# Patient Record
Sex: MALE | Race: WHITE | NOT HISPANIC OR LATINO | Employment: UNEMPLOYED | URBAN - METROPOLITAN AREA
[De-identification: names, ages, dates, MRNs, and addresses within clinical notes are randomized per-mention and may not be internally consistent; named-entity substitution may affect disease eponyms.]

---

## 2019-03-06 ENCOUNTER — HISTORICAL (OUTPATIENT)
Dept: INTERNAL MEDICINE | Facility: CLINIC | Age: 59
End: 2019-03-06

## 2019-03-06 LAB
ABS NEUT (OLG): 5.18 X10(3)/MCL (ref 2.1–9.2)
ALBUMIN SERPL-MCNC: 2.6 GM/DL (ref 3.4–5)
ALBUMIN/GLOB SERPL: 0.5 RATIO (ref 1.1–2)
ALP SERPL-CCNC: 76 UNIT/L (ref 45–117)
ALT SERPL-CCNC: 13 UNIT/L (ref 12–78)
APPEARANCE, UA: CLEAR
AST SERPL-CCNC: 19 UNIT/L (ref 15–37)
BACTERIA #/AREA URNS AUTO: ABNORMAL /[HPF]
BASOPHILS # BLD AUTO: 0.04 X10(3)/MCL
BASOPHILS NFR BLD AUTO: 0 %
BILIRUB SERPL-MCNC: 0.5 MG/DL (ref 0.2–1)
BILIRUB UR QL STRIP: NEGATIVE
BILIRUBIN DIRECT+TOT PNL SERPL-MCNC: 0.2 MG/DL
BILIRUBIN DIRECT+TOT PNL SERPL-MCNC: 0.3 MG/DL
BUN SERPL-MCNC: 12 MG/DL (ref 7–18)
CALCIUM SERPL-MCNC: 8 MG/DL (ref 8.5–10.1)
CHLORIDE SERPL-SCNC: 99 MMOL/L (ref 98–107)
CHOLEST SERPL-MCNC: 90 MG/DL
CHOLEST/HDLC SERPL: 2.4 {RATIO} (ref 0–5)
CO2 SERPL-SCNC: 26 MMOL/L (ref 21–32)
COLOR UR: YELLOW
CREAT SERPL-MCNC: 0.9 MG/DL (ref 0.6–1.3)
EOSINOPHIL # BLD AUTO: 0.18 X10(3)/MCL
EOSINOPHIL NFR BLD AUTO: 2 %
ERYTHROCYTE [DISTWIDTH] IN BLOOD BY AUTOMATED COUNT: 14.5 % (ref 11.5–14.5)
EST. AVERAGE GLUCOSE BLD GHB EST-MCNC: 80 MG/DL
FOLATE SERPL-MCNC: 3.4 NG/ML (ref 3.1–17.5)
GLOBULIN SER-MCNC: 5.6 GM/ML (ref 2.3–3.5)
GLUCOSE (UA): NORMAL
GLUCOSE SERPL-MCNC: 69 MG/DL (ref 74–106)
HAV IGM SERPL QL IA: NONREACTIVE
HBA1C MFR BLD: 4.4 % (ref 4.2–6.3)
HBV CORE IGM SERPL QL IA: NONREACTIVE
HBV SURFACE AG SERPL QL IA: NEGATIVE
HCT VFR BLD AUTO: 36.9 % (ref 40–51)
HCV AB SERPL QL IA: REACTIVE
HDLC SERPL-MCNC: 38 MG/DL
HGB BLD-MCNC: 12.7 GM/DL (ref 13.5–17.5)
HGB UR QL STRIP: >1 MG/DL
HIV 1+2 AB+HIV1 P24 AG SERPL QL IA: NONREACTIVE
HYALINE CASTS #/AREA URNS LPF: ABNORMAL /[LPF]
IMM GRANULOCYTES # BLD AUTO: 0.04 10*3/UL
IMM GRANULOCYTES NFR BLD AUTO: 0 %
KETONES UR QL STRIP: NEGATIVE
LDLC SERPL CALC-MCNC: 37 MG/DL (ref 0–130)
LEUKOCYTE ESTERASE UR QL STRIP: NEGATIVE
LYMPHOCYTES # BLD AUTO: 2.47 X10(3)/MCL
LYMPHOCYTES NFR BLD AUTO: 28 % (ref 13–40)
MCH RBC QN AUTO: 34.6 PG (ref 26–34)
MCHC RBC AUTO-ENTMCNC: 34.4 GM/DL (ref 31–37)
MCV RBC AUTO: 100.5 FL (ref 80–100)
MONOCYTES # BLD AUTO: 0.78 X10(3)/MCL
MONOCYTES NFR BLD AUTO: 9 % (ref 4–12)
NEUTROPHILS # BLD AUTO: 5.18 X10(3)/MCL
NEUTROPHILS NFR BLD AUTO: 60 X10(3)/MCL
NITRITE UR QL STRIP: NEGATIVE
PH UR STRIP: 7 [PH] (ref 4.5–8)
PLATELET # BLD AUTO: 170 X10(3)/MCL (ref 130–400)
PMV BLD AUTO: 10.2 FL (ref 7.4–10.4)
POTASSIUM SERPL-SCNC: 4.1 MMOL/L (ref 3.5–5.1)
PROT SERPL-MCNC: 8.2 GM/DL (ref 6.4–8.2)
PROT UR QL STRIP: 10 MG/DL
PSA SERPL-MCNC: 0.6 NG/ML
RBC # BLD AUTO: 3.67 X10(6)/MCL (ref 4.5–5.9)
RBC #/AREA URNS AUTO: ABNORMAL /[HPF]
SODIUM SERPL-SCNC: 132 MMOL/L (ref 136–145)
SP GR UR STRIP: 1.01 (ref 1–1.03)
SQUAMOUS #/AREA URNS LPF: ABNORMAL /[LPF]
T PALLIDUM AB SER QL: NONREACTIVE
TRIGL SERPL-MCNC: 77 MG/DL
TSH SERPL-ACNC: 2.27 MIU/L (ref 0.36–3.74)
UROBILINOGEN UR STRIP-ACNC: 2 MG/DL
VIT B12 SERPL-MCNC: 182 PG/ML (ref 193–986)
VLDLC SERPL CALC-MCNC: 15 MG/DL
WBC # SPEC AUTO: 8.7 X10(3)/MCL (ref 4.5–11)
WBC #/AREA URNS AUTO: ABNORMAL /HPF

## 2019-03-08 LAB — FINAL CULTURE: NO GROWTH

## 2019-05-21 ENCOUNTER — HISTORICAL (OUTPATIENT)
Dept: ADMINISTRATIVE | Facility: HOSPITAL | Age: 59
End: 2019-05-21

## 2019-05-21 LAB
ALBUMIN SERPL-MCNC: 3 GM/DL (ref 3.4–5)
ALBUMIN/GLOB SERPL: 0.5 RATIO (ref 1.1–2)
ALP SERPL-CCNC: 79 UNIT/L (ref 45–117)
ALT SERPL-CCNC: 21 UNIT/L (ref 12–78)
APTT PPP: 29.9 SECOND(S) (ref 23.3–37)
AST SERPL-CCNC: 31 UNIT/L (ref 15–37)
BILIRUB SERPL-MCNC: 1.2 MG/DL (ref 0.2–1)
BILIRUBIN DIRECT+TOT PNL SERPL-MCNC: 0.5 MG/DL
BILIRUBIN DIRECT+TOT PNL SERPL-MCNC: 0.7 MG/DL
BUN SERPL-MCNC: 6 MG/DL (ref 7–18)
CALCIUM SERPL-MCNC: 8.7 MG/DL (ref 8.5–10.1)
CHLORIDE SERPL-SCNC: 95 MMOL/L (ref 98–107)
CO2 SERPL-SCNC: 27 MMOL/L (ref 21–32)
CREAT SERPL-MCNC: 0.9 MG/DL (ref 0.6–1.3)
ERYTHROCYTE [DISTWIDTH] IN BLOOD BY AUTOMATED COUNT: 12.8 % (ref 11.5–14.5)
GLOBULIN SER-MCNC: 5.5 GM/ML (ref 2.3–3.5)
GLUCOSE SERPL-MCNC: 72 MG/DL (ref 74–106)
HCT VFR BLD AUTO: 37.6 % (ref 40–51)
HGB BLD-MCNC: 13.6 GM/DL (ref 13.5–17.5)
INR PPP: 1.03 (ref 0.9–1.2)
MCH RBC QN AUTO: 37.7 PG (ref 26–34)
MCHC RBC AUTO-ENTMCNC: 36.2 GM/DL (ref 31–37)
MCV RBC AUTO: 104.2 FL (ref 80–100)
PLATELET # BLD AUTO: 191 X10(3)/MCL (ref 130–400)
PMV BLD AUTO: 9.9 FL (ref 7.4–10.4)
POTASSIUM SERPL-SCNC: 4.2 MMOL/L (ref 3.5–5.1)
PROT SERPL-MCNC: 8.5 GM/DL (ref 6.4–8.2)
PROTHROMBIN TIME: 13.4 SECOND(S) (ref 11.9–14.4)
RBC # BLD AUTO: 3.61 X10(6)/MCL (ref 4.5–5.9)
SODIUM SERPL-SCNC: 130 MMOL/L (ref 136–145)
WBC # SPEC AUTO: 7.9 X10(3)/MCL (ref 4.5–11)

## 2019-05-24 ENCOUNTER — HISTORICAL (OUTPATIENT)
Dept: SURGERY | Facility: HOSPITAL | Age: 59
End: 2019-05-24

## 2019-05-24 LAB
ETHANOL SERPL-MCNC: <3 MG/DL
POTASSIUM SERPL-SCNC: 4.8 MMOL/L (ref 3.5–5.1)

## 2019-05-31 ENCOUNTER — HOSPITAL ENCOUNTER (OUTPATIENT)
Dept: MEDSURG UNIT | Facility: HOSPITAL | Age: 59
End: 2019-06-01
Attending: OTOLARYNGOLOGY | Admitting: OTOLARYNGOLOGY

## 2019-05-31 LAB — POTASSIUM SERPL-SCNC: 4.7 MMOL/L (ref 3.5–5.1)

## 2019-06-28 ENCOUNTER — HISTORICAL (OUTPATIENT)
Dept: SURGERY | Facility: HOSPITAL | Age: 59
End: 2019-06-28

## 2019-06-28 LAB
ABS NEUT (OLG): 7.17 X10(3)/MCL (ref 2.1–9.2)
BASOPHILS # BLD AUTO: 0.06 X10(3)/MCL
BASOPHILS NFR BLD AUTO: 0 %
BUN SERPL-MCNC: 7 MG/DL (ref 7–18)
CALCIUM SERPL-MCNC: 9.3 MG/DL (ref 8.5–10.1)
CHLORIDE SERPL-SCNC: 98 MMOL/L (ref 98–107)
CO2 SERPL-SCNC: 24 MMOL/L (ref 21–32)
CREAT SERPL-MCNC: 1.1 MG/DL (ref 0.6–1.3)
CREAT/UREA NIT SERPL: 6
EOSINOPHIL # BLD AUTO: 0.17 X10(3)/MCL
EOSINOPHIL NFR BLD AUTO: 2 %
ERYTHROCYTE [DISTWIDTH] IN BLOOD BY AUTOMATED COUNT: 12 % (ref 11.5–14.5)
GLUCOSE SERPL-MCNC: 115 MG/DL (ref 74–106)
HCT VFR BLD AUTO: 40.5 % (ref 40–51)
HGB BLD-MCNC: 13.9 GM/DL (ref 13.5–17.5)
IMM GRANULOCYTES # BLD AUTO: 0.08 10*3/UL
IMM GRANULOCYTES NFR BLD AUTO: 1 %
LYMPHOCYTES # BLD AUTO: 2.93 X10(3)/MCL
LYMPHOCYTES NFR BLD AUTO: 26 % (ref 13–40)
MCH RBC QN AUTO: 37 PG (ref 26–34)
MCHC RBC AUTO-ENTMCNC: 34.3 GM/DL (ref 31–37)
MCV RBC AUTO: 107.7 FL (ref 80–100)
MONOCYTES # BLD AUTO: 0.98 X10(3)/MCL
MONOCYTES NFR BLD AUTO: 9 % (ref 4–12)
NEUTROPHILS # BLD AUTO: 7.17 X10(3)/MCL
NEUTROPHILS NFR BLD AUTO: 63 X10(3)/MCL
PLATELET # BLD AUTO: 218 X10(3)/MCL (ref 130–400)
PMV BLD AUTO: 9.2 FL (ref 7.4–10.4)
POTASSIUM SERPL-SCNC: 3.9 MMOL/L (ref 3.5–5.1)
RBC # BLD AUTO: 3.76 X10(6)/MCL (ref 4.5–5.9)
SODIUM SERPL-SCNC: 130 MMOL/L (ref 136–145)
WBC # SPEC AUTO: 11.4 X10(3)/MCL (ref 4.5–11)

## 2019-07-10 ENCOUNTER — HISTORICAL (OUTPATIENT)
Dept: RESPIRATORY THERAPY | Facility: HOSPITAL | Age: 59
End: 2019-07-10

## 2019-07-26 ENCOUNTER — HISTORICAL (OUTPATIENT)
Dept: SURGERY | Facility: HOSPITAL | Age: 59
End: 2019-07-26

## 2020-04-28 ENCOUNTER — HISTORICAL (OUTPATIENT)
Dept: INTERNAL MEDICINE | Facility: CLINIC | Age: 60
End: 2020-04-28

## 2020-04-28 LAB
ABS NEUT (OLG): 3.79 X10(3)/MCL (ref 2.1–9.2)
ALBUMIN SERPL-MCNC: 2.8 GM/DL (ref 3.4–5)
ALBUMIN/GLOB SERPL: 0.6 RATIO (ref 1.1–2)
ALP SERPL-CCNC: 66 UNIT/L (ref 45–117)
ALT SERPL-CCNC: 17 UNIT/L (ref 12–78)
AST SERPL-CCNC: 23 UNIT/L (ref 15–37)
BASOPHILS # BLD AUTO: 0 X10(3)/MCL (ref 0–0.2)
BASOPHILS NFR BLD AUTO: 1 %
BILIRUB SERPL-MCNC: 0.6 MG/DL (ref 0.2–1)
BILIRUBIN DIRECT+TOT PNL SERPL-MCNC: 0.2 MG/DL (ref 0–0.2)
BILIRUBIN DIRECT+TOT PNL SERPL-MCNC: 0.4 MG/DL
BUN SERPL-MCNC: 7 MG/DL (ref 7–18)
CALCIUM SERPL-MCNC: 8.6 MG/DL (ref 8.5–10.1)
CHLORIDE SERPL-SCNC: 106 MMOL/L (ref 98–107)
CO2 SERPL-SCNC: 27 MMOL/L (ref 21–32)
CREAT SERPL-MCNC: 0.9 MG/DL (ref 0.6–1.3)
EOSINOPHIL # BLD AUTO: 0.3 X10(3)/MCL (ref 0–0.9)
EOSINOPHIL NFR BLD AUTO: 5 %
ERYTHROCYTE [DISTWIDTH] IN BLOOD BY AUTOMATED COUNT: 12.2 % (ref 11.5–14.5)
GLOBULIN SER-MCNC: 4.5 GM/ML (ref 2.3–3.5)
GLUCOSE SERPL-MCNC: 96 MG/DL (ref 74–106)
HCT VFR BLD AUTO: 39.5 % (ref 40–51)
HGB BLD-MCNC: 13.6 GM/DL (ref 13.5–17.5)
IMM GRANULOCYTES # BLD AUTO: 0.01 10*3/UL
IMM GRANULOCYTES NFR BLD AUTO: 0 %
LYMPHOCYTES # BLD AUTO: 2.3 X10(3)/MCL (ref 0.6–4.6)
LYMPHOCYTES NFR BLD AUTO: 32 %
MCH RBC QN AUTO: 34.4 PG (ref 26–34)
MCHC RBC AUTO-ENTMCNC: 34.4 GM/DL (ref 31–37)
MCV RBC AUTO: 100 FL (ref 80–100)
MONOCYTES # BLD AUTO: 0.7 X10(3)/MCL (ref 0.1–1.3)
MONOCYTES NFR BLD AUTO: 10 %
NEUTROPHILS # BLD AUTO: 3.79 X10(3)/MCL (ref 2.1–9.2)
NEUTROPHILS NFR BLD AUTO: 53 %
PLATELET # BLD AUTO: 231 X10(3)/MCL (ref 130–400)
PMV BLD AUTO: 9.3 FL (ref 7.4–10.4)
POTASSIUM SERPL-SCNC: 4.4 MMOL/L (ref 3.5–5.1)
PROT SERPL-MCNC: 7.3 GM/DL (ref 6.4–8.2)
RBC # BLD AUTO: 3.95 X10(6)/MCL (ref 4.5–5.9)
SODIUM SERPL-SCNC: 138 MMOL/L (ref 136–145)
WBC # SPEC AUTO: 7.2 X10(3)/MCL (ref 4.5–11)

## 2020-12-22 ENCOUNTER — HISTORICAL (OUTPATIENT)
Dept: INTERNAL MEDICINE | Facility: CLINIC | Age: 60
End: 2020-12-22

## 2020-12-22 LAB
ABS NEUT (OLG): 5.16 X10(3)/MCL (ref 2.1–9.2)
ALBUMIN SERPL-MCNC: 3.8 GM/DL (ref 3.4–4.8)
ALBUMIN/GLOB SERPL: 0.8 RATIO (ref 1.1–2)
ALP SERPL-CCNC: 62 UNIT/L (ref 40–150)
ALT SERPL-CCNC: 22 UNIT/L (ref 0–55)
APPEARANCE, UA: CLEAR
AST SERPL-CCNC: 40 UNIT/L (ref 5–34)
BACTERIA #/AREA URNS AUTO: ABNORMAL /HPF
BASOPHILS # BLD AUTO: 0 X10(3)/MCL (ref 0–0.2)
BASOPHILS NFR BLD AUTO: 0 %
BILIRUB SERPL-MCNC: 1 MG/DL
BILIRUB UR QL STRIP: NEGATIVE
BILIRUBIN DIRECT+TOT PNL SERPL-MCNC: 0.5 MG/DL (ref 0–0.5)
BILIRUBIN DIRECT+TOT PNL SERPL-MCNC: 0.5 MG/DL (ref 0–0.8)
BUN SERPL-MCNC: 5 MG/DL (ref 8.4–25.7)
CALCIUM SERPL-MCNC: 9.3 MG/DL (ref 8.8–10)
CHLORIDE SERPL-SCNC: 95 MMOL/L (ref 98–107)
CHOLEST SERPL-MCNC: 125 MG/DL
CHOLEST/HDLC SERPL: 5 {RATIO} (ref 0–5)
CO2 SERPL-SCNC: 25 MMOL/L (ref 23–31)
COLOR UR: COLORLESS
CREAT SERPL-MCNC: 0.98 MG/DL (ref 0.73–1.18)
DEPRECATED CALCIDIOL+CALCIFEROL SERPL-MC: 15.2 NG/ML (ref 30–80)
EOSINOPHIL # BLD AUTO: 0.3 X10(3)/MCL (ref 0–0.9)
EOSINOPHIL NFR BLD AUTO: 4 %
ERYTHROCYTE [DISTWIDTH] IN BLOOD BY AUTOMATED COUNT: 12.6 % (ref 11.5–14.5)
EST. AVERAGE GLUCOSE BLD GHB EST-MCNC: 85.3 MG/DL
GLOBULIN SER-MCNC: 4.7 GM/DL (ref 2.4–3.5)
GLUCOSE (UA): NEGATIVE
GLUCOSE SERPL-MCNC: 117 MG/DL (ref 82–115)
HBA1C MFR BLD: 4.6 %
HCT VFR BLD AUTO: 40.8 % (ref 40–51)
HDLC SERPL-MCNC: 24 MG/DL (ref 35–60)
HGB BLD-MCNC: 14.3 GM/DL (ref 13.5–17.5)
HGB UR QL STRIP: NEGATIVE
HYALINE CASTS #/AREA URNS LPF: ABNORMAL /LPF
IMM GRANULOCYTES # BLD AUTO: 0.03 10*3/UL
IMM GRANULOCYTES NFR BLD AUTO: 0 %
KETONES UR QL STRIP: NEGATIVE
LDLC SERPL CALC-MCNC: 72 MG/DL (ref 50–140)
LEUKOCYTE ESTERASE UR QL STRIP: NEGATIVE
LYMPHOCYTES # BLD AUTO: 2 X10(3)/MCL (ref 0.6–4.6)
LYMPHOCYTES NFR BLD AUTO: 24 %
MCH RBC QN AUTO: 38.6 PG (ref 26–34)
MCHC RBC AUTO-ENTMCNC: 35 GM/DL (ref 31–37)
MCV RBC AUTO: 110.3 FL (ref 80–100)
MONOCYTES # BLD AUTO: 0.7 X10(3)/MCL (ref 0.1–1.3)
MONOCYTES NFR BLD AUTO: 8 %
NEUTROPHILS # BLD AUTO: 5.16 X10(3)/MCL (ref 2.1–9.2)
NEUTROPHILS NFR BLD AUTO: 63 %
NITRITE UR QL STRIP: NEGATIVE
PH UR STRIP: 7 [PH] (ref 4.5–8)
PLATELET # BLD AUTO: 212 X10(3)/MCL (ref 130–400)
PMV BLD AUTO: 9.1 FL (ref 7.4–10.4)
POTASSIUM SERPL-SCNC: 4 MMOL/L (ref 3.5–5.1)
PROT SERPL-MCNC: 8.5 GM/DL (ref 5.8–7.6)
PROT UR QL STRIP: NEGATIVE
RBC # BLD AUTO: 3.7 X10(6)/MCL (ref 4.5–5.9)
RBC #/AREA URNS AUTO: ABNORMAL /HPF
SODIUM SERPL-SCNC: 131 MMOL/L (ref 136–145)
SP GR UR STRIP: 1 (ref 1–1.03)
SQUAMOUS #/AREA URNS LPF: ABNORMAL /LPF
TRIGL SERPL-MCNC: 146 MG/DL (ref 34–140)
TSH SERPL-ACNC: 1.26 UIU/ML (ref 0.35–4.94)
UROBILINOGEN UR STRIP-ACNC: NORMAL
VLDLC SERPL CALC-MCNC: 29 MG/DL
WBC # SPEC AUTO: 8.2 X10(3)/MCL (ref 4.5–11)
WBC #/AREA URNS AUTO: ABNORMAL /HPF

## 2021-02-11 ENCOUNTER — HISTORICAL (OUTPATIENT)
Dept: RADIOLOGY | Facility: HOSPITAL | Age: 61
End: 2021-02-11

## 2022-04-10 ENCOUNTER — HISTORICAL (OUTPATIENT)
Dept: ADMINISTRATIVE | Facility: HOSPITAL | Age: 62
End: 2022-04-10

## 2022-04-28 VITALS
HEIGHT: 69 IN | WEIGHT: 162.06 LBS | DIASTOLIC BLOOD PRESSURE: 83 MMHG | SYSTOLIC BLOOD PRESSURE: 126 MMHG | BODY MASS INDEX: 24 KG/M2 | OXYGEN SATURATION: 96 %

## 2022-05-04 NOTE — HISTORICAL [facility] CERNER
This is a historical note converted from [employer]. Formatting and pictures may have been removed.  Please reference [employer]ner for original formatting and attached multimedia. Indication for Surgery  Nasal alar squamous cell carcinoma  Preoperative Diagnosis  Nasal alar squamous cell carcinoma  Postoperative Diagnosis  Nasal alar squamous cell carcinoma  Operation  Wide local excision of left nasal alar squamous cell carcinoma  Surgeon(s)  LY Boggs MD  Assistant  JOSELIN Tapia MD  Anesthesia  General  Estimated Blood Loss  10cc  Findings  2x2cm left alar ulcerative squamous cell carcinoma  Specimen(s)  Routine:  Left nasal alar lesion  ?  Frozen:  Left nasal alar lesion tip  Left nasal alar lesion supratip  Left nasal alar lesion dorsal sidewall  Left nasal alar lesion infraorbital  Left nasal alar lesion cheek  Left nasal alar lesion upper lip  Left nasal alar lesion deep margin  Complications  None  Technique  After informed consent was obtained, the patient was brought back to the operating room and placed under general anesthesia. The patient was prepped and draped appropriately. 1cm margins were marked around the left nasal alar lesion, and 3cc lidocaine 1% with epinephrine was injected. A 15 blade was used to circumferentially incise around the lesion, and frozen margins were taken as noted above. The lesion was?palpated, and it was felt that the entire ala would need to be taken to clear deep margins. A combination of a 15 blade and curved iris scissors were used to remove a large segment of the lower lateral cartilage and skin, leaving a defect in the ala. Frozen margins returned negative for pathology. A xeroform bolster was then placed and sutured into place over the defect with intention to reconstruct at a later date. He was then turned back to anesthesia for reversal and transport to PACU in good condition. The patient tolerated the procedure well and without complication. ?  ?  ?  Kyle Boggs MD  U  Otolaryngology PGY-2
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
## 2022-05-04 NOTE — HISTORICAL [facility] CERNER
This is a historical note converted from [employer]. Formatting and pictures may have been removed.  Please reference [employer] for original formatting and attached multimedia. Indication for Surgery  Nasal skin cancer  Preoperative Diagnosis  SCC of left nasal ala  Postoperative Diagnosis  SCC of left nasal ala  Left nasal valve collapse  Operation  Right conchal cartilage harvest  Non-anatomic nasal ala graft of conchal cartilage into paramedian flap  Thinning/revision of paramedia flap  Surgeon(s)  Katalina Head MD  ?  Staff: Remington Grimm MD  Assistant  Kyle Odom MD  Anesthesia  GETA  Estimated Blood Loss  5cc  Urine Output  None  Findings  Edematous/thickened distal paramedian flap with collapse of external nasal valve  Specimen(s)  None  Complications  None  Technique  59yoM with left nasal ala s/p WLE/partial rhinectomy and reconstruction with paramedian forehead flap. He presents today for intermediate stage revision of his paramedian flap and non-anatomic conchal cartilage graft. He was met in preop. R/B/A were discussed and consent was obtained. He was brought down to the operating room. GETA was administered by the anesthesia team. Timeout was performed verifying patients name/procedure/allergies/laterality. Patient was turned 180 degrees. 2% lidocaine with 1:100,000 epinephrine was injected subcutaneously into the right conchal bowl as awell as the distal portion of the paramedian flap. Patient was then prepped and draped in usual sterile fashion. 15 blade was used to make skin incisoin over most posterior edge of conchal bowl along the crease of the antihelix. Using double pronged skin hook and curved irsi scissors, anterior skin flap was elevated off of the conchal cartilage to its most medial edge. 15 blade was then used to?make an incision through?the medial edge of the cartilage to be harvested. The cartilage was then harvested in lateral to medial fashion on its posterior edge.  The conchal bowl skin was closed with 3-0 chromic in insterrupted fashion. Mineral oil and cotton ball was then used to fashion a bolster dressing and sutrued in with 3-0 silk. The conchal cartilage was handed over to the back table to be saved for grafting. We then turned our attention to the paramedian flap. The flap was noted to have generalized edema with collapse/compression of external nasal valve. Incision was made at the caudal/distal edge. The space between the sandwiched galeal layers were found and dissected out. Flap was debulked with curved iris scissors on both edges taking care to preserve subcutaneous fat. The collected cartilag was then grafted into the pocket iwhtin the paramedian with its long axis laying horizontally. The flap was then closed with 3-0 nylons in interrupted fashion. 26Fr pediatric nasal trumpet was cut and placed in left external nasal valve to stent open the paramedian.  ?  Patient was then cleaned off and turned over to anesthesia for emergence. He was extubated and transferred to recovery in stable condition.  ?  All counts correct x2. Dr. Grimm was present for the critcal poritons of the case.  ?  Katalina Nelson MD  [facility] HO[facility]

## 2023-06-06 ENCOUNTER — HOSPITAL ENCOUNTER (EMERGENCY)
Facility: HOSPITAL | Age: 63
Discharge: ANOTHER HOSPITAL | End: 2023-06-07
Attending: FAMILY MEDICINE
Payer: MEDICAID

## 2023-06-06 DIAGNOSIS — D64.9 ANEMIA, UNSPECIFIED TYPE: ICD-10-CM

## 2023-06-06 DIAGNOSIS — E87.1 HYPONATREMIA: Primary | ICD-10-CM

## 2023-06-06 DIAGNOSIS — J90 PLEURAL EFFUSION ON RIGHT: ICD-10-CM

## 2023-06-06 DIAGNOSIS — R07.9 CHEST PAIN: ICD-10-CM

## 2023-06-06 LAB
ALBUMIN SERPL-MCNC: 3 G/DL (ref 3.4–4.8)
ALBUMIN/GLOB SERPL: 0.6 RATIO (ref 1.1–2)
ALP SERPL-CCNC: 66 UNIT/L (ref 40–150)
ALT SERPL-CCNC: 9 UNIT/L (ref 0–55)
APPEARANCE UR: CLEAR
AST SERPL-CCNC: 16 UNIT/L (ref 5–34)
BACTERIA #/AREA URNS AUTO: NORMAL /HPF
BASOPHILS # BLD AUTO: 0.03 X10(3)/MCL
BASOPHILS NFR BLD AUTO: 0.3 %
BILIRUB UR QL STRIP.AUTO: NEGATIVE MG/DL
BILIRUBIN DIRECT+TOT PNL SERPL-MCNC: 0.6 MG/DL
BNP BLD-MCNC: 999.4 PG/ML
BUN SERPL-MCNC: 14.6 MG/DL (ref 8.4–25.7)
CALCIUM SERPL-MCNC: 8.3 MG/DL (ref 8.8–10)
CHLORIDE SERPL-SCNC: 94 MMOL/L (ref 98–107)
CO2 SERPL-SCNC: 22 MMOL/L (ref 23–31)
COLOR UR: YELLOW
CREAT SERPL-MCNC: 1.17 MG/DL (ref 0.73–1.18)
D DIMER PPP IA.FEU-MCNC: 3.62 UG/ML FEU (ref 0–0.5)
EOSINOPHIL # BLD AUTO: 0 X10(3)/MCL (ref 0–0.9)
EOSINOPHIL NFR BLD AUTO: 0 %
ERYTHROCYTE [DISTWIDTH] IN BLOOD BY AUTOMATED COUNT: 21.1 % (ref 11.5–17)
ETHANOL SERPL-MCNC: 37 MG/DL
FLUAV AG UPPER RESP QL IA.RAPID: NOT DETECTED
FLUBV AG UPPER RESP QL IA.RAPID: NOT DETECTED
GFR SERPLBLD CREATININE-BSD FMLA CKD-EPI: >60 MLS/MIN/1.73/M2
GLOBULIN SER-MCNC: 4.7 GM/DL (ref 2.4–3.5)
GLUCOSE SERPL-MCNC: 87 MG/DL (ref 82–115)
GLUCOSE UR QL STRIP.AUTO: NEGATIVE MG/DL
GROUP & RH: NORMAL
HCT VFR BLD AUTO: 18 % (ref 42–52)
HEMOCCULT SP1 STL QL: NEGATIVE
HGB BLD-MCNC: 5 G/DL (ref 14–18)
INDIRECT COOMBS GEL: NORMAL
KETONES UR QL STRIP.AUTO: NEGATIVE MG/DL
LEUKOCYTE ESTERASE UR QL STRIP.AUTO: NEGATIVE UNIT/L
LYMPHOCYTES # BLD AUTO: 1.32 X10(3)/MCL (ref 0.6–4.6)
LYMPHOCYTES NFR BLD AUTO: 14.7 %
MCH RBC QN AUTO: 20.7 PG (ref 27–31)
MCHC RBC AUTO-ENTMCNC: 27.8 G/DL (ref 33–36)
MCV RBC AUTO: 74.4 FL (ref 80–94)
MONOCYTES # BLD AUTO: 1.12 X10(3)/MCL (ref 0.1–1.3)
MONOCYTES NFR BLD AUTO: 12.5 %
NEUTROPHILS # BLD AUTO: 6.45 X10(3)/MCL (ref 2.1–9.2)
NEUTROPHILS NFR BLD AUTO: 72.1 %
NITRITE UR QL STRIP.AUTO: NEGATIVE
PH UR STRIP.AUTO: 6.5 [PH]
PLATELET # BLD AUTO: 277 X10(3)/MCL (ref 130–400)
PMV BLD AUTO: 9.6 FL (ref 7.4–10.4)
POTASSIUM SERPL-SCNC: 4.4 MMOL/L (ref 3.5–5.1)
PROT SERPL-MCNC: 7.7 GM/DL (ref 5.8–7.6)
PROT UR QL STRIP.AUTO: NEGATIVE MG/DL
RBC # BLD AUTO: 2.42 X10(6)/MCL (ref 4.7–6.1)
RBC #/AREA URNS AUTO: NORMAL /HPF
RBC UR QL AUTO: NEGATIVE UNIT/L
SARS-COV-2 RNA RESP QL NAA+PROBE: NOT DETECTED
SODIUM SERPL-SCNC: 124 MMOL/L (ref 136–145)
SP GR UR STRIP.AUTO: 1.01
SPECIMEN OUTDATE: NORMAL
SQUAMOUS #/AREA URNS AUTO: NORMAL /HPF
TROPONIN ISTAT: 0.01 NG/ML
UROBILINOGEN UR STRIP-ACNC: 1 MG/DL
WBC # SPEC AUTO: 8.96 X10(3)/MCL (ref 4.5–11.5)
WBC #/AREA URNS AUTO: NORMAL /HPF

## 2023-06-06 PROCEDURE — 86920 COMPATIBILITY TEST SPIN: CPT | Performed by: SPECIALIST

## 2023-06-06 PROCEDURE — 85379 FIBRIN DEGRADATION QUANT: CPT | Performed by: FAMILY MEDICINE

## 2023-06-06 PROCEDURE — 83935 ASSAY OF URINE OSMOLALITY: CPT | Performed by: SPECIALIST

## 2023-06-06 PROCEDURE — 82270 OCCULT BLOOD FECES: CPT | Performed by: SPECIALIST

## 2023-06-06 PROCEDURE — 25000242 PHARM REV CODE 250 ALT 637 W/ HCPCS: Performed by: FAMILY MEDICINE

## 2023-06-06 PROCEDURE — 81003 URINALYSIS AUTO W/O SCOPE: CPT | Performed by: FAMILY MEDICINE

## 2023-06-06 PROCEDURE — 94640 AIRWAY INHALATION TREATMENT: CPT

## 2023-06-06 PROCEDURE — 96374 THER/PROPH/DIAG INJ IV PUSH: CPT

## 2023-06-06 PROCEDURE — 86900 BLOOD TYPING SEROLOGIC ABO: CPT | Performed by: SPECIALIST

## 2023-06-06 PROCEDURE — C9113 INJ PANTOPRAZOLE SODIUM, VIA: HCPCS | Performed by: SPECIALIST

## 2023-06-06 PROCEDURE — 83880 ASSAY OF NATRIURETIC PEPTIDE: CPT | Performed by: FAMILY MEDICINE

## 2023-06-06 PROCEDURE — 80053 COMPREHEN METABOLIC PANEL: CPT | Performed by: FAMILY MEDICINE

## 2023-06-06 PROCEDURE — 82077 ASSAY SPEC XCP UR&BREATH IA: CPT | Performed by: SPECIALIST

## 2023-06-06 PROCEDURE — 36430 TRANSFUSION BLD/BLD COMPNT: CPT

## 2023-06-06 PROCEDURE — 94760 N-INVAS EAR/PLS OXIMETRY 1: CPT

## 2023-06-06 PROCEDURE — 85025 COMPLETE CBC W/AUTO DIFF WBC: CPT | Performed by: FAMILY MEDICINE

## 2023-06-06 PROCEDURE — P9016 RBC LEUKOCYTES REDUCED: HCPCS | Performed by: SPECIALIST

## 2023-06-06 PROCEDURE — 83930 ASSAY OF BLOOD OSMOLALITY: CPT | Performed by: SPECIALIST

## 2023-06-06 PROCEDURE — 93005 ELECTROCARDIOGRAM TRACING: CPT

## 2023-06-06 PROCEDURE — 93010 ELECTROCARDIOGRAM REPORT: CPT | Mod: ,,, | Performed by: INTERNAL MEDICINE

## 2023-06-06 PROCEDURE — 25000003 PHARM REV CODE 250: Performed by: SPECIALIST

## 2023-06-06 PROCEDURE — 63600175 PHARM REV CODE 636 W HCPCS: Performed by: SPECIALIST

## 2023-06-06 PROCEDURE — 93010 EKG 12-LEAD: ICD-10-PCS | Mod: ,,, | Performed by: INTERNAL MEDICINE

## 2023-06-06 PROCEDURE — 25500020 PHARM REV CODE 255: Performed by: SPECIALIST

## 2023-06-06 PROCEDURE — 99291 CRITICAL CARE FIRST HOUR: CPT

## 2023-06-06 PROCEDURE — 63600175 PHARM REV CODE 636 W HCPCS: Performed by: FAMILY MEDICINE

## 2023-06-06 PROCEDURE — 0240U COVID/FLU A&B PCR: CPT | Performed by: FAMILY MEDICINE

## 2023-06-06 PROCEDURE — 84300 ASSAY OF URINE SODIUM: CPT | Performed by: SPECIALIST

## 2023-06-06 PROCEDURE — 96375 TX/PRO/DX INJ NEW DRUG ADDON: CPT

## 2023-06-06 RX ORDER — HYDROCODONE BITARTRATE AND ACETAMINOPHEN 500; 5 MG/1; MG/1
TABLET ORAL
Status: DISCONTINUED | OUTPATIENT
Start: 2023-06-06 | End: 2023-06-07 | Stop reason: HOSPADM

## 2023-06-06 RX ORDER — METHYLPREDNISOLONE SOD SUCC 125 MG
125 VIAL (EA) INJECTION
Status: COMPLETED | OUTPATIENT
Start: 2023-06-06 | End: 2023-06-06

## 2023-06-06 RX ORDER — TRAMADOL HYDROCHLORIDE 50 MG/1
50 TABLET ORAL
Status: COMPLETED | OUTPATIENT
Start: 2023-06-06 | End: 2023-06-06

## 2023-06-06 RX ORDER — IPRATROPIUM BROMIDE AND ALBUTEROL SULFATE 2.5; .5 MG/3ML; MG/3ML
3 SOLUTION RESPIRATORY (INHALATION)
Status: COMPLETED | OUTPATIENT
Start: 2023-06-06 | End: 2023-06-06

## 2023-06-06 RX ORDER — FUROSEMIDE 10 MG/ML
40 INJECTION INTRAMUSCULAR; INTRAVENOUS
Status: COMPLETED | OUTPATIENT
Start: 2023-06-06 | End: 2023-06-06

## 2023-06-06 RX ORDER — PANTOPRAZOLE SODIUM 40 MG/10ML
40 INJECTION, POWDER, LYOPHILIZED, FOR SOLUTION INTRAVENOUS
Status: COMPLETED | OUTPATIENT
Start: 2023-06-06 | End: 2023-06-06

## 2023-06-06 RX ADMIN — PANTOPRAZOLE SODIUM 40 MG: 40 INJECTION, POWDER, FOR SOLUTION INTRAVENOUS at 07:06

## 2023-06-06 RX ADMIN — TRAMADOL HYDROCHLORIDE 50 MG: 50 TABLET, COATED ORAL at 08:06

## 2023-06-06 RX ADMIN — FUROSEMIDE 40 MG: 10 INJECTION, SOLUTION INTRAMUSCULAR; INTRAVENOUS at 05:06

## 2023-06-06 RX ADMIN — IOPAMIDOL 100 ML: 755 INJECTION, SOLUTION INTRAVENOUS at 06:06

## 2023-06-06 RX ADMIN — IPRATROPIUM BROMIDE AND ALBUTEROL SULFATE 3 ML: 2.5; .5 SOLUTION RESPIRATORY (INHALATION) at 05:06

## 2023-06-06 RX ADMIN — METHYLPREDNISOLONE SODIUM SUCCINATE 125 MG: 125 INJECTION, POWDER, FOR SOLUTION INTRAMUSCULAR; INTRAVENOUS at 05:06

## 2023-06-06 NOTE — ED PROVIDER NOTES
Encounter Date: 6/6/2023       History     Chief Complaint   Patient presents with    Leg Swelling     Bilateral leg swelling, weeping from one leg, audible wheezing     I, Dr. Hanson, assumed care from Dr. Escalera at 6:00 p.m.; patient arrived with bilateral leg swelling with oozing serous fluid from his right leg and some audible wheezing; patient is reported to be homeless and currently is not on any medications and denies any medical problems; patient has QUIÑONES and orthopnea; no chest pain, no fever    Old records reveal a history of alcoholic cirrhosis, history of hepatitis-C, ascites, anemia, COPD and squamous cell carcinoma of the nose    The history is provided by the patient.   Review of patient's allergies indicates:  No Known Allergies  History reviewed. No pertinent past medical history.  History reviewed. No pertinent surgical history.  History reviewed. No pertinent family history.     Review of Systems   Constitutional: Negative.    HENT: Negative.     Respiratory:  Positive for shortness of breath and wheezing.    Cardiovascular:  Positive for leg swelling.   Gastrointestinal: Negative.    Genitourinary: Negative.    Musculoskeletal: Negative.    Neurological: Negative.      Physical Exam     Initial Vitals [06/06/23 1702]   BP Pulse Resp Temp SpO2   (!) 103/53 110 (!) 24 98.9 °F (37.2 °C) 95 %      MAP       --         Physical Exam    Nursing note and vitals reviewed.  Constitutional: He appears well-developed and well-nourished.   HENT:   Head: Normocephalic and atraumatic.   Mouth/Throat: Oropharynx is clear and moist.   Eyes: EOM are normal. Pupils are equal, round, and reactive to light.   Neck: Neck supple.   Normal range of motion.  Cardiovascular:  Regular rhythm and normal heart sounds.   Tachycardia present.         Right lower extremity with 2+ pitting edema and some denuded skin with yellow serous discharge   Pulmonary/Chest: Breath sounds normal.   Abdominal: Abdomen is soft. Bowel sounds  are normal. He exhibits distension (Tympanic). There is no abdominal tenderness.   Rectal exam revealed normal tone, no rectal mass, tan stool There is no rebound and no guarding.   Musculoskeletal:         General: Normal range of motion.      Cervical back: Normal range of motion and neck supple.     Neurological: He is alert and oriented to person, place, and time.   Skin: Skin is warm and dry.   Excoriations left lateral upper back; scattered ecchymoses over the upper extremities/forearms       ED Course   Critical Care    Date/Time: 6/6/2023 6:00 PM  Performed by: Grabiel Hanson MD  Authorized by: Laron Escalera MD   Direct patient critical care time: 28 minutes  Additional history critical care time: 5 minutes  Ordering / reviewing critical care time: 7 minutes  Documentation critical care time: 7 minutes  Consulting other physicians critical care time: 4 minutes  Total critical care time (exclusive of procedural time) : 51 minutes  Critical care was necessary to treat or prevent imminent or life-threatening deterioration of the following conditions: metabolic crisis and circulatory failure (Anemia requiring blood transfusion).  Critical care was time spent personally by me on the following activities: evaluation of patient's response to treatment, examination of patient, obtaining history from patient or surrogate, ordering and performing treatments and interventions, ordering and review of laboratory studies, ordering and review of radiographic studies, re-evaluation of patient's condition and review of old charts.      Labs Reviewed   COMPREHENSIVE METABOLIC PANEL - Abnormal; Notable for the following components:       Result Value    Sodium Level 124 (*)     Chloride 94 (*)     Carbon Dioxide 22 (*)     Calcium Level Total 8.3 (*)     Protein Total 7.7 (*)     Albumin Level 3.0 (*)     Globulin 4.7 (*)     Albumin/Globulin Ratio 0.6 (*)     All other components within normal limits   B-TYPE NATRIURETIC  PEPTIDE - Abnormal; Notable for the following components:    Natriuretic Peptide 999.4 (*)     All other components within normal limits   D DIMER, QUANTITATIVE - Abnormal; Notable for the following components:    D-Dimer 3.62 (*)     All other components within normal limits   CBC WITH DIFFERENTIAL - Abnormal; Notable for the following components:    RBC 2.42 (*)     Hgb 5.0 (*)     Hct 18.0 (*)     MCV 74.4 (*)     MCH 20.7 (*)     MCHC 27.8 (*)     RDW 21.1 (*)     All other components within normal limits   ALCOHOL,MEDICAL (ETHANOL) - Abnormal; Notable for the following components:    Ethanol Level 37.0 (*)     All other components within normal limits   COVID/FLU A&B PCR - Normal    Narrative:     The Xpert Xpress SARS-CoV-2/FLU/RSV plus is a rapid, multiplexed real-time PCR test intended for the simultaneous qualitative detection and differentiation of SARS-CoV-2, Influenza A, Influenza B, and respiratory syncytial virus (RSV) viral RNA in either nasopharyngeal swab or nasal swab specimens.         URINALYSIS, MICROSCOPIC - Normal   OCCULT BLOOD STOOL, CA SCREEN - Normal   CBC W/ AUTO DIFFERENTIAL    Narrative:     The following orders were created for panel order CBC auto differential.  Procedure                               Abnormality         Status                     ---------                               -----------         ------                     CBC with Differential[[id number]]        Abnormal            Final result                 Please view results for these tests on the individual orders.   URINALYSIS, REFLEX TO URINE CULTURE   OCCULT BLOOD,STOOL,SCREEN (1-3)    Narrative:     The following orders were created for panel order Occult Blood, Stool Screening (1 -3).  Procedure                               Abnormality         Status                     ---------                               -----------         ------                     Occult Blood Stool, CA S...[[id number]]  Normal               Final result               OCCULT BLOOD STOOL, CA S...[[id number]]                                                   Please view results for these tests on the individual orders.   OCCULT BLOOD STOOL, CA SCREEN 2ND SPEC   OSMOLALITY, SERUM   OSMOLALITY, URINE RANDOM   SODIUM, URINE, RANDOM   TYPE & SCREEN   POCT TROPONIN   PREPARE RBC SOFT     EKG Readings: (Independently Interpreted)   Rhythm: Sinus Tachycardia. Heart Rate: 106. Ectopy: No Ectopy. Conduction: Normal. T Waves: Normal. Clinical Impression: Normal Sinus Rhythm   Nonspecific ST abnormality   ECG Results              EKG 12-lead (In process)  Result time 06/07/23 05:24:54      In process by Interface, Lab In [facility] (06/07/23 05:24:54)                   Narrative:    Test Reason : R07.9,    Vent. Rate : 106 BPM     Atrial Rate : 106 BPM     P-R Int : 140 ms          QRS Dur : 074 ms      QT Int : 344 ms       P-R-T Axes : 021 089 023 degrees     QTc Int : 456 ms    Sinus tachycardia  Nonspecific ST abnormality  Abnormal ECG  No previous ECGs available    Referred By: [email or web address]   SELF           Confirmed By:                                   Imaging Results              CTA Chest Non-Coronary (PE Studies) (Final result)  Result time 06/06/23 19:05:57      Final result by Etelvina Nolasco MD (06/06/23 19:05:57)                   Impression:      1. No evidence of pulmonary embolus  2. Moderate right pleural effusion with right basilar atelectasis      Electronically signed by: Etelvina Nolasco  Date:    06/06/2023  Time:    19:05               Narrative:    EXAMINATION:  CTA CHEST NON CORONARY (PE STUDIES)    CLINICAL HISTORY:  Pulmonary embolism (PE) suspected, positive D-dimer;    TECHNIQUE:  Helically acquired images with axial, sagittal and coronal reformations were obtained from the thoracic inlet to the lung bases followingthe IV administration of contrast.  CTA timed for evaluation of the pulmonary arteries.  MIP images were  performed.    Automated tube current modulation, weight-based exposure dosing, and/or iterative reconstruction technique utilized to reach lowest reasonably achievable exposure rate.    DLP: 732 mGy*cm    COMPARISON:  CT chest 02/11/2021    FINDINGS:  BASE OF NECK: No significant abnormality.    AORTA: Aortic atherosclerosis.    PULMONARY VASCULATURE: Pulmonary arteries enhance normally.  No evidence of pulmonary embolus.    HEART: Normal size. No effusion. There are coronary artery calcifications.    MYRON/MEDIASTINUM: No enlarged lymph nodes by size criteria.    AIRWAYS: Patent.    LUNGS/PLEURA: Right lower lobe and right middle lobe atelectasis.  Pulmonary emphysema.  Dependently layering moderate right pleural effusion.    UPPER ABDOMEN: Nodular thickening of the left adrenal gland is unchanged dating back to at least 2021.  No follow-up imaging is recommended per consensus recommendations based on imaging criteria.    THORACIC SOFT TISSUES: Gynecomastia    BONES: No acute fracture. No suspicious lytic or sclerotic lesions.                                       X-Ray Chest AP Portable (Final result)  Result time 06/06/23 17:25:19      Final result by Wilver Hernández MD (06/06/23 17:25:19)                   Impression:      Right-sided pleural effusion with right lower lobe area of atelectasis versus developing infiltrate      Electronically signed by: Wilver Hernández  Date:    06/06/2023  Time:    17:25               Narrative:    EXAMINATION:  XR CHEST AP PORTABLE    CLINICAL HISTORY:  Chest Pain;    TECHNIQUE:  Single frontal view of the chest was performed.    COMPARISON:  05/21/2019    FINDINGS:  There is a right-sided pleural effusion.  There is right lower lobe atelectasis versus developing infiltrate.  The heart is normal appearance.  The pulmonary vascularity is unremarkable.  Bones and joints show no acute abnormality.                                       Medications   0.9%  NaCl infusion (for  blood administration) (has no administration in time range)   furosemide injection 40 mg (40 mg Intravenous Given 6/6/23 1735)   albuterol-ipratropium 2.5 mg-0.5 mg/3 mL nebulizer solution 3 mL (3 mLs Nebulization Given 6/6/23 1738)   methylPREDNISolone sodium succinate injection 125 mg (125 mg Intravenous Given 6/6/23 1737)   iopamidoL (ISOVUE-370) injection 100 mL (100 mLs Intravenous Given 6/6/23 1850)   pantoprazole injection 40 mg (40 mg Intravenous Given 6/6/23 1946)   traMADoL tablet 50 mg (50 mg Oral Given 6/6/23 2006)   furosemide injection 40 mg (40 mg Intravenous Given 6/7/23 0258)   albuterol-ipratropium 2.5 mg-0.5 mg/3 mL nebulizer solution 3 mL (3 mLs Nebulization Given 6/7/23 0301)     Medical Decision Making:   Initial Assessment:   Patient arrived with shortness of breath and wheezing, leg swelling over several days; patient denies any medical problems and is currently on no medications; he is homeless; he denies chest pain, no nausea or vomiting;   Old records reveal a history of COPD, alcoholic cirrhosis, history of hepatitis-C, ascites, anemia, squamous cell carcinoma of the nose and left adrenal mass    Patient later did note dark stools and that he takes Tums fairly frequently; he denies a history of PUD or esophageal varices; he does note treatment with interferon for hepatitis-C when he lived in [place] many years ago; he also reports he had hepatitis-B and he was cured of that  Differential Diagnosis:   CHF, pleural effusion, pneumonia, pulmonary edema, electrolyte abnormality, anemia  Clinical Tests:   Lab Tests: Ordered and Reviewed       <> Summary of Lab: Hemoglobin 5; sodium 124; stool Hemoccult blood negative  Radiological Study: Ordered and Reviewed  ED Management:  Patient was given Lasix 40 mg IV, a DuoNeb treatment and Solu-Medrol 125 mg IV upon his initial arrival; I evaluated patient and he has minimal wheezing following the DuoNeb and is resting comfortably; his labs returned  revealing a significant anemia with hemoglobin of 5.0 g/dl, sodium of 124 mmol/L, and D-dimer 3.62 ug/ml; I have ordered a CT lungs PE protocol; CT was negative for pulmonary embolus and again showed the right pleural effusion; with patient's hemoglobin at 5 g/dl will order 2 units of packed red blood cells for transfusion  Other:   I have discussed this case with another health care provider.       <> Summary of the Discussion:   19:47- I reviewed the case with Dr. Green, hospitalist at our facility; I discussed the history, past medical history, physical exam and laboratory findings; due to the history of hepatitis-C, alcoholic cirrhosis and dark stools he requested we transfer for gastroenterology evaluation to rule out the possibility of esophageal varices and to further investigate for any possibility of GI bleed           ED Course as of 06/07/23 0556   Tue Jun 06, 2023 1821 RBC(!): 2.42 [DD]   1821 Hemoglobin(!!): 5.0 [DD]   1821 Hematocrit(!!): 18.0 [DD]   1821 Sodium(!): 124 [DD]   1822 Chloride(!): 94 [DD]   1822 Troponin I: 0.01 [DD]   2102 Accepted at [facility] by Dr. Sotelo; I spoke with his nurse practitioner on-call [DD]   Wed Jun 07, 2023 0033 Prepare RBC 2 Units; Emergency [DD]   0033 Albumin(!): 3.0 [DD]      ED Course User Index  [DD] Grabiel Hanson MD                 Clinical Impression:   Final diagnoses:  [R07.9] Chest pain  [E87.1] Hyponatremia (Primary)  [D64.9] Anemia, unspecified type  [J90] Pleural effusion on right        ED Disposition Condition    Transfer to Another Facility Stable                Grabiel Hanson MD  06/07/23 0556

## 2023-06-06 NOTE — ED NOTES
1822: spoke to yanira in dietary for a low sodium diet for pt/ called with no answer since 1815. Found her in the dietary room.

## 2023-06-06 NOTE — ED PROVIDER NOTES
Encounter Date: 6/6/2023       History     Chief Complaint   Patient presents with    Leg Swelling     Bilateral leg swelling, weeping from one leg, audible wheezing     Leg swelling   63-year-old male patient who has leg swelling bilaterally with weeping from 1 leg open sores and audible wheezing patient otherwise has no nausea no vomiting no diarrhea does have orthopnea does have dyspnea on exertion pulse ox of 95% on room air no chills or night sweats no known sick contacts no chronic history known that is contributing to today's episode patient is the source of his history with significant other      Review of patient's allergies indicates:  No Known Allergies  History reviewed. No pertinent past medical history.  History reviewed. No pertinent surgical history.  History reviewed. No pertinent family history.     Review of Systems   Constitutional:  Negative for fever.   HENT:  Negative for sore throat.    Respiratory:  Positive for shortness of breath and wheezing.    Cardiovascular:  Positive for leg swelling. Negative for chest pain.   Gastrointestinal:  Negative for nausea.   Genitourinary:  Negative for dysuria.   Musculoskeletal:  Negative for back pain.   Skin:  Negative for rash.   Neurological:  Negative for weakness.   Hematological:  Does not bruise/bleed easily.   All other systems reviewed and are negative.    Physical Exam     Initial Vitals [06/06/23 1702]   BP Pulse Resp Temp SpO2   (!) 103/53 110 (!) 24 98.9 °F (37.2 °C) 95 %      MAP       --         Physical Exam    Nursing note and vitals reviewed.  Constitutional: He appears well-developed and well-nourished. He is not diaphoretic. No distress.   HENT:   Head: Normocephalic and atraumatic.   Right Ear: External ear normal.   Left Ear: External ear normal.   Nose: Nose normal.   Mouth/Throat: Oropharynx is clear and moist. No oropharyngeal exudate.   Eyes: Conjunctivae and EOM are normal. Pupils are equal, round, and reactive to light. Right  eye exhibits no discharge. Left eye exhibits no discharge.   Neck: Neck supple. No thyromegaly present. No tracheal deviation present. No JVD present.   Normal range of motion.  Cardiovascular:  Normal rate, regular rhythm, normal heart sounds and intact distal pulses.     Exam reveals no gallop and no friction rub.       No murmur heard.  Pulmonary/Chest: No stridor. No respiratory distress. He has wheezes. He has no rhonchi. He has no rales. He exhibits no tenderness.   Abdominal: Abdomen is soft. Bowel sounds are normal. He exhibits no distension. There is no abdominal tenderness. There is no rebound and no guarding.   Musculoskeletal:         General: Tenderness and edema present. Normal range of motion.      Cervical back: Normal range of motion and neck supple.     Lymphadenopathy:     He has no cervical adenopathy.   Neurological: He is alert and oriented to person, place, and time. He has normal strength and normal reflexes. No cranial nerve deficit or sensory deficit. GCS score is 15. GCS eye subscore is 4. GCS verbal subscore is 5. GCS motor subscore is 6.   Skin: Skin is warm and dry. No rash and no abscess noted. No erythema.   Open area with oozing and seeping   Psychiatric: He has a normal mood and affect. His behavior is normal. Judgment and thought content normal.       ED Course   Procedures  Labs Reviewed   COMPREHENSIVE METABOLIC PANEL - Abnormal; Notable for the following components:       Result Value    Sodium Level 124 (*)     Chloride 94 (*)     Carbon Dioxide 22 (*)     Calcium Level Total 8.3 (*)     Protein Total 7.7 (*)     Albumin Level 3.0 (*)     Globulin 4.7 (*)     Albumin/Globulin Ratio 0.6 (*)     All other components within normal limits   B-TYPE NATRIURETIC PEPTIDE - Abnormal; Notable for the following components:    Natriuretic Peptide 999.4 (*)     All other components within normal limits   D DIMER, QUANTITATIVE - Abnormal; Notable for the following components:    D-Dimer 3.62  (*)     All other components within normal limits   CBC WITH DIFFERENTIAL - Abnormal; Notable for the following components:    RBC 2.42 (*)     Hgb 5.0 (*)     Hct 18.0 (*)     MCV 74.4 (*)     MCH 20.7 (*)     MCHC 27.8 (*)     RDW 21.1 (*)     All other components within normal limits   ALCOHOL,MEDICAL (ETHANOL) - Abnormal; Notable for the following components:    Ethanol Level 37.0 (*)     All other components within normal limits   COVID/FLU A&B PCR - Normal    Narrative:     The Xpert Xpress SARS-CoV-2/FLU/RSV plus is a rapid, multiplexed real-time PCR test intended for the simultaneous qualitative detection and differentiation of SARS-CoV-2, Influenza A, Influenza B, and respiratory syncytial virus (RSV) viral RNA in either nasopharyngeal swab or nasal swab specimens.         URINALYSIS, MICROSCOPIC - Normal   OCCULT BLOOD STOOL, CA SCREEN - Normal   CBC W/ AUTO DIFFERENTIAL    Narrative:     The following orders were created for panel order CBC auto differential.  Procedure                               Abnormality         Status                     ---------                               -----------         ------                     CBC with Differential[[id number]]        Abnormal            Final result                 Please view results for these tests on the individual orders.   URINALYSIS, REFLEX TO URINE CULTURE   TROPONIN ISTAT   OCCULT BLOOD,STOOL,SCREEN (1-3)    Narrative:     The following orders were created for panel order Occult Blood, Stool Screening (1 -3).  Procedure                               Abnormality         Status                     ---------                               -----------         ------                     Occult Blood Stool, CA S...[[id number]]  Normal              Final result               OCCULT BLOOD STOOL, CA S...[[id number]]                                                   Please view results for these tests on the individual orders.   OCCULT BLOOD STOOL, CA  SCREEN 2ND SPEC   OSMOLALITY, SERUM   OSMOLALITY, URINE RANDOM   SODIUM, URINE, RANDOM   TYPE & SCREEN   ABORH RETYPE   POCT TROPONIN   PREPARE RBC SOFT     EKG Readings: (Independently Interpreted)   Initial Reading: No STEMI. Rhythm: Normal Sinus Rhythm. Ectopy: No Ectopy. Conduction: Normal. ST Segments: Normal ST Segments. T Waves: Normal. Axis: Normal. Clinical Impression: Normal Sinus Rhythm   ECG Results              EKG 12-lead (Final result)  Result time 06/07/23 08:41:27      Final result by Interface, Lab In [facility] (06/07/23 08:41:27)                   Narrative:    Test Reason : R07.9,    Vent. Rate : 106 BPM     Atrial Rate : 106 BPM     P-R Int : 140 ms          QRS Dur : 074 ms      QT Int : 344 ms       P-R-T Axes : 021 089 023 degrees     QTc Int : 456 ms    Sinus tachycardia  Nonspecific ST abnormality  Abnormal ECG  No previous ECGs available  Confirmed by Jc Kennedy MD ([id number]) on 6/7/2023 8:41:13 AM    Referred By: [email or web address]   SELF           Confirmed By:Jc Kennedy MD                                  Imaging Results              CTA Chest Non-Coronary (PE Studies) (Final result)  Result time 06/06/23 19:05:57      Final result by Etelvina Nolasco MD (06/06/23 19:05:57)                   Impression:      1. No evidence of pulmonary embolus  2. Moderate right pleural effusion with right basilar atelectasis      Electronically signed by: Etelvina Nolasco  Date:    06/06/2023  Time:    19:05               Narrative:    EXAMINATION:  CTA CHEST NON CORONARY (PE STUDIES)    CLINICAL HISTORY:  Pulmonary embolism (PE) suspected, positive D-dimer;    TECHNIQUE:  Helically acquired images with axial, sagittal and coronal reformations were obtained from the thoracic inlet to the lung bases followingthe IV administration of contrast.  CTA timed for evaluation of the pulmonary arteries.  MIP images were performed.    Automated tube current modulation, weight-based exposure dosing, and/or iterative  reconstruction technique utilized to reach lowest reasonably achievable exposure rate.    DLP: 732 mGy*cm    COMPARISON:  CT chest 02/11/2021    FINDINGS:  BASE OF NECK: No significant abnormality.    AORTA: Aortic atherosclerosis.    PULMONARY VASCULATURE: Pulmonary arteries enhance normally.  No evidence of pulmonary embolus.    HEART: Normal size. No effusion. There are coronary artery calcifications.    MYRON/MEDIASTINUM: No enlarged lymph nodes by size criteria.    AIRWAYS: Patent.    LUNGS/PLEURA: Right lower lobe and right middle lobe atelectasis.  Pulmonary emphysema.  Dependently layering moderate right pleural effusion.    UPPER ABDOMEN: Nodular thickening of the left adrenal gland is unchanged dating back to at least 2021.  No follow-up imaging is recommended per consensus recommendations based on imaging criteria.    THORACIC SOFT TISSUES: Gynecomastia    BONES: No acute fracture. No suspicious lytic or sclerotic lesions.                                       X-Ray Chest AP Portable (Final result)  Result time 06/06/23 17:25:19      Final result by Wilver Hernández MD (06/06/23 17:25:19)                   Impression:      Right-sided pleural effusion with right lower lobe area of atelectasis versus developing infiltrate      Electronically signed by: Wilver Hernández  Date:    06/06/2023  Time:    17:25               Narrative:    EXAMINATION:  XR CHEST AP PORTABLE    CLINICAL HISTORY:  Chest Pain;    TECHNIQUE:  Single frontal view of the chest was performed.    COMPARISON:  05/21/2019    FINDINGS:  There is a right-sided pleural effusion.  There is right lower lobe atelectasis versus developing infiltrate.  The heart is normal appearance.  The pulmonary vascularity is unremarkable.  Bones and joints show no acute abnormality.                                       Medications   0.9%  NaCl infusion (for blood administration) (has no administration in time range)   furosemide injection 40 mg (40 mg  Intravenous Given 6/6/23 1735)   albuterol-ipratropium 2.5 mg-0.5 mg/3 mL nebulizer solution 3 mL (3 mLs Nebulization Given 6/6/23 1738)   methylPREDNISolone sodium succinate injection 125 mg (125 mg Intravenous Given 6/6/23 1737)   iopamidoL (ISOVUE-370) injection 100 mL (100 mLs Intravenous Given 6/6/23 1850)   pantoprazole injection 40 mg (40 mg Intravenous Given 6/6/23 1946)   traMADoL tablet 50 mg (50 mg Oral Given 6/6/23 2006)   furosemide injection 40 mg (40 mg Intravenous Given 6/7/23 0258)   albuterol-ipratropium 2.5 mg-0.5 mg/3 mL nebulizer solution 3 mL (3 mLs Nebulization Given 6/7/23 0301)     Medical Decision Making:   Differential Diagnosis:   CHF cellulitis dependent edema congestive heart failure exacerbation           ED Course as of 06/07/23 0903   Tue Jun 06, 2023 1821 RBC(!): 2.42 [DD]   1821 Hemoglobin(!!): 5.0 [DD]   1821 Hematocrit(!!): 18.0 [DD]   1821 Sodium(!): 124 [DD]   1822 Chloride(!): 94 [DD]   1822 Troponin I: 0.01 [DD]   2102 Accepted at [facility] by Dr. Sotelo; I spoke with his nurse practitioner on-call [DD]   Wed Jun 07, 2023 0033 Prepare RBC 2 Units; Emergency [DD]   0033 Albumin(!): 3.0 [DD]      ED Course User Index  [DD] Grabiel Hanson MD                 Clinical Impression:   Final diagnoses:  [R07.9] Chest pain  [E87.1] Hyponatremia (Primary)  [D64.9] Anemia, unspecified type  [J90] Pleural effusion on right        ED Disposition Condition    Transfer to Another Facility Stable                Laron Escalera MD  06/06/23 1736       Laron Escalera MD  06/06/23 1736       Laron Escalera MD  06/07/23 0903

## 2023-06-07 ENCOUNTER — HOSPITAL ENCOUNTER (INPATIENT)
Facility: HOSPITAL | Age: 63
LOS: 5 days | Discharge: HOME | DRG: 432 | End: 2023-06-12
Attending: INTERNAL MEDICINE | Admitting: INTERNAL MEDICINE
Payer: MEDICAID

## 2023-06-07 VITALS
HEIGHT: 68 IN | HEART RATE: 91 BPM | DIASTOLIC BLOOD PRESSURE: 67 MMHG | TEMPERATURE: 98 F | SYSTOLIC BLOOD PRESSURE: 125 MMHG | RESPIRATION RATE: 20 BRPM | WEIGHT: 160 LBS | BODY MASS INDEX: 24.25 KG/M2 | OXYGEN SATURATION: 93 %

## 2023-06-07 DIAGNOSIS — R07.9 CHEST PAIN: ICD-10-CM

## 2023-06-07 DIAGNOSIS — K70.31 ALCOHOLIC CIRRHOSIS OF LIVER WITH ASCITES: ICD-10-CM

## 2023-06-07 DIAGNOSIS — I50.9 CHF (CONGESTIVE HEART FAILURE): Primary | ICD-10-CM

## 2023-06-07 DIAGNOSIS — D64.9 ANEMIA: ICD-10-CM

## 2023-06-07 PROBLEM — K70.30 ALCOHOLIC CIRRHOSIS: Status: ACTIVE | Noted: 2023-06-07

## 2023-06-07 LAB
ABO + RH BLD: NORMAL
ABO + RH BLD: NORMAL
ABORH RETYPE: NORMAL
AFP-TM SERPL-MCNC: 2.9 NG/ML
ALBUMIN SERPL-MCNC: 3 G/DL (ref 3.4–4.8)
ALBUMIN/GLOB SERPL: 0.6 RATIO (ref 1.1–2)
ALP SERPL-CCNC: 53 UNIT/L (ref 40–150)
ALT SERPL-CCNC: 8 UNIT/L (ref 0–55)
ANION GAP SERPL CALC-SCNC: 9 MEQ/L
APTT PPP: 27.6 SECONDS (ref 23.2–33.7)
AST SERPL-CCNC: 14 UNIT/L (ref 5–34)
BASOPHILS # BLD AUTO: 0.03 X10(3)/MCL
BASOPHILS NFR BLD AUTO: 0.4 %
BILIRUBIN DIRECT+TOT PNL SERPL-MCNC: 1.2 MG/DL
BLD PROD TYP BPU: NORMAL
BLD PROD TYP BPU: NORMAL
BLOOD UNIT EXPIRATION DATE: NORMAL
BLOOD UNIT EXPIRATION DATE: NORMAL
BLOOD UNIT TYPE CODE: 5100
BLOOD UNIT TYPE CODE: 5100
BUN SERPL-MCNC: 17.8 MG/DL (ref 8.4–25.7)
BUN SERPL-MCNC: 23 MG/DL (ref 8.4–25.7)
CALCIUM SERPL-MCNC: 8.4 MG/DL (ref 8.8–10)
CALCIUM SERPL-MCNC: 8.5 MG/DL (ref 8.8–10)
CHLORIDE SERPL-SCNC: 94 MMOL/L (ref 98–107)
CHLORIDE SERPL-SCNC: 95 MMOL/L (ref 98–107)
CO2 SERPL-SCNC: 24 MMOL/L (ref 23–31)
CO2 SERPL-SCNC: 27 MMOL/L (ref 23–31)
CREAT SERPL-MCNC: 1.16 MG/DL (ref 0.73–1.18)
CREAT SERPL-MCNC: 1.41 MG/DL (ref 0.73–1.18)
CREAT/UREA NIT SERPL: 16
CROSSMATCH INTERPRETATION: NORMAL
CROSSMATCH INTERPRETATION: NORMAL
DISPENSE STATUS: NORMAL
DISPENSE STATUS: NORMAL
EOSINOPHIL # BLD AUTO: 0 X10(3)/MCL (ref 0–0.9)
EOSINOPHIL NFR BLD AUTO: 0 %
ERYTHROCYTE [DISTWIDTH] IN BLOOD BY AUTOMATED COUNT: 20.1 % (ref 11.5–17)
ERYTHROCYTE [DISTWIDTH] IN BLOOD BY AUTOMATED COUNT: 20.7 % (ref 11.5–17)
FERRITIN SERPL-MCNC: 28.64 NG/ML (ref 21.81–274.66)
FOLATE SERPL-MCNC: 3.5 NG/ML (ref 7–31.4)
GFR SERPLBLD CREATININE-BSD FMLA CKD-EPI: 56 MLS/MIN/1.73/M2
GFR SERPLBLD CREATININE-BSD FMLA CKD-EPI: >60 MLS/MIN/1.73/M2
GLOBULIN SER-MCNC: 4.7 GM/DL (ref 2.4–3.5)
GLUCOSE SERPL-MCNC: 124 MG/DL (ref 82–115)
GLUCOSE SERPL-MCNC: 131 MG/DL (ref 82–115)
GROUP & RH: NORMAL
HCT VFR BLD AUTO: 23.4 % (ref 42–52)
HCT VFR BLD AUTO: 25.5 % (ref 42–52)
HGB BLD-MCNC: 6.9 G/DL (ref 14–18)
HGB BLD-MCNC: 7.3 G/DL (ref 14–18)
HIV 1+2 AB+HIV1 P24 AG SERPL QL IA: NONREACTIVE
IMM GRANULOCYTES # BLD AUTO: 0.04 X10(3)/MCL (ref 0–0.04)
IMM GRANULOCYTES NFR BLD AUTO: 0.6 %
INDIRECT COOMBS GEL: NORMAL
INR BLD: 1.13 (ref 0–1.3)
IRON SATN MFR SERPL: 4 % (ref 20–50)
IRON SERPL-MCNC: 17 UG/DL (ref 65–175)
LYMPHOCYTES # BLD AUTO: 0.6 X10(3)/MCL (ref 0.6–4.6)
LYMPHOCYTES NFR BLD AUTO: 9 %
MCH RBC QN AUTO: 23.2 PG (ref 27–31)
MCH RBC QN AUTO: 23.2 PG (ref 27–31)
MCHC RBC AUTO-ENTMCNC: 28.6 G/DL (ref 33–36)
MCHC RBC AUTO-ENTMCNC: 29.5 G/DL (ref 33–36)
MCV RBC AUTO: 78.5 FL (ref 80–94)
MCV RBC AUTO: 81.2 FL (ref 80–94)
MONOCYTES # BLD AUTO: 0.51 X10(3)/MCL (ref 0.1–1.3)
MONOCYTES NFR BLD AUTO: 7.6 %
NEUTROPHILS # BLD AUTO: 5.5 X10(3)/MCL (ref 2.1–9.2)
NEUTROPHILS NFR BLD AUTO: 82.4 %
NRBC BLD AUTO-RTO: 1 %
OSMOLALITY SERPL: 275 MOSM/KG (ref 280–300)
OSMOLALITY UR: 281 MOSM/KG (ref 300–1300)
PLATELET # BLD AUTO: 241 X10(3)/MCL (ref 130–400)
PLATELET # BLD AUTO: 255 X10(3)/MCL (ref 130–400)
PMV BLD AUTO: 10.2 FL (ref 7.4–10.4)
PMV BLD AUTO: 10.3 FL (ref 7.4–10.4)
POC CARDIAC TROPONIN I: 0.01 NG/ML (ref 0–0.08)
POTASSIUM SERPL-SCNC: 4.5 MMOL/L (ref 3.5–5.1)
POTASSIUM SERPL-SCNC: 5 MMOL/L (ref 3.5–5.1)
PROT SERPL-MCNC: 7.7 GM/DL (ref 5.8–7.6)
PROTHROMBIN TIME: 14.4 SECONDS (ref 12.5–14.5)
RBC # BLD AUTO: 2.98 X10(6)/MCL (ref 4.7–6.1)
RBC # BLD AUTO: 3.14 X10(6)/MCL (ref 4.7–6.1)
RET# (OHS): 0.08 (ref 0.03–0.1)
RETICULOCYTE COUNT AUTOMATED (OLG): 2.62 % (ref 1.1–2.1)
SAMPLE: NORMAL
SODIUM SERPL-SCNC: 128 MMOL/L (ref 136–145)
SODIUM SERPL-SCNC: 129 MMOL/L (ref 136–145)
SODIUM UR-SCNC: 92 MMOL/L
SPECIMEN OUTDATE: NORMAL
T4 FREE SERPL-MCNC: 0.92 NG/DL (ref 0.7–1.48)
TIBC SERPL-MCNC: 377 UG/DL (ref 69–240)
TIBC SERPL-MCNC: 394 UG/DL (ref 250–450)
TRANSFERRIN SERPL-MCNC: 357 MG/DL (ref 163–344)
TSH SERPL-ACNC: 1.66 UIU/ML (ref 0.35–4.94)
UNIT NUMBER: NORMAL
UNIT NUMBER: NORMAL
VIT B12 SERPL-MCNC: 344 PG/ML (ref 213–816)
WBC # SPEC AUTO: 6.68 X10(3)/MCL (ref 4.5–11.5)
WBC # SPEC AUTO: 9.03 X10(3)/MCL (ref 4.5–11.5)

## 2023-06-07 PROCEDURE — 11000001 HC ACUTE MED/SURG PRIVATE ROOM

## 2023-06-07 PROCEDURE — 82746 ASSAY OF FOLIC ACID SERUM: CPT | Performed by: INTERNAL MEDICINE

## 2023-06-07 PROCEDURE — P9016 RBC LEUKOCYTES REDUCED: HCPCS | Performed by: SPECIALIST

## 2023-06-07 PROCEDURE — 83550 IRON BINDING TEST: CPT | Performed by: INTERNAL MEDICINE

## 2023-06-07 PROCEDURE — 87389 HIV-1 AG W/HIV-1&-2 AB AG IA: CPT | Performed by: INTERNAL MEDICINE

## 2023-06-07 PROCEDURE — 63600175 PHARM REV CODE 636 W HCPCS: Performed by: SPECIALIST

## 2023-06-07 PROCEDURE — 85730 THROMBOPLASTIN TIME PARTIAL: CPT | Performed by: INTERNAL MEDICINE

## 2023-06-07 PROCEDURE — 83010 ASSAY OF HAPTOGLOBIN QUANT: CPT | Performed by: INTERNAL MEDICINE

## 2023-06-07 PROCEDURE — 82105 ALPHA-FETOPROTEIN SERUM: CPT | Performed by: INTERNAL MEDICINE

## 2023-06-07 PROCEDURE — 63600175 PHARM REV CODE 636 W HCPCS: Performed by: INTERNAL MEDICINE

## 2023-06-07 PROCEDURE — 85060 BLOOD SMEAR INTERPRETATION: CPT | Performed by: INTERNAL MEDICINE

## 2023-06-07 PROCEDURE — 86900 BLOOD TYPING SEROLOGIC ABO: CPT | Performed by: INTERNAL MEDICINE

## 2023-06-07 PROCEDURE — C9113 INJ PANTOPRAZOLE SODIUM, VIA: HCPCS | Performed by: INTERNAL MEDICINE

## 2023-06-07 PROCEDURE — 82607 VITAMIN B-12: CPT | Performed by: INTERNAL MEDICINE

## 2023-06-07 PROCEDURE — 80053 COMPREHEN METABOLIC PANEL: CPT | Performed by: FAMILY MEDICINE

## 2023-06-07 PROCEDURE — 94640 AIRWAY INHALATION TREATMENT: CPT

## 2023-06-07 PROCEDURE — 84439 ASSAY OF FREE THYROXINE: CPT | Performed by: INTERNAL MEDICINE

## 2023-06-07 PROCEDURE — 96376 TX/PRO/DX INJ SAME DRUG ADON: CPT

## 2023-06-07 PROCEDURE — 85045 AUTOMATED RETICULOCYTE COUNT: CPT | Performed by: INTERNAL MEDICINE

## 2023-06-07 PROCEDURE — 85610 PROTHROMBIN TIME: CPT | Performed by: INTERNAL MEDICINE

## 2023-06-07 PROCEDURE — 85027 COMPLETE CBC AUTOMATED: CPT | Performed by: INTERNAL MEDICINE

## 2023-06-07 PROCEDURE — 25000003 PHARM REV CODE 250: Performed by: FAMILY MEDICINE

## 2023-06-07 PROCEDURE — 25000242 PHARM REV CODE 250 ALT 637 W/ HCPCS: Performed by: FAMILY MEDICINE

## 2023-06-07 PROCEDURE — 25000242 PHARM REV CODE 250 ALT 637 W/ HCPCS: Performed by: SPECIALIST

## 2023-06-07 PROCEDURE — 84443 ASSAY THYROID STIM HORMONE: CPT | Performed by: INTERNAL MEDICINE

## 2023-06-07 PROCEDURE — 25000003 PHARM REV CODE 250: Performed by: INTERNAL MEDICINE

## 2023-06-07 PROCEDURE — 80074 ACUTE HEPATITIS PANEL: CPT | Performed by: INTERNAL MEDICINE

## 2023-06-07 PROCEDURE — 86923 COMPATIBILITY TEST ELECTRIC: CPT | Performed by: INTERNAL MEDICINE

## 2023-06-07 PROCEDURE — 21400001 HC TELEMETRY ROOM

## 2023-06-07 PROCEDURE — 96374 THER/PROPH/DIAG INJ IV PUSH: CPT

## 2023-06-07 PROCEDURE — 85025 COMPLETE CBC W/AUTO DIFF WBC: CPT | Performed by: FAMILY MEDICINE

## 2023-06-07 PROCEDURE — 82728 ASSAY OF FERRITIN: CPT | Performed by: INTERNAL MEDICINE

## 2023-06-07 RX ORDER — ACETAMINOPHEN 500 MG
1000 TABLET ORAL EVERY 6 HOURS PRN
Status: DISCONTINUED | OUTPATIENT
Start: 2023-06-07 | End: 2023-06-07

## 2023-06-07 RX ORDER — THIAMINE HCL 100 MG
100 TABLET ORAL 3 TIMES DAILY
Status: DISCONTINUED | OUTPATIENT
Start: 2023-06-07 | End: 2023-06-12 | Stop reason: HOSPADM

## 2023-06-07 RX ORDER — TALC
6 POWDER (GRAM) TOPICAL NIGHTLY PRN
Status: DISCONTINUED | OUTPATIENT
Start: 2023-06-07 | End: 2023-06-12 | Stop reason: HOSPADM

## 2023-06-07 RX ORDER — FUROSEMIDE 10 MG/ML
40 INJECTION INTRAMUSCULAR; INTRAVENOUS 2 TIMES DAILY
Status: DISCONTINUED | OUTPATIENT
Start: 2023-06-07 | End: 2023-06-11

## 2023-06-07 RX ORDER — ALBUTEROL SULFATE 90 UG/1
2 AEROSOL, METERED RESPIRATORY (INHALATION) EVERY 8 HOURS
Status: DISCONTINUED | OUTPATIENT
Start: 2023-06-07 | End: 2023-06-07

## 2023-06-07 RX ORDER — LORAZEPAM 1 MG/1
2 TABLET ORAL EVERY 4 HOURS PRN
Status: DISCONTINUED | OUTPATIENT
Start: 2023-06-07 | End: 2023-06-12 | Stop reason: HOSPADM

## 2023-06-07 RX ORDER — FOLIC ACID 1 MG/1
1 TABLET ORAL DAILY
Status: DISCONTINUED | OUTPATIENT
Start: 2023-06-07 | End: 2023-06-12 | Stop reason: HOSPADM

## 2023-06-07 RX ORDER — PANTOPRAZOLE SODIUM 40 MG/10ML
40 INJECTION, POWDER, LYOPHILIZED, FOR SOLUTION INTRAVENOUS 2 TIMES DAILY
Status: DISCONTINUED | OUTPATIENT
Start: 2023-06-07 | End: 2023-06-12 | Stop reason: HOSPADM

## 2023-06-07 RX ORDER — TRAMADOL HYDROCHLORIDE 50 MG/1
50 TABLET ORAL
Status: COMPLETED | OUTPATIENT
Start: 2023-06-07 | End: 2023-06-07

## 2023-06-07 RX ORDER — IPRATROPIUM BROMIDE AND ALBUTEROL SULFATE 2.5; .5 MG/3ML; MG/3ML
3 SOLUTION RESPIRATORY (INHALATION) EVERY 4 HOURS PRN
Status: DISCONTINUED | OUTPATIENT
Start: 2023-06-07 | End: 2023-06-12 | Stop reason: HOSPADM

## 2023-06-07 RX ORDER — PROCHLORPERAZINE EDISYLATE 5 MG/ML
5 INJECTION INTRAMUSCULAR; INTRAVENOUS EVERY 6 HOURS PRN
Status: DISCONTINUED | OUTPATIENT
Start: 2023-06-07 | End: 2023-06-12 | Stop reason: HOSPADM

## 2023-06-07 RX ORDER — ONDANSETRON 2 MG/ML
4 INJECTION INTRAMUSCULAR; INTRAVENOUS EVERY 4 HOURS PRN
Status: DISCONTINUED | OUTPATIENT
Start: 2023-06-07 | End: 2023-06-12 | Stop reason: HOSPADM

## 2023-06-07 RX ORDER — TRAMADOL HYDROCHLORIDE 50 MG/1
50 TABLET ORAL EVERY 6 HOURS PRN
Status: DISCONTINUED | OUTPATIENT
Start: 2023-06-07 | End: 2023-06-12 | Stop reason: HOSPADM

## 2023-06-07 RX ORDER — SODIUM CHLORIDE 0.9 % (FLUSH) 0.9 %
10 SYRINGE (ML) INJECTION
Status: DISCONTINUED | OUTPATIENT
Start: 2023-06-07 | End: 2023-06-12 | Stop reason: HOSPADM

## 2023-06-07 RX ORDER — ACETAMINOPHEN 325 MG/1
650 TABLET ORAL EVERY 4 HOURS PRN
Status: DISCONTINUED | OUTPATIENT
Start: 2023-06-07 | End: 2023-06-12 | Stop reason: HOSPADM

## 2023-06-07 RX ORDER — ALBUTEROL SULFATE 90 UG/1
2 AEROSOL, METERED RESPIRATORY (INHALATION) EVERY 4 HOURS PRN
Status: DISCONTINUED | OUTPATIENT
Start: 2023-06-07 | End: 2023-06-07 | Stop reason: HOSPADM

## 2023-06-07 RX ORDER — IPRATROPIUM BROMIDE AND ALBUTEROL SULFATE 2.5; .5 MG/3ML; MG/3ML
3 SOLUTION RESPIRATORY (INHALATION)
Status: COMPLETED | OUTPATIENT
Start: 2023-06-07 | End: 2023-06-07

## 2023-06-07 RX ORDER — FUROSEMIDE 10 MG/ML
40 INJECTION INTRAMUSCULAR; INTRAVENOUS
Status: COMPLETED | OUTPATIENT
Start: 2023-06-07 | End: 2023-06-07

## 2023-06-07 RX ORDER — POLYETHYLENE GLYCOL 3350 17 G/17G
17 POWDER, FOR SOLUTION ORAL 2 TIMES DAILY PRN
Status: DISCONTINUED | OUTPATIENT
Start: 2023-06-07 | End: 2023-06-12 | Stop reason: HOSPADM

## 2023-06-07 RX ORDER — AMOXICILLIN 250 MG
2 CAPSULE ORAL 2 TIMES DAILY PRN
Status: DISCONTINUED | OUTPATIENT
Start: 2023-06-07 | End: 2023-06-12 | Stop reason: HOSPADM

## 2023-06-07 RX ORDER — HYDROCODONE BITARTRATE AND ACETAMINOPHEN 500; 5 MG/1; MG/1
TABLET ORAL
Status: DISCONTINUED | OUTPATIENT
Start: 2023-06-08 | End: 2023-06-12 | Stop reason: HOSPADM

## 2023-06-07 RX ORDER — MAG HYDROX/ALUMINUM HYD/SIMETH 200-200-20
30 SUSPENSION, ORAL (FINAL DOSE FORM) ORAL 4 TIMES DAILY PRN
Status: DISCONTINUED | OUTPATIENT
Start: 2023-06-07 | End: 2023-06-12 | Stop reason: HOSPADM

## 2023-06-07 RX ADMIN — IPRATROPIUM BROMIDE AND ALBUTEROL SULFATE 3 ML: 2.5; .5 SOLUTION RESPIRATORY (INHALATION) at 03:06

## 2023-06-07 RX ADMIN — FOLIC ACID 1 MG: 1 TABLET ORAL at 09:06

## 2023-06-07 RX ADMIN — FUROSEMIDE 40 MG: 10 INJECTION, SOLUTION INTRAMUSCULAR; INTRAVENOUS at 09:06

## 2023-06-07 RX ADMIN — TRAMADOL HYDROCHLORIDE 50 MG: 50 TABLET, COATED ORAL at 10:06

## 2023-06-07 RX ADMIN — FUROSEMIDE 40 MG: 10 INJECTION, SOLUTION INTRAMUSCULAR; INTRAVENOUS at 02:06

## 2023-06-07 RX ADMIN — PANTOPRAZOLE SODIUM 40 MG: 40 INJECTION, POWDER, FOR SOLUTION INTRAVENOUS at 10:06

## 2023-06-07 RX ADMIN — THERA TABS 1 TABLET: TAB at 10:06

## 2023-06-07 RX ADMIN — THIAMINE HCL TAB 100 MG 100 MG: 100 TAB at 10:06

## 2023-06-07 RX ADMIN — ALBUTEROL SULFATE 2 PUFF: 90 AEROSOL, METERED RESPIRATORY (INHALATION) at 02:06

## 2023-06-07 RX ADMIN — TRAMADOL HYDROCHLORIDE 50 MG: 50 TABLET, COATED ORAL at 04:06

## 2023-06-07 NOTE — ED NOTES
Report called to BENJA Prince on 5th floor at [facility]; answered all questions; [facility]

## 2023-06-07 NOTE — ED NOTES
Called lab to follow up on 2nd unit of blood; spoke with Cal and he states Pat reports blood is not ready

## 2023-06-07 NOTE — ED NOTES
Pt moved to [facility] for ED saturation; spoke with transfer center and states transfer called in as regular transfer, explained that we are attempting to avoid going on diversion and need transfer called as stat; BENJA Catherine called [facility] for stat transfer
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
## 2023-06-07 NOTE — ED NOTES
Patient tolerating transfusion well; no acute distress noted and no signs of reaction. Rate increased to 100ml/hr

## 2023-06-07 NOTE — ED NOTES
Called transfer center, spoke with Luis, states we are still waiting on a bed and they will notify us when they have one; confirmed that we will have a bed sometime today

## 2023-06-08 LAB
ABO + RH BLD: NORMAL
ANION GAP SERPL CALC-SCNC: 6 MEQ/L
AV INDEX (PROSTH): 0.89
AV MEAN GRADIENT: 2 MMHG
AV PEAK GRADIENT: 4 MMHG
AV VALVE AREA: 3.08 CM2
AV VELOCITY RATIO: 0.9
BASOPHILS # BLD AUTO: 0.01 X10(3)/MCL
BASOPHILS NFR BLD AUTO: 0.1 %
BLD PROD TYP BPU: NORMAL
BLOOD UNIT EXPIRATION DATE: NORMAL
BLOOD UNIT TYPE CODE: 6200
BSA FOR ECHO PROCEDURE: 1.96 M2
BUN SERPL-MCNC: 23 MG/DL (ref 8.4–25.7)
CALCIUM SERPL-MCNC: 8.7 MG/DL (ref 8.8–10)
CHLORIDE SERPL-SCNC: 94 MMOL/L (ref 98–107)
CO2 SERPL-SCNC: 30 MMOL/L (ref 23–31)
CREAT SERPL-MCNC: 1.27 MG/DL (ref 0.73–1.18)
CREAT/UREA NIT SERPL: 18
CROSSMATCH INTERPRETATION: NORMAL
CV ECHO LV RWT: 0.33 CM
DISPENSE STATUS: NORMAL
DOP CALC AO PEAK VEL: 0.97 M/S
DOP CALC AO VTI: 17.9 CM
DOP CALC LVOT AREA: 3.5 CM2
DOP CALC LVOT DIAMETER: 2.1 CM
DOP CALC LVOT PEAK VEL: 0.87 M/S
DOP CALC LVOT STROKE VOLUME: 55.04 CM3
DOP CALC MV VTI: 21.5 CM
DOP CALCLVOT PEAK VEL VTI: 15.9 CM
E WAVE DECELERATION TIME: 114 MSEC
E/A RATIO: 1.86
E/E' RATIO: 12.8 M/S
ECHO LV POSTERIOR WALL: 0.92 CM (ref 0.6–1.1)
EJECTION FRACTION: 40 %
EOSINOPHIL # BLD AUTO: 0 X10(3)/MCL (ref 0–0.9)
EOSINOPHIL NFR BLD AUTO: 0 %
ERYTHROCYTE [DISTWIDTH] IN BLOOD BY AUTOMATED COUNT: 20.6 % (ref 11.5–17)
FRACTIONAL SHORTENING: 20 % (ref 28–44)
GFR SERPLBLD CREATININE-BSD FMLA CKD-EPI: >60 MLS/MIN/1.73/M2
GLUCOSE SERPL-MCNC: 84 MG/DL (ref 82–115)
HAPTOGLOB SERPL-MCNC: 179 MG/DL (ref 40–368)
HAV IGM SERPL QL IA: NONREACTIVE
HBV CORE IGM SERPL QL IA: NONREACTIVE
HBV SURFACE AG SERPL QL IA: NONREACTIVE
HCT VFR BLD AUTO: 28 % (ref 42–52)
HCV AB SERPL QL IA: REACTIVE
HEMATOLOGIST REVIEW: NORMAL
HGB BLD-MCNC: 8.3 G/DL (ref 14–18)
IMM GRANULOCYTES # BLD AUTO: 0.04 X10(3)/MCL (ref 0–0.04)
IMM GRANULOCYTES NFR BLD AUTO: 0.5 %
INTERVENTRICULAR SEPTUM: 0.83 CM (ref 0.6–1.1)
LDH SERPL-CCNC: 146 U/L (ref 125–220)
LEFT ATRIUM SIZE: 4.2 CM
LEFT ATRIUM VOLUME INDEX MOD: 40.5 ML/M2
LEFT ATRIUM VOLUME MOD: 78.9 CM3
LEFT INTERNAL DIMENSION IN SYSTOLE: 4.43 CM (ref 2.1–4)
LEFT VENTRICLE DIASTOLIC VOLUME INDEX: 76.41 ML/M2
LEFT VENTRICLE DIASTOLIC VOLUME: 149 ML
LEFT VENTRICLE MASS INDEX: 93 G/M2
LEFT VENTRICLE SYSTOLIC VOLUME INDEX: 45.7 ML/M2
LEFT VENTRICLE SYSTOLIC VOLUME: 89.1 ML
LEFT VENTRICULAR INTERNAL DIMENSION IN DIASTOLE: 5.53 CM (ref 3.5–6)
LEFT VENTRICULAR MASS: 180.91 G
LV LATERAL E/E' RATIO: 14.22 M/S
LV SEPTAL E/E' RATIO: 11.64 M/S
LVOT MG: 2 MMHG
LVOT MV: 0.56 CM/S
LYMPHOCYTES # BLD AUTO: 1.02 X10(3)/MCL (ref 0.6–4.6)
LYMPHOCYTES NFR BLD AUTO: 12.3 %
MCH RBC QN AUTO: 24.1 PG (ref 27–31)
MCHC RBC AUTO-ENTMCNC: 29.6 G/DL (ref 33–36)
MCV RBC AUTO: 81.2 FL (ref 80–94)
MONOCYTES # BLD AUTO: 0.91 X10(3)/MCL (ref 0.1–1.3)
MONOCYTES NFR BLD AUTO: 11 %
MV MEAN GRADIENT: 3 MMHG
MV PEAK A VEL: 0.69 M/S
MV PEAK E VEL: 1.28 M/S
MV PEAK GRADIENT: 5 MMHG
MV VALVE AREA BY CONTINUITY EQUATION: 2.56 CM2
NEUTROPHILS # BLD AUTO: 6.33 X10(3)/MCL (ref 2.1–9.2)
NEUTROPHILS NFR BLD AUTO: 76.1 %
NRBC BLD AUTO-RTO: 0.6 %
OHS LV EJECTION FRACTION SIMPSONS BIPLANE MOD: 4 %
PISA TR MAX VEL: 2.62 M/S
PLATELET # BLD AUTO: 239 X10(3)/MCL (ref 130–400)
PMV BLD AUTO: 9.7 FL (ref 7.4–10.4)
POTASSIUM SERPL-SCNC: 4.1 MMOL/L (ref 3.5–5.1)
RA MAJOR: 4.6 CM
RA PRESSURE: 8 MMHG
RA WIDTH: 4.3 CM
RBC # BLD AUTO: 3.45 X10(6)/MCL (ref 4.7–6.1)
RIGHT VENTRICULAR END-DIASTOLIC DIMENSION: 3.2 CM
SODIUM SERPL-SCNC: 130 MMOL/L (ref 136–145)
TDI LATERAL: 0.09 M/S
TDI SEPTAL: 0.11 M/S
TDI: 0.1 M/S
TR MAX PG: 27 MMHG
TRICUSPID ANNULAR PLANE SYSTOLIC EXCURSION: 1.85 CM
TV REST PULMONARY ARTERY PRESSURE: 35 MMHG
UNIT NUMBER: NORMAL
WBC # SPEC AUTO: 8.31 X10(3)/MCL (ref 4.5–11.5)

## 2023-06-08 PROCEDURE — P9016 RBC LEUKOCYTES REDUCED: HCPCS | Performed by: INTERNAL MEDICINE

## 2023-06-08 PROCEDURE — 80048 BASIC METABOLIC PNL TOTAL CA: CPT | Performed by: INTERNAL MEDICINE

## 2023-06-08 PROCEDURE — 25000242 PHARM REV CODE 250 ALT 637 W/ HCPCS: Performed by: INTERNAL MEDICINE

## 2023-06-08 PROCEDURE — 21400001 HC TELEMETRY ROOM

## 2023-06-08 PROCEDURE — 36430 TRANSFUSION BLD/BLD COMPNT: CPT

## 2023-06-08 PROCEDURE — 94640 AIRWAY INHALATION TREATMENT: CPT

## 2023-06-08 PROCEDURE — 27000221 HC OXYGEN, UP TO 24 HOURS

## 2023-06-08 PROCEDURE — 83615 LACTATE (LD) (LDH) ENZYME: CPT | Performed by: INTERNAL MEDICINE

## 2023-06-08 PROCEDURE — 85025 COMPLETE CBC W/AUTO DIFF WBC: CPT | Performed by: INTERNAL MEDICINE

## 2023-06-08 PROCEDURE — 25000003 PHARM REV CODE 250: Performed by: INTERNAL MEDICINE

## 2023-06-08 PROCEDURE — C9113 INJ PANTOPRAZOLE SODIUM, VIA: HCPCS | Performed by: INTERNAL MEDICINE

## 2023-06-08 PROCEDURE — 63600175 PHARM REV CODE 636 W HCPCS: Performed by: INTERNAL MEDICINE

## 2023-06-08 RX ORDER — GABAPENTIN 300 MG/1
300 CAPSULE ORAL 3 TIMES DAILY
Status: DISCONTINUED | OUTPATIENT
Start: 2023-06-08 | End: 2023-06-12

## 2023-06-08 RX ORDER — TIOTROPIUM BROMIDE AND OLODATEROL 3.124; 2.736 UG/1; UG/1
2 SPRAY, METERED RESPIRATORY (INHALATION) DAILY
COMMUNITY
Start: 2023-05-25 | End: 2024-03-25

## 2023-06-08 RX ORDER — SPIRONOLACTONE 25 MG/1
50 TABLET ORAL 2 TIMES DAILY
Status: DISCONTINUED | OUTPATIENT
Start: 2023-06-08 | End: 2023-06-12 | Stop reason: HOSPADM

## 2023-06-08 RX ORDER — CLOPIDOGREL BISULFATE 75 MG/1
75 TABLET ORAL DAILY
COMMUNITY
Start: 2023-03-21 | End: 2023-10-23

## 2023-06-08 RX ORDER — GABAPENTIN 300 MG/1
300 CAPSULE ORAL 3 TIMES DAILY
COMMUNITY
Start: 2023-05-25 | End: 2023-12-27

## 2023-06-08 RX ORDER — DICLOFENAC SODIUM 75 MG/1
75 TABLET, DELAYED RELEASE ORAL 2 TIMES DAILY
Status: ON HOLD | COMMUNITY
Start: 2023-04-26 | End: 2023-06-12 | Stop reason: HOSPADM

## 2023-06-08 RX ORDER — ALBUTEROL SULFATE 90 UG/1
1-2 AEROSOL, METERED RESPIRATORY (INHALATION) EVERY 4 HOURS PRN
Status: ON HOLD | COMMUNITY
Start: 2023-05-19 | End: 2024-01-12

## 2023-06-08 RX ORDER — SPIRONOLACTONE 50 MG/1
50 TABLET, FILM COATED ORAL 2 TIMES DAILY
Status: ON HOLD | COMMUNITY
Start: 2023-05-25 | End: 2023-11-03 | Stop reason: HOSPADM

## 2023-06-08 RX ORDER — ASPIRIN 81 MG/1
81 TABLET, FILM COATED ORAL DAILY
COMMUNITY
Start: 2023-01-20 | End: 2023-10-23

## 2023-06-08 RX ADMIN — THERA TABS 1 TABLET: TAB at 09:06

## 2023-06-08 RX ADMIN — FOLIC ACID 1 MG: 1 TABLET ORAL at 11:06

## 2023-06-08 RX ADMIN — GABAPENTIN 300 MG: 300 CAPSULE ORAL at 11:06

## 2023-06-08 RX ADMIN — SPIRONOLACTONE 50 MG: 25 TABLET ORAL at 11:06

## 2023-06-08 RX ADMIN — PANTOPRAZOLE SODIUM 40 MG: 40 INJECTION, POWDER, FOR SOLUTION INTRAVENOUS at 08:06

## 2023-06-08 RX ADMIN — SODIUM CHLORIDE 125 MG: 9 INJECTION, SOLUTION INTRAVENOUS at 09:06

## 2023-06-08 RX ADMIN — SPIRONOLACTONE 50 MG: 25 TABLET ORAL at 08:06

## 2023-06-08 RX ADMIN — GABAPENTIN 300 MG: 300 CAPSULE ORAL at 08:06

## 2023-06-08 RX ADMIN — THIAMINE HCL TAB 100 MG 100 MG: 100 TAB at 11:06

## 2023-06-08 RX ADMIN — FUROSEMIDE 40 MG: 10 INJECTION, SOLUTION INTRAMUSCULAR; INTRAVENOUS at 09:06

## 2023-06-08 RX ADMIN — GABAPENTIN 300 MG: 300 CAPSULE ORAL at 04:06

## 2023-06-08 RX ADMIN — THIAMINE HCL TAB 100 MG 100 MG: 100 TAB at 04:06

## 2023-06-08 RX ADMIN — FUROSEMIDE 40 MG: 10 INJECTION, SOLUTION INTRAMUSCULAR; INTRAVENOUS at 05:06

## 2023-06-08 RX ADMIN — IPRATROPIUM BROMIDE AND ALBUTEROL SULFATE 3 ML: .5; 3 SOLUTION RESPIRATORY (INHALATION) at 07:06

## 2023-06-08 RX ADMIN — THIAMINE HCL TAB 100 MG 100 MG: 100 TAB at 08:06

## 2023-06-08 RX ADMIN — SODIUM CHLORIDE 125 MG: 9 INJECTION, SOLUTION INTRAVENOUS at 01:06

## 2023-06-08 NOTE — PLAN OF CARE
06/08/23 1013   Discharge Assessment   Assessment Type Discharge Planning Assessment   Confirmed/corrected address, phone number and insurance Yes   Confirmed Demographics Contacted registration to update  (notified Courtney)   Source of Information patient   Communicated SADI with patient/caregiver Date not available/Unable to determine   Reason For Admission alcoholic cirrhosis   People in Home friend(s)  (homeless but drifts between friends)   Do you expect to return to your current living situation? Yes   Prior to hospitilization cognitive status: Alert/Oriented   Current cognitive status: Alert/Oriented   Home Layout Able to live on 1st floor   Equipment Currently Used at Home none  (had a cane but lost it)   Readmission within 30 days? No   Patient currently being followed by outpatient case management? No   Do you currently have service(s) that help you manage your care at home? No   Do you take prescription medications? Yes   Do you have prescription coverage? Yes   Do you have any problems affording any of your prescribed medications? No   Is the patient taking medications as prescribed? yes   Who is going to help you get home at discharge? will need ride   How do you get to doctors appointments? other (see comments)  (pennie)   Are you on dialysis? No   Discharge Plan A Home   Discharge Plan B Home   DME Needed Upon Discharge  none   Discharge Plan discussed with: Patient   Transition of Care Barriers None   Physical Activity   On average, how many days per week do you engage in moderate to strenuous exercise (like a brisk walk)?   (walks everywhere)   On average, how many minutes do you engage in exercise at this level? 120 min   Financial Resource Strain   How hard is it for you to pay for the very basics like food, housing, medical care, and heating? Hard   Housing Stability   In the last 12 months, was there a time when you were not able to pay the mortgage or rent on time? Y   In the last 12  months, how many places have you lived? 5  (drifts)   In the last 12 months, was there a time when you did not have a steady place to sleep or slept in a shelter (including now)? Y   Transportation Needs   In the past 12 months, has lack of transportation kept you from medical appointments or from getting medications? yes   In the past 12 months, has lack of transportation kept you from meetings, work, or from getting things needed for daily living? Yes   Food Insecurity   Within the past 12 months, you worried that your food would run out before you got the money to buy more. Never true  (says someone will feed him)   Within the past 12 months, the food you bought just didn't last and you didn't have money to get more. Never true   Stress   Do you feel stress - tense, restless, nervous, or anxious, or unable to sleep at night because your mind is troubled all the time - these days? Not at all   Social Connections   In a typical week, how many times do you talk on the phone with family, friends, or neighbors? Once a week   How often do you get together with friends or relatives? Once   How often do you attend Latter day or Judaism services? Never   Do you belong to any clubs or organizations such as Latter day groups, unions, fraternal or athletic groups, or school groups? No   How often do you attend meetings of the clubs or organizations you belong to? Never   Are you , , , , never , or living with a partner?

## 2023-06-08 NOTE — H&P
[facility] Hospital Medicine - H&P Note    Patient Name: Hemal Tolbert Jr.  : 1960  MRN: [id number]  PCP: No primary care provider on file.  Admitting Physician: Anjelica Sotelo MD  Admission Class: IP- Inpatient   Length of Stay: 0  Face-to-Face encounter date: 2023  Code status: Full    Chief Complaint   Bilateral lower extremity swelling    History of Present Illness   This is a 63-year-old male with medical history of alcoholic cirrhosis, COPD, hypertension, on aspirin and Plavix, presented to [facility] ED with chief complaint of bilateral lower extremity swelling and weeping as well as shortness breath and wheezing over the past 2 weeks.  He denies history of heart failure but report taking fluid pills for his cirrhosis.  He is not sure why he is on aspirin and Plavix.  Reports shortness of breath on exertion, denies chest pain, fever chills.  Report chronic intermittent cough that is nonproductive.  Denies abdominal pain cough, nausea, vomiting, hematemesis, diarrhea, melena or hematochezia.    On arrival he was borderline hypotensive, tachypneic, tachycardic, and saturating 95% on 2 L nasal cannula.  Labs were notable for hemoglobin of 5.0, platelets 277, sodium 124, creatinine 1.17, albumin 3.0, INR 1.13, , and elevated D-dimer.  Stool occult blood negative.  Chest x-ray show congestive and small right-sided pleural effusion with right lower lobe atelectasis.  CTA chest ordered for elevated D-dimer show no evidence of pulmonary embolus and similar finding of chest x-ray of moderate right-sided pleural effusion and right basilar atelectasis.    He received 2 units of PRBC, Lasix 40 mg IV 2 doses, bronchodilators, subsequently was transferred to [facility] and referred to hospital medicine service for further evaluation and management.    On my evaluation he is resting comfortably on 2 L cannula denies shortness breath or chest pain.  Repeat hemoglobin is 6.9  which is appropriate response to 2 units.    ROS   Except as documented, all other systems reviewed and negative     Past Medical History   Alcoholic cirrhosis  COPD  On aspirin/plavix --unclear reason  Hypertension    Past Surgical History   Resection of nose squamous cell carcinoma    Social History   Drinks 4-5 beers daily, used to drink more than that and liquor previously  Smoke 1 pack per day  Remote history of drug use in the 70s, denies currently    Family History   Reviewed and negative    Allergies   Patient has no known allergies.    Home Medications     Prior to Admission medications    Medication Sig Start Date End Date Taking? Authorizing Provider   ADULT ASPIRIN REGIMEN 81 mg EC tablet Take 81 mg by mouth once daily. 1/20/23   Historical Provider   albuterol (PROVENTIL/VENTOLIN HFA) 90 mcg/actuation inhaler Inhale into the lungs. 5/19/23   Historical Provider   clopidogreL (PLAVIX) 75 mg tablet Take 75 mg by mouth once daily. 3/21/23   Historical Provider   diclofenac (VOLTAREN) 75 MG EC tablet Take 75 mg by mouth 2 (two) times daily. 4/26/23   Historical Provider   gabapentin (NEURONTIN) 300 MG capsule Take 300 mg by mouth 3 (three) times daily. 5/25/23   Historical Provider   spironolactone (ALDACTONE) 50 MG tablet Take 50 mg by mouth 2 (two) times daily. 5/25/23   Historical Provider   STIOLTO RESPIMAT 2.5-2.5 mcg/actuation Mist Inhale 2 puffs into the lungs once daily. 5/25/23   Historical Provider       Physical Exam   Vital Signs  Temp:  [97.5 °F (36.4 °C)-98 °F (36.7 °C)]   Pulse:  [84-95]   Resp:  [18-20]   BP: (109-139)/(65-99)   SpO2:  [92 %-100 %]    General: Appears comfortable  HEENT: NC/AT  Neck:  JVD about 4 cm  Chest:  Scattered rales and expiratory wheezing bilaterally  CVS: Regular rhythm. Normal S1/S2.  2+ pedal edema  Abdomen: nondistended, normoactive BS, soft and non-tender.  MSK: No obvious deformity or joint swelling  Skin: Warm and dry  Neuro: AAOx3, no focal neurological  deficit  Psych: Cooperative    Labs     Recent Labs     06/06/23  1733 06/07/23  1028   WBC 8.96 6.68   RBC 2.42* 3.14*   HGB 5.0* 7.3*   HCT 18.0* 25.5*   MCV 74.4* 81.2   MCH 20.7* 23.2*   MCHC 27.8* 28.6*   RDW 21.1* 20.1*    255     Recent Labs     06/06/23  1733   D-DIMER 3.62*      Recent Labs     06/06/23  1733 06/07/23  1028   * 129*   K 4.4 5.0   CHLORIDE 94* 94*   CO2 22* 27   BUN 14.6 17.8   CREATININE 1.17 1.16   EGFRNORACEVR >60 >60   GLUCOSE 87 131*   CALCIUM 8.3* 8.5*   ALBUMIN 3.0* 3.0*   GLOBULIN 4.7* 4.7*   ALKPHOS 66 53   ALT 9 8   AST 16 14   BILITOT 0.6 1.2   .4*  --         Imaging   CTA Chest Non-Coronary (PE Studies)  Narrative: EXAMINATION:  CTA CHEST NON CORONARY (PE STUDIES)    CLINICAL HISTORY:  Pulmonary embolism (PE) suspected, positive D-dimer;    TECHNIQUE:  Helically acquired images with axial, sagittal and coronal reformations were obtained from the thoracic inlet to the lung bases followingthe IV administration of contrast.  CTA timed for evaluation of the pulmonary arteries.  MIP images were performed.    Automated tube current modulation, weight-based exposure dosing, and/or iterative reconstruction technique utilized to reach lowest reasonably achievable exposure rate.    DLP: 732 mGy*cm    COMPARISON:  CT chest 02/11/2021    FINDINGS:  BASE OF NECK: No significant abnormality.    AORTA: Aortic atherosclerosis.    PULMONARY VASCULATURE: Pulmonary arteries enhance normally.  No evidence of pulmonary embolus.    HEART: Normal size. No effusion. There are coronary artery calcifications.    MYRON/MEDIASTINUM: No enlarged lymph nodes by size criteria.    AIRWAYS: Patent.    LUNGS/PLEURA: Right lower lobe and right middle lobe atelectasis.  Pulmonary emphysema.  Dependently layering moderate right pleural effusion.    UPPER ABDOMEN: Nodular thickening of the left adrenal gland is unchanged dating back to at least 2021.  No follow-up imaging is recommended per  consensus recommendations based on imaging criteria.    THORACIC SOFT TISSUES: Gynecomastia    BONES: No acute fracture. No suspicious lytic or sclerotic lesions.  Impression: 1. No evidence of pulmonary embolus  2. Moderate right pleural effusion with right basilar atelectasis    Electronically signed by: Etelvina Nolasco  Date:    06/06/2023  Time:    19:05  X-Ray Chest AP Portable  Narrative: EXAMINATION:  XR CHEST AP PORTABLE    CLINICAL HISTORY:  Chest Pain;    TECHNIQUE:  Single frontal view of the chest was performed.    COMPARISON:  05/21/2019    FINDINGS:  There is a right-sided pleural effusion.  There is right lower lobe atelectasis versus developing infiltrate.  The heart is normal appearance.  The pulmonary vascularity is unremarkable.  Bones and joints show no acute abnormality.  Impression: Right-sided pleural effusion with right lower lobe area of atelectasis versus developing infiltrate    Electronically signed by: Wilver Hernández  Date:    06/06/2023  Time:    17:25     US Liver with Doppler (xpd)    (Results Pending)     Assessment & Plan   Symptomatic anemia- iron and folate deficiency  Acute heart failure unknown LVEF  Alcoholic cirrhosis  Hypervolemic hyponatremia-cirrhosis/heart failure - improving with diuresis  COPD mild exacerbation    History of osteoarthritis-NSAID use, on aspirin Plavix-not sure of what indication    Plan:    Transfuse additional unit/3rd unit PRBC weight  Transthoracic echo-  pending  Liver ultrasound with Doppler  Check hepatitis panel, HIV, AFP  Lasix 40 mg IV BID  Continue spironolactone 50 mg b.i.d.  Accurate I/O and daily weight  Ferrlecit 125 mg daily for 5 doses  Folic acid, multivitamin, thiamine daily  Will hold antiplatelets at this time  Given significant iron-deficiency anemia will consult GI for possible endoscopy although no sign of overt GI bleeding at this time.  PPI p.o. b.i.d.  I spent 15 minutes on smoking and alcohol cessation counseling, patient  considering to quit both, declined nicotine patch at this time.  VTE Prophylaxis:  SCDs     Critical care time:  35 minutes  Critical care diagnosis:  Symptomatic anemia, acute CHF unknown LVEF    Anjelica Sotelo MD  Internal Medicine

## 2023-06-08 NOTE — PROGRESS NOTES
[facility]  Hospital Medicine Progress Note        Chief Complaint: Inpatient Follow-up for Bilateral lower extremity swelling    HPI: This is a 63-year-old male with medical history of alcoholic cirrhosis, COPD, hypertension, on aspirin and Plavix, presented to [facility] ED with chief complaint of bilateral lower extremity swelling and weeping as well as shortness breath and wheezing over the past 2 weeks.  He denies history of heart failure but report taking fluid pills for his cirrhosis.  He is not sure why he is on aspirin and Plavix.  Reports shortness of breath on exertion, denies chest pain, fever chills.  Report chronic intermittent cough that is nonproductive.  Denies abdominal pain cough, nausea, vomiting, hematemesis, diarrhea, melena or hematochezia.     On arrival he was borderline hypotensive, tachypneic, tachycardic, and saturating 95% on 2 L nasal cannula.  Labs were notable for hemoglobin of 5.0, platelets 277, sodium 124, creatinine 1.17, albumin 3.0, INR 1.13, , and elevated D-dimer.  Stool occult blood negative.  Chest x-ray show congestive and small right-sided pleural effusion with right lower lobe atelectasis.  CTA chest ordered for elevated D-dimer show no evidence of pulmonary embolus and similar finding of chest x-ray of moderate right-sided pleural effusion and right basilar atelectasis.     He received 2 units of PRBC, Lasix 40 mg IV 2 doses, bronchodilators, subsequently was transferred to [facility] and referred to hospital medicine service for further evaluation and management.     On  evaluation he is resting comfortably on 2 L cannula denies shortness breath or chest pain.  Repeat hemoglobin is 6.9 which is appropriate response to 2 units.    June 8, 2023 Patient was seen and examined at bedside , patient denies any complaints of hematemesis/hematochezia/hematuria, lightheadedness, fevers or chills or pain or cough , bowel or bladder issues,  reports shortness of breath is better  Chart was reviewed, hemoglobin of 8.3 this morning.  Sodium improving - 130, creatinine 1.27  Case was discussed with patient's nurse and  on the floor.    June 9, 2023--this morning EGD was unremarkable, gastroenterology recommended a colonoscopy tomorrow, patient is doing well after procedure, no nausea, no vomiting, no abdominal pain    Objective/physical exam:  General: In no acute distress, afebrile  Chest: Clear to auscultation bilaterally  Heart: RRR, +S1, S2, no appreciable murmur  Abdomen: Soft, nontender, BS +, distended  MSK: Warm, no lower extremity edema, no clubbing or cyanosis  Neurologic: Alert and oriented x4, Cranial nerve II-XII intact, Strength 5/5 in all 4 extremities    VITAL SIGNS: 24 HRS MIN & MAX LAST   Temp  Min: 97.4 °F (36.3 °C)  Max: 98 °F (36.7 °C) 97.4 °F (36.3 °C)   BP  Min: 105/69  Max: 136/76 136/76   Pulse  Min: 78  Max: 95  78   Resp  Min: 18  Max: 22 18   SpO2  Min: 91 %  Max: 95 % (!) 91 %     I have reviewed the following labs:    Recent Labs   Lab 06/07/23 1028 06/07/23 2134 06/08/23  0858   WBC 6.68 9.03 8.31   RBC 3.14* 2.98* 3.45*   HGB 7.3* 6.9* 8.3*   HCT 25.5* 23.4* 28.0*   MCV 81.2 78.5* 81.2   MCH 23.2* 23.2* 24.1*   MCHC 28.6* 29.5* 29.6*   RDW 20.1* 20.7* 20.6*    241 239   MPV 10.3 10.2 9.7       Recent Labs   Lab 06/06/23  1733 06/07/23  1028 06/07/23 2134 06/08/23  0858   * 129* 128* 130*   K 4.4 5.0 4.5 4.1   CO2 22* 27 24 30   BUN 14.6 17.8 23.0 23.0   CREATININE 1.17 1.16 1.41* 1.27*   CALCIUM 8.3* 8.5* 8.4* 8.7*   ALBUMIN 3.0* 3.0*  --   --    ALKPHOS 66 53  --   --    ALT 9 8  --   --    AST 16 14  --   --    BILITOT 0.6 1.2  --   --           Microbiology Results (last 7 days)       ** No results found for the last 168 hours. **             See below for Radiology    Scheduled Med:   ferric gluconate (FERRLECIT) IVPB  125 mg Intravenous QHS    folic acid  1 mg Oral Daily    furosemide (LASIX)  injection  40 mg Intravenous BID    gabapentin  300 mg Oral TID    multivitamin  1 tablet Oral Daily    pantoprazole  40 mg Intravenous BID    spironolactone  50 mg Oral BID    thiamine  100 mg Oral TID        Continuous Infusions:       PRN Meds:  sodium chloride, acetaminophen, albuterol-ipratropium, aluminum-magnesium hydroxide-simethicone, LORazepam, melatonin, ondansetron, polyethylene glycol, prochlorperazine, senna-docusate 8.6-50 mg, sodium chloride 0.9%, traMADoL       Assessment/Plan:  Symptomatic anemia-iron and folate deficiency  Cirrhosis secondary to alcohol   Acute heart failure , Systolic  COPD with mild exacerbation  Hypervolemic hyponatremia  History of osteoarthritis-NSAID use, on aspirin Plavix-not sure of what indication  EGD was unremarkable June 9, 2023  Cardiac echo shows ejection fraction 40% and grade 3 diastolic dysfunction    Received transfusions in-house , supplement iron, pantoprazole b.i.d. avoid blood thinners  Monitor hemoglobin, keep hemoglobin above 7-8  Gastroenterology was consulted, appreciate recommendations, colonoscopy being planned for tomorrow,   Hep C antibody reactive  Ultrasound of the abdomen-cirrhosis, small volume ascites, pleural effusion  Continue Lasix and spironolactone  Cardiology consultation for new onset heart failure  P.r.n. bronchodilators   Sodium improving with diuresis, monitor sodium level  Appropriate home medications for chronic medical problems resumed              VTE prophylaxis:     Patient condition:  Fair    Anticipated discharge and Disposition:     tbd    All diagnosis and differential diagnosis have been reviewed; assessment and plan has been documented; I have personally reviewed the labs and test results that are presently available; I have reviewed the patients medication list; I have reviewed the consulting providers response and recommendations. I have reviewed or attempted to review medical records based upon their availability    All  of the patient's questions have been  addressed and answered. Patient's is agreeable to the above stated plan. I will continue to monitor closely and make adjustments to medical management as needed.  _____________________________________________________________________    Nutrition Status:    Radiology:  I have personally reviewed the following imaging and agree with the radiologist.     Echo  · The left ventricle is mildly enlarged with mildly decreased systolic   function.  · Overall there is mild to moderate global hypokinesis; Prounounced basal   inferior and inferioseptal hypokinesis.  · The estimated ejection fraction is 40%.  · Grade III left ventricular diastolic dysfunction.  · Mild right ventricular enlargement with low normal right ventricular   systolic function.  · Mild-to-moderate mitral regurgitation.  · Mild tricuspid regurgitation.  · Mild pulmonic regurgitation.  · The estimated PA systolic pressure is 35 mmHg.  · Mild left atrial enlargement.  · Mild right atrial enlargement.     US Liver with Doppler (xpd)  Narrative: EXAMINATION:  US LIVER WITH DOPPLER    CLINICAL HISTORY:  Cirrhosis evaluation;    COMPARISON:  CT yesterday.    FINDINGS:  Grayscale, color and spectral doppler evaluation of the right upper quadrant.    Pancreas obscured.  Imaged portions of IVC normal in caliber.    Liver is not significantly enlarged.  There is nodular cirrhotic hepatic contour.  There is coarsened hepatic echotexture.  No focal suspicious liver lesion.  Imaged portal system is patent with appropriate direction of flow.  Imaged hepatic veins are patent.  Hepatic artery not visualized.  Splenic artery and vein not visualized.    No gallstones visualized.  Mild nonspecific gallbladder wall thickening which could relate to fluid overload.  The common bile duct is normal in caliber  and measures 3 mm.    Right kidney measures 9 cm in length. No hydronephrosis.    There is small volume ascites.  There is moderate  right pleural effusion.  Impression: 1. Cirrhosis with no focal suspicious liver lesion seen.  2. Imaged hepatic vasculature is patent.  3. Small volume ascites and moderate right pleural effusion.    Electronically signed by: Carl Elam  Date:    06/08/2023  Time:    09:52      Jeanette Castillo MD   06/08/2023
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
## 2023-06-08 NOTE — NURSING
Nurses Note -- 4 Eyes      6/8/2023   4:23 AM      Skin assessed during: Transfer      [x] No Altered Skin Integrity Present    []Prevention Measures Documented      [] Yes- Altered Skin Integrity Present or Discovered   [] LDA Added if Not in Epic (Describe Wound)   [] New Altered Skin Integrity was Present on Admit and Documented in LDA   [] Wound Image Taken    Wound Care Consulted? No    Attending Nurse:  Yana Hinton RN     Second RN/Staff Member:     Poppy Estrada

## 2023-06-09 ENCOUNTER — ANESTHESIA EVENT (OUTPATIENT)
Dept: ENDOSCOPY | Facility: HOSPITAL | Age: 63
DRG: 432 | End: 2023-06-09
Payer: MEDICAID

## 2023-06-09 ENCOUNTER — ANESTHESIA (OUTPATIENT)
Dept: ENDOSCOPY | Facility: HOSPITAL | Age: 63
DRG: 432 | End: 2023-06-09
Payer: MEDICAID

## 2023-06-09 LAB
ANION GAP SERPL CALC-SCNC: 9 MEQ/L
BASOPHILS # BLD AUTO: 0.03 X10(3)/MCL
BASOPHILS NFR BLD AUTO: 0.3 %
BUN SERPL-MCNC: 19.1 MG/DL (ref 8.4–25.7)
CALCIUM SERPL-MCNC: 8.7 MG/DL (ref 8.8–10)
CHLORIDE SERPL-SCNC: 93 MMOL/L (ref 98–107)
CO2 SERPL-SCNC: 31 MMOL/L (ref 23–31)
CREAT SERPL-MCNC: 1.12 MG/DL (ref 0.73–1.18)
CREAT/UREA NIT SERPL: 17
EOSINOPHIL # BLD AUTO: 0 X10(3)/MCL (ref 0–0.9)
EOSINOPHIL NFR BLD AUTO: 0 %
ERYTHROCYTE [DISTWIDTH] IN BLOOD BY AUTOMATED COUNT: 21.1 % (ref 11.5–17)
GFR SERPLBLD CREATININE-BSD FMLA CKD-EPI: >60 MLS/MIN/1.73/M2
GLUCOSE SERPL-MCNC: 81 MG/DL (ref 82–115)
HCT VFR BLD AUTO: 27.6 % (ref 42–52)
HGB BLD-MCNC: 8.1 G/DL (ref 14–18)
IMM GRANULOCYTES # BLD AUTO: 0.05 X10(3)/MCL (ref 0–0.04)
IMM GRANULOCYTES NFR BLD AUTO: 0.6 %
LYMPHOCYTES # BLD AUTO: 1.16 X10(3)/MCL (ref 0.6–4.6)
LYMPHOCYTES NFR BLD AUTO: 13 %
MCH RBC QN AUTO: 23.8 PG (ref 27–31)
MCHC RBC AUTO-ENTMCNC: 29.3 G/DL (ref 33–36)
MCV RBC AUTO: 81.2 FL (ref 80–94)
MONOCYTES # BLD AUTO: 1.06 X10(3)/MCL (ref 0.1–1.3)
MONOCYTES NFR BLD AUTO: 11.9 %
NEUTROPHILS # BLD AUTO: 6.61 X10(3)/MCL (ref 2.1–9.2)
NEUTROPHILS NFR BLD AUTO: 74.2 %
NRBC BLD AUTO-RTO: 0.4 %
PATH REV: NORMAL
PLATELET # BLD AUTO: 239 X10(3)/MCL (ref 130–400)
PMV BLD AUTO: 9.8 FL (ref 7.4–10.4)
POTASSIUM SERPL-SCNC: 4.3 MMOL/L (ref 3.5–5.1)
RBC # BLD AUTO: 3.4 X10(6)/MCL (ref 4.7–6.1)
SODIUM SERPL-SCNC: 133 MMOL/L (ref 136–145)
WBC # SPEC AUTO: 8.91 X10(3)/MCL (ref 4.5–11.5)

## 2023-06-09 PROCEDURE — 25000003 PHARM REV CODE 250: Performed by: INTERNAL MEDICINE

## 2023-06-09 PROCEDURE — 25000003 PHARM REV CODE 250: Performed by: NURSE ANESTHETIST, CERTIFIED REGISTERED

## 2023-06-09 PROCEDURE — 43235 EGD DIAGNOSTIC BRUSH WASH: CPT | Performed by: INTERNAL MEDICINE

## 2023-06-09 PROCEDURE — 25000242 PHARM REV CODE 250 ALT 637 W/ HCPCS

## 2023-06-09 PROCEDURE — D9220A PRA ANESTHESIA: Mod: ANES,,, | Performed by: ANESTHESIOLOGY

## 2023-06-09 PROCEDURE — 37000008 HC ANESTHESIA 1ST 15 MINUTES: Performed by: INTERNAL MEDICINE

## 2023-06-09 PROCEDURE — D9220A PRA ANESTHESIA: Mod: CRNA,,, | Performed by: NURSE ANESTHETIST, CERTIFIED REGISTERED

## 2023-06-09 PROCEDURE — 85025 COMPLETE CBC W/AUTO DIFF WBC: CPT | Performed by: INTERNAL MEDICINE

## 2023-06-09 PROCEDURE — 99900035 HC TECH TIME PER 15 MIN (STAT)

## 2023-06-09 PROCEDURE — D9220A PRA ANESTHESIA: ICD-10-PCS | Mod: ANES,,, | Performed by: ANESTHESIOLOGY

## 2023-06-09 PROCEDURE — 21400001 HC TELEMETRY ROOM

## 2023-06-09 PROCEDURE — 27000221 HC OXYGEN, UP TO 24 HOURS

## 2023-06-09 PROCEDURE — 94640 AIRWAY INHALATION TREATMENT: CPT

## 2023-06-09 PROCEDURE — C9113 INJ PANTOPRAZOLE SODIUM, VIA: HCPCS | Performed by: INTERNAL MEDICINE

## 2023-06-09 PROCEDURE — 25000242 PHARM REV CODE 250 ALT 637 W/ HCPCS: Performed by: INTERNAL MEDICINE

## 2023-06-09 PROCEDURE — 25000003 PHARM REV CODE 250: Performed by: PHYSICIAN ASSISTANT

## 2023-06-09 PROCEDURE — 63600175 PHARM REV CODE 636 W HCPCS: Performed by: INTERNAL MEDICINE

## 2023-06-09 PROCEDURE — 80048 BASIC METABOLIC PNL TOTAL CA: CPT | Performed by: INTERNAL MEDICINE

## 2023-06-09 PROCEDURE — 63600175 PHARM REV CODE 636 W HCPCS: Performed by: NURSE ANESTHETIST, CERTIFIED REGISTERED

## 2023-06-09 PROCEDURE — D9220A PRA ANESTHESIA: ICD-10-PCS | Mod: CRNA,,, | Performed by: NURSE ANESTHETIST, CERTIFIED REGISTERED

## 2023-06-09 PROCEDURE — 94761 N-INVAS EAR/PLS OXIMETRY MLT: CPT

## 2023-06-09 RX ORDER — IPRATROPIUM BROMIDE AND ALBUTEROL SULFATE 2.5; .5 MG/3ML; MG/3ML
SOLUTION RESPIRATORY (INHALATION)
Status: COMPLETED
Start: 2023-06-09 | End: 2023-06-09

## 2023-06-09 RX ORDER — IPRATROPIUM BROMIDE AND ALBUTEROL SULFATE 2.5; .5 MG/3ML; MG/3ML
3 SOLUTION RESPIRATORY (INHALATION) ONCE
Status: CANCELLED | OUTPATIENT
Start: 2023-06-09 | End: 2023-06-09

## 2023-06-09 RX ORDER — LORAZEPAM 2 MG/ML
0.25 INJECTION INTRAMUSCULAR ONCE AS NEEDED
Status: CANCELLED | OUTPATIENT
Start: 2023-06-09 | End: 2034-11-05

## 2023-06-09 RX ORDER — LIDOCAINE HYDROCHLORIDE 10 MG/ML
1 INJECTION, SOLUTION EPIDURAL; INFILTRATION; INTRACAUDAL; PERINEURAL ONCE
Status: CANCELLED | OUTPATIENT
Start: 2023-06-09 | End: 2023-06-09

## 2023-06-09 RX ORDER — PROPOFOL 10 MG/ML
VIAL (ML) INTRAVENOUS
Status: DISCONTINUED | OUTPATIENT
Start: 2023-06-09 | End: 2023-06-09

## 2023-06-09 RX ORDER — ONDANSETRON 2 MG/ML
4 INJECTION INTRAMUSCULAR; INTRAVENOUS DAILY PRN
Status: CANCELLED | OUTPATIENT
Start: 2023-06-09

## 2023-06-09 RX ORDER — LISINOPRIL 5 MG/1
5 TABLET ORAL DAILY
Status: DISCONTINUED | OUTPATIENT
Start: 2023-06-09 | End: 2023-06-12

## 2023-06-09 RX ORDER — PROCHLORPERAZINE EDISYLATE 5 MG/ML
5 INJECTION INTRAMUSCULAR; INTRAVENOUS EVERY 30 MIN PRN
Status: CANCELLED | OUTPATIENT
Start: 2023-06-09

## 2023-06-09 RX ORDER — IPRATROPIUM BROMIDE AND ALBUTEROL SULFATE 2.5; .5 MG/3ML; MG/3ML
3 SOLUTION RESPIRATORY (INHALATION)
Status: CANCELLED | OUTPATIENT
Start: 2023-06-09

## 2023-06-09 RX ORDER — SODIUM CHLORIDE, SODIUM GLUCONATE, SODIUM ACETATE, POTASSIUM CHLORIDE AND MAGNESIUM CHLORIDE 30; 37; 368; 526; 502 MG/100ML; MG/100ML; MG/100ML; MG/100ML; MG/100ML
INJECTION, SOLUTION INTRAVENOUS CONTINUOUS
Status: CANCELLED | OUTPATIENT
Start: 2023-06-09 | End: 2023-07-09

## 2023-06-09 RX ORDER — LIDOCAINE HYDROCHLORIDE 10 MG/ML
INJECTION, SOLUTION EPIDURAL; INFILTRATION; INTRACAUDAL; PERINEURAL
Status: DISPENSED
Start: 2023-06-09 | End: 2023-06-09

## 2023-06-09 RX ORDER — LIDOCAINE HYDROCHLORIDE 20 MG/ML
INJECTION, SOLUTION EPIDURAL; INFILTRATION; INTRACAUDAL; PERINEURAL
Status: DISCONTINUED | OUTPATIENT
Start: 2023-06-09 | End: 2023-06-09

## 2023-06-09 RX ORDER — SODIUM, POTASSIUM,MAG SULFATES 17.5-3.13G
1 SOLUTION, RECONSTITUTED, ORAL ORAL 2 TIMES DAILY
Status: COMPLETED | OUTPATIENT
Start: 2023-06-09 | End: 2023-06-09

## 2023-06-09 RX ORDER — FUROSEMIDE 10 MG/ML
40 INJECTION INTRAMUSCULAR; INTRAVENOUS ONCE
Status: DISCONTINUED | OUTPATIENT
Start: 2023-06-09 | End: 2023-06-11

## 2023-06-09 RX ORDER — ONDANSETRON 4 MG/1
8 TABLET, ORALLY DISINTEGRATING ORAL EVERY 6 HOURS PRN
Status: CANCELLED | OUTPATIENT
Start: 2023-06-09

## 2023-06-09 RX ORDER — PROPOFOL 10 MG/ML
VIAL (ML) INTRAVENOUS
Status: COMPLETED
Start: 2023-06-09 | End: 2023-06-09

## 2023-06-09 RX ORDER — MEPERIDINE HYDROCHLORIDE 25 MG/ML
12.5 INJECTION INTRAMUSCULAR; INTRAVENOUS; SUBCUTANEOUS EVERY 10 MIN PRN
Status: CANCELLED | OUTPATIENT
Start: 2023-06-09 | End: 2023-06-10

## 2023-06-09 RX ADMIN — GABAPENTIN 300 MG: 300 CAPSULE ORAL at 08:06

## 2023-06-09 RX ADMIN — SODIUM CHLORIDE 125 MG: 9 INJECTION, SOLUTION INTRAVENOUS at 09:06

## 2023-06-09 RX ADMIN — THERA TABS 1 TABLET: TAB at 12:06

## 2023-06-09 RX ADMIN — GABAPENTIN 300 MG: 300 CAPSULE ORAL at 12:06

## 2023-06-09 RX ADMIN — FUROSEMIDE 40 MG: 10 INJECTION, SOLUTION INTRAMUSCULAR; INTRAVENOUS at 05:06

## 2023-06-09 RX ADMIN — THIAMINE HCL TAB 100 MG 100 MG: 100 TAB at 12:06

## 2023-06-09 RX ADMIN — SODIUM CHLORIDE, SODIUM GLUCONATE, SODIUM ACETATE, POTASSIUM CHLORIDE AND MAGNESIUM CHLORIDE: 526; 502; 368; 37; 30 INJECTION, SOLUTION INTRAVENOUS at 11:06

## 2023-06-09 RX ADMIN — IPRATROPIUM BROMIDE AND ALBUTEROL SULFATE 3 ML: .5; 3 SOLUTION RESPIRATORY (INHALATION) at 10:06

## 2023-06-09 RX ADMIN — SODIUM SULFATE, POTASSIUM SULFATE, MAGNESIUM SULFATE 354 ML: 17.5; 3.13; 1.6 SOLUTION, CONCENTRATE ORAL at 05:06

## 2023-06-09 RX ADMIN — FUROSEMIDE 40 MG: 10 INJECTION, SOLUTION INTRAMUSCULAR; INTRAVENOUS at 12:06

## 2023-06-09 RX ADMIN — THIAMINE HCL TAB 100 MG 100 MG: 100 TAB at 08:06

## 2023-06-09 RX ADMIN — IPRATROPIUM BROMIDE AND ALBUTEROL SULFATE 3 ML: .5; 3 SOLUTION RESPIRATORY (INHALATION) at 07:06

## 2023-06-09 RX ADMIN — LIDOCAINE HYDROCHLORIDE 5 ML: 20 INJECTION, SOLUTION INTRAVENOUS at 11:06

## 2023-06-09 RX ADMIN — PROPOFOL 30 MG: 10 INJECTION, EMULSION INTRAVENOUS at 11:06

## 2023-06-09 RX ADMIN — PANTOPRAZOLE SODIUM 40 MG: 40 INJECTION, POWDER, FOR SOLUTION INTRAVENOUS at 12:06

## 2023-06-09 RX ADMIN — PROPOFOL 70 MG: 10 INJECTION, EMULSION INTRAVENOUS at 11:06

## 2023-06-09 RX ADMIN — LISINOPRIL 5 MG: 5 TABLET ORAL at 05:06

## 2023-06-09 RX ADMIN — PANTOPRAZOLE SODIUM 40 MG: 40 INJECTION, POWDER, FOR SOLUTION INTRAVENOUS at 08:06

## 2023-06-09 RX ADMIN — GABAPENTIN 300 MG: 300 CAPSULE ORAL at 05:06

## 2023-06-09 RX ADMIN — SPIRONOLACTONE 50 MG: 25 TABLET ORAL at 08:06

## 2023-06-09 RX ADMIN — THIAMINE HCL TAB 100 MG 100 MG: 100 TAB at 05:06

## 2023-06-09 RX ADMIN — SPIRONOLACTONE 50 MG: 25 TABLET ORAL at 12:06

## 2023-06-09 NOTE — ANESTHESIA POSTPROCEDURE EVALUATION
Anesthesia Post Evaluation    Patient: Hemal Stricklandantionette Bernal    Procedure(s) Performed: Procedure(s) (LRB):  EGD (N/A)    Final Anesthesia Type: general      Patient location during evaluation: GI PACU  Patient participation: Yes- Able to Participate  Level of consciousness: awake and alert  Post-procedure vital signs: reviewed and stable  Pain management: adequate  Airway patency: patent    PONV status at discharge: No PONV  Anesthetic complications: no      Cardiovascular status: blood pressure returned to baseline  Respiratory status: spontaneous ventilation and room air  Hydration status: euvolemic  Follow-up not needed.          Vitals Value Taken Time   BP 95/58 06/09/23 1122   Temp 36.5 °C (97.7 °F) 06/09/23 1122   Pulse 78 06/09/23 1122   Resp 18 06/09/23 1122   SpO2 94 % 06/09/23 1122         No case tracking events are documented in the log.      Pain/Noah Score: Noah Score: 8 (6/9/2023 11:24 AM)

## 2023-06-09 NOTE — CONSULTS
Inpatient consult to Cardiology  Consult performed by: Jeanette Castillo MD  Consult ordered by: Brenda Jc MD  Reason for consult: New Onset CHF      [facility] - 5th Floor Med Surg  Cardiology  Consult Note    Patient Name: Hemal Tolbert Jr.  MRN: [id number]  Admission Date: 6/7/2023  Hospital Length of Stay: 2 days  Code Status: Full Code   Attending Provider: Jeanette Castillo MD   Consulting Provider: Yair Olivarez MD  Primary Care Physician: Primary Doctor No  Principal Problem:Alcoholic cirrhosis    Patient information was obtained from patient, past medical records, ER records, and primary team.     Subjective:     Chief Complaint: Reason for Consult:     HPI: Mr. Tolbert is a 62 y/o male with a history of HTN, COPD, Alcoholic Cirrhosis, and DEON, who is unknown to [facility]. He presented to the ER on 6.7.23 with complaints of bilateral lower extremity swelling for 2 weeks. He reports weeping wounds. Upon arrival to the ER, he was tachypneic, tachycardia, and hypotensive. Significant labs at that time included. .4, D-Dimer 3.62, and H&H 5.0/18.0, and Creat 1.17. CXR showed right-sided pleural effusion with right lower lobe area of atelectasis versus developing infiltrate. CTA Chest showed no evidence of pulmonary embolus and moderate right pleural effusion with right basilar atelectasis. US of Liver showed Cirrhosis with no focal suspicious liver lesion seen, Imaged hepatic vasculature is patent, and small volume ascites and moderate right pleural effusion. [facility] was consulted for new onset CHF management.    PMH: Alcoholic Cirrhosis, COPD, HTN, DEON, Nose Cancer  PSH: EGD (6.9.23), Resection of nose squamous cell carcinoma   Family History: Denies family history  Social History: Current alcohol drinkers; 3 beers per day; Current Smoker (1PPD). Denies illicit drug use; remote history in the 70s.     Previous Cardiac Diagnostics:   ECHO (6.8.23):   The left ventricle is mildly enlarged with mildly  decreased systolic function. Overall there is mild to moderate global hypokinesis; Prounounced basal inferior and inferioseptal hypokinesis.The estimated ejection fraction is 40%. Grade III left ventricular diastolic dysfunction. Mild right ventricular enlargement with low normal right ventricular systolic function. Mild-to-moderate mitral regurgitation. Mild tricuspid regurgitation. Mild pulmonic regurgitation.The estimated PA systolic pressure is 35 mmHg. Mild left atrial enlargement. Mild right atrial enlargement.    Review of patient's allergies indicates:  No Known Allergies    No current facility-administered medications on file prior to encounter.     Current Outpatient Medications on File Prior to Encounter   Medication Sig    ADULT ASPIRIN REGIMEN 81 mg EC tablet Take 81 mg by mouth once daily.    albuterol (PROVENTIL/VENTOLIN HFA) 90 mcg/actuation inhaler Inhale into the lungs.    clopidogreL (PLAVIX) 75 mg tablet Take 75 mg by mouth once daily.    diclofenac (VOLTAREN) 75 MG EC tablet Take 75 mg by mouth 2 (two) times daily.    gabapentin (NEURONTIN) 300 MG capsule Take 300 mg by mouth 3 (three) times daily.    spironolactone (ALDACTONE) 50 MG tablet Take 50 mg by mouth 2 (two) times daily.    STIOLTO RESPIMAT 2.5-2.5 mcg/actuation Mist Inhale 2 puffs into the lungs once daily.     Review of Systems   Respiratory:  Positive for shortness of breath.    Cardiovascular:  Negative for chest pain, palpitations and leg swelling.   Gastrointestinal:  Positive for abdominal distention.   All other systems reviewed and are negative.    Objective:     Vital Signs (Most Recent):  Temp: 97.9 °F (36.6 °C) (06/09/23 1222)  Pulse: 85 (06/09/23 1140)  Resp: 18 (06/09/23 1222)  BP: 133/76 (06/09/23 1222)  SpO2: (!) 94 % (06/09/23 1222) Vital Signs (24h Range):  Temp:  [97.5 °F (36.4 °C)-98.3 °F (36.8 °C)] 97.9 °F (36.6 °C)  Pulse:  [78-91] 85  Resp:  [15-21] 18  SpO2:  [90 %-99 %] 94 %  BP: ()/(49-96) 133/76      Weight: 79 kg (174 lb 2.6 oz)  Body mass index is 25.8 kg/m².    SpO2: (!) 94 %       Intake/Output Summary (Last 24 hours) at 6/9/2023 1504  Last data filed at 6/9/2023 1407  Gross per 24 hour   Intake 100 ml   Output 1750 ml   Net -1650 ml     Lines/Drains/Airways       Peripheral Intravenous Line  Duration                  Peripheral IV - Single Lumen 06/09/23 0915 22 G Distal;Left;Posterior Forearm <1 day                  Significant Labs:  Recent Results (from the past 72 hour(s))   COVID/FLU A&B PCR    Collection Time: 06/06/23  5:21 PM   Result Value Ref Range    Influenza A PCR Not Detected Not Detected    Influenza B PCR Not Detected Not Detected    SARS-CoV-2 PCR Not Detected Not Detected, Negative, Invalid   Troponin ISTAT    Collection Time: 06/06/23  5:31 PM   Result Value Ref Range    POC Cardiac Troponin I 0.01 0.00 - 0.08 ng/mL    Sample UNKNOWN    Comprehensive metabolic panel    Collection Time: 06/06/23  5:33 PM   Result Value Ref Range    Sodium Level 124 (L) 136 - 145 mmol/L    Potassium Level 4.4 3.5 - 5.1 mmol/L    Chloride 94 (L) 98 - 107 mmol/L    Carbon Dioxide 22 (L) 23 - 31 mmol/L    Glucose Level 87 82 - 115 mg/dL    Blood Urea Nitrogen 14.6 8.4 - 25.7 mg/dL    Creatinine 1.17 0.73 - 1.18 mg/dL    Calcium Level Total 8.3 (L) 8.8 - 10.0 mg/dL    Protein Total 7.7 (H) 5.8 - 7.6 gm/dL    Albumin Level 3.0 (L) 3.4 - 4.8 g/dL    Globulin 4.7 (H) 2.4 - 3.5 gm/dL    Albumin/Globulin Ratio 0.6 (L) 1.1 - 2.0 ratio    Bilirubin Total 0.6 <=1.5 mg/dL    Alkaline Phosphatase 66 40 - 150 unit/L    Alanine Aminotransferase 9 0 - 55 unit/L    Aspartate Aminotransferase 16 5 - 34 unit/L    eGFR >60 mls/min/1.73/m2   BNP    Collection Time: 06/06/23  5:33 PM   Result Value Ref Range    Natriuretic Peptide 999.4 (H) <=100.0 pg/mL   D-Dimer, Quantitative    Collection Time: 06/06/23  5:33 PM   Result Value Ref Range    D-Dimer 3.62 (H) 0.00 - 0.50 ug/mL FEU   CBC with Differential    Collection Time:  06/06/23  5:33 PM   Result Value Ref Range    WBC 8.96 4.50 - 11.50 x10(3)/mcL    RBC 2.42 (L) 4.70 - 6.10 x10(6)/mcL    Hgb 5.0 (LL) 14.0 - 18.0 g/dL    Hct 18.0 (LL) 42.0 - 52.0 %    MCV 74.4 (L) 80.0 - 94.0 fL    MCH 20.7 (L) 27.0 - 31.0 pg    MCHC 27.8 (L) 33.0 - 36.0 g/dL    RDW 21.1 (H) 11.5 - 17.0 %    Platelet 277 130 - 400 x10(3)/mcL    MPV 9.6 7.4 - 10.4 fL    Neut % 72.1 %    Lymph % 14.7 %    Mono % 12.5 %    Eos % 0.0 %    Basophil % 0.3 %    Lymph # 1.32 0.6 - 4.6 x10(3)/mcL    Neut # 6.45 2.1 - 9.2 x10(3)/mcL    Mono # 1.12 0.1 - 1.3 x10(3)/mcL    Eos # 0.00 0 - 0.9 x10(3)/mcL    Baso # 0.03 <=0.2 x10(3)/mcL   Ethanol    Collection Time: 06/06/23  5:33 PM   Result Value Ref Range    Ethanol Level 37.0 (H) <=10.0 mg/dL   Urinalysis, Reflex to Urine Culture    Collection Time: 06/06/23  6:06 PM    Specimen: Urine   Result Value Ref Range    Color, UA Yellow Yellow, Light-Yellow, Dark Yellow, Luma, Straw    Appearance, UA Clear Clear    Specific Gravity, UA 1.015     pH, UA 6.5 5.0 - 8.5    Protein, UA Negative Negative mg/dL    Glucose, UA Negative Negative, Normal mg/dL    Ketones, UA Negative Negative mg/dL    Blood, UA Negative Negative unit/L    Bilirubin, UA Negative Negative mg/dL    Urobilinogen, UA 1.0 0.2, 1.0, Normal mg/dL    Nitrites, UA Negative Negative    Leukocyte Esterase, UA Negative Negative unit/L   Urinalysis, Microscopic    Collection Time: 06/06/23  6:06 PM   Result Value Ref Range    Bacteria, UA None Seen None Seen, Rare, Occasional /HPF    RBC, UA None Seen None Seen, 0-2, 3-5, 0-5 /HPF    WBC, UA None Seen None Seen, 0-2, 3-5, 0-5 /HPF    Squamous Epithelial Cells, UA Rare None Seen, Rare, Occasional, Occ /HPF   POCT Troponin    Collection Time: 06/06/23  6:12 PM   Result Value Ref Range    Troponin I 0.01 ng/mL   Occult Blood Stool, CA Screen    Collection Time: 06/06/23  7:27 PM   Result Value Ref Range    Occult Blood Stool 1 Negative Negative   Osmolality, Serum     Collection Time: 06/06/23  7:39 PM   Result Value Ref Range    Osmolality 275 (L) 280 - 300 mOsm/kg   Osmolality, Urine    Collection Time: 06/06/23  7:39 PM   Result Value Ref Range    Urine Osmolality 281 (L) 300 - 1,300 mOsm/kg   Sodium, Random Urine    Collection Time: 06/06/23  7:39 PM   Result Value Ref Range    Urine Sodium 92.0 mmol/L   Prepare RBC 2 Units; Emergency    Collection Time: 06/06/23  8:28 PM   Result Value Ref Range    UNIT NUMBER [id number]     UNIT ABO/RH O POS     DISPENSE STATUS Transfused     Unit Expiration [id number]     Product Code F2282C45     Unit Blood Type Code 5100     CROSSMATCH INTERPRETATION Compatible     UNIT NUMBER [id number]     UNIT ABO/RH O POS     DISPENSE STATUS Transfused     Unit Expiration [id number]     Product Code K9373M94     Unit Blood Type Code 5100     CROSSMATCH INTERPRETATION Compatible    Type & Screen    Collection Time: 06/06/23  8:28 PM   Result Value Ref Range    Group & Rh A POS     Indirect Martinez GEL NEG     Specimen Outdate 06/09/2023 23:59    ABORH RETYPE    Collection Time: 06/07/23  6:52 AM   Result Value Ref Range    ABORH Retype A POS    Comprehensive metabolic panel    Collection Time: 06/07/23 10:28 AM   Result Value Ref Range    Sodium Level 129 (L) 136 - 145 mmol/L    Potassium Level 5.0 3.5 - 5.1 mmol/L    Chloride 94 (L) 98 - 107 mmol/L    Carbon Dioxide 27 23 - 31 mmol/L    Glucose Level 131 (H) 82 - 115 mg/dL    Blood Urea Nitrogen 17.8 8.4 - 25.7 mg/dL    Creatinine 1.16 0.73 - 1.18 mg/dL    Calcium Level Total 8.5 (L) 8.8 - 10.0 mg/dL    Protein Total 7.7 (H) 5.8 - 7.6 gm/dL    Albumin Level 3.0 (L) 3.4 - 4.8 g/dL    Globulin 4.7 (H) 2.4 - 3.5 gm/dL    Albumin/Globulin Ratio 0.6 (L) 1.1 - 2.0 ratio    Bilirubin Total 1.2 <=1.5 mg/dL    Alkaline Phosphatase 53 40 - 150 unit/L    Alanine Aminotransferase 8 0 - 55 unit/L    Aspartate Aminotransferase 14 5 - 34 unit/L    eGFR >60 mls/min/1.73/m2   CBC with Differential     Collection Time: 06/07/23 10:28 AM   Result Value Ref Range    WBC 6.68 4.50 - 11.50 x10(3)/mcL    RBC 3.14 (L) 4.70 - 6.10 x10(6)/mcL    Hgb 7.3 (L) 14.0 - 18.0 g/dL    Hct 25.5 (L) 42.0 - 52.0 %    MCV 81.2 80.0 - 94.0 fL    MCH 23.2 (L) 27.0 - 31.0 pg    MCHC 28.6 (L) 33.0 - 36.0 g/dL    RDW 20.1 (H) 11.5 - 17.0 %    Platelet 255 130 - 400 x10(3)/mcL    MPV 10.3 7.4 - 10.4 fL    Neut % 82.4 %    Lymph % 9.0 %    Mono % 7.6 %    Eos % 0.0 %    Basophil % 0.4 %    Lymph # 0.60 0.6 - 4.6 x10(3)/mcL    Neut # 5.50 2.1 - 9.2 x10(3)/mcL    Mono # 0.51 0.1 - 1.3 x10(3)/mcL    Eos # 0.00 0 - 0.9 x10(3)/mcL    Baso # 0.03 <=0.2 x10(3)/mcL    IG# 0.04 0 - 0.04 x10(3)/mcL    IG% 0.6 %   Iron and TIBC    Collection Time: 06/07/23  9:34 PM   Result Value Ref Range    Iron Binding Capacity Unsaturated 377 (H) 69 - 240 ug/dL    Iron Level 17 (L) 65 - 175 ug/dL    Transferrin 357 (H) 163 - 344 mg/dL    Iron Binding Capacity Total 394 250 - 450 ug/dL    Iron Saturation 4 (L) 20 - 50 %   Ferritin    Collection Time: 06/07/23  9:34 PM   Result Value Ref Range    Ferritin Level 28.64 21.81 - 274.66 ng/mL   Vitamin B12    Collection Time: 06/07/23  9:34 PM   Result Value Ref Range    Vitamin B12 Level 344 213 - 816 pg/mL   Folate    Collection Time: 06/07/23  9:34 PM   Result Value Ref Range    Folate Level 3.5 (L) 7.0 - 31.4 ng/mL   Reticulocytes    Collection Time: 06/07/23  9:34 PM   Result Value Ref Range    Retic Cnt Auto 2.62 (H) 1.1 - 2.1 %    RET# 0.0781 0.026 - 0.095   Haptoglobin    Collection Time: 06/07/23  9:34 PM   Result Value Ref Range    Haptoglobin 179 40 - 368 mg/dL   Path Review, Peripheral Smear    Collection Time: 06/07/23  9:34 PM   Result Value Ref Range    Peripheral Smear Evaluation       - Microcytic, hypochromic anemia.    Impression: Microcytic hypochromic anemia can be seen in either iron deficiency or a disorder of globin synthesis (e.g., thalassemia).  Additional laboratory studies for evaluation would  include iron studies and if indicated hemoglobin electrophoresis.     Robbie Maier M.D.      APTT    Collection Time: 06/07/23  9:34 PM   Result Value Ref Range    PTT 27.6 23.2 - 33.7 seconds   Protime-INR    Collection Time: 06/07/23  9:34 PM   Result Value Ref Range    PT 14.4 12.5 - 14.5 seconds    INR 1.13 0.00 - 1.30   HIV 1/2 Ag/Ab (4th Gen)    Collection Time: 06/07/23  9:34 PM   Result Value Ref Range    HIV Nonreactive Nonreactive   Hepatitis Panel, Acute    Collection Time: 06/07/23  9:34 PM   Result Value Ref Range    Hepatitis A IgM Nonreactive Nonreactive    Hepatitis B Core IgM Nonreactive Nonreactive    Hepatitis B Surface Antigen Nonreactive Nonreactive    Hepatitis C Antibody Reactive (A) Nonreactive   TSH    Collection Time: 06/07/23  9:34 PM   Result Value Ref Range    Thyroid Stimulating Hormone 1.659 0.350 - 4.940 uIU/mL   T4, Free    Collection Time: 06/07/23  9:34 PM   Result Value Ref Range    Thyroxine Free 0.92 0.70 - 1.48 ng/dL   AFP Tumor Marker    Collection Time: 06/07/23  9:34 PM   Result Value Ref Range    Alpha Fetoprotein Level 2.90 <=8.90 ng/mL   Type & Screen    Collection Time: 06/07/23  9:34 PM   Result Value Ref Range    Group & Rh A POS     Indirect Martinez GEL NEG     Specimen Outdate 06/10/2023 23:59    CBC Without Differential    Collection Time: 06/07/23  9:34 PM   Result Value Ref Range    WBC 9.03 4.50 - 11.50 x10(3)/mcL    RBC 2.98 (L) 4.70 - 6.10 x10(6)/mcL    Hgb 6.9 (L) 14.0 - 18.0 g/dL    Hct 23.4 (L) 42.0 - 52.0 %    MCV 78.5 (L) 80.0 - 94.0 fL    MCH 23.2 (L) 27.0 - 31.0 pg    MCHC 29.5 (L) 33.0 - 36.0 g/dL    RDW 20.7 (H) 11.5 - 17.0 %    Platelet 241 130 - 400 x10(3)/mcL    MPV 10.2 7.4 - 10.4 fL    NRBC% 1.0 %   Basic Metabolic Panel    Collection Time: 06/07/23  9:34 PM   Result Value Ref Range    Sodium Level 128 (L) 136 - 145 mmol/L    Potassium Level 4.5 3.5 - 5.1 mmol/L    Chloride 95 (L) 98 - 107 mmol/L    Carbon Dioxide 24 23 - 31 mmol/L    Glucose  Level 124 (H) 82 - 115 mg/dL    Blood Urea Nitrogen 23.0 8.4 - 25.7 mg/dL    Creatinine 1.41 (H) 0.73 - 1.18 mg/dL    BUN/Creatinine Ratio 16     Calcium Level Total 8.4 (L) 8.8 - 10.0 mg/dL    Anion Gap 9.0 mEq/L    eGFR 56 mls/min/1.73/m2   Prepare RBC 1 Unit    Collection Time: 06/07/23  9:34 PM   Result Value Ref Range    UNIT NUMBER [id number]     UNIT ABO/RH A POS     DISPENSE STATUS Transfused     Unit Expiration [id number]     Product Code [id number]     Unit Blood Type Code 6200     CROSSMATCH INTERPRETATION Compatible    Pathologist Interpretation    Collection Time: 06/07/23  9:34 PM   Result Value Ref Range    Pathology Review       The presence of hepatitis C antibody indicates past or present hepatitis C infection.    Robbie Maier M.D.    Lactate Dehydrogenase    Collection Time: 06/08/23  5:27 AM   Result Value Ref Range    Lactate Dehydrogenase 146 125 - 220 U/L   CBC with Differential    Collection Time: 06/08/23  8:58 AM   Result Value Ref Range    WBC 8.31 4.50 - 11.50 x10(3)/mcL    RBC 3.45 (L) 4.70 - 6.10 x10(6)/mcL    Hgb 8.3 (L) 14.0 - 18.0 g/dL    Hct 28.0 (L) 42.0 - 52.0 %    MCV 81.2 80.0 - 94.0 fL    MCH 24.1 (L) 27.0 - 31.0 pg    MCHC 29.6 (L) 33.0 - 36.0 g/dL    RDW 20.6 (H) 11.5 - 17.0 %    Platelet 239 130 - 400 x10(3)/mcL    MPV 9.7 7.4 - 10.4 fL    Neut % 76.1 %    Lymph % 12.3 %    Mono % 11.0 %    Eos % 0.0 %    Basophil % 0.1 %    Lymph # 1.02 0.6 - 4.6 x10(3)/mcL    Neut # 6.33 2.1 - 9.2 x10(3)/mcL    Mono # 0.91 0.1 - 1.3 x10(3)/mcL    Eos # 0.00 0 - 0.9 x10(3)/mcL    Baso # 0.01 <=0.2 x10(3)/mcL    IG# 0.04 0 - 0.04 x10(3)/mcL    IG% 0.5 %    NRBC% 0.6 %   Basic Metabolic Panel    Collection Time: 06/08/23  8:58 AM   Result Value Ref Range    Sodium Level 130 (L) 136 - 145 mmol/L    Potassium Level 4.1 3.5 - 5.1 mmol/L    Chloride 94 (L) 98 - 107 mmol/L    Carbon Dioxide 30 23 - 31 mmol/L    Glucose Level 84 82 - 115 mg/dL    Blood Urea Nitrogen 23.0 8.4 - 25.7 mg/dL     Creatinine 1.27 (H) 0.73 - 1.18 mg/dL    BUN/Creatinine Ratio 18     Calcium Level Total 8.7 (L) 8.8 - 10.0 mg/dL    Anion Gap 6.0 mEq/L    eGFR >60 mls/min/1.73/m2   Echo    Collection Time: 06/08/23 10:19 AM   Result Value Ref Range    BSA 1.96 m2    TDI SEPTAL 0.11 m/s    LV LATERAL E/E' RATIO 14.22 m/s    LV SEPTAL E/E' RATIO 11.64 m/s    Right Atrial Pressure (from IVC) 8 mmHg    EF 40 %    Left Ventricular Outflow Tract Mean Velocity 0.56 cm/s    Left Ventricular Outflow Tract Mean Gradient 2.00 mmHg    TDI LATERAL 0.09 m/s    LVIDd 5.53 3.5 - 6.0 cm    IVS 0.83 0.6 - 1.1 cm    Posterior Wall 0.92 0.6 - 1.1 cm    LVIDs 4.43 (A) 2.1 - 4.0 cm    FS 20 28 - 44 %    LV mass 180.91 g    LA size 4.20 cm    RVDD 3.20 cm    TAPSE 1.85 cm    Left Ventricle Relative Wall Thickness 0.33 cm    AV mean gradient 2 mmHg    AV valve area 3.08 cm2    AV Velocity Ratio 0.90     AV index (prosthetic) 0.89     MV mean gradient 3 mmHg    MV valve area by continuity eq 2.56 cm2    E/A ratio 1.86     Mean e' 0.10 m/s    E wave deceleration time 114.00 msec    LVOT diameter 2.10 cm    LVOT area 3.5 cm2    LVOT peak rex 0.87 m/s    LVOT peak VTI 15.90 cm    Ao peak rex 0.97 m/s    Ao VTI 17.9 cm    LVOT stroke volume 55.04 cm3    AV peak gradient 4 mmHg    MV peak gradient 5 mmHg    TV rest pulmonary artery pressure 35 mmHg    E/E' ratio 12.80 m/s    MV Peak E Rex 1.28 m/s    TR Max Rex 2.62 m/s    MV VTI 21.5 cm    MV Peak A Rex 0.69 m/s    LV Systolic Volume 89.10 mL    LV Systolic Volume Index 45.7 mL/m2    LV Diastolic Volume 149.00 mL    LV Diastolic Volume Index 76.41 mL/m2    LV Mass Index 93 g/m2    RA Major Axis 4.60 cm    Triscuspid Valve Regurgitation Peak Gradient 27 mmHg    LA Volume Index (Mod) 40.5 mL/m2    LA volume (mod) 78.90 cm3    RA Width 4.30 cm    Grady's Biplane MOD Ejection Fraction 4 %   Basic Metabolic Panel    Collection Time: 06/09/23  4:40 AM   Result Value Ref Range    Sodium Level 133 (L) 136 - 145  mmol/L    Potassium Level 4.3 3.5 - 5.1 mmol/L    Chloride 93 (L) 98 - 107 mmol/L    Carbon Dioxide 31 23 - 31 mmol/L    Glucose Level 81 (L) 82 - 115 mg/dL    Blood Urea Nitrogen 19.1 8.4 - 25.7 mg/dL    Creatinine 1.12 0.73 - 1.18 mg/dL    BUN/Creatinine Ratio 17     Calcium Level Total 8.7 (L) 8.8 - 10.0 mg/dL    Anion Gap 9.0 mEq/L    eGFR >60 mls/min/1.73/m2   CBC with Differential    Collection Time: 06/09/23  4:40 AM   Result Value Ref Range    WBC 8.91 4.50 - 11.50 x10(3)/mcL    RBC 3.40 (L) 4.70 - 6.10 x10(6)/mcL    Hgb 8.1 (L) 14.0 - 18.0 g/dL    Hct 27.6 (L) 42.0 - 52.0 %    MCV 81.2 80.0 - 94.0 fL    MCH 23.8 (L) 27.0 - 31.0 pg    MCHC 29.3 (L) 33.0 - 36.0 g/dL    RDW 21.1 (H) 11.5 - 17.0 %    Platelet 239 130 - 400 x10(3)/mcL    MPV 9.8 7.4 - 10.4 fL    Neut % 74.2 %    Lymph % 13.0 %    Mono % 11.9 %    Eos % 0.0 %    Basophil % 0.3 %    Lymph # 1.16 0.6 - 4.6 x10(3)/mcL    Neut # 6.61 2.1 - 9.2 x10(3)/mcL    Mono # 1.06 0.1 - 1.3 x10(3)/mcL    Eos # 0.00 0 - 0.9 x10(3)/mcL    Baso # 0.03 <=0.2 x10(3)/mcL    IG# 0.05 (H) 0 - 0.04 x10(3)/mcL    IG% 0.6 %    NRBC% 0.4 %     Significant Imaging:  Liver US (6.7.23): Cirrhosis with no focal suspicious liver lesion seen. Imaged hepatic vasculature is patent. Small volume ascites and moderate right pleural effusion.    CTA Chest NON Coronary (6.6.23): No evidence of pulmonary embolus. Moderate right pleural effusion with right basilar atelectasis    CXR AP Portable (6.6.23): Right-sided pleural effusion with right lower lobe area of atelectasis versus developing infiltrate    EKG:      Telemetry:  SR    Physical Exam  Vitals reviewed.   Constitutional:       Appearance: Normal appearance.   Cardiovascular:      Rate and Rhythm: Normal rate and regular rhythm.      Pulses: Normal pulses.      Heart sounds: Normal heart sounds. No murmur heard.  Pulmonary:      Effort: Pulmonary effort is normal.      Breath sounds: Wheezing present.      Comments: Crackles to  Right Lung  Shortness of Breath noted  Musculoskeletal:         General: Normal range of motion.      Cervical back: Normal range of motion.   Skin:     General: Skin is warm and dry.   Neurological:      Mental Status: He is alert.     Home Medications:   No current facility-administered medications on file prior to encounter.     Current Outpatient Medications on File Prior to Encounter   Medication Sig Dispense Refill    ADULT ASPIRIN REGIMEN 81 mg EC tablet Take 81 mg by mouth once daily.      albuterol (PROVENTIL/VENTOLIN HFA) 90 mcg/actuation inhaler Inhale into the lungs.      clopidogreL (PLAVIX) 75 mg tablet Take 75 mg by mouth once daily.      diclofenac (VOLTAREN) 75 MG EC tablet Take 75 mg by mouth 2 (two) times daily.      gabapentin (NEURONTIN) 300 MG capsule Take 300 mg by mouth 3 (three) times daily.      spironolactone (ALDACTONE) 50 MG tablet Take 50 mg by mouth 2 (two) times daily.      STIOLTO RESPIMAT 2.5-2.5 mcg/actuation Mist Inhale 2 puffs into the lungs once daily.       Current Inpatient Medications:  Current Facility-Administered Medications:     0.9%  NaCl infusion (for blood administration), , Intravenous, Q24H PRN, Anjelica Sotelo MD    acetaminophen tablet 650 mg, 650 mg, Oral, Q4H PRN, Anjelica Sotelo MD    albuterol-ipratropium 2.5 mg-0.5 mg/3 mL nebulizer solution 3 mL, 3 mL, Nebulization, Q4H PRN, Anjelica Sotelo MD, 3 mL at 06/09/23 1015    aluminum-magnesium hydroxide-simethicone 200-200-20 mg/5 mL suspension 30 mL, 30 mL, Oral, QID PRN, Anjelica Sotelo MD    ferric gluconate (FERRLECIT) 125 mg in sodium chloride 0.9% 100 mL IVPB, 125 mg, Intravenous, QHS, Anjelica Sotelo MD, Stopped at 06/08/23 2210    folic acid tablet 1 mg, 1 mg, Oral, Daily, Anjelica Sotelo MD, 1 mg at 06/08/23 1113    furosemide injection 40 mg, 40 mg, Intravenous, BID, Anjelica Sotelo MD, 40 mg at 06/09/23 1225    gabapentin capsule 300 mg, 300 mg, Oral, TID, Anjelica Sotelo MD, 300 mg at 06/09/23 1225    LIDOcaine (PF) 10 mg/ml  (1%) 10 mg/mL (1 %) injection, , , ,     LORazepam tablet 2 mg, 2 mg, Oral, Q4H PRN, Anjelica Sotelo MD    melatonin tablet 6 mg, 6 mg, Oral, Nightly PRN, Anjelica Sotelo MD    multivitamin tablet, 1 tablet, Oral, Daily, Anjelica Sotelo MD, 1 tablet at 06/09/23 1225    ondansetron injection 4 mg, 4 mg, Intravenous, Q4H PRN, Anjelica Sotelo MD    pantoprazole injection 40 mg, 40 mg, Intravenous, BID, Anjelica Sotelo MD, 40 mg at 06/09/23 1225    polyethylene glycol packet 17 g, 17 g, Oral, BID PRN, Anjelica Sotelo MD    prochlorperazine injection Soln 5 mg, 5 mg, Intravenous, Q6H PRN, Anjelica Sotelo MD    senna-docusate 8.6-50 mg per tablet 2 tablet, 2 tablet, Oral, BID PRN, Anjelica Sotelo MD    sodium chloride 0.9% flush 10 mL, 10 mL, Intravenous, PRN, Anjelica Sotelo MD    sodium,potassium,mag sulfates 17.5-3.13-1.6 gram SolR 354 mL, 1 kit, Oral, BID, MARQUES Hobbs    spironolactone tablet 50 mg, 50 mg, Oral, BID, Anjelica Sotelo MD, 50 mg at 06/09/23 1225    thiamine tablet 100 mg, 100 mg, Oral, TID, Anjelica Sotelo MD, 100 mg at 06/09/23 1225    traMADoL tablet 50 mg, 50 mg, Oral, Q6H PRN, Anjelica Sotelo MD, 50 mg at 06/07/23 2250      VTE Risk Mitigation (From admission, onward)           Ordered     IP VTE LOW RISK PATIENT  Once         06/07/23 2053     Place sequential compression device  Until discontinued         06/07/23 2053                  Assessment:   Acute Combined Systolic/Diastolic Heart Failure  CMO  --unknown type  --EF 40%  Symptomatic Anemia  --received 2 units of blood   --on ASA and Plavix (unknown reason)  --EGD (6.9.23): Normal Findings  Liver Cirrhosis  COPD Exacerbation   Hep C Antibody Reactive  HTN  Nose Cancer  --removal of squamous cell carcinoma   DEON  Plan:   Add Lisinopril 5mg  Continue Lasix IV 40mg oral daily  Additional Lasix 40mg today  Hold ASA and Plavix at this time   Daily Weight/Accurate I&O  Continue Spironolactone   Colonoscopy Tomorrow per GI  Keep Mag > 2 and Potassium > 4  Labs in AM: CAB, BMP,  FLP and Mag  Outpatient Ischemic Evaluation for CMO    Thank you for your consult.     I, Anahi Madrid RN, transcribed this report in the presence of Yair Olivarez MD.     Anahi Madrid, BENJA  Cardiology  [facility] - 5th Floor Med Surg  06/09/2023 3:04 PM

## 2023-06-09 NOTE — PROGRESS NOTES
"Inpatient Nutrition Evaluation    Admit Date: 6/7/2023   Total duration of encounter: 2 days    Nutrition Recommendation/Prescription     -Oral diet advancement as medically appropriate per MD to goal diet: low-sodium.     Nutrition Assessment     Chart Review    Reason Seen: continuous nutrition monitoring    Malnutrition Screening Tool Results   Have you recently lost weight without trying?: No  Have you been eating poorly because of a decreased appetite?: No   MST Score: 0     Diagnosis:  Symptomatic anemia-iron and folate deficiency  Cirrhosis secondary to alcohol   Acute heart failure, Systolic  COPD with mild exacerbation  Hypervolemic hyponatremia  History of osteoarthritis-NSAID use, on aspirin Plavix-not sure of what indication  EGD was unremarkable June 9, 2023  Cardiac echo shows ejection fraction 40% and grade 3 diastolic dysfunction    Relevant Medical History:   Past Medical History:   Diagnosis Date    Alcoholic cirrhosis     COPD (chronic obstructive pulmonary disease)     Hypertension     Sleep apnea       Nutrition-Related Medications: ferrlecit, folic acid, furosemide, MV, pantoprazole, spironolactone, thiamine    Nutrition-Related Labs:  6/9/23 Na 133, Cl 93, Gluc 81, Ca 8.7    Diet Order: Diet clear liquid  Diet NPO Except for: Medication  Oral Supplement Order: none  Appetite/Oral Intake: good/% of meals  Factors Affecting Nutritional Intake: clear liquid diet  Food/Taoism/Cultural Preferences: none reported  Food Allergies: none reported    Skin Integrity: intact  Wound(s):       Comments    6/9/23 Tolerated clear liquids for lunch, 100% tray. Denies any n/v/d/c, changes in appetite or weight prior to admit. States dry UBW ~160lbs.     Anthropometrics    Height: 5' 8.9" (175 cm)    Last Weight: 79 kg (174 lb 2.6 oz) (06/08/23 0743) Weight Method: Bed Scale  BMI (Calculated): 25.8  BMI Classification: overweight (BMI 25-29.9)        Ideal Body Weight (IBW), Male: 159.4 lb     % Ideal " Body Weight, Male (lb): 109.27 %                 Usual Body Weight (UBW), k.72 kg  % Usual Body Weight: 108.86     Usual Weight Provided By: patient    Wt Readings from Last 5 Encounters:   23 79 kg (174 lb 2.6 oz)   23 72.6 kg (160 lb)   20 73.5 kg (162 lb 0.6 oz)     Weight Change(s) Since Admission:  Admit Weight: 79.1 kg (174 lb 6.1 oz) (23 1900)  23 79kg wt. Anticipate weight loss with improving edema. 2-3+ on admit.     Patient Education    Not applicable.    Monitoring & Evaluation     Dietitian will monitor food and beverage intake.  Nutrition Risk/Follow-Up: low (follow-up in 5-7 days)  Patients assigned 'low nutrition risk' status do not qualify for a full nutritional assessment but will be monitored and re-evaluated in a 5-7 day time period. Please consult if re-evaluation needed sooner.

## 2023-06-09 NOTE — ANESTHESIA PREPROCEDURE EVALUATION
06/09/2023  Hemal Tolbert Jr. is a 63 y.o., male with CHF (EF 40% and grade 3 diastolic dysfunction) and alcoholic cirrhosis with COPD previously on transferred from outside hospital June 6 with shortness of breath and wheezing.  Further workup revealed critical anemia requiring transfusion and Lasix with supplemental oxygen with elevated BNP with right pleural effusion.  Patient presents today for EGD satting 93% on 1 L nasal cannula.  Patient states I feel great.  He states his sats are usually in the high 80s    Transthoracic echo June 8, 2023   EF 40% with grade 3 diastolic dysfunction  Mild-to-moderate MR, mild TR/PI  PA systolic pressure 35          Pre-op Assessment    I have reviewed the Patient Summary Reports.    I have reviewed the NPO Status.   I have reviewed the Medications.     Review of Systems  Anesthesia Hx:   Denies Personal Hx of Anesthesia complications.   Social:  Non-Smoker    Cardiovascular:   Hypertension  Functional Capacity good / => 4 METS  Congestive Heart Failure (CHF) , Acute Congestive Heart Failure, LV Systolic HF, LV Diastolic HF   Pulmonary:   COPD, moderate Sleep Apnea, CPAP    Hepatic/GI:  Liver Disease, Alcoholic Liver Disease , Cirrhosis        Physical Exam  General: Well nourished, Cooperative, Alert and Oriented    Airway:  Mallampati: II   Mouth Opening: Normal  TM Distance: Normal  Tongue: Normal  Neck ROM: Normal ROM    Dental:  Edentulous    Chest/Lungs:  Clear to auscultation, Normal Respiratory Rate    Heart:  Rate: Normal  Rhythm: Regular Rhythm        Anesthesia Plan  Type of Anesthesia, risks & benefits discussed:    Anesthesia Type: Gen Natural Airway  Intra-op Monitoring Plan: Standard ASA Monitors  Induction:  IV  Informed Consent: Informed consent signed with the Patient and all parties understand the risks and agree with anesthesia plan.  All  questions answered.   ASA Score: 3  Day of Surgery Review of History & Physical: H&P Update referred to the surgeon/provider.    Ready For Surgery From Anesthesia Perspective.     .

## 2023-06-09 NOTE — TRANSFER OF CARE
"Anesthesia Transfer of Care Note    Patient: Hemal Tolbert JrAlley    Procedure(s) Performed: Procedure(s) (LRB):  EGD (N/A)    Patient location: GI    Anesthesia Type: MAC    Transport from OR: Transported from OR on room air with adequate spontaneous ventilation    Post pain: adequate analgesia    Post assessment: no apparent anesthetic complications    Post vital signs: stable    Level of consciousness: awake, alert and oriented    Nausea/Vomiting: no nausea/vomiting    Complications: none    Transfer of care protocol was followed      Last vitals:   Visit Vitals  BP (!) 95/58 (BP Location: Right arm, Patient Position: Lying)   Pulse 78   Temp 36.5 °C (97.7 °F) (Skin)   Resp 18   Ht 5' 8.9" (1.75 m)   Wt 79 kg (174 lb 2.6 oz)   SpO2 (!) 94%   BMI 25.80 kg/m²     "

## 2023-06-09 NOTE — PROCEDURE NOTE ADDENDUM
EGD Report    Date of procedure: 06/09/2023     Surgeon: Tima Teixeira    ASA: per anesthesia     Medications: Per anesthesia    Indication: anemia    Procedure: EGD     Description of the Procedure: The patient was brought back to the endoscopy suite where the risks, benefits, and alternatives of the procedure were described in detail. The patient was given the opportunity to ask questions and then signed informed consent. Patient was positioned in the left lateral decubitus position, continuous monitoring was initiated, and supplemental oxygen was provided via nasal cannula. Bite block was placed. Adequate sedation was achieved with the above mentioned medications as documented in chart and then titrated during the entire procedure. Under direct visualization the gastroscope was introduced through the oropharynx into the esophagus. The scope was advanced into the stomach and to the second portion of the duodenum. Scope was withdrawn and the mucosa was carefully examined. The entire gastric mucosa was examined, including the fundus with retroflexion. Air was evacuated from the stomach and the scope was withdrawn into the esophagus. The entire esophageal mucosa was examined. The procedure was completed. The patient tolerated the procedure well and was transferred to the recovery area in stable condition.     Estimated Blood Loss: minimal    Complications: none    Findings:  Normal     Impression and Recommendations:   Colonoscopy tomorrow     Tima Teixeira

## 2023-06-10 ENCOUNTER — ANESTHESIA (OUTPATIENT)
Dept: SURGERY | Facility: HOSPITAL | Age: 63
DRG: 432 | End: 2023-06-10
Payer: MEDICAID

## 2023-06-10 ENCOUNTER — ANESTHESIA EVENT (OUTPATIENT)
Dept: SURGERY | Facility: HOSPITAL | Age: 63
DRG: 432 | End: 2023-06-10
Payer: MEDICAID

## 2023-06-10 LAB
ANION GAP SERPL CALC-SCNC: 7 MEQ/L
BASOPHILS # BLD AUTO: 0.02 X10(3)/MCL
BASOPHILS NFR BLD AUTO: 0.2 %
BUN SERPL-MCNC: 14.2 MG/DL (ref 8.4–25.7)
CALCIUM SERPL-MCNC: 8.8 MG/DL (ref 8.8–10)
CHLORIDE SERPL-SCNC: 98 MMOL/L (ref 98–107)
CHOLEST SERPL-MCNC: 77 MG/DL
CHOLEST/HDLC SERPL: 3 {RATIO} (ref 0–5)
CO2 SERPL-SCNC: 32 MMOL/L (ref 23–31)
CREAT SERPL-MCNC: 1.09 MG/DL (ref 0.73–1.18)
CREAT/UREA NIT SERPL: 13
EOSINOPHIL # BLD AUTO: 0 X10(3)/MCL (ref 0–0.9)
EOSINOPHIL NFR BLD AUTO: 0 %
ERYTHROCYTE [DISTWIDTH] IN BLOOD BY AUTOMATED COUNT: 22 % (ref 11.5–17)
GFR SERPLBLD CREATININE-BSD FMLA CKD-EPI: >60 MLS/MIN/1.73/M2
GLUCOSE SERPL-MCNC: 82 MG/DL (ref 82–115)
HCT VFR BLD AUTO: 28.8 % (ref 42–52)
HDLC SERPL-MCNC: 23 MG/DL (ref 35–60)
HGB BLD-MCNC: 8.5 G/DL (ref 14–18)
IMM GRANULOCYTES # BLD AUTO: 0.04 X10(3)/MCL (ref 0–0.04)
IMM GRANULOCYTES NFR BLD AUTO: 0.5 %
LDLC SERPL CALC-MCNC: 44 MG/DL (ref 50–140)
LYMPHOCYTES # BLD AUTO: 1.24 X10(3)/MCL (ref 0.6–4.6)
LYMPHOCYTES NFR BLD AUTO: 14.8 %
MAGNESIUM SERPL-MCNC: 2.2 MG/DL (ref 1.6–2.6)
MCH RBC QN AUTO: 24.4 PG (ref 27–31)
MCHC RBC AUTO-ENTMCNC: 29.5 G/DL (ref 33–36)
MCV RBC AUTO: 82.5 FL (ref 80–94)
MONOCYTES # BLD AUTO: 1.33 X10(3)/MCL (ref 0.1–1.3)
MONOCYTES NFR BLD AUTO: 15.9 %
NEUTROPHILS # BLD AUTO: 5.76 X10(3)/MCL (ref 2.1–9.2)
NEUTROPHILS NFR BLD AUTO: 68.6 %
NRBC BLD AUTO-RTO: 0.2 %
PLATELET # BLD AUTO: 236 X10(3)/MCL (ref 130–400)
PMV BLD AUTO: 9.7 FL (ref 7.4–10.4)
POTASSIUM SERPL-SCNC: 4 MMOL/L (ref 3.5–5.1)
RBC # BLD AUTO: 3.49 X10(6)/MCL (ref 4.7–6.1)
SODIUM SERPL-SCNC: 137 MMOL/L (ref 136–145)
TRIGL SERPL-MCNC: 52 MG/DL (ref 34–140)
VLDLC SERPL CALC-MCNC: 10 MG/DL
WBC # SPEC AUTO: 8.39 X10(3)/MCL (ref 4.5–11.5)

## 2023-06-10 PROCEDURE — 94761 N-INVAS EAR/PLS OXIMETRY MLT: CPT

## 2023-06-10 PROCEDURE — 37000009 HC ANESTHESIA EA ADD 15 MINS: Performed by: INTERNAL MEDICINE

## 2023-06-10 PROCEDURE — 21400001 HC TELEMETRY ROOM

## 2023-06-10 PROCEDURE — D9220A PRA ANESTHESIA: Mod: CRNA,,, | Performed by: NURSE ANESTHETIST, CERTIFIED REGISTERED

## 2023-06-10 PROCEDURE — 27000221 HC OXYGEN, UP TO 24 HOURS

## 2023-06-10 PROCEDURE — D9220A PRA ANESTHESIA: Mod: ANES,,, | Performed by: STUDENT IN AN ORGANIZED HEALTH CARE EDUCATION/TRAINING PROGRAM

## 2023-06-10 PROCEDURE — 25000003 PHARM REV CODE 250: Performed by: INTERNAL MEDICINE

## 2023-06-10 PROCEDURE — 37000008 HC ANESTHESIA 1ST 15 MINUTES: Performed by: INTERNAL MEDICINE

## 2023-06-10 PROCEDURE — 80048 BASIC METABOLIC PNL TOTAL CA: CPT | Performed by: INTERNAL MEDICINE

## 2023-06-10 PROCEDURE — 25000003 PHARM REV CODE 250: Performed by: NURSE ANESTHETIST, CERTIFIED REGISTERED

## 2023-06-10 PROCEDURE — 80061 LIPID PANEL: CPT | Performed by: INTERNAL MEDICINE

## 2023-06-10 PROCEDURE — 63600175 PHARM REV CODE 636 W HCPCS: Performed by: INTERNAL MEDICINE

## 2023-06-10 PROCEDURE — D9220A PRA ANESTHESIA: ICD-10-PCS | Mod: ANES,,, | Performed by: STUDENT IN AN ORGANIZED HEALTH CARE EDUCATION/TRAINING PROGRAM

## 2023-06-10 PROCEDURE — 45378 DIAGNOSTIC COLONOSCOPY: CPT | Performed by: INTERNAL MEDICINE

## 2023-06-10 PROCEDURE — 85025 COMPLETE CBC W/AUTO DIFF WBC: CPT | Performed by: INTERNAL MEDICINE

## 2023-06-10 PROCEDURE — C9113 INJ PANTOPRAZOLE SODIUM, VIA: HCPCS | Performed by: INTERNAL MEDICINE

## 2023-06-10 PROCEDURE — 83735 ASSAY OF MAGNESIUM: CPT | Performed by: INTERNAL MEDICINE

## 2023-06-10 PROCEDURE — 63600175 PHARM REV CODE 636 W HCPCS: Performed by: NURSE ANESTHETIST, CERTIFIED REGISTERED

## 2023-06-10 PROCEDURE — D9220A PRA ANESTHESIA: ICD-10-PCS | Mod: CRNA,,, | Performed by: NURSE ANESTHETIST, CERTIFIED REGISTERED

## 2023-06-10 RX ORDER — PHENYLEPHRINE HYDROCHLORIDE 10 MG/ML
INJECTION INTRAVENOUS
Status: DISCONTINUED | OUTPATIENT
Start: 2023-06-10 | End: 2023-06-10

## 2023-06-10 RX ORDER — PROPOFOL 10 MG/ML
VIAL (ML) INTRAVENOUS
Status: DISCONTINUED | OUTPATIENT
Start: 2023-06-10 | End: 2023-06-10

## 2023-06-10 RX ORDER — LIDOCAINE HYDROCHLORIDE 20 MG/ML
INJECTION, SOLUTION EPIDURAL; INFILTRATION; INTRACAUDAL; PERINEURAL
Status: DISCONTINUED | OUTPATIENT
Start: 2023-06-10 | End: 2023-06-10

## 2023-06-10 RX ADMIN — SPIRONOLACTONE 50 MG: 25 TABLET ORAL at 08:06

## 2023-06-10 RX ADMIN — THIAMINE HCL TAB 100 MG 100 MG: 100 TAB at 12:06

## 2023-06-10 RX ADMIN — PANTOPRAZOLE SODIUM 40 MG: 40 INJECTION, POWDER, FOR SOLUTION INTRAVENOUS at 12:06

## 2023-06-10 RX ADMIN — GABAPENTIN 300 MG: 300 CAPSULE ORAL at 12:06

## 2023-06-10 RX ADMIN — THIAMINE HCL TAB 100 MG 100 MG: 100 TAB at 04:06

## 2023-06-10 RX ADMIN — GABAPENTIN 300 MG: 300 CAPSULE ORAL at 08:06

## 2023-06-10 RX ADMIN — SPIRONOLACTONE 50 MG: 25 TABLET ORAL at 12:06

## 2023-06-10 RX ADMIN — PANTOPRAZOLE SODIUM 40 MG: 40 INJECTION, POWDER, FOR SOLUTION INTRAVENOUS at 08:06

## 2023-06-10 RX ADMIN — GABAPENTIN 300 MG: 300 CAPSULE ORAL at 04:06

## 2023-06-10 RX ADMIN — LISINOPRIL 5 MG: 5 TABLET ORAL at 12:06

## 2023-06-10 RX ADMIN — SODIUM CHLORIDE 125 MG: 9 INJECTION, SOLUTION INTRAVENOUS at 08:06

## 2023-06-10 RX ADMIN — PROPOFOL 40 MG: 10 INJECTION, EMULSION INTRAVENOUS at 10:06

## 2023-06-10 RX ADMIN — LIDOCAINE HYDROCHLORIDE 4 ML: 20 INJECTION, SOLUTION EPIDURAL; INFILTRATION; INTRACAUDAL; PERINEURAL at 10:06

## 2023-06-10 RX ADMIN — THERA TABS 1 TABLET: TAB at 12:06

## 2023-06-10 RX ADMIN — THIAMINE HCL TAB 100 MG 100 MG: 100 TAB at 08:06

## 2023-06-10 RX ADMIN — PHENYLEPHRINE HYDROCHLORIDE 200 MCG: 10 INJECTION INTRAVENOUS at 10:06

## 2023-06-10 RX ADMIN — FUROSEMIDE 40 MG: 10 INJECTION, SOLUTION INTRAMUSCULAR; INTRAVENOUS at 06:06

## 2023-06-10 RX ADMIN — FOLIC ACID 1 MG: 1 TABLET ORAL at 12:06

## 2023-06-10 RX ADMIN — FUROSEMIDE 40 MG: 10 INJECTION, SOLUTION INTRAMUSCULAR; INTRAVENOUS at 12:06

## 2023-06-10 RX ADMIN — SODIUM CHLORIDE, SODIUM GLUCONATE, SODIUM ACETATE, POTASSIUM CHLORIDE AND MAGNESIUM CHLORIDE: 526; 502; 368; 37; 30 INJECTION, SOLUTION INTRAVENOUS at 10:06

## 2023-06-10 RX ADMIN — PROPOFOL 60 MG: 10 INJECTION, EMULSION INTRAVENOUS at 10:06

## 2023-06-10 NOTE — ANESTHESIA PREPROCEDURE EVALUATION
06/10/2023  Hemal Tolbert Jr. is a 63 y.o., male.    Other Medical History   COPD (chronic obstructive pulmonary disease) Sleep apnea   Alcoholic cirrhosis Hypertension     Surgical History  No surgical history recorded     8.5 / 28 / 236k  Na 137, K 4.0, Cr 1.09  tte lvef40%, grade III dd, mild/mod MR, mild TR, mild ME  ecg sinus tachycardia    Pre-op Assessment    I have reviewed the Patient Summary Reports.    I have reviewed the NPO Status.   I have reviewed the Medications.     Review of Systems  Anesthesia Hx:   Denies Personal Hx of Anesthesia complications.   Social:  Non-Smoker    Cardiovascular:   Hypertension  Functional Capacity good / => 4 METS  Congestive Heart Failure (CHF) , Acute Congestive Heart Failure, LV Systolic HF, LV Diastolic HF   Pulmonary:   COPD, moderate Sleep Apnea, CPAP    Hepatic/GI:  Liver Disease, Alcoholic Liver Disease , Cirrhosis        Physical Exam  General: Well nourished, Cooperative, Alert and Oriented    Airway:  Mallampati: II   Mouth Opening: Normal  TM Distance: Normal  Tongue: Normal  Neck ROM: Normal ROM    Dental:  Edentulous        Anesthesia Plan  Type of Anesthesia, risks & benefits discussed:    Anesthesia Type: Gen Natural Airway  Intra-op Monitoring Plan: Standard ASA Monitors  Post Op Pain Control Plan: IV/PO Opioids PRN  Induction:  IV  Informed Consent: Informed consent signed with the Patient and all parties understand the risks and agree with anesthesia plan.  All questions answered.   ASA Score: 3  Day of Surgery Review of History & Physical: H&P Update referred to the surgeon/provider.    Ready For Surgery From Anesthesia Perspective.     .

## 2023-06-10 NOTE — PROGRESS NOTES
[facility] - 5th Floor Med Surg  Cardiology  Progress Note    Patient Name: Hemal Tolbert Jr.  MRN: [id number]  Admission Date: 6/7/2023  Hospital Length of Stay: 3 days  Code Status: Full Code   Attending Physician: Lopez Carr MD   Primary Care Physician: Primary Doctor No  Expected Discharge Date:   Principal Problem:Alcoholic cirrhosis    Subjective:   Chief Complaint: Reason for Consult:      HPI: Mr. Tolbert is a 62 y/o male with a history of HTN, COPD, Alcoholic Cirrhosis, and DEON, who is unknown to [facility]. He presented to the ER on 6.7.23 with complaints of bilateral lower extremity swelling for 2 weeks. He reports weeping wounds. Upon arrival to the ER, he was tachypneic, tachycardia, and hypotensive. Significant labs at that time included. .4, D-Dimer 3.62, and H&H 5.0/18.0, and Creat 1.17. CXR showed right-sided pleural effusion with right lower lobe area of atelectasis versus developing infiltrate. CTA Chest showed no evidence of pulmonary embolus and moderate right pleural effusion with right basilar atelectasis. US of Liver showed Cirrhosis with no focal suspicious liver lesion seen, Imaged hepatic vasculature is patent, and small volume ascites and moderate right pleural effusion. [facility] was consulted for new onset CHF management.    Hospital Course:  6.10.23: Patient currently undergoing endoscopy. Chart Check Performed. SR on Tele Review.     PMH: Alcoholic Cirrhosis, COPD, HTN, DEON, Nose Cancer  PSH: EGD (6.9.23), Resection of nose squamous cell carcinoma   Family History: Denies family history  Social History: Current alcohol drinkers; 3 beers per day; Current Smoker (1PPD). Denies illicit drug use; remote history in the 70s.      Previous Cardiac Diagnostics:   ECHO (6.8.23):   The left ventricle is mildly enlarged with mildly decreased systolic function. Overall there is mild to moderate global hypokinesis; Prounounced basal inferior and inferioseptal hypokinesis.The  estimated ejection fraction is 40%. Grade III left ventricular diastolic dysfunction. Mild right ventricular enlargement with low normal right ventricular systolic function. Mild-to-moderate mitral regurgitation. Mild tricuspid regurgitation. Mild pulmonic regurgitation.The estimated PA systolic pressure is 35 mmHg. Mild left atrial enlargement. Mild right atrial enlargement.     Review of patient's allergies indicates:  No Known Allergies    ROS Deferred- In Endoscopy.    Objective:     Vital Signs (Most Recent):  Temp: 98.1 °F (36.7 °C) (06/10/23 1059)  Pulse: 93 (06/10/23 1110)  Resp: 18 (06/10/23 1110)  BP: (!) 113/59 (06/10/23 1110)  SpO2: 98 % (06/10/23 1110) Vital Signs (24h Range):  Temp:  [98 °F (36.7 °C)-99.2 °F (37.3 °C)] 98.1 °F (36.7 °C)  Pulse:  [82-98] 93  Resp:  [17-23] 18  SpO2:  [89 %-98 %] 98 %  BP: (104-126)/(52-78) 113/59     Weight: 70.4 kg (155 lb 3.2 oz)  Body mass index is 22.99 kg/m².    SpO2: 98 %         Intake/Output Summary (Last 24 hours) at 6/10/2023 1232  Last data filed at 6/9/2023 1601  Gross per 24 hour   Intake --   Output 1000 ml   Net -1000 ml       Lines/Drains/Airways       Peripheral Intravenous Line  Duration                  Peripheral IV - Single Lumen 06/09/23 0915 22 G Distal;Left;Posterior Forearm 1 day                  Significant Labs:   Recent Results (from the past 72 hour(s))   Iron and TIBC    Collection Time: 06/07/23  9:34 PM   Result Value Ref Range    Iron Binding Capacity Unsaturated 377 (H) 69 - 240 ug/dL    Iron Level 17 (L) 65 - 175 ug/dL    Transferrin 357 (H) 163 - 344 mg/dL    Iron Binding Capacity Total 394 250 - 450 ug/dL    Iron Saturation 4 (L) 20 - 50 %   Ferritin    Collection Time: 06/07/23  9:34 PM   Result Value Ref Range    Ferritin Level 28.64 21.81 - 274.66 ng/mL   Vitamin B12    Collection Time: 06/07/23  9:34 PM   Result Value Ref Range    Vitamin B12 Level 344 213 - 816 pg/mL   Folate    Collection Time: 06/07/23  9:34 PM   Result Value  Ref Range    Folate Level 3.5 (L) 7.0 - 31.4 ng/mL   Reticulocytes    Collection Time: 06/07/23  9:34 PM   Result Value Ref Range    Retic Cnt Auto 2.62 (H) 1.1 - 2.1 %    RET# [id number] 0.026 - 0.095   Haptoglobin    Collection Time: 06/07/23  9:34 PM   Result Value Ref Range    Haptoglobin 179 40 - 368 mg/dL   Path Review, Peripheral Smear    Collection Time: 06/07/23  9:34 PM   Result Value Ref Range    Peripheral Smear Evaluation       - Microcytic, hypochromic anemia.    Impression: Microcytic hypochromic anemia can be seen in either iron deficiency or a disorder of globin synthesis (e.g., thalassemia).  Additional laboratory studies for evaluation would include iron studies and if indicated hemoglobin electrophoresis.     Robbie Maier M.D.      APTT    Collection Time: 06/07/23  9:34 PM   Result Value Ref Range    PTT 27.6 23.2 - 33.7 seconds   Protime-INR    Collection Time: 06/07/23  9:34 PM   Result Value Ref Range    PT 14.4 12.5 - 14.5 seconds    INR 1.13 0.00 - 1.30   HIV 1/2 Ag/Ab (4th Gen)    Collection Time: 06/07/23  9:34 PM   Result Value Ref Range    HIV Nonreactive Nonreactive   Hepatitis Panel, Acute    Collection Time: 06/07/23  9:34 PM   Result Value Ref Range    Hepatitis A IgM Nonreactive Nonreactive    Hepatitis B Core IgM Nonreactive Nonreactive    Hepatitis B Surface Antigen Nonreactive Nonreactive    Hepatitis C Antibody Reactive (A) Nonreactive   TSH    Collection Time: 06/07/23  9:34 PM   Result Value Ref Range    Thyroid Stimulating Hormone 1.659 0.350 - 4.940 uIU/mL   T4, Free    Collection Time: 06/07/23  9:34 PM   Result Value Ref Range    Thyroxine Free 0.92 0.70 - 1.48 ng/dL   AFP Tumor Marker    Collection Time: 06/07/23  9:34 PM   Result Value Ref Range    Alpha Fetoprotein Level 2.90 <=8.90 ng/mL   Type & Screen    Collection Time: 06/07/23  9:34 PM   Result Value Ref Range    Group & Rh A POS     Indirect Martinez GEL NEG     Specimen Outdate 06/10/2023 23:59    CBC Without  Differential    Collection Time: 06/07/23  9:34 PM   Result Value Ref Range    WBC 9.03 4.50 - 11.50 x10(3)/mcL    RBC 2.98 (L) 4.70 - 6.10 x10(6)/mcL    Hgb 6.9 (L) 14.0 - 18.0 g/dL    Hct 23.4 (L) 42.0 - 52.0 %    MCV 78.5 (L) 80.0 - 94.0 fL    MCH 23.2 (L) 27.0 - 31.0 pg    MCHC 29.5 (L) 33.0 - 36.0 g/dL    RDW 20.7 (H) 11.5 - 17.0 %    Platelet 241 130 - 400 x10(3)/mcL    MPV 10.2 7.4 - 10.4 fL    NRBC% 1.0 %   Basic Metabolic Panel    Collection Time: 06/07/23  9:34 PM   Result Value Ref Range    Sodium Level 128 (L) 136 - 145 mmol/L    Potassium Level 4.5 3.5 - 5.1 mmol/L    Chloride 95 (L) 98 - 107 mmol/L    Carbon Dioxide 24 23 - 31 mmol/L    Glucose Level 124 (H) 82 - 115 mg/dL    Blood Urea Nitrogen 23.0 8.4 - 25.7 mg/dL    Creatinine 1.41 (H) 0.73 - 1.18 mg/dL    BUN/Creatinine Ratio 16     Calcium Level Total 8.4 (L) 8.8 - 10.0 mg/dL    Anion Gap 9.0 mEq/L    eGFR 56 mls/min/1.73/m2   Prepare RBC 1 Unit    Collection Time: 06/07/23  9:34 PM   Result Value Ref Range    UNIT NUMBER [id number]     UNIT ABO/RH A POS     DISPENSE STATUS Transfused     Unit Expiration [id number]     Product Code P0579W40     Unit Blood Type Code 6200     CROSSMATCH INTERPRETATION Compatible    Pathologist Interpretation    Collection Time: 06/07/23  9:34 PM   Result Value Ref Range    Pathology Review       The presence of hepatitis C antibody indicates past or present hepatitis C infection.    Robbie Maier M.D.    Lactate Dehydrogenase    Collection Time: 06/08/23  5:27 AM   Result Value Ref Range    Lactate Dehydrogenase 146 125 - 220 U/L   CBC with Differential    Collection Time: 06/08/23  8:58 AM   Result Value Ref Range    WBC 8.31 4.50 - 11.50 x10(3)/mcL    RBC 3.45 (L) 4.70 - 6.10 x10(6)/mcL    Hgb 8.3 (L) 14.0 - 18.0 g/dL    Hct 28.0 (L) 42.0 - 52.0 %    MCV 81.2 80.0 - 94.0 fL    MCH 24.1 (L) 27.0 - 31.0 pg    MCHC 29.6 (L) 33.0 - 36.0 g/dL    RDW 20.6 (H) 11.5 - 17.0 %    Platelet 239 130 - 400 x10(3)/mcL     MPV 9.7 7.4 - 10.4 fL    Neut % 76.1 %    Lymph % 12.3 %    Mono % 11.0 %    Eos % 0.0 %    Basophil % 0.1 %    Lymph # 1.02 0.6 - 4.6 x10(3)/mcL    Neut # 6.33 2.1 - 9.2 x10(3)/mcL    Mono # 0.91 0.1 - 1.3 x10(3)/mcL    Eos # 0.00 0 - 0.9 x10(3)/mcL    Baso # 0.01 <=0.2 x10(3)/mcL    IG# 0.04 0 - 0.04 x10(3)/mcL    IG% 0.5 %    NRBC% 0.6 %   Basic Metabolic Panel    Collection Time: 06/08/23  8:58 AM   Result Value Ref Range    Sodium Level 130 (L) 136 - 145 mmol/L    Potassium Level 4.1 3.5 - 5.1 mmol/L    Chloride 94 (L) 98 - 107 mmol/L    Carbon Dioxide 30 23 - 31 mmol/L    Glucose Level 84 82 - 115 mg/dL    Blood Urea Nitrogen 23.0 8.4 - 25.7 mg/dL    Creatinine 1.27 (H) 0.73 - 1.18 mg/dL    BUN/Creatinine Ratio 18     Calcium Level Total 8.7 (L) 8.8 - 10.0 mg/dL    Anion Gap 6.0 mEq/L    eGFR >60 mls/min/1.73/m2   Echo    Collection Time: 06/08/23 10:19 AM   Result Value Ref Range    BSA 1.96 m2    TDI SEPTAL 0.11 m/s    LV LATERAL E/E' RATIO 14.22 m/s    LV SEPTAL E/E' RATIO 11.64 m/s    Right Atrial Pressure (from IVC) 8 mmHg    EF 40 %    Left Ventricular Outflow Tract Mean Velocity 0.56 cm/s    Left Ventricular Outflow Tract Mean Gradient 2.00 mmHg    TDI LATERAL 0.09 m/s    LVIDd 5.53 3.5 - 6.0 cm    IVS 0.83 0.6 - 1.1 cm    Posterior Wall 0.92 0.6 - 1.1 cm    LVIDs 4.43 (A) 2.1 - 4.0 cm    FS 20 28 - 44 %    LV mass 180.91 g    LA size 4.20 cm    RVDD 3.20 cm    TAPSE 1.85 cm    Left Ventricle Relative Wall Thickness 0.33 cm    AV mean gradient 2 mmHg    AV valve area 3.08 cm2    AV Velocity Ratio 0.90     AV index (prosthetic) 0.89     MV mean gradient 3 mmHg    MV valve area by continuity eq 2.56 cm2    E/A ratio 1.86     Mean e' 0.10 m/s    E wave deceleration time 114.00 msec    LVOT diameter 2.10 cm    LVOT area 3.5 cm2    LVOT peak rupal 0.87 m/s    LVOT peak VTI 15.90 cm    Ao peak rupal 0.97 m/s    Ao VTI 17.9 cm    LVOT stroke volume 55.04 cm3    AV peak gradient 4 mmHg    MV peak gradient 5 mmHg     TV rest pulmonary artery pressure 35 mmHg    E/E' ratio 12.80 m/s    MV Peak E Rex 1.28 m/s    TR Max Rex 2.62 m/s    MV VTI 21.5 cm    MV Peak A Rex 0.69 m/s    LV Systolic Volume 89.10 mL    LV Systolic Volume Index 45.7 mL/m2    LV Diastolic Volume 149.00 mL    LV Diastolic Volume Index 76.41 mL/m2    LV Mass Index 93 g/m2    RA Major Axis 4.60 cm    Triscuspid Valve Regurgitation Peak Gradient 27 mmHg    LA Volume Index (Mod) 40.5 mL/m2    LA volume (mod) 78.90 cm3    RA Width 4.30 cm    Grady's Biplane MOD Ejection Fraction 4 %   Basic Metabolic Panel    Collection Time: 06/09/23  4:40 AM   Result Value Ref Range    Sodium Level 133 (L) 136 - 145 mmol/L    Potassium Level 4.3 3.5 - 5.1 mmol/L    Chloride 93 (L) 98 - 107 mmol/L    Carbon Dioxide 31 23 - 31 mmol/L    Glucose Level 81 (L) 82 - 115 mg/dL    Blood Urea Nitrogen 19.1 8.4 - 25.7 mg/dL    Creatinine 1.12 0.73 - 1.18 mg/dL    BUN/Creatinine Ratio 17     Calcium Level Total 8.7 (L) 8.8 - 10.0 mg/dL    Anion Gap 9.0 mEq/L    eGFR >60 mls/min/1.73/m2   CBC with Differential    Collection Time: 06/09/23  4:40 AM   Result Value Ref Range    WBC 8.91 4.50 - 11.50 x10(3)/mcL    RBC 3.40 (L) 4.70 - 6.10 x10(6)/mcL    Hgb 8.1 (L) 14.0 - 18.0 g/dL    Hct 27.6 (L) 42.0 - 52.0 %    MCV 81.2 80.0 - 94.0 fL    MCH 23.8 (L) 27.0 - 31.0 pg    MCHC 29.3 (L) 33.0 - 36.0 g/dL    RDW 21.1 (H) 11.5 - 17.0 %    Platelet 239 130 - 400 x10(3)/mcL    MPV 9.8 7.4 - 10.4 fL    Neut % 74.2 %    Lymph % 13.0 %    Mono % 11.9 %    Eos % 0.0 %    Basophil % 0.3 %    Lymph # 1.16 0.6 - 4.6 x10(3)/mcL    Neut # 6.61 2.1 - 9.2 x10(3)/mcL    Mono # 1.06 0.1 - 1.3 x10(3)/mcL    Eos # 0.00 0 - 0.9 x10(3)/mcL    Baso # 0.03 <=0.2 x10(3)/mcL    IG# 0.05 (H) 0 - 0.04 x10(3)/mcL    IG% 0.6 %    NRBC% 0.4 %   Basic Metabolic Panel    Collection Time: 06/10/23  5:05 AM   Result Value Ref Range    Sodium Level 137 136 - 145 mmol/L    Potassium Level 4.0 3.5 - 5.1 mmol/L    Chloride 98 98 -  107 mmol/L    Carbon Dioxide 32 (H) 23 - 31 mmol/L    Glucose Level 82 82 - 115 mg/dL    Blood Urea Nitrogen 14.2 8.4 - 25.7 mg/dL    Creatinine 1.09 0.73 - 1.18 mg/dL    BUN/Creatinine Ratio 13     Calcium Level Total 8.8 8.8 - 10.0 mg/dL    Anion Gap 7.0 mEq/L    eGFR >60 mls/min/1.73/m2   Magnesium    Collection Time: 06/10/23  5:05 AM   Result Value Ref Range    Magnesium Level 2.20 1.60 - 2.60 mg/dL   Lipid Panel    Collection Time: 06/10/23  5:05 AM   Result Value Ref Range    Cholesterol Total 77 <=200 mg/dL    HDL Cholesterol 23 (L) 35 - 60 mg/dL    Triglyceride 52 34 - 140 mg/dL    Cholesterol/HDL Ratio 3 0 - 5    Very Low Density Lipoprotein 10     LDL Cholesterol 44.00 (L) 50.00 - 140.00 mg/dL   CBC with Differential    Collection Time: 06/10/23  5:05 AM   Result Value Ref Range    WBC 8.39 4.50 - 11.50 x10(3)/mcL    RBC 3.49 (L) 4.70 - 6.10 x10(6)/mcL    Hgb 8.5 (L) 14.0 - 18.0 g/dL    Hct 28.8 (L) 42.0 - 52.0 %    MCV 82.5 80.0 - 94.0 fL    MCH 24.4 (L) 27.0 - 31.0 pg    MCHC 29.5 (L) 33.0 - 36.0 g/dL    RDW 22.0 (H) 11.5 - 17.0 %    Platelet 236 130 - 400 x10(3)/mcL    MPV 9.7 7.4 - 10.4 fL    Neut % 68.6 %    Lymph % 14.8 %    Mono % 15.9 %    Eos % 0.0 %    Basophil % 0.2 %    Lymph # 1.24 0.6 - 4.6 x10(3)/mcL    Neut # 5.76 2.1 - 9.2 x10(3)/mcL    Mono # 1.33 (H) 0.1 - 1.3 x10(3)/mcL    Eos # 0.00 0 - 0.9 x10(3)/mcL    Baso # 0.02 <=0.2 x10(3)/mcL    IG# 0.04 0 - 0.04 x10(3)/mcL    IG% 0.5 %    NRBC% 0.2 %     Telemetry:  Tele Strips Reviewed, Sinus Rhythm    Physical Exam  Vitals and nursing note reviewed.   Patient Currently undergoing Endoscopy. Chart Check Performed.    Current Inpatient Medications:    Current Facility-Administered Medications:     0.9%  NaCl infusion (for blood administration), , Intravenous, Q24H PRN, Anjelica Sotelo MD    acetaminophen tablet 650 mg, 650 mg, Oral, Q4H PRN, Anjelica Sotelo MD    albuterol-ipratropium 2.5 mg-0.5 mg/3 mL nebulizer solution 3 mL, 3 mL, Nebulization,  Q4H PRN, Anjelica Sotelo MD, 3 mL at 06/09/23 1015    aluminum-magnesium hydroxide-simethicone 200-200-20 mg/5 mL suspension 30 mL, 30 mL, Oral, QID PRN, Anjelica Sotelo MD    ferric gluconate (FERRLECIT) 125 mg in sodium chloride 0.9% 100 mL IVPB, 125 mg, Intravenous, QHS, Anjelica Sotelo MD, Stopped at 06/09/23 2234    folic acid tablet 1 mg, 1 mg, Oral, Daily, Anjelica Sotelo MD, 1 mg at 06/08/23 1113    furosemide injection 40 mg, 40 mg, Intravenous, BID, Anjelica Sotelo MD, 40 mg at 06/09/23 1741    furosemide injection 40 mg, 40 mg, Intravenous, Once, Yair Olivarez MD    gabapentin capsule 300 mg, 300 mg, Oral, TID, Anjelica Sotelo MD, 300 mg at 06/09/23 2046    lisinopriL tablet 5 mg, 5 mg, Oral, Daily, Yair Olivarez MD, 5 mg at 06/09/23 1742    LORazepam tablet 2 mg, 2 mg, Oral, Q4H PRN, Anjelica Sotelo MD    melatonin tablet 6 mg, 6 mg, Oral, Nightly PRN, Anjelica Sotelo MD    multivitamin tablet, 1 tablet, Oral, Daily, Anjelica Sotelo MD, 1 tablet at 06/09/23 1225    ondansetron injection 4 mg, 4 mg, Intravenous, Q4H PRN, Anjelica Sotelo MD    pantoprazole injection 40 mg, 40 mg, Intravenous, BID, Anjelica Sotelo MD, 40 mg at 06/09/23 2046    polyethylene glycol packet 17 g, 17 g, Oral, BID PRN, Anjelica Sotelo MD    prochlorperazine injection Soln 5 mg, 5 mg, Intravenous, Q6H PRN, Anjelica Sotelo MD    senna-docusate 8.6-50 mg per tablet 2 tablet, 2 tablet, Oral, BID PRN, Anjelica Sotelo MD    sodium chloride 0.9% flush 10 mL, 10 mL, Intravenous, PRN, Anjelica Sotelo MD    spironolactone tablet 50 mg, 50 mg, Oral, BID, Anjelica Sotelo MD, 50 mg at 06/09/23 2046    thiamine tablet 100 mg, 100 mg, Oral, TID, Anjelica Sotelo MD, 100 mg at 06/09/23 2046    traMADoL tablet 50 mg, 50 mg, Oral, Q6H PRN, Anjelica Sotelo MD, 50 mg at 06/07/23 2250    VTE Risk Mitigation (From admission, onward)           Ordered     IP VTE LOW RISK PATIENT  Once         06/07/23 2053     Place sequential compression device  Until discontinued         06/07/23 2053                   Assessment:   Acute Combined Systolic & Diastolic HF  Cardiomyopathy (Unspecified for now)    - EF 40%  Symptomatic Anemia with Possible GI Source    - Status Post PRBC Transfusion    - EGD (6.9.23) Normal Findings  Liver Cirrhosis  COPD with Acute Exacerbation  Hepatitis C Antibody Reactive  Hypertension  Nose Cancer    - Removal of Squamous Cell Carcinoma  Obstructive Sleep Apnea    Plan:   Continue Diuretics as Clinically Indicated.  Blood thinning agents on hold presently given anemia requiring transfusion.  Continue HF Medications  Undergoing Colonoscopy Today  Plan Outpatient Ischemic Evaluation for CMO  Chart Check Performed. Will follow up tomorrow.    LINA Pedro  Cardiology  [facility] - 5th Floor Med Surg  06/10/2023     Chart reviewed.  Patient was out for colonoscopy.  Attempted twice to see the patient he was not in room.  We will see him tomorrow.

## 2023-06-10 NOTE — PROGRESS NOTES
[facility] Hospital Medicine Progress Note        Chief Complaint: Inpatient Follow-up for anemia     HPI:   63-year-old male with medical history of alcoholic cirrhosis, COPD, hypertension, on aspirin and Plavix, presented to [facility] ED with chief complaint of bilateral lower extremity swelling and weeping as well as shortness breath and wheezing over the past 2 weeks.  He denies history of heart failure but report taking fluid pills for his cirrhosis.  He is not sure why he is on aspirin and Plavix.  Reports shortness of breath on exertion, denies chest pain, fever chills.  Report chronic intermittent cough that is nonproductive.  Denies abdominal pain cough, nausea, vomiting, hematemesis, diarrhea, melena or hematochezia.     On arrival he was borderline hypotensive, tachypneic, tachycardic, and saturating 95% on 2 L nasal cannula.  Labs were notable for hemoglobin of 5.0, platelets 277, sodium 124, creatinine 1.17, albumin 3.0, INR 1.13, , and elevated D-dimer.  Stool occult blood negative.  Chest x-ray show congestive and small right-sided pleural effusion with right lower lobe atelectasis.  CTA chest ordered for elevated D-dimer show no evidence of pulmonary embolus and similar finding of chest x-ray of moderate right-sided pleural effusion and right basilar atelectasis.     He received 2 units of PRBC, Lasix 40 mg IV 2 doses, bronchodilators, subsequently was transferred to [facility] and referred to hospital medicine service for further evaluation and management.     Interval Hx:   No significant events reported overnight, prepped for colonoscopy this AM, no further evidence of  bleeding, pt was seen after the colonoscopy, was having lunch, no complaints. Lab showed stable hb 8.5     Case was discussed with patient's nurse .    Objective/physical exam:  General: In no acute distress, afebrile  Chest: Clear to auscultation bilaterally  Heart: RRR  Abdomen: Soft,  nontender  MSK: Warm, no lower extremity edema  Neurologic: Alert   VITAL SIGNS: 24 HRS MIN & MAX LAST   Temp  Min: 97.5 °F (36.4 °C)  Max: 99.2 °F (37.3 °C) 99.2 °F (37.3 °C)   BP  Min: 95/58  Max: 133/76 123/64   Pulse  Min: 78  Max: 98  92   Resp  Min: 15  Max: 20 17   SpO2  Min: 89 %  Max: 96 % (!) 92 %     I have reviewed the following labs:    Recent Labs   Lab 06/08/23  0858 06/09/23  0440 06/10/23  0505   WBC 8.31 8.91 8.39   RBC 3.45* 3.40* 3.49*   HGB 8.3* 8.1* 8.5*   HCT 28.0* 27.6* 28.8*   MCV 81.2 81.2 82.5   MCH 24.1* 23.8* 24.4*   MCHC 29.6* 29.3* 29.5*   RDW 20.6* 21.1* 22.0*    239 236   MPV 9.7 9.8 9.7       Recent Labs   Lab 06/06/23  1733 06/07/23  1028 06/07/23  2134 06/08/23  0858 06/09/23  0440 06/10/23  0505   * 129*   < > 130* 133* 137   K 4.4 5.0   < > 4.1 4.3 4.0   CO2 22* 27   < > 30 31 32*   BUN 14.6 17.8   < > 23.0 19.1 14.2   CREATININE 1.17 1.16   < > 1.27* 1.12 1.09   CALCIUM 8.3* 8.5*   < > 8.7* 8.7* 8.8   MG  --   --   --   --   --  2.20   ALBUMIN 3.0* 3.0*  --   --   --   --    ALKPHOS 66 53  --   --   --   --    ALT 9 8  --   --   --   --    AST 16 14  --   --   --   --    BILITOT 0.6 1.2  --   --   --   --     < > = values in this interval not displayed.          Microbiology Results (last 7 days)       ** No results found for the last 168 hours. **             See below for Radiology    Scheduled Med:   ferric gluconate (FERRLECIT) IVPB  125 mg Intravenous QHS    folic acid  1 mg Oral Daily    furosemide (LASIX) injection  40 mg Intravenous BID    furosemide (LASIX) injection  40 mg Intravenous Once    gabapentin  300 mg Oral TID    lisinopriL  5 mg Oral Daily    multivitamin  1 tablet Oral Daily    pantoprazole  40 mg Intravenous BID    spironolactone  50 mg Oral BID    thiamine  100 mg Oral TID        Continuous Infusions:       PRN Meds:  sodium chloride, acetaminophen, albuterol-ipratropium, aluminum-magnesium hydroxide-simethicone, LORazepam, melatonin,  ondansetron, polyethylene glycol, prochlorperazine, senna-docusate 8.6-50 mg, sodium chloride 0.9%, traMADoL       Assessment/Plan:  Symptomatic anemia with possible GI source s/p prbc transfusion iron and folate deficiency s/p egd, colon  Combined systolic and diastolic  heart failure  LVEF 40%  Alcoholic cirrhosis  Hypervolemic hyponatremia-cirrhosis/heart failure - improving with diuresis  COPD mild exacerbation     History of osteoarthritis-NSAID use, on aspirin Plavix-not sure of what indication      Pt doing well , hb stable , no active out put, on room air  Pt had colonoscopy this AM , tolerating po diet  Cont iv diuretics   Reviewed cards, GI note  GI cleared  to use antithrombotics  if necessary  Cards following. Appreciate further recs   Needs strict Out put monitoring  Labs in the AM       Pt needs follow up gi/pa clinic 2-3 weeks with cbc ( office [phone])   pt needs outpt video capsule exam of the small bowel to complete gi anemia workup.       VTE prophylaxis: Scds    Patient condition:  Stable    Anticipated discharge and Disposition:   TBD      _____________________________________________________________________    Nutrition Status:    Radiology:  I have personally reviewed the following imaging and agree with the radiologist.     Echo  · The left ventricle is mildly enlarged with mildly decreased systolic   function.  · Overall there is mild to moderate global hypokinesis; Prounounced basal   inferior and inferioseptal hypokinesis.  · The estimated ejection fraction is 40%.  · Grade III left ventricular diastolic dysfunction.  · Mild right ventricular enlargement with low normal right ventricular   systolic function.  · Mild-to-moderate mitral regurgitation.  · Mild tricuspid regurgitation.  · Mild pulmonic regurgitation.  · The estimated PA systolic pressure is 35 mmHg.  · Mild left atrial enlargement.  · Mild right atrial enlargement.     US Liver with Doppler (xpd)  Narrative:  EXAMINATION:  US LIVER WITH DOPPLER    CLINICAL HISTORY:  Cirrhosis evaluation;    COMPARISON:  CT yesterday.    FINDINGS:  Grayscale, color and spectral doppler evaluation of the right upper quadrant.    Pancreas obscured.  Imaged portions of IVC normal in caliber.    Liver is not significantly enlarged.  There is nodular cirrhotic hepatic contour.  There is coarsened hepatic echotexture.  No focal suspicious liver lesion.  Imaged portal system is patent with appropriate direction of flow.  Imaged hepatic veins are patent.  Hepatic artery not visualized.  Splenic artery and vein not visualized.    No gallstones visualized.  Mild nonspecific gallbladder wall thickening which could relate to fluid overload.  The common bile duct is normal in caliber  and measures 3 mm.    Right kidney measures 9 cm in length. No hydronephrosis.    There is small volume ascites.  There is moderate right pleural effusion.  Impression: 1. Cirrhosis with no focal suspicious liver lesion seen.  2. Imaged hepatic vasculature is patent.  3. Small volume ascites and moderate right pleural effusion.    Electronically signed by: Carl Elam  Date:    06/08/2023  Time:    09:52      Lopez Carr MD   06/10/2023

## 2023-06-10 NOTE — PROGRESS NOTES
Procedure results reported to floor nurse. Informed of vital signs, diet, and patient's stability. Nurse verbalized understanding.

## 2023-06-10 NOTE — ANESTHESIA POSTPROCEDURE EVALUATION
Anesthesia Post Evaluation    Patient: Hemal Stricklandantionette Bernal    Procedure(s) Performed: Procedure(s) (LRB):  COLONOSCOPY (N/A)    Final Anesthesia Type: general      Patient location during evaluation: PACU  Patient participation: Yes- Able to Participate  Level of consciousness: awake and alert  Post-procedure vital signs: reviewed and stable  Pain management: adequate  Airway patency: patent    PONV status at discharge: No PONV  Anesthetic complications: no      Respiratory status: unassisted  Hydration status: euvolemic  Follow-up not needed.          Vitals Value Taken Time   /59 06/10/23 1110   Temp 36.7 °C (98.1 °F) 06/10/23 1059   Pulse 90 06/10/23 1110   Resp 18 06/10/23 1110   SpO2 99 % 06/10/23 1110   Vitals shown include unvalidated device data.      No case tracking events are documented in the log.      Pain/Noah Score: Noah Score: 10 (6/10/2023 10:57 AM)

## 2023-06-10 NOTE — PROVATION PATIENT INSTRUCTIONS
Discharge Summary/Instructions after an Endoscopic Procedure  Patient Name: Hemal Tolbert  Patient MRN: [id number]  Patient YOB: 1960  Saturday, Mabel 10, 2023  Johan Flower MD  Dear patient,  As a result of recent federal legislation (The Federal Cures Act), you may   receive lab or pathology results from your procedure in your [facility]   account before your physician is able to contact you. Your physician or   their representative will relay the results to you with their   recommendations at their soonest availability.  Thank you,  RESTRICTIONS:  During your procedure today, you received medications for sedation.  These   medications may affect your judgment, balance and coordination.  Therefore,   for 24 hours, you have the following restrictions:   - DO NOT drive a car, operate machinery, make legal/financial decisions,   sign important papers or drink alcohol.    ACTIVITY:  Today: no heavy lifting, straining or running due to procedural   sedation/anesthesia.  The following day: return to full activity including work.  DIET:  Eat and drink normally unless instructed otherwise.     TREATMENT FOR COMMON SIDE EFFECTS:  - Mild abdominal pain, nausea, belching, bloating or excessive gas:  rest,   eat lightly and use a heating pad.  - Sore Throat: treat with throat lozenges and/or gargle with warm salt   water.  - Because air was used during the procedure, expelling large amounts of air   from your rectum or belching is normal.  - If a bowel prep was taken, you may not have a bowel movement for 1-3 days.    This is normal.  SYMPTOMS TO WATCH FOR AND REPORT TO YOUR PHYSICIAN:  1. Abdominal pain or bloating, other than gas cramps.  2. Chest pain.  3. Back pain.  4. Signs of infection such as: chills or fever occurring within 24 hours   after the procedure.  5. Rectal bleeding, which would show as bright red, maroon, or black stools.   (A tablespoon of blood from the rectum is not serious, especially  if   hemorrhoids are present.)  6. Vomiting.  7. Weakness or dizziness.  GO DIRECTLY TO THE NEAREST EMERGENCY ROOM IF YOU HAVE ANY OF THE FOLLOWING:      Difficulty breathing              Chills and/or fever over 101 F   Persistent vomiting and/or vomiting blood   Severe abdominal pain   Severe chest pain   Black, tarry stools   Bleeding- more than one tablespoon   Any other symptom or condition that you feel may need urgent attention  Your doctor recommends these additional instructions:  If any biopsies were taken, your doctors clinic will contact you in 1 to 2   weeks with any results.  - Return patient to [facility] for [facility] care.   - Resume regular diet.   - Continue present medications.  - ok to use antithrombotics  - ok to dc from gi standpoint  - follow up gi/pa clinic 2-3 weeks with [facility] (our office [phone])  - we will schedule pt for outpt video capsule exam of the small bowel to   complete gi anemia workup.  - no further recommendations, we will sign off now  For questions, problems or results please call your physician - Johan Flower MD at Work:  [phone].  [facility], EMERGENCY ROOM PHONE NUMBER: [phone]  IF A COMPLICATION OR EMERGENCY SITUATION ARISES AND YOU ARE UNABLE TO REACH   YOUR PHYSICIAN - GO DIRECTLY TO THE EMERGENCY ROOM.  MD Johan Guaman MD  6/10/2023 10:58:27 AM  This report has been verified and signed electronically.  Dear patient,  As a result of recent federal legislation (The Federal Cures Act), you may   receive lab or pathology results from your procedure in your [facility]   account before your physician is able to contact you. Your physician or   their representative will relay the results to you with their   recommendations at their soonest availability.  Thank you,  PROVATION

## 2023-06-10 NOTE — TRANSFER OF CARE
"Anesthesia Transfer of Care Note    Patient: Hemal Tolbert JrAlley    Procedure(s) Performed: Procedure(s) (LRB):  COLONOSCOPY (N/A)    Patient location: OPS    Anesthesia Type: MAC    Transport from OR: Transported from OR on room air with adequate spontaneous ventilation    Post pain: adequate analgesia    Post assessment: no apparent anesthetic complications    Post vital signs: stable    Level of consciousness: awake and oriented    Nausea/Vomiting: no nausea/vomiting    Complications: none    Transfer of care protocol was followed      Last vitals:   Visit Vitals  /63 (BP Location: Right arm)   Pulse 92   Temp 36.9 °C (98.4 °F) (Tympanic)   Resp (!) 22   Ht 5' 8.9" (1.75 m)   Wt 70.4 kg (155 lb 3.2 oz)   SpO2 (!) 93%   BMI 22.99 kg/m²     "

## 2023-06-11 LAB
ANION GAP SERPL CALC-SCNC: 12 MEQ/L
ANISOCYTOSIS BLD QL SMEAR: ABNORMAL
BASOPHILS # BLD AUTO: 0.03 X10(3)/MCL
BASOPHILS NFR BLD AUTO: 0.3 %
BUN SERPL-MCNC: 17.1 MG/DL (ref 8.4–25.7)
CALCIUM SERPL-MCNC: 9 MG/DL (ref 8.8–10)
CHLORIDE SERPL-SCNC: 96 MMOL/L (ref 98–107)
CO2 SERPL-SCNC: 26 MMOL/L (ref 23–31)
CREAT SERPL-MCNC: 1.14 MG/DL (ref 0.73–1.18)
CREAT/UREA NIT SERPL: 15
EOSINOPHIL # BLD AUTO: 0 X10(3)/MCL (ref 0–0.9)
EOSINOPHIL NFR BLD AUTO: 0 %
ERYTHROCYTE [DISTWIDTH] IN BLOOD BY AUTOMATED COUNT: 23.4 % (ref 11.5–17)
GFR SERPLBLD CREATININE-BSD FMLA CKD-EPI: >60 MLS/MIN/1.73/M2
GLUCOSE SERPL-MCNC: 102 MG/DL (ref 82–115)
HCT VFR BLD AUTO: 33.3 % (ref 42–52)
HGB BLD-MCNC: 9.8 G/DL (ref 14–18)
HYPOCHROMIA BLD QL SMEAR: ABNORMAL
IMM GRANULOCYTES # BLD AUTO: 0.04 X10(3)/MCL (ref 0–0.04)
IMM GRANULOCYTES NFR BLD AUTO: 0.4 %
LYMPHOCYTES # BLD AUTO: 1.16 X10(3)/MCL (ref 0.6–4.6)
LYMPHOCYTES NFR BLD AUTO: 10.4 %
MACROCYTES BLD QL SMEAR: ABNORMAL
MAGNESIUM SERPL-MCNC: 2.3 MG/DL (ref 1.6–2.6)
MCH RBC QN AUTO: 24.3 PG (ref 27–31)
MCHC RBC AUTO-ENTMCNC: 29.4 G/DL (ref 33–36)
MCV RBC AUTO: 82.6 FL (ref 80–94)
MONOCYTES # BLD AUTO: 1.42 X10(3)/MCL (ref 0.1–1.3)
MONOCYTES NFR BLD AUTO: 12.8 %
NEUTROPHILS # BLD AUTO: 8.48 X10(3)/MCL (ref 2.1–9.2)
NEUTROPHILS NFR BLD AUTO: 76.1 %
NRBC BLD AUTO-RTO: 0 %
PLATELET # BLD AUTO: 262 X10(3)/MCL (ref 130–400)
PLATELET # BLD EST: ADEQUATE 10*3/UL
PMV BLD AUTO: 9.5 FL (ref 7.4–10.4)
POTASSIUM SERPL-SCNC: 4.1 MMOL/L (ref 3.5–5.1)
RBC # BLD AUTO: 4.03 X10(6)/MCL (ref 4.7–6.1)
RBC MORPH BLD: ABNORMAL
SODIUM SERPL-SCNC: 134 MMOL/L (ref 136–145)
WBC # SPEC AUTO: 11.13 X10(3)/MCL (ref 4.5–11.5)

## 2023-06-11 PROCEDURE — 63600175 PHARM REV CODE 636 W HCPCS: Performed by: INTERNAL MEDICINE

## 2023-06-11 PROCEDURE — 83735 ASSAY OF MAGNESIUM: CPT | Performed by: NURSE PRACTITIONER

## 2023-06-11 PROCEDURE — 27000221 HC OXYGEN, UP TO 24 HOURS

## 2023-06-11 PROCEDURE — 21400001 HC TELEMETRY ROOM

## 2023-06-11 PROCEDURE — 25000003 PHARM REV CODE 250: Performed by: INTERNAL MEDICINE

## 2023-06-11 PROCEDURE — 94761 N-INVAS EAR/PLS OXIMETRY MLT: CPT

## 2023-06-11 PROCEDURE — C9113 INJ PANTOPRAZOLE SODIUM, VIA: HCPCS | Performed by: INTERNAL MEDICINE

## 2023-06-11 PROCEDURE — 85025 COMPLETE CBC W/AUTO DIFF WBC: CPT | Performed by: INTERNAL MEDICINE

## 2023-06-11 PROCEDURE — 80048 BASIC METABOLIC PNL TOTAL CA: CPT | Performed by: NURSE PRACTITIONER

## 2023-06-11 PROCEDURE — 25000003 PHARM REV CODE 250: Performed by: NURSE PRACTITIONER

## 2023-06-11 RX ORDER — METOPROLOL SUCCINATE 25 MG/1
25 TABLET, EXTENDED RELEASE ORAL DAILY
Status: DISCONTINUED | OUTPATIENT
Start: 2023-06-11 | End: 2023-06-12 | Stop reason: HOSPADM

## 2023-06-11 RX ORDER — FUROSEMIDE 20 MG/1
20 TABLET ORAL DAILY
Status: DISCONTINUED | OUTPATIENT
Start: 2023-06-11 | End: 2023-06-12 | Stop reason: HOSPADM

## 2023-06-11 RX ORDER — CLOPIDOGREL BISULFATE 75 MG/1
75 TABLET ORAL DAILY
Status: DISCONTINUED | OUTPATIENT
Start: 2023-06-11 | End: 2023-06-12 | Stop reason: HOSPADM

## 2023-06-11 RX ADMIN — SODIUM CHLORIDE 125 MG: 9 INJECTION, SOLUTION INTRAVENOUS at 09:06

## 2023-06-11 RX ADMIN — CLOPIDOGREL BISULFATE 75 MG: 75 TABLET ORAL at 04:06

## 2023-06-11 RX ADMIN — THIAMINE HCL TAB 100 MG 100 MG: 100 TAB at 09:06

## 2023-06-11 RX ADMIN — THERA TABS 1 TABLET: TAB at 09:06

## 2023-06-11 RX ADMIN — SPIRONOLACTONE 50 MG: 25 TABLET ORAL at 09:06

## 2023-06-11 RX ADMIN — FOLIC ACID 1 MG: 1 TABLET ORAL at 09:06

## 2023-06-11 RX ADMIN — PANTOPRAZOLE SODIUM 40 MG: 40 INJECTION, POWDER, FOR SOLUTION INTRAVENOUS at 09:06

## 2023-06-11 RX ADMIN — FUROSEMIDE 20 MG: 20 TABLET ORAL at 10:06

## 2023-06-11 RX ADMIN — THIAMINE HCL TAB 100 MG 100 MG: 100 TAB at 02:06

## 2023-06-11 RX ADMIN — FUROSEMIDE 40 MG: 10 INJECTION, SOLUTION INTRAMUSCULAR; INTRAVENOUS at 09:06

## 2023-06-11 RX ADMIN — LISINOPRIL 5 MG: 5 TABLET ORAL at 09:06

## 2023-06-11 RX ADMIN — GABAPENTIN 300 MG: 300 CAPSULE ORAL at 02:06

## 2023-06-11 RX ADMIN — GABAPENTIN 300 MG: 300 CAPSULE ORAL at 09:06

## 2023-06-11 RX ADMIN — METOPROLOL SUCCINATE 25 MG: 25 TABLET, EXTENDED RELEASE ORAL at 10:06

## 2023-06-11 NOTE — PROGRESS NOTES
[facility] - 5th Floor Med Surg  Cardiology  Progress Note    Patient Name: Hemal Tolbert Jr.  MRN: [id number]  Admission Date: 6/7/2023  Hospital Length of Stay: 4 days  Code Status: Full Code   Attending Physician: Lopez Carr MD   Primary Care Physician: Primary Doctor No  Expected Discharge Date:   Principal Problem:Alcoholic cirrhosis    Subjective:   Chief Complaint: Reason for Consult:      HPI: Mr. Tolbert is a 64 y/o male with a history of HTN, COPD, Alcoholic Cirrhosis, and DEON, who is unknown to [facility]. He presented to the ER on 6.7.23 with complaints of bilateral lower extremity swelling for 2 weeks. He reports weeping wounds. Upon arrival to the ER, he was tachypneic, tachycardia, and hypotensive. Significant labs at that time included. .4, D-Dimer 3.62, and H&H 5.0/18.0, and Creat 1.17. CXR showed right-sided pleural effusion with right lower lobe area of atelectasis versus developing infiltrate. CTA Chest showed no evidence of pulmonary embolus and moderate right pleural effusion with right basilar atelectasis. US of Liver showed Cirrhosis with no focal suspicious liver lesion seen, Imaged hepatic vasculature is patent, and small volume ascites and moderate right pleural effusion. [facility] was consulted for new onset CHF management.    Hospital Course:  6.10.23: Patient currently undergoing endoscopy. Chart Check Performed. SR on Tele Review.  6.11.23: NAD Noted. Resting in bed. Comfortable.      PMH: Alcoholic Cirrhosis, COPD, HTN, DEON, Nose Cancer  PSH: EGD (6.9.23), Resection of nose squamous cell carcinoma   Family History: Denies family history  Social History: Current alcohol drinkers; 3 beers per day; Current Smoker (1PPD). Denies illicit drug use; remote history in the 70s.      Previous Cardiac Diagnostics:   ECHO (6.8.23):   The left ventricle is mildly enlarged with mildly decreased systolic function. Overall there is mild to moderate global hypokinesis; Prounounced  basal inferior and inferioseptal hypokinesis.The estimated ejection fraction is 40%. Grade III left ventricular diastolic dysfunction. Mild right ventricular enlargement with low normal right ventricular systolic function. Mild-to-moderate mitral regurgitation. Mild tricuspid regurgitation. Mild pulmonic regurgitation.The estimated PA systolic pressure is 35 mmHg. Mild left atrial enlargement. Mild right atrial enlargement.     Review of patient's allergies indicates:  No Known Allergies    Review of Systems   Cardiovascular:  Negative for chest pain.   Respiratory:  Negative for shortness of breath.       Objective:     Vital Signs (Most Recent):  Temp: 98.6 °F (37 °C) (06/11/23 0700)  Pulse: 93 (06/11/23 0700)  Resp: 18 (06/11/23 0700)  BP: 110/64 (06/11/23 0700)  SpO2: (!) 92 % (06/11/23 0700) Vital Signs (24h Range):  Temp:  [98.1 °F (36.7 °C)-99.5 °F (37.5 °C)] 98.6 °F (37 °C)  Pulse:  [82-97] 93  Resp:  [17-23] 18  SpO2:  [89 %-98 %] 92 %  BP: ()/(31-71) 110/64     Weight: 70.4 kg (155 lb 3.2 oz)  Body mass index is 22.99 kg/m².    SpO2: (!) 92 %         Intake/Output Summary (Last 24 hours) at 6/11/2023 0910  Last data filed at 6/11/2023 0745  Gross per 24 hour   Intake 0 ml   Output --   Net 0 ml         Lines/Drains/Airways       Peripheral Intravenous Line  Duration                  Peripheral IV - Single Lumen 06/10/23 2040 22 G Anterior;Right Forearm <1 day                  Significant Labs:   Recent Results (from the past 72 hour(s))   Echo    Collection Time: 06/08/23 10:19 AM   Result Value Ref Range    BSA 1.96 m2    TDI SEPTAL 0.11 m/s    LV LATERAL E/E' RATIO 14.22 m/s    LV SEPTAL E/E' RATIO 11.64 m/s    Right Atrial Pressure (from IVC) 8 mmHg    EF 40 %    Left Ventricular Outflow Tract Mean Velocity 0.56 cm/s    Left Ventricular Outflow Tract Mean Gradient 2.00 mmHg    TDI LATERAL 0.09 m/s    LVIDd 5.53 3.5 - 6.0 cm    IVS 0.83 0.6 - 1.1 cm    Posterior Wall 0.92 0.6 - 1.1 cm    LVIDs 4.43  (A) 2.1 - 4.0 cm    FS 20 28 - 44 %    LV mass 180.91 g    LA size 4.20 cm    RVDD 3.20 cm    TAPSE 1.85 cm    Left Ventricle Relative Wall Thickness 0.33 cm    AV mean gradient 2 mmHg    AV valve area 3.08 cm2    AV Velocity Ratio 0.90     AV index (prosthetic) 0.89     MV mean gradient 3 mmHg    MV valve area by continuity eq 2.56 cm2    E/A ratio 1.86     Mean e' 0.10 m/s    E wave deceleration time 114.00 msec    LVOT diameter 2.10 cm    LVOT area 3.5 cm2    LVOT peak rex 0.87 m/s    LVOT peak VTI 15.90 cm    Ao peak rex 0.97 m/s    Ao VTI 17.9 cm    LVOT stroke volume 55.04 cm3    AV peak gradient 4 mmHg    MV peak gradient 5 mmHg    TV rest pulmonary artery pressure 35 mmHg    E/E' ratio 12.80 m/s    MV Peak E Rex 1.28 m/s    TR Max Rex 2.62 m/s    MV VTI 21.5 cm    MV Peak A Rex 0.69 m/s    LV Systolic Volume 89.10 mL    LV Systolic Volume Index 45.7 mL/m2    LV Diastolic Volume 149.00 mL    LV Diastolic Volume Index 76.41 mL/m2    LV Mass Index 93 g/m2    RA Major Axis 4.60 cm    Triscuspid Valve Regurgitation Peak Gradient 27 mmHg    LA Volume Index (Mod) 40.5 mL/m2    LA volume (mod) 78.90 cm3    RA Width 4.30 cm    Grady's Biplane MOD Ejection Fraction 4 %   Basic Metabolic Panel    Collection Time: 06/09/23  4:40 AM   Result Value Ref Range    Sodium Level 133 (L) 136 - 145 mmol/L    Potassium Level 4.3 3.5 - 5.1 mmol/L    Chloride 93 (L) 98 - 107 mmol/L    Carbon Dioxide 31 23 - 31 mmol/L    Glucose Level 81 (L) 82 - 115 mg/dL    Blood Urea Nitrogen 19.1 8.4 - 25.7 mg/dL    Creatinine 1.12 0.73 - 1.18 mg/dL    BUN/Creatinine Ratio 17     Calcium Level Total 8.7 (L) 8.8 - 10.0 mg/dL    Anion Gap 9.0 mEq/L    eGFR >60 mls/min/1.73/m2   CBC with Differential    Collection Time: 06/09/23  4:40 AM   Result Value Ref Range    WBC 8.91 4.50 - 11.50 x10(3)/mcL    RBC 3.40 (L) 4.70 - 6.10 x10(6)/mcL    Hgb 8.1 (L) 14.0 - 18.0 g/dL    Hct 27.6 (L) 42.0 - 52.0 %    MCV 81.2 80.0 - 94.0 fL    MCH 23.8 (L) 27.0 -  31.0 pg    MCHC 29.3 (L) 33.0 - 36.0 g/dL    RDW 21.1 (H) 11.5 - 17.0 %    Platelet 239 130 - 400 x10(3)/mcL    MPV 9.8 7.4 - 10.4 fL    Neut % 74.2 %    Lymph % 13.0 %    Mono % 11.9 %    Eos % 0.0 %    Basophil % 0.3 %    Lymph # 1.16 0.6 - 4.6 x10(3)/mcL    Neut # 6.61 2.1 - 9.2 x10(3)/mcL    Mono # 1.06 0.1 - 1.3 x10(3)/mcL    Eos # 0.00 0 - 0.9 x10(3)/mcL    Baso # 0.03 <=0.2 x10(3)/mcL    IG# 0.05 (H) 0 - 0.04 x10(3)/mcL    IG% 0.6 %    NRBC% 0.4 %   Basic Metabolic Panel    Collection Time: 06/10/23  5:05 AM   Result Value Ref Range    Sodium Level 137 136 - 145 mmol/L    Potassium Level 4.0 3.5 - 5.1 mmol/L    Chloride 98 98 - 107 mmol/L    Carbon Dioxide 32 (H) 23 - 31 mmol/L    Glucose Level 82 82 - 115 mg/dL    Blood Urea Nitrogen 14.2 8.4 - 25.7 mg/dL    Creatinine 1.09 0.73 - 1.18 mg/dL    BUN/Creatinine Ratio 13     Calcium Level Total 8.8 8.8 - 10.0 mg/dL    Anion Gap 7.0 mEq/L    eGFR >60 mls/min/1.73/m2   Magnesium    Collection Time: 06/10/23  5:05 AM   Result Value Ref Range    Magnesium Level 2.20 1.60 - 2.60 mg/dL   Lipid Panel    Collection Time: 06/10/23  5:05 AM   Result Value Ref Range    Cholesterol Total 77 <=200 mg/dL    HDL Cholesterol 23 (L) 35 - 60 mg/dL    Triglyceride 52 34 - 140 mg/dL    Cholesterol/HDL Ratio 3 0 - 5    Very Low Density Lipoprotein 10     LDL Cholesterol 44.00 (L) 50.00 - 140.00 mg/dL   CBC with Differential    Collection Time: 06/10/23  5:05 AM   Result Value Ref Range    WBC 8.39 4.50 - 11.50 x10(3)/mcL    RBC 3.49 (L) 4.70 - 6.10 x10(6)/mcL    Hgb 8.5 (L) 14.0 - 18.0 g/dL    Hct 28.8 (L) 42.0 - 52.0 %    MCV 82.5 80.0 - 94.0 fL    MCH 24.4 (L) 27.0 - 31.0 pg    MCHC 29.5 (L) 33.0 - 36.0 g/dL    RDW 22.0 (H) 11.5 - 17.0 %    Platelet 236 130 - 400 x10(3)/mcL    MPV 9.7 7.4 - 10.4 fL    Neut % 68.6 %    Lymph % 14.8 %    Mono % 15.9 %    Eos % 0.0 %    Basophil % 0.2 %    Lymph # 1.24 0.6 - 4.6 x10(3)/mcL    Neut # 5.76 2.1 - 9.2 x10(3)/mcL    Mono # 1.33 (H) 0.1 -  1.3 x10(3)/mcL    Eos # 0.00 0 - 0.9 x10(3)/mcL    Baso # 0.02 <=0.2 x10(3)/mcL    IG# 0.04 0 - 0.04 x10(3)/mcL    IG% 0.5 %    NRBC% 0.2 %   Basic Metabolic Panel    Collection Time: 06/11/23  7:17 AM   Result Value Ref Range    Sodium Level 134 (L) 136 - 145 mmol/L    Potassium Level 4.1 3.5 - 5.1 mmol/L    Chloride 96 (L) 98 - 107 mmol/L    Carbon Dioxide 26 23 - 31 mmol/L    Glucose Level 102 82 - 115 mg/dL    Blood Urea Nitrogen 17.1 8.4 - 25.7 mg/dL    Creatinine 1.14 0.73 - 1.18 mg/dL    BUN/Creatinine Ratio 15     Calcium Level Total 9.0 8.8 - 10.0 mg/dL    Anion Gap 12.0 mEq/L    eGFR >60 mls/min/1.73/m2   Magnesium    Collection Time: 06/11/23  7:17 AM   Result Value Ref Range    Magnesium Level 2.30 1.60 - 2.60 mg/dL   CBC with Differential    Collection Time: 06/11/23  7:17 AM   Result Value Ref Range    WBC 11.13 4.50 - 11.50 x10(3)/mcL    RBC 4.03 (L) 4.70 - 6.10 x10(6)/mcL    Hgb 9.8 (L) 14.0 - 18.0 g/dL    Hct 33.3 (L) 42.0 - 52.0 %    MCV 82.6 80.0 - 94.0 fL    MCH 24.3 (L) 27.0 - 31.0 pg    MCHC 29.4 (L) 33.0 - 36.0 g/dL    RDW 23.4 (H) 11.5 - 17.0 %    Platelet 262 130 - 400 x10(3)/mcL    MPV 9.5 7.4 - 10.4 fL    Neut % 76.1 %    Lymph % 10.4 %    Mono % 12.8 %    Eos % 0.0 %    Basophil % 0.3 %    Lymph # 1.16 0.6 - 4.6 x10(3)/mcL    Neut # 8.48 2.1 - 9.2 x10(3)/mcL    Mono # 1.42 (H) 0.1 - 1.3 x10(3)/mcL    Eos # 0.00 0 - 0.9 x10(3)/mcL    Baso # 0.03 <=0.2 x10(3)/mcL    IG# 0.04 0 - 0.04 x10(3)/mcL    IG% 0.4 %    NRBC% 0.0 %   Blood Smear Microscopic Exam    Collection Time: 06/11/23  7:17 AM   Result Value Ref Range    RBC Morph Abnormal (A) Normal    Anisocyte 1+ (A) (none)    Hypochrom 1+ (A) (none)    Macrocyte 1+ (A) (none)    Platelet Est Adequate Normal, Adequate     Telemetry:  Sinus Rhythm    Physical Exam  Vitals and nursing note reviewed.   Constitutional:       General: He is not in acute distress.  HENT:      Head: Normocephalic.      Mouth/Throat:      Mouth: Mucous membranes are  moist.      Pharynx: Oropharynx is clear.   Cardiovascular:      Rate and Rhythm: Normal rate and regular rhythm.   Pulmonary:      Effort: Pulmonary effort is normal. No respiratory distress.   Abdominal:      Palpations: Abdomen is soft.   Musculoskeletal:         General: Normal range of motion.      Cervical back: Neck supple.   Skin:     General: Skin is warm and dry.   Neurological:      General: No focal deficit present.      Mental Status: Mental status is at baseline.   Psychiatric:         Behavior: Behavior normal.   Patient Currently undergoing Endoscopy. Chart Check Performed.    Current Inpatient Medications:    Current Facility-Administered Medications:     0.9%  NaCl infusion (for blood administration), , Intravenous, Q24H PRN, Anjelica Sotelo MD    acetaminophen tablet 650 mg, 650 mg, Oral, Q4H PRN, Anjelica Sotelo MD    albuterol-ipratropium 2.5 mg-0.5 mg/3 mL nebulizer solution 3 mL, 3 mL, Nebulization, Q4H PRN, Anjelica Sotelo MD, 3 mL at 06/09/23 1015    aluminum-magnesium hydroxide-simethicone 200-200-20 mg/5 mL suspension 30 mL, 30 mL, Oral, QID PRN, Anjelica Sotelo MD    ferric gluconate (FERRLECIT) 125 mg in sodium chloride 0.9% 100 mL IVPB, 125 mg, Intravenous, QHS, Anjelica Sotelo MD, Stopped at 06/10/23 2127    folic acid tablet 1 mg, 1 mg, Oral, Daily, Anjelica Sotelo MD, 1 mg at 06/10/23 1240    furosemide injection 40 mg, 40 mg, Intravenous, BID, Anjelica Sotelo MD, 40 mg at 06/10/23 1819    furosemide injection 40 mg, 40 mg, Intravenous, Once, Yair Olivarez MD    gabapentin capsule 300 mg, 300 mg, Oral, TID, Anjelica Sotelo MD, 300 mg at 06/10/23 2027    lisinopriL tablet 5 mg, 5 mg, Oral, Daily, Yair Olivarez MD, 5 mg at 06/10/23 1240    LORazepam tablet 2 mg, 2 mg, Oral, Q4H PRN, Anjelica Sotelo MD    melatonin tablet 6 mg, 6 mg, Oral, Nightly PRN, Anjelica Sotelo MD    metoprolol succinate (TOPROL-XL) 24 hr tablet 25 mg, 25 mg, Oral, Daily, LINA Pedro    multivitamin tablet, 1 tablet, Oral, Daily, Ali M  MD Deepak, 1 tablet at 06/10/23 1240    ondansetron injection 4 mg, 4 mg, Intravenous, Q4H PRN, Anjelica Sotelo MD    pantoprazole injection 40 mg, 40 mg, Intravenous, BID, Anjelica Sotelo MD, 40 mg at 06/10/23 2027    polyethylene glycol packet 17 g, 17 g, Oral, BID PRN, Anjelica Sotelo MD    prochlorperazine injection Soln 5 mg, 5 mg, Intravenous, Q6H PRN, Anjelica Sotelo MD    senna-docusate 8.6-50 mg per tablet 2 tablet, 2 tablet, Oral, BID PRN, Anjelica Sotelo MD    sodium chloride 0.9% flush 10 mL, 10 mL, Intravenous, PRN, Anjelica Sotelo MD    spironolactone tablet 50 mg, 50 mg, Oral, BID, Anjelica Sotelo MD, 50 mg at 06/10/23 2027    thiamine tablet 100 mg, 100 mg, Oral, TID, Anjelica Sotelo MD, 100 mg at 06/10/23 2027    traMADoL tablet 50 mg, 50 mg, Oral, Q6H PRN, Anjelica Sotelo MD, 50 mg at 06/07/23 2250    VTE Risk Mitigation (From admission, onward)           Ordered     IP VTE LOW RISK PATIENT  Once         06/07/23 2053     Place sequential compression device  Until discontinued         06/07/23 2053                  Assessment:   Acute Combined Systolic & Diastolic HF (Compensated)  Cardiomyopathy (Unspecified for now)    - EF 40%  Symptomatic Anemia with Possible GI Source    - Status Post PRBC Transfusion    - EGD (6.9.23) Normal Findings, Colonoscopy: Negative (6.10.23)  Liver Cirrhosis  COPD with Acute Exacerbation  Hepatitis C Antibody Reactive  Hypertension  Nose Cancer    - Removal of Squamous Cell Carcinoma  Obstructive Sleep Apnea    Plan:   Transition to Lasix 20 Mg PO Daily  Continue Current HF Medications- Add low dose beta blockade  Plan outpatient Nuclear SPECT Pharm Stress Test Re: CMO EF 40% Rule out Ischemic Etiology  Follow up with [facility]- Baldemar Ochoa  Will be available. Call if needed.    Selvin Sue, LINA  Cardiology  [facility] - 5th Floor Med Surg  06/11/2023

## 2023-06-11 NOTE — PROGRESS NOTES
[facility]  Hospital Medicine Progress Note        Chief Complaint: Inpatient Follow-up for anemia     HPI:   63-year-old male with medical history of alcoholic cirrhosis, COPD, hypertension, on aspirin and Plavix, presented to [facility] ED with chief complaint of bilateral lower extremity swelling and weeping as well as shortness breath and wheezing over the past 2 weeks.  He denies history of heart failure but report taking fluid pills for his cirrhosis.  He is not sure why he is on aspirin and Plavix.  Reports shortness of breath on exertion, denies chest pain, fever chills.  Report chronic intermittent cough that is nonproductive.  Denies abdominal pain cough, nausea, vomiting, hematemesis, diarrhea, melena or hematochezia.     On arrival he was borderline hypotensive, tachypneic, tachycardic, and saturating 95% on 2 L nasal cannula.  Labs were notable for hemoglobin of 5.0, platelets 277, sodium 124, creatinine 1.17, albumin 3.0, INR 1.13, , and elevated D-dimer.  Stool occult blood negative.  Chest x-ray show congestive and small right-sided pleural effusion with right lower lobe atelectasis.  CTA chest ordered for elevated D-dimer show no evidence of pulmonary embolus and similar finding of chest x-ray of moderate right-sided pleural effusion and right basilar atelectasis.     He received 2 units of PRBC, Lasix 40 mg IV 2 doses, bronchodilators, subsequently was transferred to [facility] and referred to hospital medicine service for further evaluation and management.     EGD (6.9.23) Normal Findings, Colonoscopy: Negative (6.10.23).Pt needs follow up gi/pa clinic 2-3 weeks with cbc ( office [phone])   pt needs outpt video capsule exam of the small bowel to complete gi anemia workup.     Cards evaluated recommended  outpatient Nuclear SPECT Pharm Stress Test Re: CMO EF 40% Rule out Ischemic Etiology    Interval Hx:   No significant events reported overnight, pt  reported feeling better, friend/fam member at bed side, discussed the egd/colon findings, need to have OP f/u.      Objective/physical exam:  General: In no acute distress, afebrile  Chest: Clear to auscultation bilaterally  Heart: RRR  Abdomen: Soft, nontender  MSK: Warm, no lower extremity edema  Neurologic: Alert   VITAL SIGNS: 24 HRS MIN & MAX LAST   Temp  Min: 98.1 °F (36.7 °C)  Max: 99.5 °F (37.5 °C) 98.6 °F (37 °C)   BP  Min: 81/31  Max: 124/71 110/64   Pulse  Min: 82  Max: 97  93   Resp  Min: 17  Max: 23 18   SpO2  Min: 89 %  Max: 98 % (!) 92 %     I have reviewed the following labs:    Recent Labs   Lab 06/09/23  0440 06/10/23  0505 06/11/23  0717   WBC 8.91 8.39 11.13   RBC 3.40* 3.49* 4.03*   HGB 8.1* 8.5* 9.8*   HCT 27.6* 28.8* 33.3*   MCV 81.2 82.5 82.6   MCH 23.8* 24.4* 24.3*   MCHC 29.3* 29.5* 29.4*   RDW 21.1* 22.0* 23.4*    236 262   MPV 9.8 9.7 9.5       Recent Labs   Lab 06/06/23  1733 06/07/23  1028 06/07/23  2134 06/09/23  0440 06/10/23  0505 06/11/23  0717   * 129*   < > 133* 137 134*   K 4.4 5.0   < > 4.3 4.0 4.1   CO2 22* 27   < > 31 32* 26   BUN 14.6 17.8   < > 19.1 14.2 17.1   CREATININE 1.17 1.16   < > 1.12 1.09 1.14   CALCIUM 8.3* 8.5*   < > 8.7* 8.8 9.0   MG  --   --   --   --  2.20 2.30   ALBUMIN 3.0* 3.0*  --   --   --   --    ALKPHOS 66 53  --   --   --   --    ALT 9 8  --   --   --   --    AST 16 14  --   --   --   --    BILITOT 0.6 1.2  --   --   --   --     < > = values in this interval not displayed.          Microbiology Results (last 7 days)       ** No results found for the last 168 hours. **             See below for Radiology    Scheduled Med:   ferric gluconate (FERRLECIT) IVPB  125 mg Intravenous QHS    folic acid  1 mg Oral Daily    furosemide  20 mg Oral Daily    gabapentin  300 mg Oral TID    lisinopriL  5 mg Oral Daily    metoprolol succinate  25 mg Oral Daily    multivitamin  1 tablet Oral Daily    pantoprazole  40 mg Intravenous BID    spironolactone  50  mg Oral BID    thiamine  100 mg Oral TID        Continuous Infusions:       PRN Meds:  sodium chloride, acetaminophen, albuterol-ipratropium, aluminum-magnesium hydroxide-simethicone, LORazepam, melatonin, ondansetron, polyethylene glycol, prochlorperazine, senna-docusate 8.6-50 mg, sodium chloride 0.9%, traMADoL       Assessment/Plan:  Symptomatic anemia with possible GI source s/p prbc transfusion iron and folate deficiency s/p egd, colon  Combined systolic and diastolic  heart failure  LVEF 40%  Alcoholic cirrhosis  Hypervolemic hyponatremia-cirrhosis/heart failure - improving with diuresis  COPD mild exacerbation     History of osteoarthritis-NSAID use, on aspirin Plavix for hx of PAD with stents    Pt doing well , hb stable ,noted he was satting 90-93% on room air RR 22  Ordered 6 min walk test   Volume status improved   Diuretics switched to po  Cards evaluated , input noted, recommended OP work up.started BB   GI cleared  to use antithrombotics  if necessary  Resumed plavix today  Cont iv iron day 5 today then stop   Cont duonebs prn  Labs in the AM   Pt still drinking alcohol, smokes marijauna-counselled     Pt needs follow up gi/pa clinic 2-3 weeks with cbc ( office [phone])   pt needs outpt video capsule exam of the small bowel to complete gi anemia workup.   Follow up with [facility] [place]      VTE prophylaxis: Scds    Patient condition:  Stable    Anticipated discharge and Disposition:   Likely home tmrw      _____________________________________________________________________    Nutrition Status:    Radiology:  I have personally reviewed the following imaging and agree with the radiologist.     Echo  · The left ventricle is mildly enlarged with mildly decreased systolic   function.  · Overall there is mild to moderate global hypokinesis; Prounounced basal   inferior and inferioseptal hypokinesis.  · The estimated ejection fraction is 40%.  · Grade III left ventricular diastolic  dysfunction.  · Mild right ventricular enlargement with low normal right ventricular   systolic function.  · Mild-to-moderate mitral regurgitation.  · Mild tricuspid regurgitation.  · Mild pulmonic regurgitation.  · The estimated PA systolic pressure is 35 mmHg.  · Mild left atrial enlargement.  · Mild right atrial enlargement.     US Liver with Doppler (xpd)  Narrative: EXAMINATION:  US LIVER WITH DOPPLER    CLINICAL HISTORY:  Cirrhosis evaluation;    COMPARISON:  CT yesterday.    FINDINGS:  Grayscale, color and spectral doppler evaluation of the right upper quadrant.    Pancreas obscured.  Imaged portions of IVC normal in caliber.    Liver is not significantly enlarged.  There is nodular cirrhotic hepatic contour.  There is coarsened hepatic echotexture.  No focal suspicious liver lesion.  Imaged portal system is patent with appropriate direction of flow.  Imaged hepatic veins are patent.  Hepatic artery not visualized.  Splenic artery and vein not visualized.    No gallstones visualized.  Mild nonspecific gallbladder wall thickening which could relate to fluid overload.  The common bile duct is normal in caliber  and measures 3 mm.    Right kidney measures 9 cm in length. No hydronephrosis.    There is small volume ascites.  There is moderate right pleural effusion.  Impression: 1. Cirrhosis with no focal suspicious liver lesion seen.  2. Imaged hepatic vasculature is patent.  3. Small volume ascites and moderate right pleural effusion.    Electronically signed by: Carl Elam  Date:    06/08/2023  Time:    09:52      Lopez Carr MD   06/11/2023

## 2023-06-12 VITALS
WEIGHT: 155.19 LBS | DIASTOLIC BLOOD PRESSURE: 54 MMHG | TEMPERATURE: 98 F | HEART RATE: 95 BPM | HEIGHT: 69 IN | SYSTOLIC BLOOD PRESSURE: 106 MMHG | BODY MASS INDEX: 22.98 KG/M2 | OXYGEN SATURATION: 95 % | RESPIRATION RATE: 17 BRPM

## 2023-06-12 LAB
BASOPHILS # BLD AUTO: 0.04 X10(3)/MCL
BASOPHILS NFR BLD AUTO: 0.3 %
EOSINOPHIL # BLD AUTO: 0 X10(3)/MCL (ref 0–0.9)
EOSINOPHIL NFR BLD AUTO: 0 %
ERYTHROCYTE [DISTWIDTH] IN BLOOD BY AUTOMATED COUNT: 23.9 % (ref 11.5–17)
HCT VFR BLD AUTO: 30.4 % (ref 42–52)
HGB BLD-MCNC: 9 G/DL (ref 14–18)
IMM GRANULOCYTES # BLD AUTO: 0.06 X10(3)/MCL (ref 0–0.04)
IMM GRANULOCYTES NFR BLD AUTO: 0.5 %
LYMPHOCYTES # BLD AUTO: 1.41 X10(3)/MCL (ref 0.6–4.6)
LYMPHOCYTES NFR BLD AUTO: 12 %
MCH RBC QN AUTO: 24.9 PG (ref 27–31)
MCHC RBC AUTO-ENTMCNC: 29.6 G/DL (ref 33–36)
MCV RBC AUTO: 84 FL (ref 80–94)
MONOCYTES # BLD AUTO: 1.66 X10(3)/MCL (ref 0.1–1.3)
MONOCYTES NFR BLD AUTO: 14.1 %
NEUTROPHILS # BLD AUTO: 8.61 X10(3)/MCL (ref 2.1–9.2)
NEUTROPHILS NFR BLD AUTO: 73.1 %
NRBC BLD AUTO-RTO: 0 %
PLATELET # BLD AUTO: 230 X10(3)/MCL (ref 130–400)
PMV BLD AUTO: 9.5 FL (ref 7.4–10.4)
RBC # BLD AUTO: 3.62 X10(6)/MCL (ref 4.7–6.1)
WBC # SPEC AUTO: 11.78 X10(3)/MCL (ref 4.5–11.5)

## 2023-06-12 PROCEDURE — C9113 INJ PANTOPRAZOLE SODIUM, VIA: HCPCS | Performed by: INTERNAL MEDICINE

## 2023-06-12 PROCEDURE — 63600175 PHARM REV CODE 636 W HCPCS: Performed by: INTERNAL MEDICINE

## 2023-06-12 PROCEDURE — 25000003 PHARM REV CODE 250: Performed by: INTERNAL MEDICINE

## 2023-06-12 PROCEDURE — 25000003 PHARM REV CODE 250: Performed by: NURSE PRACTITIONER

## 2023-06-12 PROCEDURE — 85025 COMPLETE CBC W/AUTO DIFF WBC: CPT | Performed by: INTERNAL MEDICINE

## 2023-06-12 RX ORDER — FOLIC ACID 1 MG/1
1 TABLET ORAL DAILY
Qty: 90 TABLET | Refills: 1 | Status: ON HOLD | OUTPATIENT
Start: 2023-06-12 | End: 2023-11-21 | Stop reason: HOSPADM

## 2023-06-12 RX ORDER — GABAPENTIN 100 MG/1
100 CAPSULE ORAL 3 TIMES DAILY
Status: DISCONTINUED | OUTPATIENT
Start: 2023-06-12 | End: 2023-06-12 | Stop reason: HOSPADM

## 2023-06-12 RX ORDER — TRAMADOL HYDROCHLORIDE 50 MG/1
50 TABLET ORAL EVERY 8 HOURS PRN
Qty: 15 TABLET | Refills: 0 | Status: SHIPPED | OUTPATIENT
Start: 2023-06-12 | End: 2023-10-23

## 2023-06-12 RX ORDER — LISINOPRIL 2.5 MG/1
2.5 TABLET ORAL DAILY
Qty: 90 TABLET | Refills: 0 | Status: ON HOLD | OUTPATIENT
Start: 2023-06-12 | End: 2023-11-03 | Stop reason: HOSPADM

## 2023-06-12 RX ORDER — FUROSEMIDE 20 MG/1
20 TABLET ORAL DAILY
Qty: 30 TABLET | Refills: 1 | Status: SHIPPED | OUTPATIENT
Start: 2023-06-12 | End: 2023-10-23 | Stop reason: DRUGHIGH

## 2023-06-12 RX ORDER — PANTOPRAZOLE SODIUM 40 MG/1
40 TABLET, DELAYED RELEASE ORAL 2 TIMES DAILY
Qty: 60 TABLET | Refills: 2 | Status: SHIPPED | OUTPATIENT
Start: 2023-06-12 | End: 2023-12-13 | Stop reason: SDUPTHER

## 2023-06-12 RX ORDER — LISINOPRIL 2.5 MG/1
2.5 TABLET ORAL DAILY
Status: DISCONTINUED | OUTPATIENT
Start: 2023-06-12 | End: 2023-06-12 | Stop reason: HOSPADM

## 2023-06-12 RX ORDER — LANOLIN ALCOHOL/MO/W.PET/CERES
100 CREAM (GRAM) TOPICAL DAILY
Status: ON HOLD | COMMUNITY
Start: 2023-06-12 | End: 2023-11-03 | Stop reason: HOSPADM

## 2023-06-12 RX ORDER — METOPROLOL SUCCINATE 25 MG/1
25 TABLET, EXTENDED RELEASE ORAL DAILY
Qty: 30 TABLET | Refills: 1 | Status: ON HOLD | OUTPATIENT
Start: 2023-06-12 | End: 2023-11-03 | Stop reason: HOSPADM

## 2023-06-12 RX ADMIN — THIAMINE HCL TAB 100 MG 100 MG: 100 TAB at 09:06

## 2023-06-12 RX ADMIN — SPIRONOLACTONE 50 MG: 25 TABLET ORAL at 09:06

## 2023-06-12 RX ADMIN — FOLIC ACID 1 MG: 1 TABLET ORAL at 09:06

## 2023-06-12 RX ADMIN — METOPROLOL SUCCINATE 25 MG: 25 TABLET, EXTENDED RELEASE ORAL at 09:06

## 2023-06-12 RX ADMIN — PANTOPRAZOLE SODIUM 40 MG: 40 INJECTION, POWDER, FOR SOLUTION INTRAVENOUS at 09:06

## 2023-06-12 RX ADMIN — THERA TABS 1 TABLET: TAB at 09:06

## 2023-06-12 RX ADMIN — GABAPENTIN 100 MG: 100 CAPSULE ORAL at 09:06

## 2023-06-12 RX ADMIN — FUROSEMIDE 20 MG: 20 TABLET ORAL at 09:06

## 2023-06-12 RX ADMIN — CLOPIDOGREL BISULFATE 75 MG: 75 TABLET ORAL at 09:06

## 2023-06-12 RX ADMIN — LISINOPRIL 2.5 MG: 2.5 TABLET ORAL at 09:06

## 2023-06-12 NOTE — CARE UPDATE
[id number] Pt informs me he wants to take a bus to his hotel. Cancelled pt's trip with [employer]

## 2023-06-12 NOTE — DISCHARGE SUMMARY
[facility] Hospital Medicine Discharge Summary    Admit Date: 6/7/2023  Discharge Date and Time: 6/12/202310:36 AM  Admitting Physician:  Team  Discharging Physician: Bill Epps MD.  Primary Care Physician: Primary Doctor No  Consults: Cardiology and Gastroenterology    Discharge Diagnoses:  Symptomatic anemia with possible GI source s/p prbc transfusion iron and folate deficiency s/p egd, colon  Combined systolic and diastolic  heart failure  LVEF 40%  Alcoholic cirrhosis- refused to quit alcohol   Hypervolemic hyponatremia-cirrhosis/heart failure - improving with diuresis  COPD mild exacerbation- resolved  Acute hypoxemic respiratory failure due to COPD ykhqjjmezprr-pbwowdhf-bi room air now  Nicotine dependence with current smoking status and refusing to quit   History of osteoarthritis-NSAID use, on aspirin Plavix for hx of PAD with stents    Hospital Course:   63-year-old male with medical history of alcoholic cirrhosis, COPD, hypertension, on aspirin and Plavix, presented to [facility] ED with chief complaint of bilateral lower extremity swelling and weeping as well as shortness breath and wheezing over the past 2 weeks.  He denies history of heart failure but report taking fluid pills for his cirrhosis.  He is not sure why he is on aspirin and Plavix.  Reports shortness of breath on exertion, denies chest pain, fever chills.  Report chronic intermittent cough that is nonproductive.  Denies abdominal pain cough, nausea, vomiting, hematemesis, diarrhea, melena or hematochezia.  On arrival he was borderline hypotensive, tachypneic, tachycardic, and saturating 95% on 2 L nasal cannula.  Labs were notable for hemoglobin of 5.0, platelets 277, sodium 124, creatinine 1.17, albumin 3.0, INR 1.13, , and elevated D-dimer.  Stool occult blood negative.  Chest x-ray show congestive and small right-sided pleural effusion with right lower lobe atelectasis.  CTA chest ordered  for elevated D-dimer show no evidence of pulmonary embolus and similar finding of chest x-ray of moderate right-sided pleural effusion and right basilar atelectasis.  He received 2 units of PRBC, Lasix 40 mg IV 2 doses, bronchodilators, subsequently was transferred to [facility] and referred to hospital medicine service for further evaluation and management.  EGD (6.9.23) Normal Findings, Colonoscopy: Negative (6.10.23).Pt needs follow up gi/pa clinic 2-3 weeks with cbc ( office [phone])   Pt needs outpt video capsule exam of the small bowel to complete gi anemia workup.-  informed   Cards evaluated recommended  outpatient Nuclear SPECT Pharm Stress Test Re: CMO EF 40% Rule out Ischemic Etiology  Pt doing well, hb stable noted he was satting 90-93% on room air  Ordered 6 min walk test - did not qualify for home oxygen  Volume status improved   Diuretics switched to po  Cards evaluated , input noted, recommended OP work up.started BB   GI cleared  to use antithrombotics, resumed  Completed 5 days of iv iron while in-house  Pt still drinking alcohol, smokes marijauna-counselled patient refused to quit alcohol and quit smoking as well.  Reported he gets out of here, he would like the cigarettes and when he gets home, he will drink is alcohol     Pt needs follow up gi/pa clinic 2-3 weeks with cbc ( office [phone])   pt needs outpt video capsule exam of the small bowel to complete gi anemia workup.   Follow up with [facility]   informed     Pt was seen and examined on the day of discharge  Vitals:  VITAL SIGNS: 24 HRS MIN & MAX LAST   Temp  Min: 97.5 °F (36.4 °C)  Max: 98.7 °F (37.1 °C) 98.4 °F (36.9 °C)   BP  Min: 73/30  Max: 120/62 (!) 106/54   Pulse  Min: 66  Max: 95  95   Resp  Min: 15  Max: 22 17   SpO2  Min: 88 %  Max: 95 % 95 %       Physical Exam:  General: In no acute distress, afebrile  Chest: Clear to auscultation bilaterally, on room air  Heart: RRR  Abdomen:  Soft, distended, nontender  MSK: Warm, no lower extremity edema  Neurologic:  Awake alert and oriented      Significant Diagnostic Studies: See Full reports for all details    Recent Labs   Lab 06/10/23  0505 06/11/23  0717 06/12/23  0515   WBC 8.39 11.13 11.78*   RBC 3.49* 4.03* 3.62*   HGB 8.5* 9.8* 9.0*   HCT 28.8* 33.3* 30.4*   MCV 82.5 82.6 84.0   MCH 24.4* 24.3* 24.9*   MCHC 29.5* 29.4* 29.6*   RDW 22.0* 23.4* 23.9*    262 230   MPV 9.7 9.5 9.5       Recent Labs   Lab 06/06/23  1733 06/07/23  1028 06/07/23  2134 06/09/23  0440 06/10/23  0505 06/11/23  0717   * 129*   < > 133* 137 134*   K 4.4 5.0   < > 4.3 4.0 4.1   CO2 22* 27   < > 31 32* 26   BUN 14.6 17.8   < > 19.1 14.2 17.1   CREATININE 1.17 1.16   < > 1.12 1.09 1.14   CALCIUM 8.3* 8.5*   < > 8.7* 8.8 9.0   MG  --   --   --   --  2.20 2.30   ALBUMIN 3.0* 3.0*  --   --   --   --    ALKPHOS 66 53  --   --   --   --    ALT 9 8  --   --   --   --    AST 16 14  --   --   --   --    BILITOT 0.6 1.2  --   --   --   --     < > = values in this interval not displayed.        Microbiology Results (last 7 days)       ** No results found for the last 168 hours. **             Echo  · The left ventricle is mildly enlarged with mildly decreased systolic   function.  · Overall there is mild to moderate global hypokinesis; Prounounced basal   inferior and inferioseptal hypokinesis.  · The estimated ejection fraction is 40%.  · Grade III left ventricular diastolic dysfunction.  · Mild right ventricular enlargement with low normal right ventricular   systolic function.  · Mild-to-moderate mitral regurgitation.  · Mild tricuspid regurgitation.  · Mild pulmonic regurgitation.  · The estimated PA systolic pressure is 35 mmHg.  · Mild left atrial enlargement.  · Mild right atrial enlargement.     US Liver with Doppler (xpd)  Narrative: EXAMINATION:  US LIVER WITH DOPPLER    CLINICAL HISTORY:  Cirrhosis evaluation;    COMPARISON:  CT  yesterday.    FINDINGS:  Grayscale, color and spectral doppler evaluation of the right upper quadrant.    Pancreas obscured.  Imaged portions of IVC normal in caliber.    Liver is not significantly enlarged.  There is nodular cirrhotic hepatic contour.  There is coarsened hepatic echotexture.  No focal suspicious liver lesion.  Imaged portal system is patent with appropriate direction of flow.  Imaged hepatic veins are patent.  Hepatic artery not visualized.  Splenic artery and vein not visualized.    No gallstones visualized.  Mild nonspecific gallbladder wall thickening which could relate to fluid overload.  The common bile duct is normal in caliber  and measures 3 mm.    Right kidney measures 9 cm in length. No hydronephrosis.    There is small volume ascites.  There is moderate right pleural effusion.  Impression: 1. Cirrhosis with no focal suspicious liver lesion seen.  2. Imaged hepatic vasculature is patent.  3. Small volume ascites and moderate right pleural effusion.    Electronically signed by: Carl Elam  Date:    06/08/2023  Time:    09:52         Medication List        START taking these medications      folic acid 1 MG tablet  Commonly known as: FOLVITE  Take 1 tablet (1 mg total) by mouth once daily.     furosemide 20 MG tablet  Commonly known as: LASIX  Take 1 tablet (20 mg total) by mouth once daily.     lisinopriL 2.5 MG tablet  Commonly known as: PRINIVIL,ZESTRIL  Take 1 tablet (2.5 mg total) by mouth once daily.     metoprolol succinate 25 MG 24 hr tablet  Commonly known as: TOPROL-XL  Take 1 tablet (25 mg total) by mouth once daily.     multivitamin Tab  Take 1 tablet by mouth once daily.     pantoprazole 40 MG tablet  Commonly known as: PROTONIX  Take 1 tablet (40 mg total) by mouth 2 (two) times daily.     thiamine 100 MG tablet  Take 1 tablet (100 mg total) by mouth once daily.     traMADoL 50 mg tablet  Commonly known as: ULTRAM  Take 1 tablet (50 mg total) by mouth every 8 (eight) hours  as needed for Pain.            CONTINUE taking these medications      ADULT ASPIRIN REGIMEN 81 MG EC tablet  Generic drug: aspirin     albuterol 90 mcg/actuation inhaler  Commonly known as: PROVENTIL/VENTOLIN HFA     clopidogreL 75 mg tablet  Commonly known as: PLAVIX     gabapentin 300 MG capsule  Commonly known as: NEURONTIN     spironolactone 50 MG tablet  Commonly known as: ALDACTONE     STIOLTO RESPIMAT 2.5-2.5 mcg/actuation Mist  Generic drug: tiotropium-olodateroL            STOP taking these medications      diclofenac 75 MG EC tablet  Commonly known as: VOLTAREN               Where to Get Your Medications        These medications were sent to [address] [address]      Phone: [phone]   folic acid 1 MG tablet  furosemide 20 MG tablet  lisinopriL 2.5 MG tablet  metoprolol succinate 25 MG 24 hr tablet  pantoprazole 40 MG tablet  traMADoL 50 mg tablet       You can get these medications from any pharmacy    You don't need a prescription for these medications  multivitamin Tab  thiamine 100 MG tablet          Explained in detail to the patient about the discharge plan, medications, and follow-up visits. Pt understands and agrees with the treatment plan  Discharge Disposition: Home   Discharged Condition: stable  Diet-   Dietary Orders (From admission, onward)       Start     Ordered    06/10/23 1300  Diet Low Sodium, 2gm  Diet effective now         06/10/23 1259                   Medications Per DC med rec  Activities as tolerated   Follow-up Information       LINA Terrazas Follow up in 10 day(s).    Specialty: Cardiology  Why: Hospital Follow Up- Needs Nuclear Stress Test SPECT Re: CMO  Office will contact you with appointment.  Contact information:  [address] [facility] [address] [phone]               Johan Flower MD. Schedule an appointment as soon as possible for a visit in  2 week(s).    Specialty: Gastroenterology  Why: to set up Video capsule endoscopy for GI bleed / alcoholic cirrhosis  Office will contact you with an appointment.  Contact information:  [address]  [facility] [id number] [phone]                           For further questions contact hospitalist office    Discharge time 34 minutes    For worsening symptoms, chest pain, shortness of breath, increased abdominal pain, high grade fever, stroke or stroke like symptoms, immediately go to the nearest Emergency Room or call 911 as soon as possible.      Bill Epps MD  Department of Hospital Medicine   [facility]   06/12/2023 10:36 AM

## 2023-06-12 NOTE — CARE UPDATE
[id number] Pt will dc today and needs a ride. Called [employer] transport and set up transport to [facility] at [address]. Trip #[id number]

## 2023-06-13 ENCOUNTER — PATIENT OUTREACH (OUTPATIENT)
Dept: ADMINISTRATIVE | Facility: CLINIC | Age: 63
End: 2023-06-13
Payer: MEDICAID

## 2023-06-13 NOTE — PROGRESS NOTES
C3 nurse attempted to contact Hemal Tolbert Jr.  for a TCC post hospital discharge follow up call. No answer. Left voicemail with callback information. The patient does not have a scheduled HOSFU appointment.

## 2023-06-14 NOTE — PROGRESS NOTES
C3 nurse spoke with Hemal Tolbert Jr.  for a TCC post hospital discharge follow up call. The patient does not have a scheduled HOSFU appointment with pcp within 5-7 days post hospital discharge date 6/12/23. C3 nurse was unable to schedule HOSFU appointment in [facility].

## 2023-10-23 ENCOUNTER — HOSPITAL ENCOUNTER (INPATIENT)
Facility: HOSPITAL | Age: 63
LOS: 11 days | Discharge: HOME | DRG: 216 | End: 2023-11-03
Attending: EMERGENCY MEDICINE | Admitting: INTERNAL MEDICINE
Payer: MEDICAID

## 2023-10-23 ENCOUNTER — HOSPITAL ENCOUNTER (EMERGENCY)
Facility: HOSPITAL | Age: 63
Discharge: ANOTHER HOSPITAL | End: 2023-10-23
Attending: SPECIALIST
Payer: MEDICAID

## 2023-10-23 VITALS
BODY MASS INDEX: 24.25 KG/M2 | WEIGHT: 160 LBS | HEIGHT: 68 IN | HEART RATE: 95 BPM | SYSTOLIC BLOOD PRESSURE: 126 MMHG | RESPIRATION RATE: 24 BRPM | OXYGEN SATURATION: 96 % | DIASTOLIC BLOOD PRESSURE: 78 MMHG | TEMPERATURE: 98 F

## 2023-10-23 DIAGNOSIS — R07.89 CHEST WALL PAIN: ICD-10-CM

## 2023-10-23 DIAGNOSIS — R79.89 ELEVATED TROPONIN I LEVEL: ICD-10-CM

## 2023-10-23 DIAGNOSIS — I21.4 NSTEMI (NON-ST ELEVATED MYOCARDIAL INFARCTION): ICD-10-CM

## 2023-10-23 DIAGNOSIS — R07.9 CHEST PAIN, UNSPECIFIED TYPE: ICD-10-CM

## 2023-10-23 DIAGNOSIS — I47.20 VT (VENTRICULAR TACHYCARDIA): ICD-10-CM

## 2023-10-23 DIAGNOSIS — I50.9 CONGESTIVE HEART FAILURE, UNSPECIFIED HF CHRONICITY, UNSPECIFIED HEART FAILURE TYPE: ICD-10-CM

## 2023-10-23 DIAGNOSIS — I50.43 ACUTE ON CHRONIC COMBINED SYSTOLIC AND DIASTOLIC CONGESTIVE HEART FAILURE: ICD-10-CM

## 2023-10-23 DIAGNOSIS — I50.41 ACUTE COMBINED SYSTOLIC AND DIASTOLIC CONGESTIVE HEART FAILURE, NYHA CLASS 2: ICD-10-CM

## 2023-10-23 DIAGNOSIS — I50.43 ACUTE ON CHRONIC COMBINED SYSTOLIC AND DIASTOLIC HEART FAILURE: ICD-10-CM

## 2023-10-23 DIAGNOSIS — F17.200 TOBACCO DEPENDENCE: ICD-10-CM

## 2023-10-23 DIAGNOSIS — R79.89 ELEVATED TROPONIN: ICD-10-CM

## 2023-10-23 DIAGNOSIS — I25.110 CORONARY ARTERY DISEASE INVOLVING NATIVE CORONARY ARTERY OF NATIVE HEART WITH UNSTABLE ANGINA PECTORIS: ICD-10-CM

## 2023-10-23 DIAGNOSIS — J44.9 CHRONIC OBSTRUCTIVE PULMONARY DISEASE, UNSPECIFIED COPD TYPE: ICD-10-CM

## 2023-10-23 DIAGNOSIS — R57.0 CARDIOGENIC SHOCK: ICD-10-CM

## 2023-10-23 DIAGNOSIS — I49.01 VENTRICULAR FIBRILLATION: Primary | ICD-10-CM

## 2023-10-23 DIAGNOSIS — R06.2 WHEEZING: ICD-10-CM

## 2023-10-23 DIAGNOSIS — I47.20 V-TACH: Primary | ICD-10-CM

## 2023-10-23 DIAGNOSIS — R07.9 CHEST PAIN: ICD-10-CM

## 2023-10-23 DIAGNOSIS — I10 PRIMARY HYPERTENSION: ICD-10-CM

## 2023-10-23 PROBLEM — F10.10 ALCOHOL ABUSE: Status: ACTIVE | Noted: 2023-10-23

## 2023-10-23 PROBLEM — Z72.0 TOBACCO ABUSE: Status: ACTIVE | Noted: 2023-10-23

## 2023-10-23 LAB
ALBUMIN SERPL BCP-MCNC: 3.6 G/DL (ref 3.5–5.2)
ALBUMIN SERPL-MCNC: 3.5 G/DL (ref 3.4–4.8)
ALBUMIN/GLOB SERPL: 0.7 RATIO (ref 1.1–2)
ALP SERPL-CCNC: 60 UNIT/L (ref 40–150)
ALP SERPL-CCNC: 65 U/L (ref 55–135)
ALT SERPL W/O P-5'-P-CCNC: 21 U/L (ref 10–44)
ALT SERPL-CCNC: 17 UNIT/L (ref 0–55)
ANION GAP SERPL CALC-SCNC: 15 MMOL/L (ref 8–16)
AORTIC ROOT ANNULUS: 3.38 CM
APTT PPP: 27.9 SEC (ref 21–32)
APTT PPP: 38.4 SEC (ref 21–32)
ASCENDING AORTA: 3.41 CM
AST SERPL-CCNC: 37 UNIT/L (ref 5–34)
AST SERPL-CCNC: 62 U/L (ref 10–40)
AV INDEX (PROSTH): 0.92
AV MEAN GRADIENT: 2 MMHG
AV PEAK GRADIENT: 3 MMHG
AV VALVE AREA BY VELOCITY RATIO: 2.64 CM²
AV VALVE AREA: 2.73 CM²
AV VELOCITY RATIO: 0.88
BASOPHILS # BLD AUTO: 0.04 X10(3)/MCL
BASOPHILS # BLD AUTO: 0.06 K/UL (ref 0–0.2)
BASOPHILS # BLD AUTO: 0.06 K/UL (ref 0–0.2)
BASOPHILS NFR BLD AUTO: 0.4 %
BASOPHILS NFR BLD: 0.3 % (ref 0–1.9)
BASOPHILS NFR BLD: 0.3 % (ref 0–1.9)
BILIRUB SERPL-MCNC: 0.4 MG/DL
BILIRUB SERPL-MCNC: 0.4 MG/DL (ref 0.1–1)
BNP BLD-MCNC: 122.4 PG/ML
BNP SERPL-MCNC: 189 PG/ML (ref 0–99)
BSA FOR ECHO PROCEDURE: 2.08 M2
BUN SERPL-MCNC: 11 MG/DL (ref 8.4–25.7)
BUN SERPL-MCNC: 12 MG/DL (ref 8–23)
CALCIUM SERPL-MCNC: 8.6 MG/DL (ref 8.7–10.5)
CALCIUM SERPL-MCNC: 9.3 MG/DL (ref 8.8–10)
CHLORIDE SERPL-SCNC: 95 MMOL/L (ref 95–110)
CHLORIDE SERPL-SCNC: 96 MMOL/L (ref 98–107)
CK SERPL-CCNC: 939 U/L (ref 20–200)
CO2 SERPL-SCNC: 18 MMOL/L (ref 23–29)
CO2 SERPL-SCNC: 18 MMOL/L (ref 23–31)
CREAT SERPL-MCNC: 1.2 MG/DL (ref 0.5–1.4)
CREAT SERPL-MCNC: 1.24 MG/DL (ref 0.73–1.18)
CV ECHO LV RWT: 0.45 CM
DIFFERENTIAL METHOD: ABNORMAL
DIFFERENTIAL METHOD: ABNORMAL
DOP CALC AO PEAK VEL: 0.86 M/S
DOP CALC AO VTI: 11.9 CM
DOP CALC LVOT AREA: 3 CM2
DOP CALC LVOT DIAMETER: 1.95 CM
DOP CALC LVOT PEAK VEL: 0.76 M/S
DOP CALC LVOT STROKE VOLUME: 32.54 CM3
DOP CALC RVOT PEAK VEL: 0.57 M/S
DOP CALC RVOT VTI: 9.8 CM
DOP CALCLVOT PEAK VEL VTI: 10.9 CM
E WAVE DECELERATION TIME: 119.77 MSEC
E/A RATIO: 1.63
E/E' RATIO: 6.84 M/S
ECHO LV POSTERIOR WALL: 0.95 CM (ref 0.6–1.1)
EOSINOPHIL # BLD AUTO: 0 K/UL (ref 0–0.5)
EOSINOPHIL # BLD AUTO: 0.1 K/UL (ref 0–0.5)
EOSINOPHIL # BLD AUTO: 0.17 X10(3)/MCL (ref 0–0.9)
EOSINOPHIL NFR BLD AUTO: 1.6 %
EOSINOPHIL NFR BLD: 0 % (ref 0–8)
EOSINOPHIL NFR BLD: 0.4 % (ref 0–8)
ERYTHROCYTE [DISTWIDTH] IN BLOOD BY AUTOMATED COUNT: 12.4 % (ref 11.5–14.5)
ERYTHROCYTE [DISTWIDTH] IN BLOOD BY AUTOMATED COUNT: 12.6 % (ref 11.5–14.5)
ERYTHROCYTE [DISTWIDTH] IN BLOOD BY AUTOMATED COUNT: 12.7 % (ref 11.5–17)
EST. GFR  (NO RACE VARIABLE): >60 ML/MIN/1.73 M^2
ETHANOL SERPL-MCNC: 29 MG/DL
FRACTIONAL SHORTENING: 13 % (ref 28–44)
GFR SERPLBLD CREATININE-BSD FMLA CKD-EPI: >60 MLS/MIN/1.73/M2
GLOBULIN SER-MCNC: 5.2 GM/DL (ref 2.4–3.5)
GLUCOSE SERPL-MCNC: 154 MG/DL (ref 82–115)
GLUCOSE SERPL-MCNC: 173 MG/DL (ref 70–110)
HCT VFR BLD AUTO: 39.7 % (ref 42–52)
HCT VFR BLD AUTO: 42.6 % (ref 40–54)
HCT VFR BLD AUTO: 43.5 % (ref 40–54)
HGB BLD-MCNC: 13 G/DL (ref 14–18)
HGB BLD-MCNC: 14.1 G/DL (ref 14–18)
HGB BLD-MCNC: 14.4 G/DL (ref 14–18)
IMM GRANULOCYTES # BLD AUTO: 0.04 X10(3)/MCL (ref 0–0.04)
IMM GRANULOCYTES # BLD AUTO: 0.12 K/UL (ref 0–0.04)
IMM GRANULOCYTES # BLD AUTO: 0.19 K/UL (ref 0–0.04)
IMM GRANULOCYTES NFR BLD AUTO: 0.4 %
IMM GRANULOCYTES NFR BLD AUTO: 0.7 % (ref 0–0.5)
IMM GRANULOCYTES NFR BLD AUTO: 1.1 % (ref 0–0.5)
INR PPP: 1
INR PPP: 1 (ref 0.8–1.2)
INTERVENTRICULAR SEPTUM: 0.96 CM (ref 0.6–1.1)
IVC DIAMETER: 1.42 CM
IVRT: 49.48 MSEC
LA MAJOR: 4.28 CM
LA MINOR: 4.19 CM
LA WIDTH: 2.7 CM
LACTATE SERPL-SCNC: 4.6 MMOL/L (ref 0.5–2.2)
LEFT ATRIUM SIZE: 2.39 CM
LEFT ATRIUM VOLUME INDEX: 11.4 ML/M2
LEFT ATRIUM VOLUME: 23.23 CM3
LEFT INTERNAL DIMENSION IN SYSTOLE: 3.71 CM (ref 2.1–4)
LEFT VENTRICLE DIASTOLIC VOLUME INDEX: 39.3 ML/M2
LEFT VENTRICLE DIASTOLIC VOLUME: 80.17 ML
LEFT VENTRICLE MASS INDEX: 64 G/M2
LEFT VENTRICLE SYSTOLIC VOLUME INDEX: 28.6 ML/M2
LEFT VENTRICLE SYSTOLIC VOLUME: 58.38 ML
LEFT VENTRICULAR INTERNAL DIMENSION IN DIASTOLE: 4.24 CM (ref 3.5–6)
LEFT VENTRICULAR MASS: 130.71 G
LIPASE SERPL-CCNC: 13 U/L
LV LATERAL E/E' RATIO: 6.5 M/S
LV SEPTAL E/E' RATIO: 7.22 M/S
LVOT MG: 1.13 MMHG
LVOT MV: 0.49 CM/S
LYMPHOCYTES # BLD AUTO: 1.1 K/UL (ref 1–4.8)
LYMPHOCYTES # BLD AUTO: 1.7 K/UL (ref 1–4.8)
LYMPHOCYTES # BLD AUTO: 3.96 X10(3)/MCL (ref 0.6–4.6)
LYMPHOCYTES NFR BLD AUTO: 36.7 %
LYMPHOCYTES NFR BLD: 6.3 % (ref 18–48)
LYMPHOCYTES NFR BLD: 9.5 % (ref 18–48)
MAGNESIUM SERPL-MCNC: 2.2 MG/DL (ref 1.6–2.6)
MCH RBC QN AUTO: 32.6 PG (ref 27–31)
MCH RBC QN AUTO: 32.7 PG (ref 27–31)
MCH RBC QN AUTO: 33.2 PG (ref 27–31)
MCHC RBC AUTO-ENTMCNC: 32.7 G/DL (ref 33–36)
MCHC RBC AUTO-ENTMCNC: 33.1 G/DL (ref 32–36)
MCHC RBC AUTO-ENTMCNC: 33.1 G/DL (ref 32–36)
MCV RBC AUTO: 101.5 FL (ref 80–94)
MCV RBC AUTO: 99 FL (ref 82–98)
MCV RBC AUTO: 99 FL (ref 82–98)
MONOCYTES # BLD AUTO: 1.29 X10(3)/MCL (ref 0.1–1.3)
MONOCYTES # BLD AUTO: 1.3 K/UL (ref 0.3–1)
MONOCYTES # BLD AUTO: 1.3 K/UL (ref 0.3–1)
MONOCYTES NFR BLD AUTO: 12 %
MONOCYTES NFR BLD: 7 % (ref 4–15)
MONOCYTES NFR BLD: 7.6 % (ref 4–15)
MV PEAK A VEL: 0.4 M/S
MV PEAK E VEL: 0.65 M/S
MV STENOSIS PRESSURE HALF TIME: 34.73 MS
MV VALVE AREA P 1/2 METHOD: 6.33 CM2
NEUTROPHILS # BLD AUTO: 14.5 K/UL (ref 1.8–7.7)
NEUTROPHILS # BLD AUTO: 15.2 K/UL (ref 1.8–7.7)
NEUTROPHILS # BLD AUTO: 5.24 X10(3)/MCL (ref 2.1–9.2)
NEUTROPHILS NFR BLD AUTO: 48.7 %
NEUTROPHILS NFR BLD: 81.5 % (ref 38–73)
NEUTROPHILS NFR BLD: 85.3 % (ref 38–73)
NRBC BLD-RTO: 0 /100 WBC
NRBC BLD-RTO: 0 /100 WBC
PHOSPHATE SERPL-MCNC: 5.3 MG/DL (ref 2.7–4.5)
PISA MRMAX VEL: 3.5 M/S
PISA TR MAX VEL: 2.09 M/S
PLATELET # BLD AUTO: 243 X10(3)/MCL (ref 130–400)
PLATELET # BLD AUTO: 259 K/UL (ref 150–450)
PLATELET # BLD AUTO: 264 K/UL (ref 150–450)
PMV BLD AUTO: 9.4 FL (ref 9.2–12.9)
PMV BLD AUTO: 9.6 FL (ref 7.4–10.4)
PMV BLD AUTO: 9.8 FL (ref 9.2–12.9)
POC CARDIAC TROPONIN I: 0.51 NG/ML (ref 0–0.08)
POCT GLUCOSE: 146 MG/DL (ref 70–110)
POCT GLUCOSE: 167 MG/DL (ref 70–110)
POTASSIUM SERPL-SCNC: 4.8 MMOL/L (ref 3.5–5.1)
POTASSIUM SERPL-SCNC: 5.6 MMOL/L (ref 3.5–5.1)
PROCALCITONIN SERPL IA-MCNC: 1.63 NG/ML
PROT SERPL-MCNC: 8.7 GM/DL (ref 5.8–7.6)
PROT SERPL-MCNC: 8.9 G/DL (ref 6–8.4)
PROTHROMBIN TIME: 10.1 SECONDS (ref 12.5–14.5)
PROTHROMBIN TIME: 10.8 SEC (ref 9–12.5)
PV MEAN GRADIENT: 1 MMHG
RA MAJOR: 2.94 CM
RA PRESSURE ESTIMATED: 3 MMHG
RA WIDTH: 2.3 CM
RBC # BLD AUTO: 3.91 X10(6)/MCL (ref 4.7–6.1)
RBC # BLD AUTO: 4.32 M/UL (ref 4.6–6.2)
RBC # BLD AUTO: 4.41 M/UL (ref 4.6–6.2)
RV MID DIAMA: 3.37 CM
RV TB RVSP: 5 MMHG
SAMPLE: ABNORMAL
SODIUM SERPL-SCNC: 128 MMOL/L (ref 136–145)
SODIUM SERPL-SCNC: 128 MMOL/L (ref 136–145)
STJ: 3.35 CM
TDI LATERAL: 0.1 M/S
TDI SEPTAL: 0.09 M/S
TDI: 0.1 M/S
TR MAX PG: 17 MMHG
TRICUSPID ANNULAR PLANE SYSTOLIC EXCURSION: 1.82 CM
TROPONIN I SERPL DL<=0.01 NG/ML-MCNC: 3.63 NG/ML (ref 0–0.03)
TROPONIN I SERPL DL<=0.01 NG/ML-MCNC: 39.97 NG/ML (ref 0–0.03)
TROPONIN I SERPL DL<=0.01 NG/ML-MCNC: 9.9 NG/ML (ref 0–0.03)
TSH SERPL-ACNC: 2.67 UIU/ML (ref 0.35–4.94)
TV REST PULMONARY ARTERY PRESSURE: 20 MMHG
WBC # BLD AUTO: 17.74 K/UL (ref 3.9–12.7)
WBC # BLD AUTO: 17.79 K/UL (ref 3.9–12.7)
WBC # SPEC AUTO: 10.74 X10(3)/MCL (ref 4.5–11.5)
Z-SCORE OF LEFT VENTRICULAR DIMENSION IN END DIASTOLE: -3.61
Z-SCORE OF LEFT VENTRICULAR DIMENSION IN END SYSTOLE: -0.04

## 2023-10-23 PROCEDURE — 85610 PROTHROMBIN TIME: CPT | Performed by: SPECIALIST

## 2023-10-23 PROCEDURE — 99285 EMERGENCY DEPT VISIT HI MDM: CPT | Mod: 25,27

## 2023-10-23 PROCEDURE — 94640 AIRWAY INHALATION TREATMENT: CPT | Mod: XB

## 2023-10-23 PROCEDURE — 27000221 HC OXYGEN, UP TO 24 HOURS

## 2023-10-23 PROCEDURE — 94640 AIRWAY INHALATION TREATMENT: CPT

## 2023-10-23 PROCEDURE — 84443 ASSAY THYROID STIM HORMONE: CPT | Performed by: SPECIALIST

## 2023-10-23 PROCEDURE — 36415 COLL VENOUS BLD VENIPUNCTURE: CPT | Performed by: NURSE PRACTITIONER

## 2023-10-23 PROCEDURE — 84425 ASSAY OF VITAMIN B-1: CPT | Performed by: NURSE PRACTITIONER

## 2023-10-23 PROCEDURE — 25000242 PHARM REV CODE 250 ALT 637 W/ HCPCS: Performed by: EMERGENCY MEDICINE

## 2023-10-23 PROCEDURE — 85025 COMPLETE CBC W/AUTO DIFF WBC: CPT | Performed by: SPECIALIST

## 2023-10-23 PROCEDURE — 83690 ASSAY OF LIPASE: CPT | Performed by: SPECIALIST

## 2023-10-23 PROCEDURE — 80053 COMPREHEN METABOLIC PANEL: CPT | Performed by: EMERGENCY MEDICINE

## 2023-10-23 PROCEDURE — 63600175 PHARM REV CODE 636 W HCPCS: Performed by: NURSE PRACTITIONER

## 2023-10-23 PROCEDURE — 85730 THROMBOPLASTIN TIME PARTIAL: CPT | Mod: 91 | Performed by: INTERNAL MEDICINE

## 2023-10-23 PROCEDURE — 63600175 PHARM REV CODE 636 W HCPCS

## 2023-10-23 PROCEDURE — 85025 COMPLETE CBC W/AUTO DIFF WBC: CPT | Performed by: EMERGENCY MEDICINE

## 2023-10-23 PROCEDURE — 25000003 PHARM REV CODE 250: Performed by: EMERGENCY MEDICINE

## 2023-10-23 PROCEDURE — 63600175 PHARM REV CODE 636 W HCPCS: Performed by: EMERGENCY MEDICINE

## 2023-10-23 PROCEDURE — 99285 EMERGENCY DEPT VISIT HI MDM: CPT | Mod: 25

## 2023-10-23 PROCEDURE — 85610 PROTHROMBIN TIME: CPT | Performed by: NURSE PRACTITIONER

## 2023-10-23 PROCEDURE — 27100171 HC OXYGEN HIGH FLOW UP TO 24 HOURS

## 2023-10-23 PROCEDURE — 27000190 HC CPAP FULL FACE MASK W/VALVE

## 2023-10-23 PROCEDURE — 82550 ASSAY OF CK (CPK): CPT | Performed by: EMERGENCY MEDICINE

## 2023-10-23 PROCEDURE — 93010 EKG 12-LEAD: ICD-10-PCS | Mod: ,,, | Performed by: INTERNAL MEDICINE

## 2023-10-23 PROCEDURE — 93005 ELECTROCARDIOGRAM TRACING: CPT | Mod: 59

## 2023-10-23 PROCEDURE — 94660 CPAP INITIATION&MGMT: CPT

## 2023-10-23 PROCEDURE — 93010 ELECTROCARDIOGRAM REPORT: CPT | Mod: ,,, | Performed by: INTERNAL MEDICINE

## 2023-10-23 PROCEDURE — 84145 PROCALCITONIN (PCT): CPT | Performed by: NURSE PRACTITIONER

## 2023-10-23 PROCEDURE — 83605 ASSAY OF LACTIC ACID: CPT | Performed by: NURSE PRACTITIONER

## 2023-10-23 PROCEDURE — 84100 ASSAY OF PHOSPHORUS: CPT | Performed by: NURSE PRACTITIONER

## 2023-10-23 PROCEDURE — 83735 ASSAY OF MAGNESIUM: CPT | Performed by: SPECIALIST

## 2023-10-23 PROCEDURE — 82962 GLUCOSE BLOOD TEST: CPT

## 2023-10-23 PROCEDURE — 94761 N-INVAS EAR/PLS OXIMETRY MLT: CPT

## 2023-10-23 PROCEDURE — 83880 ASSAY OF NATRIURETIC PEPTIDE: CPT | Performed by: SPECIALIST

## 2023-10-23 PROCEDURE — 99223 1ST HOSP IP/OBS HIGH 75: CPT | Mod: ,,, | Performed by: INTERNAL MEDICINE

## 2023-10-23 PROCEDURE — 83880 ASSAY OF NATRIURETIC PEPTIDE: CPT | Performed by: EMERGENCY MEDICINE

## 2023-10-23 PROCEDURE — 87040 BLOOD CULTURE FOR BACTERIA: CPT | Performed by: NURSE PRACTITIONER

## 2023-10-23 PROCEDURE — 25000003 PHARM REV CODE 250: Performed by: NURSE PRACTITIONER

## 2023-10-23 PROCEDURE — 36415 COLL VENOUS BLD VENIPUNCTURE: CPT | Performed by: INTERNAL MEDICINE

## 2023-10-23 PROCEDURE — 99223 PR INITIAL HOSPITAL CARE,LEVL III: ICD-10-PCS | Mod: ,,, | Performed by: INTERNAL MEDICINE

## 2023-10-23 PROCEDURE — 82077 ASSAY SPEC XCP UR&BREATH IA: CPT | Performed by: SPECIALIST

## 2023-10-23 PROCEDURE — 80053 COMPREHEN METABOLIC PANEL: CPT | Performed by: SPECIALIST

## 2023-10-23 PROCEDURE — 84484 ASSAY OF TROPONIN QUANT: CPT | Mod: 91 | Performed by: NURSE PRACTITIONER

## 2023-10-23 PROCEDURE — 85730 THROMBOPLASTIN TIME PARTIAL: CPT | Performed by: NURSE PRACTITIONER

## 2023-10-23 PROCEDURE — 93005 ELECTROCARDIOGRAM TRACING: CPT

## 2023-10-23 PROCEDURE — 85025 COMPLETE CBC W/AUTO DIFF WBC: CPT | Mod: 91 | Performed by: NURSE PRACTITIONER

## 2023-10-23 PROCEDURE — 25000242 PHARM REV CODE 250 ALT 637 W/ HCPCS: Performed by: NURSE PRACTITIONER

## 2023-10-23 PROCEDURE — 20000000 HC ICU ROOM

## 2023-10-23 PROCEDURE — 92950 HEART/LUNG RESUSCITATION CPR: CPT

## 2023-10-23 PROCEDURE — 96374 THER/PROPH/DIAG INJ IV PUSH: CPT

## 2023-10-23 PROCEDURE — 84484 ASSAY OF TROPONIN QUANT: CPT | Performed by: EMERGENCY MEDICINE

## 2023-10-23 PROCEDURE — 99900035 HC TECH TIME PER 15 MIN (STAT)

## 2023-10-23 RX ORDER — IPRATROPIUM BROMIDE AND ALBUTEROL SULFATE 2.5; .5 MG/3ML; MG/3ML
3 SOLUTION RESPIRATORY (INHALATION)
Status: COMPLETED | OUTPATIENT
Start: 2023-10-23 | End: 2023-10-23

## 2023-10-23 RX ORDER — FAMOTIDINE 10 MG/ML
20 INJECTION INTRAVENOUS EVERY 12 HOURS
Status: DISCONTINUED | OUTPATIENT
Start: 2023-10-23 | End: 2023-10-23

## 2023-10-23 RX ORDER — CALCIUM GLUCONATE 20 MG/ML
1 INJECTION, SOLUTION INTRAVENOUS
Status: DISCONTINUED | OUTPATIENT
Start: 2023-10-23 | End: 2023-11-03 | Stop reason: HOSPADM

## 2023-10-23 RX ORDER — BUDESONIDE 0.5 MG/2ML
0.5 INHALANT ORAL EVERY 12 HOURS
Status: DISCONTINUED | OUTPATIENT
Start: 2023-10-23 | End: 2023-11-03 | Stop reason: HOSPADM

## 2023-10-23 RX ORDER — SODIUM CHLORIDE 9 MG/ML
1000 INJECTION, SOLUTION INTRAVENOUS
Status: COMPLETED | OUTPATIENT
Start: 2023-10-23 | End: 2023-10-23

## 2023-10-23 RX ORDER — POTASSIUM CHLORIDE 7.45 MG/ML
60 INJECTION INTRAVENOUS
Status: DISCONTINUED | OUTPATIENT
Start: 2023-10-23 | End: 2023-11-03 | Stop reason: HOSPADM

## 2023-10-23 RX ORDER — MUPIROCIN 20 MG/G
OINTMENT TOPICAL 2 TIMES DAILY
Status: COMPLETED | OUTPATIENT
Start: 2023-10-23 | End: 2023-10-27

## 2023-10-23 RX ORDER — NAPROXEN SODIUM 220 MG/1
81 TABLET, FILM COATED ORAL DAILY
Status: DISCONTINUED | OUTPATIENT
Start: 2023-10-24 | End: 2023-11-03 | Stop reason: HOSPADM

## 2023-10-23 RX ORDER — MAGNESIUM SULFATE HEPTAHYDRATE 40 MG/ML
2 INJECTION, SOLUTION INTRAVENOUS
Status: DISPENSED | OUTPATIENT
Start: 2023-10-23 | End: 2023-10-23

## 2023-10-23 RX ORDER — MAGNESIUM SULFATE HEPTAHYDRATE 40 MG/ML
2 INJECTION, SOLUTION INTRAVENOUS
Status: DISCONTINUED | OUTPATIENT
Start: 2023-10-23 | End: 2023-11-03 | Stop reason: HOSPADM

## 2023-10-23 RX ORDER — POTASSIUM CHLORIDE 7.45 MG/ML
80 INJECTION INTRAVENOUS
Status: DISCONTINUED | OUTPATIENT
Start: 2023-10-23 | End: 2023-11-03 | Stop reason: HOSPADM

## 2023-10-23 RX ORDER — HEPARIN SODIUM,PORCINE/D5W 25000/250
0-40 INTRAVENOUS SOLUTION INTRAVENOUS CONTINUOUS
Status: DISCONTINUED | OUTPATIENT
Start: 2023-10-23 | End: 2023-10-26

## 2023-10-23 RX ORDER — FUROSEMIDE 10 MG/ML
40 INJECTION INTRAMUSCULAR; INTRAVENOUS
Status: COMPLETED | OUTPATIENT
Start: 2023-10-23 | End: 2023-10-23

## 2023-10-23 RX ORDER — ACETAMINOPHEN 325 MG/1
650 TABLET ORAL EVERY 4 HOURS PRN
Status: DISCONTINUED | OUTPATIENT
Start: 2023-10-23 | End: 2023-10-24 | Stop reason: SDUPTHER

## 2023-10-23 RX ORDER — CALCIUM GLUCONATE 20 MG/ML
2 INJECTION, SOLUTION INTRAVENOUS
Status: DISCONTINUED | OUTPATIENT
Start: 2023-10-23 | End: 2023-11-03 | Stop reason: HOSPADM

## 2023-10-23 RX ORDER — GABAPENTIN 300 MG/1
300 CAPSULE ORAL 3 TIMES DAILY
Status: DISCONTINUED | OUTPATIENT
Start: 2023-10-24 | End: 2023-11-03 | Stop reason: HOSPADM

## 2023-10-23 RX ORDER — LORAZEPAM 2 MG/ML
1 INJECTION INTRAMUSCULAR EVERY 6 HOURS PRN
Status: DISCONTINUED | OUTPATIENT
Start: 2023-10-23 | End: 2023-10-28

## 2023-10-23 RX ORDER — CALCIUM GLUCONATE 20 MG/ML
3 INJECTION, SOLUTION INTRAVENOUS
Status: DISCONTINUED | OUTPATIENT
Start: 2023-10-23 | End: 2023-11-03 | Stop reason: HOSPADM

## 2023-10-23 RX ORDER — CHLORHEXIDINE GLUCONATE ORAL RINSE 1.2 MG/ML
15 SOLUTION DENTAL 2 TIMES DAILY
Status: DISCONTINUED | OUTPATIENT
Start: 2023-10-23 | End: 2023-10-24

## 2023-10-23 RX ORDER — AMIODARONE HYDROCHLORIDE 150 MG/3ML
150 INJECTION, SOLUTION INTRAVENOUS
Status: COMPLETED | OUTPATIENT
Start: 2023-10-23 | End: 2023-10-23

## 2023-10-23 RX ORDER — FOLIC ACID 1 MG/1
1 TABLET ORAL DAILY
Status: DISCONTINUED | OUTPATIENT
Start: 2023-10-23 | End: 2023-11-03 | Stop reason: HOSPADM

## 2023-10-23 RX ORDER — CHLORDIAZEPOXIDE HYDROCHLORIDE 5 MG/1
5 CAPSULE, GELATIN COATED ORAL EVERY 8 HOURS
Status: DISCONTINUED | OUTPATIENT
Start: 2023-10-23 | End: 2023-10-27

## 2023-10-23 RX ORDER — THIAMINE HCL 100 MG
100 TABLET ORAL DAILY
Status: DISCONTINUED | OUTPATIENT
Start: 2023-10-23 | End: 2023-11-03 | Stop reason: HOSPADM

## 2023-10-23 RX ORDER — CHLORDIAZEPOXIDE HYDROCHLORIDE 5 MG/1
10 CAPSULE, GELATIN COATED ORAL EVERY 8 HOURS
Status: DISCONTINUED | OUTPATIENT
Start: 2023-10-23 | End: 2023-10-23

## 2023-10-23 RX ORDER — FUROSEMIDE 10 MG/ML
40 INJECTION INTRAMUSCULAR; INTRAVENOUS ONCE
Status: COMPLETED | OUTPATIENT
Start: 2023-10-23 | End: 2023-10-23

## 2023-10-23 RX ORDER — FUROSEMIDE 40 MG/1
40 TABLET ORAL DAILY
COMMUNITY
Start: 2023-10-09

## 2023-10-23 RX ORDER — POTASSIUM CHLORIDE 7.45 MG/ML
40 INJECTION INTRAVENOUS
Status: DISCONTINUED | OUTPATIENT
Start: 2023-10-23 | End: 2023-11-03 | Stop reason: HOSPADM

## 2023-10-23 RX ORDER — SODIUM CHLORIDE 0.9 % (FLUSH) 0.9 %
10 SYRINGE (ML) INJECTION
Status: DISCONTINUED | OUTPATIENT
Start: 2023-10-23 | End: 2023-11-03 | Stop reason: HOSPADM

## 2023-10-23 RX ORDER — PANTOPRAZOLE SODIUM 40 MG/1
40 TABLET, DELAYED RELEASE ORAL 2 TIMES DAILY
Status: DISCONTINUED | OUTPATIENT
Start: 2023-10-24 | End: 2023-11-03 | Stop reason: HOSPADM

## 2023-10-23 RX ORDER — METHYLPREDNISOLONE SOD SUCC 125 MG
125 VIAL (EA) INJECTION ONCE
Status: COMPLETED | OUTPATIENT
Start: 2023-10-23 | End: 2023-10-23

## 2023-10-23 RX ORDER — IPRATROPIUM BROMIDE AND ALBUTEROL SULFATE 2.5; .5 MG/3ML; MG/3ML
3 SOLUTION RESPIRATORY (INHALATION) EVERY 6 HOURS PRN
Status: DISCONTINUED | OUTPATIENT
Start: 2023-10-23 | End: 2023-11-03 | Stop reason: HOSPADM

## 2023-10-23 RX ORDER — MAGNESIUM SULFATE HEPTAHYDRATE 40 MG/ML
4 INJECTION, SOLUTION INTRAVENOUS
Status: DISCONTINUED | OUTPATIENT
Start: 2023-10-23 | End: 2023-11-03 | Stop reason: HOSPADM

## 2023-10-23 RX ORDER — ASPIRIN 325 MG
325 TABLET, DELAYED RELEASE (ENTERIC COATED) ORAL ONCE
Status: COMPLETED | OUTPATIENT
Start: 2023-10-23 | End: 2023-10-23

## 2023-10-23 RX ORDER — ARFORMOTEROL TARTRATE 15 UG/2ML
15 SOLUTION RESPIRATORY (INHALATION) 2 TIMES DAILY
Status: DISCONTINUED | OUTPATIENT
Start: 2023-10-23 | End: 2023-11-03 | Stop reason: HOSPADM

## 2023-10-23 RX ORDER — GLUCAGON 1 MG
1 KIT INJECTION
Status: DISCONTINUED | OUTPATIENT
Start: 2023-10-23 | End: 2023-10-26

## 2023-10-23 RX ORDER — INSULIN ASPART 100 [IU]/ML
0-5 INJECTION, SOLUTION INTRAVENOUS; SUBCUTANEOUS EVERY 6 HOURS PRN
Status: DISCONTINUED | OUTPATIENT
Start: 2023-10-23 | End: 2023-10-26

## 2023-10-23 RX ADMIN — Medication 100 MG: at 02:10

## 2023-10-23 RX ADMIN — ASPIRIN 325 MG: 325 TABLET, COATED ORAL at 12:10

## 2023-10-23 RX ADMIN — IPRATROPIUM BROMIDE AND ALBUTEROL SULFATE 3 ML: 2.5; .5 SOLUTION RESPIRATORY (INHALATION) at 02:10

## 2023-10-23 RX ADMIN — IPRATROPIUM BROMIDE AND ALBUTEROL SULFATE 3 ML: 2.5; .5 SOLUTION RESPIRATORY (INHALATION) at 07:10

## 2023-10-23 RX ADMIN — MAGNESIUM SULFATE HEPTAHYDRATE 2 G: 40 INJECTION, SOLUTION INTRAVENOUS at 05:10

## 2023-10-23 RX ADMIN — BUDESONIDE INHALATION 0.5 MG: 0.5 SUSPENSION RESPIRATORY (INHALATION) at 07:10

## 2023-10-23 RX ADMIN — MUPIROCIN: 20 OINTMENT TOPICAL at 12:10

## 2023-10-23 RX ADMIN — MUPIROCIN: 20 OINTMENT TOPICAL at 09:10

## 2023-10-23 RX ADMIN — METHYLPREDNISOLONE SODIUM SUCCINATE 125 MG: 125 INJECTION, POWDER, FOR SOLUTION INTRAMUSCULAR; INTRAVENOUS at 08:10

## 2023-10-23 RX ADMIN — SODIUM CHLORIDE 1000 ML: 9 INJECTION, SOLUTION INTRAVENOUS at 07:10

## 2023-10-23 RX ADMIN — AMIODARONE HYDROCHLORIDE 0.5 MG/MIN: 1.8 INJECTION, SOLUTION INTRAVENOUS at 12:10

## 2023-10-23 RX ADMIN — AMIODARONE HYDROCHLORIDE 150 MG: 50 INJECTION, SOLUTION INTRAVENOUS at 06:10

## 2023-10-23 RX ADMIN — IPRATROPIUM BROMIDE AND ALBUTEROL SULFATE 3 ML: 2.5; .5 SOLUTION RESPIRATORY (INHALATION) at 09:10

## 2023-10-23 RX ADMIN — HEPARIN SODIUM 14 UNITS/KG/HR: 10000 INJECTION, SOLUTION INTRAVENOUS at 07:10

## 2023-10-23 RX ADMIN — CHLORHEXIDINE GLUCONATE 0.12% ORAL RINSE 15 ML: 1.2 LIQUID ORAL at 12:10

## 2023-10-23 RX ADMIN — CHLORDIAZEPOXIDE HYDROCHLORIDE 5 MG: 5 CAPSULE ORAL at 09:10

## 2023-10-23 RX ADMIN — MAGNESIUM SULFATE HEPTAHYDRATE 2 G: 40 INJECTION, SOLUTION INTRAVENOUS at 12:10

## 2023-10-23 RX ADMIN — FAMOTIDINE 20 MG: 10 INJECTION, SOLUTION INTRAVENOUS at 12:10

## 2023-10-23 RX ADMIN — FUROSEMIDE 40 MG: 10 INJECTION, SOLUTION INTRAMUSCULAR; INTRAVENOUS at 08:10

## 2023-10-23 RX ADMIN — HEPARIN SODIUM 12 UNITS/KG/HR: 10000 INJECTION, SOLUTION INTRAVENOUS at 12:10

## 2023-10-23 RX ADMIN — AMIODARONE HYDROCHLORIDE 1 MG/MIN: 1.8 INJECTION, SOLUTION INTRAVENOUS at 06:10

## 2023-10-23 RX ADMIN — CEFTRIAXONE 1 G: 1 INJECTION, POWDER, FOR SOLUTION INTRAMUSCULAR; INTRAVENOUS at 08:10

## 2023-10-23 RX ADMIN — ARFORMOTEROL TARTRATE 15 MCG: 15 SOLUTION RESPIRATORY (INHALATION) at 07:10

## 2023-10-23 RX ADMIN — FUROSEMIDE 40 MG: 10 INJECTION, SOLUTION INTRAMUSCULAR; INTRAVENOUS at 12:10

## 2023-10-23 RX ADMIN — CHLORHEXIDINE GLUCONATE 0.12% ORAL RINSE 15 ML: 1.2 LIQUID ORAL at 08:10

## 2023-10-23 RX ADMIN — CHLORDIAZEPOXIDE HYDROCHLORIDE 5 MG: 5 CAPSULE ORAL at 01:10

## 2023-10-23 RX ADMIN — FOLIC ACID 1 MG: 1 TABLET ORAL at 12:10

## 2023-10-23 NOTE — H&P
[facility] - Emergency Dept.  Critical Care Medicine  History & Physical    Patient Name: Hemal Tolbert Jr.  MRN: [id number]  Admission Date: 10/23/2023  Hospital Length of Stay: 0 days  Code Status: Full Code  Attending Physician: Elva Amador MD   Primary Care Provider: No, Primary Doctor   Principal Problem: VT (ventricular tachycardia)    Subjective:     HPI:  63-year-old male with a known past medical history of combined heart failure with the EF of 40% and grade 3 diastolic dysfunction, alcohol abuse (reports 3-4 beers/day), tobacco abuse with alcoholic cirrhosis, hypertension, and COPD that presented to the  ER 10/23 as an ER to ER transfer for vtach that required defibrillation.  Prior to transfer, patient was initiated on amiodarone infusion.  Upon arrival to the ER in [place], Cardiology was consulted.  Is documented that patient again went into V-tach and required defibrillation in the ER with 1 shock with conversion to normal sinus rhythm.  ER nurse reports patient was given multiple nebs back to back for wheezing and was escalated to a Ventimask if he is a mouth breather. Patient's patient will be continued on amnio fusion protocol.  Review of EKG strips in the concerning for possible Torsades. Workup significant for leukocytosis of 17,000, sodium 128, creatinine 1.2, , , and troponin trends up with latest 3.6 --> 9.9.  Patient was given an aspirin, started on heparin infusion, given 4 mg of Mag IV, and 40 mg of Lasix IV.  Echo ordered.  Patient admitted to the ICU per the critical care team with Cardiology following.        Hospital/ICU Course:  No notes on file     Past Medical History:   Diagnosis Date    Alcoholic cirrhosis     CHF (congestive heart failure)     LV EF 40%    COPD (chronic obstructive pulmonary disease)     Hypertension     Peripheral artery disease     with stents    Sleep apnea        Past Surgical History:   Procedure Laterality Date    COLONOSCOPY  N/A 6/10/2023    Procedure: COLONOSCOPY;  Surgeon: Johan Flower MD;  Location: [facility] OR;  Service: Gastroenterology;  Laterality: N/A;    ESOPHAGOGASTRODUODENOSCOPY N/A 6/9/2023    Procedure: EGD;  Surgeon: Tima Teixeira MD;  Location: [facility] ENDOSCOPY;  Service: Gastroenterology;  Laterality: N/A;       Review of patient's allergies indicates:  No Known Allergies    Family History    None       Tobacco Use    Smoking status: Every Day     Current packs/day: 1.00     Types: Cigarettes    Smokeless tobacco: Never   Substance and Sexual Activity    Alcohol use: Yes     Alcohol/week: 24.0 standard drinks of alcohol     Types: 24 Cans of beer per week     Comment: Refused to quit in June 23    Drug use: Yes     Types: Marijuana    Sexual activity: Not on file         Review of Systems   Unable to perform ROS: Acuity of condition     Objective:     Vital Signs (Most Recent):  Temp: 96.3 °F (35.7 °C) (warm blankets applied) (10/23/23 1027)  Pulse: 106 (10/23/23 1215)  Resp: (!) 23 (10/23/23 1215)  BP: 117/75 (10/23/23 1215)  SpO2: 96 % (10/23/23 1215) Vital Signs (24h Range):  Temp:  [96.3 °F (35.7 °C)-97.9 °F (36.6 °C)] 96.3 °F (35.7 °C)  Pulse:  [] 106  Resp:  [17-32] 23  SpO2:  [86 %-100 %] 96 %  BP: ()/() 117/75     Weight: 89.8 kg (198 lb)  Body mass index is 30.11 kg/m².    No intake or output data in the 24 hours ending 10/23/23 1327     Physical Exam  Vitals reviewed.   Constitutional:       General: He is awake. He is not in acute distress.     Appearance: He is well-developed. He is obese. He is ill-appearing.      Comments: Disheveled male semi-upright in bed on venti mask in [employer]   Cardiovascular:      Rate and Rhythm: Normal rate.      Pulses:           Radial pulses are 2+ on the right side and 2+ on the left side.   Pulmonary:      Effort: Pulmonary effort is normal. No tachypnea, bradypnea, accessory muscle usage or respiratory distress.      Breath sounds: Decreased  breath sounds and wheezing present.      Comments: On venti mask  Abdominal:      General: There is no distension.      Palpations: Abdomen is soft.   Musculoskeletal:      Right lower leg: Edema present.      Left lower leg: Edema present.   Skin:     General: Skin is warm and dry.   Neurological:      General: No focal deficit present.      Mental Status: He is alert.   Psychiatric:         Behavior: Behavior is cooperative.          Vents:  Oxygen Concentration (%): 50 (10/23/23 1022)    Lines/Drains/Airways       Peripheral Intravenous Line  Duration                  Peripheral IV - Single Lumen 20 G Left;Posterior Hand -- days         Peripheral IV - Single Lumen 20 G Posterior;Right Hand -- days         Peripheral IV - Single Lumen 10/23/23 1150 20 G Anterior;Left Forearm <1 day         Peripheral IV - Single Lumen 10/23/23 1225 20 G Anterior;Right Forearm <1 day                    Significant Labs:    CBC/Anemia Profile:  Recent Labs   Lab 10/23/23  0600 10/23/23  0950 10/23/23  1150   WBC 10.74 17.74* 17.79*   HGB 13.0* 14.1 14.4   HCT 39.7* 42.6 43.5    264 259   .5* 99* 99*   RDW 12.7 12.4 12.6        Chemistries:  Recent Labs   Lab 10/23/23  0600 10/23/23  0950   * 128*   K 5.6* 4.8   CL  --  95   CO2 18* 18*   BUN 11.0 12   CREATININE 1.24* 1.2   CALCIUM 9.3 8.6*   ALBUMIN 3.5 3.6   PROT  --  8.9*   BILITOT 0.4 0.4   ALKPHOS 60 65   ALT 17 21   AST 37* 62*   GLUCOSE 154*  --    MG 2.20  --        All pertinent labs within the past 24 hours have been reviewed.    Significant Imaging:   I have reviewed all pertinent imaging results/findings within the past 24 hours.    Assessment/Plan:     Psychiatric  Alcohol abuse  - ETOH level 29 at OSH  - reports drinks 3-4 beers/day with last drink 10/22  - ativan for CIWA >8 PRN  - folic acid and thiamine daily   - librium 5mg q8h  - cessation education when appropriate     Pulmonary  COPD (chronic obstructive pulmonary disease)  - does not appear  to be in acute exacerbation and findings more consistent with volume  - PRN duoneb, scheduled brovana and pulmicort nebs  - monitor for bronchospasm and change in status    Cardiac/Vascular  * VT (ventricular tachycardia)  - shock x1 at OSH and again x1 in the ER here   - on amio gtt per protocol  - telemetry monitoring   - giving 4 mg mag IV  - check phos  - replete lytes aggressively as needed  - echo pending   - cards following     Primary hypertension  - resume home meds as appropriate  - trend hemodynamics  - cards following     Acute on chronic combined systolic and diastolic heart failure  - previous echo with EF 40% and grade II diastolic function, repeat echo pending  - appears volume up on exam with elevated BNP, low Na, and hypoxia  - 40 mg lasix IV x1, monitor response     Elevated troponin  - trends climbing, continue to trend  - CP at time of my exam  - ASA given and heparin gtt started upon my exam   - cards following     GI  Alcoholic cirrhosis  - trend enzymes  - supportive care   - alcohol cessation education when appropriate     Other  Tobacco abuse  - cessation education when appropriate  - nicotine patch if needed     Prophylaxis Measures:  GI ppx: Famotidine  VTE ppx: Heparin  Glucose control: Monitor blood glucose    Code Status: Full Code    Critical Care Time: 41 minutes  Critical secondary to Patient has a condition that poses threat to life and bodily function: vtach on amio    Critical care was time spent personally by me on the following activities: development of treatment plan with patient or surrogate and bedside caregivers, discussions with consultants, evaluation of patient's response to treatment, examination of patient, ordering and performing treatments and interventions, ordering and review of laboratory studies, ordering and review of radiographic studies, pulse oximetry, re-evaluation of patient's condition. This critical care time did not overlap with that of any other  provider or involve time for any procedures.     Ryan Gold NP  Critical Care Medicine  [facility] - Emergency Dept.

## 2023-10-23 NOTE — ASSESSMENT & PLAN NOTE
- ETOH level 29 at OSH  - reports drinks 3-4 beers/day with last drink 10/22  - ativan for CIWA >8 PRN  - folic acid and thiamine daily   - librium 5mg q8h  - cessation education when appropriate

## 2023-10-23 NOTE — H&P (VIEW-ONLY)
"[facility] - Emergency Dept.  Cardiology  Consult Note    Patient Name: Hemal Tolbert Jr.  MRN: [id number]   Admission Date: 10/23/2023  Hospital Length of Stay: 0 days  Code Status: Prior   Attending Provider: Armin Foster MD   Consulting Provider: Gaurav Salomon MD  Primary Care Physician: No, Primary Doctor  Principal Problem:<principal problem not specified>    Patient information was obtained from ER records.     Inpatient consult to Cardiology  Consult performed by: Gaurav Salomon MD  Consult ordered by: Armin Foster MD        Subjective:     Chief Complaint:  SOB and chest pain     HPI:   Patient presents to the emergency room with chest pain he states started about 2 hours ago, substernal; ECG appeared normal that was taken upon patient's arrival; as I was questioning patient he said "0 my God" and then went unconscious and rhythm indicated ventricular fibrillation; chest compressions were immediately started and pads placed and patient was defibrillated x1 into sinus rhythm; patient regained consciousness about 30 seconds after being defibrillated     The history is provided by the patient.      Review of patient's allergies indicates:  No Known Allergies       Past Medical History:   Diagnosis Date    Alcoholic cirrhosis      CHF (congestive heart failure)       LV EF 40%    COPD (chronic obstructive pulmonary disease)      Hypertension      Peripheral artery disease       with stents    Sleep apnea    Patient seen and examined. Plan trend troponin, treat VT and echo today.  Prior history of anemia and cirrhosis, was planned to have capsule endoscopy and cards w/u for ischemia. Continued etoh issues.  May need follow up [facility] this week.      Past Medical History:   Diagnosis Date    Alcoholic cirrhosis     CHF (congestive heart failure)     LV EF 40%    COPD (chronic obstructive pulmonary disease)     Hypertension     Peripheral artery disease     with stents    Sleep apnea  "       Past Surgical History:   Procedure Laterality Date    COLONOSCOPY N/A 6/10/2023    Procedure: COLONOSCOPY;  Surgeon: Johan Flower MD;  Location: [facility] OR;  Service: Gastroenterology;  Laterality: N/A;    ESOPHAGOGASTRODUODENOSCOPY N/A 6/9/2023    Procedure: EGD;  Surgeon: Tima Teixeira MD;  Location: [facility] ENDOSCOPY;  Service: Gastroenterology;  Laterality: N/A;       Review of patient's allergies indicates:  No Known Allergies    Current Facility-Administered Medications on File Prior to Encounter   Medication    [COMPLETED] 0.9%  NaCl infusion    [COMPLETED] albuterol-ipratropium 2.5 mg-0.5 mg/3 mL nebulizer solution 3 mL    [COMPLETED] amiodarone 360 mg/200 mL (1.8 mg/mL) infusion    [COMPLETED] amiodarone injection 150 mg     Current Outpatient Medications on File Prior to Encounter   Medication Sig    folic acid (FOLVITE) 1 MG tablet Take 1 tablet (1 mg total) by mouth once daily.    furosemide (LASIX) 40 MG tablet Take 40 mg by mouth once daily.    gabapentin (NEURONTIN) 300 MG capsule Take 300 mg by mouth 3 (three) times daily.    pantoprazole (PROTONIX) 40 MG tablet Take 1 tablet (40 mg total) by mouth 2 (two) times daily.    spironolactone (ALDACTONE) 50 MG tablet Take 50 mg by mouth 2 (two) times daily.    STIOLTO RESPIMAT 2.5-2.5 mcg/actuation Mist Inhale 2 puffs into the lungs once daily.    albuterol (PROVENTIL/VENTOLIN HFA) 90 mcg/actuation inhaler Inhale 1-2 puffs into the lungs every 4 (four) hours as needed.    lisinopriL (PRINIVIL,ZESTRIL) 2.5 MG tablet Take 1 tablet (2.5 mg total) by mouth once daily. (Patient not taking: Reported on 10/23/2023)    metoprolol succinate (TOPROL-XL) 25 MG 24 hr tablet Take 1 tablet (25 mg total) by mouth once daily. (Patient not taking: Reported on 10/23/2023)    multivitamin Tab Take 1 tablet by mouth once daily. (Patient not taking: Reported on 6/14/2023)    thiamine 100 MG tablet Take 1 tablet (100 mg total) by mouth once  daily. (Patient not taking: Reported on 6/14/2023)    [DISCONTINUED] ADULT ASPIRIN REGIMEN 81 mg EC tablet Take 81 mg by mouth once daily.    [DISCONTINUED] clopidogreL (PLAVIX) 75 mg tablet Take 75 mg by mouth once daily.    [DISCONTINUED] furosemide (LASIX) 20 MG tablet Take 1 tablet (20 mg total) by mouth once daily.    [DISCONTINUED] traMADoL (ULTRAM) 50 mg tablet Take 1 tablet (50 mg total) by mouth every 8 (eight) hours as needed for Pain.     Family History    None       Tobacco Use    Smoking status: Every Day     Current packs/day: 1.00     Types: Cigarettes    Smokeless tobacco: Never   Substance and Sexual Activity    Alcohol use: Yes     Alcohol/week: 24.0 standard drinks of alcohol     Types: 24 Cans of beer per week     Comment: Refused to quit in June 23    Drug use: Yes     Types: Marijuana    Sexual activity: Not on file     Review of Systems   Constitutional: Positive for malaise/fatigue. Negative for chills, diaphoresis, night sweats, weight gain and weight loss.   HENT:  Negative for congestion, hoarse voice, sore throat and stridor.    Eyes:  Negative for double vision and pain.   Cardiovascular:  Positive for chest pain and dyspnea on exertion. Negative for claudication, cyanosis, irregular heartbeat, leg swelling, near-syncope, orthopnea, palpitations, paroxysmal nocturnal dyspnea and syncope.   Respiratory:  Negative for cough, hemoptysis, shortness of breath, sleep disturbances due to breathing, snoring, sputum production and wheezing.    Endocrine: Negative for cold intolerance, heat intolerance and polydipsia.   Hematologic/Lymphatic: Negative for bleeding problem. Does not bruise/bleed easily.   Skin:  Negative for color change, dry skin and rash.   Musculoskeletal:  Negative for joint swelling and muscle cramps.   Gastrointestinal:  Negative for bloating, abdominal pain, constipation, diarrhea, dysphagia, melena, nausea and vomiting.   Genitourinary:  Negative for flank pain and  urgency.   Neurological:  Negative for dizziness, focal weakness, headaches, light-headedness, loss of balance, seizures and weakness.   Psychiatric/Behavioral:  Negative for altered mental status and memory loss. The patient is not nervous/anxious.      Objective:     Vital Signs (Most Recent):  Temp: 96.3 °F (35.7 °C) (warm blankets applied) (10/23/23 1027)  Pulse: 109 (10/23/23 1031)  Resp: (!) 26 (10/23/23 1031)  BP: 118/76 (10/23/23 1031)  SpO2: 96 % (10/23/23 1031) Vital Signs (24h Range):  Temp:  [96.3 °F (35.7 °C)-97.9 °F (36.6 °C)] 96.3 °F (35.7 °C)  Pulse:  [] 109  Resp:  [18-32] 26  SpO2:  [86 %-100 %] 96 %  BP: ()/() 118/76     Weight: 90 kg (198 lb 6.6 oz)  Body mass index is 30.17 kg/m².    SpO2: 96 %       No intake or output data in the 24 hours ending 10/23/23 1113    Lines/Drains/Airways       Peripheral Intravenous Line  Duration                  Peripheral IV - Single Lumen 20 G Left;Posterior Hand -- days         Peripheral IV - Single Lumen 20 G Posterior;Right Hand -- days                     Physical Exam  Eyes:      Pupils: Pupils are equal, round, and reactive to light.   Neck:      Trachea: No tracheal deviation.   Cardiovascular:      Rate and Rhythm: Normal rate and regular rhythm.      Pulses: Intact distal pulses.           Carotid pulses are 2+ on the right side and 2+ on the left side.       Radial pulses are 2+ on the right side and 2+ on the left side.        Femoral pulses are 2+ on the right side and 2+ on the left side.       Popliteal pulses are 2+ on the right side and 2+ on the left side.        Dorsalis pedis pulses are 2+ on the right side and 2+ on the left side.        Posterior tibial pulses are 2+ on the right side and 2+ on the left side.      Heart sounds: Normal heart sounds. No murmur heard.     No friction rub. No gallop.   Pulmonary:      Effort: Pulmonary effort is normal. No respiratory distress.      Breath sounds: Normal breath sounds. No  stridor. No wheezing or rales.   Chest:      Chest wall: No tenderness.   Abdominal:      General: There is no distension.      Tenderness: There is no abdominal tenderness. There is no rebound.   Musculoskeletal:         General: No tenderness.      Cervical back: Normal range of motion.   Skin:     General: Skin is warm and dry.   Neurological:      Mental Status: He is alert and oriented to person, place, and time.          Significant Labs: CMP   Recent Labs   Lab 10/23/23  0600 10/23/23  0950   * 128*   K 5.6* 4.8   CL  --  95   CO2 18* 18*   GLU  --  173*   BUN 11.0 12   CREATININE 1.24* 1.2   CALCIUM 9.3 8.6*   PROT  --  8.9*   ALBUMIN 3.5 3.6   BILITOT 0.4 0.4   ALKPHOS 60 65   AST 37* 62*   ALT 17 21   ANIONGAP  --  15   , CBC   Recent Labs   Lab 10/23/23  0600 10/23/23  0950   WBC 10.74 17.74*   HGB 13.0* 14.1   HCT 39.7* 42.6    264   , and Troponin   Recent Labs   Lab 10/23/23  0950   TROPONINI 3.626*       Significant Imaging: Echocardiogram: Transthoracic echo (TTE) complete ([facility] Only):   Results for orders placed or performed during the hospital encounter of 06/07/23   Echo   Result Value Ref Range    BSA 1.96 m2    TDI SEPTAL 0.11 m/s    LV LATERAL E/E' RATIO 14.22 m/s    LV SEPTAL E/E' RATIO 11.64 m/s    Right Atrial Pressure (from IVC) 8 mmHg    EF 40 %    Left Ventricular Outflow Tract Mean Velocity 0.56 cm/s    Left Ventricular Outflow Tract Mean Gradient 2.00 mmHg    TDI LATERAL 0.09 m/s    LVIDd 5.53 3.5 - 6.0 cm    IVS 0.83 0.6 - 1.1 cm    Posterior Wall 0.92 0.6 - 1.1 cm    LVIDs 4.43 (A) 2.1 - 4.0 cm    FS 20 28 - 44 %    LV mass 180.91 g    LA size 4.20 cm    RVDD 3.20 cm    TAPSE 1.85 cm    Left Ventricle Relative Wall Thickness 0.33 cm    AV mean gradient 2 mmHg    AV valve area 3.08 cm2    AV Velocity Ratio 0.90     AV index (prosthetic) 0.89     MV mean gradient 3 mmHg    MV valve area by continuity eq 2.56 cm2    E/A ratio 1.86     Mean e' 0.10 m/s    E wave deceleration  time 114.00 msec    LVOT diameter 2.10 cm    LVOT area 3.5 cm2    LVOT peak rex 0.87 m/s    LVOT peak VTI 15.90 cm    Ao peak rex 0.97 m/s    Ao VTI 17.9 cm    LVOT stroke volume 55.04 cm3    AV peak gradient 4 mmHg    MV peak gradient 5 mmHg    TV resting pulmonary artery pressure 35 mmHg    E/E' ratio 12.80 m/s    MV Peak E Rex 1.28 m/s    TR Max Rex 2.62 m/s    MV VTI 21.5 cm    MV Peak A Rex 0.69 m/s    LV Systolic Volume 89.10 mL    LV Systolic Volume Index 45.7 mL/m2    LV Diastolic Volume 149.00 mL    LV Diastolic Volume Index 76.41 mL/m2    LV Mass Index 93 g/m2    RA Major Axis 4.60 cm    Triscuspid Valve Regurgitation Peak Gradient 27 mmHg    LA Volume Index (Mod) 40.5 mL/m2    LA volume (mod) 78.90 cm3    RA Width 4.30 cm    Grady's Biplane MOD Ejection Fraction 4 %    Narrative    · The left ventricle is mildly enlarged with mildly decreased systolic   function.  · Overall there is mild to moderate global hypokinesis; Prounounced basal   inferior and inferioseptal hypokinesis.  · The estimated ejection fraction is 40%.  · Grade III left ventricular diastolic dysfunction.  · Mild right ventricular enlargement with low normal right ventricular   systolic function.  · Mild-to-moderate mitral regurgitation.  · Mild tricuspid regurgitation.  · Mild pulmonic regurgitation.  · The estimated PA systolic pressure is 35 mmHg.  · Mild left atrial enlargement.  · Mild right atrial enlargement.        Assessment and Plan:     VT (ventricular tachycardia)  Treated with shock therapy  Continue amiodarone  Ischemic evaluation  ETOH evaluation and cardiac echo pending    Elevated troponin  Will trend  IV heparin  Cardiac echo pending  EKG not showing ischemia    CHF (congestive heart failure)  Patient is identified as having Combined Systolic and Diastolic heart failure that is Acute on chronic. CHF is currently uncontrolled due to Continued edema of extremities. Latest ECHO performed and demonstrates- Results for  orders placed during the hospital encounter of 06/07/23    Echo    Interpretation Summary  · The left ventricle is mildly enlarged with mildly decreased systolic function.  · Overall there is mild to moderate global hypokinesis; Prounounced basal inferior and inferioseptal hypokinesis.  · The estimated ejection fraction is 40%.  · Grade III left ventricular diastolic dysfunction.  · Mild right ventricular enlargement with low normal right ventricular systolic function.  · Mild-to-moderate mitral regurgitation.  · Mild tricuspid regurgitation.  · Mild pulmonic regurgitation.  · The estimated PA systolic pressure is 35 mmHg.  · Mild left atrial enlargement.  · Mild right atrial enlargement.  . Continue Beta Blocker, ACE/ARB and Furosemide and monitor clinical status closely. Monitor on telemetry. Patient is off CHF pathway.  Monitor strict Is&Os and daily weights.  Place on fluid restriction of 2 L. Cardiology has been consulted. Continue to stress to patient importance of self efficacy and  on diet for CHF. Last BNP reviewed- and noted below   Recent Labs   Lab 10/23/23  0950   *   .    Alcoholic cirrhosis  Per intensive team        VTE Risk Mitigation (From admission, onward)    None          Thank you for your consult. I will follow-up with patient. Please contact us if you have any additional questions.    Gaurav Salomon MD  Cardiology   [facility] - Emergency Dept.

## 2023-10-23 NOTE — PLAN OF CARE
Pt is AAOx4, VSS on 15 L venti-mask 50% FiO2. Inspiratory and expiratory wheezes noted. Sinus tachycardia on monitor. Pt's abdomen is distended. Voiding per urinal with 0 mL post void bladder scan. NPO. Pt turns self independently in bed. POC reviewed with pt. Bed is locked in lowest position, side rails up x2, bed alarm set, call light/personal items within reach, pt instructed to call staff for assistance with mobility.   Temp:  [96.3 °F (35.7 °C)-97.6 °F (36.4 °C)]   Pulse:  []   Resp:  [17-32]   BP: ()/(50-89)   SpO2:  [86 %-100 %]      Problem: Adult Inpatient Plan of Care  Goal: Plan of Care Review  Outcome: Ongoing, Progressing  Goal: Patient-Specific Goal (Individualized)  Outcome: Ongoing, Progressing  Goal: Absence of Hospital-Acquired Illness or Injury  Outcome: Ongoing, Progressing  Goal: Optimal Comfort and Wellbeing  Outcome: Ongoing, Progressing  Goal: Readiness for Transition of Care  Outcome: Ongoing, Progressing

## 2023-10-23 NOTE — ASSESSMENT & PLAN NOTE
- trends climbing, continue to trend  - CP at time of my exam  - ASA given and heparin gtt started upon my exam   - cards following

## 2023-10-23 NOTE — ASSESSMENT & PLAN NOTE
- shock x1 at OSH and again x1 in the ER here   - on amio gtt per protocol  - telemetry monitoring   - giving 4 mg mag IV  - check phos  - replete lytes aggressively as needed  - echo pending   - cards following

## 2023-10-23 NOTE — ED PROVIDER NOTES
"Encounter Date: 10/23/2023       History     Chief Complaint   Patient presents with    Chest Pain     Crushing chest pain. Diaphoretic. Chest pain for a few hours.      Patient presents to the emergency room with chest pain he states started about 2 hours ago, substernal; ECG appeared normal that was taken upon patient's arrival; as I was questioning patient he said "0 my God" and then went unconscious and rhythm indicated ventricular fibrillation; chest compressions were immediately started and pads placed and patient was defibrillated x1 into sinus rhythm; patient regained consciousness about 30 seconds after being defibrillated    The history is provided by the patient.     Review of patient's allergies indicates:  No Known Allergies  Past Medical History:   Diagnosis Date    Alcoholic cirrhosis     CHF (congestive heart failure)     LV EF 40%    COPD (chronic obstructive pulmonary disease)     Hypertension     Peripheral artery disease     with stents    Sleep apnea      Past Surgical History:   Procedure Laterality Date    COLONOSCOPY N/A 6/10/2023    Procedure: COLONOSCOPY;  Surgeon: Johan Flower MD;  Location: [facility] OR;  Service: Gastroenterology;  Laterality: N/A;    ESOPHAGOGASTRODUODENOSCOPY N/A 6/9/2023    Procedure: EGD;  Surgeon: Tima Teixeira MD;  Location: [facility] ENDOSCOPY;  Service: Gastroenterology;  Laterality: N/A;     History reviewed. No pertinent family history.  Social History     Tobacco Use    Smoking status: Every Day     Current packs/day: 1.00     Types: Cigarettes    Smokeless tobacco: Never   Substance Use Topics    Alcohol use: Yes     Alcohol/week: 24.0 standard drinks of alcohol     Types: 24 Cans of beer per week     Comment: Refused to quit in June 23    Drug use: Yes     Types: Marijuana     Review of Systems   Unable to perform ROS: Mental status change       Physical Exam     Initial Vitals [10/23/23 0543]   BP Pulse Resp Temp SpO2   (!) 145/102 100 18 97.9 °F (36.6 " °C) 99 %      MAP       --         Physical Exam    Nursing note and vitals reviewed.  Constitutional: He appears well-developed and well-nourished.   HENT:   Head: Normocephalic and atraumatic.   Eyes: EOM are normal. Pupils are equal, round, and reactive to light.   Neck: Neck supple.   Normal range of motion.  Cardiovascular:  Normal rate, regular rhythm and normal heart sounds.           Pulmonary/Chest: Breath sounds normal.   Abdominal: Abdomen is soft.   Musculoskeletal:         General: Normal range of motion.      Cervical back: Normal range of motion and neck supple.     Neurological: He is alert and oriented to person, place, and time.   Skin: Skin is warm and dry.         ED Course   Procedures  Labs Reviewed   B-TYPE NATRIURETIC PEPTIDE - Abnormal; Notable for the following components:       Result Value    Natriuretic Peptide 122.4 (*)     All other components within normal limits   COMPREHENSIVE METABOLIC PANEL - Abnormal; Notable for the following components:    Sodium Level 128 (*)     Potassium Level 5.6 (*)     Chloride 96 (*)     Carbon Dioxide 18 (*)     Glucose Level 154 (*)     Creatinine 1.24 (*)     Protein Total 8.7 (*)     Globulin 5.2 (*)     Albumin/Globulin Ratio 0.7 (*)     Aspartate Aminotransferase 37 (*)     All other components within normal limits   ALCOHOL,MEDICAL (ETHANOL) - Abnormal; Notable for the following components:    Ethanol Level 29.0 (*)     All other components within normal limits   PROTIME-INR - Abnormal; Notable for the following components:    PT 10.1 (*)     All other components within normal limits   CBC WITH DIFFERENTIAL - Abnormal; Notable for the following components:    RBC 3.91 (*)     Hgb 13.0 (*)     Hct 39.7 (*)     .5 (*)     MCH 33.2 (*)     MCHC 32.7 (*)     All other components within normal limits   TROPONIN ISTAT - Abnormal; Notable for the following components:    POC Cardiac Troponin I 0.51 (*)     All other components within normal limits    MAGNESIUM - Normal   LIPASE - Normal   TSH - Normal   CBC W/ AUTO DIFFERENTIAL    Narrative:     The following orders were created for panel order CBC auto differential.  Procedure                               Abnormality         Status                     ---------                               -----------         ------                     CBC with Differential[[id number]]       Abnormal            Final result                 Please view results for these tests on the individual orders.   POCT TROPONIN     EKG Readings: (Independently Interpreted)   Rhythm: Normal Sinus Rhythm. Heart Rate: 94. Ectopy: No Ectopy. Conduction: Normal. ST Segments: Normal ST Segments. T Waves: Normal. Clinical Impression: Normal Sinus Rhythm   Anterior infarct age undetermined       Imaging Results              X-Ray Chest 1 View (Final result)  Result time 10/23/23 07:07:24      Final result by Waldemar Trujillo MD (10/23/23 07:07:24)                   Impression:      No acute pulmonary process identified.      Electronically signed by: Waldemar Trujillo  Date:    10/23/2023  Time:    07:07               Narrative:    EXAMINATION:  XR CHEST 1 VIEW    CLINICAL HISTORY:  Wheezing    TECHNIQUE:  Frontal view(s) of the chest.    COMPARISON:  Radiography 06/06/2023    FINDINGS:  Normal cardiac silhouette.  The lungs are well-inflated.  No consolidation identified.  No significant pleural effusion or discernible pneumothorax.                                       Medications   amiodarone injection 150 mg (150 mg Intravenous Given 10/23/23 0621)   amiodarone 360 mg/200 mL (1.8 mg/mL) infusion (1 mg/min Intravenous New Bag 10/23/23 0626)   albuterol-ipratropium 2.5 mg-0.5 mg/3 mL nebulizer solution 3 mL (3 mLs Nebulization Given 10/23/23 0700)   0.9%  NaCl infusion (1,000 mLs Intravenous New Bag 10/23/23 0709)     Medical Decision Making  Patient presents to the emergency room with chest pain he states started about 2 hours ago, substernal;  "ECG appeared normal that was taken upon patient's arrival; as I was questioning patient he said "0 my God" and then went unconscious and rhythm indicated ventricular fibrillation; chest compressions were immediately started and pads placed and patient was defibrillated x1 into sinus rhythm; patient regained consciousness about 30 seconds after being defibrillated; patient has a past medical history of hypertension, oa, alcoholic cirrhosis, COPD, anemia    DIFFERENTIAL DIAGNOSIS- AMI, VFib, CHF, hypertension, electrolyte abnormality    Patient care turned over to Dr. Pinto at 6:00 a.m. Dr Pinto : I was called to the room for a rhythm change and the patient is in stable V-Tach with BP of 109/76. Amiodarone begun. Shortly after the patient is back in sinus rhythm. He denies chest pain but on exam he is wheezing badly. Will give a neb.     Amount and/or Complexity of Data Reviewed  Labs: ordered. Decision-making details documented in ED Course.  Radiology: ordered.  Discussion of management or test interpretation with external provider(s): The patient has had ventricular fibrillation and then stable V-tach which were managed by starting amiodarone. Critical care time 60 minutes.     Critical Care  Total time providing critical care: 60 minutes                               Clinical Impression:   Final diagnoses:  [R07.9] Chest pain  [I49.01] Ventricular fibrillation (Primary)  [R79.89] Elevated troponin  [R06.2] Wheezing  [J44.9] Chronic obstructive pulmonary disease, unspecified COPD type  [R07.89] Chest wall pain        ED Disposition Condition    Transfer to Another Facility Stable                Chavez Pinto MD  10/23/23 0722       Chavez Pinto MD  11/27/23 0644    "

## 2023-10-23 NOTE — ASSESSMENT & PLAN NOTE
Treated with shock therapy  Continue amiodarone  Ischemic evaluation  ETOH evaluation and cardiac echo pending

## 2023-10-23 NOTE — ED NOTES
Patient awake lethargic weak diaphoretic EKG done SR c/o severe mid chest pain started 2 hours ago

## 2023-10-23 NOTE — PHARMACY MED REC
"Admission Medication History     The home medication history was taken by Annette Peres.    You may go to "Admission" then "Reconcile Home Medications" tabs to review and/or act upon these items.     The home medication list has been updated by the Pharmacy department.   Please read ALL comments highlighted in yellow.   Please address this information as you see fit.    Feel free to contact us if you have any questions or require assistance.      The medications listed below were removed from the home medication list. Please reorder if appropriate:  Patient reports no longer taking the following medication(s):  Tramadol 50mg  Asprin 81mg  Plavix 75mg        Medications listed below were obtained from: Patient/family and Analytic software- [employer]  (Not in a hospital admission)      Annette Peres  [id number]      Current Outpatient Medications on File Prior to Encounter   Medication Sig Dispense Refill Last Dose    folic acid (FOLVITE) 1 MG tablet Take 1 tablet (1 mg total) by mouth once daily. 90 tablet 1 10/22/2023    furosemide (LASIX) 40 MG tablet Take 40 mg by mouth once daily.   10/22/2023    gabapentin (NEURONTIN) 300 MG capsule Take 300 mg by mouth 3 (three) times daily.   10/22/2023    pantoprazole (PROTONIX) 40 MG tablet Take 1 tablet (40 mg total) by mouth 2 (two) times daily. 60 tablet 2 10/22/2023    spironolactone (ALDACTONE) 50 MG tablet Take 50 mg by mouth 2 (two) times daily.   10/22/2023    STIOLTO RESPIMAT 2.5-2.5 mcg/actuation Mist Inhale 2 puffs into the lungs once daily.   10/22/2023    albuterol (PROVENTIL/VENTOLIN HFA) 90 mcg/actuation inhaler Inhale 1-2 puffs into the lungs every 4 (four) hours as needed.   Unknown    lisinopriL (PRINIVIL,ZESTRIL) 2.5 MG tablet Take 1 tablet (2.5 mg total) by mouth once daily. (Patient not taking: Reported on 10/23/2023) 90 tablet 0 Not Taking    metoprolol succinate (TOPROL-XL) 25 MG 24 hr tablet Take 1 tablet (25 mg total) by mouth once daily. " (Patient not taking: Reported on 10/23/2023) 30 tablet 1 Not Taking    multivitamin Tab Take 1 tablet by mouth once daily. (Patient not taking: Reported on 6/14/2023)       thiamine 100 MG tablet Take 1 tablet (100 mg total) by mouth once daily. (Patient not taking: Reported on 6/14/2023)                          .

## 2023-10-23 NOTE — Clinical Note
The PA catheter was inserted into the main pulmonary artery. Hemodynamics were performed. Cardiac output was obtained at 6 L/min.

## 2023-10-23 NOTE — ED NOTES
Patient arrived to ER having chest pain. Patient taken to room 5 and EKG performed. Patient complaining of crushing chest pain and diaphoretic on arrival.

## 2023-10-23 NOTE — ED PROVIDER NOTES
SCRIBE #1 NOTE: I, Jailyn Parrish, am scribing for, and in the presence of, Armin Foster MD. I have scribed the entire note.      History      Chief Complaint   Patient presents with    Chest Pain       Review of patient's allergies indicates:  No Known Allergies     HPI   HPI    10/23/2023, 9:15 AM   History obtained from the patient and EMS  HPI/ROS limited secondary to acuity of condition      History of Present Illness: Hemal Tolbert Jr. is a 63 y.o. male patient with a PMHx of alcoholic cirrhosis, CHF, COPD, HTN, peripheral artery disease, and sleep apnea who presents to the Emergency Department as a transfer from [facility]. Pt was seen at [facility] this morning for CP. At [facility], the pt went into ventricular fibrillation and ROSC was achieved after chest compressions and defibrillation. The pt then went into V-Tach, so amiodarone was started. Pt was then transferred to this facility for further treatment.         Arrival mode: AASI    PCP: No, Primary Doctor       Past Medical History:  Past Medical History:   Diagnosis Date    Alcoholic cirrhosis     CHF (congestive heart failure)     LV EF 40%    COPD (chronic obstructive pulmonary disease)     Hypertension     Peripheral artery disease     with stents    Sleep apnea        Past Surgical History:  Past Surgical History:   Procedure Laterality Date    COLONOSCOPY N/A 6/10/2023    Procedure: COLONOSCOPY;  Surgeon: Johan Flower MD;  Location: [facility] OR;  Service: Gastroenterology;  Laterality: N/A;    ESOPHAGOGASTRODUODENOSCOPY N/A 6/9/2023    Procedure: EGD;  Surgeon: Tima Teixeira MD;  Location: [facility] ENDOSCOPY;  Service: Gastroenterology;  Laterality: N/A;         Family History:  No family history on file.    Social History:  Social History     Tobacco Use    Smoking status: Every Day     Current packs/day: 1.00     Types: Cigarettes    Smokeless tobacco: Never   Substance and Sexual  Activity    Alcohol use: Yes     Alcohol/week: 24.0 standard drinks of alcohol     Types: 24 Cans of beer per week     Comment: Refused to quit in June 23    Drug use: Yes     Types: Marijuana    Sexual activity: Not on file       ROS   Review of Systems   Unable to perform ROS: Acuity of condition       Physical Exam      Initial Vitals   BP Pulse Resp Temp SpO2   10/23/23 0933 10/23/23 0925 10/23/23 0933 10/23/23 1027 10/23/23 0933   104/75 (!) 11 (!) 30 96.3 °F (35.7 °C) 97 %      MAP       --                 Physical Exam  Nursing Notes and Vital Signs Reviewed.  Constitutional: Patient is in moderate distress. Well-developed and well-nourished.  Head: Atraumatic. Normocephalic.  Eyes: PERRL. EOM intact. Conjunctivae are not pale. No scleral icterus.  ENT: Mucous membranes are moist. Oropharynx is clear and symmetric.    Neck: Supple. Full ROM. No lymphadenopathy.  Cardiovascular: Tachycardic rate. Regular rhythm. No murmurs, rubs, or gallops. Distal pulses are 2+ and symmetric.  Pulmonary/Chest: Tachypneic. No respiratory distress. Clear to auscultation bilaterally. No wheezing or rales.  Abdominal: Soft and non-distended.  There is no tenderness.  No rebound, guarding, or rigidity.   Musculoskeletal: Moves all extremities. No obvious deformities. No edema.  Skin: Mottled  Neurological:  Awake.  No acute focal neurological deficits are appreciated.    ED Course    Critical Care    Date/Time: 10/23/2023 11:04 AM    Performed by: Armin Foster MD  Authorized by: Armin Foster MD  Direct patient critical care time: 24 minutes  Additional history critical care time: 8 minutes  Ordering / reviewing critical care time: 9 minutes  Documentation critical care time: 9 minutes  Consulting other physicians critical care time: 10 minutes  Total critical care time (exclusive of procedural time) : 60 minutes  Critical care time was exclusive of separately billable procedures and treating other patients and  "teaching time.  Critical care was necessary to treat or prevent imminent or life-threatening deterioration of the following conditions: V-tach and elevated troponin.  Critical care was time spent personally by me on the following activities: blood draw for specimens, development of treatment plan with patient or surrogate, discussions with consultants, interpretation of cardiac output measurements, evaluation of patient's response to treatment, ordering and performing treatments and interventions, ordering and review of laboratory studies, examination of patient, obtaining history from patient or surrogate, ordering and review of radiographic studies, pulse oximetry, re-evaluation of patient's condition and review of old charts.        ED Vital Signs:  Vitals:    10/23/23 0925 10/23/23 0933 10/23/23 0947 10/23/23 0952   BP:  104/75 135/84    Pulse: (!) 11 101 106 107   Resp:  (!) 30 (!) 30 (!) 26   Temp:       TempSrc:  Oral     SpO2:  97% 99% 98%   Weight:  90 kg (198 lb 6.6 oz)     Height:  5' 8" (1.727 m)      10/23/23 0957 10/23/23 1000 10/23/23 1010 10/23/23 1017   BP: 124/84 124/84  130/86   Pulse: 109 109 (!) 115 (!) 112   Resp: (!) 24 (!) 24 (!) 26 (!) 24   Temp:       TempSrc:       SpO2: 98% 98% (!) 86% (!) 91%   Weight:       Height:        10/23/23 1022 10/23/23 1027 10/23/23 1031 10/23/23 1132   BP:   118/76 117/77   Pulse: 109  109 103   Resp: (!) 24  (!) 26 17   Temp:  96.3 °F (35.7 °C)     TempSrc:  Rectal     SpO2: 96%  96% 97%   Weight:   89.8 kg (198 lb) 89.8 kg (198 lb)   Height:   5' 8" (1.727 m) 5' 8" (1.727 m)       Abnormal Lab Results:  Labs Reviewed   CBC W/ AUTO DIFFERENTIAL - Abnormal; Notable for the following components:       Result Value    WBC 17.74 (*)     RBC 4.32 (*)     MCV 99 (*)     MCH 32.6 (*)     Immature Granulocytes 1.1 (*)     Gran # (ANC) 14.5 (*)     Immature Grans (Abs) 0.19 (*)     Mono # 1.3 (*)     Gran % 81.5 (*)     Lymph % 9.5 (*)     All other components within " normal limits   COMPREHENSIVE METABOLIC PANEL - Abnormal; Notable for the following components:    Sodium 128 (*)     CO2 18 (*)     Glucose 173 (*)     Calcium 8.6 (*)     Total Protein 8.9 (*)     AST 62 (*)     All other components within normal limits   B-TYPE NATRIURETIC PEPTIDE - Abnormal; Notable for the following components:     (*)     All other components within normal limits   CK - Abnormal; Notable for the following components:     (*)     All other components within normal limits   TROPONIN I - Abnormal; Notable for the following components:    Troponin I 3.626 (*)     All other components within normal limits   CBC W/ AUTO DIFFERENTIAL - Abnormal; Notable for the following components:    WBC 17.79 (*)     RBC 4.41 (*)     MCV 99 (*)     MCH 32.7 (*)     Immature Granulocytes 0.7 (*)     Gran # (ANC) 15.2 (*)     Immature Grans (Abs) 0.12 (*)     Mono # 1.3 (*)     Gran % 85.3 (*)     Lymph % 6.3 (*)     All other components within normal limits    Narrative:     Draw baseline aPTT prior to starting the heparin bolus or  infusion  (if patient is on warfarin prior to heparin therapy)   POCT GLUCOSE - Abnormal; Notable for the following components:    POCT Glucose 167 (*)     All other components within normal limits   APTT    Narrative:     Draw baseline aPTT prior to starting the heparin bolus or  infusion  (if patient is on warfarin prior to heparin therapy)   PROTIME-INR    Narrative:     Draw baseline aPTT prior to starting the heparin bolus or  infusion  (if patient is on warfarin prior to heparin therapy)   URINALYSIS, REFLEX TO URINE CULTURE   TROPONIN I   VITAMIN B1   DRUG SCREEN PANEL, URINE EMERGENCY   URINALYSIS, REFLEX TO URINE CULTURE   POCT GLUCOSE MONITORING CONTINUOUS        All Lab Results:  Results for orders placed or performed during the hospital encounter of 10/23/23   CBC Auto Differential   Result Value Ref Range    WBC 17.74 (H) 3.90 - 12.70 K/uL    RBC 4.32 (L) 4.60  - 6.20 M/uL    Hemoglobin 14.1 14.0 - 18.0 g/dL    Hematocrit 42.6 40.0 - 54.0 %    MCV 99 (H) 82 - 98 fL    MCH 32.6 (H) 27.0 - 31.0 pg    MCHC 33.1 32.0 - 36.0 g/dL    RDW 12.4 11.5 - 14.5 %    Platelets 264 150 - 450 K/uL    MPV 9.8 9.2 - 12.9 fL    Immature Granulocytes 1.1 (H) 0.0 - 0.5 %    Gran # (ANC) 14.5 (H) 1.8 - 7.7 K/uL    Immature Grans (Abs) 0.19 (H) 0.00 - 0.04 K/uL    Lymph # 1.7 1.0 - 4.8 K/uL    Mono # 1.3 (H) 0.3 - 1.0 K/uL    Eos # 0.0 0.0 - 0.5 K/uL    Baso # 0.06 0.00 - 0.20 K/uL    nRBC 0 0 /100 WBC    Gran % 81.5 (H) 38.0 - 73.0 %    Lymph % 9.5 (L) 18.0 - 48.0 %    Mono % 7.6 4.0 - 15.0 %    Eosinophil % 0.0 0.0 - 8.0 %    Basophil % 0.3 0.0 - 1.9 %    Differential Method Automated    Comprehensive Metabolic Panel   Result Value Ref Range    Sodium 128 (L) 136 - 145 mmol/L    Potassium 4.8 3.5 - 5.1 mmol/L    Chloride 95 95 - 110 mmol/L    CO2 18 (L) 23 - 29 mmol/L    Glucose 173 (H) 70 - 110 mg/dL    BUN 12 8 - 23 mg/dL    Creatinine 1.2 0.5 - 1.4 mg/dL    Calcium 8.6 (L) 8.7 - 10.5 mg/dL    Total Protein 8.9 (H) 6.0 - 8.4 g/dL    Albumin 3.6 3.5 - 5.2 g/dL    Total Bilirubin 0.4 0.1 - 1.0 mg/dL    Alkaline Phosphatase 65 55 - 135 U/L    AST 62 (H) 10 - 40 U/L    ALT 21 10 - 44 U/L    eGFR >60 >60 mL/min/1.73 m^2    Anion Gap 15 8 - 16 mmol/L   BNP   Result Value Ref Range     (H) 0 - 99 pg/mL   CK   Result Value Ref Range     (H) 20 - 200 U/L   Troponin I   Result Value Ref Range    Troponin I 3.626 (H) 0.000 - 0.026 ng/mL   APTT   Result Value Ref Range    aPTT 27.9 21.0 - 32.0 sec   Protime-INR   Result Value Ref Range    Prothrombin Time 10.8 9.0 - 12.5 sec    INR 1.0 0.8 - 1.2   CBC auto differential   Result Value Ref Range    WBC 17.79 (H) 3.90 - 12.70 K/uL    RBC 4.41 (L) 4.60 - 6.20 M/uL    Hemoglobin 14.4 14.0 - 18.0 g/dL    Hematocrit 43.5 40.0 - 54.0 %    MCV 99 (H) 82 - 98 fL    MCH 32.7 (H) 27.0 - 31.0 pg    MCHC 33.1 32.0 - 36.0 g/dL    RDW 12.6 11.5 - 14.5 %     Platelets 259 150 - 450 K/uL    MPV 9.4 9.2 - 12.9 fL    Immature Granulocytes 0.7 (H) 0.0 - 0.5 %    Gran # (ANC) 15.2 (H) 1.8 - 7.7 K/uL    Immature Grans (Abs) 0.12 (H) 0.00 - 0.04 K/uL    Lymph # 1.1 1.0 - 4.8 K/uL    Mono # 1.3 (H) 0.3 - 1.0 K/uL    Eos # 0.1 0.0 - 0.5 K/uL    Baso # 0.06 0.00 - 0.20 K/uL    nRBC 0 0 /100 WBC    Gran % 85.3 (H) 38.0 - 73.0 %    Lymph % 6.3 (L) 18.0 - 48.0 %    Mono % 7.0 4.0 - 15.0 %    Eosinophil % 0.4 0.0 - 8.0 %    Basophil % 0.3 0.0 - 1.9 %    Differential Method Automated    Echo   Result Value Ref Range    BSA 2.08 m2    IVRT 49.48 msec    TDI LATERAL 0.10 m/s    Left Ventricular Outflow Tract Mean Gradient 1.13 mmHg    Left Ventricular Outflow Tract Mean Velocity 0.49 cm/s    MV stenosis pressure 1/2 time 34.73 ms    Mr max rex 3.50 m/s    TR Max Rex 2.09 m/s    Ao root annulus 3.38 cm    Ao peak rex 0.86 m/s    Posterior Wall 0.95 0.6 - 1.1 cm    LVOT diameter 1.95 cm    LVOT peak rex 0.76 m/s    LVOT peak VTI 10.90 cm    E wave deceleration time 119.77 msec    MV Peak A Rex 0.40 m/s    MV Peak E Rex 0.65 m/s    RA Major Axis 2.94 cm    TAPSE 1.82 cm    PV mean gradient 1 mmHg    RVOT peak rex 0.57 m/s    RVOT peak VTI 9.8 cm    IVS 0.96 0.6 - 1.1 cm    Ao VTI 11.90 cm    AV mean gradient 2 mmHg    LVIDd 4.24 3.5 - 6.0 cm    LVIDs 3.71 2.1 - 4.0 cm    Left Atrium Major Axis 4.28 cm    Left Atrium Minor Axis 4.19 cm    RV mid diameter 3.37 cm    STJ 3.35 cm    Ascending aorta 3.41 cm    LV Systolic Volume 58.38 mL    LV Diastolic Volume 80.17 mL    TDI SEPTAL 0.09 m/s    LA size 2.39 cm    IVC diameter 1.42 cm    LV LATERAL E/E' RATIO 6.50 m/s    LV SEPTAL E/E' RATIO 7.22 m/s    FS 13 28 - 44 %    LV mass 130.71 g    ZLVIDD -3.61     ZLVIDS -0.04     Left Ventricle Relative Wall Thickness 0.45 cm    AV valve area 2.73 cm²    AV Velocity Ratio 0.88     AV index (prosthetic) 0.92     MV valve area p 1/2 method 6.33 cm2    E/A ratio 1.63     Mean e' 0.10 m/s    LVOT area  3.0 cm2    LVOT stroke volume 32.54 cm3    AV peak gradient 3 mmHg    E/E' ratio 6.84 m/s    LV Systolic Volume Index 28.6 mL/m2    LV Diastolic Volume Index 39.30 mL/m2    LV Mass Index 64 g/m2    Triscuspid Valve Regurgitation Peak Gradient 17 mmHg    ADDIS by Velocity Ratio 2.64 cm²    PV peak gradient 1     LA Volume Index 11.4 mL/m2    LA volume 23.23 cm3    LA WIDTH 2.7 cm    RA Width 2.3 cm   POCT glucose   Result Value Ref Range    POCT Glucose 167 (H) 70 - 110 mg/dL         Imaging Results:  Imaging Results              X-Ray Chest AP Portable (Final result)  Result time 10/23/23 09:51:44      Final result by Lucio Calhoun III, MD (10/23/23 09:51:44)                   Impression:      Interstitial prominence with mild cephalization.  This can be seen with pulmonary venous hypertension.  These findings are unchanged.    Unchanged hyperinflation      Electronically signed by: Paddy Calhoun  Date:    10/23/2023  Time:    09:51               Narrative:    EXAMINATION:  XR CHEST AP PORTABLE    CLINICAL HISTORY:  sob;    TECHNIQUE:  Single frontal view of the chest was performed.    COMPARISON:  10/23/2023 at 06:53    FINDINGS:  External devices overlie the lower chest.  The cardiac silhouette and mediastinum are unremarkable.  Interstitial prominence with mild cephalization.  This can be seen with pulmonary venous hypertension.  No pneumothorax or pleural effusion.  The lungs are hyperinflated.                                     The EKG was ordered, reviewed, and independently interpreted by the ED provider.  Interpretation time: 9:36  Rate: 102 BPM  Rhythm: sinus tachycardia  Interpretation: Septal infarct, age undetermined. No STEMI.           The Emergency Provider reviewed the vital signs and test results, which are outlined above.    ED Discussion     10:49 AM: Discussed pt's case with Dr. Salomon (Cardiology) who recommends admitting the pt to the ICU.    11:05 AM: Discussed case with   Marjorie (Critical Care Medicine). Dr. Amador agrees with current care and management of pt and accepts admission.   Admitting Service: Critical Care Medicine   Admitting Physician: Dr. Amador  Admit to: ICU    11:07 AM: Re-evaluated pt. I have discussed test results, shared treatment plan, and the need for admission with patient and family at bedside. Pt and family express understanding at this time and agree with all information. All questions answered. Pt and family have no further questions or concerns at this time. Pt is ready for admit.           ED Medication(s):  Medications   glucagon (human recombinant) injection 1 mg (has no administration in time range)   sodium chloride 0.9% flush 10 mL (has no administration in time range)   mupirocin 2 % ointment ( Nasal Given 10/23/23 1205)   chlorhexidine 0.12 % solution 15 mL (15 mLs Mouth/Throat Given 10/23/23 1205)   potassium chloride 10 mEq in 100 mL IVPB (has no administration in time range)     And   potassium chloride 10 mEq in 100 mL IVPB (has no administration in time range)     And   potassium chloride 10 mEq in 100 mL IVPB (has no administration in time range)   magnesium sulfate 2g in water 50mL IVPB (premix) (has no administration in time range)   magnesium sulfate 2g in water 50mL IVPB (premix) (has no administration in time range)   sodium phosphate 15 mmol in dextrose 5 % (D5W) 250 mL IVPB (has no administration in time range)   sodium phosphate 20.01 mmol in dextrose 5 % (D5W) 250 mL IVPB (has no administration in time range)   sodium phosphate 30 mmol in dextrose 5 % (D5W) 250 mL IVPB (has no administration in time range)   calcium gluconate 1 g in NS IVPB (premixed) (has no administration in time range)   calcium gluconate 1 g in NS IVPB (premixed) (has no administration in time range)   calcium gluconate 1 g in NS IVPB (premixed) (has no administration in time range)   famotidine (PF) injection 20 mg (20 mg Intravenous Given 10/23/23  1207)   acetaminophen tablet 650 mg (has no administration in time range)   insulin aspart U-100 pen 0-5 Units (has no administration in time range)   dextrose 10% bolus 125 mL 125 mL (has no administration in time range)   dextrose 10% bolus 250 mL 250 mL (has no administration in time range)   aspirin chewable tablet 81 mg (has no administration in time range)   heparin 25,000 units in dextrose 5% (100 units/ml) IV bolus from bag INITIAL BOLUS (max bolus 4000 units) (has no administration in time range)   heparin 25,000 units in dextrose 5% 250 mL (100 units/mL) infusion LOW INTENSITY nomogram - [facility] (has no administration in time range)   heparin 25,000 units in dextrose 5% (100 units/ml) IV bolus from bag - ADDITIONAL PRN BOLUS - 60 units/kg (max bolus 4000 units) (has no administration in time range)   heparin 25,000 units in dextrose 5% (100 units/ml) IV bolus from bag - ADDITIONAL PRN BOLUS - 30 units/kg (max bolus 4000 units) (has no administration in time range)   magnesium sulfate 2g in water 50mL IVPB (premix) (2 g Intravenous New Bag 10/23/23 1200)   folic acid tablet 1 mg (1 mg Oral Given 10/23/23 1203)   LORazepam injection 1 mg (has no administration in time range)   chlordiazepoxide capsule 10 mg (has no administration in time range)   albuterol-ipratropium 2.5 mg-0.5 mg/3 mL nebulizer solution 3 mL (has no administration in time range)   arformoteroL nebulizer solution 15 mcg (has no administration in time range)   budesonide nebulizer solution 0.5 mg (has no administration in time range)   amiodarone 360 mg/200 mL (1.8 mg/mL) infusion (has no administration in time range)   albuterol-ipratropium 2.5 mg-0.5 mg/3 mL nebulizer solution 3 mL (3 mLs Nebulization Given 10/23/23 0957)   furosemide injection 40 mg (40 mg Intravenous Given 10/23/23 1210)   aspirin EC tablet 325 mg (325 mg Oral Given 10/23/23 1203)           New Prescriptions    No medications on file         Medical Decision Making     Medical Decision Making  Transfer from outside facility for Vfib, then vtach.  NSR since being placed on amiodarone  DDx: vtach, NSTEMI    Amount and/or Complexity of Data Reviewed  Labs: ordered. Decision-making details documented in ED Course.     Details: Elevated troponin  Radiology: ordered. Decision-making details documented in ED Course.     Details: No acute findings  ECG/medicine tests: ordered and independent interpretation performed. Decision-making details documented in ED Course.    Risk  Prescription drug management.  Decision regarding hospitalization.                Scribe Attestation:   Scribe #1: I performed the above scribed service and the documentation accurately describes the services I performed. I attest to the accuracy of the note.    Attending:   Physician Attestation Statement for Scribe #1: I, Armin Foster MD, personally performed the services described in this documentation, as scribed by Jailyn Parrish, in my presence, and it is both accurate and complete.          Clinical Impression       ICD-10-CM ICD-9-CM   1. V-tach  I47.20 427.1   2. Chest pain, unspecified type  R07.9 786.50   3. Elevated troponin  R79.89 790.6   4. NSTEMI (non-ST elevated myocardial infarction)  I21.4 410.70       Disposition:   Disposition: Admitted  Condition: Armin Moses MD  10/23/23 1214

## 2023-10-23 NOTE — CONSULTS
"[facility] - Emergency Dept.  Cardiology  Consult Note    Patient Name: Hemal Tolbert Jr.  MRN: [id number]   Admission Date: 10/23/2023  Hospital Length of Stay: 0 days  Code Status: Prior   Attending Provider: Armin Foster MD   Consulting Provider: Gaurav Salomon MD  Primary Care Physician: No, Primary Doctor  Principal Problem:<principal problem not specified>    Patient information was obtained from ER records.     Inpatient consult to Cardiology  Consult performed by: Gaurav Salomon MD  Consult ordered by: Armin Foster MD        Subjective:     Chief Complaint:  SOB and chest pain     HPI:   Patient presents to the emergency room with chest pain he states started about 2 hours ago, substernal; ECG appeared normal that was taken upon patient's arrival; as I was questioning patient he said "0 my God" and then went unconscious and rhythm indicated ventricular fibrillation; chest compressions were immediately started and pads placed and patient was defibrillated x1 into sinus rhythm; patient regained consciousness about 30 seconds after being defibrillated     The history is provided by the patient.      Review of patient's allergies indicates:  No Known Allergies       Past Medical History:   Diagnosis Date    Alcoholic cirrhosis      CHF (congestive heart failure)       LV EF 40%    COPD (chronic obstructive pulmonary disease)      Hypertension      Peripheral artery disease       with stents    Sleep apnea    Patient seen and examined. Plan trend troponin, treat VT and echo today.  Prior history of anemia and cirrhosis, was planned to have capsule endoscopy and cards w/u for ischemia. Continued etoh issues.  May need follow up [facility] this week.      Past Medical History:   Diagnosis Date    Alcoholic cirrhosis     CHF (congestive heart failure)     LV EF 40%    COPD (chronic obstructive pulmonary disease)     Hypertension     Peripheral artery disease     with stents    Sleep apnea  "       Past Surgical History:   Procedure Laterality Date    COLONOSCOPY N/A 6/10/2023    Procedure: COLONOSCOPY;  Surgeon: Johan Flower MD;  Location: [facility] OR;  Service: Gastroenterology;  Laterality: N/A;    ESOPHAGOGASTRODUODENOSCOPY N/A 6/9/2023    Procedure: EGD;  Surgeon: Tima Teixeira MD;  Location: [facility] ENDOSCOPY;  Service: Gastroenterology;  Laterality: N/A;       Review of patient's allergies indicates:  No Known Allergies    Current Facility-Administered Medications on File Prior to Encounter   Medication    [COMPLETED] 0.9%  NaCl infusion    [COMPLETED] albuterol-ipratropium 2.5 mg-0.5 mg/3 mL nebulizer solution 3 mL    [COMPLETED] amiodarone 360 mg/200 mL (1.8 mg/mL) infusion    [COMPLETED] amiodarone injection 150 mg     Current Outpatient Medications on File Prior to Encounter   Medication Sig    folic acid (FOLVITE) 1 MG tablet Take 1 tablet (1 mg total) by mouth once daily.    furosemide (LASIX) 40 MG tablet Take 40 mg by mouth once daily.    gabapentin (NEURONTIN) 300 MG capsule Take 300 mg by mouth 3 (three) times daily.    pantoprazole (PROTONIX) 40 MG tablet Take 1 tablet (40 mg total) by mouth 2 (two) times daily.    spironolactone (ALDACTONE) 50 MG tablet Take 50 mg by mouth 2 (two) times daily.    STIOLTO RESPIMAT 2.5-2.5 mcg/actuation Mist Inhale 2 puffs into the lungs once daily.    albuterol (PROVENTIL/VENTOLIN HFA) 90 mcg/actuation inhaler Inhale 1-2 puffs into the lungs every 4 (four) hours as needed.    lisinopriL (PRINIVIL,ZESTRIL) 2.5 MG tablet Take 1 tablet (2.5 mg total) by mouth once daily. (Patient not taking: Reported on 10/23/2023)    metoprolol succinate (TOPROL-XL) 25 MG 24 hr tablet Take 1 tablet (25 mg total) by mouth once daily. (Patient not taking: Reported on 10/23/2023)    multivitamin Tab Take 1 tablet by mouth once daily. (Patient not taking: Reported on 6/14/2023)    thiamine 100 MG tablet Take 1 tablet (100 mg total) by mouth once  daily. (Patient not taking: Reported on 6/14/2023)    [DISCONTINUED] ADULT ASPIRIN REGIMEN 81 mg EC tablet Take 81 mg by mouth once daily.    [DISCONTINUED] clopidogreL (PLAVIX) 75 mg tablet Take 75 mg by mouth once daily.    [DISCONTINUED] furosemide (LASIX) 20 MG tablet Take 1 tablet (20 mg total) by mouth once daily.    [DISCONTINUED] traMADoL (ULTRAM) 50 mg tablet Take 1 tablet (50 mg total) by mouth every 8 (eight) hours as needed for Pain.     Family History    None       Tobacco Use    Smoking status: Every Day     Current packs/day: 1.00     Types: Cigarettes    Smokeless tobacco: Never   Substance and Sexual Activity    Alcohol use: Yes     Alcohol/week: 24.0 standard drinks of alcohol     Types: 24 Cans of beer per week     Comment: Refused to quit in June 23    Drug use: Yes     Types: Marijuana    Sexual activity: Not on file     Review of Systems   Constitutional: Positive for malaise/fatigue. Negative for chills, diaphoresis, night sweats, weight gain and weight loss.   HENT:  Negative for congestion, hoarse voice, sore throat and stridor.    Eyes:  Negative for double vision and pain.   Cardiovascular:  Positive for chest pain and dyspnea on exertion. Negative for claudication, cyanosis, irregular heartbeat, leg swelling, near-syncope, orthopnea, palpitations, paroxysmal nocturnal dyspnea and syncope.   Respiratory:  Negative for cough, hemoptysis, shortness of breath, sleep disturbances due to breathing, snoring, sputum production and wheezing.    Endocrine: Negative for cold intolerance, heat intolerance and polydipsia.   Hematologic/Lymphatic: Negative for bleeding problem. Does not bruise/bleed easily.   Skin:  Negative for color change, dry skin and rash.   Musculoskeletal:  Negative for joint swelling and muscle cramps.   Gastrointestinal:  Negative for bloating, abdominal pain, constipation, diarrhea, dysphagia, melena, nausea and vomiting.   Genitourinary:  Negative for flank pain and  urgency.   Neurological:  Negative for dizziness, focal weakness, headaches, light-headedness, loss of balance, seizures and weakness.   Psychiatric/Behavioral:  Negative for altered mental status and memory loss. The patient is not nervous/anxious.      Objective:     Vital Signs (Most Recent):  Temp: 96.3 °F (35.7 °C) (warm blankets applied) (10/23/23 1027)  Pulse: 109 (10/23/23 1031)  Resp: (!) 26 (10/23/23 1031)  BP: 118/76 (10/23/23 1031)  SpO2: 96 % (10/23/23 1031) Vital Signs (24h Range):  Temp:  [96.3 °F (35.7 °C)-97.9 °F (36.6 °C)] 96.3 °F (35.7 °C)  Pulse:  [] 109  Resp:  [18-32] 26  SpO2:  [86 %-100 %] 96 %  BP: ()/() 118/76     Weight: 90 kg (198 lb 6.6 oz)  Body mass index is 30.17 kg/m².    SpO2: 96 %       No intake or output data in the 24 hours ending 10/23/23 1113    Lines/Drains/Airways       Peripheral Intravenous Line  Duration                  Peripheral IV - Single Lumen 20 G Left;Posterior Hand -- days         Peripheral IV - Single Lumen 20 G Posterior;Right Hand -- days                     Physical Exam  Eyes:      Pupils: Pupils are equal, round, and reactive to light.   Neck:      Trachea: No tracheal deviation.   Cardiovascular:      Rate and Rhythm: Normal rate and regular rhythm.      Pulses: Intact distal pulses.           Carotid pulses are 2+ on the right side and 2+ on the left side.       Radial pulses are 2+ on the right side and 2+ on the left side.        Femoral pulses are 2+ on the right side and 2+ on the left side.       Popliteal pulses are 2+ on the right side and 2+ on the left side.        Dorsalis pedis pulses are 2+ on the right side and 2+ on the left side.        Posterior tibial pulses are 2+ on the right side and 2+ on the left side.      Heart sounds: Normal heart sounds. No murmur heard.     No friction rub. No gallop.   Pulmonary:      Effort: Pulmonary effort is normal. No respiratory distress.      Breath sounds: Normal breath sounds. No  stridor. No wheezing or rales.   Chest:      Chest wall: No tenderness.   Abdominal:      General: There is no distension.      Tenderness: There is no abdominal tenderness. There is no rebound.   Musculoskeletal:         General: No tenderness.      Cervical back: Normal range of motion.   Skin:     General: Skin is warm and dry.   Neurological:      Mental Status: He is alert and oriented to person, place, and time.          Significant Labs: CMP   Recent Labs   Lab 10/23/23  0600 10/23/23  0950   * 128*   K 5.6* 4.8   CL  --  95   CO2 18* 18*   GLU  --  173*   BUN 11.0 12   CREATININE 1.24* 1.2   CALCIUM 9.3 8.6*   PROT  --  8.9*   ALBUMIN 3.5 3.6   BILITOT 0.4 0.4   ALKPHOS 60 65   AST 37* 62*   ALT 17 21   ANIONGAP  --  15   , CBC   Recent Labs   Lab 10/23/23  0600 10/23/23  0950   WBC 10.74 17.74*   HGB 13.0* 14.1   HCT 39.7* 42.6    264   , and Troponin   Recent Labs   Lab 10/23/23  0950   TROPONINI 3.626*       Significant Imaging: Echocardiogram: Transthoracic echo (TTE) complete ([facility] Only):   Results for orders placed or performed during the hospital encounter of 06/07/23   Echo   Result Value Ref Range    BSA 1.96 m2    TDI SEPTAL 0.11 m/s    LV LATERAL E/E' RATIO 14.22 m/s    LV SEPTAL E/E' RATIO 11.64 m/s    Right Atrial Pressure (from IVC) 8 mmHg    EF 40 %    Left Ventricular Outflow Tract Mean Velocity 0.56 cm/s    Left Ventricular Outflow Tract Mean Gradient 2.00 mmHg    TDI LATERAL 0.09 m/s    LVIDd 5.53 3.5 - 6.0 cm    IVS 0.83 0.6 - 1.1 cm    Posterior Wall 0.92 0.6 - 1.1 cm    LVIDs 4.43 (A) 2.1 - 4.0 cm    FS 20 28 - 44 %    LV mass 180.91 g    LA size 4.20 cm    RVDD 3.20 cm    TAPSE 1.85 cm    Left Ventricle Relative Wall Thickness 0.33 cm    AV mean gradient 2 mmHg    AV valve area 3.08 cm2    AV Velocity Ratio 0.90     AV index (prosthetic) 0.89     MV mean gradient 3 mmHg    MV valve area by continuity eq 2.56 cm2    E/A ratio 1.86     Mean e' 0.10 m/s    E wave deceleration  time 114.00 msec    LVOT diameter 2.10 cm    LVOT area 3.5 cm2    LVOT peak rex 0.87 m/s    LVOT peak VTI 15.90 cm    Ao peak rex 0.97 m/s    Ao VTI 17.9 cm    LVOT stroke volume 55.04 cm3    AV peak gradient 4 mmHg    MV peak gradient 5 mmHg    TV resting pulmonary artery pressure 35 mmHg    E/E' ratio 12.80 m/s    MV Peak E Rex 1.28 m/s    TR Max Rex 2.62 m/s    MV VTI 21.5 cm    MV Peak A Rex 0.69 m/s    LV Systolic Volume 89.10 mL    LV Systolic Volume Index 45.7 mL/m2    LV Diastolic Volume 149.00 mL    LV Diastolic Volume Index 76.41 mL/m2    LV Mass Index 93 g/m2    RA Major Axis 4.60 cm    Triscuspid Valve Regurgitation Peak Gradient 27 mmHg    LA Volume Index (Mod) 40.5 mL/m2    LA volume (mod) 78.90 cm3    RA Width 4.30 cm    Grady's Biplane MOD Ejection Fraction 4 %    Narrative    · The left ventricle is mildly enlarged with mildly decreased systolic   function.  · Overall there is mild to moderate global hypokinesis; Prounounced basal   inferior and inferioseptal hypokinesis.  · The estimated ejection fraction is 40%.  · Grade III left ventricular diastolic dysfunction.  · Mild right ventricular enlargement with low normal right ventricular   systolic function.  · Mild-to-moderate mitral regurgitation.  · Mild tricuspid regurgitation.  · Mild pulmonic regurgitation.  · The estimated PA systolic pressure is 35 mmHg.  · Mild left atrial enlargement.  · Mild right atrial enlargement.        Assessment and Plan:     VT (ventricular tachycardia)  Treated with shock therapy  Continue amiodarone  Ischemic evaluation  ETOH evaluation and cardiac echo pending    Elevated troponin  Will trend  IV heparin  Cardiac echo pending  EKG not showing ischemia    CHF (congestive heart failure)  Patient is identified as having Combined Systolic and Diastolic heart failure that is Acute on chronic. CHF is currently uncontrolled due to Continued edema of extremities. Latest ECHO performed and demonstrates- Results for  orders placed during the hospital encounter of 06/07/23    Echo    Interpretation Summary  · The left ventricle is mildly enlarged with mildly decreased systolic function.  · Overall there is mild to moderate global hypokinesis; Prounounced basal inferior and inferioseptal hypokinesis.  · The estimated ejection fraction is 40%.  · Grade III left ventricular diastolic dysfunction.  · Mild right ventricular enlargement with low normal right ventricular systolic function.  · Mild-to-moderate mitral regurgitation.  · Mild tricuspid regurgitation.  · Mild pulmonic regurgitation.  · The estimated PA systolic pressure is 35 mmHg.  · Mild left atrial enlargement.  · Mild right atrial enlargement.  . Continue Beta Blocker, ACE/ARB and Furosemide and monitor clinical status closely. Monitor on telemetry. Patient is off CHF pathway.  Monitor strict Is&Os and daily weights.  Place on fluid restriction of 2 L. Cardiology has been consulted. Continue to stress to patient importance of self efficacy and  on diet for CHF. Last BNP reviewed- and noted below   Recent Labs   Lab 10/23/23  0950   *   .    Alcoholic cirrhosis  Per intensive team        VTE Risk Mitigation (From admission, onward)    None          Thank you for your consult. I will follow-up with patient. Please contact us if you have any additional questions.    Gaurav Salomon MD  Cardiology   [facility] - Emergency Dept.

## 2023-10-23 NOTE — ASSESSMENT & PLAN NOTE
- does not appear to be in acute exacerbation and findings more consistent with volume  - PRN duoneb, scheduled brovana and pulmicort nebs  - monitor for bronchospasm and change in status

## 2023-10-23 NOTE — HPI
Hemal Tolbert is a 63-year-old male with a known past medical history of combined heart failure with an EF of 40% and grade 3 diastolic dysfunction, alcohol abuse (reports 3-4 beers/day), tobacco abuse with alcoholic cirrhosis, hypertension, and COPD who presented to the  ER 10/23 as an ER to ER transfer for evaluation of Vtach requiring defibrillation.  Prior to transfer, patient was initiated on amiodarone infusion. Upon arrival to the ER in [place], Cardiology was consulted. It was documented the patient again went into V-tach and required defibrillation in the ER with 1 shock with conversion to normal sinus rhythm. ER nurse reports patient was given multiple nebs back to back for wheezing and was escalated to a Ventimask as he is a mouth breather. Patient's was continued on the amiodarone infusion protocol. Review of EKG strips was concerning for possible Torsades. Workup significant for leukocytosis of 17,000, sodium 128, creatinine 1.2, , , and troponin trends up with latest 3.6 --> 9.9.  Patient was given an aspirin, started on heparin infusion, given 4 mg of Mag IV, and 40 mg of Lasix IV.  Echo ordered.  Patient admitted to the ICU per the critical care team with Cardiology following.

## 2023-10-23 NOTE — Clinical Note
Balloon pump turned off and removed from right femoral artery. Wire access obtained in right femoral artery.

## 2023-10-23 NOTE — ED NOTES
[facility] in ER loading pt -pt lethargic diaphoretic -responds to voice -continues to c/o chest pain --sinus tach on monitor --report given to [facility] to [place] -pt accepted to ED per Dr Foster

## 2023-10-23 NOTE — SUBJECTIVE & OBJECTIVE
Past Medical History:   Diagnosis Date    Alcoholic cirrhosis     CHF (congestive heart failure)     LV EF 40%    COPD (chronic obstructive pulmonary disease)     Hypertension     Peripheral artery disease     with stents    Sleep apnea        Past Surgical History:   Procedure Laterality Date    COLONOSCOPY N/A 6/10/2023    Procedure: COLONOSCOPY;  Surgeon: Johan Flower MD;  Location: [facility] OR;  Service: Gastroenterology;  Laterality: N/A;    ESOPHAGOGASTRODUODENOSCOPY N/A 6/9/2023    Procedure: EGD;  Surgeon: Tima Teixeira MD;  Location: [facility] ENDOSCOPY;  Service: Gastroenterology;  Laterality: N/A;       Review of patient's allergies indicates:  No Known Allergies    Current Facility-Administered Medications on File Prior to Encounter   Medication    [COMPLETED] 0.9%  NaCl infusion    [COMPLETED] albuterol-ipratropium 2.5 mg-0.5 mg/3 mL nebulizer solution 3 mL    [COMPLETED] amiodarone 360 mg/200 mL (1.8 mg/mL) infusion    [COMPLETED] amiodarone injection 150 mg     Current Outpatient Medications on File Prior to Encounter   Medication Sig    folic acid (FOLVITE) 1 MG tablet Take 1 tablet (1 mg total) by mouth once daily.    furosemide (LASIX) 40 MG tablet Take 40 mg by mouth once daily.    gabapentin (NEURONTIN) 300 MG capsule Take 300 mg by mouth 3 (three) times daily.    pantoprazole (PROTONIX) 40 MG tablet Take 1 tablet (40 mg total) by mouth 2 (two) times daily.    spironolactone (ALDACTONE) 50 MG tablet Take 50 mg by mouth 2 (two) times daily.    STIOLTO RESPIMAT 2.5-2.5 mcg/actuation Mist Inhale 2 puffs into the lungs once daily.    albuterol (PROVENTIL/VENTOLIN HFA) 90 mcg/actuation inhaler Inhale 1-2 puffs into the lungs every 4 (four) hours as needed.    lisinopriL (PRINIVIL,ZESTRIL) 2.5 MG tablet Take 1 tablet (2.5 mg total) by mouth once daily. (Patient not taking: Reported on 10/23/2023)    metoprolol succinate (TOPROL-XL) 25 MG 24 hr tablet Take 1 tablet (25 mg total) by mouth once  daily. (Patient not taking: Reported on 10/23/2023)    multivitamin Tab Take 1 tablet by mouth once daily. (Patient not taking: Reported on 6/14/2023)    thiamine 100 MG tablet Take 1 tablet (100 mg total) by mouth once daily. (Patient not taking: Reported on 6/14/2023)    [DISCONTINUED] ADULT ASPIRIN REGIMEN 81 mg EC tablet Take 81 mg by mouth once daily.    [DISCONTINUED] clopidogreL (PLAVIX) 75 mg tablet Take 75 mg by mouth once daily.    [DISCONTINUED] furosemide (LASIX) 20 MG tablet Take 1 tablet (20 mg total) by mouth once daily.    [DISCONTINUED] traMADoL (ULTRAM) 50 mg tablet Take 1 tablet (50 mg total) by mouth every 8 (eight) hours as needed for Pain.     Family History    None       Tobacco Use    Smoking status: Every Day     Current packs/day: 1.00     Types: Cigarettes    Smokeless tobacco: Never   Substance and Sexual Activity    Alcohol use: Yes     Alcohol/week: 24.0 standard drinks of alcohol     Types: 24 Cans of beer per week     Comment: Refused to quit in June 23    Drug use: Yes     Types: Marijuana    Sexual activity: Not on file     Review of Systems   Constitutional: Positive for malaise/fatigue. Negative for chills, diaphoresis, night sweats, weight gain and weight loss.   HENT:  Negative for congestion, hoarse voice, sore throat and stridor.    Eyes:  Negative for double vision and pain.   Cardiovascular:  Positive for chest pain and dyspnea on exertion. Negative for claudication, cyanosis, irregular heartbeat, leg swelling, near-syncope, orthopnea, palpitations, paroxysmal nocturnal dyspnea and syncope.   Respiratory:  Negative for cough, hemoptysis, shortness of breath, sleep disturbances due to breathing, snoring, sputum production and wheezing.    Endocrine: Negative for cold intolerance, heat intolerance and polydipsia.   Hematologic/Lymphatic: Negative for bleeding problem. Does not bruise/bleed easily.   Skin:  Negative for color change, dry skin and rash.   Musculoskeletal:   Negative for joint swelling and muscle cramps.   Gastrointestinal:  Negative for bloating, abdominal pain, constipation, diarrhea, dysphagia, melena, nausea and vomiting.   Genitourinary:  Negative for flank pain and urgency.   Neurological:  Negative for dizziness, focal weakness, headaches, light-headedness, loss of balance, seizures and weakness.   Psychiatric/Behavioral:  Negative for altered mental status and memory loss. The patient is not nervous/anxious.      Objective:     Vital Signs (Most Recent):  Temp: 96.3 °F (35.7 °C) (warm blankets applied) (10/23/23 1027)  Pulse: 109 (10/23/23 1031)  Resp: (!) 26 (10/23/23 1031)  BP: 118/76 (10/23/23 1031)  SpO2: 96 % (10/23/23 1031) Vital Signs (24h Range):  Temp:  [96.3 °F (35.7 °C)-97.9 °F (36.6 °C)] 96.3 °F (35.7 °C)  Pulse:  [] 109  Resp:  [18-32] 26  SpO2:  [86 %-100 %] 96 %  BP: ()/() 118/76     Weight: 90 kg (198 lb 6.6 oz)  Body mass index is 30.17 kg/m².    SpO2: 96 %       No intake or output data in the 24 hours ending 10/23/23 1113    Lines/Drains/Airways       Peripheral Intravenous Line  Duration                  Peripheral IV - Single Lumen 20 G Left;Posterior Hand -- days         Peripheral IV - Single Lumen 20 G Posterior;Right Hand -- days                     Physical Exam  Eyes:      Pupils: Pupils are equal, round, and reactive to light.   Neck:      Trachea: No tracheal deviation.   Cardiovascular:      Rate and Rhythm: Normal rate and regular rhythm.      Pulses: Intact distal pulses.           Carotid pulses are 2+ on the right side and 2+ on the left side.       Radial pulses are 2+ on the right side and 2+ on the left side.        Femoral pulses are 2+ on the right side and 2+ on the left side.       Popliteal pulses are 2+ on the right side and 2+ on the left side.        Dorsalis pedis pulses are 2+ on the right side and 2+ on the left side.        Posterior tibial pulses are 2+ on the right side and 2+ on the left side.       Heart sounds: Normal heart sounds. No murmur heard.     No friction rub. No gallop.   Pulmonary:      Effort: Pulmonary effort is normal. No respiratory distress.      Breath sounds: Normal breath sounds. No stridor. No wheezing or rales.   Chest:      Chest wall: No tenderness.   Abdominal:      General: There is no distension.      Tenderness: There is no abdominal tenderness. There is no rebound.   Musculoskeletal:         General: No tenderness.      Cervical back: Normal range of motion.   Skin:     General: Skin is warm and dry.   Neurological:      Mental Status: He is alert and oriented to person, place, and time.          Significant Labs: CMP   Recent Labs   Lab 10/23/23  0600 10/23/23  0950   * 128*   K 5.6* 4.8   CL  --  95   CO2 18* 18*   GLU  --  173*   BUN 11.0 12   CREATININE 1.24* 1.2   CALCIUM 9.3 8.6*   PROT  --  8.9*   ALBUMIN 3.5 3.6   BILITOT 0.4 0.4   ALKPHOS 60 65   AST 37* 62*   ALT 17 21   ANIONGAP  --  15   , CBC   Recent Labs   Lab 10/23/23  0600 10/23/23  0950   WBC 10.74 17.74*   HGB 13.0* 14.1   HCT 39.7* 42.6    264   , and Troponin   Recent Labs   Lab 10/23/23  0950   TROPONINI 3.626*       Significant Imaging: Echocardiogram: Transthoracic echo (TTE) complete (Cupid Only):   Results for orders placed or performed during the hospital encounter of 06/07/23   Echo   Result Value Ref Range    BSA 1.96 m2    TDI SEPTAL 0.11 m/s    LV LATERAL E/E' RATIO 14.22 m/s    LV SEPTAL E/E' RATIO 11.64 m/s    Right Atrial Pressure (from IVC) 8 mmHg    EF 40 %    Left Ventricular Outflow Tract Mean Velocity 0.56 cm/s    Left Ventricular Outflow Tract Mean Gradient 2.00 mmHg    TDI LATERAL 0.09 m/s    LVIDd 5.53 3.5 - 6.0 cm    IVS 0.83 0.6 - 1.1 cm    Posterior Wall 0.92 0.6 - 1.1 cm    LVIDs 4.43 (A) 2.1 - 4.0 cm    FS 20 28 - 44 %    LV mass 180.91 g    LA size 4.20 cm    RVDD 3.20 cm    TAPSE 1.85 cm    Left Ventricle Relative Wall Thickness 0.33 cm    AV mean gradient 2 mmHg     AV valve area 3.08 cm2    AV Velocity Ratio 0.90     AV index (prosthetic) 0.89     MV mean gradient 3 mmHg    MV valve area by continuity eq 2.56 cm2    E/A ratio 1.86     Mean e' 0.10 m/s    E wave deceleration time 114.00 msec    LVOT diameter 2.10 cm    LVOT area 3.5 cm2    LVOT peak rex 0.87 m/s    LVOT peak VTI 15.90 cm    Ao peak rex 0.97 m/s    Ao VTI 17.9 cm    LVOT stroke volume 55.04 cm3    AV peak gradient 4 mmHg    MV peak gradient 5 mmHg    TV resting pulmonary artery pressure 35 mmHg    E/E' ratio 12.80 m/s    MV Peak E Rex 1.28 m/s    TR Max Rex 2.62 m/s    MV VTI 21.5 cm    MV Peak A Rex 0.69 m/s    LV Systolic Volume 89.10 mL    LV Systolic Volume Index 45.7 mL/m2    LV Diastolic Volume 149.00 mL    LV Diastolic Volume Index 76.41 mL/m2    LV Mass Index 93 g/m2    RA Major Axis 4.60 cm    Triscuspid Valve Regurgitation Peak Gradient 27 mmHg    LA Volume Index (Mod) 40.5 mL/m2    LA volume (mod) 78.90 cm3    RA Width 4.30 cm    Grady's Biplane MOD Ejection Fraction 4 %    Narrative    · The left ventricle is mildly enlarged with mildly decreased systolic   function.  · Overall there is mild to moderate global hypokinesis; Prounounced basal   inferior and inferioseptal hypokinesis.  · The estimated ejection fraction is 40%.  · Grade III left ventricular diastolic dysfunction.  · Mild right ventricular enlargement with low normal right ventricular   systolic function.  · Mild-to-moderate mitral regurgitation.  · Mild tricuspid regurgitation.  · Mild pulmonic regurgitation.  · The estimated PA systolic pressure is 35 mmHg.  · Mild left atrial enlargement.  · Mild right atrial enlargement.

## 2023-10-23 NOTE — ASSESSMENT & PLAN NOTE
- previous echo with EF 40% and grade II diastolic function, repeat echo pending  - appears volume up on exam with elevated BNP, low Na, and hypoxia  - 40 mg lasix IV x1, monitor response

## 2023-10-23 NOTE — ED NOTES
Amiodarone 360 mg/200 ml (33.3 ml/hr) and normal saline (125 ml/hr) continued upon arrival to ED.

## 2023-10-23 NOTE — SUBJECTIVE & OBJECTIVE
Past Medical History:   Diagnosis Date    Alcoholic cirrhosis     CHF (congestive heart failure)     LV EF 40%    COPD (chronic obstructive pulmonary disease)     Hypertension     Peripheral artery disease     with stents    Sleep apnea        Past Surgical History:   Procedure Laterality Date    COLONOSCOPY N/A 6/10/2023    Procedure: COLONOSCOPY;  Surgeon: Johan Flower MD;  Location: [facility] OR;  Service: Gastroenterology;  Laterality: N/A;    ESOPHAGOGASTRODUODENOSCOPY N/A 6/9/2023    Procedure: EGD;  Surgeon: Tima Teixeira MD;  Location: [facility] ENDOSCOPY;  Service: Gastroenterology;  Laterality: N/A;       Review of patient's allergies indicates:  No Known Allergies    Family History    None       Tobacco Use    Smoking status: Every Day     Current packs/day: 1.00     Types: Cigarettes    Smokeless tobacco: Never   Substance and Sexual Activity    Alcohol use: Yes     Alcohol/week: 24.0 standard drinks of alcohol     Types: 24 Cans of beer per week     Comment: Refused to quit in June 23    Drug use: Yes     Types: Marijuana    Sexual activity: Not on file         Review of Systems   Unable to perform ROS: Acuity of condition     Objective:     Vital Signs (Most Recent):  Temp: 96.3 °F (35.7 °C) (warm blankets applied) (10/23/23 1027)  Pulse: 106 (10/23/23 1215)  Resp: (!) 23 (10/23/23 1215)  BP: 117/75 (10/23/23 1215)  SpO2: 96 % (10/23/23 1215) Vital Signs (24h Range):  Temp:  [96.3 °F (35.7 °C)-97.9 °F (36.6 °C)] 96.3 °F (35.7 °C)  Pulse:  [] 106  Resp:  [17-32] 23  SpO2:  [86 %-100 %] 96 %  BP: ()/() 117/75     Weight: 89.8 kg (198 lb)  Body mass index is 30.11 kg/m².    No intake or output data in the 24 hours ending 10/23/23 1327     Physical Exam  Vitals reviewed.   Constitutional:       General: He is awake. He is not in acute distress.     Appearance: He is well-developed. He is obese. He is ill-appearing.      Comments: Disheveled male semi-upright in bed on venti mask in  NAD   Cardiovascular:      Rate and Rhythm: Normal rate.      Pulses:           Radial pulses are 2+ on the right side and 2+ on the left side.   Pulmonary:      Effort: Pulmonary effort is normal. No tachypnea, bradypnea, accessory muscle usage or respiratory distress.      Breath sounds: Decreased breath sounds and wheezing present.      Comments: On venti mask  Abdominal:      General: There is no distension.      Palpations: Abdomen is soft.   Musculoskeletal:      Right lower leg: Edema present.      Left lower leg: Edema present.   Skin:     General: Skin is warm and dry.   Neurological:      General: No focal deficit present.      Mental Status: He is alert.   Psychiatric:         Behavior: Behavior is cooperative.          Vents:  Oxygen Concentration (%): 50 (10/23/23 1022)    Lines/Drains/Airways       Peripheral Intravenous Line  Duration                  Peripheral IV - Single Lumen 20 G Left;Posterior Hand -- days         Peripheral IV - Single Lumen 20 G Posterior;Right Hand -- days         Peripheral IV - Single Lumen 10/23/23 1150 20 G Anterior;Left Forearm <1 day         Peripheral IV - Single Lumen 10/23/23 1225 20 G Anterior;Right Forearm <1 day                    Significant Labs:    CBC/Anemia Profile:  Recent Labs   Lab 10/23/23  0600 10/23/23  0950 10/23/23  1150   WBC 10.74 17.74* 17.79*   HGB 13.0* 14.1 14.4   HCT 39.7* 42.6 43.5    264 259   .5* 99* 99*   RDW 12.7 12.4 12.6        Chemistries:  Recent Labs   Lab 10/23/23  0600 10/23/23  0950   * 128*   K 5.6* 4.8   CL  --  95   CO2 18* 18*   BUN 11.0 12   CREATININE 1.24* 1.2   CALCIUM 9.3 8.6*   ALBUMIN 3.5 3.6   PROT  --  8.9*   BILITOT 0.4 0.4   ALKPHOS 60 65   ALT 17 21   AST 37* 62*   GLUCOSE 154*  --    MG 2.20  --        All pertinent labs within the past 24 hours have been reviewed.    Significant Imaging:   I have reviewed all pertinent imaging results/findings within the past 24 hours.

## 2023-10-23 NOTE — ASSESSMENT & PLAN NOTE
Patient is identified as having Combined Systolic and Diastolic heart failure that is Acute on chronic. CHF is currently uncontrolled due to Continued edema of extremities. Latest ECHO performed and demonstrates- Results for orders placed during the hospital encounter of 06/07/23    Echo    Interpretation Summary  · The left ventricle is mildly enlarged with mildly decreased systolic function.  · Overall there is mild to moderate global hypokinesis; Prounounced basal inferior and inferioseptal hypokinesis.  · The estimated ejection fraction is 40%.  · Grade III left ventricular diastolic dysfunction.  · Mild right ventricular enlargement with low normal right ventricular systolic function.  · Mild-to-moderate mitral regurgitation.  · Mild tricuspid regurgitation.  · Mild pulmonic regurgitation.  · The estimated PA systolic pressure is 35 mmHg.  · Mild left atrial enlargement.  · Mild right atrial enlargement.  . Continue Beta Blocker, ACE/ARB and Furosemide and monitor clinical status closely. Monitor on telemetry. Patient is off CHF pathway.  Monitor strict Is&Os and daily weights.  Place on fluid restriction of 2 L. Cardiology has been consulted. Continue to stress to patient importance of self efficacy and  on diet for CHF. Last BNP reviewed- and noted below   Recent Labs   Lab 10/23/23  0950   *   .

## 2023-10-23 NOTE — HPI
"Patient presents to the emergency room with chest pain he states started about 2 hours ago, substernal; ECG appeared normal that was taken upon patient's arrival; as I was questioning patient he said "0 my God" and then went unconscious and rhythm indicated ventricular fibrillation; chest compressions were immediately started and pads placed and patient was defibrillated x1 into sinus rhythm; patient regained consciousness about 30 seconds after being defibrillated     The history is provided by the patient.      Review of patient's allergies indicates:  No Known Allergies       Past Medical History:   Diagnosis Date    Alcoholic cirrhosis      CHF (congestive heart failure)       LV EF 40%    COPD (chronic obstructive pulmonary disease)      Hypertension      Peripheral artery disease       with stents    Sleep apnea    Patient seen and examined. Plan trend troponin, treat VT and echo today.  Prior history of anemia and cirrhosis, was planned to have capsule endoscopy and cards w/u for ischemia. Continued etoh issues.  May need follow up [facility] this week.  "

## 2023-10-23 NOTE — Clinical Note
The catheter was reinserted into the and was inserted over the wire into the distal   left anterior descending. Hemodynamics were performed.  Inserted over iFR wire.

## 2023-10-24 PROBLEM — E87.1 HYPONATREMIA: Status: ACTIVE | Noted: 2023-10-24

## 2023-10-24 PROBLEM — R57.0 CARDIOGENIC SHOCK: Status: ACTIVE | Noted: 2023-10-24

## 2023-10-24 PROBLEM — R33.9 URINE RETENTION: Status: ACTIVE | Noted: 2023-10-24

## 2023-10-24 LAB
ALBUMIN SERPL BCP-MCNC: 3.1 G/DL (ref 3.5–5.2)
ALBUMIN SERPL BCP-MCNC: 3.2 G/DL (ref 3.5–5.2)
ALLENS TEST: ABNORMAL
ALP SERPL-CCNC: 47 U/L (ref 55–135)
ALP SERPL-CCNC: 55 U/L (ref 55–135)
ALT SERPL W/O P-5'-P-CCNC: 32 U/L (ref 10–44)
ALT SERPL W/O P-5'-P-CCNC: 35 U/L (ref 10–44)
AMPHET+METHAMPHET UR QL: NEGATIVE
ANION GAP SERPL CALC-SCNC: 12 MMOL/L (ref 8–16)
ANION GAP SERPL CALC-SCNC: 15 MMOL/L (ref 8–16)
APTT PPP: 38.5 SEC (ref 21–32)
APTT PPP: 47 SEC (ref 21–32)
APTT PPP: 49.6 SEC (ref 21–32)
AST SERPL-CCNC: 105 U/L (ref 10–40)
AST SERPL-CCNC: 138 U/L (ref 10–40)
BARBITURATES UR QL SCN>200 NG/ML: NEGATIVE
BASOPHILS # BLD AUTO: 0.02 K/UL (ref 0–0.2)
BASOPHILS NFR BLD: 0.2 % (ref 0–1.9)
BENZODIAZ UR QL SCN>200 NG/ML: NEGATIVE
BILIRUB DIRECT SERPL-MCNC: 0.2 MG/DL (ref 0.1–0.3)
BILIRUB SERPL-MCNC: 0.2 MG/DL (ref 0.1–1)
BILIRUB SERPL-MCNC: 0.3 MG/DL (ref 0.1–1)
BILIRUB UR QL STRIP: NEGATIVE
BILIRUB UR QL STRIP: NEGATIVE
BUN SERPL-MCNC: 19 MG/DL (ref 8–23)
BUN SERPL-MCNC: 19 MG/DL (ref 8–23)
BZE UR QL SCN: NEGATIVE
CALCIUM SERPL-MCNC: 7.8 MG/DL (ref 8.7–10.5)
CALCIUM SERPL-MCNC: 8.4 MG/DL (ref 8.7–10.5)
CANNABINOIDS UR QL SCN: NEGATIVE
CHLORIDE SERPL-SCNC: 94 MMOL/L (ref 95–110)
CHLORIDE SERPL-SCNC: 94 MMOL/L (ref 95–110)
CHOLEST SERPL-MCNC: 136 MG/DL (ref 120–199)
CHOLEST/HDLC SERPL: 3.9 {RATIO} (ref 2–5)
CK SERPL-CCNC: 1121 U/L (ref 20–200)
CLARITY UR: CLEAR
CLARITY UR: CLEAR
CO2 SERPL-SCNC: 18 MMOL/L (ref 23–29)
CO2 SERPL-SCNC: 21 MMOL/L (ref 23–29)
COLOR UR: YELLOW
COLOR UR: YELLOW
CREAT SERPL-MCNC: 1.4 MG/DL (ref 0.5–1.4)
CREAT SERPL-MCNC: 1.6 MG/DL (ref 0.5–1.4)
CREAT UR-MCNC: 142.7 MG/DL (ref 23–375)
DELSYS: ABNORMAL
DIFFERENTIAL METHOD: ABNORMAL
EOSINOPHIL # BLD AUTO: 0 K/UL (ref 0–0.5)
EOSINOPHIL NFR BLD: 0 % (ref 0–8)
EP: 8
ERYTHROCYTE [DISTWIDTH] IN BLOOD BY AUTOMATED COUNT: 12.2 % (ref 11.5–14.5)
ERYTHROCYTE [SEDIMENTATION RATE] IN BLOOD BY WESTERGREN METHOD: 18 MM/H
EST. GFR  (NO RACE VARIABLE): 48 ML/MIN/1.73 M^2
EST. GFR  (NO RACE VARIABLE): 56 ML/MIN/1.73 M^2
FIO2: 50
GLUCOSE SERPL-MCNC: 192 MG/DL (ref 70–110)
GLUCOSE SERPL-MCNC: 220 MG/DL (ref 70–110)
GLUCOSE UR QL STRIP: NEGATIVE
GLUCOSE UR QL STRIP: NEGATIVE
HCO3 UR-SCNC: 21.7 MMOL/L (ref 24–28)
HCT VFR BLD AUTO: 33.7 % (ref 40–54)
HDLC SERPL-MCNC: 35 MG/DL (ref 40–75)
HDLC SERPL: 25.7 % (ref 20–50)
HGB BLD-MCNC: 11.5 G/DL (ref 14–18)
HGB UR QL STRIP: NEGATIVE
HGB UR QL STRIP: NEGATIVE
IMM GRANULOCYTES # BLD AUTO: 0.08 K/UL (ref 0–0.04)
IMM GRANULOCYTES NFR BLD AUTO: 0.8 % (ref 0–0.5)
IP: 14
KETONES UR QL STRIP: ABNORMAL
KETONES UR QL STRIP: ABNORMAL
LACTATE SERPL-SCNC: 1 MMOL/L (ref 0.5–2.2)
LACTATE SERPL-SCNC: 4 MMOL/L (ref 0.5–2.2)
LACTATE SERPL-SCNC: 6.9 MMOL/L (ref 0.5–2.2)
LDLC SERPL CALC-MCNC: 83.4 MG/DL (ref 63–159)
LEUKOCYTE ESTERASE UR QL STRIP: NEGATIVE
LEUKOCYTE ESTERASE UR QL STRIP: NEGATIVE
LYMPHOCYTES # BLD AUTO: 0.5 K/UL (ref 1–4.8)
LYMPHOCYTES NFR BLD: 5.2 % (ref 18–48)
MAGNESIUM SERPL-MCNC: 2.8 MG/DL (ref 1.6–2.6)
MCH RBC QN AUTO: 32.3 PG (ref 27–31)
MCHC RBC AUTO-ENTMCNC: 34.1 G/DL (ref 32–36)
MCV RBC AUTO: 95 FL (ref 82–98)
METHADONE UR QL SCN>300 NG/ML: NEGATIVE
MODE: ABNORMAL
MONOCYTES # BLD AUTO: 0.2 K/UL (ref 0.3–1)
MONOCYTES NFR BLD: 1.8 % (ref 4–15)
NEUTROPHILS # BLD AUTO: 9.2 K/UL (ref 1.8–7.7)
NEUTROPHILS NFR BLD: 92 % (ref 38–73)
NITRITE UR QL STRIP: NEGATIVE
NITRITE UR QL STRIP: NEGATIVE
NONHDLC SERPL-MCNC: 101 MG/DL
NRBC BLD-RTO: 0 /100 WBC
OPIATES UR QL SCN: NEGATIVE
PCO2 BLDA: 37.1 MMHG (ref 35–45)
PCP UR QL SCN>25 NG/ML: NEGATIVE
PH SMN: 7.38 [PH] (ref 7.35–7.45)
PH UR STRIP: 5 [PH] (ref 5–8)
PH UR STRIP: 5 [PH] (ref 5–8)
PHOSPHATE SERPL-MCNC: 3 MG/DL (ref 2.7–4.5)
PLATELET # BLD AUTO: 227 K/UL (ref 150–450)
PMV BLD AUTO: 10.8 FL (ref 9.2–12.9)
PO2 BLDA: 90 MMHG (ref 80–100)
POC BE: -4 MMOL/L
POC SATURATED O2: 97 % (ref 95–100)
POCT GLUCOSE: 147 MG/DL (ref 70–110)
POCT GLUCOSE: 148 MG/DL (ref 70–110)
POCT GLUCOSE: 177 MG/DL (ref 70–110)
POCT GLUCOSE: 182 MG/DL (ref 70–110)
POCT GLUCOSE: 202 MG/DL (ref 70–110)
POTASSIUM SERPL-SCNC: 4.3 MMOL/L (ref 3.5–5.1)
POTASSIUM SERPL-SCNC: 4.7 MMOL/L (ref 3.5–5.1)
PROT SERPL-MCNC: 7.4 G/DL (ref 6–8.4)
PROT SERPL-MCNC: 8.1 G/DL (ref 6–8.4)
PROT UR QL STRIP: NEGATIVE
PROT UR QL STRIP: NEGATIVE
RBC # BLD AUTO: 3.56 M/UL (ref 4.6–6.2)
SAMPLE: ABNORMAL
SITE: ABNORMAL
SODIUM SERPL-SCNC: 127 MMOL/L (ref 136–145)
SODIUM SERPL-SCNC: 127 MMOL/L (ref 136–145)
SP GR UR STRIP: 1.02 (ref 1–1.03)
SP GR UR STRIP: 1.02 (ref 1–1.03)
TOXICOLOGY INFORMATION: NORMAL
TRIGL SERPL-MCNC: 88 MG/DL (ref 30–150)
TROPONIN I SERPL DL<=0.01 NG/ML-MCNC: 19.91 NG/ML (ref 0–0.03)
TROPONIN I SERPL DL<=0.01 NG/ML-MCNC: 21.84 NG/ML (ref 0–0.03)
TROPONIN I SERPL DL<=0.01 NG/ML-MCNC: 23.98 NG/ML (ref 0–0.03)
TROPONIN I SERPL DL<=0.01 NG/ML-MCNC: 33.09 NG/ML (ref 0–0.03)
URN SPEC COLLECT METH UR: ABNORMAL
URN SPEC COLLECT METH UR: ABNORMAL
UROBILINOGEN UR STRIP-ACNC: NEGATIVE EU/DL
UROBILINOGEN UR STRIP-ACNC: NEGATIVE EU/DL
WBC # BLD AUTO: 10 K/UL (ref 3.9–12.7)

## 2023-10-24 PROCEDURE — 80053 COMPREHEN METABOLIC PANEL: CPT | Performed by: NURSE PRACTITIONER

## 2023-10-24 PROCEDURE — 25000003 PHARM REV CODE 250: Performed by: PHYSICIAN ASSISTANT

## 2023-10-24 PROCEDURE — 63600175 PHARM REV CODE 636 W HCPCS: Performed by: INTERNAL MEDICINE

## 2023-10-24 PROCEDURE — 82550 ASSAY OF CK (CPK): CPT | Performed by: NURSE PRACTITIONER

## 2023-10-24 PROCEDURE — 80061 LIPID PANEL: CPT | Performed by: NURSE PRACTITIONER

## 2023-10-24 PROCEDURE — 81003 URINALYSIS AUTO W/O SCOPE: CPT | Mod: 59 | Performed by: EMERGENCY MEDICINE

## 2023-10-24 PROCEDURE — 36215 PLACE CATHETER IN ARTERY: CPT | Mod: LT | Performed by: INTERNAL MEDICINE

## 2023-10-24 PROCEDURE — 94660 CPAP INITIATION&MGMT: CPT

## 2023-10-24 PROCEDURE — 75625 PR  ANGIO AORTOGRAM ABD SERIAL: ICD-10-PCS | Mod: 26,59,, | Performed by: INTERNAL MEDICINE

## 2023-10-24 PROCEDURE — 25500020 PHARM REV CODE 255: Performed by: INTERNAL MEDICINE

## 2023-10-24 PROCEDURE — 99900035 HC TECH TIME PER 15 MIN (STAT)

## 2023-10-24 PROCEDURE — 80076 HEPATIC FUNCTION PANEL: CPT | Performed by: PHYSICIAN ASSISTANT

## 2023-10-24 PROCEDURE — 25000003 PHARM REV CODE 250: Performed by: NURSE PRACTITIONER

## 2023-10-24 PROCEDURE — 27100171 HC OXYGEN HIGH FLOW UP TO 24 HOURS

## 2023-10-24 PROCEDURE — 83605 ASSAY OF LACTIC ACID: CPT | Mod: 91 | Performed by: NURSE PRACTITIONER

## 2023-10-24 PROCEDURE — 93460 PR CATH PLACE/CORON ANGIO, IMG SUPER/INTERP,R&L HRT CATH, L HRT VENTRIC: ICD-10-PCS | Mod: 26,59,51, | Performed by: INTERNAL MEDICINE

## 2023-10-24 PROCEDURE — 20000000 HC ICU ROOM

## 2023-10-24 PROCEDURE — C1769 GUIDE WIRE: HCPCS | Performed by: INTERNAL MEDICINE

## 2023-10-24 PROCEDURE — C1751 CATH, INF, PER/CENT/MIDLINE: HCPCS | Performed by: INTERNAL MEDICINE

## 2023-10-24 PROCEDURE — 83735 ASSAY OF MAGNESIUM: CPT | Performed by: NURSE PRACTITIONER

## 2023-10-24 PROCEDURE — 75625 CONTRAST EXAM ABDOMINL AORTA: CPT | Mod: 26,59,, | Performed by: INTERNAL MEDICINE

## 2023-10-24 PROCEDURE — 99233 PR SUBSEQUENT HOSPITAL CARE,LEVL III: ICD-10-PCS | Mod: ,,, | Performed by: INTERNAL MEDICINE

## 2023-10-24 PROCEDURE — 33967 INSERT I-AORT PERCUT DEVICE: CPT | Performed by: INTERNAL MEDICINE

## 2023-10-24 PROCEDURE — 33967 PR IABP INSERTION: ICD-10-PCS | Mod: 59,,, | Performed by: INTERNAL MEDICINE

## 2023-10-24 PROCEDURE — 80307 DRUG TEST PRSMV CHEM ANLYZR: CPT | Performed by: NURSE PRACTITIONER

## 2023-10-24 PROCEDURE — 93460 R&L HRT ART/VENTRICLE ANGIO: CPT | Mod: 26,59,51, | Performed by: INTERNAL MEDICINE

## 2023-10-24 PROCEDURE — 84100 ASSAY OF PHOSPHORUS: CPT | Performed by: NURSE PRACTITIONER

## 2023-10-24 PROCEDURE — 36415 COLL VENOUS BLD VENIPUNCTURE: CPT | Performed by: INTERNAL MEDICINE

## 2023-10-24 PROCEDURE — 75716 PR  ANGIO EXTERMITY BILAT: ICD-10-PCS | Mod: 26,59,, | Performed by: INTERNAL MEDICINE

## 2023-10-24 PROCEDURE — 94761 N-INVAS EAR/PLS OXIMETRY MLT: CPT

## 2023-10-24 PROCEDURE — 99233 SBSQ HOSP IP/OBS HIGH 50: CPT | Mod: ,,, | Performed by: INTERNAL MEDICINE

## 2023-10-24 PROCEDURE — 99152 MOD SED SAME PHYS/QHP 5/>YRS: CPT | Mod: ,,, | Performed by: INTERNAL MEDICINE

## 2023-10-24 PROCEDURE — 75716 ARTERY X-RAYS ARMS/LEGS: CPT | Performed by: INTERNAL MEDICINE

## 2023-10-24 PROCEDURE — 25000003 PHARM REV CODE 250: Performed by: INTERNAL MEDICINE

## 2023-10-24 PROCEDURE — 85730 THROMBOPLASTIN TIME PARTIAL: CPT | Mod: 91 | Performed by: INTERNAL MEDICINE

## 2023-10-24 PROCEDURE — 63600175 PHARM REV CODE 636 W HCPCS: Performed by: NURSE PRACTITIONER

## 2023-10-24 PROCEDURE — 84484 ASSAY OF TROPONIN QUANT: CPT | Mod: 91 | Performed by: NURSE PRACTITIONER

## 2023-10-24 PROCEDURE — 99152 MOD SED SAME PHYS/QHP 5/>YRS: CPT | Performed by: INTERNAL MEDICINE

## 2023-10-24 PROCEDURE — 85025 COMPLETE CBC W/AUTO DIFF WBC: CPT | Performed by: NURSE PRACTITIONER

## 2023-10-24 PROCEDURE — 75716 ARTERY X-RAYS ARMS/LEGS: CPT | Mod: 26,59,, | Performed by: INTERNAL MEDICINE

## 2023-10-24 PROCEDURE — 94640 AIRWAY INHALATION TREATMENT: CPT

## 2023-10-24 PROCEDURE — 83605 ASSAY OF LACTIC ACID: CPT | Mod: 91 | Performed by: PHYSICIAN ASSISTANT

## 2023-10-24 PROCEDURE — 93460 R&L HRT ART/VENTRICLE ANGIO: CPT | Performed by: INTERNAL MEDICINE

## 2023-10-24 PROCEDURE — 80048 BASIC METABOLIC PNL TOTAL CA: CPT | Mod: XB | Performed by: NURSE PRACTITIONER

## 2023-10-24 PROCEDURE — 82803 BLOOD GASES ANY COMBINATION: CPT

## 2023-10-24 PROCEDURE — 75625 CONTRAST EXAM ABDOMINL AORTA: CPT | Performed by: INTERNAL MEDICINE

## 2023-10-24 PROCEDURE — 36620 INSERTION CATHETER ARTERY: CPT

## 2023-10-24 PROCEDURE — 84484 ASSAY OF TROPONIN QUANT: CPT | Performed by: NURSE PRACTITIONER

## 2023-10-24 PROCEDURE — 85730 THROMBOPLASTIN TIME PARTIAL: CPT | Performed by: INTERNAL MEDICINE

## 2023-10-24 PROCEDURE — 36215 PLACE CATHETER IN ARTERY: CPT | Mod: LT,,, | Performed by: INTERNAL MEDICINE

## 2023-10-24 PROCEDURE — 27201423 OPTIME MED/SURG SUP & DEVICES STERILE SUPPLY: Performed by: INTERNAL MEDICINE

## 2023-10-24 PROCEDURE — 36215 PR PLACE CATH SELECTIVE ART,NECK: ICD-10-PCS | Mod: LT,,, | Performed by: INTERNAL MEDICINE

## 2023-10-24 PROCEDURE — 83605 ASSAY OF LACTIC ACID: CPT | Performed by: NURSE PRACTITIONER

## 2023-10-24 PROCEDURE — 33967 INSERT I-AORT PERCUT DEVICE: CPT | Mod: 59,,, | Performed by: INTERNAL MEDICINE

## 2023-10-24 PROCEDURE — C1894 INTRO/SHEATH, NON-LASER: HCPCS | Performed by: INTERNAL MEDICINE

## 2023-10-24 PROCEDURE — 25000242 PHARM REV CODE 250 ALT 637 W/ HCPCS: Performed by: NURSE PRACTITIONER

## 2023-10-24 PROCEDURE — 36600 WITHDRAWAL OF ARTERIAL BLOOD: CPT

## 2023-10-24 PROCEDURE — 99153 MOD SED SAME PHYS/QHP EA: CPT | Performed by: INTERNAL MEDICINE

## 2023-10-24 PROCEDURE — 99152 PR MOD CONSCIOUS SEDATION, SAME PHYS, 5+ YRS, FIRST 15 MIN: ICD-10-PCS | Mod: ,,, | Performed by: INTERNAL MEDICINE

## 2023-10-24 RX ORDER — ACETAMINOPHEN 325 MG/1
650 TABLET ORAL EVERY 4 HOURS PRN
Status: DISCONTINUED | OUTPATIENT
Start: 2023-10-24 | End: 2023-10-26 | Stop reason: CLARIF

## 2023-10-24 RX ORDER — FUROSEMIDE 10 MG/ML
40 INJECTION INTRAMUSCULAR; INTRAVENOUS
Status: DISCONTINUED | OUTPATIENT
Start: 2023-10-25 | End: 2023-10-26

## 2023-10-24 RX ORDER — DOBUTAMINE HYDROCHLORIDE 400 MG/100ML
3 INJECTION INTRAVENOUS CONTINUOUS
Status: DISCONTINUED | OUTPATIENT
Start: 2023-10-24 | End: 2023-10-29

## 2023-10-24 RX ORDER — LIDOCAINE HYDROCHLORIDE 20 MG/ML
INJECTION, SOLUTION INFILTRATION; PERINEURAL
Status: DISCONTINUED | OUTPATIENT
Start: 2023-10-24 | End: 2023-10-24 | Stop reason: HOSPADM

## 2023-10-24 RX ORDER — ATORVASTATIN CALCIUM 40 MG/1
80 TABLET, FILM COATED ORAL DAILY
Status: DISCONTINUED | OUTPATIENT
Start: 2023-10-25 | End: 2023-11-03 | Stop reason: HOSPADM

## 2023-10-24 RX ORDER — ONDANSETRON 8 MG/1
8 TABLET, ORALLY DISINTEGRATING ORAL EVERY 8 HOURS PRN
Status: DISCONTINUED | OUTPATIENT
Start: 2023-10-24 | End: 2023-10-26 | Stop reason: SDUPTHER

## 2023-10-24 RX ORDER — FENTANYL CITRATE 50 UG/ML
INJECTION, SOLUTION INTRAMUSCULAR; INTRAVENOUS
Status: DISCONTINUED | OUTPATIENT
Start: 2023-10-24 | End: 2023-10-24 | Stop reason: HOSPADM

## 2023-10-24 RX ORDER — CEFAZOLIN SODIUM 1 G/3ML
INJECTION, POWDER, FOR SOLUTION INTRAMUSCULAR; INTRAVENOUS
Status: DISCONTINUED | OUTPATIENT
Start: 2023-10-24 | End: 2023-10-24 | Stop reason: HOSPADM

## 2023-10-24 RX ORDER — NITROGLYCERIN 5 MG/ML
INJECTION, SOLUTION INTRAVENOUS
Status: DISCONTINUED | OUTPATIENT
Start: 2023-10-24 | End: 2023-10-24 | Stop reason: HOSPADM

## 2023-10-24 RX ORDER — HEPARIN SODIUM 1000 [USP'U]/ML
INJECTION, SOLUTION INTRAVENOUS; SUBCUTANEOUS
Status: DISCONTINUED | OUTPATIENT
Start: 2023-10-24 | End: 2023-10-24 | Stop reason: HOSPADM

## 2023-10-24 RX ORDER — SODIUM CHLORIDE 9 MG/ML
INJECTION, SOLUTION INTRAVENOUS CONTINUOUS
Status: DISCONTINUED | OUTPATIENT
Start: 2023-10-24 | End: 2023-10-24

## 2023-10-24 RX ORDER — VERAPAMIL HYDROCHLORIDE 2.5 MG/ML
INJECTION, SOLUTION INTRAVENOUS
Status: DISCONTINUED | OUTPATIENT
Start: 2023-10-24 | End: 2023-10-24 | Stop reason: HOSPADM

## 2023-10-24 RX ORDER — SODIUM CHLORIDE 9 MG/ML
INJECTION, SOLUTION INTRAVENOUS CONTINUOUS
Status: ACTIVE | OUTPATIENT
Start: 2023-10-24 | End: 2023-10-25

## 2023-10-24 RX ORDER — MIDAZOLAM HYDROCHLORIDE 1 MG/ML
INJECTION, SOLUTION INTRAMUSCULAR; INTRAVENOUS
Status: DISCONTINUED | OUTPATIENT
Start: 2023-10-24 | End: 2023-10-24 | Stop reason: HOSPADM

## 2023-10-24 RX ORDER — DEXMEDETOMIDINE HYDROCHLORIDE 4 UG/ML
0-1.4 INJECTION INTRAVENOUS CONTINUOUS
Status: DISCONTINUED | OUTPATIENT
Start: 2023-10-25 | End: 2023-10-28

## 2023-10-24 RX ORDER — FUROSEMIDE 10 MG/ML
INJECTION INTRAMUSCULAR; INTRAVENOUS
Status: DISCONTINUED | OUTPATIENT
Start: 2023-10-24 | End: 2023-10-24 | Stop reason: HOSPADM

## 2023-10-24 RX ADMIN — FOLIC ACID 1 MG: 1 TABLET ORAL at 09:10

## 2023-10-24 RX ADMIN — ASPIRIN 81 MG CHEWABLE TABLET 81 MG: 81 TABLET CHEWABLE at 09:10

## 2023-10-24 RX ADMIN — MUPIROCIN: 20 OINTMENT TOPICAL at 09:10

## 2023-10-24 RX ADMIN — CHLORDIAZEPOXIDE HYDROCHLORIDE 5 MG: 5 CAPSULE ORAL at 02:10

## 2023-10-24 RX ADMIN — DEXMEDETOMIDINE HYDROCHLORIDE 0.3 MCG/KG/HR: 4 INJECTION INTRAVENOUS at 11:10

## 2023-10-24 RX ADMIN — BUDESONIDE INHALATION 0.5 MG: 0.5 SUSPENSION RESPIRATORY (INHALATION) at 09:10

## 2023-10-24 RX ADMIN — BUDESONIDE INHALATION 0.5 MG: 0.5 SUSPENSION RESPIRATORY (INHALATION) at 07:10

## 2023-10-24 RX ADMIN — METHYLPREDNISOLONE SODIUM SUCCINATE 60 MG: 40 INJECTION, POWDER, FOR SOLUTION INTRAMUSCULAR; INTRAVENOUS at 11:10

## 2023-10-24 RX ADMIN — METHYLPREDNISOLONE SODIUM SUCCINATE 60 MG: 40 INJECTION, POWDER, FOR SOLUTION INTRAMUSCULAR; INTRAVENOUS at 03:10

## 2023-10-24 RX ADMIN — ARFORMOTEROL TARTRATE 15 MCG: 15 SOLUTION RESPIRATORY (INHALATION) at 07:10

## 2023-10-24 RX ADMIN — SODIUM CHLORIDE: 9 INJECTION, SOLUTION INTRAVENOUS at 03:10

## 2023-10-24 RX ADMIN — SODIUM CHLORIDE 500 ML: 9 INJECTION, SOLUTION INTRAVENOUS at 04:10

## 2023-10-24 RX ADMIN — DOBUTAMINE IN DEXTROSE 5 MCG/KG/MIN: 400 INJECTION, SOLUTION INTRAVENOUS at 03:10

## 2023-10-24 RX ADMIN — GABAPENTIN 300 MG: 300 CAPSULE ORAL at 08:10

## 2023-10-24 RX ADMIN — MUPIROCIN: 20 OINTMENT TOPICAL at 08:10

## 2023-10-24 RX ADMIN — CHLORHEXIDINE GLUCONATE 0.12% ORAL RINSE 15 ML: 1.2 LIQUID ORAL at 09:10

## 2023-10-24 RX ADMIN — CEFTRIAXONE 1 G: 1 INJECTION, POWDER, FOR SOLUTION INTRAMUSCULAR; INTRAVENOUS at 08:10

## 2023-10-24 RX ADMIN — METHYLPREDNISOLONE SODIUM SUCCINATE 60 MG: 40 INJECTION, POWDER, FOR SOLUTION INTRAMUSCULAR; INTRAVENOUS at 02:10

## 2023-10-24 RX ADMIN — GABAPENTIN 300 MG: 300 CAPSULE ORAL at 02:10

## 2023-10-24 RX ADMIN — SODIUM CHLORIDE: 9 INJECTION, SOLUTION INTRAVENOUS at 10:10

## 2023-10-24 RX ADMIN — IPRATROPIUM BROMIDE AND ALBUTEROL SULFATE 3 ML: 2.5; .5 SOLUTION RESPIRATORY (INHALATION) at 01:10

## 2023-10-24 RX ADMIN — AMIODARONE HYDROCHLORIDE 0.5 MG/MIN: 1.8 INJECTION, SOLUTION INTRAVENOUS at 12:10

## 2023-10-24 RX ADMIN — CHLORDIAZEPOXIDE HYDROCHLORIDE 5 MG: 5 CAPSULE ORAL at 05:10

## 2023-10-24 RX ADMIN — AMIODARONE HYDROCHLORIDE 0.5 MG/MIN: 1.8 INJECTION, SOLUTION INTRAVENOUS at 01:10

## 2023-10-24 RX ADMIN — INSULIN ASPART 1 UNITS: 100 INJECTION, SOLUTION INTRAVENOUS; SUBCUTANEOUS at 12:10

## 2023-10-24 RX ADMIN — SODIUM CHLORIDE 250 ML: 9 INJECTION, SOLUTION INTRAVENOUS at 01:10

## 2023-10-24 RX ADMIN — IPRATROPIUM BROMIDE AND ALBUTEROL SULFATE 3 ML: 2.5; .5 SOLUTION RESPIRATORY (INHALATION) at 04:10

## 2023-10-24 RX ADMIN — ARFORMOTEROL TARTRATE 15 MCG: 15 SOLUTION RESPIRATORY (INHALATION) at 09:10

## 2023-10-24 RX ADMIN — METHYLPREDNISOLONE SODIUM SUCCINATE 60 MG: 40 INJECTION, POWDER, FOR SOLUTION INTRAMUSCULAR; INTRAVENOUS at 05:10

## 2023-10-24 RX ADMIN — PANTOPRAZOLE SODIUM 40 MG: 40 TABLET, DELAYED RELEASE ORAL at 09:10

## 2023-10-24 RX ADMIN — CHLORDIAZEPOXIDE HYDROCHLORIDE 5 MG: 5 CAPSULE ORAL at 09:10

## 2023-10-24 RX ADMIN — PANTOPRAZOLE SODIUM 40 MG: 40 TABLET, DELAYED RELEASE ORAL at 08:10

## 2023-10-24 RX ADMIN — GABAPENTIN 300 MG: 300 CAPSULE ORAL at 09:10

## 2023-10-24 RX ADMIN — HEPARIN SODIUM 14 UNITS/KG/HR: 10000 INJECTION, SOLUTION INTRAVENOUS at 07:10

## 2023-10-24 RX ADMIN — Medication 100 MG: at 09:10

## 2023-10-24 NOTE — ASSESSMENT & PLAN NOTE
Treated with shock therapy  Continue amiodarone  Ischemic evaluation  ETOH evaluation and cardiac echo pending    10/24/23  -Echo with EF of 20-25%  -Ischemic evaluation pending  -Continue amiodarone gtt for now  -Keep K > 4; Mg > 2

## 2023-10-24 NOTE — SUBJECTIVE & OBJECTIVE
Review of Systems   Constitutional: Positive for malaise/fatigue.   HENT: Negative.     Eyes: Negative.    Cardiovascular:  Positive for dyspnea on exertion.   Respiratory: Negative.     Endocrine: Negative.    Hematologic/Lymphatic: Negative.    Skin: Negative.    Musculoskeletal:  Positive for arthritis and joint pain.   Gastrointestinal: Negative.    Genitourinary: Negative.    Neurological: Negative.    Psychiatric/Behavioral: Negative.     Allergic/Immunologic: Negative.      Objective:     Vital Signs (Most Recent):  Temp: 98 °F (36.7 °C) (10/24/23 1101)  Pulse: 87 (10/24/23 0739)  Resp: 20 (10/24/23 0739)  BP: (!) 123/58 (10/24/23 0600)  SpO2: 96 % (10/24/23 0739) Vital Signs (24h Range):  Temp:  [97.5 °F (36.4 °C)-98.3 °F (36.8 °C)] 98 °F (36.7 °C)  Pulse:  [] 87  Resp:  [13-32] 20  SpO2:  [91 %-100 %] 96 %  BP: (101-130)/(58-84) 123/58  Arterial Line BP: (117-146)/(48-60) 127/58     Weight: 81.9 kg (180 lb 8.9 oz)  Body mass index is 27.45 kg/m².     SpO2: 96 %         Intake/Output Summary (Last 24 hours) at 10/24/2023 1334  Last data filed at 10/24/2023 1200  Gross per 24 hour   Intake 1777.64 ml   Output 525 ml   Net 1252.64 ml       Lines/Drains/Airways       Drain  Duration                  Urethral Catheter 10/24/23 1120 Coude 18 Fr. <1 day              Arterial Line  Duration             Arterial Line 10/24/23 0301 Right Radial <1 day              Peripheral Intravenous Line  Duration                  Peripheral IV - Single Lumen 20 G Left;Posterior Hand -- days         Peripheral IV - Single Lumen 20 G Posterior;Right Hand -- days         Peripheral IV - Single Lumen 10/23/23 1225 20 G Anterior;Right Forearm 1 day                       Physical Exam  Vitals and nursing note reviewed.   Constitutional:       General: He is not in acute distress.     Appearance: He is well-developed. He is ill-appearing. He is not diaphoretic.      Comments: On supplemental O2   HENT:      Head:  Normocephalic and atraumatic.   Eyes:      General:         Right eye: No discharge.         Left eye: No discharge.      Pupils: Pupils are equal, round, and reactive to light.   Cardiovascular:      Rate and Rhythm: Normal rate and regular rhythm.      Heart sounds: Normal heart sounds, S1 normal and S2 normal. No murmur heard.  Pulmonary:      Effort: Pulmonary effort is normal. No respiratory distress.      Breath sounds: Rhonchi present. No wheezing or rales.   Abdominal:      General: There is no distension.      Tenderness: There is no rebound.   Musculoskeletal:      Right lower leg: No edema.      Left lower leg: No edema.   Skin:     General: Skin is warm and dry.      Findings: No erythema.   Neurological:      General: No focal deficit present.      Mental Status: He is alert and oriented to person, place, and time.   Psychiatric:         Mood and Affect: Mood normal.         Behavior: Behavior normal.         Thought Content: Thought content normal.            Significant Labs: CMP   Recent Labs   Lab 10/23/23  0950 10/24/23  0250 10/24/23  0513 10/24/23  1226   * 127* 127*  --    K 4.8 4.7 4.3  --    CL 95 94* 94*  --    CO2 18* 18* 21*  --    * 220* 192*  --    BUN 12 19 19  --    CREATININE 1.2 1.6* 1.4  --    CALCIUM 8.6* 8.4* 7.8*  --    PROT 8.9* 8.1  --  7.4   ALBUMIN 3.6 3.2*  --  3.1*   BILITOT 0.4 0.2  --  0.3   ALKPHOS 65 55  --  47*   AST 62* 138*  --  105*   ALT 21 35  --  32   ANIONGAP 15 15 12  --    , CBC   Recent Labs   Lab 10/23/23  0950 10/23/23  1150 10/24/23  0513   WBC 17.74* 17.79* 10.00   HGB 14.1 14.4 11.5*   HCT 42.6 43.5 33.7*    259 227   , Troponin   Recent Labs   Lab 10/24/23  0043 10/24/23  0912 10/24/23  1226   TROPONINI 33.090* 23.984* 21.839*   , and All pertinent lab results from the last 24 hours have been reviewed.    Significant Imaging: Echocardiogram: Transthoracic echo (TTE) complete (Cupid Only):   Results for orders placed or performed  during the hospital encounter of 10/23/23   Echo   Result Value Ref Range    BSA 2.08 m2    IVRT 49.48 msec    TDI LATERAL 0.10 m/s    Left Ventricular Outflow Tract Mean Gradient 1.13 mmHg    Left Ventricular Outflow Tract Mean Velocity 0.49 cm/s    MV stenosis pressure 1/2 time 34.73 ms    Mr max rex 3.50 m/s    TR Max Rex 2.09 m/s    Ao root annulus 3.38 cm    Ao peak rex 0.86 m/s    Posterior Wall 0.95 0.6 - 1.1 cm    LVOT diameter 1.95 cm    LVOT peak rex 0.76 m/s    LVOT peak VTI 10.90 cm    E wave deceleration time 119.77 msec    MV Peak A Rex 0.40 m/s    MV Peak E Rex 0.65 m/s    RA Major Axis 2.94 cm    TAPSE 1.82 cm    PV mean gradient 1 mmHg    RVOT peak rex 0.57 m/s    RVOT peak VTI 9.8 cm    IVS 0.96 0.6 - 1.1 cm    Ao VTI 11.90 cm    AV mean gradient 2 mmHg    LVIDd 4.24 3.5 - 6.0 cm    LVIDs 3.71 2.1 - 4.0 cm    Left Atrium Major Axis 4.28 cm    Left Atrium Minor Axis 4.19 cm    RV mid diameter 3.37 cm    STJ 3.35 cm    Ascending aorta 3.41 cm    LV Systolic Volume 58.38 mL    LV Diastolic Volume 80.17 mL    TDI SEPTAL 0.09 m/s    LA size 2.39 cm    IVC diameter 1.42 cm    LV LATERAL E/E' RATIO 6.50 m/s    LV SEPTAL E/E' RATIO 7.22 m/s    FS 13 28 - 44 %    LV mass 130.71 g    ZLVIDD -3.61     ZLVIDS -0.04     Left Ventricle Relative Wall Thickness 0.45 cm    AV valve area 2.73 cm²    AV Velocity Ratio 0.88     AV index (prosthetic) 0.92     MV valve area p 1/2 method 6.33 cm2    E/A ratio 1.63     Mean e' 0.10 m/s    LVOT area 3.0 cm2    LVOT stroke volume 32.54 cm3    AV peak gradient 3 mmHg    E/E' ratio 6.84 m/s    LV Systolic Volume Index 28.6 mL/m2    LV Diastolic Volume Index 39.30 mL/m2    LV Mass Index 64 g/m2    Triscuspid Valve Regurgitation Peak Gradient 17 mmHg    ADDIS by Velocity Ratio 2.64 cm²    LA Volume Index 11.4 mL/m2    LA volume 23.23 cm3    LA WIDTH 2.7 cm    RA Width 2.3 cm    TV resting pulmonary artery pressure 20 mmHg    RV TB RVSP 5 mmHg    Est. RA pres 3 mmHg    Narrative       Left Ventricle: The left ventricle is mildly dilated. Normal wall   thickness. There is concentric remodeling. Moderate global hypokinesis   present. There is severely reduced systolic function with a visually   estimated ejection fraction of 20 - 25%. There is normal diastolic   function.    Right Ventricle: Normal right ventricular cavity size. Wall thickness   is normal. Right ventricle wall motion  is normal. Systolic function is   normal.    Mitral Valve: There is mild to moderate regurgitation with a centrally   directed jet.    Pulmonary Artery: The estimated pulmonary artery systolic pressure is   20 mmHg.    IVC/SVC: Normal venous pressure at 3 mmHg.      and X-Ray: CXR: X-Ray Chest 1 View (CXR): No results found for this visit on 10/23/23. and X-Ray Chest PA and Lateral (CXR): No results found for this visit on 10/23/23.

## 2023-10-24 NOTE — PROGRESS NOTES
[facility] (Hospital)  Cardiology  Progress Note    Patient Name: Hemal Tolbert Jr.  MRN: [id number]  Admission Date: 10/23/2023  Hospital Length of Stay: 1 days  Code Status: Full Code   Attending Physician: Elva Amador MD   Primary Care Physician: Emily, Primary Doctor  Expected Discharge Date:   Principal Problem:VT (ventricular tachycardia)    Subjective:   HPI:  63-year-old male with a known past medical history of combined heart failure with the EF of 40% and grade 3 diastolic dysfunction, alcohol abuse (reports 3-4 beers/day), tobacco abuse with alcoholic cirrhosis, hypertension, and COPD that presented to the  ER 10/23 as an ER to ER transfer for vtach that required defibrillation.  Prior to transfer, patient was initiated on amiodarone infusion.  Upon arrival to the ER in [place], Cardiology was consulted.  Is documented that patient again went into V-tach and required defibrillation in the ER with 1 shock with conversion to normal sinus rhythm.  ER nurse reports patient was given multiple nebs back to back for wheezing and was escalated to a Ventimask if he is a mouth breather. Patient's patient will be continued on amnio fusion protocol.  Review of EKG strips in the concerning for possible Torsades. Workup significant for leukocytosis of 17,000, sodium 128, creatinine 1.2, , , and troponin trends up with latest 3.6 --> 9.9.  Patient was given an aspirin, started on heparin infusion, given 4 mg of Mag IV, and 40 mg of Lasix IV.  Echo ordered.  Patient admitted to the ICU per the critical care team with Cardiology following.         Hospital Course:   10/24/23-Patient seen and examined today, resting in bed. Respiratory status worsened overnight, initially required Bipap. LA vasquez to 6.9, dobutamine initiated with improvement. Feeling better at time of exam, less SOB, able to lie flat. No CP. Troponin peaked at 33, now trending down. Repeat LA this AM normalized. Will  plan on R/LHC today pending discussion with interventionalist, Dr. Ernandez.           Review of Systems   Constitutional: Positive for malaise/fatigue.   HENT: Negative.     Eyes: Negative.    Cardiovascular:  Positive for dyspnea on exertion.   Respiratory: Negative.     Endocrine: Negative.    Hematologic/Lymphatic: Negative.    Skin: Negative.    Musculoskeletal:  Positive for arthritis and joint pain.   Gastrointestinal: Negative.    Genitourinary: Negative.    Neurological: Negative.    Psychiatric/Behavioral: Negative.     Allergic/Immunologic: Negative.      Objective:     Vital Signs (Most Recent):  Temp: 98 °F (36.7 °C) (10/24/23 1101)  Pulse: 87 (10/24/23 0739)  Resp: 20 (10/24/23 0739)  BP: (!) 123/58 (10/24/23 0600)  SpO2: 96 % (10/24/23 0739) Vital Signs (24h Range):  Temp:  [97.5 °F (36.4 °C)-98.3 °F (36.8 °C)] 98 °F (36.7 °C)  Pulse:  [] 87  Resp:  [13-32] 20  SpO2:  [91 %-100 %] 96 %  BP: (101-130)/(58-84) 123/58  Arterial Line BP: (117-146)/(48-60) 127/58     Weight: 81.9 kg (180 lb 8.9 oz)  Body mass index is 27.45 kg/m².     SpO2: 96 %         Intake/Output Summary (Last 24 hours) at 10/24/2023 1334  Last data filed at 10/24/2023 1200  Gross per 24 hour   Intake 1777.64 ml   Output 525 ml   Net 1252.64 ml       Lines/Drains/Airways       Drain  Duration                  Urethral Catheter 10/24/23 1120 Coude 18 Fr. <1 day              Arterial Line  Duration             Arterial Line 10/24/23 0301 Right Radial <1 day              Peripheral Intravenous Line  Duration                  Peripheral IV - Single Lumen 20 G Left;Posterior Hand -- days         Peripheral IV - Single Lumen 20 G Posterior;Right Hand -- days         Peripheral IV - Single Lumen 10/23/23 1225 20 G Anterior;Right Forearm 1 day                       Physical Exam  Vitals and nursing note reviewed.   Constitutional:       General: He is not in acute distress.     Appearance: He is well-developed. He is ill-appearing. He is  not diaphoretic.      Comments: On supplemental O2   HENT:      Head: Normocephalic and atraumatic.   Eyes:      General:         Right eye: No discharge.         Left eye: No discharge.      Pupils: Pupils are equal, round, and reactive to light.   Cardiovascular:      Rate and Rhythm: Normal rate and regular rhythm.      Heart sounds: Normal heart sounds, S1 normal and S2 normal. No murmur heard.  Pulmonary:      Effort: Pulmonary effort is normal. No respiratory distress.      Breath sounds: Rhonchi present. No wheezing or rales.   Abdominal:      General: There is no distension.      Tenderness: There is no rebound.   Musculoskeletal:      Right lower leg: No edema.      Left lower leg: No edema.   Skin:     General: Skin is warm and dry.      Findings: No erythema.   Neurological:      General: No focal deficit present.      Mental Status: He is alert and oriented to person, place, and time.   Psychiatric:         Mood and Affect: Mood normal.         Behavior: Behavior normal.         Thought Content: Thought content normal.            Significant Labs: CMP   Recent Labs   Lab 10/23/23  0950 10/24/23  0250 10/24/23  0513 10/24/23  1226   * 127* 127*  --    K 4.8 4.7 4.3  --    CL 95 94* 94*  --    CO2 18* 18* 21*  --    * 220* 192*  --    BUN 12 19 19  --    CREATININE 1.2 1.6* 1.4  --    CALCIUM 8.6* 8.4* 7.8*  --    PROT 8.9* 8.1  --  7.4   ALBUMIN 3.6 3.2*  --  3.1*   BILITOT 0.4 0.2  --  0.3   ALKPHOS 65 55  --  47*   AST 62* 138*  --  105*   ALT 21 35  --  32   ANIONGAP 15 15 12  --    , CBC   Recent Labs   Lab 10/23/23  0950 10/23/23  1150 10/24/23  0513   WBC 17.74* 17.79* 10.00   HGB 14.1 14.4 11.5*   HCT 42.6 43.5 33.7*    259 227   , Troponin   Recent Labs   Lab 10/24/23  0043 10/24/23  0912 10/24/23  1226   TROPONINI 33.090* 23.984* 21.839*   , and All pertinent lab results from the last 24 hours have been reviewed.    Significant Imaging: Echocardiogram: Transthoracic echo (TTE)  complete (Cupid Only):   Results for orders placed or performed during the hospital encounter of 10/23/23   Echo   Result Value Ref Range    BSA 2.08 m2    IVRT 49.48 msec    TDI LATERAL 0.10 m/s    Left Ventricular Outflow Tract Mean Gradient 1.13 mmHg    Left Ventricular Outflow Tract Mean Velocity 0.49 cm/s    MV stenosis pressure 1/2 time 34.73 ms    Mr max rex 3.50 m/s    TR Max Rex 2.09 m/s    Ao root annulus 3.38 cm    Ao peak rex 0.86 m/s    Posterior Wall 0.95 0.6 - 1.1 cm    LVOT diameter 1.95 cm    LVOT peak rex 0.76 m/s    LVOT peak VTI 10.90 cm    E wave deceleration time 119.77 msec    MV Peak A Rex 0.40 m/s    MV Peak E Rex 0.65 m/s    RA Major Axis 2.94 cm    TAPSE 1.82 cm    PV mean gradient 1 mmHg    RVOT peak rex 0.57 m/s    RVOT peak VTI 9.8 cm    IVS 0.96 0.6 - 1.1 cm    Ao VTI 11.90 cm    AV mean gradient 2 mmHg    LVIDd 4.24 3.5 - 6.0 cm    LVIDs 3.71 2.1 - 4.0 cm    Left Atrium Major Axis 4.28 cm    Left Atrium Minor Axis 4.19 cm    RV mid diameter 3.37 cm    STJ 3.35 cm    Ascending aorta 3.41 cm    LV Systolic Volume 58.38 mL    LV Diastolic Volume 80.17 mL    TDI SEPTAL 0.09 m/s    LA size 2.39 cm    IVC diameter 1.42 cm    LV LATERAL E/E' RATIO 6.50 m/s    LV SEPTAL E/E' RATIO 7.22 m/s    FS 13 28 - 44 %    LV mass 130.71 g    ZLVIDD -3.61     ZLVIDS -0.04     Left Ventricle Relative Wall Thickness 0.45 cm    AV valve area 2.73 cm²    AV Velocity Ratio 0.88     AV index (prosthetic) 0.92     MV valve area p 1/2 method 6.33 cm2    E/A ratio 1.63     Mean e' 0.10 m/s    LVOT area 3.0 cm2    LVOT stroke volume 32.54 cm3    AV peak gradient 3 mmHg    E/E' ratio 6.84 m/s    LV Systolic Volume Index 28.6 mL/m2    LV Diastolic Volume Index 39.30 mL/m2    LV Mass Index 64 g/m2    Triscuspid Valve Regurgitation Peak Gradient 17 mmHg    ADDIS by Velocity Ratio 2.64 cm²    LA Volume Index 11.4 mL/m2    LA volume 23.23 cm3    LA WIDTH 2.7 cm    RA Width 2.3 cm    TV resting pulmonary artery pressure 20  mmHg    RV TB RVSP 5 mmHg    Est. RA pres 3 mmHg    Narrative      Left Ventricle: The left ventricle is mildly dilated. Normal wall   thickness. There is concentric remodeling. Moderate global hypokinesis   present. There is severely reduced systolic function with a visually   estimated ejection fraction of 20 - 25%. There is normal diastolic   function.    Right Ventricle: Normal right ventricular cavity size. Wall thickness   is normal. Right ventricle wall motion  is normal. Systolic function is   normal.    Mitral Valve: There is mild to moderate regurgitation with a centrally   directed jet.    Pulmonary Artery: The estimated pulmonary artery systolic pressure is   20 mmHg.    IVC/SVC: Normal venous pressure at 3 mmHg.      and X-Ray: CXR: X-Ray Chest 1 View (CXR): No results found for this visit on 10/23/23. and X-Ray Chest PA and Lateral (CXR): No results found for this visit on 10/23/23.    Assessment and Plan:   Patient who presents with NSTEMI/VT/ETOH abuse. No recurrent arrhythmias overnight. Required initiation of inotropic support with good response. Ischemic workup with R/LHC planned. Will discuss further with [facility].    * VT (ventricular tachycardia)  Treated with shock therapy  Continue amiodarone  Ischemic evaluation  ETOH evaluation and cardiac echo pending    10/24/23  -Echo with EF of 20-25%  -Ischemic evaluation pending  -Continue amiodarone gtt for now  -Keep K > 4; Mg > 2    Cardiogenic shock  -Clinical condition worsened overnight with increased SOB/lactic acidosis  -LA normalized on dobutamine  -Continue inotropic support  -R/LHC planned  -Will discuss with [facility]    Hyponatremia  -In setting of CHF/ETOH abuse  -Monitor    Alcohol abuse  -ETOH cessation    COPD (chronic obstructive pulmonary disease)  -Mgmt as per primary team    Tobacco abuse  -Cessation advised    Acute on chronic combined systolic and diastolic heart failure  -BNP minimally elevated  -LA improved with  inotropic support  -Ischemic evaluation pending  -Will optimize regimen accordingly    Elevated troponin  Will trend  IV heparin  Cardiac echo pending  EKG not showing ischemia    10/24/23  -Troponin bumped to 33, now trending down  -CP free during exam  -Echo showed EF of 20-25%, developed cardiogenic shock overnight, responded well to dobutamine  -Continue ASA, heparin gtt  -Plans for R/LHC today pending discussion with Dr. Enrandez    CHF (congestive heart failure)  Patient is identified as having Combined Systolic and Diastolic heart failure that is Acute on chronic. CHF is currently uncontrolled due to Continued edema of extremities. Latest ECHO performed and demonstrates- Results for orders placed during the hospital encounter of 06/07/23    Echo    Interpretation Summary  · The left ventricle is mildly enlarged with mildly decreased systolic function.  · Overall there is mild to moderate global hypokinesis; Prounounced basal inferior and inferioseptal hypokinesis.  · The estimated ejection fraction is 40%.  · Grade III left ventricular diastolic dysfunction.  · Mild right ventricular enlargement with low normal right ventricular systolic function.  · Mild-to-moderate mitral regurgitation.  · Mild tricuspid regurgitation.  · Mild pulmonic regurgitation.  · The estimated PA systolic pressure is 35 mmHg.  · Mild left atrial enlargement.  · Mild right atrial enlargement.  . Continue Beta Blocker, ACE/ARB and Furosemide and monitor clinical status closely. Monitor on telemetry. Patient is off CHF pathway.  Monitor strict Is&Os and daily weights.  Place on fluid restriction of 2 L. Cardiology has been consulted. Continue to stress to patient importance of self efficacy and  on diet for CHF. Last BNP reviewed- and noted below   Recent Labs   Lab 10/23/23  0950   *   .    Alcoholic cirrhosis  -Per intensive team  -Will monitor LFT's while on amiodarone        VTE Risk Mitigation (From admission, onward)          Ordered     heparin 25,000 units in dextrose 5% (100 units/ml) IV bolus from bag - ADDITIONAL PRN BOLUS - 60 units/kg (max bolus 4000 units)  As needed (PRN)        Question:  Heparin Infusion Adjustment (DO NOT MODIFY ANSWER)  Answer:  [email or web address]\epic\Images\Pharmacy\HeparinInfusions\heparin LOW INTENSITY nomogram for [facility] [id number].[email or web address]    10/23/23 1123     heparin 25,000 units in dextrose 5% (100 units/ml) IV bolus from bag - ADDITIONAL PRN BOLUS - 30 units/kg (max bolus 4000 units)  As needed (PRN)        Question:  Heparin Infusion Adjustment (DO NOT MODIFY ANSWER)  Answer:  \\[email or web address]\epic\Images\Pharmacy\HeparinInfusions\heparin LOW INTENSITY nomogram for [facility] [id number].pdf    10/23/23 1123     heparin 25,000 units in dextrose 5% 250 mL (100 units/mL) infusion LOW INTENSITY nomogram - [facility]  Continuous        Question:  Begin at (units/kg/hr)  Answer:  12    10/23/23 1123     IP VTE HIGH RISK PATIENT  Once         10/23/23 1120     Place sequential compression device  Until discontinued         10/23/23 1120                Cammie Richardson PA-C  Cardiology  [facility] ([facility])

## 2023-10-24 NOTE — HOSPITAL COURSE
10/24: no acute events overnight, pt awake and alert on NC feeling much better than at time of admit, only c/o is that his is hungry but with keep NPO pending cards eval as may likely needs ischemic w/u  10/26: no acute events noted overnight, pt to return to cath lab today for high risk PCI, no new issues or c/o noted, wheezing resolved  10/27: denies acute complaints. Remains on Precedex infusion this morning. Abdomen firm, reports flatus and denies any complaints of nausea or vomiting. Denies chest pain or shortness of breath.   10/28: abdomen softer this morning; reports having large bowel movement overnight. Denies acute complaints of chest pain or shortness of breath. Remains on dobutamine infusion.

## 2023-10-24 NOTE — ASSESSMENT & PLAN NOTE
- trend liver enzymes  - supportive care   - alcohol cessation education when appropriate   - see plan for alcohol abuse

## 2023-10-24 NOTE — ASSESSMENT & PLAN NOTE
- shock x1 at OSH and again x1 in the ER here   - on amio gtt per protocol  - telemetry monitoring   - replete lytes aggressively as needed  - echo with EF 20-25%  - cards following   - NPO for now as pt will likely need ischemic w/u per card's timing

## 2023-10-24 NOTE — HOSPITAL COURSE
10/24/23-Patient seen and examined today, resting in bed. Respiratory status worsened overnight, initially required Bipap. LA vasquez to 6.9, dobutamine initiated with improvement. Feeling better at time of exam, less SOB, able to lie flat. No CP. Troponin peaked at 33, now trending down. Repeat LA this AM normalized. Will plan on R/LHC today pending discussion with interventionalist, Dr. Ernandez.     10/25/23-Patient seen and examined today, sedated, s/p R/LHC yesterday which showed elevated filling pressure, moderate pulmonary HTN, occlured RCA with left to right collaterals , ostial 80% LAD stenosis. IABP placed. CTS turned down for CABG. No recurrent VT. Labs reviewed. H/H dropped no active signs of bleeding. Creatinine stable. Will re-assess in AM, keep NPO after MN for possible LHC with high risk PCI.     10/26/23-Patient seen and examined today, resting in bed. Repeat LHC with high risk PCI planned by Dr. Ernandez. Feels ok. No CP. SOB stable. Labs reviewed and are stable. Amiodarone switched to po. Will need LifeVest.    10/27/23-Patient seen and examined today, sitting up in bed. Feels ok. No CP/SOB. Complains of constipation, abdomen distended. Xray concerning for SBO, critical care following. Dobutamine being weaned. LifeVest ordered.     10/28/2023 patient seen and examined sitting up in bed feels improved walking out of bed from time to time will do decreased dobutamine and LifeVest ordered.  Plan on eventual discharge.    10/29/2023: Patent patient is stable sitting up in bed able to walk LifeVest order off dobutamine will add Entresto today.  Continue guideline directed medical management of cardiomyopathy ischemia.  Patient is stable this time.    10/30/23 pt seen and examined today, resting in bed. Denies any Cp and CHF sxs at this time. Awaiting LifeVest approval. BP stable. Labs reviewed, stable    10/31/23 Pt seen and examined today resting comfortably in bed. Denies any CP and CHF sxs at this time Labs  reviewed, chart reviewed

## 2023-10-24 NOTE — ASSESSMENT & PLAN NOTE
- ETOH level 29 at OSH, reports drinks 3-4 beers/day with last drink 10/22  - ativan IV for CIWA >8 PRN  - folic acid and thiamine daily   - librium 5mg q8h  - cessation education when appropriate   - monitor for s/s w/d

## 2023-10-24 NOTE — ASSESSMENT & PLAN NOTE
- previous echo with EF 40% and grade II diastolic function, repeat echo with EF 20-25%  - was given lasix then small fluid bolus, minimal UOP but that is concern for retention and attempts to place jacobson per nursing underway  - started on dobutamine gtt overnight, currently at 5 mcg/kg/min  - cards following

## 2023-10-24 NOTE — PROGRESS NOTES
[facility] ([facility])  Critical Care Medicine  Progress Note    Patient Name: Hemal Tolbert Jr.  MRN: [id number]  Admission Date: 10/23/2023  Hospital Length of Stay: 1 days  Code Status: Full Code  Attending Provider: Elva Amador MD  Primary Care Provider: Emily, Primary Doctor   Principal Problem: VT (ventricular tachycardia)    Subjective:     HPI:  63-year-old male with a known past medical history of combined heart failure with the EF of 40% and grade 3 diastolic dysfunction, alcohol abuse (reports 3-4 beers/day), tobacco abuse with alcoholic cirrhosis, hypertension, and COPD that presented to the  ER 10/23 as an ER to ER transfer for vtach that required defibrillation.  Prior to transfer, patient was initiated on amiodarone infusion.  Upon arrival to the ER in [place], Cardiology was consulted.  Is documented that patient again went into V-tach and required defibrillation in the ER with 1 shock with conversion to normal sinus rhythm.  ER nurse reports patient was given multiple nebs back to back for wheezing and was escalated to a Ventimask if he is a mouth breather. Patient's patient will be continued on amnio fusion protocol.  Review of EKG strips in the concerning for possible Torsades. Workup significant for leukocytosis of 17,000, sodium 128, creatinine 1.2, , , and troponin trends up with latest 3.6 --> 9.9.  Patient was given an aspirin, started on heparin infusion, given 4 mg of Mag IV, and 40 mg of Lasix IV.  Echo ordered.  Patient admitted to the ICU per the critical care team with Cardiology following.        Hospital/ICU Course:  10/24: no acute events overnight, pt awake and alert on NC feeling much better than at time of admit, only c/o is that his is hungry but with keep NPO pending cards eval as may likely needs ischemic w/u      ROS complete and negative unless stated in the interval HPI    Objective:     Vital Signs (Most Recent):  Temp: 98.1 °F (36.7 °C)  (10/24/23 0701)  Pulse: 87 (10/24/23 0739)  Resp: 20 (10/24/23 0739)  BP: (!) 123/58 (10/24/23 0600)  SpO2: 96 % (10/24/23 0739) Vital Signs (24h Range):  Temp:  [96.3 °F (35.7 °C)-98.3 °F (36.8 °C)] 98.1 °F (36.7 °C)  Pulse:  [] 87  Resp:  [13-32] 20  SpO2:  [86 %-100 %] 96 %  BP: (101-130)/(58-86) 123/58  Arterial Line BP: (117-146)/(48-60) 127/58     Weight: 81.9 kg (180 lb 8.9 oz)  Body mass index is 27.45 kg/m².      Intake/Output Summary (Last 24 hours) at 10/24/2023 1007  Last data filed at 10/24/2023 0804  Gross per 24 hour   Intake 1477.73 ml   Output 525 ml   Net 952.73 ml        Physical Exam  Vitals reviewed.   Constitutional:       General: He is awake. He is not in acute distress.     Comments: Chronically ill appearing male semi-upright in bed on 2L NC in NAD   Cardiovascular:      Rate and Rhythm: Normal rate.      Pulses:           Radial pulses are 2+ on the right side and 2+ on the left side.      Comments: No edema  Pulmonary:      Effort: Pulmonary effort is normal. No tachypnea, bradypnea, accessory muscle usage or respiratory distress.      Breath sounds: Decreased breath sounds present. No wheezing, rhonchi or rales.   Abdominal:      General: There is no distension.      Palpations: Abdomen is soft.   Musculoskeletal:      Right lower leg: No edema.      Left lower leg: No edema.      Comments: FERREIRA   Skin:     General: Skin is warm and dry.   Neurological:      General: No focal deficit present.      Mental Status: He is alert.   Psychiatric:         Behavior: Behavior is cooperative.               Vents:  Oxygen Concentration (%): 32 (10/24/23 0735)    Lines/Drains/Airways       Arterial Line  Duration             Arterial Line 10/24/23 0301 Right Radial <1 day              Peripheral Intravenous Line  Duration                  Peripheral IV - Single Lumen 20 G Left;Posterior Hand -- days         Peripheral IV - Single Lumen 20 G Posterior;Right Hand -- days         Peripheral IV -  Single Lumen 10/23/23 1150 20 G Anterior;Left Forearm <1 day         Peripheral IV - Single Lumen 10/23/23 1225 20 G Anterior;Right Forearm <1 day                    Significant Labs:    CBC/Anemia Profile:  Recent Labs   Lab 10/23/23  0950 10/23/23  1150 10/24/23  0513   WBC 17.74* 17.79* 10.00   HGB 14.1 14.4 11.5*   HCT 42.6 43.5 33.7*    259 227   MCV 99* 99* 95   RDW 12.4 12.6 12.2        Chemistries:  Recent Labs   Lab 10/23/23  0600 10/23/23  0950 10/23/23  1150 10/24/23  0250 10/24/23  0513   * 128*  --  127* 127*   K 5.6* 4.8  --  4.7 4.3   CL  --  95  --  94* 94*   CO2 18* 18*  --  18* 21*   BUN 11.0 12  --  19 19   CREATININE 1.24* 1.2  --  1.6* 1.4   CALCIUM 9.3 8.6*  --  8.4* 7.8*   ALBUMIN 3.5 3.6  --  3.2*  --    PROT  --  8.9*  --  8.1  --    BILITOT 0.4 0.4  --  0.2  --    ALKPHOS 60 65  --  55  --    ALT 17 21  --  35  --    AST 37* 62*  --  138*  --    GLUCOSE 154*  --   --   --   --    MG 2.20  --   --   --  2.8*   PHOS  --   --  5.3*  --  3.0       All pertinent labs within the past 24 hours have been reviewed.    Significant Imaging:  I have reviewed all pertinent imaging results/findings within the past 24 hours.      ABG  Recent Labs   Lab 10/24/23  0211   PH 7.375   PO2 90   PCO2 37.1   HCO3 21.7*   BE -4*     Assessment/Plan:     Psychiatric  Alcohol abuse  - ETOH level 29 at OSH, reports drinks 3-4 beers/day with last drink 10/22  - ativan IV for CIWA >8 PRN  - folic acid and thiamine daily   - librium 5mg q8h  - cessation education when appropriate   - monitor for s/s w/d    Pulmonary  COPD (chronic obstructive pulmonary disease)  - started on IV steroids and rocephin  - on 2L NC this AM  - PRN duoneb, scheduled brovana and pulmicort nebs    Cardiac/Vascular  * VT (ventricular tachycardia)  - shock x1 at OSH and again x1 in the ER here   - on amio gtt per protocol  - telemetry monitoring   - replete lytes aggressively as needed  - echo with EF 20-25%  - cards following   -  NPO for now as pt will likely need ischemic w/u per card's timing     Primary hypertension  - resume home meds as appropriate  - trend hemodynamics  - cards following     Acute on chronic combined systolic and diastolic heart failure  - previous echo with EF 40% and grade II diastolic function, repeat echo with EF 20-25%  - was given lasix then small fluid bolus, minimal UOP but that is concern for retention and attempts to place jacobson per nursing underway  - started on dobutamine gtt overnight, currently at 5 mcg/kg/min  - cards following     Elevated troponin  - troponin plateau at 39  - no CP this AM  - continue ASA and heparin gtt    - cards following   - will keep NPO for now as pt will likely need ischemic w/u    Renal/  Hyponatremia  - could be s/t alcohol abuse +/- HF  - monitor   - allow PO intake when able     Urine retention  - jacobson placement attemps per nursing, in unsuccessful with coude may need urology consult   - monitor UOP and RFP    GI  Alcoholic cirrhosis  - trend liver enzymes  - supportive care   - alcohol cessation education when appropriate   - see plan for alcohol abuse     Other  Tobacco abuse  - cessation education when appropriate  - nicotine patch if needed    Prophylaxis Measures:  GI ppx: Famotidine  VTE ppx: Heparin  Glucose control: Monitor blood glucose    Code Status: Full Code     Critical Care Time: 35 minutes  Critical secondary to Patient has a condition that poses threat to life and bodily function: vtach on amio and heparin gtt with dobutamine      Critical care was time spent personally by me on the following activities: development of treatment plan with patient or surrogate and bedside caregivers, discussions with consultants, evaluation of patient's response to treatment, examination of patient, ordering and performing treatments and interventions, ordering and review of laboratory studies, ordering and review of radiographic studies, pulse oximetry, re-evaluation of  patient's condition. This critical care time did not overlap with that of any other provider or involve time for any procedures.     Ryan Gold NP  Critical Care Medicine  [facility] - Intensive Care ([facility])

## 2023-10-24 NOTE — ASSESSMENT & PLAN NOTE
- cessation education when appropriate  - nicotine patch if needed   (4) History of Falls or Infant-Toddler Placed in Bed

## 2023-10-24 NOTE — ASSESSMENT & PLAN NOTE
- previous echo with EF 40% and grade II diastolic function, repeat echo with EF 20-25%  - continue lasix  - jacobson in place   - cath lab 10/24 with findings of occluded RCA and LAD ostial calcified with 80% stenosis, returned to ICU with R groin IABP  - remains on dobutamine, heparin, and amio gtts  - cards following  - cardiac surgery consulted

## 2023-10-24 NOTE — ASSESSMENT & PLAN NOTE
-BNP minimally elevated  -LA improved with inotropic support  -Ischemic evaluation pending  -Will optimize regimen accordingly

## 2023-10-24 NOTE — ASSESSMENT & PLAN NOTE
Will trend  IV heparin  Cardiac echo pending  EKG not showing ischemia    10/24/23  -Troponin bumped to 33, now trending down  -CP free during exam  -Echo showed EF of 20-25%, developed cardiogenic shock overnight, responded well to dobutamine  -Continue ASA, heparin gtt  -Plans for R/LHC today pending discussion with Dr. Ernandez

## 2023-10-24 NOTE — INTERVAL H&P NOTE
The patient has been examined and the H&P has been reviewed:    I concur with the findings and no changes have occurred since H&P was written.    Procedure risks, benefits and alternative options discussed and understood by patient/family.          Active Hospital Problems    Diagnosis  POA    *VT (ventricular tachycardia) [I47.20]  Yes    Urine retention [R33.9]  Yes    Hyponatremia [E87.1]  Yes    Cardiogenic shock [R57.0]  Unknown    Elevated troponin [R79.89]  Yes    Acute on chronic combined systolic and diastolic heart failure [I50.43]  Yes    Tobacco abuse [Z72.0]  Yes    Primary hypertension [I10]  Yes    COPD (chronic obstructive pulmonary disease) [J44.9]  Yes    Alcohol abuse [F10.10]  Yes    Alcoholic cirrhosis [K70.30]  Yes      Resolved Hospital Problems   No resolved problems to display.

## 2023-10-24 NOTE — ASSESSMENT & PLAN NOTE
-Clinical condition worsened overnight with increased SOB/lactic acidosis  -LA normalized on dobutamine  -Continue inotropic support  -R/LHC planned  -Will discuss with Main [place]

## 2023-10-24 NOTE — PLAN OF CARE
[facility] (Hospital)  Initial Discharge Assessment       Primary Care Provider: No, Primary Doctor    Admission Diagnosis: V-tach [I47.20]  Elevated troponin [R79.89]  NSTEMI (non-ST elevated myocardial infarction) [I21.4]  Chest pain, unspecified type [R07.9]    Admission Date: 10/23/2023  Expected Discharge Date:     Transition of Care Barriers: None    Payor: MEDICAID / Plan: [place] / Product Type: Managed Medicaid /     Extended Emergency Contact Information  Primary Emergency Contact: Elda Neal  Mobile Phone: [phone]  Relation: Friend  Preferred language: English   needed? No  Secondary Emergency Contact: Luis Alfredo Tolbert  Mobile Phone: [phone]  Relation: Relative    Discharge Plan A: Home with family         [place] [address] [address]  Phone: [phone] Fax: [phone]      Initial Assessment (most recent)       Adult Discharge Assessment - 10/24/23 1341          Discharge Assessment    Assessment Type Discharge Planning Assessment     Confirmed/corrected address, phone number and insurance Yes     Confirmed Demographics Correct on Facesheet     Source of Information patient     Communicated SADI with patient/caregiver Date not available/Unable to determine     Reason For Admission ventricular tachycardia     People in Home friend(s)     Facility Arrived From: home     Do you expect to return to your current living situation? Yes     Do you have help at home or someone to help you manage your care at home? Yes     Who are your caregiver(s) and their phone number(s)? FriendElda     Prior to hospitilization cognitive status: Alert/Oriented     Current cognitive status: Alert/Oriented     Walking or Climbing Stairs ambulation difficulty, requires equipment     Mobility Management cane     Home Accessibility wheelchair accessible     Home Layout Able to live  on 1st floor     Equipment Currently Used at Home cane, straight     Readmission within 30 days? No     Patient currently being followed by outpatient case management? No     Do you currently have service(s) that help you manage your care at home? No     Do you take prescription medications? Yes     Do you have prescription coverage? Yes     Coverage medicaid     Do you have any problems affording any of your prescribed medications? No     Is the patient taking medications as prescribed? yes     Who is going to help you get home at discharge? may need assistance     How do you get to doctors appointments? car, drives self;family or friend will provide     Are you on dialysis? No     Do you take coumadin? No     DME Needed Upon Discharge  none     Discharge Plan discussed with: Patient     Transition of Care Barriers None     Discharge Plan A Home with family                   Anticipated DC dispo: home with friends   Prior Level of Function: independent with ADLs   People in home: friend, Elda     Comments:  CM met with patient at bedside to introduce role and discuss discharge planning. His friend and roommate, Elda, will be help at home. Patient may need assistance with transportation at time of discharge. Confirmed demographics, insurance, and emergency contacts. CM discharge needs depends on hospital progress. CM will continue following to assist with other needs.

## 2023-10-24 NOTE — ASSESSMENT & PLAN NOTE
- jacobson placement attemps per nursing, in unsuccessful with coude may need urology consult   - monitor UOP and RFP

## 2023-10-24 NOTE — SUBJECTIVE & OBJECTIVE
ROS complete and negative unless stated in the interval HPI    Objective:     Vital Signs (Most Recent):  Temp: 98.1 °F (36.7 °C) (10/24/23 0701)  Pulse: 87 (10/24/23 0739)  Resp: 20 (10/24/23 0739)  BP: (!) 123/58 (10/24/23 0600)  SpO2: 96 % (10/24/23 0739) Vital Signs (24h Range):  Temp:  [96.3 °F (35.7 °C)-98.3 °F (36.8 °C)] 98.1 °F (36.7 °C)  Pulse:  [] 87  Resp:  [13-32] 20  SpO2:  [86 %-100 %] 96 %  BP: (101-130)/(58-86) 123/58  Arterial Line BP: (117-146)/(48-60) 127/58     Weight: 81.9 kg (180 lb 8.9 oz)  Body mass index is 27.45 kg/m².      Intake/Output Summary (Last 24 hours) at 10/24/2023 1007  Last data filed at 10/24/2023 0804  Gross per 24 hour   Intake 1477.73 ml   Output 525 ml   Net 952.73 ml        Physical Exam  Vitals reviewed.   Constitutional:       General: He is awake. He is not in acute distress.     Comments: Chronically ill appearing male semi-upright in bed on 2L NC in NAD   Cardiovascular:      Rate and Rhythm: Normal rate.      Pulses:           Radial pulses are 2+ on the right side and 2+ on the left side.      Comments: No edema  Pulmonary:      Effort: Pulmonary effort is normal. No tachypnea, bradypnea, accessory muscle usage or respiratory distress.      Breath sounds: Decreased breath sounds present. No wheezing, rhonchi or rales.   Abdominal:      General: There is no distension.      Palpations: Abdomen is soft.   Musculoskeletal:      Right lower leg: No edema.      Left lower leg: No edema.      Comments: FERREIRA   Skin:     General: Skin is warm and dry.   Neurological:      General: No focal deficit present.      Mental Status: He is alert.   Psychiatric:         Behavior: Behavior is cooperative.               Vents:  Oxygen Concentration (%): 32 (10/24/23 0735)    Lines/Drains/Airways       Arterial Line  Duration             Arterial Line 10/24/23 0301 Right Radial <1 day              Peripheral Intravenous Line  Duration                  Peripheral IV - Single Lumen  20 G Left;Posterior Hand -- days         Peripheral IV - Single Lumen 20 G Posterior;Right Hand -- days         Peripheral IV - Single Lumen 10/23/23 1150 20 G Anterior;Left Forearm <1 day         Peripheral IV - Single Lumen 10/23/23 1225 20 G Anterior;Right Forearm <1 day                    Significant Labs:    CBC/Anemia Profile:  Recent Labs   Lab 10/23/23  0950 10/23/23  1150 10/24/23  0513   WBC 17.74* 17.79* 10.00   HGB 14.1 14.4 11.5*   HCT 42.6 43.5 33.7*    259 227   MCV 99* 99* 95   RDW 12.4 12.6 12.2        Chemistries:  Recent Labs   Lab 10/23/23  0600 10/23/23  0950 10/23/23  1150 10/24/23  0250 10/24/23  0513   * 128*  --  127* 127*   K 5.6* 4.8  --  4.7 4.3   CL  --  95  --  94* 94*   CO2 18* 18*  --  18* 21*   BUN 11.0 12  --  19 19   CREATININE 1.24* 1.2  --  1.6* 1.4   CALCIUM 9.3 8.6*  --  8.4* 7.8*   ALBUMIN 3.5 3.6  --  3.2*  --    PROT  --  8.9*  --  8.1  --    BILITOT 0.4 0.4  --  0.2  --    ALKPHOS 60 65  --  55  --    ALT 17 21  --  35  --    AST 37* 62*  --  138*  --    GLUCOSE 154*  --   --   --   --    MG 2.20  --   --   --  2.8*   PHOS  --   --  5.3*  --  3.0       All pertinent labs within the past 24 hours have been reviewed.    Significant Imaging:  I have reviewed all pertinent imaging results/findings within the past 24 hours.

## 2023-10-24 NOTE — TREATMENT PLAN
Worsening lactic acid, from 4.6 to 6.9.  Patient has been on BIPAP since around 7 pm after having some labored breathing.   Appears comfortable on BIPAP.  -115, HR stable  Currently on amiodarone infusion and heparin infusion.  Lasix 40 mg IV was given earlier with only 100 ml output.  Troponin has steadily risen from 3.6 this morning up to 9.9, then to 39, repeated and is 33.    Discussed with Dr. Elva Amador.  Will give 250 ml fluid bolus.  Reached out to cardiology on call, Dr. Lindsay and informed him of elevated troponin and lactic acid.  He recommends dobutamine infusion which has been ordered and communicated to nursing staff.

## 2023-10-24 NOTE — ASSESSMENT & PLAN NOTE
- troponin plateau at 39  - no CP this AM  - continue ASA and heparin gtt    - cards following   - will keep NPO for now as pt will likely need ischemic w/u

## 2023-10-24 NOTE — PLAN OF CARE
POC di scussed w/patient, verbalized understanding. ST/SR on monitor. VSS. Pt wore BiPAP most of shift, amarjit well, WOC/SOB much improved; now on 3LNC. LA elevated; Total 750mL NS bolus given; amarjit well. Dobutamine gtt started; heparin and amio gtts continued. Troponin inc to 39, then down to 33; cardiology notified. Scheduled Lasix given; amarjit 275mL UOP per urinal. Failed jacobson placement w/straight tip and coude cath; bladder scan 150mL urine; NP aware. Turns independently in bed. Fall precautions in place, bed alarm on.

## 2023-10-24 NOTE — ASSESSMENT & PLAN NOTE
- started on IV steroids and rocephin  - on 2L NC this AM  - PRN duoneb, scheduled brovana and pulmicort nebs

## 2023-10-25 LAB
ANION GAP SERPL CALC-SCNC: 10 MMOL/L (ref 8–16)
APTT PPP: 31.9 SEC (ref 21–32)
APTT PPP: 36.7 SEC (ref 21–32)
APTT PPP: 42.6 SEC (ref 21–32)
APTT PPP: 84.5 SEC (ref 21–32)
BASOPHILS # BLD AUTO: 0.01 K/UL (ref 0–0.2)
BASOPHILS NFR BLD: 0.1 % (ref 0–1.9)
BUN SERPL-MCNC: 25 MG/DL (ref 8–23)
CALCIUM SERPL-MCNC: 7.8 MG/DL (ref 8.7–10.5)
CHLORIDE SERPL-SCNC: 97 MMOL/L (ref 95–110)
CO2 SERPL-SCNC: 23 MMOL/L (ref 23–29)
CREAT SERPL-MCNC: 1.3 MG/DL (ref 0.5–1.4)
DIFFERENTIAL METHOD: ABNORMAL
EOSINOPHIL # BLD AUTO: 0 K/UL (ref 0–0.5)
EOSINOPHIL NFR BLD: 0 % (ref 0–8)
ERYTHROCYTE [DISTWIDTH] IN BLOOD BY AUTOMATED COUNT: 12.7 % (ref 11.5–14.5)
EST. GFR  (NO RACE VARIABLE): >60 ML/MIN/1.73 M^2
GLUCOSE SERPL-MCNC: 168 MG/DL (ref 70–110)
HCT VFR BLD AUTO: 31.4 % (ref 40–54)
HGB BLD-MCNC: 10.3 G/DL (ref 14–18)
IMM GRANULOCYTES # BLD AUTO: 0.09 K/UL (ref 0–0.04)
IMM GRANULOCYTES NFR BLD AUTO: 0.8 % (ref 0–0.5)
LYMPHOCYTES # BLD AUTO: 0.4 K/UL (ref 1–4.8)
LYMPHOCYTES NFR BLD: 3.4 % (ref 18–48)
MAGNESIUM SERPL-MCNC: 2.7 MG/DL (ref 1.6–2.6)
MCH RBC QN AUTO: 31.6 PG (ref 27–31)
MCHC RBC AUTO-ENTMCNC: 32.8 G/DL (ref 32–36)
MCV RBC AUTO: 96 FL (ref 82–98)
MONOCYTES # BLD AUTO: 0.4 K/UL (ref 0.3–1)
MONOCYTES NFR BLD: 3.4 % (ref 4–15)
NEUTROPHILS # BLD AUTO: 10.9 K/UL (ref 1.8–7.7)
NEUTROPHILS NFR BLD: 92.3 % (ref 38–73)
NRBC BLD-RTO: 0 /100 WBC
PHOSPHATE SERPL-MCNC: 3.1 MG/DL (ref 2.7–4.5)
PLATELET # BLD AUTO: 218 K/UL (ref 150–450)
PMV BLD AUTO: 11.1 FL (ref 9.2–12.9)
POCT GLUCOSE: 131 MG/DL (ref 70–110)
POCT GLUCOSE: 149 MG/DL (ref 70–110)
POCT GLUCOSE: 150 MG/DL (ref 70–110)
POCT GLUCOSE: 154 MG/DL (ref 70–110)
POCT GLUCOSE: 158 MG/DL (ref 70–110)
POCT GLUCOSE: 166 MG/DL (ref 70–110)
POTASSIUM SERPL-SCNC: 4.6 MMOL/L (ref 3.5–5.1)
RBC # BLD AUTO: 3.26 M/UL (ref 4.6–6.2)
SODIUM SERPL-SCNC: 130 MMOL/L (ref 136–145)
WBC # BLD AUTO: 11.77 K/UL (ref 3.9–12.7)

## 2023-10-25 PROCEDURE — 63600175 PHARM REV CODE 636 W HCPCS: Performed by: INTERNAL MEDICINE

## 2023-10-25 PROCEDURE — 25000003 PHARM REV CODE 250: Performed by: INTERNAL MEDICINE

## 2023-10-25 PROCEDURE — 99233 PR SUBSEQUENT HOSPITAL CARE,LEVL III: ICD-10-PCS | Mod: ,,, | Performed by: PHYSICIAN ASSISTANT

## 2023-10-25 PROCEDURE — 99233 SBSQ HOSP IP/OBS HIGH 50: CPT | Mod: ,,, | Performed by: PHYSICIAN ASSISTANT

## 2023-10-25 PROCEDURE — 85730 THROMBOPLASTIN TIME PARTIAL: CPT | Performed by: NURSE PRACTITIONER

## 2023-10-25 PROCEDURE — 25000003 PHARM REV CODE 250: Performed by: NURSE PRACTITIONER

## 2023-10-25 PROCEDURE — C9399 UNCLASSIFIED DRUGS OR BIOLOG: HCPCS | Performed by: NURSE PRACTITIONER

## 2023-10-25 PROCEDURE — 27000221 HC OXYGEN, UP TO 24 HOURS

## 2023-10-25 PROCEDURE — 94761 N-INVAS EAR/PLS OXIMETRY MLT: CPT

## 2023-10-25 PROCEDURE — 84100 ASSAY OF PHOSPHORUS: CPT | Performed by: INTERNAL MEDICINE

## 2023-10-25 PROCEDURE — 94640 AIRWAY INHALATION TREATMENT: CPT

## 2023-10-25 PROCEDURE — 25000242 PHARM REV CODE 250 ALT 637 W/ HCPCS: Performed by: INTERNAL MEDICINE

## 2023-10-25 PROCEDURE — 99900035 HC TECH TIME PER 15 MIN (STAT)

## 2023-10-25 PROCEDURE — 83735 ASSAY OF MAGNESIUM: CPT | Performed by: INTERNAL MEDICINE

## 2023-10-25 PROCEDURE — 20000000 HC ICU ROOM

## 2023-10-25 PROCEDURE — 85730 THROMBOPLASTIN TIME PARTIAL: CPT | Mod: 91 | Performed by: INTERNAL MEDICINE

## 2023-10-25 PROCEDURE — 80048 BASIC METABOLIC PNL TOTAL CA: CPT | Performed by: INTERNAL MEDICINE

## 2023-10-25 PROCEDURE — 85025 COMPLETE CBC W/AUTO DIFF WBC: CPT | Performed by: INTERNAL MEDICINE

## 2023-10-25 PROCEDURE — 63600175 PHARM REV CODE 636 W HCPCS: Performed by: NURSE PRACTITIONER

## 2023-10-25 RX ORDER — HYDRALAZINE HYDROCHLORIDE 20 MG/ML
10 INJECTION INTRAMUSCULAR; INTRAVENOUS EVERY 6 HOURS PRN
Status: DISCONTINUED | OUTPATIENT
Start: 2023-10-25 | End: 2023-11-03 | Stop reason: HOSPADM

## 2023-10-25 RX ADMIN — MUPIROCIN: 20 OINTMENT TOPICAL at 09:10

## 2023-10-25 RX ADMIN — CEFTRIAXONE 1 G: 1 INJECTION, POWDER, FOR SOLUTION INTRAMUSCULAR; INTRAVENOUS at 08:10

## 2023-10-25 RX ADMIN — MUPIROCIN: 20 OINTMENT TOPICAL at 08:10

## 2023-10-25 RX ADMIN — ARFORMOTEROL TARTRATE 15 MCG: 15 SOLUTION RESPIRATORY (INHALATION) at 07:10

## 2023-10-25 RX ADMIN — HYDRALAZINE HYDROCHLORIDE 10 MG: 20 INJECTION, SOLUTION INTRAMUSCULAR; INTRAVENOUS at 10:10

## 2023-10-25 RX ADMIN — GABAPENTIN 300 MG: 300 CAPSULE ORAL at 08:10

## 2023-10-25 RX ADMIN — ATORVASTATIN CALCIUM 80 MG: 40 TABLET, FILM COATED ORAL at 08:10

## 2023-10-25 RX ADMIN — BUDESONIDE INHALATION 0.5 MG: 0.5 SUSPENSION RESPIRATORY (INHALATION) at 07:10

## 2023-10-25 RX ADMIN — CHLORDIAZEPOXIDE HYDROCHLORIDE 5 MG: 5 CAPSULE ORAL at 02:10

## 2023-10-25 RX ADMIN — PANTOPRAZOLE SODIUM 40 MG: 40 TABLET, DELAYED RELEASE ORAL at 08:10

## 2023-10-25 RX ADMIN — SODIUM CHLORIDE: 9 INJECTION, SOLUTION INTRAVENOUS at 02:10

## 2023-10-25 RX ADMIN — PANTOPRAZOLE SODIUM 40 MG: 40 TABLET, DELAYED RELEASE ORAL at 09:10

## 2023-10-25 RX ADMIN — HEPARIN SODIUM 13 UNITS/KG/HR: 10000 INJECTION, SOLUTION INTRAVENOUS at 07:10

## 2023-10-25 RX ADMIN — Medication 100 MG: at 08:10

## 2023-10-25 RX ADMIN — METHYLPREDNISOLONE SODIUM SUCCINATE 60 MG: 40 INJECTION, POWDER, FOR SOLUTION INTRAMUSCULAR; INTRAVENOUS at 05:10

## 2023-10-25 RX ADMIN — IPRATROPIUM BROMIDE AND ALBUTEROL SULFATE 3 ML: 2.5; .5 SOLUTION RESPIRATORY (INHALATION) at 05:10

## 2023-10-25 RX ADMIN — FUROSEMIDE 40 MG: 10 INJECTION, SOLUTION INTRAMUSCULAR; INTRAVENOUS at 11:10

## 2023-10-25 RX ADMIN — GABAPENTIN 300 MG: 300 CAPSULE ORAL at 02:10

## 2023-10-25 RX ADMIN — AMIODARONE HYDROCHLORIDE 0.5 MG/MIN: 1.8 INJECTION, SOLUTION INTRAVENOUS at 11:10

## 2023-10-25 RX ADMIN — DEXMEDETOMIDINE HYDROCHLORIDE 0.5 MCG/KG/HR: 4 INJECTION INTRAVENOUS at 08:10

## 2023-10-25 RX ADMIN — CHLORDIAZEPOXIDE HYDROCHLORIDE 5 MG: 5 CAPSULE ORAL at 05:10

## 2023-10-25 RX ADMIN — FOLIC ACID 1 MG: 1 TABLET ORAL at 08:10

## 2023-10-25 RX ADMIN — GABAPENTIN 300 MG: 300 CAPSULE ORAL at 09:10

## 2023-10-25 RX ADMIN — METHYLPREDNISOLONE SODIUM SUCCINATE 60 MG: 40 INJECTION, POWDER, FOR SOLUTION INTRAMUSCULAR; INTRAVENOUS at 11:10

## 2023-10-25 RX ADMIN — DEXMEDETOMIDINE HYDROCHLORIDE 0.5 MCG/KG/HR: 4 INJECTION INTRAVENOUS at 05:10

## 2023-10-25 RX ADMIN — CHLORDIAZEPOXIDE HYDROCHLORIDE 5 MG: 5 CAPSULE ORAL at 09:10

## 2023-10-25 RX ADMIN — AMIODARONE HYDROCHLORIDE 0.5 MG/MIN: 1.8 INJECTION, SOLUTION INTRAVENOUS at 12:10

## 2023-10-25 RX ADMIN — ASPIRIN 81 MG CHEWABLE TABLET 81 MG: 81 TABLET CHEWABLE at 08:10

## 2023-10-25 RX ADMIN — DOBUTAMINE IN DEXTROSE 5 MCG/KG/MIN: 400 INJECTION, SOLUTION INTRAVENOUS at 02:10

## 2023-10-25 NOTE — ASSESSMENT & PLAN NOTE
-Clinical condition worsened overnight with increased SOB/lactic acidosis  -LA normalized on dobutamine  -Continue inotropic support  -R/LHC planned  -Will discuss with Main [place]    10/25/23  -Not candidate for advanced options given ETOH abuse  -Continue dobutamine  -Possible high risk PCI tmw

## 2023-10-25 NOTE — SUBJECTIVE & OBJECTIVE
No current facility-administered medications on file prior to encounter.     Current Outpatient Medications on File Prior to Encounter   Medication Sig    folic acid (FOLVITE) 1 MG tablet Take 1 tablet (1 mg total) by mouth once daily.    furosemide (LASIX) 40 MG tablet Take 40 mg by mouth once daily.    gabapentin (NEURONTIN) 300 MG capsule Take 300 mg by mouth 3 (three) times daily.    pantoprazole (PROTONIX) 40 MG tablet Take 1 tablet (40 mg total) by mouth 2 (two) times daily.    spironolactone (ALDACTONE) 50 MG tablet Take 50 mg by mouth 2 (two) times daily.    STIOLTO RESPIMAT 2.5-2.5 mcg/actuation Mist Inhale 2 puffs into the lungs once daily.    albuterol (PROVENTIL/VENTOLIN HFA) 90 mcg/actuation inhaler Inhale 1-2 puffs into the lungs every 4 (four) hours as needed.    lisinopriL (PRINIVIL,ZESTRIL) 2.5 MG tablet Take 1 tablet (2.5 mg total) by mouth once daily. (Patient not taking: Reported on 10/23/2023)    metoprolol succinate (TOPROL-XL) 25 MG 24 hr tablet Take 1 tablet (25 mg total) by mouth once daily. (Patient not taking: Reported on 10/23/2023)    multivitamin Tab Take 1 tablet by mouth once daily. (Patient not taking: Reported on 6/14/2023)    thiamine 100 MG tablet Take 1 tablet (100 mg total) by mouth once daily. (Patient not taking: Reported on 6/14/2023)       Review of patient's allergies indicates:  No Known Allergies    Past Medical History:   Diagnosis Date    Alcoholic cirrhosis     CHF (congestive heart failure)     LV EF 40%    COPD (chronic obstructive pulmonary disease)     Hypertension     Peripheral artery disease     with stents    Sleep apnea      Past Surgical History:   Procedure Laterality Date    ANGIOGRAM, ABDOMINAL AORTA  10/24/2023    Procedure: Angiogram, Abdominal Aorta;  Surgeon: Janette Ernandez MD;  Location: [facility] [place] CATH LAB;  Service: Cardiology;;    ARTERIOGRAPHY OF SUBCLAVIAN ARTERY Left 10/24/2023    Procedure: ARTERIOGRAM, SUBCLAVIAN;  Surgeon: Janette Ernandez MD;   Location: [facility] CATH LAB;  Service: Cardiology;  Laterality: Left;    CATHETERIZATION OF BOTH LEFT AND RIGHT HEART N/A 10/24/2023    Procedure: CATHETERIZATION, HEART, BOTH LEFT AND RIGHT;  Surgeon: Janette Ernandez MD;  Location: [facility] CATH LAB;  Service: Cardiology;  Laterality: N/A;    COLONOSCOPY N/A 6/10/2023    Procedure: COLONOSCOPY;  Surgeon: Johan Flower MD;  Location: [facility] OR;  Service: Gastroenterology;  Laterality: N/A;    ESOPHAGOGASTRODUODENOSCOPY N/A 6/9/2023    Procedure: EGD;  Surgeon: Tima Teixeira MD;  Location: [facility] ENDOSCOPY;  Service: Gastroenterology;  Laterality: N/A;    INSERTION OF INTRA-AORTIC BALLOON ASSIST DEVICE  10/24/2023    Procedure: INSERTION, INTRA-AORTIC BALLOON PUMP;  Surgeon: Janette Ernandez MD;  Location: [facility] CATH LAB;  Service: Cardiology;;    PLACEMENT, IABP  10/24/2023    Procedure: Placement, IABP;  Surgeon: Janette Ernandez MD;  Location: [facility] CATH LAB;  Service: Cardiology;;     Family History    None       Tobacco Use    Smoking status: Every Day     Current packs/day: 1.00     Types: Cigarettes    Smokeless tobacco: Never   Substance and Sexual Activity    Alcohol use: Yes     Alcohol/week: 24.0 standard drinks of alcohol     Types: 24 Cans of beer per week     Comment: Refused to quit in June 23    Drug use: Yes     Types: Marijuana    Sexual activity: Not on file     Review of Systems   Constitutional:  Positive for activity change.   HENT: Negative.     Eyes:  Positive for visual disturbance.   Respiratory:  Positive for cough, chest tightness and shortness of breath.    Cardiovascular:  Positive for chest pain.   Gastrointestinal:  Positive for abdominal distention. Negative for abdominal pain.   Endocrine: Negative.    Genitourinary: Negative.    Musculoskeletal: Negative.    Neurological: Negative.    Hematological: Negative.    Psychiatric/Behavioral: Negative.       Objective:     Vital Signs (Most Recent):  Temp: 97.7 °F (36.5 °C) (10/25/23  1504)  Pulse: 81 (10/25/23 1505)  Resp: 12 (10/25/23 1505)  BP: (!) 142/59 (10/25/23 1504)  SpO2: 96 % (10/25/23 1505) Vital Signs (24h Range):  Temp:  [97.7 °F (36.5 °C)-98.2 °F (36.8 °C)] 97.7 °F (36.5 °C)  Pulse:  [] 81  Resp:  [11-50] 12  SpO2:  [90 %-98 %] 96 %  BP: (125-171)/(56-77) 142/59  Arterial Line BP: (107-168)/(23-81) 142/58     Weight: 85.3 kg (188 lb 0.8 oz)  Body mass index is 28.59 kg/m².    SpO2: 96 %        Intake/Output - Last 3 Shifts         10/23 0700  10/24 0659 10/24 0700  10/25 0659 10/25 0700  10/26 0659    I.V. (mL/kg) 623 (7.8) 1933 (22.7) 431.6 (5.1)    IV Piggyback 854.8 174.9 39.9    Total Intake(mL/kg) 1477.7 (18.4) 2107.9 (24.7) 471.5 (5.5)    Urine (mL/kg/hr) 425 2165 (1.1) 1300 (1.7)    Stool  0     Total Output 425 2165 1300    Net +1052.7 -57.1 -828.5           Stool Occurrence  0 x              Lines/Drains/Airways       Drain  Duration                  Urethral Catheter 10/24/23 1120 Coude 18 Fr. 1 day              Arterial Line  Duration             Arterial Line 10/24/23 0301 Right Radial 1 day              Line  Duration                  IABP 10/24/23  1 day              Peripheral Intravenous Line  Duration                  Peripheral IV - Single Lumen 20 G Posterior;Right Hand -- days         Peripheral IV - Single Lumen 10/23/23 1225 20 G Anterior;Right Forearm 2 days         Peripheral IV - Single Lumen 10/24/23 1555 20 G Anterior;Proximal;Right Forearm <1 day                     STS Risk Score: n/a     Physical Exam  Constitutional:       Appearance: Normal appearance.   HENT:      Head: Normocephalic and atraumatic.      Nose: Nose normal.   Eyes:      Extraocular Movements: Extraocular movements intact.      Conjunctiva/sclera: Conjunctivae normal.   Cardiovascular:      Rate and Rhythm: Normal rate and regular rhythm.      Heart sounds: Normal heart sounds.   Pulmonary:      Effort: Pulmonary effort is normal.   Abdominal:      General: There is distension.       Palpations: Abdomen is soft.   Musculoskeletal:      Right lower leg: No edema.      Left lower leg: No edema.   Skin:     General: Skin is warm and dry.   Neurological:      Mental Status: He is alert and oriented to person, place, and time.   Psychiatric:         Behavior: Behavior normal.            Significant Labs:  All pertinent labs from the last 24 hours have been reviewed.    Significant Diagnostics:  I have reviewed all pertinent imaging results/findings within the past 24 hours.

## 2023-10-25 NOTE — PROGRESS NOTES
[facility] - Intensive Care ([facility])  Critical Care Medicine  Progress Note    Patient Name: Hemal Tolbert Jr.  MRN: [id number]  Admission Date: 10/23/2023  Hospital Length of Stay: 2 days  Code Status: Full Code  Attending Provider: Jc Mars MD  Primary Care Provider: Norma, Sigifredo   Principal Problem: VT (ventricular tachycardia)    Subjective:     HPI:  63-year-old male with a known past medical history of combined heart failure with the EF of 40% and grade 3 diastolic dysfunction, alcohol abuse (reports 3-4 beers/day), tobacco abuse with alcoholic cirrhosis, hypertension, and COPD that presented to the  ER 10/23 as an ER to ER transfer for vtach that required defibrillation.  Prior to transfer, patient was initiated on amiodarone infusion.  Upon arrival to the ER in [place], Cardiology was consulted.  Is documented that patient again went into V-tach and required defibrillation in the ER with 1 shock with conversion to normal sinus rhythm.  ER nurse reports patient was given multiple nebs back to back for wheezing and was escalated to a Ventimask if he is a mouth breather. Patient's patient will be continued on amnio fusion protocol.  Review of EKG strips in the concerning for possible Torsades. Workup significant for leukocytosis of 17,000, sodium 128, creatinine 1.2, , , and troponin trends up with latest 3.6 --> 9.9.  Patient was given an aspirin, started on heparin infusion, given 4 mg of Mag IV, and 40 mg of Lasix IV.  Echo ordered.  Patient admitted to the ICU per the critical care team with Cardiology following.        Hospital/ICU Course:  10/24: no acute events overnight, pt awake and alert on NC feeling much better than at time of admit, only c/o is that his is hungry but with keep NPO pending cards eval as may likely needs ischemic w/u      ROS complete and negative unless stated in the interval HPI     Objective:     Vital Signs (Most Recent):  Temp: 98.1 °F (36.7  °C) (10/25/23 0705)  Pulse: 80 (10/25/23 0850)  Resp: 12 (10/25/23 0850)  BP: (!) 166/68 (10/25/23 0705)  SpO2: 97 % (10/25/23 0850) Vital Signs (24h Range):  Temp:  [97.7 °F (36.5 °C)-98.3 °F (36.8 °C)] 98.1 °F (36.7 °C)  Pulse:  [] 80  Resp:  [12-37] 12  SpO2:  [90 %-97 %] 97 %  BP: (121-166)/(61-71) 166/68  Arterial Line BP: ()/(40-81) 168/67     Weight: 85.3 kg (188 lb 0.8 oz)  Body mass index is 28.59 kg/m².      Intake/Output Summary (Last 24 hours) at 10/25/2023 0855  Last data filed at 10/25/2023 0800  Gross per 24 hour   Intake 2073.82 ml   Output 2265 ml   Net -191.18 ml        Physical Exam  Vitals reviewed.   Constitutional:       General: He is awake. He is not in acute distress.     Appearance: He is well-developed.      Comments: Chronically ill appearing male lying in bed on NC with some audible wheezing    Cardiovascular:      Rate and Rhythm: Normal rate.      Pulses:           Radial pulses are 2+ on the right side and 2+ on the left side.      Comments: IABP to R groin   Pulmonary:      Effort: Pulmonary effort is normal.      Breath sounds: Wheezing present.      Comments: On NC  Abdominal:      General: There is no distension.      Palpations: Abdomen is soft.      Comments: Round   Musculoskeletal:      Comments: FERREIRA   Skin:     General: Skin is warm and dry.   Neurological:      General: No focal deficit present.      Mental Status: He is alert.   Psychiatric:         Behavior: Behavior is cooperative.               Vents:  Oxygen Concentration (%): 32 (10/25/23 0750)    Lines/Drains/Airways       Drain  Duration                  Urethral Catheter 10/24/23 1120 Coude 18 Fr. <1 day              Arterial Line  Duration             Arterial Line 10/24/23 0301 Right Radial 1 day              Line  Duration                  IABP 10/24/23  1 day              Peripheral Intravenous Line  Duration                  Peripheral IV - Single Lumen 20 G Posterior;Right Hand -- days          Peripheral IV - Single Lumen 10/23/23 1225 20 G Anterior;Right Forearm 1 day         Peripheral IV - Single Lumen 10/24/23 1555 20 G Anterior;Proximal;Right Forearm <1 day                    Significant Labs:    CBC/Anemia Profile:  Recent Labs   Lab 10/23/23  1150 10/24/23  0513 10/25/23  0501   WBC 17.79* 10.00 11.77   HGB 14.4 11.5* 10.3*   HCT 43.5 33.7* 31.4*    227 218   MCV 99* 95 96   RDW 12.6 12.2 12.7        Chemistries:  Recent Labs   Lab 10/23/23  0950 10/23/23  1150 10/24/23  0250 10/24/23  0513 10/24/23  1226 10/25/23  0501   *  --  127* 127*  --  130*   K 4.8  --  4.7 4.3  --  4.6   CL 95  --  94* 94*  --  97   CO2 18*  --  18* 21*  --  23   BUN 12  --  19 19  --  25*   CREATININE 1.2  --  1.6* 1.4  --  1.3   CALCIUM 8.6*  --  8.4* 7.8*  --  7.8*   ALBUMIN 3.6  --  3.2*  --  3.1*  --    PROT 8.9*  --  8.1  --  7.4  --    BILITOT 0.4  --  0.2  --  0.3  --    ALKPHOS 65  --  55  --  47*  --    ALT 21  --  35  --  32  --    AST 62*  --  138*  --  105*  --    MG  --   --   --  2.8*  --  2.7*   PHOS  --  5.3*  --  3.0  --  3.1       All pertinent labs within the past 24 hours have been reviewed.    Significant Imaging:  I have reviewed all pertinent imaging results/findings within the past 24 hours.      ABG  Recent Labs   Lab 10/24/23  0211   PH 7.375   PO2 90   PCO2 37.1   HCO3 21.7*   BE -4*     Assessment/Plan:     Psychiatric  Alcohol abuse  - ETOH level 29 at OSH, reports drinks 3-4 beers/day with last drink 10/22  - ativan IV for CIWA >8 PRN  - folic acid and thiamine daily   - librium 5mg q8h  - cessation education when appropriate   - monitor for s/s w/d    Pulmonary  COPD (chronic obstructive pulmonary disease)  - remains on IV steroids and rocephin  - on 2L NC this AM  - PRN duoneb, scheduled brovana and pulmicort nebs  - with wheezing today, continue diuresis     Cardiac/Vascular  * VT (ventricular tachycardia)  - shock x1 at OSH and again x1 in the ER here   - on amio gtt per  protocol  - telemetry monitoring   - replete lytes aggressively as needed  - echo with EF 20-25%  - cards following   - see plan for HF    Primary hypertension  - resume home meds as appropriate  - trend hemodynamics  - cards following     Acute on chronic combined systolic and diastolic heart failure  - previous echo with EF 40% and grade II diastolic function, repeat echo with EF 20-25%  - continue lasix  - jacobson in place   - cath lab 10/24 with findings of occluded RCA and LAD ostial calcified with 80% stenosis, returned to ICU with R groin IABP  - remains on dobutamine, heparin, and amio gtts  - cards following  - cardiac surgery consulted     Elevated troponin  - troponin plateau at 39  - remains CP free  - continue ASA and heparin gtt    - see plan for HF    Renal/  Hyponatremia  - could be s/t alcohol abuse +/- HF  - monitor, improving   - allow PO intake when able     Urine retention  - jacobson in place  - monitor UOP and RFP    GI  Alcoholic cirrhosis  - trend liver enzymes  - supportive care   - alcohol cessation education when appropriate   - see plan for alcohol abuse     Other  Tobacco abuse  - cessation education when appropriate  - nicotine patch if needed    Prophylaxis Measures:  GI ppx: Famotidine  VTE ppx: Heparin  Glucose control: Monitor blood glucose    Code Status: Full Code      Critical Care Time: 33 minutes  Critical secondary to Patient has a condition that poses threat to life and bodily function: vtach on amio and heparin gtt with dobutamine and amio, IABP in place to R groin     Critical care was time spent personally by me on the following activities: development of treatment plan with patient or surrogate and bedside caregivers, discussions with consultants, evaluation of patient's response to treatment, examination of patient, ordering and performing treatments and interventions, ordering and review of laboratory studies, ordering and review of radiographic studies, pulse oximetry,  re-evaluation of patient's condition. This critical care time did not overlap with that of any other provider or involve time for any procedures.     Ryan Gold NP  Critical Care Medicine  [facility] - Intensive Care ([facility])

## 2023-10-25 NOTE — ASSESSMENT & PLAN NOTE
-BNP minimally elevated  -LA improved with inotropic support  -Ischemic evaluation pending  -Will optimize regimen accordingly    10/25/23  -RHC with elevated filling pressures  -Continue IV diuresis  -Will optimize regimen as tolerated  -Possible high risk PCI tmw

## 2023-10-25 NOTE — PLAN OF CARE
Pt AAOx4, VSS, Afebrile, tolerating 3L NC  NSR on monitor Amio gtt, Dobutamine gtt,& heparin gtt continued  Coude jacobson place with minimal urine output  Left unit to cath lab @ 1745 for heart cath  POC discussed with patient  Safety precautions maintained, bed alarm in use

## 2023-10-25 NOTE — HPI
The patient is a 63-year-old male with multiple medical problems including CHF, alcoholic cirrhosis, depressed ejection fraction, hypertension, peripheral vascular disease who presented to the emergency room with chest pain.  The patient is currently using alcohol and tobacco.    Patient was being evaluated in the emergency room when he developed witness to ventricular fibrillation.  Patient was defibrillated x1 and patient is started on amiodarone.  The patient had cardiac catheterization done which showed severe multivessel coronary artery disease with an occluded RCA and an ostial LAD day lesion.  In addition cardiac catheterization shows elevated LVEDP of 31 and moderate pulmonary hypertension.  The patient had an intra-aortic balloon pump placed.

## 2023-10-25 NOTE — ASSESSMENT & PLAN NOTE
The patient is a 63-year-old male with multiple medical problems including CHF, alcoholic cirrhosis, depressed ejection fraction, hypertension, peripheral vascular disease who presented to the emergency room with chest pain.  The patient is status post ventricular fibrillation.  Patient was resuscitated successfully.  Cardiac catheterization shows severe multivessel coronary artery disease including an occluded RCA and an ostial LAD lesion.  In addition the patient had an elevated LVEDP and pulmonary hypertension.  An intra-aortic balloon pump was placed in the cath lab.     The patient is a poor candidate for surgical intervention due to his comorbidities which include COPD, current alcoholism, depressed ejection fraction, and cirrhosis.

## 2023-10-25 NOTE — ASSESSMENT & PLAN NOTE
- shock x1 at OSH and again x1 in the ER here   - on amio gtt per protocol  - telemetry monitoring   - replete lytes aggressively as needed  - echo with EF 20-25%  - cards following   - see plan for HF

## 2023-10-25 NOTE — CONSULTS
[facility])  Cardiothoracic Surgery  Consult Note    Patient Name: Hemal Tolbert Jr.  MRN: [id number]  Admission Date: 10/23/2023  Attending Physician: Jc Mars MD  Referring Provider: Chavez Pinto MD    Patient information was obtained from patient and ER records.     Consults  Subjective:     Principal Problem: VT (ventricular tachycardia)    History of Present Illness: The patient is a 63-year-old male with multiple medical problems including CHF, alcoholic cirrhosis, depressed ejection fraction, hypertension, peripheral vascular disease who presented to the emergency room with chest pain.  The patient is currently using alcohol and tobacco.    Patient was being evaluated in the emergency room when he developed witness to ventricular fibrillation.  Patient was defibrillated x1 and patient is started on amiodarone.  The patient had cardiac catheterization done which showed severe multivessel coronary artery disease with an occluded RCA and an ostial LAD day lesion.  In addition cardiac catheterization shows elevated LVEDP of 31 and moderate pulmonary hypertension.  The patient had an intra-aortic balloon pump placed.      No current facility-administered medications on file prior to encounter.     Current Outpatient Medications on File Prior to Encounter   Medication Sig    folic acid (FOLVITE) 1 MG tablet Take 1 tablet (1 mg total) by mouth once daily.    furosemide (LASIX) 40 MG tablet Take 40 mg by mouth once daily.    gabapentin (NEURONTIN) 300 MG capsule Take 300 mg by mouth 3 (three) times daily.    pantoprazole (PROTONIX) 40 MG tablet Take 1 tablet (40 mg total) by mouth 2 (two) times daily.    spironolactone (ALDACTONE) 50 MG tablet Take 50 mg by mouth 2 (two) times daily.    STIOLTO RESPIMAT 2.5-2.5 mcg/actuation Mist Inhale 2 puffs into the lungs once daily.    albuterol (PROVENTIL/VENTOLIN HFA) 90 mcg/actuation inhaler Inhale 1-2 puffs into the lungs every 4  (four) hours as needed.    lisinopriL (PRINIVIL,ZESTRIL) 2.5 MG tablet Take 1 tablet (2.5 mg total) by mouth once daily. (Patient not taking: Reported on 10/23/2023)    metoprolol succinate (TOPROL-XL) 25 MG 24 hr tablet Take 1 tablet (25 mg total) by mouth once daily. (Patient not taking: Reported on 10/23/2023)    multivitamin Tab Take 1 tablet by mouth once daily. (Patient not taking: Reported on 6/14/2023)    thiamine 100 MG tablet Take 1 tablet (100 mg total) by mouth once daily. (Patient not taking: Reported on 6/14/2023)       Review of patient's allergies indicates:  No Known Allergies    Past Medical History:   Diagnosis Date    Alcoholic cirrhosis     CHF (congestive heart failure)     LV EF 40%    COPD (chronic obstructive pulmonary disease)     Hypertension     Peripheral artery disease     with stents    Sleep apnea      Past Surgical History:   Procedure Laterality Date    ANGIOGRAM, ABDOMINAL AORTA  10/24/2023    Procedure: Angiogram, Abdominal Aorta;  Surgeon: Janette Ernandez MD;  Location: [facility] CATH LAB;  Service: Cardiology;;    ARTERIOGRAPHY OF SUBCLAVIAN ARTERY Left 10/24/2023    Procedure: ARTERIOGRAM, SUBCLAVIAN;  Surgeon: Janette Ernandez MD;  Location: [facility] CATH LAB;  Service: Cardiology;  Laterality: Left;    CATHETERIZATION OF BOTH LEFT AND RIGHT HEART N/A 10/24/2023    Procedure: CATHETERIZATION, HEART, BOTH LEFT AND RIGHT;  Surgeon: Janette Ernandez MD;  Location: [facility] CATH LAB;  Service: Cardiology;  Laterality: N/A;    COLONOSCOPY N/A 6/10/2023    Procedure: COLONOSCOPY;  Surgeon: Johan Flower MD;  Location: [facility] OR;  Service: Gastroenterology;  Laterality: N/A;    ESOPHAGOGASTRODUODENOSCOPY N/A 6/9/2023    Procedure: EGD;  Surgeon: Tima Teixeira MD;  Location: [facility] ENDOSCOPY;  Service: Gastroenterology;  Laterality: N/A;    INSERTION OF INTRA-AORTIC BALLOON ASSIST DEVICE  10/24/2023    Procedure: INSERTION, INTRA-AORTIC BALLOON PUMP;  Surgeon: Awais  Janette BARNETT MD;  Location: [facility] CATH LAB;  Service: Cardiology;;    PLACEMENT, IABP  10/24/2023    Procedure: Placement, IABP;  Surgeon: Janette Ernandez MD;  Location: [facility] CATH LAB;  Service: Cardiology;;     Family History    None       Tobacco Use    Smoking status: Every Day     Current packs/day: 1.00     Types: Cigarettes    Smokeless tobacco: Never   Substance and Sexual Activity    Alcohol use: Yes     Alcohol/week: 24.0 standard drinks of alcohol     Types: 24 Cans of beer per week     Comment: Refused to quit in June 23    Drug use: Yes     Types: Marijuana    Sexual activity: Not on file     Review of Systems   Constitutional:  Positive for activity change.   HENT: Negative.     Eyes:  Positive for visual disturbance.   Respiratory:  Positive for cough, chest tightness and shortness of breath.    Cardiovascular:  Positive for chest pain.   Gastrointestinal:  Positive for abdominal distention. Negative for abdominal pain.   Endocrine: Negative.    Genitourinary: Negative.    Musculoskeletal: Negative.    Neurological: Negative.    Hematological: Negative.    Psychiatric/Behavioral: Negative.       Objective:     Vital Signs (Most Recent):  Temp: 97.7 °F (36.5 °C) (10/25/23 1504)  Pulse: 81 (10/25/23 1505)  Resp: 12 (10/25/23 1505)  BP: (!) 142/59 (10/25/23 1504)  SpO2: 96 % (10/25/23 1505) Vital Signs (24h Range):  Temp:  [97.7 °F (36.5 °C)-98.2 °F (36.8 °C)] 97.7 °F (36.5 °C)  Pulse:  [] 81  Resp:  [11-50] 12  SpO2:  [90 %-98 %] 96 %  BP: (125-171)/(56-77) 142/59  Arterial Line BP: (107-168)/(23-81) 142/58     Weight: 85.3 kg (188 lb 0.8 oz)  Body mass index is 28.59 kg/m².    SpO2: 96 %        Intake/Output - Last 3 Shifts         10/23 0700  10/24 0659 10/24 0700  10/25 0659 10/25 0700  10/26 0659    I.V. (mL/kg) 623 (7.8) 1933 (22.7) 431.6 (5.1)    IV Piggyback 854.8 174.9 39.9    Total Intake(mL/kg) 1477.7 (18.4) 2107.9 (24.7) 471.5 (5.5)    Urine (mL/kg/hr) 425 2165 (1.1) 1300 (1.7)     Stool  0     Total Output 425 2165 1300    Net +1052.7 -57.1 -828.5           Stool Occurrence  0 x              Lines/Drains/Airways       Drain  Duration                  Urethral Catheter 10/24/23 1120 Coude 18 Fr. 1 day              Arterial Line  Duration             Arterial Line 10/24/23 0301 Right Radial 1 day              Line  Duration                  IABP 10/24/23  1 day              Peripheral Intravenous Line  Duration                  Peripheral IV - Single Lumen 20 G Posterior;Right Hand -- days         Peripheral IV - Single Lumen 10/23/23 1225 20 G Anterior;Right Forearm 2 days         Peripheral IV - Single Lumen 10/24/23 1555 20 G Anterior;Proximal;Right Forearm <1 day                     STS Risk Score: n/a     Physical Exam  Constitutional:       Appearance: Normal appearance.   HENT:      Head: Normocephalic and atraumatic.      Nose: Nose normal.   Eyes:      Extraocular Movements: Extraocular movements intact.      Conjunctiva/sclera: Conjunctivae normal.   Cardiovascular:      Rate and Rhythm: Normal rate and regular rhythm.      Heart sounds: Normal heart sounds.   Pulmonary:      Effort: Pulmonary effort is normal.   Abdominal:      General: There is distension.      Palpations: Abdomen is soft.   Musculoskeletal:      Right lower leg: No edema.      Left lower leg: No edema.   Skin:     General: Skin is warm and dry.   Neurological:      Mental Status: He is alert and oriented to person, place, and time.   Psychiatric:         Behavior: Behavior normal.            Significant Labs:  All pertinent labs from the last 24 hours have been reviewed.    Significant Diagnostics:  I have reviewed all pertinent imaging results/findings within the past 24 hours.      Assessment/Plan:     NYHA Score: NYHA IV: inability to carry on any physical activity without discomfort    Cardiogenic shock  The patient is a 63-year-old male with multiple medical problems including CHF, alcoholic cirrhosis,  depressed ejection fraction, hypertension, peripheral vascular disease who presented to the emergency room with chest pain.  The patient is status post ventricular fibrillation.  Patient was resuscitated successfully.  Cardiac catheterization shows severe multivessel coronary artery disease including an occluded RCA and an ostial LAD lesion.  In addition the patient had an elevated LVEDP and pulmonary hypertension.  An intra-aortic balloon pump was placed in the cath lab.     The patient is a poor candidate for surgical intervention due to his comorbidities which include COPD, current alcoholism, depressed ejection fraction, and cirrhosis.          Thank you for your consult. I will follow-up with patient. Please contact us if you have any additional questions.    John Amaya MD  Cardiothoracic Surgery  [facility] ([facility])

## 2023-10-25 NOTE — OP NOTE
INPATIENT Operative Note         SUMMARY     Surgery Date: 10/24/2023     Surgeon(s) and Role:     * Janette Ernandez MD - Primary    ASSISTANT:none    Pre-op Diagnosis:  VT (ventricular tachycardia) [I47.20]  NSTEMI (non-ST elevated myocardial infarction) [I21.4]  Acute combined systolic and diastolic congestive heart failure, NYHA class 2 [I50.41]  Elevated troponin [R79.89]      Post-op Diagnosis:  VT (ventricular tachycardia) [I47.20]  NSTEMI (non-ST elevated myocardial infarction) [I21.4]  Acute combined systolic and diastolic congestive heart failure, NYHA class 2 [I50.41]  Elevated troponin [R79.89]    Procedure(s) (LRB):  CATHETERIZATION, HEART, BOTH LEFT AND RIGHT (N/A)  Angiogram, Abdominal Aorta  INSERTION, INTRA-AORTIC BALLOON PUMP  ARTERIOGRAM, SUBCLAVIAN (Left)    COMPLICATION:none    Anesthesia: RN IV Sedation    Findings/Key Components:  Elevated filling pressures both rt aND LEFT   MODERATE PULMONARY HTN    OCCLUDED RCA WITH LEFT TO RIGHT COLLATERAL  LAD OSTIAL CALCIFIED HAZZY 80% STENOSIS   LVEDP 31   IABP PLACED  Estimated Blood Loss: < 50 ML.         SPECIMEN: NONE    Devices/Prostetics: None    PLAN: CONSULT CARDIAC SURGERY  DIURESE.

## 2023-10-25 NOTE — SUBJECTIVE & OBJECTIVE
ROS complete and negative unless stated in the interval HPI     Objective:     Vital Signs (Most Recent):  Temp: 98.1 °F (36.7 °C) (10/25/23 0705)  Pulse: 80 (10/25/23 0850)  Resp: 12 (10/25/23 0850)  BP: (!) 166/68 (10/25/23 0705)  SpO2: 97 % (10/25/23 0850) Vital Signs (24h Range):  Temp:  [97.7 °F (36.5 °C)-98.3 °F (36.8 °C)] 98.1 °F (36.7 °C)  Pulse:  [] 80  Resp:  [12-37] 12  SpO2:  [90 %-97 %] 97 %  BP: (121-166)/(61-71) 166/68  Arterial Line BP: ()/(40-81) 168/67     Weight: 85.3 kg (188 lb 0.8 oz)  Body mass index is 28.59 kg/m².      Intake/Output Summary (Last 24 hours) at 10/25/2023 0855  Last data filed at 10/25/2023 0800  Gross per 24 hour   Intake 2073.82 ml   Output 2265 ml   Net -191.18 ml        Physical Exam  Vitals reviewed.   Constitutional:       General: He is awake. He is not in acute distress.     Appearance: He is well-developed.      Comments: Chronically ill appearing male lying in bed on NC with some audible wheezing    Cardiovascular:      Rate and Rhythm: Normal rate.      Pulses:           Radial pulses are 2+ on the right side and 2+ on the left side.      Comments: IABP to R groin   Pulmonary:      Effort: Pulmonary effort is normal.      Breath sounds: Wheezing present.      Comments: On NC  Abdominal:      General: There is no distension.      Palpations: Abdomen is soft.      Comments: Round   Musculoskeletal:      Comments: FERREIRA   Skin:     General: Skin is warm and dry.   Neurological:      General: No focal deficit present.      Mental Status: He is alert.   Psychiatric:         Behavior: Behavior is cooperative.               Vents:  Oxygen Concentration (%): 32 (10/25/23 0750)    Lines/Drains/Airways       Drain  Duration                  Urethral Catheter 10/24/23 1120 Coude 18 Fr. <1 day              Arterial Line  Duration             Arterial Line 10/24/23 0301 Right Radial 1 day              Line  Duration                  IABP 10/24/23  1 day               Peripheral Intravenous Line  Duration                  Peripheral IV - Single Lumen 20 G Posterior;Right Hand -- days         Peripheral IV - Single Lumen 10/23/23 1225 20 G Anterior;Right Forearm 1 day         Peripheral IV - Single Lumen 10/24/23 1555 20 G Anterior;Proximal;Right Forearm <1 day                    Significant Labs:    CBC/Anemia Profile:  Recent Labs   Lab 10/23/23  1150 10/24/23  0513 10/25/23  0501   WBC 17.79* 10.00 11.77   HGB 14.4 11.5* 10.3*   HCT 43.5 33.7* 31.4*    227 218   MCV 99* 95 96   RDW 12.6 12.2 12.7        Chemistries:  Recent Labs   Lab 10/23/23  0950 10/23/23  1150 10/24/23  0250 10/24/23  0513 10/24/23  1226 10/25/23  0501   *  --  127* 127*  --  130*   K 4.8  --  4.7 4.3  --  4.6   CL 95  --  94* 94*  --  97   CO2 18*  --  18* 21*  --  23   BUN 12  --  19 19  --  25*   CREATININE 1.2  --  1.6* 1.4  --  1.3   CALCIUM 8.6*  --  8.4* 7.8*  --  7.8*   ALBUMIN 3.6  --  3.2*  --  3.1*  --    PROT 8.9*  --  8.1  --  7.4  --    BILITOT 0.4  --  0.2  --  0.3  --    ALKPHOS 65  --  55  --  47*  --    ALT 21  --  35  --  32  --    AST 62*  --  138*  --  105*  --    MG  --   --   --  2.8*  --  2.7*   PHOS  --  5.3*  --  3.0  --  3.1       All pertinent labs within the past 24 hours have been reviewed.    Significant Imaging:  I have reviewed all pertinent imaging results/findings within the past 24 hours.

## 2023-10-25 NOTE — PLAN OF CARE
Pt Aaox3. NSR on monitor. BP elevated, PRN  hydralazine given x1. Afebrile. Room air. IABP intact. Right groin site CDI. PIVs, jacobson intact. Tolerating cardiac diet. BG monitored. Pt shifts weight independently. Bed low, wheels locked, alarms audible. POC reviewed.    NPO at MN for possible LHC

## 2023-10-25 NOTE — ASSESSMENT & PLAN NOTE
Will trend  IV heparin  Cardiac echo pending  EKG not showing ischemia    10/24/23  -Troponin bumped to 33, now trending down  -CP free during exam  -Echo showed EF of 20-25%, developed cardiogenic shock overnight, responded well to dobutamine  -Continue ASA, heparin gtt  -Plans for R/LHC today pending discussion with Dr. Ernandez    10/25/23  -s/p R/LHC which showed elevated filling pressures, occluded RCA with left to right collateral, ostial LAD  -Turned down for CABG by CTS  -Continue heparin gtt, ASA, statin   -Keep NPO for possible high risk PCI tmw pending stability of H/H

## 2023-10-25 NOTE — PROGRESS NOTES
[facility] - Intensive Care (Hospital)  Cardiology  Progress Note    Patient Name: Hemal Tolbert Jr.  MRN: [id number]  Admission Date: 10/23/2023  Hospital Length of Stay: 2 days  Code Status: Full Code   Attending Physician: Jc Mars MD   Primary Care Physician: Norma, Provider  Expected Discharge Date:   Principal Problem:VT (ventricular tachycardia)    Subjective:   HPI:  63-year-old male with a known past medical history of combined heart failure with the EF of 40% and grade 3 diastolic dysfunction, alcohol abuse (reports 3-4 beers/day), tobacco abuse with alcoholic cirrhosis, hypertension, and COPD that presented to the  ER 10/23 as an ER to ER transfer for vtach that required defibrillation.  Prior to transfer, patient was initiated on amiodarone infusion.  Upon arrival to the ER in [place], Cardiology was consulted.  Is documented that patient again went into V-tach and required defibrillation in the ER with 1 shock with conversion to normal sinus rhythm.  ER nurse reports patient was given multiple nebs back to back for wheezing and was escalated to a Ventimask if he is a mouth breather. Patient's patient will be continued on amnio fusion protocol.  Review of EKG strips in the concerning for possible Torsades. Workup significant for leukocytosis of 17,000, sodium 128, creatinine 1.2, , , and troponin trends up with latest 3.6 --> 9.9.  Patient was given an aspirin, started on heparin infusion, given 4 mg of Mag IV, and 40 mg of Lasix IV.  Echo ordered.  Patient admitted to the ICU per the critical care team with Cardiology following.         Hospital Course:   10/24/23-Patient seen and examined today, resting in bed. Respiratory status worsened overnight, initially required Bipap. LA vasquez to 6.9, dobutamine initiated with improvement. Feeling better at time of exam, less SOB, able to lie flat. No CP. Troponin peaked at 33, now trending down. Repeat LA this AM normalized. Will  plan on R/LHC today pending discussion with interventionalist, Dr. Ernandez.     10/25/23-Patient seen and examined today, sedated, s/p R/LHC yesterday which showed elevated filling pressure, moderate pulmonary HTN, occlured RCA with left to right collaterals , ostial 80% LAD stenosis. IABP placed. CTS turned down for CABG. No recurrent VT. Labs reviewed. H/H dropped no active signs of bleeding. Creatinine stable. Will re-assess in AM, keep NPO after MN for possible LHC with high risk PCI.         Review of Systems   Reason unable to perform ROS: sedated.     Objective:     Vital Signs (Most Recent):  Temp: 98.1 °F (36.7 °C) (10/25/23 1105)  Pulse: 85 (10/25/23 1417)  Resp: 19 (10/25/23 1417)  BP: (!) 125/56 (10/25/23 1105)  SpO2: 96 % (10/25/23 1417) Vital Signs (24h Range):  Temp:  [97.7 °F (36.5 °C)-98.3 °F (36.8 °C)] 98.1 °F (36.7 °C)  Pulse:  [] 85  Resp:  [11-50] 19  SpO2:  [90 %-98 %] 96 %  BP: (124-171)/(56-77) 125/56  Arterial Line BP: (107-168)/(23-81) 145/59     Weight: 85.3 kg (188 lb 0.8 oz)  Body mass index is 28.59 kg/m².     SpO2: 96 %         Intake/Output Summary (Last 24 hours) at 10/25/2023 1419  Last data filed at 10/25/2023 1400  Gross per 24 hour   Intake 2164.29 ml   Output 3030 ml   Net -865.71 ml       Lines/Drains/Airways       Drain  Duration                  Urethral Catheter 10/24/23 1120 Coude 18 Fr. 1 day              Arterial Line  Duration             Arterial Line 10/24/23 0301 Right Radial 1 day              Line  Duration                  IABP 10/24/23  1 day              Peripheral Intravenous Line  Duration                  Peripheral IV - Single Lumen 20 G Posterior;Right Hand -- days         Peripheral IV - Single Lumen 10/23/23 1225 20 G Anterior;Right Forearm 2 days         Peripheral IV - Single Lumen 10/24/23 1555 20 G Anterior;Proximal;Right Forearm <1 day                       Physical Exam  Vitals and nursing note reviewed.   Constitutional:       Appearance: He is  ill-appearing.      Comments: On supplemental O2   HENT:      Head: Normocephalic and atraumatic.   Cardiovascular:      Rate and Rhythm: Normal rate and regular rhythm.      Heart sounds: S1 normal and S2 normal. No murmur heard.  Pulmonary:      Effort: Pulmonary effort is normal.      Breath sounds: Rhonchi present.   Abdominal:      Palpations: Abdomen is soft.   Musculoskeletal:      Right lower leg: No edema.      Left lower leg: No edema.   Skin:     Comments: R groin with IABP in place   Neurological:      Comments: Sedated            Significant Labs: CMP   Recent Labs   Lab 10/24/23  0250 10/24/23  0513 10/24/23  1226 10/25/23  0501   * 127*  --  130*   K 4.7 4.3  --  4.6   CL 94* 94*  --  97   CO2 18* 21*  --  23   * 192*  --  168*   BUN 19 19  --  25*   CREATININE 1.6* 1.4  --  1.3   CALCIUM 8.4* 7.8*  --  7.8*   PROT 8.1  --  7.4  --    ALBUMIN 3.2*  --  3.1*  --    BILITOT 0.2  --  0.3  --    ALKPHOS 55  --  47*  --    *  --  105*  --    ALT 35  --  32  --    ANIONGAP 15 12  --  10   , CBC   Recent Labs   Lab 10/24/23  0513 10/25/23  0501   WBC 10.00 11.77   HGB 11.5* 10.3*   HCT 33.7* 31.4*    218   , Troponin   Recent Labs   Lab 10/24/23  0912 10/24/23  1226 10/24/23  2032   TROPONINI 23.984* 21.839* 19.912*   , and All pertinent lab results from the last 24 hours have been reviewed.    Significant Imaging: Echocardiogram: Transthoracic echo (TTE) complete (Cupid Only):   Results for orders placed or performed during the hospital encounter of 10/23/23   Echo   Result Value Ref Range    BSA 2.08 m2    IVRT 49.48 msec    TDI LATERAL 0.10 m/s    Left Ventricular Outflow Tract Mean Gradient 1.13 mmHg    Left Ventricular Outflow Tract Mean Velocity 0.49 cm/s    MV stenosis pressure 1/2 time 34.73 ms    Mr max rex 3.50 m/s    TR Max Rex 2.09 m/s    Ao root annulus 3.38 cm    Ao peak rex 0.86 m/s    Posterior Wall 0.95 0.6 - 1.1 cm    LVOT diameter 1.95 cm    LVOT peak rex 0.76  m/s    LVOT peak VTI 10.90 cm    E wave deceleration time 119.77 msec    MV Peak A Rex 0.40 m/s    MV Peak E Rex 0.65 m/s    RA Major Axis 2.94 cm    TAPSE 1.82 cm    PV mean gradient 1 mmHg    RVOT peak rex 0.57 m/s    RVOT peak VTI 9.8 cm    IVS 0.96 0.6 - 1.1 cm    Ao VTI 11.90 cm    AV mean gradient 2 mmHg    LVIDd 4.24 3.5 - 6.0 cm    LVIDs 3.71 2.1 - 4.0 cm    Left Atrium Major Axis 4.28 cm    Left Atrium Minor Axis 4.19 cm    RV mid diameter 3.37 cm    STJ 3.35 cm    Ascending aorta 3.41 cm    LV Systolic Volume 58.38 mL    LV Diastolic Volume 80.17 mL    TDI SEPTAL 0.09 m/s    LA size 2.39 cm    IVC diameter 1.42 cm    LV LATERAL E/E' RATIO 6.50 m/s    LV SEPTAL E/E' RATIO 7.22 m/s    FS 13 28 - 44 %    LV mass 130.71 g    ZLVIDD -3.61     ZLVIDS -0.04     Left Ventricle Relative Wall Thickness 0.45 cm    AV valve area 2.73 cm²    AV Velocity Ratio 0.88     AV index (prosthetic) 0.92     MV valve area p 1/2 method 6.33 cm2    E/A ratio 1.63     Mean e' 0.10 m/s    LVOT area 3.0 cm2    LVOT stroke volume 32.54 cm3    AV peak gradient 3 mmHg    E/E' ratio 6.84 m/s    LV Systolic Volume Index 28.6 mL/m2    LV Diastolic Volume Index 39.30 mL/m2    LV Mass Index 64 g/m2    Triscuspid Valve Regurgitation Peak Gradient 17 mmHg    ADDIS by Velocity Ratio 2.64 cm²    LA Volume Index 11.4 mL/m2    LA volume 23.23 cm3    LA WIDTH 2.7 cm    RA Width 2.3 cm    TV resting pulmonary artery pressure 20 mmHg    RV TB RVSP 5 mmHg    Est. RA pres 3 mmHg    Narrative      Left Ventricle: The left ventricle is mildly dilated. Normal wall   thickness. There is concentric remodeling. Moderate global hypokinesis   present. There is severely reduced systolic function with a visually   estimated ejection fraction of 20 - 25%. There is normal diastolic   function.    Right Ventricle: Normal right ventricular cavity size. Wall thickness   is normal. Right ventricle wall motion  is normal. Systolic function is   normal.    Mitral  Valve: There is mild to moderate regurgitation with a centrally   directed jet.    Pulmonary Artery: The estimated pulmonary artery systolic pressure is   20 mmHg.    IVC/SVC: Normal venous pressure at 3 mmHg.     , EKG: Reviewed, and X-Ray: CXR: X-Ray Chest 1 View (CXR):   Results for orders placed or performed during the hospital encounter of 10/23/23   X-Ray Chest 1 View    Narrative    EXAMINATION:  XR CHEST 1 VIEW    CLINICAL HISTORY:  IABP placement;    TECHNIQUE:  Single frontal view of the chest was performed.    COMPARISON:  10/23/2023    FINDINGS:  Multiple telemetry leads overlie the chest.  The cardiac silhouette and mediastinum are unremarkable.  Exam is limited as the right costophrenic sulcus is not included.  Developing bilateral perihilar ground-glass opacities along with more prominent interstitial prominence and cephalization compatible with worsening pulmonary venous hypertension.  No visualized pneumothorax or pleural effusion.  The lungs remain hyperinflated.      Impression    Findings compatible with worsening pulmonary venous hypertension.    Unchanged hyperinflation.      Electronically signed by: Paddy Calhoun  Date:    10/25/2023  Time:    07:49    and X-Ray Chest PA and Lateral (CXR): No results found for this visit on 10/23/23.    Assessment and Plan:   Patient who presents with VT/NSTEMI/cardiogenic shock.  RCA and ostial LAD lesion as well as elevated filling pressures noted yesterday. Turned down for CABG. Continue diuresis and OMT. Possible high risk PCI tmw pending stability of H/H.     * VT (ventricular tachycardia)  Treated with shock therapy  Continue amiodarone  Ischemic evaluation  ETOH evaluation and cardiac echo pending    10/24/23  -Echo with EF of 20-25%  -Ischemic evaluation pending  -Continue amiodarone gtt for now  -Keep K > 4; Mg > 2    10/25/23  -Stable  -Keep on amiodarone for now  -Repeat LFT's    Cardiogenic shock  -Clinical condition worsened overnight  with increased SOB/lactic acidosis  -LA normalized on dobutamine  -Continue inotropic support  -R/LHC planned  -Will discuss with Main [facility]    10/25/23  -Not candidate for advanced options given ETOH abuse  -Continue dobutamine  -Possible high risk PCI tmw    Hyponatremia  -In setting of CHF/ETOH abuse  -Monitor    Alcohol abuse  -ETOH cessation    COPD (chronic obstructive pulmonary disease)  -Mgmt as per primary team    Tobacco abuse  -Cessation advised    Acute on chronic combined systolic and diastolic heart failure  -BNP minimally elevated  -LA improved with inotropic support  -Ischemic evaluation pending  -Will optimize regimen accordingly    10/25/23  -RHC with elevated filling pressures  -Continue IV diuresis  -Will optimize regimen as tolerated  -Possible high risk PCI tmw    Elevated troponin  Will trend  IV heparin  Cardiac echo pending  EKG not showing ischemia    10/24/23  -Troponin bumped to 33, now trending down  -CP free during exam  -Echo showed EF of 20-25%, developed cardiogenic shock overnight, responded well to dobutamine  -Continue ASA, heparin gtt  -Plans for R/LHC today pending discussion with Dr. Ernandez    10/25/23  -s/p R/LHC which showed elevated filling pressures, occluded RCA with left to right collateral, ostial LAD  -Turned down for CABG by CTS  -Continue heparin gtt, ASA, statin   -Keep NPO for possible high risk PCI tmw pending stability of H/H      CHF (congestive heart failure)  Patient is identified as having Combined Systolic and Diastolic heart failure that is Acute on chronic. CHF is currently uncontrolled due to Continued edema of extremities. Latest ECHO performed and demonstrates- Results for orders placed during the hospital encounter of 06/07/23    Echo    Interpretation Summary  · The left ventricle is mildly enlarged with mildly decreased systolic function.  · Overall there is mild to moderate global hypokinesis; Prounounced basal inferior and inferioseptal  hypokinesis.  · The estimated ejection fraction is 40%.  · Grade III left ventricular diastolic dysfunction.  · Mild right ventricular enlargement with low normal right ventricular systolic function.  · Mild-to-moderate mitral regurgitation.  · Mild tricuspid regurgitation.  · Mild pulmonic regurgitation.  · The estimated PA systolic pressure is 35 mmHg.  · Mild left atrial enlargement.  · Mild right atrial enlargement.  . Continue Beta Blocker, ACE/ARB and Furosemide and monitor clinical status closely. Monitor on telemetry. Patient is off CHF pathway.  Monitor strict Is&Os and daily weights.  Place on fluid restriction of 2 L. Cardiology has been consulted. Continue to stress to patient importance of self efficacy and  on diet for CHF. Last BNP reviewed- and noted below   Recent Labs   Lab 10/23/23  0950   *   .    Alcoholic cirrhosis  -Per intensive team  -Will monitor LFT's while on amiodarone        VTE Risk Mitigation (From admission, onward)         Ordered     heparin 25,000 units in dextrose 5% (100 units/ml) IV bolus from bag - ADDITIONAL PRN BOLUS - 60 units/kg (max bolus 4000 units)  As needed (PRN)        Question:  Heparin Infusion Adjustment (DO NOT MODIFY ANSWER)  Answer:  \\[facility]\Images\Pharmacy\HeparinInfusions\heparin LOW INTENSITY nomogram for [facility] [id number]    10/23/23 1123     heparin 25,000 units in dextrose 5% (100 units/ml) IV bolus from bag - ADDITIONAL PRN BOLUS - 30 units/kg (max bolus 4000 units)  As needed (PRN)        Question:  Heparin Infusion Adjustment (DO NOT MODIFY ANSWER)  Answer:  \\[facility]\Images\Pharmacy\HeparinInfusions\heparin LOW INTENSITY nomogram for [facility] [id number]    10/23/23 1123     heparin 25,000 units in dextrose 5% 250 mL (100 units/mL) infusion LOW INTENSITY nomogram - [facility]  Continuous        Question:  Begin at (units/kg/hr)  Answer:  12    10/23/23 1123     IP VTE HIGH RISK PATIENT  Once         10/23/23 1120     Place sequential  compression device  Until discontinued         10/23/23 1129                Cammie Richardson PA-C  Cardiology  '[place] - Intensive Care ([facility])

## 2023-10-25 NOTE — PLAN OF CARE
Problem: Adult Inpatient Plan of Care  Goal: Plan of Care Review  Outcome: Ongoing, Not Progressing  Pt remains AAOx4, confusion/agitation overnight but now back to baseline. Afebrile. NSR on monitor. BP stable. PIVs infusing Heparin, Amio, dobutamine, and Precedex. IABP to R groin CDI, without complications. TR band to L wrist removed this shift without complications. Adequate urine output via jacobson cath. Small weight shift adjustments made frequently but pt remains flat due to IABP. POC reviewed with pt, verbalized understanding but needs reinforcement. Will continue with current POC and update as needed.

## 2023-10-25 NOTE — SUBJECTIVE & OBJECTIVE
Review of Systems   Reason unable to perform ROS: sedated.     Objective:     Vital Signs (Most Recent):  Temp: 98.1 °F (36.7 °C) (10/25/23 1105)  Pulse: 85 (10/25/23 1417)  Resp: 19 (10/25/23 1417)  BP: (!) 125/56 (10/25/23 1105)  SpO2: 96 % (10/25/23 1417) Vital Signs (24h Range):  Temp:  [97.7 °F (36.5 °C)-98.3 °F (36.8 °C)] 98.1 °F (36.7 °C)  Pulse:  [] 85  Resp:  [11-50] 19  SpO2:  [90 %-98 %] 96 %  BP: (124-171)/(56-77) 125/56  Arterial Line BP: (107-168)/(23-81) 145/59     Weight: 85.3 kg (188 lb 0.8 oz)  Body mass index is 28.59 kg/m².     SpO2: 96 %         Intake/Output Summary (Last 24 hours) at 10/25/2023 1419  Last data filed at 10/25/2023 1400  Gross per 24 hour   Intake 2164.29 ml   Output 3030 ml   Net -865.71 ml       Lines/Drains/Airways       Drain  Duration                  Urethral Catheter 10/24/23 1120 Coude 18 Fr. 1 day              Arterial Line  Duration             Arterial Line 10/24/23 0301 Right Radial 1 day              Line  Duration                  IABP 10/24/23  1 day              Peripheral Intravenous Line  Duration                  Peripheral IV - Single Lumen 20 G Posterior;Right Hand -- days         Peripheral IV - Single Lumen 10/23/23 1225 20 G Anterior;Right Forearm 2 days         Peripheral IV - Single Lumen 10/24/23 1555 20 G Anterior;Proximal;Right Forearm <1 day                       Physical Exam  Vitals and nursing note reviewed.   Constitutional:       Appearance: He is ill-appearing.      Comments: On supplemental O2   HENT:      Head: Normocephalic and atraumatic.   Cardiovascular:      Rate and Rhythm: Normal rate and regular rhythm.      Heart sounds: S1 normal and S2 normal. No murmur heard.  Pulmonary:      Effort: Pulmonary effort is normal.      Breath sounds: Rhonchi present.   Abdominal:      Palpations: Abdomen is soft.   Musculoskeletal:      Right lower leg: No edema.      Left lower leg: No edema.   Skin:     Comments: R groin with IABP in place    Neurological:      Comments: Sedated            Significant Labs: CMP   Recent Labs   Lab 10/24/23  0250 10/24/23  0513 10/24/23  1226 10/25/23  0501   * 127*  --  130*   K 4.7 4.3  --  4.6   CL 94* 94*  --  97   CO2 18* 21*  --  23   * 192*  --  168*   BUN 19 19  --  25*   CREATININE 1.6* 1.4  --  1.3   CALCIUM 8.4* 7.8*  --  7.8*   PROT 8.1  --  7.4  --    ALBUMIN 3.2*  --  3.1*  --    BILITOT 0.2  --  0.3  --    ALKPHOS 55  --  47*  --    *  --  105*  --    ALT 35  --  32  --    ANIONGAP 15 12  --  10   , CBC   Recent Labs   Lab 10/24/23  0513 10/25/23  0501   WBC 10.00 11.77   HGB 11.5* 10.3*   HCT 33.7* 31.4*    218   , Troponin   Recent Labs   Lab 10/24/23  0912 10/24/23  1226 10/24/23  2032   TROPONINI 23.984* 21.839* 19.912*   , and All pertinent lab results from the last 24 hours have been reviewed.    Significant Imaging: Echocardiogram: Transthoracic echo (TTE) complete (Cupid Only):   Results for orders placed or performed during the hospital encounter of 10/23/23   Echo   Result Value Ref Range    BSA 2.08 m2    IVRT 49.48 msec    TDI LATERAL 0.10 m/s    Left Ventricular Outflow Tract Mean Gradient 1.13 mmHg    Left Ventricular Outflow Tract Mean Velocity 0.49 cm/s    MV stenosis pressure 1/2 time 34.73 ms    Mr max rex 3.50 m/s    TR Max Rex 2.09 m/s    Ao root annulus 3.38 cm    Ao peak rex 0.86 m/s    Posterior Wall 0.95 0.6 - 1.1 cm    LVOT diameter 1.95 cm    LVOT peak rex 0.76 m/s    LVOT peak VTI 10.90 cm    E wave deceleration time 119.77 msec    MV Peak A Rex 0.40 m/s    MV Peak E Rex 0.65 m/s    RA Major Axis 2.94 cm    TAPSE 1.82 cm    PV mean gradient 1 mmHg    RVOT peak rex 0.57 m/s    RVOT peak VTI 9.8 cm    IVS 0.96 0.6 - 1.1 cm    Ao VTI 11.90 cm    AV mean gradient 2 mmHg    LVIDd 4.24 3.5 - 6.0 cm    LVIDs 3.71 2.1 - 4.0 cm    Left Atrium Major Axis 4.28 cm    Left Atrium Minor Axis 4.19 cm    RV mid diameter 3.37 cm    STJ 3.35 cm    Ascending aorta 3.41  cm    LV Systolic Volume 58.38 mL    LV Diastolic Volume 80.17 mL    TDI SEPTAL 0.09 m/s    LA size 2.39 cm    IVC diameter 1.42 cm    LV LATERAL E/E' RATIO 6.50 m/s    LV SEPTAL E/E' RATIO 7.22 m/s    FS 13 28 - 44 %    LV mass 130.71 g    ZLVIDD -3.61     ZLVIDS -0.04     Left Ventricle Relative Wall Thickness 0.45 cm    AV valve area 2.73 cm²    AV Velocity Ratio 0.88     AV index (prosthetic) 0.92     MV valve area p 1/2 method 6.33 cm2    E/A ratio 1.63     Mean e' 0.10 m/s    LVOT area 3.0 cm2    LVOT stroke volume 32.54 cm3    AV peak gradient 3 mmHg    E/E' ratio 6.84 m/s    LV Systolic Volume Index 28.6 mL/m2    LV Diastolic Volume Index 39.30 mL/m2    LV Mass Index 64 g/m2    Triscuspid Valve Regurgitation Peak Gradient 17 mmHg    ADDIS by Velocity Ratio 2.64 cm²    LA Volume Index 11.4 mL/m2    LA volume 23.23 cm3    LA WIDTH 2.7 cm    RA Width 2.3 cm    TV resting pulmonary artery pressure 20 mmHg    RV TB RVSP 5 mmHg    Est. RA pres 3 mmHg    Narrative      Left Ventricle: The left ventricle is mildly dilated. Normal wall   thickness. There is concentric remodeling. Moderate global hypokinesis   present. There is severely reduced systolic function with a visually   estimated ejection fraction of 20 - 25%. There is normal diastolic   function.    Right Ventricle: Normal right ventricular cavity size. Wall thickness   is normal. Right ventricle wall motion  is normal. Systolic function is   normal.    Mitral Valve: There is mild to moderate regurgitation with a centrally   directed jet.    Pulmonary Artery: The estimated pulmonary artery systolic pressure is   20 mmHg.    IVC/SVC: Normal venous pressure at 3 mmHg.     , EKG: Reviewed, and X-Ray: CXR: X-Ray Chest 1 View (CXR):   Results for orders placed or performed during the hospital encounter of 10/23/23   X-Ray Chest 1 View    Narrative    EXAMINATION:  XR CHEST 1 VIEW    CLINICAL HISTORY:  IABP placement;    TECHNIQUE:  Single frontal view of the chest  was performed.    COMPARISON:  10/23/2023    FINDINGS:  Multiple telemetry leads overlie the chest.  The cardiac silhouette and mediastinum are unremarkable.  Exam is limited as the right costophrenic sulcus is not included.  Developing bilateral perihilar ground-glass opacities along with more prominent interstitial prominence and cephalization compatible with worsening pulmonary venous hypertension.  No visualized pneumothorax or pleural effusion.  The lungs remain hyperinflated.      Impression    Findings compatible with worsening pulmonary venous hypertension.    Unchanged hyperinflation.      Electronically signed by: Paddy Calhoun  Date:    10/25/2023  Time:    07:49    and X-Ray Chest PA and Lateral (CXR): No results found for this visit on 10/23/23.

## 2023-10-25 NOTE — ASSESSMENT & PLAN NOTE
- remains on IV steroids and rocephin  - on 2L NC this AM  - PRN duoneb, scheduled brovana and pulmicort nebs  - with wheezing today, continue diuresis

## 2023-10-26 LAB
ALBUMIN SERPL BCP-MCNC: 3.3 G/DL (ref 3.5–5.2)
ALP SERPL-CCNC: 41 U/L (ref 55–135)
ALT SERPL W/O P-5'-P-CCNC: 19 U/L (ref 10–44)
ANION GAP SERPL CALC-SCNC: 12 MMOL/L (ref 8–16)
APTT PPP: 43 SEC (ref 21–32)
AST SERPL-CCNC: 32 U/L (ref 10–40)
BASOPHILS # BLD AUTO: 0.01 K/UL (ref 0–0.2)
BASOPHILS NFR BLD: 0.1 % (ref 0–1.9)
BILIRUB DIRECT SERPL-MCNC: 0.2 MG/DL (ref 0.1–0.3)
BILIRUB SERPL-MCNC: 0.3 MG/DL (ref 0.1–1)
BNP SERPL-MCNC: 1150 PG/ML (ref 0–99)
BUN SERPL-MCNC: 33 MG/DL (ref 8–23)
CALCIUM SERPL-MCNC: 8.6 MG/DL (ref 8.7–10.5)
CHLORIDE SERPL-SCNC: 96 MMOL/L (ref 95–110)
CO2 SERPL-SCNC: 24 MMOL/L (ref 23–29)
CREAT SERPL-MCNC: 1.3 MG/DL (ref 0.5–1.4)
DIFFERENTIAL METHOD: ABNORMAL
EOSINOPHIL # BLD AUTO: 0 K/UL (ref 0–0.5)
EOSINOPHIL NFR BLD: 0 % (ref 0–8)
ERYTHROCYTE [DISTWIDTH] IN BLOOD BY AUTOMATED COUNT: 12.6 % (ref 11.5–14.5)
EST. GFR  (NO RACE VARIABLE): >60 ML/MIN/1.73 M^2
GLUCOSE SERPL-MCNC: 153 MG/DL (ref 70–110)
HCT VFR BLD AUTO: 32.8 % (ref 40–54)
HGB BLD-MCNC: 11.1 G/DL (ref 14–18)
IMM GRANULOCYTES # BLD AUTO: 0.13 K/UL (ref 0–0.04)
IMM GRANULOCYTES NFR BLD AUTO: 1.1 % (ref 0–0.5)
LYMPHOCYTES # BLD AUTO: 0.4 K/UL (ref 1–4.8)
LYMPHOCYTES NFR BLD: 3.4 % (ref 18–48)
MAGNESIUM SERPL-MCNC: 2.8 MG/DL (ref 1.6–2.6)
MCH RBC QN AUTO: 32.6 PG (ref 27–31)
MCHC RBC AUTO-ENTMCNC: 33.8 G/DL (ref 32–36)
MCV RBC AUTO: 96 FL (ref 82–98)
MONOCYTES # BLD AUTO: 0.6 K/UL (ref 0.3–1)
MONOCYTES NFR BLD: 5.3 % (ref 4–15)
NEUTROPHILS # BLD AUTO: 10.2 K/UL (ref 1.8–7.7)
NEUTROPHILS NFR BLD: 90.1 % (ref 38–73)
NRBC BLD-RTO: 0 /100 WBC
PHOSPHATE SERPL-MCNC: 3.2 MG/DL (ref 2.7–4.5)
PLATELET # BLD AUTO: 211 K/UL (ref 150–450)
PMV BLD AUTO: 10.7 FL (ref 9.2–12.9)
POC ACTIVATED CLOTTING TIME K: 137 SEC (ref 74–137)
POC ACTIVATED CLOTTING TIME K: 161 SEC (ref 74–137)
POCT GLUCOSE: 138 MG/DL (ref 70–110)
POCT GLUCOSE: 150 MG/DL (ref 70–110)
POCT GLUCOSE: 151 MG/DL (ref 70–110)
POCT GLUCOSE: 151 MG/DL (ref 70–110)
POTASSIUM SERPL-SCNC: 4.7 MMOL/L (ref 3.5–5.1)
PROCALCITONIN SERPL IA-MCNC: 0.09 NG/ML
PROT SERPL-MCNC: 7.9 G/DL (ref 6–8.4)
RBC # BLD AUTO: 3.41 M/UL (ref 4.6–6.2)
SAMPLE: ABNORMAL
SAMPLE: NORMAL
SODIUM SERPL-SCNC: 132 MMOL/L (ref 136–145)
WBC # BLD AUTO: 11.35 K/UL (ref 3.9–12.7)

## 2023-10-26 PROCEDURE — 25000003 PHARM REV CODE 250: Performed by: INTERNAL MEDICINE

## 2023-10-26 PROCEDURE — 33990 INSJ PERQ VAD L HRT ARTERIAL: CPT | Performed by: INTERNAL MEDICINE

## 2023-10-26 PROCEDURE — 99152 PR MOD CONSCIOUS SEDATION, SAME PHYS, 5+ YRS, FIRST 15 MIN: ICD-10-PCS | Mod: ,,, | Performed by: INTERNAL MEDICINE

## 2023-10-26 PROCEDURE — 36410 VNPNXR 3YR/> PHY/QHP DX/THER: CPT

## 2023-10-26 PROCEDURE — C1894 INTRO/SHEATH, NON-LASER: HCPCS | Performed by: INTERNAL MEDICINE

## 2023-10-26 PROCEDURE — 85730 THROMBOPLASTIN TIME PARTIAL: CPT | Performed by: NURSE PRACTITIONER

## 2023-10-26 PROCEDURE — 80076 HEPATIC FUNCTION PANEL: CPT | Performed by: INTERNAL MEDICINE

## 2023-10-26 PROCEDURE — 93572 IV DOP VEL&/PRESS C FLO EA: CPT | Mod: LD | Performed by: INTERNAL MEDICINE

## 2023-10-26 PROCEDURE — 85025 COMPLETE CBC W/AUTO DIFF WBC: CPT | Performed by: INTERNAL MEDICINE

## 2023-10-26 PROCEDURE — 76937 US GUIDE VASCULAR ACCESS: CPT

## 2023-10-26 PROCEDURE — 63600175 PHARM REV CODE 636 W HCPCS: Performed by: INTERNAL MEDICINE

## 2023-10-26 PROCEDURE — 93799 UNLISTED CV SVC/PROCEDURE: CPT | Mod: LD | Performed by: INTERNAL MEDICINE

## 2023-10-26 PROCEDURE — 84145 PROCALCITONIN (PCT): CPT | Performed by: INTERNAL MEDICINE

## 2023-10-26 PROCEDURE — C1753 CATH, INTRAVAS ULTRASOUND: HCPCS | Performed by: INTERNAL MEDICINE

## 2023-10-26 PROCEDURE — 25500020 PHARM REV CODE 255: Performed by: INTERNAL MEDICINE

## 2023-10-26 PROCEDURE — 33990 INSJ PERQ VAD L HRT ARTERIAL: CPT | Mod: ,,, | Performed by: INTERNAL MEDICINE

## 2023-10-26 PROCEDURE — 93799: ICD-10-PCS | Mod: 26,LD,, | Performed by: INTERNAL MEDICINE

## 2023-10-26 PROCEDURE — 99153 MOD SED SAME PHYS/QHP EA: CPT | Performed by: INTERNAL MEDICINE

## 2023-10-26 PROCEDURE — 94761 N-INVAS EAR/PLS OXIMETRY MLT: CPT

## 2023-10-26 PROCEDURE — C1769 GUIDE WIRE: HCPCS | Performed by: INTERNAL MEDICINE

## 2023-10-26 PROCEDURE — 99152 MOD SED SAME PHYS/QHP 5/>YRS: CPT | Performed by: INTERNAL MEDICINE

## 2023-10-26 PROCEDURE — 27000221 HC OXYGEN, UP TO 24 HOURS

## 2023-10-26 PROCEDURE — 27201423 OPTIME MED/SURG SUP & DEVICES STERILE SUPPLY: Performed by: INTERNAL MEDICINE

## 2023-10-26 PROCEDURE — 63600175 PHARM REV CODE 636 W HCPCS: Performed by: NURSE PRACTITIONER

## 2023-10-26 PROCEDURE — 94640 AIRWAY INHALATION TREATMENT: CPT

## 2023-10-26 PROCEDURE — 99152 MOD SED SAME PHYS/QHP 5/>YRS: CPT | Mod: ,,, | Performed by: INTERNAL MEDICINE

## 2023-10-26 PROCEDURE — C9399 UNCLASSIFIED DRUGS OR BIOLOG: HCPCS | Performed by: INTERNAL MEDICINE

## 2023-10-26 PROCEDURE — 25000003 PHARM REV CODE 250: Performed by: NURSE PRACTITIONER

## 2023-10-26 PROCEDURE — 25000003 PHARM REV CODE 250: Performed by: PHYSICIAN ASSISTANT

## 2023-10-26 PROCEDURE — 85347 COAGULATION TIME ACTIVATED: CPT | Performed by: INTERNAL MEDICINE

## 2023-10-26 PROCEDURE — 80048 BASIC METABOLIC PNL TOTAL CA: CPT | Performed by: NURSE PRACTITIONER

## 2023-10-26 PROCEDURE — 93799 UNLISTED CV SVC/PROCEDURE: CPT | Mod: 26,LD,, | Performed by: INTERNAL MEDICINE

## 2023-10-26 PROCEDURE — 33990 PR INSERT, VAD, PERCUT, LT HEART, ART ACCESS ONLY: ICD-10-PCS | Mod: ,,, | Performed by: INTERNAL MEDICINE

## 2023-10-26 PROCEDURE — 99900035 HC TECH TIME PER 15 MIN (STAT)

## 2023-10-26 PROCEDURE — C1751 CATH, INF, PER/CENT/MIDLINE: HCPCS

## 2023-10-26 PROCEDURE — 99233 PR SUBSEQUENT HOSPITAL CARE,LEVL III: ICD-10-PCS | Mod: ,,, | Performed by: INTERNAL MEDICINE

## 2023-10-26 PROCEDURE — 83735 ASSAY OF MAGNESIUM: CPT | Performed by: INTERNAL MEDICINE

## 2023-10-26 PROCEDURE — 99233 SBSQ HOSP IP/OBS HIGH 50: CPT | Mod: ,,, | Performed by: INTERNAL MEDICINE

## 2023-10-26 PROCEDURE — 27801859 OPTIME CATHETER, IMPELLA: Performed by: INTERNAL MEDICINE

## 2023-10-26 PROCEDURE — 25000242 PHARM REV CODE 250 ALT 637 W/ HCPCS: Performed by: INTERNAL MEDICINE

## 2023-10-26 PROCEDURE — 83880 ASSAY OF NATRIURETIC PEPTIDE: CPT | Performed by: PHYSICIAN ASSISTANT

## 2023-10-26 PROCEDURE — 84100 ASSAY OF PHOSPHORUS: CPT | Performed by: INTERNAL MEDICINE

## 2023-10-26 PROCEDURE — C1887 CATHETER, GUIDING: HCPCS | Performed by: INTERNAL MEDICINE

## 2023-10-26 PROCEDURE — 20000000 HC ICU ROOM

## 2023-10-26 PROCEDURE — C9399 UNCLASSIFIED DRUGS OR BIOLOG: HCPCS | Performed by: NURSE PRACTITIONER

## 2023-10-26 RX ORDER — MIDAZOLAM HYDROCHLORIDE 1 MG/ML
INJECTION, SOLUTION INTRAMUSCULAR; INTRAVENOUS
Status: DISCONTINUED | OUTPATIENT
Start: 2023-10-26 | End: 2023-10-26 | Stop reason: HOSPADM

## 2023-10-26 RX ORDER — BUMETANIDE 0.25 MG/ML
2 INJECTION INTRAMUSCULAR; INTRAVENOUS ONCE
Status: COMPLETED | OUTPATIENT
Start: 2023-10-27 | End: 2023-10-26

## 2023-10-26 RX ORDER — FUROSEMIDE 10 MG/ML
40 INJECTION INTRAMUSCULAR; INTRAVENOUS ONCE
Status: COMPLETED | OUTPATIENT
Start: 2023-10-26 | End: 2023-10-26

## 2023-10-26 RX ORDER — LIDOCAINE HYDROCHLORIDE 20 MG/ML
INJECTION, SOLUTION EPIDURAL; INFILTRATION; INTRACAUDAL; PERINEURAL
Status: DISCONTINUED | OUTPATIENT
Start: 2023-10-26 | End: 2023-10-26 | Stop reason: HOSPADM

## 2023-10-26 RX ORDER — FUROSEMIDE 10 MG/ML
INJECTION INTRAMUSCULAR; INTRAVENOUS
Status: DISCONTINUED | OUTPATIENT
Start: 2023-10-26 | End: 2023-10-26 | Stop reason: HOSPADM

## 2023-10-26 RX ORDER — ACETAMINOPHEN 325 MG/1
650 TABLET ORAL EVERY 4 HOURS PRN
Status: DISCONTINUED | OUTPATIENT
Start: 2023-10-26 | End: 2023-10-27

## 2023-10-26 RX ORDER — FENTANYL CITRATE 50 UG/ML
INJECTION, SOLUTION INTRAMUSCULAR; INTRAVENOUS
Status: DISCONTINUED | OUTPATIENT
Start: 2023-10-26 | End: 2023-10-26 | Stop reason: HOSPADM

## 2023-10-26 RX ORDER — LIDOCAINE HYDROCHLORIDE AND EPINEPHRINE 5; 5 MG/ML; UG/ML
110 INJECTION, SOLUTION INFILTRATION; PERINEURAL ONCE
Status: DISCONTINUED | OUTPATIENT
Start: 2023-10-26 | End: 2023-10-26

## 2023-10-26 RX ORDER — AMIODARONE HYDROCHLORIDE 200 MG/1
400 TABLET ORAL 2 TIMES DAILY
Status: DISCONTINUED | OUTPATIENT
Start: 2023-10-26 | End: 2023-11-03 | Stop reason: HOSPADM

## 2023-10-26 RX ORDER — HEPARIN SODIUM 1000 [USP'U]/ML
INJECTION INTRAVENOUS; SUBCUTANEOUS
Status: DISCONTINUED | OUTPATIENT
Start: 2023-10-26 | End: 2023-10-26 | Stop reason: HOSPADM

## 2023-10-26 RX ORDER — SODIUM CHLORIDE 9 MG/ML
INJECTION, SOLUTION INTRAVENOUS CONTINUOUS
Status: CANCELLED | OUTPATIENT
Start: 2023-10-26 | End: 2023-10-26

## 2023-10-26 RX ORDER — PROTAMINE SULFATE 10 MG/ML
INJECTION, SOLUTION INTRAVENOUS
Status: DISCONTINUED | OUTPATIENT
Start: 2023-10-26 | End: 2023-10-26 | Stop reason: HOSPADM

## 2023-10-26 RX ORDER — CLOPIDOGREL BISULFATE 75 MG/1
75 TABLET ORAL DAILY
Status: DISCONTINUED | OUTPATIENT
Start: 2023-10-27 | End: 2023-11-03 | Stop reason: HOSPADM

## 2023-10-26 RX ORDER — LIDOCAINE HYDROCHLORIDE AND EPINEPHRINE 5; 5 MG/ML; UG/ML
10 INJECTION, SOLUTION INFILTRATION; PERINEURAL ONCE
Status: COMPLETED | OUTPATIENT
Start: 2023-10-26 | End: 2023-10-26

## 2023-10-26 RX ORDER — SPIRONOLACTONE 25 MG/1
25 TABLET ORAL 2 TIMES DAILY
Status: DISCONTINUED | OUTPATIENT
Start: 2023-10-26 | End: 2023-11-03 | Stop reason: HOSPADM

## 2023-10-26 RX ORDER — ALBUMIN HUMAN 250 G/1000ML
25 SOLUTION INTRAVENOUS ONCE
Status: COMPLETED | OUTPATIENT
Start: 2023-10-27 | End: 2023-10-27

## 2023-10-26 RX ORDER — CEFAZOLIN SODIUM 1 G/3ML
INJECTION, POWDER, FOR SOLUTION INTRAMUSCULAR; INTRAVENOUS
Status: DISCONTINUED | OUTPATIENT
Start: 2023-10-26 | End: 2023-10-26 | Stop reason: HOSPADM

## 2023-10-26 RX ORDER — SODIUM CHLORIDE 9 MG/ML
INJECTION, SOLUTION INTRAVENOUS CONTINUOUS
Status: DISCONTINUED | OUTPATIENT
Start: 2023-10-26 | End: 2023-10-26

## 2023-10-26 RX ORDER — METOPROLOL TARTRATE 1 MG/ML
5 INJECTION, SOLUTION INTRAVENOUS ONCE
Status: COMPLETED | OUTPATIENT
Start: 2023-10-26 | End: 2023-10-26

## 2023-10-26 RX ORDER — HYDROMORPHONE HYDROCHLORIDE 1 MG/ML
1 INJECTION, SOLUTION INTRAMUSCULAR; INTRAVENOUS; SUBCUTANEOUS ONCE
Status: COMPLETED | OUTPATIENT
Start: 2023-10-26 | End: 2023-10-26

## 2023-10-26 RX ORDER — ONDANSETRON 8 MG/1
8 TABLET, ORALLY DISINTEGRATING ORAL EVERY 8 HOURS PRN
Status: DISCONTINUED | OUTPATIENT
Start: 2023-10-26 | End: 2023-10-27

## 2023-10-26 RX ORDER — BISACODYL 5 MG
10 TABLET, DELAYED RELEASE (ENTERIC COATED) ORAL DAILY PRN
Status: DISCONTINUED | OUTPATIENT
Start: 2023-10-27 | End: 2023-11-03 | Stop reason: HOSPADM

## 2023-10-26 RX ADMIN — ALBUMIN (HUMAN) 25 G: 5 SOLUTION INTRAVENOUS at 11:10

## 2023-10-26 RX ADMIN — PANTOPRAZOLE SODIUM 40 MG: 40 TABLET, DELAYED RELEASE ORAL at 08:10

## 2023-10-26 RX ADMIN — SODIUM CHLORIDE: 9 INJECTION, SOLUTION INTRAVENOUS at 08:10

## 2023-10-26 RX ADMIN — DEXMEDETOMIDINE HYDROCHLORIDE 1.4 MCG/KG/HR: 4 INJECTION INTRAVENOUS at 05:10

## 2023-10-26 RX ADMIN — ATORVASTATIN CALCIUM 80 MG: 40 TABLET, FILM COATED ORAL at 08:10

## 2023-10-26 RX ADMIN — METHYLPREDNISOLONE SODIUM SUCCINATE 60 MG: 40 INJECTION, POWDER, FOR SOLUTION INTRAMUSCULAR; INTRAVENOUS at 05:10

## 2023-10-26 RX ADMIN — GABAPENTIN 300 MG: 300 CAPSULE ORAL at 08:10

## 2023-10-26 RX ADMIN — METOROPROLOL TARTRATE 5 MG: 5 INJECTION, SOLUTION INTRAVENOUS at 08:10

## 2023-10-26 RX ADMIN — AMIODARONE HYDROCHLORIDE 0.5 MG/MIN: 1.8 INJECTION, SOLUTION INTRAVENOUS at 12:10

## 2023-10-26 RX ADMIN — TICAGRELOR 180 MG: 90 TABLET ORAL at 09:10

## 2023-10-26 RX ADMIN — DEXMEDETOMIDINE HYDROCHLORIDE 0.7 MCG/KG/HR: 4 INJECTION INTRAVENOUS at 10:10

## 2023-10-26 RX ADMIN — ARFORMOTEROL TARTRATE 15 MCG: 15 SOLUTION RESPIRATORY (INHALATION) at 07:10

## 2023-10-26 RX ADMIN — FOLIC ACID 1 MG: 1 TABLET ORAL at 08:10

## 2023-10-26 RX ADMIN — SPIRONOLACTONE 25 MG: 25 TABLET ORAL at 08:10

## 2023-10-26 RX ADMIN — BISACODYL 10 MG: 5 TABLET, COATED ORAL at 11:10

## 2023-10-26 RX ADMIN — DEXMEDETOMIDINE HYDROCHLORIDE 0.6 MCG/KG/HR: 4 INJECTION INTRAVENOUS at 03:10

## 2023-10-26 RX ADMIN — MUPIROCIN: 20 OINTMENT TOPICAL at 09:10

## 2023-10-26 RX ADMIN — MUPIROCIN: 20 OINTMENT TOPICAL at 08:10

## 2023-10-26 RX ADMIN — AMIODARONE HYDROCHLORIDE 400 MG: 200 TABLET ORAL at 08:10

## 2023-10-26 RX ADMIN — LIDOCAINE HYDROCHLORIDE,EPINEPHRINE BITARTRATE 10 ML: 5; .005 INJECTION, SOLUTION INFILTRATION; PERINEURAL at 08:10

## 2023-10-26 RX ADMIN — SPIRONOLACTONE 25 MG: 25 TABLET ORAL at 02:10

## 2023-10-26 RX ADMIN — HEPARIN SODIUM 18 UNITS/KG/HR: 10000 INJECTION, SOLUTION INTRAVENOUS at 01:10

## 2023-10-26 RX ADMIN — Medication 100 MG: at 08:10

## 2023-10-26 RX ADMIN — CHLORDIAZEPOXIDE HYDROCHLORIDE 5 MG: 5 CAPSULE ORAL at 02:10

## 2023-10-26 RX ADMIN — BUDESONIDE INHALATION 0.5 MG: 0.5 SUSPENSION RESPIRATORY (INHALATION) at 07:10

## 2023-10-26 RX ADMIN — FUROSEMIDE 40 MG: 10 INJECTION, SOLUTION INTRAMUSCULAR; INTRAVENOUS at 04:10

## 2023-10-26 RX ADMIN — BUMETANIDE 2 MG: 0.25 INJECTION INTRAMUSCULAR; INTRAVENOUS at 11:10

## 2023-10-26 RX ADMIN — ASPIRIN 81 MG CHEWABLE TABLET 81 MG: 81 TABLET CHEWABLE at 08:10

## 2023-10-26 RX ADMIN — DEXMEDETOMIDINE HYDROCHLORIDE 1.4 MCG/KG/HR: 4 INJECTION INTRAVENOUS at 08:10

## 2023-10-26 RX ADMIN — METHYLPREDNISOLONE SODIUM SUCCINATE 60 MG: 40 INJECTION, POWDER, FOR SOLUTION INTRAMUSCULAR; INTRAVENOUS at 11:10

## 2023-10-26 RX ADMIN — DOBUTAMINE IN DEXTROSE 5 MCG/KG/MIN: 400 INJECTION, SOLUTION INTRAVENOUS at 12:10

## 2023-10-26 RX ADMIN — GABAPENTIN 300 MG: 300 CAPSULE ORAL at 02:10

## 2023-10-26 RX ADMIN — METHYLPREDNISOLONE SODIUM SUCCINATE 60 MG: 40 INJECTION, POWDER, FOR SOLUTION INTRAMUSCULAR; INTRAVENOUS at 02:10

## 2023-10-26 RX ADMIN — HYDROMORPHONE HYDROCHLORIDE 1 MG: 1 INJECTION, SOLUTION INTRAMUSCULAR; INTRAVENOUS; SUBCUTANEOUS at 03:10

## 2023-10-26 RX ADMIN — CHLORDIAZEPOXIDE HYDROCHLORIDE 5 MG: 5 CAPSULE ORAL at 05:10

## 2023-10-26 RX ADMIN — HYDRALAZINE HYDROCHLORIDE 10 MG: 20 INJECTION, SOLUTION INTRAMUSCULAR; INTRAVENOUS at 02:10

## 2023-10-26 NOTE — ASSESSMENT & PLAN NOTE
- remains on IV steroids and rocephin  - on 2L NC this AM  - PRN duoneb, scheduled brovana and pulmicort nebs

## 2023-10-26 NOTE — PLAN OF CARE
Problem: Adult Inpatient Plan of Care  Goal: Plan of Care Review  Outcome: Ongoing, Progressing  Pt remains AAOx4, no complaints of pain or discomfort. Afebrile. NSR on monitor. BP stable. PIVs infusing Heparin, Amio, Dobutamine, and precedex. IABP to R groin CDI, without complications. Adequate urine output via jacobson cath. Small weight shift adjustments made frequently, HOB remains flat due to IABP. Pt kept NPO past midnight for possible procedure today. POC reviewed with pt, verbalized understanding but needs reinforcement. Will continue with current POC and update as needed.

## 2023-10-26 NOTE — ASSESSMENT & PLAN NOTE
Treated with shock therapy  Continue amiodarone  Ischemic evaluation  ETOH evaluation and cardiac echo pending    10/24/23  -Echo with EF of 20-25%  -Ischemic evaluation pending  -Continue amiodarone gtt for now  -Keep K > 4; Mg > 2    10/25/23  -Stable  -Keep on amiodarone for now  -Repeat LFT's    10/26/23  -Amiodarone switched to po  -Will need LIfeVest

## 2023-10-26 NOTE — ASSESSMENT & PLAN NOTE
-Per intensive team  -Will monitor LFT's while on amiodarone    10/26/23  -May need paracentesis, abdomen terse

## 2023-10-26 NOTE — ASSESSMENT & PLAN NOTE
Will trend  IV heparin  Cardiac echo pending  EKG not showing ischemia    10/24/23  -Troponin bumped to 33, now trending down  -CP free during exam  -Echo showed EF of 20-25%, developed cardiogenic shock overnight, responded well to dobutamine  -Continue ASA, heparin gtt  -Plans for R/LHC today pending discussion with Dr. Ernandez    10/25/23  -s/p R/LHC which showed elevated filling pressures, occluded RCA with left to right collateral, ostial LAD  -Turned down for CABG by CTS  -Continue heparin gtt, ASA, statin   -Keep NPO for possible high risk PCI tmw pending stability of H/H    10/26/23  -LHC with possible high risk PCI today by Dr. Ernandez. All risks, benefits, and treatment alternatives explained to patient in detail. All questions answered. He has agreed to proceed.

## 2023-10-26 NOTE — PROGRESS NOTES
[facility] ([facility])  Critical Care Medicine  Progress Note    Patient Name: Hemal Tolbert Jr.  MRN: [id number]  Admission Date: 10/23/2023  Hospital Length of Stay: 3 days  Code Status: Full Code  Attending Provider: Jc Mars MD  Primary Care Provider: Sigifredo Green   Principal Problem: VT (ventricular tachycardia)    Subjective:     HPI:  63-year-old male with a known past medical history of combined heart failure with the EF of 40% and grade 3 diastolic dysfunction, alcohol abuse (reports 3-4 beers/day), tobacco abuse with alcoholic cirrhosis, hypertension, and COPD that presented to the  ER 10/23 as an ER to ER transfer for vtach that required defibrillation.  Prior to transfer, patient was initiated on amiodarone infusion.  Upon arrival to the ER in [place], Cardiology was consulted.  Is documented that patient again went into V-tach and required defibrillation in the ER with 1 shock with conversion to normal sinus rhythm.  ER nurse reports patient was given multiple nebs back to back for wheezing and was escalated to a Ventimask if he is a mouth breather. Patient's patient will be continued on amnio fusion protocol.  Review of EKG strips in the concerning for possible Torsades. Workup significant for leukocytosis of 17,000, sodium 128, creatinine 1.2, , , and troponin trends up with latest 3.6 --> 9.9.  Patient was given an aspirin, started on heparin infusion, given 4 mg of Mag IV, and 40 mg of Lasix IV.  Echo ordered.  Patient admitted to the ICU per the critical care team with Cardiology following.        Hospital/ICU Course:  10/24: no acute events overnight, pt awake and alert on NC feeling much better than at time of admit, only c/o is that his is hungry but with keep NPO pending cards eval as may likely needs ischemic w/u  10/26: no acute events noted overnight, pt to return to cath lab today for high risk PCI, no new issues or c/o noted, wheezing  resolved      ROS complete and negative unless stated in the interval HPI     Objective:     Vital Signs (Most Recent):  Temp: 97.9 °F (36.6 °C) (10/26/23 0705)  Pulse: 75 (10/26/23 1045)  Resp: (!) 33 (10/26/23 1045)  BP: (!) 124/57 (10/26/23 0800)  SpO2: (!) 93 % (10/26/23 1045) Vital Signs (24h Range):  Temp:  [97.5 °F (36.4 °C)-97.9 °F (36.6 °C)] 97.9 °F (36.6 °C)  Pulse:  [71-96] 75  Resp:  [11-36] 33  SpO2:  [92 %-97 %] 93 %  BP: (124-142)/(57-59) 124/57  Arterial Line BP: (118-173)/(56-73) 173/66     Weight: 85.9 kg (189 lb 6 oz)  Body mass index is 28.79 kg/m².      Intake/Output Summary (Last 24 hours) at 10/26/2023 1342  Last data filed at 10/26/2023 1304  Gross per 24 hour   Intake 1342.13 ml   Output 1635 ml   Net -292.87 ml        Physical Exam  Vitals reviewed.   Constitutional:       General: He is awake. He is not in acute distress.     Appearance: He is well-developed.      Comments: Chronically ill appearing male lying in bed on NC in NAD   Cardiovascular:      Rate and Rhythm: Normal rate.      Pulses:           Radial pulses are 2+ on the right side and 2+ on the left side.   Pulmonary:      Effort: Pulmonary effort is normal.      Breath sounds: Decreased air movement present. No wheezing or rhonchi.      Comments: On 2L NC  Abdominal:      General: There is distension.      Palpations: Abdomen is soft.   Genitourinary:     Comments: Veronica in place  Musculoskeletal:      Right lower leg: No edema.      Left lower leg: No edema.   Skin:     General: Skin is warm and dry.   Neurological:      General: No focal deficit present.      Mental Status: He is alert.   Psychiatric:         Behavior: Behavior is cooperative.               Vents:  Oxygen Concentration (%): 32 (10/25/23 0750)    Lines/Drains/Airways       Drain  Duration                  Urethral Catheter 10/24/23 1120 Coude 18 Fr. 2 days              Arterial Line  Duration             Arterial Line 10/24/23 0301 Right Radial 2 days               Line  Duration                  IABP 10/24/23  2 days              Peripheral Intravenous Line  Duration                  Peripheral IV - Single Lumen 10/23/23 1225 20 G Anterior;Right Forearm 3 days         Peripheral IV - Single Lumen 10/24/23 1555 20 G Anterior;Proximal;Right Forearm 1 day         Peripheral IV - Single Lumen 10/26/23 0430 20 G Anterior;Left;Proximal Forearm <1 day         Peripheral IV - Single Lumen 10/26/23 1132 20 G Left;Posterior Forearm <1 day                    Significant Labs:    CBC/Anemia Profile:  Recent Labs   Lab 10/25/23  0501 10/26/23  0440   WBC 11.77 11.35   HGB 10.3* 11.1*   HCT 31.4* 32.8*    211   MCV 96 96   RDW 12.7 12.6        Chemistries:  Recent Labs   Lab 10/25/23  0501 10/26/23  0440   * 132*   K 4.6 4.7   CL 97 96   CO2 23 24   BUN 25* 33*   CREATININE 1.3 1.3   CALCIUM 7.8* 8.6*   ALBUMIN  --  3.3*   PROT  --  7.9   BILITOT  --  0.3   ALKPHOS  --  41*   ALT  --  19   AST  --  32   MG 2.7* 2.8*   PHOS 3.1 3.2       All pertinent labs within the past 24 hours have been reviewed.    Significant Imaging:  I have reviewed all pertinent imaging results/findings within the past 24 hours.      ABG  Recent Labs   Lab 10/24/23  0211   PH 7.375   PO2 90   PCO2 37.1   HCO3 21.7*   BE -4*     Assessment/Plan:     Psychiatric  Alcohol abuse  - ETOH level 29 at OSH, reports drinks 3-4 beers/day with last drink 10/22  - ativan IV for CIWA >8 PRN  - folic acid and thiamine daily   - stop librium   - cessation education when appropriate   - monitor for s/s w/d    Pulmonary  COPD (chronic obstructive pulmonary disease)  - remains on IV steroids and rocephin  - on 2L NC this AM  - PRN duoneb, scheduled brovana and pulmicort nebs    Cardiac/Vascular  * VT (ventricular tachycardia)  - shock x1 at OSH and again x1 in the ER here   - on amio gtt per protocol  - telemetry monitoring   - replete lytes aggressively as needed  - echo with EF 20-25%  - cards following   - see  plan for HF    Cardiogenic shock  - see plan for HF    Primary hypertension  - resume home meds as appropriate  - trend hemodynamics  - cards following     Acute on chronic combined systolic and diastolic heart failure  - previous echo with EF 40% and grade II diastolic function, repeat echo with EF 20-25%  - continue lasix  - jacobson in place   - cath lab 10/24 with findings of occluded RCA and LAD ostial calcified with 80% stenosis, returned to ICU with R groin IABP  - pt brought back to cath lab 10/26 for high risk PCI  - remains on dobutamine, heparin, and amio gtts  - cards following  - cardiac surgery consulted and deemed pt not a candidate for CABG    Elevated troponin  - troponin plateau at 39  - remains CP free  - continue ASA and heparin gtt    - see plan for HF    Renal/  Hyponatremia  - could be s/t alcohol abuse +/- HF  - monitor, slowly improving   - allow PO intake    Urine retention  - jacobson in place  - monitor UOP and RFP    GI  Alcoholic cirrhosis  - trend liver enzymes  - supportive care   - alcohol cessation education when appropriate   - see plan for alcohol abuse     Other  Tobacco abuse  - cessation education when appropriate  - nicotine patch if needed    Prophylaxis Measures:  GI ppx: Famotidine  VTE ppx: Heparin  Glucose control: Monitor blood glucose    Code Status: Full Code    Critical Care Time: 38 minutes  Critical secondary to Patient has a condition that poses threat to life and bodily function: vtach on amio and heparin gtt with dobutamine and amio, IABP in place to R groin     Critical care was time spent personally by me on the following activities: development of treatment plan with patient or surrogate and bedside caregivers, discussions with consultants, evaluation of patient's response to treatment, examination of patient, ordering and performing treatments and interventions, ordering and review of laboratory studies, ordering and review of radiographic studies, pulse  oximetry, re-evaluation of patient's condition. This critical care time did not overlap with that of any other provider or involve time for any procedures.     Ryan Gold NP  Critical Care Medicine  [facility] ([facility])

## 2023-10-26 NOTE — ASSESSMENT & PLAN NOTE
- previous echo with EF 40% and grade II diastolic function, repeat echo with EF 20-25%  - continue lasix  - jacobson in place   - cath lab 10/24 with findings of occluded RCA and LAD ostial calcified with 80% stenosis, returned to ICU with R groin IABP  - pt brought back to cath lab 10/26 for high risk PCI  - remains on dobutamine, heparin, and amio gtts  - cards following  - cardiac surgery consulted and deemed pt not a candidate for CABG

## 2023-10-26 NOTE — PROGRESS NOTES
Attempted to release fem-stop per MD directive, immediate blood oozing seen, fem-stop replaced. R foot pedal pulse remains present via doppler, pt denies numbness or tingling in RLE. Will continue to monitor site

## 2023-10-26 NOTE — PROGRESS NOTES
[facility] ([facility])  Cardiology  Progress Note    Patient Name: Hemla Tolbert Jr.  MRN: [id number]  Admission Date: 10/23/2023  Hospital Length of Stay: 3 days  Code Status: Full Code   Attending Physician: Jc Mars MD   Primary Care Physician: Norma, Provider  Expected Discharge Date:   Principal Problem:VT (ventricular tachycardia)    Subjective:   HPI:  63-year-old male with a known past medical history of combined heart failure with the EF of 40% and grade 3 diastolic dysfunction, alcohol abuse (reports 3-4 beers/day), tobacco abuse with alcoholic cirrhosis, hypertension, and COPD that presented to the  ER 10/23 as an ER to ER transfer for vtach that required defibrillation.  Prior to transfer, patient was initiated on amiodarone infusion.  Upon arrival to the ER in [place], Cardiology was consulted.  Is documented that patient again went into V-tach and required defibrillation in the ER with 1 shock with conversion to normal sinus rhythm.  ER nurse reports patient was given multiple nebs back to back for wheezing and was escalated to a Ventimask if he is a mouth breather. Patient's patient will be continued on amnio fusion protocol.  Review of EKG strips in the concerning for possible Torsades. Workup significant for leukocytosis of 17,000, sodium 128, creatinine 1.2, , , and troponin trends up with latest 3.6 --> 9.9.  Patient was given an aspirin, started on heparin infusion, given 4 mg of Mag IV, and 40 mg of Lasix IV.  Echo ordered.  Patient admitted to the ICU per the critical care team with Cardiology following.      Hospital Course:   10/24/23-Patient seen and examined today, resting in bed. Respiratory status worsened overnight, initially required Bipap. LA vasquez to 6.9, dobutamine initiated with improvement. Feeling better at time of exam, less SOB, able to lie flat. No CP. Troponin peaked at 33, now trending down. Repeat LA this AM normalized. Will  plan on R/LHC today pending discussion with interventionalist, Dr. Ernandez.     10/25/23-Patient seen and examined today, sedated, s/p R/LHC yesterday which showed elevated filling pressure, moderate pulmonary HTN, occlured RCA with left to right collaterals , ostial 80% LAD stenosis. IABP placed. CTS turned down for CABG. No recurrent VT. Labs reviewed. H/H dropped no active signs of bleeding. Creatinine stable. Will re-assess in AM, keep NPO after MN for possible LHC with high risk PCI.     10/26/23-Patient seen and examined today, resting in bed. Repeat LHC with high risk PCI planned by Dr. Ernandez. Feels ok. No CP. SOB stable. Labs reviewed and are stable. Amiodarone switched to po. Will need LifeVest.          Review of Systems   Constitutional: Positive for malaise/fatigue.   HENT: Negative.     Eyes: Negative.    Cardiovascular:  Positive for dyspnea on exertion.   Respiratory:  Positive for shortness of breath.    Endocrine: Negative.    Hematologic/Lymphatic: Negative.    Skin: Negative.    Musculoskeletal:  Positive for arthritis and joint pain.   Gastrointestinal: Negative.    Genitourinary: Negative.    Neurological: Negative.    Psychiatric/Behavioral: Negative.       Objective:     Vital Signs (Most Recent):  Temp: 97.9 °F (36.6 °C) (10/26/23 0705)  Pulse: 75 (10/26/23 1045)  Resp: (!) 33 (10/26/23 1045)  BP: (!) 124/57 (10/26/23 0800)  SpO2: (!) 93 % (10/26/23 1045) Vital Signs (24h Range):  Temp:  [97.5 °F (36.4 °C)-97.9 °F (36.6 °C)] 97.9 °F (36.6 °C)  Pulse:  [71-96] 75  Resp:  [11-36] 33  SpO2:  [92 %-97 %] 93 %  BP: (124)/(57) 124/57  Arterial Line BP: (121-173)/(56-73) 173/66     Weight: 85.9 kg (189 lb 6 oz)  Body mass index is 28.79 kg/m².     SpO2: (!) 93 %         Intake/Output Summary (Last 24 hours) at 10/26/2023 1529  Last data filed at 10/26/2023 1304  Gross per 24 hour   Intake 1252.34 ml   Output 1460 ml   Net -207.66 ml       Lines/Drains/Airways       Drain  Duration                   Urethral Catheter 10/24/23 1120 Coude 18 Fr. 2 days              Arterial Line  Duration             Arterial Line 10/24/23 0301 Right Radial 2 days              Line  Duration                  IABP 10/24/23  2 days              Peripheral Intravenous Line  Duration                  Peripheral IV - Single Lumen 10/23/23 1225 20 G Anterior;Right Forearm 3 days         Peripheral IV - Single Lumen 10/24/23 1555 20 G Anterior;Proximal;Right Forearm 1 day         Peripheral IV - Single Lumen 10/26/23 0430 20 G Anterior;Left;Proximal Forearm <1 day         Peripheral IV - Single Lumen 10/26/23 1132 20 G Left;Posterior Forearm <1 day                       Physical Exam  Vitals and nursing note reviewed.   Constitutional:       General: He is not in acute distress.     Appearance: He is well-developed. He is ill-appearing. He is not diaphoretic.      Comments: On supplemental O2   HENT:      Head: Normocephalic and atraumatic.   Eyes:      General:         Right eye: No discharge.         Left eye: No discharge.      Pupils: Pupils are equal, round, and reactive to light.   Cardiovascular:      Rate and Rhythm: Normal rate and regular rhythm.      Heart sounds: Normal heart sounds, S1 normal and S2 normal. No murmur heard.  Pulmonary:      Effort: Pulmonary effort is normal. No respiratory distress.      Breath sounds: Rhonchi present. No wheezing.   Abdominal:      General: There is distension.      Tenderness: There is no abdominal tenderness. There is no rebound.   Musculoskeletal:      Right lower leg: No edema.      Left lower leg: No edema.   Skin:     General: Skin is warm and dry.      Findings: No erythema.   Neurological:      General: No focal deficit present.      Mental Status: He is alert and oriented to person, place, and time.   Psychiatric:         Mood and Affect: Mood normal.         Behavior: Behavior normal.         Thought Content: Thought content normal.            Significant Labs: CMP   Recent Labs    Lab 10/25/23  0501 10/26/23  0440   * 132*   K 4.6 4.7   CL 97 96   CO2 23 24   * 153*   BUN 25* 33*   CREATININE 1.3 1.3   CALCIUM 7.8* 8.6*   PROT  --  7.9   ALBUMIN  --  3.3*   BILITOT  --  0.3   ALKPHOS  --  41*   AST  --  32   ALT  --  19   ANIONGAP 10 12   , CBC   Recent Labs   Lab 10/25/23  0501 10/26/23  0440   WBC 11.77 11.35   HGB 10.3* 11.1*   HCT 31.4* 32.8*    211   , Troponin   Recent Labs   Lab 10/24/23  2032   TROPONINI 19.912*   , and All pertinent lab results from the last 24 hours have been reviewed.    Significant Imaging: Echocardiogram: Transthoracic echo (TTE) complete (Cupid Only):   Results for orders placed or performed during the hospital encounter of 10/23/23   Echo   Result Value Ref Range    BSA 2.08 m2    IVRT 49.48 msec    TDI LATERAL 0.10 m/s    Left Ventricular Outflow Tract Mean Gradient 1.13 mmHg    Left Ventricular Outflow Tract Mean Velocity 0.49 cm/s    MV stenosis pressure 1/2 time 34.73 ms    Mr max rex 3.50 m/s    TR Max Rex 2.09 m/s    Ao root annulus 3.38 cm    Ao peak rex 0.86 m/s    Posterior Wall 0.95 0.6 - 1.1 cm    LVOT diameter 1.95 cm    LVOT peak rex 0.76 m/s    LVOT peak VTI 10.90 cm    E wave deceleration time 119.77 msec    MV Peak A Rex 0.40 m/s    MV Peak E Rex 0.65 m/s    RA Major Axis 2.94 cm    TAPSE 1.82 cm    PV mean gradient 1 mmHg    RVOT peak rex 0.57 m/s    RVOT peak VTI 9.8 cm    IVS 0.96 0.6 - 1.1 cm    Ao VTI 11.90 cm    AV mean gradient 2 mmHg    LVIDd 4.24 3.5 - 6.0 cm    LVIDs 3.71 2.1 - 4.0 cm    Left Atrium Major Axis 4.28 cm    Left Atrium Minor Axis 4.19 cm    RV mid diameter 3.37 cm    STJ 3.35 cm    Ascending aorta 3.41 cm    LV Systolic Volume 58.38 mL    LV Diastolic Volume 80.17 mL    TDI SEPTAL 0.09 m/s    LA size 2.39 cm    IVC diameter 1.42 cm    LV LATERAL E/E' RATIO 6.50 m/s    LV SEPTAL E/E' RATIO 7.22 m/s    FS 13 28 - 44 %    LV mass 130.71 g    ZLVIDD -3.61     ZLVIDS -0.04     Left Ventricle Relative Wall  Thickness 0.45 cm    AV valve area 2.73 cm²    AV Velocity Ratio 0.88     AV index (prosthetic) 0.92     MV valve area p 1/2 method 6.33 cm2    E/A ratio 1.63     Mean e' 0.10 m/s    LVOT area 3.0 cm2    LVOT stroke volume 32.54 cm3    AV peak gradient 3 mmHg    E/E' ratio 6.84 m/s    LV Systolic Volume Index 28.6 mL/m2    LV Diastolic Volume Index 39.30 mL/m2    LV Mass Index 64 g/m2    Triscuspid Valve Regurgitation Peak Gradient 17 mmHg    ADDIS by Velocity Ratio 2.64 cm²    LA Volume Index 11.4 mL/m2    LA volume 23.23 cm3    LA WIDTH 2.7 cm    RA Width 2.3 cm    TV resting pulmonary artery pressure 20 mmHg    RV TB RVSP 5 mmHg    Est. RA pres 3 mmHg    Narrative      Left Ventricle: The left ventricle is mildly dilated. Normal wall   thickness. There is concentric remodeling. Moderate global hypokinesis   present. There is severely reduced systolic function with a visually   estimated ejection fraction of 20 - 25%. There is normal diastolic   function.    Right Ventricle: Normal right ventricular cavity size. Wall thickness   is normal. Right ventricle wall motion  is normal. Systolic function is   normal.    Mitral Valve: There is mild to moderate regurgitation with a centrally   directed jet.    Pulmonary Artery: The estimated pulmonary artery systolic pressure is   20 mmHg.    IVC/SVC: Normal venous pressure at 3 mmHg.      and EKG: Reviewed    Assessment and Plan:   Patient who presents with VT/Vfib arrest/cardiogenic shock/NSTEMI. Stable. LHC today with possible high risk PCI. Continue OMT. Needs LifeVest.    * VT (ventricular tachycardia)  Treated with shock therapy  Continue amiodarone  Ischemic evaluation  ETOH evaluation and cardiac echo pending    10/24/23  -Echo with EF of 20-25%  -Ischemic evaluation pending  -Continue amiodarone gtt for now  -Keep K > 4; Mg > 2    10/25/23  -Stable  -Keep on amiodarone for now  -Repeat LFT's    10/26/23  -Amiodarone switched to po  -Will need  LIfeVest    Cardiogenic shock  -Clinical condition worsened overnight with increased SOB/lactic acidosis  -LA normalized on dobutamine  -Continue inotropic support  -R/LHC planned  -Will discuss with [facility] [place]    10/25/23  -Not candidate for advanced options given ETOH abuse  -Continue dobutamine  -Possible high risk PCI tmw    10/26/23  -C with possible high risk PCI today  -Will try to wean dobutamine as tolerated  -OMT    Hyponatremia  -In setting of CHF/ETOH abuse  -Monitor    Alcohol abuse  -ETOH cessation    COPD (chronic obstructive pulmonary disease)  -Mgmt as per primary team    Tobacco abuse  -Cessation advised    Acute on chronic combined systolic and diastolic heart failure  -BNP minimally elevated  -LA improved with inotropic support  -Ischemic evaluation pending  -Will optimize regimen accordingly    10/25/23  -RHC with elevated filling pressures  -Continue IV diuresis  -Will optimize regimen as tolerated  -Possible high risk PCI tmw    10/26/23  -Continue OMT  -LHC with possible high risk PCI today    Elevated troponin  Will trend  IV heparin  Cardiac echo pending  EKG not showing ischemia    10/24/23  -Troponin bumped to 33, now trending down  -CP free during exam  -Echo showed EF of 20-25%, developed cardiogenic shock overnight, responded well to dobutamine  -Continue ASA, heparin gtt  -Plans for R/LHC today pending discussion with Dr. Ernandez    10/25/23  -s/p R/LHC which showed elevated filling pressures, occluded RCA with left to right collateral, ostial LAD  -Turned down for CABG by CTS  -Continue heparin gtt, ASA, statin   -Keep NPO for possible high risk PCI tmw pending stability of H/H    10/26/23  -C with possible high risk PCI today by Dr. Ernandez. All risks, benefits, and treatment alternatives explained to patient in detail. All questions answered. He has agreed to proceed.      CHF (congestive heart failure)  Patient is identified as having Combined Systolic and Diastolic heart failure  that is Acute on chronic. CHF is currently uncontrolled due to Continued edema of extremities. Latest ECHO performed and demonstrates- Results for orders placed during the hospital encounter of 06/07/23    Echo    Interpretation Summary  · The left ventricle is mildly enlarged with mildly decreased systolic function.  · Overall there is mild to moderate global hypokinesis; Prounounced basal inferior and inferioseptal hypokinesis.  · The estimated ejection fraction is 40%.  · Grade III left ventricular diastolic dysfunction.  · Mild right ventricular enlargement with low normal right ventricular systolic function.  · Mild-to-moderate mitral regurgitation.  · Mild tricuspid regurgitation.  · Mild pulmonic regurgitation.  · The estimated PA systolic pressure is 35 mmHg.  · Mild left atrial enlargement.  · Mild right atrial enlargement.  . Continue Beta Blocker, ACE/ARB and Furosemide and monitor clinical status closely. Monitor on telemetry. Patient is off CHF pathway.  Monitor strict Is&Os and daily weights.  Place on fluid restriction of 2 L. Cardiology has been consulted. Continue to stress to patient importance of self efficacy and  on diet for CHF. Last BNP reviewed- and noted below   Recent Labs   Lab 10/23/23  0950   *   .    Alcoholic cirrhosis  -Per intensive team  -Will monitor LFT's while on amiodarone    10/26/23  -May need paracentesis, abdomen terse        VTE Risk Mitigation (From admission, onward)         Ordered     IP VTE HIGH RISK PATIENT  Once         10/23/23 1120     Place sequential compression device  Until discontinued         10/23/23 1120                Cammie Richardson PA-C  Cardiology  [facility] ([facility])

## 2023-10-26 NOTE — OP NOTE
INPATIENT Operative Note         SUMMARY     Surgery Date: 10/26/2023     Surgeon(s) and Role:     * Janette Ernandez MD - Primary    ASSISTANT:none    Pre-op Diagnosis:  Cardiogenic shock [R57.0]  Elevated troponin I level [R79.89]  Acute on chronic combined systolic and diastolic congestive heart failure [I50.43]  Coronary artery disease involving native coronary artery of native heart with unstable angina pectoris [I25.110]      Post-op Diagnosis:  Cardiogenic shock [R57.0]  Elevated troponin I level [R79.89]  Acute on chronic combined systolic and diastolic congestive heart failure [I50.43]  Coronary artery disease involving native coronary artery of native heart with unstable angina pectoris [I25.110]    Procedure(s) (LRB):  Percutaneous coronary intervention- with Impella (N/A)  INSERTION, IMPELLA (N/A)  Ultrasound-coronary (N/A)    COMPLICATION:none    Anesthesia: RN IV Sedation    Findings/Key Components:  Lad ostial angiographically 50% calcified csa 4.7 mm2   D1 large less than 40% stenosis in the ostium csa greater than 3 mm2.   Ifr abnormal in lad and diagonal   Lcx luminAL IRREGULarities   rca occluded.  Impella placed and will be removed at bedside.  Estimated Blood Loss: < 50 ML.         SPECIMEN: NONE    Devices/Prostetics: none    PLAN: maximize medical therapy   [facility]   PET CARDIOLITE AS OUTPATIENT

## 2023-10-26 NOTE — SUBJECTIVE & OBJECTIVE
Review of Systems   Constitutional: Positive for malaise/fatigue.   HENT: Negative.     Eyes: Negative.    Cardiovascular:  Positive for dyspnea on exertion.   Respiratory:  Positive for shortness of breath.    Endocrine: Negative.    Hematologic/Lymphatic: Negative.    Skin: Negative.    Musculoskeletal:  Positive for arthritis and joint pain.   Gastrointestinal: Negative.    Genitourinary: Negative.    Neurological: Negative.    Psychiatric/Behavioral: Negative.       Objective:     Vital Signs (Most Recent):  Temp: 97.9 °F (36.6 °C) (10/26/23 0705)  Pulse: 75 (10/26/23 1045)  Resp: (!) 33 (10/26/23 1045)  BP: (!) 124/57 (10/26/23 0800)  SpO2: (!) 93 % (10/26/23 1045) Vital Signs (24h Range):  Temp:  [97.5 °F (36.4 °C)-97.9 °F (36.6 °C)] 97.9 °F (36.6 °C)  Pulse:  [71-96] 75  Resp:  [11-36] 33  SpO2:  [92 %-97 %] 93 %  BP: (124)/(57) 124/57  Arterial Line BP: (121-173)/(56-73) 173/66     Weight: 85.9 kg (189 lb 6 oz)  Body mass index is 28.79 kg/m².     SpO2: (!) 93 %         Intake/Output Summary (Last 24 hours) at 10/26/2023 1529  Last data filed at 10/26/2023 1304  Gross per 24 hour   Intake 1252.34 ml   Output 1460 ml   Net -207.66 ml       Lines/Drains/Airways       Drain  Duration                  Urethral Catheter 10/24/23 1120 Coude 18 Fr. 2 days              Arterial Line  Duration             Arterial Line 10/24/23 0301 Right Radial 2 days              Line  Duration                  IABP 10/24/23  2 days              Peripheral Intravenous Line  Duration                  Peripheral IV - Single Lumen 10/23/23 1225 20 G Anterior;Right Forearm 3 days         Peripheral IV - Single Lumen 10/24/23 1555 20 G Anterior;Proximal;Right Forearm 1 day         Peripheral IV - Single Lumen 10/26/23 0430 20 G Anterior;Left;Proximal Forearm <1 day         Peripheral IV - Single Lumen 10/26/23 1132 20 G Left;Posterior Forearm <1 day                       Physical Exam  Vitals and nursing note reviewed.    Constitutional:       General: He is not in acute distress.     Appearance: He is well-developed. He is ill-appearing. He is not diaphoretic.      Comments: On supplemental O2   HENT:      Head: Normocephalic and atraumatic.   Eyes:      General:         Right eye: No discharge.         Left eye: No discharge.      Pupils: Pupils are equal, round, and reactive to light.   Cardiovascular:      Rate and Rhythm: Normal rate and regular rhythm.      Heart sounds: Normal heart sounds, S1 normal and S2 normal. No murmur heard.  Pulmonary:      Effort: Pulmonary effort is normal. No respiratory distress.      Breath sounds: Rhonchi present. No wheezing.   Abdominal:      General: There is distension.      Tenderness: There is no abdominal tenderness. There is no rebound.   Musculoskeletal:      Right lower leg: No edema.      Left lower leg: No edema.   Skin:     General: Skin is warm and dry.      Findings: No erythema.   Neurological:      General: No focal deficit present.      Mental Status: He is alert and oriented to person, place, and time.   Psychiatric:         Mood and Affect: Mood normal.         Behavior: Behavior normal.         Thought Content: Thought content normal.            Significant Labs: CMP   Recent Labs   Lab 10/25/23  0501 10/26/23  0440   * 132*   K 4.6 4.7   CL 97 96   CO2 23 24   * 153*   BUN 25* 33*   CREATININE 1.3 1.3   CALCIUM 7.8* 8.6*   PROT  --  7.9   ALBUMIN  --  3.3*   BILITOT  --  0.3   ALKPHOS  --  41*   AST  --  32   ALT  --  19   ANIONGAP 10 12   , CBC   Recent Labs   Lab 10/25/23  0501 10/26/23  0440   WBC 11.77 11.35   HGB 10.3* 11.1*   HCT 31.4* 32.8*    211   , Troponin   Recent Labs   Lab 10/24/23  2032   TROPONINI 19.912*   , and All pertinent lab results from the last 24 hours have been reviewed.    Significant Imaging: Echocardiogram: Transthoracic echo (TTE) complete ([facility] Only):   Results for orders placed or performed during the hospital encounter  of 10/23/23   Echo   Result Value Ref Range    BSA 2.08 m2    IVRT 49.48 msec    TDI LATERAL 0.10 m/s    Left Ventricular Outflow Tract Mean Gradient 1.13 mmHg    Left Ventricular Outflow Tract Mean Velocity 0.49 cm/s    MV stenosis pressure 1/2 time 34.73 ms    Mr max rex 3.50 m/s    TR Max Rex 2.09 m/s    Ao root annulus 3.38 cm    Ao peak rex 0.86 m/s    Posterior Wall 0.95 0.6 - 1.1 cm    LVOT diameter 1.95 cm    LVOT peak rex 0.76 m/s    LVOT peak VTI 10.90 cm    E wave deceleration time 119.77 msec    MV Peak A Rex 0.40 m/s    MV Peak E Rex 0.65 m/s    RA Major Axis 2.94 cm    TAPSE 1.82 cm    PV mean gradient 1 mmHg    RVOT peak rex 0.57 m/s    RVOT peak VTI 9.8 cm    IVS 0.96 0.6 - 1.1 cm    Ao VTI 11.90 cm    AV mean gradient 2 mmHg    LVIDd 4.24 3.5 - 6.0 cm    LVIDs 3.71 2.1 - 4.0 cm    Left Atrium Major Axis 4.28 cm    Left Atrium Minor Axis 4.19 cm    RV mid diameter 3.37 cm    STJ 3.35 cm    Ascending aorta 3.41 cm    LV Systolic Volume 58.38 mL    LV Diastolic Volume 80.17 mL    TDI SEPTAL 0.09 m/s    LA size 2.39 cm    IVC diameter 1.42 cm    LV LATERAL E/E' RATIO 6.50 m/s    LV SEPTAL E/E' RATIO 7.22 m/s    FS 13 28 - 44 %    LV mass 130.71 g    ZLVIDD -3.61     ZLVIDS -0.04     Left Ventricle Relative Wall Thickness 0.45 cm    AV valve area 2.73 cm²    AV Velocity Ratio 0.88     AV index (prosthetic) 0.92     MV valve area p 1/2 method 6.33 cm2    E/A ratio 1.63     Mean e' 0.10 m/s    LVOT area 3.0 cm2    LVOT stroke volume 32.54 cm3    AV peak gradient 3 mmHg    E/E' ratio 6.84 m/s    LV Systolic Volume Index 28.6 mL/m2    LV Diastolic Volume Index 39.30 mL/m2    LV Mass Index 64 g/m2    Triscuspid Valve Regurgitation Peak Gradient 17 mmHg    ADDIS by Velocity Ratio 2.64 cm²    LA Volume Index 11.4 mL/m2    LA volume 23.23 cm3    LA WIDTH 2.7 cm    RA Width 2.3 cm    TV resting pulmonary artery pressure 20 mmHg    RV TB RVSP 5 mmHg    Est. RA pres 3 mmHg    Narrative      Left Ventricle: The left  ventricle is mildly dilated. Normal wall   thickness. There is concentric remodeling. Moderate global hypokinesis   present. There is severely reduced systolic function with a visually   estimated ejection fraction of 20 - 25%. There is normal diastolic   function.    Right Ventricle: Normal right ventricular cavity size. Wall thickness   is normal. Right ventricle wall motion  is normal. Systolic function is   normal.    Mitral Valve: There is mild to moderate regurgitation with a centrally   directed jet.    Pulmonary Artery: The estimated pulmonary artery systolic pressure is   20 mmHg.    IVC/SVC: Normal venous pressure at 3 mmHg.      and EKG: Reviewed

## 2023-10-26 NOTE — SUBJECTIVE & OBJECTIVE
ROS complete and negative unless stated in the interval HPI     Objective:     Vital Signs (Most Recent):  Temp: 97.9 °F (36.6 °C) (10/26/23 0705)  Pulse: 75 (10/26/23 1045)  Resp: (!) 33 (10/26/23 1045)  BP: (!) 124/57 (10/26/23 0800)  SpO2: (!) 93 % (10/26/23 1045) Vital Signs (24h Range):  Temp:  [97.5 °F (36.4 °C)-97.9 °F (36.6 °C)] 97.9 °F (36.6 °C)  Pulse:  [71-96] 75  Resp:  [11-36] 33  SpO2:  [92 %-97 %] 93 %  BP: (124-142)/(57-59) 124/57  Arterial Line BP: (118-173)/(56-73) 173/66     Weight: 85.9 kg (189 lb 6 oz)  Body mass index is 28.79 kg/m².      Intake/Output Summary (Last 24 hours) at 10/26/2023 1342  Last data filed at 10/26/2023 1304  Gross per 24 hour   Intake 1342.13 ml   Output 1635 ml   Net -292.87 ml        Physical Exam  Vitals reviewed.   Constitutional:       General: He is awake. He is not in acute distress.     Appearance: He is well-developed.      Comments: Chronically ill appearing male lying in bed on NC in NAD   Cardiovascular:      Rate and Rhythm: Normal rate.      Pulses:           Radial pulses are 2+ on the right side and 2+ on the left side.   Pulmonary:      Effort: Pulmonary effort is normal.      Breath sounds: Decreased air movement present. No wheezing or rhonchi.      Comments: On 2L NC  Abdominal:      General: There is distension.      Palpations: Abdomen is soft.   Genitourinary:     Comments: Veronica in place  Musculoskeletal:      Right lower leg: No edema.      Left lower leg: No edema.   Skin:     General: Skin is warm and dry.   Neurological:      General: No focal deficit present.      Mental Status: He is alert.   Psychiatric:         Behavior: Behavior is cooperative.               Vents:  Oxygen Concentration (%): 32 (10/25/23 0750)    Lines/Drains/Airways       Drain  Duration                  Urethral Catheter 10/24/23 1120 Coude 18 Fr. 2 days              Arterial Line  Duration             Arterial Line 10/24/23 0301 Right Radial 2 days              Line   Duration                  IABP 10/24/23  2 days              Peripheral Intravenous Line  Duration                  Peripheral IV - Single Lumen 10/23/23 1225 20 G Anterior;Right Forearm 3 days         Peripheral IV - Single Lumen 10/24/23 1555 20 G Anterior;Proximal;Right Forearm 1 day         Peripheral IV - Single Lumen 10/26/23 0430 20 G Anterior;Left;Proximal Forearm <1 day         Peripheral IV - Single Lumen 10/26/23 1132 20 G Left;Posterior Forearm <1 day                    Significant Labs:    CBC/Anemia Profile:  Recent Labs   Lab 10/25/23  0501 10/26/23  0440   WBC 11.77 11.35   HGB 10.3* 11.1*   HCT 31.4* 32.8*    211   MCV 96 96   RDW 12.7 12.6        Chemistries:  Recent Labs   Lab 10/25/23  0501 10/26/23  0440   * 132*   K 4.6 4.7   CL 97 96   CO2 23 24   BUN 25* 33*   CREATININE 1.3 1.3   CALCIUM 7.8* 8.6*   ALBUMIN  --  3.3*   PROT  --  7.9   BILITOT  --  0.3   ALKPHOS  --  41*   ALT  --  19   AST  --  32   MG 2.7* 2.8*   PHOS 3.1 3.2       All pertinent labs within the past 24 hours have been reviewed.    Significant Imaging:  I have reviewed all pertinent imaging results/findings within the past 24 hours.

## 2023-10-26 NOTE — INTERVAL H&P NOTE
The patient has been examined and the H&P has been reviewed:    I concur with the findings and no changes have occurred since H&P was written.  Patient ahs severe calcified ostial lad disease. Turned down for cabg will proceed with high risk intervention impella supported.   Procedure risks, benefits and alternative options discussed and understood by patient/family.  I have explained the risks, benefits , and alternatives of the procedure in detail.the patient voices understanding and all questions have been answered.the patient agrees to proceed as planned.         Active Hospital Problems    Diagnosis  POA    *VT (ventricular tachycardia) [I47.20]  Yes    Urine retention [R33.9]  Yes    Hyponatremia [E87.1]  Yes    Cardiogenic shock [R57.0]  Yes    Elevated troponin [R79.89]  Yes    Acute on chronic combined systolic and diastolic heart failure [I50.43]  Yes    Tobacco abuse [Z72.0]  Yes    Primary hypertension [I10]  Yes    COPD (chronic obstructive pulmonary disease) [J44.9]  Yes    Alcohol abuse [F10.10]  Yes    Alcoholic cirrhosis [K70.30]  Yes      Resolved Hospital Problems   No resolved problems to display.

## 2023-10-26 NOTE — ASSESSMENT & PLAN NOTE
-Clinical condition worsened overnight with increased SOB/lactic acidosis  -LA normalized on dobutamine  -Continue inotropic support  -R/LHC planned  -Will discuss with [facility] [place]    10/25/23  -Not candidate for advanced options given ETOH abuse  -Continue dobutamine  -Possible high risk PCI [facility]    10/26/23  -LHC with possible high risk PCI today  -Will try to wean dobutamine as tolerated  -OMT

## 2023-10-26 NOTE — ASSESSMENT & PLAN NOTE
- ETOH level 29 at OSH, reports drinks 3-4 beers/day with last drink 10/22  - ativan IV for CIWA >8 PRN  - folic acid and thiamine daily   - stop librium   - cessation education when appropriate   - monitor for s/s w/d

## 2023-10-26 NOTE — PROGRESS NOTES
Dr. Mayo at bedside, removed Impella from R groin. Manual pressure held and fem-stop placed to groin. R foot pedal pulse present via doppler. Fem-stop to remain in place until 16:40 and flat bedrest for a total of 8 hours until midnight per Dr. Ernandez. Pt very talkative and trying to move throughout this process leading to loss of PIV, new PIV placed and pt placed on max dose Precedex to remain calm and drowsy in order to maintain flat bedrest without bleeding issues to groin site

## 2023-10-26 NOTE — ASSESSMENT & PLAN NOTE
-BNP minimally elevated  -LA improved with inotropic support  -Ischemic evaluation pending  -Will optimize regimen accordingly    10/25/23  -RHC with elevated filling pressures  -Continue IV diuresis  -Will optimize regimen as tolerated  -Possible high risk PCI tmw    10/26/23  -Continue OMT  -[facility] with possible high risk PCI today

## 2023-10-26 NOTE — PLAN OF CARE
At 1055 patient was transported to [facility] for high risk PCI and removal of IABP.    At 1430, receive back from [facility] with Impella support. Planned to removed the device after the ACT is normalize.    Patient was off mechanical circulatory support since 1545. Femstop was put per MD. No complaint of pain at the right femoral area. Dorsal pulses can be appreciated by doppler and no tingling sensations.    Hemodynamics remain stable.  Still on dobutamine.

## 2023-10-27 PROBLEM — R14.0 ABDOMINAL DISTENTION: Status: ACTIVE | Noted: 2023-10-27

## 2023-10-27 PROBLEM — F17.200 TOBACCO DEPENDENCE: Status: ACTIVE | Noted: 2023-10-23

## 2023-10-27 LAB
ANION GAP SERPL CALC-SCNC: 11 MMOL/L (ref 8–16)
BASOPHILS # BLD AUTO: 0.01 K/UL (ref 0–0.2)
BASOPHILS # BLD AUTO: 0.01 K/UL (ref 0–0.2)
BASOPHILS NFR BLD: 0.1 % (ref 0–1.9)
BASOPHILS NFR BLD: 0.1 % (ref 0–1.9)
BUN SERPL-MCNC: 39 MG/DL (ref 8–23)
CALCIUM SERPL-MCNC: 7.8 MG/DL (ref 8.7–10.5)
CHLORIDE SERPL-SCNC: 100 MMOL/L (ref 95–110)
CO2 SERPL-SCNC: 25 MMOL/L (ref 23–29)
CREAT SERPL-MCNC: 1.1 MG/DL (ref 0.5–1.4)
DIFFERENTIAL METHOD: ABNORMAL
DIFFERENTIAL METHOD: ABNORMAL
EOSINOPHIL # BLD AUTO: 0 K/UL (ref 0–0.5)
EOSINOPHIL # BLD AUTO: 0 K/UL (ref 0–0.5)
EOSINOPHIL NFR BLD: 0 % (ref 0–8)
EOSINOPHIL NFR BLD: 0 % (ref 0–8)
ERYTHROCYTE [DISTWIDTH] IN BLOOD BY AUTOMATED COUNT: 12.3 % (ref 11.5–14.5)
ERYTHROCYTE [DISTWIDTH] IN BLOOD BY AUTOMATED COUNT: 12.3 % (ref 11.5–14.5)
EST. GFR  (NO RACE VARIABLE): >60 ML/MIN/1.73 M^2
GLUCOSE SERPL-MCNC: 159 MG/DL (ref 70–110)
HCT VFR BLD AUTO: 30.2 % (ref 40–54)
HCT VFR BLD AUTO: 30.2 % (ref 40–54)
HGB BLD-MCNC: 10.2 G/DL (ref 14–18)
HGB BLD-MCNC: 10.2 G/DL (ref 14–18)
IMM GRANULOCYTES # BLD AUTO: 0.15 K/UL (ref 0–0.04)
IMM GRANULOCYTES # BLD AUTO: 0.15 K/UL (ref 0–0.04)
IMM GRANULOCYTES NFR BLD AUTO: 1.2 % (ref 0–0.5)
IMM GRANULOCYTES NFR BLD AUTO: 1.2 % (ref 0–0.5)
LYMPHOCYTES # BLD AUTO: 0.3 K/UL (ref 1–4.8)
LYMPHOCYTES # BLD AUTO: 0.3 K/UL (ref 1–4.8)
LYMPHOCYTES NFR BLD: 2.1 % (ref 18–48)
LYMPHOCYTES NFR BLD: 2.1 % (ref 18–48)
MAGNESIUM SERPL-MCNC: 2.3 MG/DL (ref 1.6–2.6)
MCH RBC QN AUTO: 32.3 PG (ref 27–31)
MCH RBC QN AUTO: 32.3 PG (ref 27–31)
MCHC RBC AUTO-ENTMCNC: 33.8 G/DL (ref 32–36)
MCHC RBC AUTO-ENTMCNC: 33.8 G/DL (ref 32–36)
MCV RBC AUTO: 96 FL (ref 82–98)
MCV RBC AUTO: 96 FL (ref 82–98)
MONOCYTES # BLD AUTO: 0.6 K/UL (ref 0.3–1)
MONOCYTES # BLD AUTO: 0.6 K/UL (ref 0.3–1)
MONOCYTES NFR BLD: 4.8 % (ref 4–15)
MONOCYTES NFR BLD: 4.8 % (ref 4–15)
NEUTROPHILS # BLD AUTO: 11.2 K/UL (ref 1.8–7.7)
NEUTROPHILS # BLD AUTO: 11.2 K/UL (ref 1.8–7.7)
NEUTROPHILS NFR BLD: 91.8 % (ref 38–73)
NEUTROPHILS NFR BLD: 91.8 % (ref 38–73)
NRBC BLD-RTO: 0 /100 WBC
NRBC BLD-RTO: 0 /100 WBC
PHOSPHATE SERPL-MCNC: 3.3 MG/DL (ref 2.7–4.5)
PLATELET # BLD AUTO: 198 K/UL (ref 150–450)
PLATELET # BLD AUTO: 198 K/UL (ref 150–450)
PMV BLD AUTO: 10.4 FL (ref 9.2–12.9)
PMV BLD AUTO: 10.4 FL (ref 9.2–12.9)
POCT GLUCOSE: 170 MG/DL (ref 70–110)
POCT GLUCOSE: 97 MG/DL (ref 70–110)
POTASSIUM SERPL-SCNC: 3.7 MMOL/L (ref 3.5–5.1)
RBC # BLD AUTO: 3.16 M/UL (ref 4.6–6.2)
RBC # BLD AUTO: 3.16 M/UL (ref 4.6–6.2)
SODIUM SERPL-SCNC: 136 MMOL/L (ref 136–145)
VIT B1 BLD-MCNC: 76 UG/L (ref 38–122)
WBC # BLD AUTO: 12.18 K/UL (ref 3.9–12.7)
WBC # BLD AUTO: 12.18 K/UL (ref 3.9–12.7)

## 2023-10-27 PROCEDURE — 25000003 PHARM REV CODE 250: Performed by: INTERNAL MEDICINE

## 2023-10-27 PROCEDURE — 63600175 PHARM REV CODE 636 W HCPCS: Performed by: NURSE PRACTITIONER

## 2023-10-27 PROCEDURE — 25000242 PHARM REV CODE 250 ALT 637 W/ HCPCS: Performed by: INTERNAL MEDICINE

## 2023-10-27 PROCEDURE — 80048 BASIC METABOLIC PNL TOTAL CA: CPT | Performed by: INTERNAL MEDICINE

## 2023-10-27 PROCEDURE — 94640 AIRWAY INHALATION TREATMENT: CPT

## 2023-10-27 PROCEDURE — 83735 ASSAY OF MAGNESIUM: CPT | Performed by: INTERNAL MEDICINE

## 2023-10-27 PROCEDURE — 99233 PR SUBSEQUENT HOSPITAL CARE,LEVL III: ICD-10-PCS | Mod: ,,, | Performed by: PHYSICIAN ASSISTANT

## 2023-10-27 PROCEDURE — 25000003 PHARM REV CODE 250: Performed by: NURSE PRACTITIONER

## 2023-10-27 PROCEDURE — 25000003 PHARM REV CODE 250: Performed by: PHYSICIAN ASSISTANT

## 2023-10-27 PROCEDURE — 63600175 PHARM REV CODE 636 W HCPCS: Performed by: INTERNAL MEDICINE

## 2023-10-27 PROCEDURE — 85025 COMPLETE CBC W/AUTO DIFF WBC: CPT | Performed by: INTERNAL MEDICINE

## 2023-10-27 PROCEDURE — 84100 ASSAY OF PHOSPHORUS: CPT | Performed by: INTERNAL MEDICINE

## 2023-10-27 PROCEDURE — 94761 N-INVAS EAR/PLS OXIMETRY MLT: CPT

## 2023-10-27 PROCEDURE — 99233 SBSQ HOSP IP/OBS HIGH 50: CPT | Mod: ,,, | Performed by: PHYSICIAN ASSISTANT

## 2023-10-27 PROCEDURE — 20000000 HC ICU ROOM

## 2023-10-27 PROCEDURE — 99900035 HC TECH TIME PER 15 MIN (STAT)

## 2023-10-27 PROCEDURE — C9399 UNCLASSIFIED DRUGS OR BIOLOG: HCPCS | Performed by: INTERNAL MEDICINE

## 2023-10-27 PROCEDURE — 27000221 HC OXYGEN, UP TO 24 HOURS

## 2023-10-27 PROCEDURE — P9047 ALBUMIN (HUMAN), 25%, 50ML: HCPCS | Mod: JZ,JG | Performed by: NURSE PRACTITIONER

## 2023-10-27 RX ORDER — NAPROXEN 500 MG/1
500 TABLET ORAL ONCE
Status: COMPLETED | OUTPATIENT
Start: 2023-10-27 | End: 2023-10-27

## 2023-10-27 RX ORDER — POTASSIUM CHLORIDE 20 MEQ/1
40 TABLET, EXTENDED RELEASE ORAL ONCE
Status: COMPLETED | OUTPATIENT
Start: 2023-10-27 | End: 2023-10-27

## 2023-10-27 RX ORDER — PREDNISONE 20 MG/1
20 TABLET ORAL DAILY
Status: COMPLETED | OUTPATIENT
Start: 2023-10-27 | End: 2023-10-31

## 2023-10-27 RX ORDER — DOCUSATE SODIUM 100 MG/1
100 CAPSULE, LIQUID FILLED ORAL 2 TIMES DAILY
Status: DISCONTINUED | OUTPATIENT
Start: 2023-10-27 | End: 2023-11-03 | Stop reason: HOSPADM

## 2023-10-27 RX ORDER — CHLORDIAZEPOXIDE HYDROCHLORIDE 5 MG/1
5 CAPSULE, GELATIN COATED ORAL 2 TIMES DAILY
Status: COMPLETED | OUTPATIENT
Start: 2023-10-27 | End: 2023-10-28

## 2023-10-27 RX ORDER — ONDANSETRON 2 MG/ML
4 INJECTION INTRAMUSCULAR; INTRAVENOUS EVERY 6 HOURS PRN
Status: DISCONTINUED | OUTPATIENT
Start: 2023-10-27 | End: 2023-11-03 | Stop reason: HOSPADM

## 2023-10-27 RX ORDER — POLYETHYLENE GLYCOL 3350 17 G/17G
17 POWDER, FOR SOLUTION ORAL 2 TIMES DAILY
Status: DISCONTINUED | OUTPATIENT
Start: 2023-10-27 | End: 2023-10-30

## 2023-10-27 RX ORDER — LACTULOSE 10 G/15ML
20 SOLUTION ORAL 3 TIMES DAILY
Status: DISCONTINUED | OUTPATIENT
Start: 2023-10-27 | End: 2023-11-03 | Stop reason: HOSPADM

## 2023-10-27 RX ADMIN — LACTULOSE 20 G: 20 SOLUTION ORAL at 02:10

## 2023-10-27 RX ADMIN — PREDNISONE 20 MG: 20 TABLET ORAL at 10:10

## 2023-10-27 RX ADMIN — MUPIROCIN: 20 OINTMENT TOPICAL at 09:10

## 2023-10-27 RX ADMIN — POLYETHYLENE GLYCOL 3350 17 G: 17 POWDER, FOR SOLUTION ORAL at 09:10

## 2023-10-27 RX ADMIN — ARFORMOTEROL TARTRATE 15 MCG: 15 SOLUTION RESPIRATORY (INHALATION) at 08:10

## 2023-10-27 RX ADMIN — SPIRONOLACTONE 25 MG: 25 TABLET ORAL at 08:10

## 2023-10-27 RX ADMIN — METHYLPREDNISOLONE SODIUM SUCCINATE 60 MG: 40 INJECTION, POWDER, FOR SOLUTION INTRAMUSCULAR; INTRAVENOUS at 05:10

## 2023-10-27 RX ADMIN — AMIODARONE HYDROCHLORIDE 400 MG: 200 TABLET ORAL at 09:10

## 2023-10-27 RX ADMIN — ARFORMOTEROL TARTRATE 15 MCG: 15 SOLUTION RESPIRATORY (INHALATION) at 07:10

## 2023-10-27 RX ADMIN — ONDANSETRON 4 MG: 2 INJECTION INTRAMUSCULAR; INTRAVENOUS at 12:10

## 2023-10-27 RX ADMIN — AMIODARONE HYDROCHLORIDE 400 MG: 200 TABLET ORAL at 08:10

## 2023-10-27 RX ADMIN — GABAPENTIN 300 MG: 300 CAPSULE ORAL at 08:10

## 2023-10-27 RX ADMIN — Medication 100 MG: at 09:10

## 2023-10-27 RX ADMIN — CLOPIDOGREL BISULFATE 75 MG: 75 TABLET ORAL at 09:10

## 2023-10-27 RX ADMIN — BUDESONIDE INHALATION 0.5 MG: 0.5 SUSPENSION RESPIRATORY (INHALATION) at 07:10

## 2023-10-27 RX ADMIN — POTASSIUM CHLORIDE 40 MEQ: 1500 TABLET, EXTENDED RELEASE ORAL at 10:10

## 2023-10-27 RX ADMIN — MUPIROCIN: 20 OINTMENT TOPICAL at 08:10

## 2023-10-27 RX ADMIN — CHLORDIAZEPOXIDE HYDROCHLORIDE 5 MG: 5 CAPSULE ORAL at 08:10

## 2023-10-27 RX ADMIN — GABAPENTIN 300 MG: 300 CAPSULE ORAL at 09:10

## 2023-10-27 RX ADMIN — SPIRONOLACTONE 25 MG: 25 TABLET ORAL at 09:10

## 2023-10-27 RX ADMIN — PANTOPRAZOLE SODIUM 40 MG: 40 TABLET, DELAYED RELEASE ORAL at 09:10

## 2023-10-27 RX ADMIN — BUDESONIDE INHALATION 0.5 MG: 0.5 SUSPENSION RESPIRATORY (INHALATION) at 08:10

## 2023-10-27 RX ADMIN — ASPIRIN 81 MG CHEWABLE TABLET 81 MG: 81 TABLET CHEWABLE at 09:10

## 2023-10-27 RX ADMIN — NAPROXEN 500 MG: 500 TABLET ORAL at 09:10

## 2023-10-27 RX ADMIN — FOLIC ACID 1 MG: 1 TABLET ORAL at 09:10

## 2023-10-27 RX ADMIN — DEXMEDETOMIDINE HYDROCHLORIDE 1.4 MCG/KG/HR: 4 INJECTION INTRAVENOUS at 07:10

## 2023-10-27 RX ADMIN — LACTULOSE 20 G: 20 SOLUTION ORAL at 09:10

## 2023-10-27 RX ADMIN — DEXMEDETOMIDINE HYDROCHLORIDE 1.2 MCG/KG/HR: 4 INJECTION INTRAVENOUS at 12:10

## 2023-10-27 RX ADMIN — GABAPENTIN 300 MG: 300 CAPSULE ORAL at 02:10

## 2023-10-27 RX ADMIN — DEXMEDETOMIDINE HYDROCHLORIDE 1.4 MCG/KG/HR: 4 INJECTION INTRAVENOUS at 04:10

## 2023-10-27 RX ADMIN — DOCUSATE SODIUM 100 MG: 100 CAPSULE, LIQUID FILLED ORAL at 09:10

## 2023-10-27 RX ADMIN — ATORVASTATIN CALCIUM 80 MG: 40 TABLET, FILM COATED ORAL at 09:10

## 2023-10-27 RX ADMIN — PANTOPRAZOLE SODIUM 40 MG: 40 TABLET, DELAYED RELEASE ORAL at 08:10

## 2023-10-27 NOTE — ASSESSMENT & PLAN NOTE
Will trend  IV heparin  Cardiac echo pending  EKG not showing ischemia    10/24/23  -Troponin bumped to 33, now trending down  -CP free during exam  -Echo showed EF of 20-25%, developed cardiogenic shock overnight, responded well to dobutamine  -Continue ASA, heparin gtt  -Plans for R/LHC today pending discussion with Dr. Ernandez    10/25/23  -s/p R/LHC which showed elevated filling pressures, occluded RCA with left to right collateral, ostial LAD  -Turned down for CABG by CTS  -Continue heparin gtt, ASA, statin   -Keep NPO for possible high risk PCI tmw pending stability of H/H    10/26/23  -LHC with possible high risk PCI today by Dr. Ernandez. All risks, benefits, and treatment alternatives explained to patient in detail. All questions answered. He has agreed to proceed.    10/27/23  -s/p LHC yesterday that showed 50% ostial LAD lesion, 40% D1 lesion, luminal irregularities of LCX  -CP free  -Continue ASA, Plavix, statin  -Will further optimize meds as tolerated  -Wean dobutamine as tolerated

## 2023-10-27 NOTE — PLAN OF CARE
Received consult for What's Trending.  Clinicals Faxed to [facility], liaison notified.       10/27/23 5962   Post-Acute Status   Post-Acute Authorization HME   E Status Referrals Sent   Discharge Plan   Discharge Plan A Home with family

## 2023-10-27 NOTE — PROGRESS NOTES
[facility] ([facility])  Cardiology  Progress Note    Patient Name: Hemal Tolbert Jr.  MRN: [id number]  Admission Date: 10/23/2023  Hospital Length of Stay: 4 days  Code Status: Full Code   Attending Physician: Jc Mars MD   Primary Care Physician: Norma, Provider  Expected Discharge Date:   Principal Problem:VT (ventricular tachycardia)    Subjective:   HPI:  63-year-old male with a known past medical history of combined heart failure with the EF of 40% and grade 3 diastolic dysfunction, alcohol abuse (reports 3-4 beers/day), tobacco abuse with alcoholic cirrhosis, hypertension, and COPD that presented to the  ER 10/23 as an ER to ER transfer for vtach that required defibrillation.  Prior to transfer, patient was initiated on amiodarone infusion.  Upon arrival to the ER in [place], Cardiology was consulted.  Is documented that patient again went into V-tach and required defibrillation in the ER with 1 shock with conversion to normal sinus rhythm.  ER nurse reports patient was given multiple nebs back to back for wheezing and was escalated to a Ventimask if he is a mouth breather. Patient's patient will be continued on amnio fusion protocol.  Review of EKG strips in the concerning for possible Torsades. Workup significant for leukocytosis of 17,000, sodium 128, creatinine 1.2, , , and troponin trends up with latest 3.6 --> 9.9.  Patient was given an aspirin, started on heparin infusion, given 4 mg of Mag IV, and 40 mg of Lasix IV.  Echo ordered.  Patient admitted to the ICU per the critical care team with Cardiology following.      Hospital Course:   10/24/23-Patient seen and examined today, resting in bed. Respiratory status worsened overnight, initially required Bipap. LA vasquez to 6.9, dobutamine initiated with improvement. Feeling better at time of exam, less SOB, able to lie flat. No CP. Troponin peaked at 33, now trending down. Repeat LA this AM normalized. Will  plan on R/LHC today pending discussion with interventionalist, Dr. Ernandez.     10/25/23-Patient seen and examined today, sedated, s/p R/LHC yesterday which showed elevated filling pressure, moderate pulmonary HTN, occlured RCA with left to right collaterals , ostial 80% LAD stenosis. IABP placed. CTS turned down for CABG. No recurrent VT. Labs reviewed. H/H dropped no active signs of bleeding. Creatinine stable. Will re-assess in AM, keep NPO after MN for possible LHC with high risk PCI.     10/26/23-Patient seen and examined today, resting in bed. Repeat LHC with high risk PCI planned by Dr. Ernandez. Feels ok. No CP. SOB stable. Labs reviewed and are stable. Amiodarone switched to po. Will need LifeVest.    10/27/23-Patient seen and examined today, sitting up in bed. Feels ok. No CP/SOB. Complains of constipation, abdomen distended. Xray concerning for SBO, critical care following. Dobutamine being weaned. LifeVest ordered.         Review of Systems   Constitutional: Positive for malaise/fatigue.   HENT: Negative.     Eyes: Negative.    Cardiovascular: Negative.    Respiratory: Negative.     Endocrine: Negative.    Hematologic/Lymphatic: Negative.    Skin: Negative.    Musculoskeletal:  Positive for arthritis.   Gastrointestinal:  Positive for constipation.   Genitourinary: Negative.    Neurological: Negative.    Psychiatric/Behavioral: Negative.     Allergic/Immunologic: Negative.      Objective:     Vital Signs (Most Recent):  Temp: 97.9 °F (36.6 °C) (10/27/23 0300)  Pulse: 78 (10/27/23 0744)  Resp: (!) 22 (10/27/23 0744)  BP: (!) 146/68 (10/27/23 0919)  SpO2: 97 % (10/27/23 0744) Vital Signs (24h Range):  Temp:  [97.7 °F (36.5 °C)-98.7 °F (37.1 °C)] 97.9 °F (36.6 °C)  Pulse:  [62-85] 78  Resp:  [9-33] 22  SpO2:  [88 %-97 %] 97 %  BP: ()/(60-90) 146/68  Arterial Line BP: ()/(56-95) 160/69     Weight: 88 kg (194 lb 0.1 oz)  Body mass index is 29.5 kg/m².     SpO2: 97 %         Intake/Output Summary (Last  24 hours) at 10/27/2023 1425  Last data filed at 10/27/2023 1000  Gross per 24 hour   Intake 1046.72 ml   Output 2743 ml   Net -1696.28 ml       Lines/Drains/Airways       Drain  Duration                  Urethral Catheter 10/24/23 1120 Coude 18 Fr. 3 days              Peripheral Intravenous Line  Duration                  Peripheral IV - Single Lumen 10/26/23 0430 20 G Anterior;Left;Proximal Forearm 1 day         Midline Catheter Insertion/Assessment  - Single Lumen 10/26/23 1645 Right basilic vein (medial side of arm) 18g x 8cm <1 day                       Physical Exam  Vitals and nursing note reviewed.   Constitutional:       General: He is not in acute distress.     Appearance: He is well-developed. He is ill-appearing. He is not diaphoretic.      Comments: On supplemental O2   HENT:      Head: Normocephalic and atraumatic.   Eyes:      General:         Right eye: No discharge.         Left eye: No discharge.      Pupils: Pupils are equal, round, and reactive to light.   Cardiovascular:      Rate and Rhythm: Normal rate and regular rhythm.      Heart sounds: Normal heart sounds, S1 normal and S2 normal. No murmur heard.  Pulmonary:      Effort: Pulmonary effort is normal. No respiratory distress.      Breath sounds: Rhonchi present. No wheezing.   Abdominal:      General: There is distension.      Tenderness: There is no rebound.   Musculoskeletal:      Right lower leg: No edema.      Left lower leg: No edema.   Skin:     General: Skin is warm and dry.      Findings: No erythema.      Comments: Groin access site C/D/I; no bleeding erythema or drainage   Neurological:      General: No focal deficit present.      Mental Status: He is alert and oriented to person, place, and time.   Psychiatric:         Mood and Affect: Mood normal.         Behavior: Behavior normal.         Thought Content: Thought content normal.            Significant Labs: CMP   Recent Labs   Lab 10/26/23  0440 10/27/23  0357   * 136   K  "4.7 3.7   CL 96 100   CO2 24 25   * 159*   BUN 33* 39*   CREATININE 1.3 1.1   CALCIUM 8.6* 7.8*   PROT 7.9  --    ALBUMIN 3.3*  --    BILITOT 0.3  --    ALKPHOS 41*  --    AST 32  --    ALT 19  --    ANIONGAP 12 11   , CBC   Recent Labs   Lab 10/26/23  0440 10/27/23  0357   WBC 11.35 12.18  12.18   HGB 11.1* 10.2*  10.2*   HCT 32.8* 30.2*  30.2*    198  198   , Troponin No results for input(s): "TROPONINI" in the last 48 hours., and All pertinent lab results from the last 24 hours have been reviewed.    Significant Imaging: Echocardiogram: Transthoracic echo (TTE) complete (Cupid Only):   Results for orders placed or performed during the hospital encounter of 10/23/23   Echo   Result Value Ref Range    BSA 2.08 m2    IVRT 49.48 msec    TDI LATERAL 0.10 m/s    Left Ventricular Outflow Tract Mean Gradient 1.13 mmHg    Left Ventricular Outflow Tract Mean Velocity 0.49 cm/s    MV stenosis pressure 1/2 time 34.73 ms    Mr max rex 3.50 m/s    TR Max Rex 2.09 m/s    Ao root annulus 3.38 cm    Ao peak rex 0.86 m/s    Posterior Wall 0.95 0.6 - 1.1 cm    LVOT diameter 1.95 cm    LVOT peak rex 0.76 m/s    LVOT peak VTI 10.90 cm    E wave deceleration time 119.77 msec    MV Peak A Rex 0.40 m/s    MV Peak E Rex 0.65 m/s    RA Major Axis 2.94 cm    TAPSE 1.82 cm    PV mean gradient 1 mmHg    RVOT peak rex 0.57 m/s    RVOT peak VTI 9.8 cm    IVS 0.96 0.6 - 1.1 cm    Ao VTI 11.90 cm    AV mean gradient 2 mmHg    LVIDd 4.24 3.5 - 6.0 cm    LVIDs 3.71 2.1 - 4.0 cm    Left Atrium Major Axis 4.28 cm    Left Atrium Minor Axis 4.19 cm    RV mid diameter 3.37 cm    STJ 3.35 cm    Ascending aorta 3.41 cm    LV Systolic Volume 58.38 mL    LV Diastolic Volume 80.17 mL    TDI SEPTAL 0.09 m/s    LA size 2.39 cm    IVC diameter 1.42 cm    LV LATERAL E/E' RATIO 6.50 m/s    LV SEPTAL E/E' RATIO 7.22 m/s    FS 13 28 - 44 %    LV mass 130.71 g    ZLVIDD -3.61     ZLVIDS -0.04     Left Ventricle Relative Wall Thickness 0.45 cm    " AV valve area 2.73 cm²    AV Velocity Ratio 0.88     AV index (prosthetic) 0.92     MV valve area p 1/2 method 6.33 cm2    E/A ratio 1.63     Mean e' 0.10 m/s    LVOT area 3.0 cm2    LVOT stroke volume 32.54 cm3    AV peak gradient 3 mmHg    E/E' ratio 6.84 m/s    LV Systolic Volume Index 28.6 mL/m2    LV Diastolic Volume Index 39.30 mL/m2    LV Mass Index 64 g/m2    Triscuspid Valve Regurgitation Peak Gradient 17 mmHg    ADDIS by Velocity Ratio 2.64 cm²    LA Volume Index 11.4 mL/m2    LA volume 23.23 cm3    LA WIDTH 2.7 cm    RA Width 2.3 cm    TV resting pulmonary artery pressure 20 mmHg    RV TB RVSP 5 mmHg    Est. RA pres 3 mmHg    Narrative      Left Ventricle: The left ventricle is mildly dilated. Normal wall   thickness. There is concentric remodeling. Moderate global hypokinesis   present. There is severely reduced systolic function with a visually   estimated ejection fraction of 20 - 25%. There is normal diastolic   function.    Right Ventricle: Normal right ventricular cavity size. Wall thickness   is normal. Right ventricle wall motion  is normal. Systolic function is   normal.    Mitral Valve: There is mild to moderate regurgitation with a centrally   directed jet.    Pulmonary Artery: The estimated pulmonary artery systolic pressure is   20 mmHg.    IVC/SVC: Normal venous pressure at 3 mmHg.     , EKG: Reviewed, and X-Ray: CXR: X-Ray Chest 1 View (CXR):   Results for orders placed or performed during the hospital encounter of 10/23/23   X-Ray Chest 1 View    Narrative    EXAMINATION:  XR CHEST 1 VIEW    CLINICAL HISTORY:  IABP placement;    TECHNIQUE:  Single frontal view of the chest was performed.    COMPARISON:  10/23/2023    FINDINGS:  Multiple telemetry leads overlie the chest.  The cardiac silhouette and mediastinum are unremarkable.  Exam is limited as the right costophrenic sulcus is not included.  Developing bilateral perihilar ground-glass opacities along with more prominent interstitial  prominence and cephalization compatible with worsening pulmonary venous hypertension.  No visualized pneumothorax or pleural effusion.  The lungs remain hyperinflated.      Impression    Findings compatible with worsening pulmonary venous hypertension.    Unchanged hyperinflation.      Electronically signed by: Paddy Calhoun  Date:    10/25/2023  Time:    07:49    and X-Ray Chest PA and Lateral (CXR): No results found for this visit on 10/23/23.    Assessment and Plan:   Patient who presents with VT/NSTEMI/cardiogenic shock. [facility] yesterday-med mgmt, Impella removed. Will wean dobutamine as tolerated. LifeVest ordered. Abdominal Xray with possible SBO, mgmt as per critical care.     * VT (ventricular tachycardia)  Treated with shock therapy  Continue amiodarone  Ischemic evaluation  ETOH evaluation and cardiac echo pending    10/24/23  -Echo with EF of 20-25%  -Ischemic evaluation pending  -Continue amiodarone gtt for now  -Keep K > 4; Mg > 2    10/25/23  -Stable  -Keep on amiodarone for now  -Repeat LFT's    10/26/23  -Amiodarone switched to po  -Will need LIfeVest    Abdominal distention  -Xray concerning for SBO  -Mgmt as per primary team    Cardiogenic shock  -Clinical condition worsened overnight with increased SOB/lactic acidosis  -LA normalized on dobutamine  -Continue inotropic support  -R/[facility] planned  -Will discuss with Main [place]    10/25/23  -Not candidate for advanced options given ETOH abuse  -Continue dobutamine  -Possible high risk PCI tmw    10/26/23  -[facility] with possible high risk PCI today  -Will try to wean dobutamine as tolerated  -OMT    10/27/23  -s/p [facility] yesterday---med mgmt recommended  -Continue ASA, Plavix, statin  -Will add BB once off inotrope  -Entresto, po Lasix as tolerated    Hyponatremia  -In setting of CHF/ETOH abuse  -Monitor    Alcohol abuse  -ETOH cessation    COPD (chronic obstructive pulmonary disease)  -Mgmt as per primary team    Tobacco dependence  -Cessation  advised    Acute on chronic combined systolic and diastolic heart failure  -BNP minimally elevated  -LA improved with inotropic support  -Ischemic evaluation pending  -Will optimize regimen accordingly    10/25/23  -RHC with elevated filling pressures  -Continue IV diuresis  -Will optimize regimen as tolerated  -Possible high risk PCI tmw    10/26/23  -Continue OMT  -LHC with possible high risk PCI today    10/27/23  -Continue OMT-will add BB once off dobutamine  -Entresto, po Lasix when able to tolerate  -LifeVest for SCD prevention    Elevated troponin  Will trend  IV heparin  Cardiac echo pending  EKG not showing ischemia    10/24/23  -Troponin bumped to 33, now trending down  -CP free during exam  -Echo showed EF of 20-25%, developed cardiogenic shock overnight, responded well to dobutamine  -Continue ASA, heparin gtt  -Plans for R/LHC today pending discussion with Dr. Ernandez    10/25/23  -s/p R/LHC which showed elevated filling pressures, occluded RCA with left to right collateral, ostial LAD  -Turned down for CABG by CTS  -Continue heparin gtt, ASA, statin   -Keep NPO for possible high risk PCI tmw pending stability of H/H    10/26/23  -LHC with possible high risk PCI today by Dr. Ernandez. All risks, benefits, and treatment alternatives explained to patient in detail. All questions answered. He has agreed to proceed.    10/27/23  -s/p LHC yesterday that showed 50% ostial LAD lesion, 40% D1 lesion, luminal irregularities of LCX  -CP free  -Continue ASA, Plavix, statin  -Will further optimize meds as tolerated  -Wean dobutamine as tolerated        CHF (congestive heart failure)  Patient is identified as having Combined Systolic and Diastolic heart failure that is Acute on chronic. CHF is currently uncontrolled due to Continued edema of extremities. Latest ECHO performed and demonstrates- Results for orders placed during the hospital encounter of 06/07/23    Echo    Interpretation Summary  · The left ventricle is  mildly enlarged with mildly decreased systolic function.  · Overall there is mild to moderate global hypokinesis; Prounounced basal inferior and inferioseptal hypokinesis.  · The estimated ejection fraction is 40%.  · Grade III left ventricular diastolic dysfunction.  · Mild right ventricular enlargement with low normal right ventricular systolic function.  · Mild-to-moderate mitral regurgitation.  · Mild tricuspid regurgitation.  · Mild pulmonic regurgitation.  · The estimated PA systolic pressure is 35 mmHg.  · Mild left atrial enlargement.  · Mild right atrial enlargement.  . Continue Beta Blocker, ACE/ARB and Furosemide and monitor clinical status closely. Monitor on telemetry. Patient is off CHF pathway.  Monitor strict Is&Os and daily weights.  Place on fluid restriction of 2 L. Cardiology has been consulted. Continue to stress to patient importance of self efficacy and  on diet for CHF. Last BNP reviewed- and noted below   Recent Labs   Lab 10/23/23  0950   *   .    Alcoholic cirrhosis  -Per intensive team  -Will monitor LFT's while on amiodarone    10/26/23  -May need paracentesis, abdomen terse    10/27/23  -Mgmt as per primary team        VTE Risk Mitigation (From admission, onward)         Ordered     IP VTE HIGH RISK PATIENT  Once         10/23/23 1120     Place sequential compression device  Until discontinued         10/23/23 1120                Cammie Richardson PA-C  Cardiology  [facility] ([facility])

## 2023-10-27 NOTE — ASSESSMENT & PLAN NOTE
-Clinical condition worsened overnight with increased SOB/lactic acidosis  -LA normalized on dobutamine  -Continue inotropic support  -R/LHC planned  -Will discuss with [facility] [place]    10/25/23  -Not candidate for advanced options given ETOH abuse  -Continue dobutamine  -Possible high risk PCI w    10/26/23  -C with possible high risk PCI today  -Will try to wean dobutamine as tolerated  -OMT    10/27/23  -s/p C yesterday---med mgmt recommended  -Continue ASA, Plavix, statin  -Will add BB once off inotrope  -Entresto, po Lasix as tolerated

## 2023-10-27 NOTE — ASSESSMENT & PLAN NOTE
- ETOH level 29 at OSH, reports drinks 3-4 beers/day with last drink 10/22  - Ativan IV for CIWA >8 PRN  - continue folic acid and thiamine daily   - wean off librium   - cessation education as appropriate and reinforce upon discharge  - monitor for evidence of withdrawal

## 2023-10-27 NOTE — ASSESSMENT & PLAN NOTE
- check abdominal imaging, rule out ileus  - add scheduled Miralax, lactulose, and Docusate  - benign abdominal findings with no evidence of guarding, tenderness, denies any n/v  - may need abdominal ultrasound to evaluate for ascites and need for paracentesis  - monitor for BM

## 2023-10-27 NOTE — ASSESSMENT & PLAN NOTE
- d/c IV Solu-medrol; start 5-day course of oral Prednisone  - pulmonary status stable on 2L NC this AM  - PRN duoneb, continue scheduled brovana and pulmicort nebs  - monitor chest imaging as needed

## 2023-10-27 NOTE — ASSESSMENT & PLAN NOTE
- post LHS with high risk PCI  - not a candidate for CABG per cardiothoracic surgery  - continue statin, ASA, Plavix

## 2023-10-27 NOTE — ASSESSMENT & PLAN NOTE
-Per intensive team  -Will monitor LFT's while on amiodarone    10/26/23  -May need paracentesis, abdomen terse    10/27/23  -Mgmt as per primary team

## 2023-10-27 NOTE — PLAN OF CARE
Pt AAOx4 with RASS goal -1 and maintained with Precedex at 1.4 mcg/kg/hr. SPO2 >93% with 4 L/min NC. Dobutamine gtts continued. HR NSR with systolic BP ranging in 150s-160s. Veronica in place with total UOP of 1,366 mL during this shift. No BM during this shift. R femoral cath site clean and dry. Bleeding stopped and no evidence of hematoma formation during this shift. POC reviewed with patient. Bed in lowest position, side rails up x 3, wheels locked, call bell within reach, and alarms on and audible.

## 2023-10-27 NOTE — PROGRESS NOTES
[facility]  Critical Care Medicine Progress Note    Patient Name: Hemal Tolbert Jr.   MRN: [id number]  Admission Date: 10/23/2023    Hospital Length of Stay: 4 days  Code Status: Full Code     Attending Provider: Jc Mars MD  Principal Problem: VT (ventricular tachycardia)  Subjective:   History of Present Illness:  Hemal Tolbert is a 63-year-old male with a known past medical history of combined heart failure with an EF of 40% and grade 3 diastolic dysfunction, alcohol abuse (reports 3-4 beers/day), tobacco abuse with alcoholic cirrhosis, hypertension, and COPD who presented to the  ER 10/23 as an ER to ER transfer for evaluation of Vtach requiring defibrillation.  Prior to transfer, patient was initiated on amiodarone infusion. Upon arrival to the ER in [place], Cardiology was consulted. It was documented the patient again went into V-tach and required defibrillation in the ER with 1 shock with conversion to normal sinus rhythm. ER nurse reports patient was given multiple nebs back to back for wheezing and was escalated to a Ventimask as he is a mouth breather. Patient's was continued on the amiodarone infusion protocol. Review of EKG strips was concerning for possible Torsades. Workup significant for leukocytosis of 17,000, sodium 128, creatinine 1.2, , , and troponin trends up with latest 3.6 --> 9.9.  Patient was given an aspirin, started on heparin infusion, given 4 mg of Mag IV, and 40 mg of Lasix IV.  Echo ordered.  Patient admitted to the ICU per the critical care team with Cardiology following.      Hospital/ICU Course:   10/24: no acute events overnight, pt awake and alert on NC feeling much better than at time of admit, only c/o is that his is hungry but with keep NPO pending cards eval as may likely needs ischemic w/u   10/26: no acute events noted overnight, pt to return to cath lab today for high risk PCI, no new issues or c/o  noted, wheezing resolved   10/27: denies acute complaints. Remains on Precedex infusion this morning. Abdomen firm, reports flatus and denies any complaints of nausea or vomiting. Denies chest pain or shortness of breath.     Objective:     Vital Signs (Most Recent):  Temp: 97.9 °F (36.6 °C) (10/27/23 0300)  Pulse: 78 (10/27/23 0744)  Resp: (!) 22 (10/27/23 0744)  BP: (!) 146/68 (10/27/23 0919)  SpO2: 97 % (10/27/23 0744) Vital Signs (24h Range):  Temp:  [97.7 °F (36.5 °C)-98.7 °F (37.1 °C)] 97.9 °F (36.6 °C)  Pulse:  [62-85] 78  Resp:  [9-33] 22  SpO2:  [88 %-97 %] 97 %  BP: ()/(60-90) 146/68  Arterial Line BP: ()/(56-95) 160/69   Weight: 88 kg (194 lb 0.1 oz);  Body mass index is 29.5 kg/m².    Intake/Output Summary (Last 24 hours) at 10/27/2023 1306  Last data filed at 10/27/2023 1000  Gross per 24 hour   Intake 1371.99 ml   Output 3268 ml   Net -1896.01 ml      Physical Exam  Vitals and nursing note reviewed.   HENT:      Head: Normocephalic.   Eyes:      Conjunctiva/sclera: Conjunctivae normal.   Cardiovascular:      Rate and Rhythm: Normal rate.   Pulmonary:      Effort: Pulmonary effort is normal.      Breath sounds: Normal breath sounds.   Abdominal:      General: There is distension.      Tenderness: There is no abdominal tenderness. There is no guarding or rebound.   Musculoskeletal:         General: Normal range of motion.      Cervical back: Normal range of motion.   Skin:     General: Skin is warm and dry.   Neurological:      Mental Status: He is alert and oriented to person, place, and time.   Psychiatric:         Mood and Affect: Mood normal.       Review of Systems: Negative except as indicated in HPI    Significant Labs:  CBC/Anemia Profile:  Recent Labs   Lab 10/26/23  0440 10/27/23  0357   WBC 11.35 12.18  12.18   HGB 11.1* 10.2*  10.2*   HCT 32.8* 30.2*  30.2*    198  198   MCV 96 96  96   RDW 12.6 12.3  12.3   Chemistries:  Recent Labs   Lab 10/26/23  5760  10/27/23  0357   * 136   K 4.7 3.7   CL 96 100   CO2 24 25   BUN 33* 39*   CREATININE 1.3 1.1   CALCIUM 8.6* 7.8*   ALBUMIN 3.3*  --    PROT 7.9  --    BILITOT 0.3  --    ALKPHOS 41*  --    ALT 19  --    AST 32  --    MG 2.8* 2.3   PHOS 3.2 3.3   ABG  Recent Labs   Lab 10/24/23  0211   PH 7.375   PO2 90   PCO2 37.1   HCO3 21.7*   BE -4*     Assessment/Plan:   * VT (ventricular tachycardia)  - shock x1 at OSH and again x1 in the ER here   - Continue oral amiodarone 400mg BID  - continue telemetry monitoring   - follow lytes and replete as needed  - echo with EF 20-25%  - Cardiology following   - plan for LifeVest upon discharge    Acute on chronic combined systolic and diastolic heart failure  Cardiogenic shock  - previous echo with EF 40% and grade II diastolic function, repeat echo with EF 20-25%  - continue scheduled lasix; monitor I&O trends  - Cath lab on 10/24: Occluded RCA and LAD ostial calcified with 80% stenosis; returned to ICU with R groin IABP  - Returned to cath lab 10/26 for high risk PCI  - Remains on Dobutamine infusion  - Cards following  - Evaluated by cardiac surgery and deemed not a candidate for CABG    Elevated troponin  - post LHS with high risk PCI  - not a candidate for CABG per cardiothoracic surgery  - continue statin, ASA, Plavix    COPD (chronic obstructive pulmonary disease)  - d/c IV Solu-medrol; start 5-day course of oral Prednisone  - pulmonary status stable on 2L NC this AM  - PRN duoneb, continue scheduled brovana and pulmicort nebs  - monitor chest imaging as needed    Primary hypertension  - currently on Dobutamine infusion  - monitor hemodynamics and adjust meds as appropriate    Hyponatremia  - could be s/t alcohol abuse +/- HF  - monitor, slowly improving   - allow PO intake    Urine retention  - jacobson in place  - monitor UOP    Alcoholic cirrhosis  - follow liver enzymes  - continue supportive care   - alcohol cessation education when appropriate     Abdominal  distention  - check abdominal imaging, rule out ileus  - add scheduled Miralax, lactulose, and Docusate  - benign abdominal findings with no evidence of guarding, tenderness, denies any n/v  - may need abdominal ultrasound to evaluate for ascites and need for paracentesis  - monitor for BM    Alcohol abuse  - ETOH level 29 at OSH, reports drinks 3-4 beers/day with last drink 10/22  - Ativan IV for CIWA >8 PRN  - continue folic acid and thiamine daily   - wean off librium   - cessation education as appropriate and reinforce upon discharge  - monitor for evidence of withdrawal    Tobacco dependence  - cessation education when appropriate  - nicotine patch if needed    Critical Care Time: 38 minutes  Critical secondary to Patient has a condition that poses threat to life and bodily function: Cardiogenic shock on Dobutamine      Critical care was time spent personally by me on the following activities: development of treatment plan with patient or surrogate and bedside caregivers, discussions with consultants, evaluation of patient's response to treatment, examination of patient, ordering and performing treatments and interventions, ordering and review of laboratory studies, ordering and review of radiographic studies, pulse oximetry, re-evaluation of patient's condition. This critical care time did not overlap with that of any other provider or involve time for any procedures.     Meg Boss NP  Pulmonary and Critical Care  [facility]

## 2023-10-27 NOTE — ASSESSMENT & PLAN NOTE
- previous echo with EF 40% and grade II diastolic function, repeat echo with EF 20-25%  - continue scheduled lasix; monitor I&O trends  - Cath lab on 10/24: Occluded RCA and LAD ostial calcified with 80% stenosis; returned to ICU with R groin IABP  - Returned to cath lab 10/26 for high risk PCI  - Remains on Dobutamine infusion  - Cards following  - Evaluated by cardiac surgery and deemed not a candidate for CABG

## 2023-10-27 NOTE — ASSESSMENT & PLAN NOTE
-BNP minimally elevated  -LA improved with inotropic support  -Ischemic evaluation pending  -Will optimize regimen accordingly    10/25/23  -RHC with elevated filling pressures  -Continue IV diuresis  -Will optimize regimen as tolerated  -Possible high risk PCI tmw    10/26/23  -Continue OMT  -[facility] with possible high risk PCI today    10/27/23  -Continue OMT-will add BB once off dobutamine  -Entresto, po Lasix when able to tolerate  -LifeVest for SCD prevention

## 2023-10-27 NOTE — PLAN OF CARE
Hemodynamically stable.  No complaint of chest pain.  Femoral insertion site was intact, no bleeding or hematoma noted.    Patient abdomen was distended, denies nausea and vomiting upon shift change.at around 1130, complained of nausea and vomited around 20. Patient was put on sitting position. After that verbalize relief and said that he's passing more gas. Stool softeners was administered but still did not have any bowel movement. X-ray of the abdomen was also done which indicated a possible small bowel obstruction. Informed NP about results and planned to ambulate the patient in order to increase mobility of the bowels.  Ambulation tolerated, HR increase to 115 with no SOB noted.  Still monitoring for progression of obstruction.

## 2023-10-27 NOTE — SUBJECTIVE & OBJECTIVE
Objective:     Vital Signs (Most Recent):  Temp: 97.9 °F (36.6 °C) (10/27/23 0300)  Pulse: 78 (10/27/23 0744)  Resp: (!) 22 (10/27/23 0744)  BP: (!) 146/68 (10/27/23 0919)  SpO2: 97 % (10/27/23 0744) Vital Signs (24h Range):  Temp:  [97.7 °F (36.5 °C)-98.7 °F (37.1 °C)] 97.9 °F (36.6 °C)  Pulse:  [62-85] 78  Resp:  [9-33] 22  SpO2:  [88 %-97 %] 97 %  BP: ()/(60-90) 146/68  Arterial Line BP: ()/(56-95) 160/69   Weight: 88 kg (194 lb 0.1 oz);  Body mass index is 29.5 kg/m².    Intake/Output Summary (Last 24 hours) at 10/27/2023 1306  Last data filed at 10/27/2023 1000  Gross per 24 hour   Intake 1371.99 ml   Output 3268 ml   Net -1896.01 ml      Physical Exam  Vitals and nursing note reviewed.   HENT:      Head: Normocephalic.   Eyes:      Conjunctiva/sclera: Conjunctivae normal.   Cardiovascular:      Rate and Rhythm: Normal rate.   Pulmonary:      Effort: Pulmonary effort is normal.      Breath sounds: Normal breath sounds.   Abdominal:      General: There is distension.      Tenderness: There is no abdominal tenderness. There is no guarding or rebound.   Musculoskeletal:         General: Normal range of motion.      Cervical back: Normal range of motion.   Skin:     General: Skin is warm and dry.   Neurological:      Mental Status: He is alert and oriented to person, place, and time.   Psychiatric:         Mood and Affect: Mood normal.         Review of Systems: Negative except as indicated in HPI    Significant Labs:  CBC/Anemia Profile:  Recent Labs   Lab 10/26/23  0440 10/27/23  0357   WBC 11.35 12.18  12.18   HGB 11.1* 10.2*  10.2*   HCT 32.8* 30.2*  30.2*    198  198   MCV 96 96  96   RDW 12.6 12.3  12.3   Chemistries:  Recent Labs   Lab 10/26/23  0440 10/27/23  0357   * 136   K 4.7 3.7   CL 96 100   CO2 24 25   BUN 33* 39*   CREATININE 1.3 1.1   CALCIUM 8.6* 7.8*   ALBUMIN 3.3*  --    PROT 7.9  --    BILITOT 0.3  --    ALKPHOS 41*  --    ALT 19  --    AST 32  --    MG 2.8* 2.3    PHOS 3.2 3.3

## 2023-10-27 NOTE — SUBJECTIVE & OBJECTIVE
Review of Systems   Constitutional: Positive for malaise/fatigue.   HENT: Negative.     Eyes: Negative.    Cardiovascular: Negative.    Respiratory: Negative.     Endocrine: Negative.    Hematologic/Lymphatic: Negative.    Skin: Negative.    Musculoskeletal:  Positive for arthritis.   Gastrointestinal:  Positive for constipation.   Genitourinary: Negative.    Neurological: Negative.    Psychiatric/Behavioral: Negative.     Allergic/Immunologic: Negative.      Objective:     Vital Signs (Most Recent):  Temp: 97.9 °F (36.6 °C) (10/27/23 0300)  Pulse: 78 (10/27/23 0744)  Resp: (!) 22 (10/27/23 0744)  BP: (!) 146/68 (10/27/23 0919)  SpO2: 97 % (10/27/23 0744) Vital Signs (24h Range):  Temp:  [97.7 °F (36.5 °C)-98.7 °F (37.1 °C)] 97.9 °F (36.6 °C)  Pulse:  [62-85] 78  Resp:  [9-33] 22  SpO2:  [88 %-97 %] 97 %  BP: ()/(60-90) 146/68  Arterial Line BP: ()/(56-95) 160/69     Weight: 88 kg (194 lb 0.1 oz)  Body mass index is 29.5 kg/m².     SpO2: 97 %         Intake/Output Summary (Last 24 hours) at 10/27/2023 1425  Last data filed at 10/27/2023 1000  Gross per 24 hour   Intake 1046.72 ml   Output 2743 ml   Net -1696.28 ml       Lines/Drains/Airways       Drain  Duration                  Urethral Catheter 10/24/23 1120 Coude 18 Fr. 3 days              Peripheral Intravenous Line  Duration                  Peripheral IV - Single Lumen 10/26/23 0430 20 G Anterior;Left;Proximal Forearm 1 day         Midline Catheter Insertion/Assessment  - Single Lumen 10/26/23 1645 Right basilic vein (medial side of arm) 18g x 8cm <1 day                       Physical Exam  Vitals and nursing note reviewed.   Constitutional:       General: He is not in acute distress.     Appearance: He is well-developed. He is ill-appearing. He is not diaphoretic.      Comments: On supplemental O2   HENT:      Head: Normocephalic and atraumatic.   Eyes:      General:         Right eye: No discharge.         Left eye: No discharge.      Pupils:  "Pupils are equal, round, and reactive to light.   Cardiovascular:      Rate and Rhythm: Normal rate and regular rhythm.      Heart sounds: Normal heart sounds, S1 normal and S2 normal. No murmur heard.  Pulmonary:      Effort: Pulmonary effort is normal. No respiratory distress.      Breath sounds: Rhonchi present. No wheezing.   Abdominal:      General: There is distension.      Tenderness: There is no rebound.   Musculoskeletal:      Right lower leg: No edema.      Left lower leg: No edema.   Skin:     General: Skin is warm and dry.      Findings: No erythema.      Comments: Groin access site C/D/I; no bleeding erythema or drainage   Neurological:      General: No focal deficit present.      Mental Status: He is alert and oriented to person, place, and time.   Psychiatric:         Mood and Affect: Mood normal.         Behavior: Behavior normal.         Thought Content: Thought content normal.            Significant Labs: CMP   Recent Labs   Lab 10/26/23  0440 10/27/23  0357   * 136   K 4.7 3.7   CL 96 100   CO2 24 25   * 159*   BUN 33* 39*   CREATININE 1.3 1.1   CALCIUM 8.6* 7.8*   PROT 7.9  --    ALBUMIN 3.3*  --    BILITOT 0.3  --    ALKPHOS 41*  --    AST 32  --    ALT 19  --    ANIONGAP 12 11   , CBC   Recent Labs   Lab 10/26/23  0440 10/27/23  0357   WBC 11.35 12.18  12.18   HGB 11.1* 10.2*  10.2*   HCT 32.8* 30.2*  30.2*    198  198   , Troponin No results for input(s): "TROPONINI" in the last 48 hours., and All pertinent lab results from the last 24 hours have been reviewed.    Significant Imaging: Echocardiogram: Transthoracic echo (TTE) complete (Cupid Only):   Results for orders placed or performed during the hospital encounter of 10/23/23   Echo   Result Value Ref Range    BSA 2.08 m2    IVRT 49.48 msec    TDI LATERAL 0.10 m/s    Left Ventricular Outflow Tract Mean Gradient 1.13 mmHg    Left Ventricular Outflow Tract Mean Velocity 0.49 cm/s    MV stenosis pressure 1/2 time 34.73 " ms    Mr max rex 3.50 m/s    TR Max Rex 2.09 m/s    Ao root annulus 3.38 cm    Ao peak rex 0.86 m/s    Posterior Wall 0.95 0.6 - 1.1 cm    LVOT diameter 1.95 cm    LVOT peak rex 0.76 m/s    LVOT peak VTI 10.90 cm    E wave deceleration time 119.77 msec    MV Peak A Rex 0.40 m/s    MV Peak E Rex 0.65 m/s    RA Major Axis 2.94 cm    TAPSE 1.82 cm    PV mean gradient 1 mmHg    RVOT peak rex 0.57 m/s    RVOT peak VTI 9.8 cm    IVS 0.96 0.6 - 1.1 cm    Ao VTI 11.90 cm    AV mean gradient 2 mmHg    LVIDd 4.24 3.5 - 6.0 cm    LVIDs 3.71 2.1 - 4.0 cm    Left Atrium Major Axis 4.28 cm    Left Atrium Minor Axis 4.19 cm    RV mid diameter 3.37 cm    STJ 3.35 cm    Ascending aorta 3.41 cm    LV Systolic Volume 58.38 mL    LV Diastolic Volume 80.17 mL    TDI SEPTAL 0.09 m/s    LA size 2.39 cm    IVC diameter 1.42 cm    LV LATERAL E/E' RATIO 6.50 m/s    LV SEPTAL E/E' RATIO 7.22 m/s    FS 13 28 - 44 %    LV mass 130.71 g    ZLVIDD -3.61     ZLVIDS -0.04     Left Ventricle Relative Wall Thickness 0.45 cm    AV valve area 2.73 cm²    AV Velocity Ratio 0.88     AV index (prosthetic) 0.92     MV valve area p 1/2 method 6.33 cm2    E/A ratio 1.63     Mean e' 0.10 m/s    LVOT area 3.0 cm2    LVOT stroke volume 32.54 cm3    AV peak gradient 3 mmHg    E/E' ratio 6.84 m/s    LV Systolic Volume Index 28.6 mL/m2    LV Diastolic Volume Index 39.30 mL/m2    LV Mass Index 64 g/m2    Triscuspid Valve Regurgitation Peak Gradient 17 mmHg    ADDIS by Velocity Ratio 2.64 cm²    LA Volume Index 11.4 mL/m2    LA volume 23.23 cm3    LA WIDTH 2.7 cm    RA Width 2.3 cm    TV resting pulmonary artery pressure 20 mmHg    RV TB RVSP 5 mmHg    Est. RA pres 3 mmHg    Narrative      Left Ventricle: The left ventricle is mildly dilated. Normal wall   thickness. There is concentric remodeling. Moderate global hypokinesis   present. There is severely reduced systolic function with a visually   estimated ejection fraction of 20 - 25%. There is normal diastolic    function.    Right Ventricle: Normal right ventricular cavity size. Wall thickness   is normal. Right ventricle wall motion  is normal. Systolic function is   normal.    Mitral Valve: There is mild to moderate regurgitation with a centrally   directed jet.    Pulmonary Artery: The estimated pulmonary artery systolic pressure is   20 mmHg.    IVC/SVC: Normal venous pressure at 3 mmHg.     , EKG: Reviewed, and X-Ray: CXR: X-Ray Chest 1 View (CXR):   Results for orders placed or performed during the hospital encounter of 10/23/23   X-Ray Chest 1 View    Narrative    EXAMINATION:  XR CHEST 1 VIEW    CLINICAL HISTORY:  IABP placement;    TECHNIQUE:  Single frontal view of the chest was performed.    COMPARISON:  10/23/2023    FINDINGS:  Multiple telemetry leads overlie the chest.  The cardiac silhouette and mediastinum are unremarkable.  Exam is limited as the right costophrenic sulcus is not included.  Developing bilateral perihilar ground-glass opacities along with more prominent interstitial prominence and cephalization compatible with worsening pulmonary venous hypertension.  No visualized pneumothorax or pleural effusion.  The lungs remain hyperinflated.      Impression    Findings compatible with worsening pulmonary venous hypertension.    Unchanged hyperinflation.      Electronically signed by: Paddy Calhoun  Date:    10/25/2023  Time:    07:49    and X-Ray Chest PA and Lateral (CXR): No results found for this visit on 10/23/23.

## 2023-10-27 NOTE — ASSESSMENT & PLAN NOTE
- follow liver enzymes  - continue supportive care   - alcohol cessation education when appropriate

## 2023-10-27 NOTE — ASSESSMENT & PLAN NOTE
- shock x1 at OSH and again x1 in the ER here   - Continue oral amiodarone 400mg BID  - continue telemetry monitoring   - follow lytes and replete as needed  - echo with EF 20-25%  - Cardiology following   - plan for LifeVest upon discharge

## 2023-10-28 LAB
ANION GAP SERPL CALC-SCNC: 9 MMOL/L (ref 8–16)
BASOPHILS # BLD AUTO: 0.02 K/UL (ref 0–0.2)
BASOPHILS NFR BLD: 0.1 % (ref 0–1.9)
BUN SERPL-MCNC: 52 MG/DL (ref 8–23)
CALCIUM SERPL-MCNC: 8.2 MG/DL (ref 8.7–10.5)
CHLORIDE SERPL-SCNC: 105 MMOL/L (ref 95–110)
CO2 SERPL-SCNC: 23 MMOL/L (ref 23–29)
CREAT SERPL-MCNC: 1.4 MG/DL (ref 0.5–1.4)
DIFFERENTIAL METHOD: ABNORMAL
EOSINOPHIL # BLD AUTO: 0 K/UL (ref 0–0.5)
EOSINOPHIL NFR BLD: 0 % (ref 0–8)
ERYTHROCYTE [DISTWIDTH] IN BLOOD BY AUTOMATED COUNT: 13.2 % (ref 11.5–14.5)
EST. GFR  (NO RACE VARIABLE): 56 ML/MIN/1.73 M^2
GLUCOSE SERPL-MCNC: 99 MG/DL (ref 70–110)
HCT VFR BLD AUTO: 29.7 % (ref 40–54)
HGB BLD-MCNC: 9.8 G/DL (ref 14–18)
IMM GRANULOCYTES # BLD AUTO: 0.26 K/UL (ref 0–0.04)
IMM GRANULOCYTES NFR BLD AUTO: 1.7 % (ref 0–0.5)
LYMPHOCYTES # BLD AUTO: 1.1 K/UL (ref 1–4.8)
LYMPHOCYTES NFR BLD: 7 % (ref 18–48)
MAGNESIUM SERPL-MCNC: 2.5 MG/DL (ref 1.6–2.6)
MCH RBC QN AUTO: 32.5 PG (ref 27–31)
MCHC RBC AUTO-ENTMCNC: 33 G/DL (ref 32–36)
MCV RBC AUTO: 98 FL (ref 82–98)
MONOCYTES # BLD AUTO: 1.9 K/UL (ref 0.3–1)
MONOCYTES NFR BLD: 12.7 % (ref 4–15)
NEUTROPHILS # BLD AUTO: 11.9 K/UL (ref 1.8–7.7)
NEUTROPHILS NFR BLD: 78.5 % (ref 38–73)
NRBC BLD-RTO: 0 /100 WBC
PHOSPHATE SERPL-MCNC: 2.6 MG/DL (ref 2.7–4.5)
PLATELET # BLD AUTO: 232 K/UL (ref 150–450)
PMV BLD AUTO: 11.4 FL (ref 9.2–12.9)
POCT GLUCOSE: 112 MG/DL (ref 70–110)
POCT GLUCOSE: 127 MG/DL (ref 70–110)
POCT GLUCOSE: 83 MG/DL (ref 70–110)
POCT GLUCOSE: 91 MG/DL (ref 70–110)
POCT GLUCOSE: 94 MG/DL (ref 70–110)
POTASSIUM SERPL-SCNC: 3.9 MMOL/L (ref 3.5–5.1)
RBC # BLD AUTO: 3.02 M/UL (ref 4.6–6.2)
SODIUM SERPL-SCNC: 137 MMOL/L (ref 136–145)
WBC # BLD AUTO: 15.14 K/UL (ref 3.9–12.7)

## 2023-10-28 PROCEDURE — 99900035 HC TECH TIME PER 15 MIN (STAT)

## 2023-10-28 PROCEDURE — 80048 BASIC METABOLIC PNL TOTAL CA: CPT | Performed by: INTERNAL MEDICINE

## 2023-10-28 PROCEDURE — 25000003 PHARM REV CODE 250: Performed by: INTERNAL MEDICINE

## 2023-10-28 PROCEDURE — 63600175 PHARM REV CODE 636 W HCPCS: Performed by: PHYSICIAN ASSISTANT

## 2023-10-28 PROCEDURE — 21400001 HC TELEMETRY ROOM

## 2023-10-28 PROCEDURE — 25000003 PHARM REV CODE 250: Performed by: NURSE PRACTITIONER

## 2023-10-28 PROCEDURE — 94640 AIRWAY INHALATION TREATMENT: CPT

## 2023-10-28 PROCEDURE — 25000003 PHARM REV CODE 250: Performed by: FAMILY MEDICINE

## 2023-10-28 PROCEDURE — 25000242 PHARM REV CODE 250 ALT 637 W/ HCPCS: Performed by: INTERNAL MEDICINE

## 2023-10-28 PROCEDURE — 36415 COLL VENOUS BLD VENIPUNCTURE: CPT | Performed by: INTERNAL MEDICINE

## 2023-10-28 PROCEDURE — 25000003 PHARM REV CODE 250: Performed by: PHYSICIAN ASSISTANT

## 2023-10-28 PROCEDURE — 94761 N-INVAS EAR/PLS OXIMETRY MLT: CPT

## 2023-10-28 PROCEDURE — 85025 COMPLETE CBC W/AUTO DIFF WBC: CPT | Performed by: INTERNAL MEDICINE

## 2023-10-28 PROCEDURE — 99233 SBSQ HOSP IP/OBS HIGH 50: CPT | Mod: ,,, | Performed by: INTERNAL MEDICINE

## 2023-10-28 PROCEDURE — 63600175 PHARM REV CODE 636 W HCPCS: Performed by: NURSE PRACTITIONER

## 2023-10-28 PROCEDURE — 84100 ASSAY OF PHOSPHORUS: CPT | Performed by: INTERNAL MEDICINE

## 2023-10-28 PROCEDURE — 99233 PR SUBSEQUENT HOSPITAL CARE,LEVL III: ICD-10-PCS | Mod: ,,, | Performed by: INTERNAL MEDICINE

## 2023-10-28 PROCEDURE — 83735 ASSAY OF MAGNESIUM: CPT | Performed by: INTERNAL MEDICINE

## 2023-10-28 PROCEDURE — 27000221 HC OXYGEN, UP TO 24 HOURS

## 2023-10-28 RX ORDER — TAMSULOSIN HYDROCHLORIDE 0.4 MG/1
0.4 CAPSULE ORAL DAILY
Status: DISCONTINUED | OUTPATIENT
Start: 2023-10-28 | End: 2023-11-03 | Stop reason: HOSPADM

## 2023-10-28 RX ORDER — TALC
6 POWDER (GRAM) TOPICAL NIGHTLY PRN
Status: DISCONTINUED | OUTPATIENT
Start: 2023-10-28 | End: 2023-11-03 | Stop reason: HOSPADM

## 2023-10-28 RX ORDER — ENOXAPARIN SODIUM 100 MG/ML
40 INJECTION SUBCUTANEOUS EVERY 24 HOURS
Status: DISCONTINUED | OUTPATIENT
Start: 2023-10-28 | End: 2023-11-03 | Stop reason: HOSPADM

## 2023-10-28 RX ADMIN — GABAPENTIN 300 MG: 300 CAPSULE ORAL at 09:10

## 2023-10-28 RX ADMIN — ENOXAPARIN SODIUM 40 MG: 40 INJECTION SUBCUTANEOUS at 05:10

## 2023-10-28 RX ADMIN — CHLORDIAZEPOXIDE HYDROCHLORIDE 5 MG: 5 CAPSULE ORAL at 09:10

## 2023-10-28 RX ADMIN — ARFORMOTEROL TARTRATE 15 MCG: 15 SOLUTION RESPIRATORY (INHALATION) at 07:10

## 2023-10-28 RX ADMIN — BUDESONIDE INHALATION 0.5 MG: 0.5 SUSPENSION RESPIRATORY (INHALATION) at 07:10

## 2023-10-28 RX ADMIN — SPIRONOLACTONE 25 MG: 25 TABLET ORAL at 09:10

## 2023-10-28 RX ADMIN — PANTOPRAZOLE SODIUM 40 MG: 40 TABLET, DELAYED RELEASE ORAL at 09:10

## 2023-10-28 RX ADMIN — DOCUSATE SODIUM 100 MG: 100 CAPSULE, LIQUID FILLED ORAL at 08:10

## 2023-10-28 RX ADMIN — TRAZODONE HYDROCHLORIDE 25 MG: 50 TABLET ORAL at 08:10

## 2023-10-28 RX ADMIN — GABAPENTIN 300 MG: 300 CAPSULE ORAL at 08:10

## 2023-10-28 RX ADMIN — FOLIC ACID 1 MG: 1 TABLET ORAL at 09:10

## 2023-10-28 RX ADMIN — PREDNISONE 20 MG: 20 TABLET ORAL at 09:10

## 2023-10-28 RX ADMIN — SPIRONOLACTONE 25 MG: 25 TABLET ORAL at 08:10

## 2023-10-28 RX ADMIN — ATORVASTATIN CALCIUM 80 MG: 40 TABLET, FILM COATED ORAL at 09:10

## 2023-10-28 RX ADMIN — POLYETHYLENE GLYCOL 3350 17 G: 17 POWDER, FOR SOLUTION ORAL at 08:10

## 2023-10-28 RX ADMIN — GABAPENTIN 300 MG: 300 CAPSULE ORAL at 03:10

## 2023-10-28 RX ADMIN — AMIODARONE HYDROCHLORIDE 400 MG: 200 TABLET ORAL at 09:10

## 2023-10-28 RX ADMIN — LACTULOSE 20 G: 20 SOLUTION ORAL at 08:10

## 2023-10-28 RX ADMIN — BUDESONIDE INHALATION 0.5 MG: 0.5 SUSPENSION RESPIRATORY (INHALATION) at 09:10

## 2023-10-28 RX ADMIN — TAMSULOSIN HYDROCHLORIDE 0.4 MG: 0.4 CAPSULE ORAL at 11:10

## 2023-10-28 RX ADMIN — ARFORMOTEROL TARTRATE 15 MCG: 15 SOLUTION RESPIRATORY (INHALATION) at 09:10

## 2023-10-28 RX ADMIN — ASPIRIN 81 MG CHEWABLE TABLET 81 MG: 81 TABLET CHEWABLE at 09:10

## 2023-10-28 RX ADMIN — CLOPIDOGREL BISULFATE 75 MG: 75 TABLET ORAL at 09:10

## 2023-10-28 RX ADMIN — AMIODARONE HYDROCHLORIDE 400 MG: 200 TABLET ORAL at 08:10

## 2023-10-28 RX ADMIN — Medication 100 MG: at 09:10

## 2023-10-28 RX ADMIN — CHLORDIAZEPOXIDE HYDROCHLORIDE 5 MG: 5 CAPSULE ORAL at 08:10

## 2023-10-28 RX ADMIN — DOBUTAMINE IN DEXTROSE 3 MCG/KG/MIN: 400 INJECTION, SOLUTION INTRAVENOUS at 01:10

## 2023-10-28 RX ADMIN — PANTOPRAZOLE SODIUM 40 MG: 40 TABLET, DELAYED RELEASE ORAL at 08:10

## 2023-10-28 NOTE — ASSESSMENT & PLAN NOTE
- post LHC with high risk PCI  - not a candidate for CABG per cardiothoracic surgery  - continue statin, ASA, Plavix

## 2023-10-28 NOTE — ASSESSMENT & PLAN NOTE
- ETOH level 29 at OSH, reports drinks 3-4 beers/day with last drink 10/22  - Ativan IV for CIWA >8 PRN  - continue folic acid and thiamine daily   - complete librium taper  - cessation education as appropriate and reinforce upon discharge

## 2023-10-28 NOTE — CONSULTS
[facility] - Int[facility] Care ([facility])  Hospital Medicine  Consult Note    Patient Name: Hemal Tolbert Jr.  MRN: [id number]  Admission Date: 10/23/2023  Hospital Length of Stay: 5 days  Attending Physician: Jc Mars MD   Primary Care Provider: Sigifredo Green           Patient information was obtained from patient, ER records and primary team.     Consults  Subjective:     Principal Problem: VT (ventricular tachycardia)    Chief Complaint:   Chief Complaint   Patient presents with    Chest Pain        HPI: 63M  has a past medical history of Alcoholic cirrhosis, CHF (congestive heart failure), COPD (chronic obstructive pulmonary disease), Hypertension, Peripheral artery disease, and Sleep apnea.  Presented to emergency department from outside facility for vtach requiring defibrillation x 2. Initial workup revealed possible Torsades on electrocardiography strips. Cbc with leukocytosis, cmp with hyponatremia, creatinine 1.2, , , and elevated troponin, 3.6 --> 9.9. Patient was given an aspirin, started on heparin infusion, given 4 mg of Mag IV, and 40 mg of Lasix IV and transferred to icu per Cardiology recommendations. Echocardiogram with 20-25% ejection fraction. Patient required precedex infusion while in icu.    Cardiac catheterization revealed severe stenosis. CTS consulted and deemed not a surgical candidate. Amio infusion converted to po medication(s). Dobutamine infusion weaned. Patient is awaiting lifevest prior to discharge.    Us abdomen without significant ascites. On lactulose for cirrhosis.     Hospital medicine consulted for assumption of care.         Past Medical History:   Diagnosis Date    Alcoholic cirrhosis     CHF (congestive heart failure)     LV EF 40%    COPD (chronic obstructive pulmonary disease)     Hypertension     Peripheral artery disease     with stents    Sleep apnea        Past Surgical History:   Procedure Laterality Date    ANGIOGRAM, ABDOMINAL AORTA   10/24/2023    Procedure: Angiogram, Abdominal Aorta;  Surgeon: Janette Ernandez MD;  Location: [facility] CATH LAB;  Service: Cardiology;;    ARTERIOGRAPHY OF SUBCLAVIAN ARTERY Left 10/24/2023    Procedure: ARTERIOGRAM, SUBCLAVIAN;  Surgeon: Janette Ernandez MD;  Location: [facility] CATH LAB;  Service: Cardiology;  Laterality: Left;    CATHETERIZATION OF BOTH LEFT AND RIGHT HEART N/A 10/24/2023    Procedure: CATHETERIZATION, HEART, BOTH LEFT AND RIGHT;  Surgeon: Janette Ernandez MD;  Location: [facility] CATH LAB;  Service: Cardiology;  Laterality: N/A;    COLONOSCOPY N/A 6/10/2023    Procedure: COLONOSCOPY;  Surgeon: Johan Flower MD;  Location: [facility] OR;  Service: Gastroenterology;  Laterality: N/A;    ESOPHAGOGASTRODUODENOSCOPY N/A 6/9/2023    Procedure: EGD;  Surgeon: Tima Teixeira MD;  Location: [facility] ENDOSCOPY;  Service: Gastroenterology;  Laterality: N/A;    INSERTION OF INTRA-AORTIC BALLOON ASSIST DEVICE  10/24/2023    Procedure: INSERTION, INTRA-AORTIC BALLOON PUMP;  Surgeon: Janette Ernandez MD;  Location: [facility] CATH LAB;  Service: Cardiology;;    INSERTION OF INTRAVASCULAR MICROAXIAL BLOOD PUMP N/A 10/26/2023    Procedure: INSERTION, IMPELLA;  Surgeon: Janette Ernandez MD;  Location: [facility] CATH LAB;  Service: Cardiology;  Laterality: N/A;    INSTANTANEOUS WAVE-FREE RATIO (IFR) N/A 10/26/2023    Procedure: Instantaneous Wave-Free Ratio (IFR);  Surgeon: Janette Ernandez MD;  Location: [facility] CATH LAB;  Service: Cardiology;  Laterality: N/A;    INTRAVASCULAR ULTRASOUND, CORONARY N/A 10/26/2023    Procedure: Ultrasound-coronary;  Surgeon: Janette Ernandez MD;  Location: [facility] CATH LAB;  Service: Cardiology;  Laterality: N/A;    PERCUTANEOUS CORONARY INTERVENTION, ARTERY N/A 10/26/2023    Procedure: Percutaneous coronary intervention- with Impella;  Surgeon: Janette Ernandez MD;  Location: [facility] CATH LAB;  Service: Cardiology;  Laterality: N/A;    PLACEMENT, IABP  10/24/2023    Procedure: Placement, IABP;   Surgeon: Janette Ernandez MD;  Location: [facility] CATH LAB;  Service: Cardiology;;       Review of patient's allergies indicates:  No Known Allergies    No current facility-administered medications on file prior to encounter.     Current Outpatient Medications on File Prior to Encounter   Medication Sig    folic acid (FOLVITE) 1 MG tablet Take 1 tablet (1 mg total) by mouth once daily.    furosemide (LASIX) 40 MG tablet Take 40 mg by mouth once daily.    gabapentin (NEURONTIN) 300 MG capsule Take 300 mg by mouth 3 (three) times daily.    pantoprazole (PROTONIX) 40 MG tablet Take 1 tablet (40 mg total) by mouth 2 (two) times daily.    spironolactone (ALDACTONE) 50 MG tablet Take 50 mg by mouth 2 (two) times daily.    STIOLTO RESPIMAT 2.5-2.5 mcg/actuation Mist Inhale 2 puffs into the lungs once daily.    albuterol (PROVENTIL/VENTOLIN HFA) 90 mcg/actuation inhaler Inhale 1-2 puffs into the lungs every 4 (four) hours as needed.    lisinopriL (PRINIVIL,ZESTRIL) 2.5 MG tablet Take 1 tablet (2.5 mg total) by mouth once daily. (Patient not taking: Reported on 10/23/2023)    metoprolol succinate (TOPROL-XL) 25 MG 24 hr tablet Take 1 tablet (25 mg total) by mouth once daily. (Patient not taking: Reported on 10/23/2023)    multivitamin Tab Take 1 tablet by mouth once daily. (Patient not taking: Reported on 6/14/2023)    thiamine 100 MG tablet Take 1 tablet (100 mg total) by mouth once daily. (Patient not taking: Reported on 6/14/2023)     Family History    None       Tobacco Use    Smoking status: Every Day     Current packs/day: 1.00     Types: Cigarettes    Smokeless tobacco: Never   Substance and Sexual Activity    Alcohol use: Yes     Alcohol/week: 24.0 standard drinks of alcohol     Types: 24 Cans of beer per week     Comment: Refused to quit in June 23    Drug use: Yes     Types: Marijuana    Sexual activity: Not on file     Review of Systems   Constitutional:  Positive for activity change and fatigue.  Negative for appetite change and fever.   Respiratory:  Negative for shortness of breath.    Cardiovascular:  Negative for chest pain and leg swelling.   Gastrointestinal:  Positive for abdominal distention. Negative for abdominal pain, nausea and vomiting.   Neurological:  Positive for weakness.   Psychiatric/Behavioral:  Positive for sleep disturbance. Negative for agitation, behavioral problems, confusion and dysphoric mood. The patient is not nervous/anxious.      Objective:     Vital Signs (Most Recent):  Temp: 98.5 °F (36.9 °C) (10/28/23 1110)  Pulse: 85 (10/28/23 1200)  Resp: (!) 29 (10/28/23 1200)  BP: 128/69 (10/28/23 1200)  SpO2: (!) 93 % (10/28/23 1200) Vital Signs (24h Range):  Temp:  [97.7 °F (36.5 °C)-98.7 °F (37.1 °C)] 98.5 °F (36.9 °C)  Pulse:  [] 85  Resp:  [16-67] 29  SpO2:  [87 %-97 %] 93 %  BP: (112-163)/(54-93) 128/69     Weight: 83.1 kg (183 lb 3.2 oz)  Body mass index is 27.86 kg/m².     Physical Exam  Vitals and nursing note reviewed.   Constitutional:       General: He is not in acute distress.     Appearance: He is ill-appearing. He is not toxic-appearing.      Interventions: Nasal cannula in place.   HENT:      Head: Normocephalic and atraumatic.      Comments: Edentulous   Cardiovascular:      Rate and Rhythm: Normal rate.   Pulmonary:      Effort: Tachypnea and respiratory distress present.   Abdominal:      General: There is distension.      Palpations: Abdomen is soft.      Tenderness: There is no abdominal tenderness.   Genitourinary:     Comments: ajcobson  Musculoskeletal:      Right lower leg: No edema.      Left lower leg: No edema.   Skin:     General: Skin is warm.   Neurological:      Mental Status: Mental status is at baseline.          Significant Labs: All pertinent labs within the past 24 hours have been reviewed.  CBC:   Recent Labs   Lab 10/27/23  0357 10/28/23  0412   WBC 12.18  12.18 15.14*   HGB 10.2*  10.2* 9.8*   HCT 30.2*  30.2* 29.7*     198 232      CMP:   Recent Labs   Lab 10/27/23  0357 10/28/23  0502    137   K 3.7 3.9    105   CO2 25 23   * 99   BUN 39* 52*   CREATININE 1.1 1.4   CALCIUM 7.8* 8.2*   ANIONGAP 11 9         Significant Imaging: I have reviewed all pertinent imaging results/findings within the past 24 hours.    Assessment/Plan:     * VT (ventricular tachycardia)  Required defibrillation x 2  Electrolytes corrected  Cardiology following  Severe CAD, not a surgical candidate  Awaiting lifevest      Abdominal distention  Us abdomen without significant ascites  Continue lactulose      Hyponatremia  Patient has hyponatremia which is controlled,We will aim to correct the sodium by 4-6mEq in 24 hours. We will monitor sodium Daily. The hyponatremia is due to Heart Failure and Cirrhosis. We will obtain the following studies: bmp. We will treat the hyponatremia with Fluid restriction of:  1.5 liter per day. The patient's sodium results have been reviewed and are listed below.  Recent Labs   Lab 10/28/23  0502        Resolved     Urine retention  Started on flomax  Voiding trial        Alcohol abuse  Required precedex infusion  Continue librium taper      COPD (chronic obstructive pulmonary disease)  Patient's COPD is with exacerbation noted by continued dyspnea currently.  Patient is currently off COPD Pathway. Continue scheduled inhalers Steroids and Supplemental oxygen and monitor respiratory status closely.     Primary hypertension  Chronic, controlled. Latest blood pressure and vitals reviewed-     Temp:  [97.7 °F (36.5 °C)-98.7 °F (37.1 °C)]   Pulse:  []   Resp:  [16-67]   BP: (112-163)/(54-93)   SpO2:  [89 %-97 %] .   Home meds for hypertension were reviewed and noted below.   Hypertension Medications             furosemide (LASIX) 40 MG tablet Take 40 mg by mouth once daily.    lisinopriL (PRINIVIL,ZESTRIL) 2.5 MG tablet Take 1 tablet (2.5 mg total) by mouth once daily.    metoprolol succinate (TOPROL-XL) 25 MG  24 hr tablet Take 1 tablet (25 mg total) by mouth once daily.    spironolactone (ALDACTONE) 50 MG tablet Take 50 mg by mouth 2 (two) times daily.          While in the hospital, will manage blood pressure as follows; Adjust home antihypertensive regimen as follows- prn hydralazine    Will utilize p.r.n. blood pressure medication only if patient's blood pressure greater than 140/90 and he develops symptoms such as worsening chest pain or shortness of breath.    Tobacco dependence  Dangers of cigarette smoking were reviewed with patient in detail. Patient was Counseled for 3-10 minutes. Nicotine replacement options were discussed. Nicotine replacement was discussed- not prescribed per patient's request    Acute on chronic combined systolic and diastolic heart failure  Attributed to etoh and cad  Awaiting lifevest  Fluid and salt restriction    Elevated troponin  See vtach      Alcoholic cirrhosis  Patient with known Cirrhosis with Child's class Calculator for Child's score link- https://[email or web address][employer]/calc/340/child-sarmiento-score-cirrhosis-mortality#creator-insights. Co-morbidities are present and inclusive of ascites, malnutrition, anemia/pancytopenia and anticoagulation.  MELD-Na score calculated; MELD 3.0: 15 at 10/25/2023  5:01 AM  MELD-Na: 9 at 10/25/2023  5:01 AM  Calculated from:  Serum Creatinine: 1.3 mg/dL at 10/25/2023  5:01 AM  Serum Sodium: 130 mmol/L at 10/25/2023  5:01 AM  Total Bilirubin: 0.3 mg/dL (Using min of 1 mg/dL) at 10/24/2023 12:26 PM  Serum Albumin: 3.1 g/dL at 10/24/2023 12:26 PM  INR(ratio): 1.0 at 10/23/2023 11:50 AM  Age at listing (hypothetical): 63 years  Sex: Male at 10/25/2023  5:01 AM      Continue chronic meds. Etiology likely ETOH. Will avoid any hepatotoxic meds, and monitor CBC/CMP/INR for synthetic function.     VTE Risk Mitigation (From admission, onward)         Ordered     enoxaparin injection 40 mg  Every 24 hours         10/28/23 0938     IP VTE HIGH RISK PATIENT  Once          10/23/23 1120     Place sequential compression device  Until discontinued         10/23/23 1120                Critical care time spent on the evaluation and treatment of severe organ dysfunction, review of pertinent labs and imaging studies, discussions with consulting providers and discussions with patient/family: 51 minutes.    Thank you for your consult. I will follow-up with patient. Please contact us if you have any additional questions.    Leelee Dewitt MD  Department of Hospital Medicine   formerly [facility] - Intensive Care [facility])

## 2023-10-28 NOTE — SUBJECTIVE & OBJECTIVE
Interval History:  Stable improving no chest discomfort quite well this time.    Review of Systems   Constitutional: Positive for malaise/fatigue. Negative for chills, diaphoresis, night sweats, weight gain and weight loss.   HENT:  Negative for congestion, hoarse voice, sore throat and stridor.    Eyes:  Negative for double vision and pain.   Cardiovascular:  Negative for chest pain, claudication, cyanosis, dyspnea on exertion, irregular heartbeat, leg swelling, near-syncope, orthopnea, palpitations, paroxysmal nocturnal dyspnea and syncope.   Respiratory:  Negative for cough, hemoptysis, shortness of breath, sleep disturbances due to breathing, snoring, sputum production and wheezing.    Endocrine: Negative for cold intolerance, heat intolerance and polydipsia.   Hematologic/Lymphatic: Negative for bleeding problem. Does not bruise/bleed easily.   Skin:  Negative for color change, dry skin and rash.   Musculoskeletal:  Negative for joint swelling and muscle cramps.   Gastrointestinal:  Negative for bloating, abdominal pain, constipation, diarrhea, dysphagia, melena, nausea and vomiting.   Genitourinary:  Negative for flank pain and urgency.   Neurological:  Negative for dizziness, focal weakness, headaches, light-headedness, loss of balance, seizures and weakness.   Psychiatric/Behavioral:  Negative for altered mental status and memory loss. The patient is not nervous/anxious.      Objective:     Vital Signs (Most Recent):  Temp: 98.5 °F (36.9 °C) (10/28/23 1110)  Pulse: 85 (10/28/23 1200)  Resp: (!) 29 (10/28/23 1200)  BP: 128/69 (10/28/23 1200)  SpO2: (!) 93 % (10/28/23 1200) Vital Signs (24h Range):  Temp:  [98.3 °F (36.8 °C)-98.7 °F (37.1 °C)] 98.5 °F (36.9 °C)  Pulse:  [] 85  Resp:  [16-67] 29  SpO2:  [89 %-97 %] 93 %  BP: (112-163)/(54-93) 128/69     Weight: 83.1 kg (183 lb 3.2 oz)  Body mass index is 27.86 kg/m².     SpO2: (!) 93 %         Intake/Output Summary (Last 24 hours) at 10/28/2023 1558  Last  data filed at 10/28/2023 1409  Gross per 24 hour   Intake 748.67 ml   Output 545 ml   Net 203.67 ml       Lines/Drains/Airways       Drain  Duration                  Urethral Catheter 10/24/23 1120 Coude 18 Fr. 4 days              Peripheral Intravenous Line  Duration                  Midline Catheter Insertion/Assessment  - Single Lumen 10/26/23 1645 Right basilic vein (medial side of arm) 18g x 8cm 1 day                       Physical Exam  Eyes:      Pupils: Pupils are equal, round, and reactive to light.   Neck:      Trachea: No tracheal deviation.   Cardiovascular:      Rate and Rhythm: Normal rate and regular rhythm.      Pulses: Intact distal pulses.           Carotid pulses are 2+ on the right side and 2+ on the left side.       Radial pulses are 2+ on the right side and 2+ on the left side.        Femoral pulses are 2+ on the right side and 2+ on the left side.       Popliteal pulses are 2+ on the right side and 2+ on the left side.        Dorsalis pedis pulses are 2+ on the right side and 2+ on the left side.        Posterior tibial pulses are 2+ on the right side and 2+ on the left side.      Heart sounds: Normal heart sounds. No murmur heard.     No friction rub. No gallop.   Pulmonary:      Effort: Pulmonary effort is normal. No respiratory distress.      Breath sounds: Normal breath sounds. No stridor. No wheezing or rales.   Chest:      Chest wall: No tenderness.   Abdominal:      General: There is no distension.      Tenderness: There is no abdominal tenderness. There is no rebound.   Musculoskeletal:         General: No tenderness.      Cervical back: Normal range of motion.   Skin:     General: Skin is warm and dry.   Neurological:      Mental Status: He is alert and oriented to person, place, and time.            Significant Labs: CBC   Recent Labs   Lab 10/27/23  0357 10/28/23  0412   WBC 12.18  12.18 15.14*   HGB 10.2*  10.2* 9.8*   HCT 30.2*  30.2* 29.7*     198 232       Significant  Imaging: Echocardiogram: Transthoracic echo (TTE) complete (Cupid Only):   Results for orders placed or performed during the hospital encounter of 10/23/23   Echo   Result Value Ref Range    BSA 2.08 m2    IVRT 49.48 msec    TDI LATERAL 0.10 m/s    Left Ventricular Outflow Tract Mean Gradient 1.13 mmHg    Left Ventricular Outflow Tract Mean Velocity 0.49 cm/s    MV stenosis pressure 1/2 time 34.73 ms    Mr max rex 3.50 m/s    TR Max Rex 2.09 m/s    Ao root annulus 3.38 cm    Ao peak rex 0.86 m/s    Posterior Wall 0.95 0.6 - 1.1 cm    LVOT diameter 1.95 cm    LVOT peak rex 0.76 m/s    LVOT peak VTI 10.90 cm    E wave deceleration time 119.77 msec    MV Peak A Rex 0.40 m/s    MV Peak E Rex 0.65 m/s    RA Major Axis 2.94 cm    TAPSE 1.82 cm    PV mean gradient 1 mmHg    RVOT peak rex 0.57 m/s    RVOT peak VTI 9.8 cm    IVS 0.96 0.6 - 1.1 cm    Ao VTI 11.90 cm    AV mean gradient 2 mmHg    LVIDd 4.24 3.5 - 6.0 cm    LVIDs 3.71 2.1 - 4.0 cm    Left Atrium Major Axis 4.28 cm    Left Atrium Minor Axis 4.19 cm    RV mid diameter 3.37 cm    STJ 3.35 cm    Ascending aorta 3.41 cm    LV Systolic Volume 58.38 mL    LV Diastolic Volume 80.17 mL    TDI SEPTAL 0.09 m/s    LA size 2.39 cm    IVC diameter 1.42 cm    LV LATERAL E/E' RATIO 6.50 m/s    LV SEPTAL E/E' RATIO 7.22 m/s    FS 13 28 - 44 %    LV mass 130.71 g    ZLVIDD -3.61     ZLVIDS -0.04     Left Ventricle Relative Wall Thickness 0.45 cm    AV valve area 2.73 cm²    AV Velocity Ratio 0.88     AV index (prosthetic) 0.92     MV valve area p 1/2 method 6.33 cm2    E/A ratio 1.63     Mean e' 0.10 m/s    LVOT area 3.0 cm2    LVOT stroke volume 32.54 cm3    AV peak gradient 3 mmHg    E/E' ratio 6.84 m/s    LV Systolic Volume Index 28.6 mL/m2    LV Diastolic Volume Index 39.30 mL/m2    LV Mass Index 64 g/m2    Triscuspid Valve Regurgitation Peak Gradient 17 mmHg    ADDIS by Velocity Ratio 2.64 cm²    LA Volume Index 11.4 mL/m2    LA volume 23.23 cm3    LA WIDTH 2.7 cm    RA Width  2.3 cm    TV resting pulmonary artery pressure 20 mmHg    RV TB RVSP 5 mmHg    Est. RA pres 3 mmHg    Narrative      Left Ventricle: The left ventricle is mildly dilated. Normal wall   thickness. There is concentric remodeling. Moderate global hypokinesis   present. There is severely reduced systolic function with a visually   estimated ejection fraction of 20 - 25%. There is normal diastolic   function.    Right Ventricle: Normal right ventricular cavity size. Wall thickness   is normal. Right ventricle wall motion  is normal. Systolic function is   normal.    Mitral Valve: There is mild to moderate regurgitation with a centrally   directed jet.    Pulmonary Artery: The estimated pulmonary artery systolic pressure is   20 mmHg.    IVC/SVC: Normal venous pressure at 3 mmHg.

## 2023-10-28 NOTE — ASSESSMENT & PLAN NOTE
-Clinical condition worsened overnight with increased SOB/lactic acidosis  -LA normalized on dobutamine  -Continue inotropic support  -R/LHC planned  -Will discuss with Main Ruffin    10/25/23  -Not candidate for advanced options given ETOH abuse  -Continue dobutamine  -Possible high risk PCI w    10/26/23  -C with possible high risk PCI today  -Will try to wean dobutamine as tolerated  -OMT    10/27/23  -s/p C yesterday---med mgmt recommended  -Continue ASA, Plavix, statin  -Will add BB once off inotrope  -Entresto, po Lasix as tolerated

## 2023-10-28 NOTE — SUBJECTIVE & OBJECTIVE
Past Medical History:   Diagnosis Date    Alcoholic cirrhosis     CHF (congestive heart failure)     LV EF 40%    COPD (chronic obstructive pulmonary disease)     Hypertension     Peripheral artery disease     with stents    Sleep apnea        Past Surgical History:   Procedure Laterality Date    ANGIOGRAM, ABDOMINAL AORTA  10/24/2023    Procedure: Angiogram, Abdominal Aorta;  Surgeon: Janette Ernandez MD;  Location: [facility] CATH LAB;  Service: Cardiology;;    ARTERIOGRAPHY OF SUBCLAVIAN ARTERY Left 10/24/2023    Procedure: ARTERIOGRAM, SUBCLAVIAN;  Surgeon: Janette Ernandez MD;  Location: [facility] CATH LAB;  Service: Cardiology;  Laterality: Left;    CATHETERIZATION OF BOTH LEFT AND RIGHT HEART N/A 10/24/2023    Procedure: CATHETERIZATION, HEART, BOTH LEFT AND RIGHT;  Surgeon: Janette Ernandez MD;  Location: [facility] CATH LAB;  Service: Cardiology;  Laterality: N/A;    COLONOSCOPY N/A 6/10/2023    Procedure: COLONOSCOPY;  Surgeon: Johan Flower MD;  Location: [facility] OR;  Service: Gastroenterology;  Laterality: N/A;    ESOPHAGOGASTRODUODENOSCOPY N/A 6/9/2023    Procedure: EGD;  Surgeon: Tima Teixeira MD;  Location: [facility] ENDOSCOPY;  Service: Gastroenterology;  Laterality: N/A;    INSERTION OF INTRA-AORTIC BALLOON ASSIST DEVICE  10/24/2023    Procedure: INSERTION, INTRA-AORTIC BALLOON PUMP;  Surgeon: Janette Ernandez MD;  Location: [facility] CATH LAB;  Service: Cardiology;;    INSERTION OF INTRAVASCULAR MICROAXIAL BLOOD PUMP N/A 10/26/2023    Procedure: INSERTION, IMPELLA;  Surgeon: Janette Ernandez MD;  Location: [facility] CATH LAB;  Service: Cardiology;  Laterality: N/A;    INSTANTANEOUS WAVE-FREE RATIO (IFR) N/A 10/26/2023    Procedure: Instantaneous Wave-Free Ratio (IFR);  Surgeon: Janette Ernandez MD;  Location: [facility] CATH LAB;  Service: Cardiology;  Laterality: N/A;    INTRAVASCULAR ULTRASOUND, CORONARY N/A 10/26/2023    Procedure: Ultrasound-coronary;  Surgeon: Janette Ernandez MD;  Location: [facility] CATH LAB;  Service:  Cardiology;  Laterality: N/A;    PERCUTANEOUS CORONARY INTERVENTION, ARTERY N/A 10/26/2023    Procedure: Percutaneous coronary intervention- with Impella;  Surgeon: Janette Ernandez MD;  Location: [facility] CATH LAB;  Service: Cardiology;  Laterality: N/A;    PLACEMENT, IABP  10/24/2023    Procedure: Placement, IABP;  Surgeon: Janette Ernandez MD;  Location: [facility] CATH LAB;  Service: Cardiology;;       Review of patient's allergies indicates:  No Known Allergies    No current facility-administered medications on file prior to encounter.     Current Outpatient Medications on File Prior to Encounter   Medication Sig    folic acid (FOLVITE) 1 MG tablet Take 1 tablet (1 mg total) by mouth once daily.    furosemide (LASIX) 40 MG tablet Take 40 mg by mouth once daily.    gabapentin (NEURONTIN) 300 MG capsule Take 300 mg by mouth 3 (three) times daily.    pantoprazole (PROTONIX) 40 MG tablet Take 1 tablet (40 mg total) by mouth 2 (two) times daily.    spironolactone (ALDACTONE) 50 MG tablet Take 50 mg by mouth 2 (two) times daily.    STIOLTO RESPIMAT 2.5-2.5 mcg/actuation Mist Inhale 2 puffs into the lungs once daily.    albuterol (PROVENTIL/VENTOLIN HFA) 90 mcg/actuation inhaler Inhale 1-2 puffs into the lungs every 4 (four) hours as needed.    lisinopriL (PRINIVIL,ZESTRIL) 2.5 MG tablet Take 1 tablet (2.5 mg total) by mouth once daily. (Patient not taking: Reported on 10/23/2023)    metoprolol succinate (TOPROL-XL) 25 MG 24 hr tablet Take 1 tablet (25 mg total) by mouth once daily. (Patient not taking: Reported on 10/23/2023)    multivitamin Tab Take 1 tablet by mouth once daily. (Patient not taking: Reported on 6/14/2023)    thiamine 100 MG tablet Take 1 tablet (100 mg total) by mouth once daily. (Patient not taking: Reported on 6/14/2023)     Family History    None       Tobacco Use    Smoking status: Every Day     Current packs/day: 1.00     Types: Cigarettes    Smokeless tobacco: Never   Substance and Sexual Activity     Alcohol use: Yes     Alcohol/week: 24.0 standard drinks of alcohol     Types: 24 Cans of beer per week     Comment: Refused to quit in June 23    Drug use: Yes     Types: Marijuana    Sexual activity: Not on file     Review of Systems   Constitutional:  Positive for activity change and fatigue. Negative for appetite change and fever.   Respiratory:  Negative for shortness of breath.    Cardiovascular:  Negative for chest pain and leg swelling.   Gastrointestinal:  Positive for abdominal distention. Negative for abdominal pain, nausea and vomiting.   Neurological:  Positive for weakness.   Psychiatric/Behavioral:  Positive for sleep disturbance. Negative for agitation, behavioral problems, confusion and dysphoric mood. The patient is not nervous/anxious.      Objective:     Vital Signs (Most Recent):  Temp: 98.5 °F (36.9 °C) (10/28/23 1110)  Pulse: 85 (10/28/23 1200)  Resp: (!) 29 (10/28/23 1200)  BP: 128/69 (10/28/23 1200)  SpO2: (!) 93 % (10/28/23 1200) Vital Signs (24h Range):  Temp:  [97.7 °F (36.5 °C)-98.7 °F (37.1 °C)] 98.5 °F (36.9 °C)  Pulse:  [] 85  Resp:  [16-67] 29  SpO2:  [87 %-97 %] 93 %  BP: (112-163)/(54-93) 128/69     Weight: 83.1 kg (183 lb 3.2 oz)  Body mass index is 27.86 kg/m².     Physical Exam  Vitals and nursing note reviewed.   Constitutional:       General: He is not in acute distress.     Appearance: He is ill-appearing. He is not toxic-appearing.      Interventions: Nasal cannula in place.   HENT:      Head: Normocephalic and atraumatic.      Comments: Edentulous   Cardiovascular:      Rate and Rhythm: Normal rate.   Pulmonary:      Effort: Tachypnea and respiratory distress present.   Abdominal:      General: There is distension.      Palpations: Abdomen is soft.      Tenderness: There is no abdominal tenderness.   Genitourinary:     Comments: jacobson  Musculoskeletal:      Right lower leg: No edema.      Left lower leg: No edema.   Skin:     General: Skin is warm.   Neurological:       Mental Status: Mental status is at baseline.          Significant Labs: All pertinent labs within the past 24 hours have been reviewed.  CBC:   Recent Labs   Lab 10/27/23  0357 10/28/23  0412   WBC 12.18  12.18 15.14*   HGB 10.2*  10.2* 9.8*   HCT 30.2*  30.2* 29.7*     198 232     CMP:   Recent Labs   Lab 10/27/23  0357 10/28/23  0502    137   K 3.7 3.9    105   CO2 25 23   * 99   BUN 39* 52*   CREATININE 1.1 1.4   CALCIUM 7.8* 8.2*   ANIONGAP 11 9         Significant Imaging: I have reviewed all pertinent imaging results/findings within the past 24 hours.

## 2023-10-28 NOTE — HPI
63M  has a past medical history of Alcoholic cirrhosis, CHF (congestive heart failure), COPD (chronic obstructive pulmonary disease), Hypertension, Peripheral artery disease, and Sleep apnea.  Presented to emergency department from outside facility for vtach requiring defibrillation x 2. Initial workup revealed possible Torsades on electrocardiography strips. Cbc with leukocytosis, cmp with hyponatremia, creatinine 1.2, , , and elevated troponin, 3.6 --> 9.9. Patient was given an aspirin, started on heparin infusion, given 4 mg of Mag IV, and 40 mg of Lasix IV and transferred to icu per Cardiology recommendations. Echocardiogram with 20-25% ejection fraction. Patient required precedex infusion while in icu.    Cardiac catheterization revealed severe stenosis. CTS consulted and deemed not a surgical candidate. Amio infusion converted to po medication(s). Dobutamine infusion weaned. Patient is awaiting lifevest prior to discharge.    Us abdomen without significant ascites. On lactulose for cirrhosis.     Hospital medicine consulted for assumption of care.

## 2023-10-28 NOTE — ASSESSMENT & PLAN NOTE
- shock x1 at OSH and again x1 in the ER here   - continue oral amiodarone 400mg BID  - continue telemetry monitoring   - follow lytes and replete as needed  - echo with EF 20-25%  - Cardiology following   - plan for LifeVest upon discharge; LifeVest ordered per cards

## 2023-10-28 NOTE — ASSESSMENT & PLAN NOTE
- follow liver enzymes  - continue supportive care   - alcohol cessation education when appropriate    1 day

## 2023-10-28 NOTE — ASSESSMENT & PLAN NOTE
Patient's COPD is with exacerbation noted by continued dyspnea currently.  Patient is currently off COPD Pathway. Continue scheduled inhalers Steroids and Supplemental oxygen and monitor respiratory status closely.

## 2023-10-28 NOTE — ASSESSMENT & PLAN NOTE
- previous echo with EF 40% and grade II diastolic function, repeat echo with EF 20-25%  - Cath lab on 10/24: Occluded RCA and LAD ostial calcified with 80% stenosis; returned to ICU with R groin IABP  - Returned to cath lab 10/26 for high risk PCI  - Remains on Dobutamine infusion  - not on BBlocker at this time due to vasopressor needs  - Cards following  - continue spironolactone  - follow I&O trends  - monitor for further/additional diuretic needs  - Evaluated by cardiac surgery and deemed not a candidate for CABG

## 2023-10-28 NOTE — ASSESSMENT & PLAN NOTE
Required defibrillation x 2  Electrolytes corrected  Cardiology following  Severe CAD, not a surgical candidate  Awaiting lifevest

## 2023-10-28 NOTE — SUBJECTIVE & OBJECTIVE
Objective:     Vital Signs (Most Recent):  Temp: 98.3 °F (36.8 °C) (10/28/23 0305)  Pulse: 86 (10/28/23 0736)  Resp: 20 (10/28/23 0736)  BP: 138/64 (10/28/23 0917)  SpO2: 97 % (10/28/23 0736) Vital Signs (24h Range):  Temp:  [97.7 °F (36.5 °C)-98.6 °F (37 °C)] 98.3 °F (36.8 °C)  Pulse:  [] 86  Resp:  [16-67] 20  SpO2:  [87 %-97 %] 97 %  BP: (103-163)/(54-93) 138/64  Arterial Line BP: (129-138)/(65-67) 129/65   Weight: 83.1 kg (183 lb 3.2 oz);  Body mass index is 27.86 kg/m².    Intake/Output Summary (Last 24 hours) at 10/28/2023 0926  Last data filed at 10/28/2023 0600  Gross per 24 hour   Intake 775.87 ml   Output 760 ml   Net 15.87 ml      Physical Exam  Vitals and nursing note reviewed.   HENT:      Head: Normocephalic.   Eyes:      Conjunctiva/sclera: Conjunctivae normal.   Cardiovascular:      Rate and Rhythm: Normal rate.   Pulmonary:      Effort: Pulmonary effort is normal.      Breath sounds: Normal breath sounds.   Abdominal:      Comments: Protuberant, soft to palpation, denies tenderness, no guarding  +flatus and +BM   Musculoskeletal:         General: Normal range of motion.      Cervical back: Normal range of motion.   Skin:     General: Skin is warm and dry.   Neurological:      Mental Status: He is alert and oriented to person, place, and time.   Psychiatric:         Mood and Affect: Mood normal.          Review of Systems: Negative except as indicated in HPI    Significant Labs:  CBC/Anemia Profile:  Recent Labs   Lab 10/27/23  0357 10/28/23  0412   WBC 12.18  12.18 15.14*   HGB 10.2*  10.2* 9.8*   HCT 30.2*  30.2* 29.7*     198 232   MCV 96  96 98   RDW 12.3  12.3 13.2   Chemistries:  Recent Labs   Lab 10/27/23  0357 10/28/23  0502    137   K 3.7 3.9    105   CO2 25 23   BUN 39* 52*   CREATININE 1.1 1.4   CALCIUM 7.8* 8.2*   MG 2.3 2.5   PHOS 3.3 2.6*

## 2023-10-28 NOTE — ASSESSMENT & PLAN NOTE
Chronic, controlled. Latest blood pressure and vitals reviewed-     Temp:  [97.7 °F (36.5 °C)-98.7 °F (37.1 °C)]   Pulse:  []   Resp:  [16-67]   BP: (112-163)/(54-93)   SpO2:  [89 %-97 %] .   Home meds for hypertension were reviewed and noted below.   Hypertension Medications             furosemide (LASIX) 40 MG tablet Take 40 mg by mouth once daily.    lisinopriL (PRINIVIL,ZESTRIL) 2.5 MG tablet Take 1 tablet (2.5 mg total) by mouth once daily.    metoprolol succinate (TOPROL-XL) 25 MG 24 hr tablet Take 1 tablet (25 mg total) by mouth once daily.    spironolactone (ALDACTONE) 50 MG tablet Take 50 mg by mouth 2 (two) times daily.          While in the hospital, will manage blood pressure as follows; Adjust home antihypertensive regimen as follows- prn hydralazine    Will utilize p.r.n. blood pressure medication only if patient's blood pressure greater than 140/90 and he develops symptoms such as worsening chest pain or shortness of breath.

## 2023-10-28 NOTE — ASSESSMENT & PLAN NOTE
Patient with known Cirrhosis with Child's class Calculator for Child's score link- https://[email or web address].com/[email or web address]/child-sarmiento-score-cirrhosis-mortality#creator-insights. Co-morbidities are present and inclusive of ascites, malnutrition, anemia/pancytopenia and anticoagulation.  MELD-Na score calculated; MELD 3.0: 15 at 10/25/2023  5:01 AM  MELD-Na: 9 at 10/25/2023  5:01 AM  Calculated from:  Serum Creatinine: 1.3 mg/dL at 10/25/2023  5:01 AM  Serum Sodium: 130 mmol/L at 10/25/2023  5:01 AM  Total Bilirubin: 0.3 mg/dL (Using min of 1 mg/dL) at 10/24/2023 12:26 PM  Serum Albumin: 3.1 g/dL at 10/24/2023 12:26 PM  INR(ratio): 1.0 at 10/23/2023 11:50 AM  Age at listing (hypothetical): 63 years  Sex: Male at 10/25/2023  5:01 AM      Continue chronic meds. Etiology likely ETOH. Will avoid any hepatotoxic meds, and monitor CBC/CMP/INR for synthetic function.

## 2023-10-28 NOTE — PROGRESS NOTES
[facility] ([facility])  Cardiology  Progress Note    Patient Name: Hemal Tolbert Jr.  MRN: [id number]  Admission Date: 10/23/2023  Hospital Length of Stay: 5 days  Code Status: Full Code   Attending Physician: Leelee Dewitt MD   Primary Care Physician: Norma, Provider  Expected Discharge Date:   Principal Problem:VT (ventricular tachycardia)    Subjective:     Hospital Course:   10/24/23-Patient seen and examined today, resting in bed. Respiratory status worsened overnight, initially required Bipap. LA vasquez to 6.9, dobutamine initiated with improvement. Feeling better at time of exam, less SOB, able to lie flat. No CP. Troponin peaked at 33, now trending down. Repeat LA this AM normalized. Will plan on R/LHC today pending discussion with interventionalist, Dr. Ernandez.     10/25/23-Patient seen and examined today, sedated, s/p R/LHC yesterday which showed elevated filling pressure, moderate pulmonary HTN, occlured RCA with left to right collaterals , ostial 80% LAD stenosis. IABP placed. CTS turned down for CABG. No recurrent VT. Labs reviewed. H/H dropped no active signs of bleeding. Creatinine stable. Will re-assess in AM, keep NPO after MN for possible LHC with high risk PCI.     10/26/23-Patient seen and examined today, resting in bed. Repeat LHC with high risk PCI planned by Dr. Ernandez. Feels ok. No CP. SOB stable. Labs reviewed and are stable. Amiodarone switched to po. Will need LifeVest.    10/27/23-Patient seen and examined today, sitting up in bed. Feels ok. No CP/SOB. Complains of constipation, abdomen distended. Xray concerning for SBO, critical care following. Dobutamine being weaned. LifeVest ordered.     10/20/2023 patient seen and examined sitting up in bed feels improved walking out of bed from time to time will do decreased dobutamine and LifeVest ordered.  Plan on eventual discharge.      Interval History:  Stable improving no chest discomfort quite well this time.    Review of  Systems   Constitutional: Positive for malaise/fatigue. Negative for chills, diaphoresis, night sweats, weight gain and weight loss.   HENT:  Negative for congestion, hoarse voice, sore throat and stridor.    Eyes:  Negative for double vision and pain.   Cardiovascular:  Negative for chest pain, claudication, cyanosis, dyspnea on exertion, irregular heartbeat, leg swelling, near-syncope, orthopnea, palpitations, paroxysmal nocturnal dyspnea and syncope.   Respiratory:  Negative for cough, hemoptysis, shortness of breath, sleep disturbances due to breathing, snoring, sputum production and wheezing.    Endocrine: Negative for cold intolerance, heat intolerance and polydipsia.   Hematologic/Lymphatic: Negative for bleeding problem. Does not bruise/bleed easily.   Skin:  Negative for color change, dry skin and rash.   Musculoskeletal:  Negative for joint swelling and muscle cramps.   Gastrointestinal:  Negative for bloating, abdominal pain, constipation, diarrhea, dysphagia, melena, nausea and vomiting.   Genitourinary:  Negative for flank pain and urgency.   Neurological:  Negative for dizziness, focal weakness, headaches, light-headedness, loss of balance, seizures and weakness.   Psychiatric/Behavioral:  Negative for altered mental status and memory loss. The patient is not nervous/anxious.      Objective:     Vital Signs (Most Recent):  Temp: 98.5 °F (36.9 °C) (10/28/23 1110)  Pulse: 85 (10/28/23 1200)  Resp: (!) 29 (10/28/23 1200)  BP: 128/69 (10/28/23 1200)  SpO2: (!) 93 % (10/28/23 1200) Vital Signs (24h Range):  Temp:  [98.3 °F (36.8 °C)-98.7 °F (37.1 °C)] 98.5 °F (36.9 °C)  Pulse:  [] 85  Resp:  [16-67] 29  SpO2:  [89 %-97 %] 93 %  BP: (112-163)/(54-93) 128/69     Weight: 83.1 kg (183 lb 3.2 oz)  Body mass index is 27.86 kg/m².     SpO2: (!) 93 %         Intake/Output Summary (Last 24 hours) at 10/28/2023 2632  Last data filed at 10/28/2023 1409  Gross per 24 hour   Intake 748.67 ml   Output 545 ml   Net  203.67 ml       Lines/Drains/Airways       Drain  Duration                  Urethral Catheter 10/24/23 1120 Coude 18 Fr. 4 days              Peripheral Intravenous Line  Duration                  Midline Catheter Insertion/Assessment  - Single Lumen 10/26/23 1645 Right basilic vein (medial side of arm) 18g x 8cm 1 day                       Physical Exam  Eyes:      Pupils: Pupils are equal, round, and reactive to light.   Neck:      Trachea: No tracheal deviation.   Cardiovascular:      Rate and Rhythm: Normal rate and regular rhythm.      Pulses: Intact distal pulses.           Carotid pulses are 2+ on the right side and 2+ on the left side.       Radial pulses are 2+ on the right side and 2+ on the left side.        Femoral pulses are 2+ on the right side and 2+ on the left side.       Popliteal pulses are 2+ on the right side and 2+ on the left side.        Dorsalis pedis pulses are 2+ on the right side and 2+ on the left side.        Posterior tibial pulses are 2+ on the right side and 2+ on the left side.      Heart sounds: Normal heart sounds. No murmur heard.     No friction rub. No gallop.   Pulmonary:      Effort: Pulmonary effort is normal. No respiratory distress.      Breath sounds: Normal breath sounds. No stridor. No wheezing or rales.   Chest:      Chest wall: No tenderness.   Abdominal:      General: There is no distension.      Tenderness: There is no abdominal tenderness. There is no rebound.   Musculoskeletal:         General: No tenderness.      Cervical back: Normal range of motion.   Skin:     General: Skin is warm and dry.   Neurological:      Mental Status: He is alert and oriented to person, place, and time.            Significant Labs: CBC   Recent Labs   Lab 10/27/23  0357 10/28/23  0412   WBC 12.18  12.18 15.14*   HGB 10.2*  10.2* 9.8*   HCT 30.2*  30.2* 29.7*     198 232       Significant Imaging: Echocardiogram: Transthoracic echo (TTE) complete (Cupid Only):   Results for  orders placed or performed during the hospital encounter of 10/23/23   Echo   Result Value Ref Range    BSA 2.08 m2    IVRT 49.48 msec    TDI LATERAL 0.10 m/s    Left Ventricular Outflow Tract Mean Gradient 1.13 mmHg    Left Ventricular Outflow Tract Mean Velocity 0.49 cm/s    MV stenosis pressure 1/2 time 34.73 ms    Mr max rex 3.50 m/s    TR Max Rex 2.09 m/s    Ao root annulus 3.38 cm    Ao peak rex 0.86 m/s    Posterior Wall 0.95 0.6 - 1.1 cm    LVOT diameter 1.95 cm    LVOT peak rex 0.76 m/s    LVOT peak VTI 10.90 cm    E wave deceleration time 119.77 msec    MV Peak A Rex 0.40 m/s    MV Peak E Rex 0.65 m/s    RA Major Axis 2.94 cm    TAPSE 1.82 cm    PV mean gradient 1 mmHg    RVOT peak rex 0.57 m/s    RVOT peak VTI 9.8 cm    IVS 0.96 0.6 - 1.1 cm    Ao VTI 11.90 cm    AV mean gradient 2 mmHg    LVIDd 4.24 3.5 - 6.0 cm    LVIDs 3.71 2.1 - 4.0 cm    Left Atrium Major Axis 4.28 cm    Left Atrium Minor Axis 4.19 cm    RV mid diameter 3.37 cm    STJ 3.35 cm    Ascending aorta 3.41 cm    LV Systolic Volume 58.38 mL    LV Diastolic Volume 80.17 mL    TDI SEPTAL 0.09 m/s    LA size 2.39 cm    IVC diameter 1.42 cm    LV LATERAL E/E' RATIO 6.50 m/s    LV SEPTAL E/E' RATIO 7.22 m/s    FS 13 28 - 44 %    LV mass 130.71 g    ZLVIDD -3.61     ZLVIDS -0.04     Left Ventricle Relative Wall Thickness 0.45 cm    AV valve area 2.73 cm²    AV Velocity Ratio 0.88     AV index (prosthetic) 0.92     MV valve area p 1/2 method 6.33 cm2    E/A ratio 1.63     Mean e' 0.10 m/s    LVOT area 3.0 cm2    LVOT stroke volume 32.54 cm3    AV peak gradient 3 mmHg    E/E' ratio 6.84 m/s    LV Systolic Volume Index 28.6 mL/m2    LV Diastolic Volume Index 39.30 mL/m2    LV Mass Index 64 g/m2    Triscuspid Valve Regurgitation Peak Gradient 17 mmHg    ADDIS by Velocity Ratio 2.64 cm²    LA Volume Index 11.4 mL/m2    LA volume 23.23 cm3    LA WIDTH 2.7 cm    RA Width 2.3 cm    TV resting pulmonary artery pressure 20 mmHg    RV TB RVSP 5 mmHg    Est. RA  pres 3 mmHg    Narrative      Left Ventricle: The left ventricle is mildly dilated. Normal wall   thickness. There is concentric remodeling. Moderate global hypokinesis   present. There is severely reduced systolic function with a visually   estimated ejection fraction of 20 - 25%. There is normal diastolic   function.    Right Ventricle: Normal right ventricular cavity size. Wall thickness   is normal. Right ventricle wall motion  is normal. Systolic function is   normal.    Mitral Valve: There is mild to moderate regurgitation with a centrally   directed jet.    Pulmonary Artery: The estimated pulmonary artery systolic pressure is   20 mmHg.    IVC/SVC: Normal venous pressure at 3 mmHg.       Assessment and Plan:     Brief HPI:  Patient is stable no no chest discomfort doing quite well this time.  Plan on weaning dobutamine eventual transfer to floor.    * VT (ventricular tachycardia)  Treated with shock therapy  Continue amiodarone  Ischemic evaluation  ETOH evaluation and cardiac echo pending    10/24/23  -Echo with EF of 20-25%  -Ischemic evaluation pending  -Continue amiodarone gtt for now  -Keep K > 4; Mg > 2    10/25/23  -Stable  -Keep on amiodarone for now  -Repeat LFT's    10/26/23  -Amiodarone switched to po  -Will need LIfeVest    Abdominal distention  -Xray concerning for SBO  -Mgmt as per primary team    Cardiogenic shock  -Clinical condition worsened overnight with increased SOB/lactic acidosis  -LA normalized on dobutamine  -Continue inotropic support  -R/LHC planned  -Will discuss with [facility]    10/25/23  -Not candidate for advanced options given ETOH abuse  -Continue dobutamine  -Possible high risk PCI tmw    10/26/23  -C with possible high risk PCI today  -Will try to wean dobutamine as tolerated  -OMT    10/27/23  -s/p C yesterday---med mgmt recommended  -Continue ASA, Plavix, statin  -Will add BB once off inotrope  -Entresto, po Lasix as tolerated    Hyponatremia  -In setting of  CHF/ETOH abuse  -Monitor    Alcohol abuse  -ETOH cessation    COPD (chronic obstructive pulmonary disease)  -Mgmt as per primary team    Tobacco dependence  -Cessation advised    Acute on chronic combined systolic and diastolic heart failure  -BNP minimally elevated  -LA improved with inotropic support  -Ischemic evaluation pending  -Will optimize regimen accordingly    10/25/23  -RHC with elevated filling pressures  -Continue IV diuresis  -Will optimize regimen as tolerated  -Possible high risk PCI tmw    10/26/23  -Continue OMT  -LHC with possible high risk PCI today    10/27/23  -Continue OMT-will add BB once off dobutamine  -Entresto, po Lasix when able to tolerate  -LifeVest for SCD prevention    Elevated troponin  Will trend  IV heparin  Cardiac echo pending  EKG not showing ischemia    10/24/23  -Troponin bumped to 33, now trending down  -CP free during exam  -Echo showed EF of 20-25%, developed cardiogenic shock overnight, responded well to dobutamine  -Continue ASA, heparin gtt  -Plans for R/LHC today pending discussion with Dr. Ernandez    10/25/23  -s/p R/LHC which showed elevated filling pressures, occluded RCA with left to right collateral, ostial LAD  -Turned down for CABG by CTS  -Continue heparin gtt, ASA, statin   -Keep NPO for possible high risk PCI tmw pending stability of H/H    10/26/23  -LHC with possible high risk PCI today by Dr. Ernandez. All risks, benefits, and treatment alternatives explained to patient in detail. All questions answered. He has agreed to proceed.    10/27/23  -s/p LHC yesterday that showed 50% ostial LAD lesion, 40% D1 lesion, luminal irregularities of LCX  -CP free  -Continue ASA, Plavix, statin  -Will further optimize meds as tolerated  -Wean dobutamine as tolerated        CHF (congestive heart failure)  Patient is identified as having Combined Systolic and Diastolic heart failure that is Acute on chronic. CHF is currently uncontrolled due to Continued edema of extremities.  Latest ECHO performed and demonstrates- Results for orders placed during the hospital encounter of 06/07/23    Echo    Interpretation Summary  · The left ventricle is mildly enlarged with mildly decreased systolic function.  · Overall there is mild to moderate global hypokinesis; Prounounced basal inferior and inferioseptal hypokinesis.  · The estimated ejection fraction is 40%.  · Grade III left ventricular diastolic dysfunction.  · Mild right ventricular enlargement with low normal right ventricular systolic function.  · Mild-to-moderate mitral regurgitation.  · Mild tricuspid regurgitation.  · Mild pulmonic regurgitation.  · The estimated PA systolic pressure is 35 mmHg.  · Mild left atrial enlargement.  · Mild right atrial enlargement.  . Continue Beta Blocker, ACE/ARB and Furosemide and monitor clinical status closely. Monitor on telemetry. Patient is off CHF pathway.  Monitor strict Is&Os and daily weights.  Place on fluid restriction of 2 L. Cardiology has been consulted. Continue to stress to patient importance of self efficacy and  on diet for CHF. Last BNP reviewed- and noted below   Recent Labs   Lab 10/23/23  0950   *   .    Alcoholic cirrhosis  -Per intensive team  -Will monitor LFT's while on amiodarone    10/26/23  -May need paracentesis, abdomen terse    10/27/23  -Mgmt as per primary team        VTE Risk Mitigation (From admission, onward)         Ordered     enoxaparin injection 40 mg  Every 24 hours         10/28/23 0938     IP VTE HIGH RISK PATIENT  Once         10/23/23 1120     Place sequential compression device  Until discontinued         10/23/23 1120                Gaurav Salomon MD  Cardiology  [facility] ([facility])

## 2023-10-28 NOTE — ASSESSMENT & PLAN NOTE
- complete 5-day course of oral Prednisone  - pulmonary status stable on 3L NC this AM  - PRN duoneb, continue scheduled brovana and pulmicort nebs  - monitor chest imaging as needed

## 2023-10-28 NOTE — PLAN OF CARE
Pt AAOx4. On 4L NC. NSR on heart monitor. Remains on set rate dobutamine gtt. Adequate UO per indira. Several loose Bms this shift. Bed low and locked. Call light within reach. Bed alarm on. Will continue to monitor.

## 2023-10-28 NOTE — ASSESSMENT & PLAN NOTE
Patient has hyponatremia which is controlled,We will aim to correct the sodium by 4-6mEq in 24 hours. We will monitor sodium Daily. The hyponatremia is due to Heart Failure and Cirrhosis. We will obtain the following studies: bmp. We will treat the hyponatremia with Fluid restriction of:  1.5 liter per day. The patient's sodium results have been reviewed and are listed below.  Recent Labs   Lab 10/28/23  0502        Resolved

## 2023-10-28 NOTE — ASSESSMENT & PLAN NOTE
-BNP minimally elevated  -LA improved with inotropic support  -Ischemic evaluation pending  -Will optimize regimen accordingly    10/25/23  -RHC with elevated filling pressures  -Continue IV diuresis  -Will optimize regimen as tolerated  -Possible high risk PCI tmw    10/26/23  -Continue OMT  -[facility] with possible high risk PCI today    10/27/23  -Continue OMT-will add BB once off dobutamine  -Entresto, po Lasix when able to tolerate  -LifeVest for SCD prevention

## 2023-10-28 NOTE — ASSESSMENT & PLAN NOTE
- abdominal imaging reviewed; concern for possible SBO  - abdominal exam benign with no complaints of abdominal pain, tenderness, guarding  - no nausea and vomiting --> +flatus and BM overnight  - continue scheduled Miralax, lactulose, and Docusate  - persistent protuberant abdomen which is softer on exam today  - check abdominal ultrasound to evaluate for ascites and need for paracentesis  - monitor for continued BMs

## 2023-10-28 NOTE — PROGRESS NOTES
[facility]  Critical Care Medicine Progress Note    Patient Name: Hemal Tolbert Jr.   MRN: [id number]  Admission Date: 10/23/2023    Hospital Length of Stay: 5 days  Code Status: Full Code     Attending Provider: Jc Mars MD  Principal Problem: VT (ventricular tachycardia)  Subjective:   History of Present Illness:  Hemal Tolbert is a 63-year-old male with a known past medical history of combined heart failure with an EF of 40% and grade 3 diastolic dysfunction, alcohol abuse (reports 3-4 beers/day), tobacco abuse with alcoholic cirrhosis, hypertension, and COPD who presented to the  ER 10/23 as an ER to ER transfer for evaluation of Vtach requiring defibrillation.  Prior to transfer, patient was initiated on amiodarone infusion. Upon arrival to the ER in [place], Cardiology was consulted. It was documented the patient again went into V-tach and required defibrillation in the ER with 1 shock with conversion to normal sinus rhythm. ER nurse reports patient was given multiple nebs back to back for wheezing and was escalated to a Ventimask as he is a mouth breather. Patient's was continued on the amiodarone infusion protocol. Review of EKG strips was concerning for possible Torsades. Workup significant for leukocytosis of 17,000, sodium 128, creatinine 1.2, , , and troponin trends up with latest 3.6 --> 9.9.  Patient was given an aspirin, started on heparin infusion, given 4 mg of Mag IV, and 40 mg of Lasix IV.  Echo ordered.  Patient admitted to the ICU per the critical care team with Cardiology following.      Hospital/ICU Course:   10/24: no acute events overnight, pt awake and alert on NC feeling much better than at time of admit, only c/o is that his is hungry but with keep NPO pending cards eval as may likely needs ischemic w/u   10/26: no acute events noted overnight, pt to return to cath lab today for high risk PCI, no new issues or c/o  noted, wheezing resolved   10/27: denies acute complaints. Remains on Precedex infusion this morning. Abdomen firm, reports flatus and denies any complaints of nausea or vomiting. Denies chest pain or shortness of breath.    10/28: abdomen softer this morning; reports having large bowel movement overnight. Denies acute complaints of chest pain or shortness of breath. Remains on dobutamine infusion.    Objective:     Vital Signs (Most Recent):  Temp: 98.3 °F (36.8 °C) (10/28/23 0305)  Pulse: 86 (10/28/23 0736)  Resp: 20 (10/28/23 0736)  BP: 138/64 (10/28/23 0917)  SpO2: 97 % (10/28/23 0736) Vital Signs (24h Range):  Temp:  [97.7 °F (36.5 °C)-98.6 °F (37 °C)] 98.3 °F (36.8 °C)  Pulse:  [] 86  Resp:  [16-67] 20  SpO2:  [87 %-97 %] 97 %  BP: (103-163)/(54-93) 138/64  Arterial Line BP: (129-138)/(65-67) 129/65   Weight: 83.1 kg (183 lb 3.2 oz);  Body mass index is 27.86 kg/m².    Intake/Output Summary (Last 24 hours) at 10/28/2023 0926  Last data filed at 10/28/2023 0600  Gross per 24 hour   Intake 775.87 ml   Output 760 ml   Net 15.87 ml      Physical Exam  Vitals and nursing note reviewed.   HENT:      Head: Normocephalic.   Eyes:      Conjunctiva/sclera: Conjunctivae normal.   Cardiovascular:      Rate and Rhythm: Normal rate.   Pulmonary:      Effort: Pulmonary effort is normal.      Breath sounds: Normal breath sounds.   Abdominal:      Comments: Protuberant, soft to palpation, denies tenderness, no guarding  +flatus and +BM   Musculoskeletal:         General: Normal range of motion.      Cervical back: Normal range of motion.   Skin:     General: Skin is warm and dry.   Neurological:      Mental Status: He is alert and oriented to person, place, and time.   Psychiatric:         Mood and Affect: Mood normal.      Review of Systems: Negative except as indicated in HPI    Significant Labs:  CBC/Anemia Profile:  Recent Labs   Lab 10/27/23  0357 10/28/23  0412   WBC 12.18  12.18 15.14*   HGB 10.2*  10.2* 9.8*    HCT 30.2*  30.2* 29.7*     198 232   MCV 96  96 98   RDW 12.3  12.3 13.2   Chemistries:  Recent Labs   Lab 10/27/23  0357 10/28/23  0502    137   K 3.7 3.9    105   CO2 25 23   BUN 39* 52*   CREATININE 1.1 1.4   CALCIUM 7.8* 8.2*   MG 2.3 2.5   PHOS 3.3 2.6*   ABG  Recent Labs   Lab 10/24/23  0211   PH 7.375   PO2 90   PCO2 37.1   HCO3 21.7*   BE -4*     Assessment/Plan:   * VT (ventricular tachycardia)  - shock x1 at OSH and again x1 in the ER here   - continue oral amiodarone 400mg BID  - continue telemetry monitoring   - follow lytes and replete as needed  - echo with EF 20-25%  - Cardiology following   - plan for LifeVest upon discharge; LifeVest ordered per cards    Acute on chronic combined systolic and diastolic heart failure  Cardiogenic shock  - previous echo with EF 40% and grade II diastolic function, repeat echo with EF 20-25%  - Cath lab on 10/24: Occluded RCA and LAD ostial calcified with 80% stenosis; returned to ICU with R groin IABP  - Returned to cath lab 10/26 for high risk PCI  - Remains on Dobutamine infusion  - not on BBlocker at this time due to vasopressor needs  - Cards following  - continue spironolactone  - follow I&O trends  - monitor for further/additional diuretic needs  - Evaluated by cardiac surgery and deemed not a candidate for CABG    Elevated troponin  - post C with high risk PCI  - not a candidate for CABG per cardiothoracic surgery  - continue statin, ASA, Plavix    COPD (chronic obstructive pulmonary disease)  - complete 5-day course of oral Prednisone  - pulmonary status stable on 3L NC this AM  - PRN duoneb, continue scheduled brovana and pulmicort nebs  - monitor chest imaging as needed    Alcohol abuse  - ETOH level 29 at OSH, reports drinks 3-4 beers/day with last drink 10/22  - Ativan IV for CIWA >8 PRN  - continue folic acid and thiamine daily   - complete librium taper  - cessation education as appropriate and reinforce upon  discharge    Abdominal distention  - abdominal imaging reviewed; concern for possible SBO  - abdominal exam benign with no complaints of abdominal pain, tenderness, guarding  - no nausea and vomiting --> +flatus and BM overnight  - continue scheduled Miralax, lactulose, and Docusate  - persistent protuberant abdomen which is softer on exam today  - check abdominal ultrasound to evaluate for ascites and need for paracentesis  - monitor for continued BMs    Hyponatremia  - could be s/t alcohol abuse +/- HF  - monitoring; improved trends and appears resolved    Urine retention  - jacobson in place  - add flomax; discontinue jacobson catheter and assess for continued retention  - monitoring UOP    Primary hypertension  - currently on Dobutamine infusion  - monitor hemodynamics and adjust meds as appropriate    Alcoholic cirrhosis  - follow liver enzymes  - continue supportive care   - alcohol cessation education when appropriate     Tobacco dependence  Assistance with smoking cessation was offered, including:  [x]  Medications  [x]  Counseling  [x]  Printed Information on Smoking Cessation  [x]  Referral to a Smoking Cessation Program    Patient was counseled regarding smoking for 3-10 minutes.    DVT prophylaxis: Add lovenox daily  Critical Care Time: 38 minutes  Critical secondary to Patient has a condition that poses threat to life and bodily function: Cardiogenic shock requiring Dobutamine infusion      Critical care was time spent personally by me on the following activities: development of treatment plan with patient or surrogate and bedside caregivers, discussions with consultants, evaluation of patient's response to treatment, examination of patient, ordering and performing treatments and interventions, ordering and review of laboratory studies, ordering and review of radiographic studies, pulse oximetry, re-evaluation of patient's condition. This critical care time did not overlap with that of any other provider or  involve time for any procedures.     Meg Boss NP  Pulmonary and Critical Care  [facility]

## 2023-10-29 PROBLEM — E87.1 HYPONATREMIA: Status: RESOLVED | Noted: 2023-10-24 | Resolved: 2023-10-29

## 2023-10-29 LAB
ANION GAP SERPL CALC-SCNC: 10 MMOL/L (ref 8–16)
BACTERIA BLD CULT: NORMAL
BACTERIA BLD CULT: NORMAL
BASOPHILS # BLD AUTO: 0.04 K/UL (ref 0–0.2)
BASOPHILS NFR BLD: 0.3 % (ref 0–1.9)
BUN SERPL-MCNC: 38 MG/DL (ref 8–23)
CALCIUM SERPL-MCNC: 7.7 MG/DL (ref 8.7–10.5)
CHLORIDE SERPL-SCNC: 107 MMOL/L (ref 95–110)
CO2 SERPL-SCNC: 22 MMOL/L (ref 23–29)
CREAT SERPL-MCNC: 1.1 MG/DL (ref 0.5–1.4)
DIFFERENTIAL METHOD: ABNORMAL
EOSINOPHIL # BLD AUTO: 0 K/UL (ref 0–0.5)
EOSINOPHIL NFR BLD: 0.1 % (ref 0–8)
ERYTHROCYTE [DISTWIDTH] IN BLOOD BY AUTOMATED COUNT: 12.9 % (ref 11.5–14.5)
EST. GFR  (NO RACE VARIABLE): >60 ML/MIN/1.73 M^2
GLUCOSE SERPL-MCNC: 96 MG/DL (ref 70–110)
HCT VFR BLD AUTO: 30.8 % (ref 40–54)
HGB BLD-MCNC: 9.8 G/DL (ref 14–18)
IMM GRANULOCYTES # BLD AUTO: 0.29 K/UL (ref 0–0.04)
IMM GRANULOCYTES NFR BLD AUTO: 1.8 % (ref 0–0.5)
LYMPHOCYTES # BLD AUTO: 1.2 K/UL (ref 1–4.8)
LYMPHOCYTES NFR BLD: 7.3 % (ref 18–48)
MAGNESIUM SERPL-MCNC: 2.4 MG/DL (ref 1.6–2.6)
MCH RBC QN AUTO: 31.7 PG (ref 27–31)
MCHC RBC AUTO-ENTMCNC: 31.8 G/DL (ref 32–36)
MCV RBC AUTO: 100 FL (ref 82–98)
MONOCYTES # BLD AUTO: 1.9 K/UL (ref 0.3–1)
MONOCYTES NFR BLD: 11.8 % (ref 4–15)
NEUTROPHILS # BLD AUTO: 12.6 K/UL (ref 1.8–7.7)
NEUTROPHILS NFR BLD: 78.7 % (ref 38–73)
NRBC BLD-RTO: 0 /100 WBC
PHOSPHATE SERPL-MCNC: 2.5 MG/DL (ref 2.7–4.5)
PLATELET # BLD AUTO: 241 K/UL (ref 150–450)
PMV BLD AUTO: 10.8 FL (ref 9.2–12.9)
POCT GLUCOSE: 101 MG/DL (ref 70–110)
POCT GLUCOSE: 124 MG/DL (ref 70–110)
POCT GLUCOSE: 88 MG/DL (ref 70–110)
POTASSIUM SERPL-SCNC: 4.2 MMOL/L (ref 3.5–5.1)
RBC # BLD AUTO: 3.09 M/UL (ref 4.6–6.2)
SODIUM SERPL-SCNC: 139 MMOL/L (ref 136–145)
WBC # BLD AUTO: 15.99 K/UL (ref 3.9–12.7)

## 2023-10-29 PROCEDURE — 25000003 PHARM REV CODE 250: Performed by: PHYSICIAN ASSISTANT

## 2023-10-29 PROCEDURE — 25000003 PHARM REV CODE 250: Performed by: INTERNAL MEDICINE

## 2023-10-29 PROCEDURE — 51798 US URINE CAPACITY MEASURE: CPT

## 2023-10-29 PROCEDURE — 80048 BASIC METABOLIC PNL TOTAL CA: CPT | Performed by: INTERNAL MEDICINE

## 2023-10-29 PROCEDURE — 25000242 PHARM REV CODE 250 ALT 637 W/ HCPCS: Performed by: INTERNAL MEDICINE

## 2023-10-29 PROCEDURE — 25000003 PHARM REV CODE 250: Performed by: NURSE PRACTITIONER

## 2023-10-29 PROCEDURE — 94640 AIRWAY INHALATION TREATMENT: CPT

## 2023-10-29 PROCEDURE — 85025 COMPLETE CBC W/AUTO DIFF WBC: CPT | Performed by: INTERNAL MEDICINE

## 2023-10-29 PROCEDURE — 99900035 HC TECH TIME PER 15 MIN (STAT)

## 2023-10-29 PROCEDURE — 99233 SBSQ HOSP IP/OBS HIGH 50: CPT | Mod: ,,, | Performed by: INTERNAL MEDICINE

## 2023-10-29 PROCEDURE — 84100 ASSAY OF PHOSPHORUS: CPT | Performed by: INTERNAL MEDICINE

## 2023-10-29 PROCEDURE — 21400001 HC TELEMETRY ROOM

## 2023-10-29 PROCEDURE — 36415 COLL VENOUS BLD VENIPUNCTURE: CPT | Performed by: INTERNAL MEDICINE

## 2023-10-29 PROCEDURE — 63600175 PHARM REV CODE 636 W HCPCS: Performed by: NURSE PRACTITIONER

## 2023-10-29 PROCEDURE — 94761 N-INVAS EAR/PLS OXIMETRY MLT: CPT

## 2023-10-29 PROCEDURE — 25000003 PHARM REV CODE 250: Performed by: FAMILY MEDICINE

## 2023-10-29 PROCEDURE — 83735 ASSAY OF MAGNESIUM: CPT | Performed by: INTERNAL MEDICINE

## 2023-10-29 PROCEDURE — 27000221 HC OXYGEN, UP TO 24 HOURS

## 2023-10-29 PROCEDURE — 99233 PR SUBSEQUENT HOSPITAL CARE,LEVL III: ICD-10-PCS | Mod: ,,, | Performed by: INTERNAL MEDICINE

## 2023-10-29 RX ADMIN — TAMSULOSIN HYDROCHLORIDE 0.4 MG: 0.4 CAPSULE ORAL at 08:10

## 2023-10-29 RX ADMIN — GABAPENTIN 300 MG: 300 CAPSULE ORAL at 02:10

## 2023-10-29 RX ADMIN — DOCUSATE SODIUM 100 MG: 100 CAPSULE, LIQUID FILLED ORAL at 08:10

## 2023-10-29 RX ADMIN — PREDNISONE 20 MG: 20 TABLET ORAL at 08:10

## 2023-10-29 RX ADMIN — AMIODARONE HYDROCHLORIDE 400 MG: 200 TABLET ORAL at 08:10

## 2023-10-29 RX ADMIN — SPIRONOLACTONE 25 MG: 25 TABLET ORAL at 08:10

## 2023-10-29 RX ADMIN — SACUBITRIL AND VALSARTAN 1 TABLET: 24; 26 TABLET, FILM COATED ORAL at 08:10

## 2023-10-29 RX ADMIN — GABAPENTIN 300 MG: 300 CAPSULE ORAL at 08:10

## 2023-10-29 RX ADMIN — BUDESONIDE INHALATION 0.5 MG: 0.5 SUSPENSION RESPIRATORY (INHALATION) at 07:10

## 2023-10-29 RX ADMIN — FOLIC ACID 1 MG: 1 TABLET ORAL at 08:10

## 2023-10-29 RX ADMIN — BUDESONIDE INHALATION 0.5 MG: 0.5 SUSPENSION RESPIRATORY (INHALATION) at 08:10

## 2023-10-29 RX ADMIN — SACUBITRIL AND VALSARTAN 1 TABLET: 24; 26 TABLET, FILM COATED ORAL at 11:10

## 2023-10-29 RX ADMIN — Medication 100 MG: at 08:10

## 2023-10-29 RX ADMIN — ARFORMOTEROL TARTRATE 15 MCG: 15 SOLUTION RESPIRATORY (INHALATION) at 08:10

## 2023-10-29 RX ADMIN — ASPIRIN 81 MG CHEWABLE TABLET 81 MG: 81 TABLET CHEWABLE at 08:10

## 2023-10-29 RX ADMIN — ARFORMOTEROL TARTRATE 15 MCG: 15 SOLUTION RESPIRATORY (INHALATION) at 07:10

## 2023-10-29 RX ADMIN — ATORVASTATIN CALCIUM 80 MG: 40 TABLET, FILM COATED ORAL at 08:10

## 2023-10-29 RX ADMIN — TRAZODONE HYDROCHLORIDE 25 MG: 50 TABLET ORAL at 08:10

## 2023-10-29 RX ADMIN — PANTOPRAZOLE SODIUM 40 MG: 40 TABLET, DELAYED RELEASE ORAL at 08:10

## 2023-10-29 RX ADMIN — ENOXAPARIN SODIUM 40 MG: 40 INJECTION SUBCUTANEOUS at 04:10

## 2023-10-29 RX ADMIN — CLOPIDOGREL BISULFATE 75 MG: 75 TABLET ORAL at 08:10

## 2023-10-29 NOTE — PLAN OF CARE
PT REMAIN IN NSR, T-MAX 99, VSS AND NADN. URINE OUTPUT WNL. DOPAMINE DISCONTINUED AND TRANSFER ORDERS FOR TELE ARE IN.  PT AMBULATED TO RESTROOM WITHOUT ASSISTANCE. POC REVIEWED WITH PT. QUESTIONS ANSWERED. WBG WNL. GOOD APPETITE.

## 2023-10-29 NOTE — ASSESSMENT & PLAN NOTE
Attributed to etoh and cad  Awaiting lifevest  Fluid and salt restriction  I/os  Discontinue jacobson

## 2023-10-29 NOTE — HOSPITAL COURSE
63M has a past medical history of Alcoholic cirrhosis, CHF (congestive heart failure), COPD (chronic obstructive pulmonary disease), Hypertension, Peripheral artery disease, and Sleep apnea.  Presented to emergency department from outside facility for vtach requiring defibrillation x 2. Initial workup revealed possible Torsades on electrocardiography strips. Cbc with leukocytosis, cmp with hyponatremia, creatinine 1.2, , , and elevated troponin, 3.6 --> 9.9. Patient was given an aspirin, started on heparin infusion, given 4 mg of Mag IV, and 40 mg of Lasix IV and transferred to icu per Cardiology recommendations. Echocardiogram with 20-25% ejection fraction. Patient required precedex infusion while in icu.     Cardiac catheterization revealed severe stenosis. CTS consulted and deemed not a surgical candidate. Amio infusion converted to po medication(s). Dobutamine infusion weaned. Patient is awaiting lifevest prior to discharge.     Us abdomen without significant ascites. On lactulose for cirrhosis.   10/29 transferred from icu to floor status. No acute events overnight. Awaiting lifevest. Voiding trial to discontinue jacobson. Multiple bowel movements reported.  10/30 no acute events overnight. Jacobson discontinued. Awaiting lifevest. Patient ready for discharge.  10/31 complains of abdominal tenderness from coughing, improved with abdominal binder. monitor. Awaiting lifevest  11/1 - 11/2: awaiting lifevest, discussed with case management, cardiology to do peer to peer 11/3  11/3: cards performed p2p and lifevest was still denied. Patient discharged home on amio 400mg daily, entresto, metoprolol, lasix, and aldactone. Outpatient referral to cards in [facility].

## 2023-10-29 NOTE — SUBJECTIVE & OBJECTIVE
Interval History: See hospital course for today      Review of Systems   Constitutional:  Positive for activity change (improved) and fatigue (improved).   HENT:          Nasal problem   Respiratory:  Negative for shortness of breath.    Cardiovascular:  Negative for chest pain and leg swelling.   Gastrointestinal:  Positive for constipation (improved). Negative for abdominal pain, nausea and vomiting.   Genitourinary:  Positive for difficulty urinating.   Musculoskeletal:  Negative for gait problem.   Neurological:  Positive for weakness (improved).     Objective:     Vital Signs (Most Recent):  Temp: 98.1 °F (36.7 °C) (10/29/23 1152)  Pulse: 96 (10/29/23 1152)  Resp: 18 (10/29/23 1152)  BP: 139/86 (10/29/23 1152)  SpO2: (!) 93 % (10/29/23 1152) Vital Signs (24h Range):  Temp:  [97.4 °F (36.3 °C)-99 °F (37.2 °C)] 98.1 °F (36.7 °C)  Pulse:  [80-96] 96  Resp:  [13-30] 18  SpO2:  [91 %-96 %] 93 %  BP: (117-145)/(56-86) 139/86     Weight: 83.1 kg (183 lb 3.2 oz)  Body mass index is 27.86 kg/m².    Intake/Output Summary (Last 24 hours) at 10/29/2023 1237  Last data filed at 10/29/2023 0635  Gross per 24 hour   Intake 960 ml   Output 1465 ml   Net -505 ml         Physical Exam  Vitals and nursing note reviewed.   Constitutional:       General: He is not in acute distress.     Appearance: He is ill-appearing. He is not toxic-appearing.   HENT:      Head: Normocephalic and atraumatic.     Cardiovascular:      Rate and Rhythm: Normal rate.   Pulmonary:      Effort: Pulmonary effort is normal. No respiratory distress.   Abdominal:      Palpations: Abdomen is soft.      Tenderness: There is no abdominal tenderness.   Genitourinary:     Comments: jacobson  Musculoskeletal:      Right lower leg: No edema.      Left lower leg: No edema.   Skin:     General: Skin is warm.   Neurological:      Mental Status: He is alert. Mental status is at baseline.      Motor: No weakness.   Psychiatric:         Mood and Affect: Mood normal.          Behavior: Behavior normal.             Significant Labs: All pertinent labs within the past 24 hours have been reviewed.  CBC:   Recent Labs   Lab 10/28/23  0412 10/29/23  0516   WBC 15.14* 15.99*   HGB 9.8* 9.8*   HCT 29.7* 30.8*    241     CMP:   Recent Labs   Lab 10/28/23  0502 10/29/23  0516    139   K 3.9 4.2    107   CO2 23 22*   GLU 99 96   BUN 52* 38*   CREATININE 1.4 1.1   CALCIUM 8.2* 7.7*   ANIONGAP 9 10       Significant Imaging: I have reviewed all pertinent imaging results/findings within the past 24 hours.

## 2023-10-29 NOTE — ASSESSMENT & PLAN NOTE
Chronic, controlled. Latest blood pressure and vitals reviewed-     Temp:  [97.4 °F (36.3 °C)-99 °F (37.2 °C)]   Pulse:  [80-96]   Resp:  [13-30]   BP: (117-145)/(56-86)   SpO2:  [91 %-96 %] .   Home meds for hypertension were reviewed and noted below.   Hypertension Medications             furosemide (LASIX) 40 MG tablet Take 40 mg by mouth once daily.    lisinopriL (PRINIVIL,ZESTRIL) 2.5 MG tablet Take 1 tablet (2.5 mg total) by mouth once daily.    metoprolol succinate (TOPROL-XL) 25 MG 24 hr tablet Take 1 tablet (25 mg total) by mouth once daily.    spironolactone (ALDACTONE) 50 MG tablet Take 50 mg by mouth 2 (two) times daily.          While in the hospital, will manage blood pressure as follows; Adjust home antihypertensive regimen as follows- prn hydralazine    Will utilize p.r.n. blood pressure medication only if patient's blood pressure greater than 140/90 and he develops symptoms such as worsening chest pain or shortness of breath.

## 2023-10-29 NOTE — ASSESSMENT & PLAN NOTE
-BNP minimally elevated  -LA improved with inotropic support  -Ischemic evaluation pending  -Will optimize regimen accordingly    10/25/23  -RHC with elevated filling pressures  -Continue IV diuresis  -Will optimize regimen as tolerated  -Possible high risk PCI tmw    10/26/23  -Continue OMT  -C with possible high risk PCI today    10/27/23  -Continue OMT-will add BB once off dobutamine  -Entresto, po Lasix when able to tolerate  -LifeVest for SCD prevention    10/29/2023: Continue guideline directed medical management beta-blockers added as well as Entresto continue to follow patient clinically now off dobutamine LifeVest ordered.

## 2023-10-29 NOTE — SUBJECTIVE & OBJECTIVE
Interval History:  Stable no chest discomfort edema resolved abdomen is now quiescent improved.  No further normal pain    Review of Systems   Constitutional: Positive for malaise/fatigue. Negative for chills, diaphoresis, night sweats, weight gain and weight loss.   HENT:  Negative for congestion, hoarse voice, sore throat and stridor.    Eyes:  Negative for double vision and pain.   Cardiovascular:  Negative for chest pain, claudication, cyanosis, dyspnea on exertion, irregular heartbeat, leg swelling, near-syncope, orthopnea, palpitations, paroxysmal nocturnal dyspnea and syncope.   Respiratory:  Negative for cough, hemoptysis, shortness of breath, sleep disturbances due to breathing, snoring, sputum production and wheezing.    Endocrine: Negative for cold intolerance, heat intolerance and polydipsia.   Hematologic/Lymphatic: Negative for bleeding problem. Does not bruise/bleed easily.   Skin:  Negative for color change, dry skin and rash.   Musculoskeletal:  Negative for joint swelling and muscle cramps.   Gastrointestinal:  Negative for bloating, abdominal pain, constipation, diarrhea, dysphagia, melena, nausea and vomiting.   Genitourinary:  Negative for flank pain and urgency.   Neurological:  Negative for dizziness, focal weakness, headaches, light-headedness, loss of balance, seizures and weakness.   Psychiatric/Behavioral:  Negative for altered mental status and memory loss. The patient is not nervous/anxious.      Objective:     Vital Signs (Most Recent):  Temp: 98.1 °F (36.7 °C) (10/29/23 1152)  Pulse: 96 (10/29/23 1152)  Resp: 18 (10/29/23 1152)  BP: 139/86 (10/29/23 1152)  SpO2: (!) 93 % (10/29/23 1152) Vital Signs (24h Range):  Temp:  [97.4 °F (36.3 °C)-98.4 °F (36.9 °C)] 98.1 °F (36.7 °C)  Pulse:  [80-96] 96  Resp:  [13-30] 18  SpO2:  [91 %-96 %] 93 %  BP: (117-145)/(56-86) 139/86     Weight: 83.1 kg (183 lb 3.2 oz)  Body mass index is 27.86 kg/m².     SpO2: (!) 93 %         Intake/Output Summary  (Last 24 hours) at 10/29/2023 1559  Last data filed at 10/29/2023 1455  Gross per 24 hour   Intake 720 ml   Output 1840 ml   Net -1120 ml       Lines/Drains/Airways       Peripheral Intravenous Line  Duration                  Peripheral IV - Single Lumen 10/29/23 1520 22 G Posterior;Right Hand <1 day                       Physical Exam  Eyes:      Pupils: Pupils are equal, round, and reactive to light.   Neck:      Trachea: No tracheal deviation.   Cardiovascular:      Rate and Rhythm: Normal rate and regular rhythm.      Pulses: Intact distal pulses.           Carotid pulses are 2+ on the right side and 2+ on the left side.       Radial pulses are 2+ on the right side and 2+ on the left side.        Femoral pulses are 2+ on the right side and 2+ on the left side.       Popliteal pulses are 2+ on the right side and 2+ on the left side.        Dorsalis pedis pulses are 2+ on the right side and 2+ on the left side.        Posterior tibial pulses are 2+ on the right side and 2+ on the left side.      Heart sounds: Normal heart sounds. No murmur heard.     No friction rub. No gallop.   Pulmonary:      Effort: Pulmonary effort is normal. No respiratory distress.      Breath sounds: Normal breath sounds. No stridor. No wheezing or rales.   Chest:      Chest wall: No tenderness.   Abdominal:      General: There is no distension.      Tenderness: There is no abdominal tenderness. There is no rebound.   Musculoskeletal:         General: No tenderness.      Cervical back: Normal range of motion.   Skin:     General: Skin is warm and dry.   Neurological:      Mental Status: He is alert and oriented to person, place, and time.            Significant Labs: BMP:   Recent Labs   Lab 10/28/23  0502 10/29/23  0516   GLU 99 96    139   K 3.9 4.2    107   CO2 23 22*   BUN 52* 38*   CREATININE 1.4 1.1   CALCIUM 8.2* 7.7*   MG 2.5 2.4    and CBC   Recent Labs   Lab 10/28/23  0412 10/29/23  0516   WBC 15.14* 15.99*   HGB 9.8*  9.8*   HCT 29.7* 30.8*    241       Significant Imaging: Echocardiogram: Transthoracic echo (TTE) complete (Cupid Only):   Results for orders placed or performed during the hospital encounter of 10/23/23   Echo   Result Value Ref Range    BSA 2.08 m2    IVRT 49.48 msec    TDI LATERAL 0.10 m/s    Left Ventricular Outflow Tract Mean Gradient 1.13 mmHg    Left Ventricular Outflow Tract Mean Velocity 0.49 cm/s    MV stenosis pressure 1/2 time 34.73 ms    Mr max rex 3.50 m/s    TR Max Rex 2.09 m/s    Ao root annulus 3.38 cm    Ao peak rex 0.86 m/s    Posterior Wall 0.95 0.6 - 1.1 cm    LVOT diameter 1.95 cm    LVOT peak rex 0.76 m/s    LVOT peak VTI 10.90 cm    E wave deceleration time 119.77 msec    MV Peak A Rex 0.40 m/s    MV Peak E Rex 0.65 m/s    RA Major Axis 2.94 cm    TAPSE 1.82 cm    PV mean gradient 1 mmHg    RVOT peak rex 0.57 m/s    RVOT peak VTI 9.8 cm    IVS 0.96 0.6 - 1.1 cm    Ao VTI 11.90 cm    AV mean gradient 2 mmHg    LVIDd 4.24 3.5 - 6.0 cm    LVIDs 3.71 2.1 - 4.0 cm    Left Atrium Major Axis 4.28 cm    Left Atrium Minor Axis 4.19 cm    RV mid diameter 3.37 cm    STJ 3.35 cm    Ascending aorta 3.41 cm    LV Systolic Volume 58.38 mL    LV Diastolic Volume 80.17 mL    TDI SEPTAL 0.09 m/s    LA size 2.39 cm    IVC diameter 1.42 cm    LV LATERAL E/E' RATIO 6.50 m/s    LV SEPTAL E/E' RATIO 7.22 m/s    FS 13 28 - 44 %    LV mass 130.71 g    ZLVIDD -3.61     ZLVIDS -0.04     Left Ventricle Relative Wall Thickness 0.45 cm    AV valve area 2.73 cm²    AV Velocity Ratio 0.88     AV index (prosthetic) 0.92     MV valve area p 1/2 method 6.33 cm2    E/A ratio 1.63     Mean e' 0.10 m/s    LVOT area 3.0 cm2    LVOT stroke volume 32.54 cm3    AV peak gradient 3 mmHg    E/E' ratio 6.84 m/s    LV Systolic Volume Index 28.6 mL/m2    LV Diastolic Volume Index 39.30 mL/m2    LV Mass Index 64 g/m2    Triscuspid Valve Regurgitation Peak Gradient 17 mmHg    ADDIS by Velocity Ratio 2.64 cm²    LA Volume Index 11.4  mL/m2    LA volume 23.23 cm3    LA WIDTH 2.7 cm    RA Width 2.3 cm    TV resting pulmonary artery pressure 20 mmHg    RV TB RVSP 5 mmHg    Est. RA pres 3 mmHg    Narrative      Left Ventricle: The left ventricle is mildly dilated. Normal wall   thickness. There is concentric remodeling. Moderate global hypokinesis   present. There is severely reduced systolic function with a visually   estimated ejection fraction of 20 - 25%. There is normal diastolic   function.    Right Ventricle: Normal right ventricular cavity size. Wall thickness   is normal. Right ventricle wall motion  is normal. Systolic function is   normal.    Mitral Valve: There is mild to moderate regurgitation with a centrally   directed jet.    Pulmonary Artery: The estimated pulmonary artery systolic pressure is   20 mmHg.    IVC/SVC: Normal venous pressure at 3 mmHg.

## 2023-10-29 NOTE — PROGRESS NOTES
[facility] - Telemetry [facility] Medicine  Progress Note    Patient Name: Hemal Tolbert Jr.  MRN: [id number]  Patient Class: IP- Inpatient   Admission Date: 10/23/2023  Length of Stay: 6 days  Attending Physician: Leelee Dewitt MD  Primary Care Provider: Norma Provider        Subjective:     Principal Problem:VT (ventricular tachycardia)        HPI:  63M  has a past medical history of Alcoholic cirrhosis, CHF (congestive heart failure), COPD (chronic obstructive pulmonary disease), Hypertension, Peripheral artery disease, and Sleep apnea.  Presented to emergency department from outside facility for vtach requiring defibrillation x 2. Initial workup revealed possible Torsades on electrocardiography strips. Cbc with leukocytosis, cmp with hyponatremia, creatinine 1.2, , , and elevated troponin, 3.6 --> 9.9. Patient was given an aspirin, started on heparin infusion, given 4 mg of Mag IV, and 40 mg of Lasix IV and transferred to icu per Cardiology recommendations. Echocardiogram with 20-25% ejection fraction. Patient required precedex infusion while in icu.    Cardiac catheterization revealed severe stenosis. CTS consulted and deemed not a surgical candidate. Amio infusion converted to po medication(s). Dobutamine infusion weaned. Patient is awaiting lifevest prior to discharge.    Us abdomen without significant ascites. On lactulose for cirrhosis.     Hospital medicine consulted for assumption of care.         Overview/Hospital Course:  63M has a past medical history of Alcoholic cirrhosis, CHF (congestive heart failure), COPD (chronic obstructive pulmonary disease), Hypertension, Peripheral artery disease, and Sleep apnea.  Presented to emergency department from outside facility for vtach requiring defibrillation x 2. Initial workup revealed possible Torsades on electrocardiography strips. Cbc with leukocytosis, cmp with hyponatremia, creatinine 1.2, , , and elevated troponin,  3.6 --> 9.9. Patient was given an aspirin, started on heparin infusion, given 4 mg of Mag IV, and 40 mg of Lasix IV and transferred to icu per Cardiology recommendations. Echocardiogram with 20-25% ejection fraction. Patient required precedex infusion while in icu.     Cardiac catheterization revealed severe stenosis. CTS consulted and deemed not a surgical candidate. Amio infusion converted to po medication(s). Dobutamine infusion weaned. Patient is awaiting lifevest prior to discharge.     Us abdomen without significant ascites. On lactulose for cirrhosis.   10/29 transferred from icu to floor status. No acute events overnight. Awaiting lifevest. Voiding trial to discontinue jacobson. Multiple bowel movements reported.      Interval History: See hospital course for today      Review of Systems   Constitutional:  Positive for activity change (improved) and fatigue (improved).   HENT:          Nasal problem   Respiratory:  Negative for shortness of breath.    Cardiovascular:  Negative for chest pain and leg swelling.   Gastrointestinal:  Positive for constipation (improved). Negative for abdominal pain, nausea and vomiting.   Genitourinary:  Positive for difficulty urinating.   Musculoskeletal:  Negative for gait problem.   Neurological:  Positive for weakness (improved).     Objective:     Vital Signs (Most Recent):  Temp: 98.1 °F (36.7 °C) (10/29/23 1152)  Pulse: 96 (10/29/23 1152)  Resp: 18 (10/29/23 1152)  BP: 139/86 (10/29/23 1152)  SpO2: (!) 93 % (10/29/23 1152) Vital Signs (24h Range):  Temp:  [97.4 °F (36.3 °C)-99 °F (37.2 °C)] 98.1 °F (36.7 °C)  Pulse:  [80-96] 96  Resp:  [13-30] 18  SpO2:  [91 %-96 %] 93 %  BP: (117-145)/(56-86) 139/86     Weight: 83.1 kg (183 lb 3.2 oz)  Body mass index is 27.86 kg/m².    Intake/Output Summary (Last 24 hours) at 10/29/2023 1237  Last data filed at 10/29/2023 0635  Gross per 24 hour   Intake 960 ml   Output 1465 ml   Net -505 ml         Physical Exam  Vitals and nursing note  reviewed.   Constitutional:       General: He is not in acute distress.     Appearance: He is ill-appearing. He is not toxic-appearing.   HENT:      Head: Normocephalic and atraumatic.     Cardiovascular:      Rate and Rhythm: Normal rate.   Pulmonary:      Effort: Pulmonary effort is normal. No respiratory distress.   Abdominal:      Palpations: Abdomen is soft.      Tenderness: There is no abdominal tenderness.   Genitourinary:     Comments: jacobson  Musculoskeletal:      Right lower leg: No edema.      Left lower leg: No edema.   Skin:     General: Skin is warm.   Neurological:      Mental Status: He is alert. Mental status is at baseline.      Motor: No weakness.   Psychiatric:         Mood and Affect: Mood normal.         Behavior: Behavior normal.             Significant Labs: All pertinent labs within the past 24 hours have been reviewed.  CBC:   Recent Labs   Lab 10/28/23  0412 10/29/23  0516   WBC 15.14* 15.99*   HGB 9.8* 9.8*   HCT 29.7* 30.8*    241     CMP:   Recent Labs   Lab 10/28/23  0502 10/29/23  0516    139   K 3.9 4.2    107   CO2 23 22*   GLU 99 96   BUN 52* 38*   CREATININE 1.4 1.1   CALCIUM 8.2* 7.7*   ANIONGAP 9 10       Significant Imaging: I have reviewed all pertinent imaging results/findings within the past 24 hours.      Assessment/Plan:      * VT (ventricular tachycardia)  Required defibrillation x 2  Electrolytes corrected  Cardiology following  Severe CAD, not a surgical candidate  Awaiting lifevest      Abdominal distention  Us abdomen without significant ascites  Continue lactulose      Cardiogenic shock  Awaiting lifevest      Urine retention  Started on flomax  Voiding trial        Alcohol abuse  Required precedex infusion  Continue librium taper      COPD (chronic obstructive pulmonary disease)  Patient's COPD is with exacerbation noted by continued dyspnea currently.  Patient is currently off COPD Pathway. Continue scheduled inhalers Steroids and Supplemental  oxygen and monitor respiratory status closely.     Primary hypertension  Chronic, controlled. Latest blood pressure and vitals reviewed-     Temp:  [97.4 °F (36.3 °C)-99 °F (37.2 °C)]   Pulse:  [80-96]   Resp:  [13-30]   BP: (117-145)/(56-86)   SpO2:  [91 %-96 %] .   Home meds for hypertension were reviewed and noted below.   Hypertension Medications             furosemide (LASIX) 40 MG tablet Take 40 mg by mouth once daily.    lisinopriL (PRINIVIL,ZESTRIL) 2.5 MG tablet Take 1 tablet (2.5 mg total) by mouth once daily.    metoprolol succinate (TOPROL-XL) 25 MG 24 hr tablet Take 1 tablet (25 mg total) by mouth once daily.    spironolactone (ALDACTONE) 50 MG tablet Take 50 mg by mouth 2 (two) times daily.          While in the hospital, will manage blood pressure as follows; Adjust home antihypertensive regimen as follows- prn hydralazine    Will utilize p.r.n. blood pressure medication only if patient's blood pressure greater than 140/90 and he develops symptoms such as worsening chest pain or shortness of breath.    Tobacco dependence  Dangers of cigarette smoking were reviewed with patient in detail. Patient was Counseled for 3-10 minutes. Nicotine replacement options were discussed. Nicotine replacement was discussed- not prescribed per patient's request    Acute on chronic combined systolic and diastolic heart failure  Attributed to etoh and cad  Awaiting lifevest  Fluid and salt restriction  I/os  Discontinue jacobson    Elevated troponin  See vtach      Alcoholic cirrhosis  Patient with known Cirrhosis with Child's class Calculator for Child's score link- https://www.mdcalc.com/calc/340/child-sarmiento-score-cirrhosis-mortality#creator-insights. Co-morbidities are present and inclusive of ascites, malnutrition, anemia/pancytopenia and anticoagulation.  MELD-Na score calculated; MELD 3.0: 15 at 10/25/2023  5:01 AM  MELD-Na: 9 at 10/25/2023  5:01 AM  Calculated from:  Serum Creatinine: 1.3 mg/dL at 10/25/2023  5:01  AM  Serum Sodium: 130 mmol/L at 10/25/2023  5:01 AM  Total Bilirubin: 0.3 mg/dL (Using min of 1 mg/dL) at 10/24/2023 12:26 PM  Serum Albumin: 3.1 g/dL at 10/24/2023 12:26 PM  INR(ratio): 1.0 at 10/23/2023 11:50 AM  Age at listing (hypothetical): 63 years  Sex: Male at 10/25/2023  5:01 AM      Continue chronic meds. Etiology likely ETOH. Will avoid any hepatotoxic meds, and monitor CBC/CMP/INR for synthetic function.     VTE Risk Mitigation (From admission, onward)         Ordered     enoxaparin injection 40 mg  Every 24 hours         10/28/23 0938     IP VTE HIGH RISK PATIENT  Once         10/23/23 1120     Place sequential compression device  Until discontinued         10/23/23 1120                Discharge Planning   SADI: 10/29/2023     Code Status: Full Code   Is the patient medically ready for discharge?:     Reason for patient still in hospital (select all that apply): Patient trending condition and Pending disposition  Discharge Plan A: Home with family                  Leelee Dewitt MD  Department of Hospital Medicine   [facility] - Telemetry ([facility])

## 2023-10-29 NOTE — PROGRESS NOTES
Patient transferred with RN x1 and continuous, remote telemetry monitoring on 4L NC to room 237. Report given to and care assumed by BENJA Guadarrama. Patient belongings at bedside. VSS.

## 2023-10-29 NOTE — ASSESSMENT & PLAN NOTE
Patient with known Cirrhosis with Child's class Calculator for Child's score link- https://www.[employer].com/calc/340/child-sarmiento-score-cirrhosis-mortality#creator-insights. Co-morbidities are present and inclusive of ascites, malnutrition, anemia/pancytopenia and anticoagulation.  MELD-Na score calculated; MELD 3.0: 15 at 10/25/2023  5:01 AM  MELD-Na: 9 at 10/25/2023  5:01 AM  Calculated from:  Serum Creatinine: 1.3 mg/dL at 10/25/2023  5:01 AM  Serum Sodium: 130 mmol/L at 10/25/2023  5:01 AM  Total Bilirubin: 0.3 mg/dL (Using min of 1 mg/dL) at 10/24/2023 12:26 PM  Serum Albumin: 3.1 g/dL at 10/24/2023 12:26 PM  INR(ratio): 1.0 at 10/23/2023 11:50 AM  Age at listing (hypothetical): 63 years  Sex: Male at 10/25/2023  5:01 AM      Continue chronic meds. Etiology likely ETOH. Will avoid any hepatotoxic meds, and monitor CBC/CMP/INR for synthetic function.

## 2023-10-29 NOTE — PROGRESS NOTES
[facility] - Telemetry ([facility])  Cardiology  Progress Note    Patient Name: Hemal Tolbert Jr.  MRN: [id number]  Admission Date: 10/23/2023  Hospital Length of Stay: 6 days  Code Status: Full Code   Attending Physician: Leelee Dewitt MD   Primary Care Physician: Norma, Provider  Expected Discharge Date: 10/29/2023  Principal Problem:VT (ventricular tachycardia)    Subjective:     Hospital Course:   10/24/23-Patient seen and examined today, resting in bed. Respiratory status worsened overnight, initially required Bipap. LA vasquez to 6.9, dobutamine initiated with improvement. Feeling better at time of exam, less SOB, able to lie flat. No CP. Troponin peaked at 33, now trending down. Repeat LA this AM normalized. Will plan on R/LHC today pending discussion with interventionalist, Dr. Ernandez.     10/25/23-Patient seen and examined today, sedated, s/p R/LHC yesterday which showed elevated filling pressure, moderate pulmonary HTN, occlured RCA with left to right collaterals , ostial 80% LAD stenosis. IABP placed. CTS turned down for CABG. No recurrent VT. Labs reviewed. H/H dropped no active signs of bleeding. Creatinine stable. Will re-assess in AM, keep NPO after MN for possible LHC with high risk PCI.     10/26/23-Patient seen and examined today, resting in bed. Repeat LHC with high risk PCI planned by Dr. Ernandez. Feels ok. No CP. SOB stable. Labs reviewed and are stable. Amiodarone switched to po. Will need LifeVest.    10/27/23-Patient seen and examined today, sitting up in bed. Feels ok. No CP/SOB. Complains of constipation, abdomen distended. Xray concerning for SBO, critical care following. Dobutamine being weaned. LifeVest ordered.     10/28/2023 patient seen and examined sitting up in bed feels improved walking out of bed from time to time will do decreased dobutamine and LifeVest ordered.  Plan on eventual discharge.    10/29/2023: Patent patient is stable sitting up in bed able to walk LifeVest order off  dobutamine will add Entresto today.  Continue guideline directed medical management of cardiomyopathy ischemia.  Patient is stable this time.      Interval History:  Stable no chest discomfort edema resolved abdomen is now quiescent improved.  No further normal pain    Review of Systems   Constitutional: Positive for malaise/fatigue. Negative for chills, diaphoresis, night sweats, weight gain and weight loss.   HENT:  Negative for congestion, hoarse voice, sore throat and stridor.    Eyes:  Negative for double vision and pain.   Cardiovascular:  Negative for chest pain, claudication, cyanosis, dyspnea on exertion, irregular heartbeat, leg swelling, near-syncope, orthopnea, palpitations, paroxysmal nocturnal dyspnea and syncope.   Respiratory:  Negative for cough, hemoptysis, shortness of breath, sleep disturbances due to breathing, snoring, sputum production and wheezing.    Endocrine: Negative for cold intolerance, heat intolerance and polydipsia.   Hematologic/Lymphatic: Negative for bleeding problem. Does not bruise/bleed easily.   Skin:  Negative for color change, dry skin and rash.   Musculoskeletal:  Negative for joint swelling and muscle cramps.   Gastrointestinal:  Negative for bloating, abdominal pain, constipation, diarrhea, dysphagia, melena, nausea and vomiting.   Genitourinary:  Negative for flank pain and urgency.   Neurological:  Negative for dizziness, focal weakness, headaches, light-headedness, loss of balance, seizures and weakness.   Psychiatric/Behavioral:  Negative for altered mental status and memory loss. The patient is not nervous/anxious.      Objective:     Vital Signs (Most Recent):  Temp: 98.1 °F (36.7 °C) (10/29/23 1152)  Pulse: 96 (10/29/23 1152)  Resp: 18 (10/29/23 1152)  BP: 139/86 (10/29/23 1152)  SpO2: (!) 93 % (10/29/23 1152) Vital Signs (24h Range):  Temp:  [97.4 °F (36.3 °C)-98.4 °F (36.9 °C)] 98.1 °F (36.7 °C)  Pulse:  [80-96] 96  Resp:  [13-30] 18  SpO2:  [91 %-96 %] 93 %  BP:  (117-145)/(56-86) 139/86     Weight: 83.1 kg (183 lb 3.2 oz)  Body mass index is 27.86 kg/m².     SpO2: (!) 93 %         Intake/Output Summary (Last 24 hours) at 10/29/2023 1559  Last data filed at 10/29/2023 1455  Gross per 24 hour   Intake 720 ml   Output 1840 ml   Net -1120 ml       Lines/Drains/Airways       Peripheral Intravenous Line  Duration                  Peripheral IV - Single Lumen 10/29/23 1520 22 G Posterior;Right Hand <1 day                       Physical Exam  Eyes:      Pupils: Pupils are equal, round, and reactive to light.   Neck:      Trachea: No tracheal deviation.   Cardiovascular:      Rate and Rhythm: Normal rate and regular rhythm.      Pulses: Intact distal pulses.           Carotid pulses are 2+ on the right side and 2+ on the left side.       Radial pulses are 2+ on the right side and 2+ on the left side.        Femoral pulses are 2+ on the right side and 2+ on the left side.       Popliteal pulses are 2+ on the right side and 2+ on the left side.        Dorsalis pedis pulses are 2+ on the right side and 2+ on the left side.        Posterior tibial pulses are 2+ on the right side and 2+ on the left side.      Heart sounds: Normal heart sounds. No murmur heard.     No friction rub. No gallop.   Pulmonary:      Effort: Pulmonary effort is normal. No respiratory distress.      Breath sounds: Normal breath sounds. No stridor. No wheezing or rales.   Chest:      Chest wall: No tenderness.   Abdominal:      General: There is no distension.      Tenderness: There is no abdominal tenderness. There is no rebound.   Musculoskeletal:         General: No tenderness.      Cervical back: Normal range of motion.   Skin:     General: Skin is warm and dry.   Neurological:      Mental Status: He is alert and oriented to person, place, and time.            Significant Labs: BMP:   Recent Labs   Lab 10/28/23  0502 10/29/23  0516   GLU 99 96    139   K 3.9 4.2    107   CO2 23 22*   BUN 52* 38*    CREATININE 1.4 1.1   CALCIUM 8.2* 7.7*   MG 2.5 2.4    and CBC   Recent Labs   Lab 10/28/23  0412 10/29/23  0516   WBC 15.14* 15.99*   HGB 9.8* 9.8*   HCT 29.7* 30.8*    241       Significant Imaging: Echocardiogram: Transthoracic echo (TTE) complete ([facility] Only):   Results for orders placed or performed during the hospital encounter of 10/23/23   Echo   Result Value Ref Range    BSA 2.08 m2    IVRT 49.48 msec    TDI LATERAL 0.10 m/s    Left Ventricular Outflow Tract Mean Gradient 1.13 mmHg    Left Ventricular Outflow Tract Mean Velocity 0.49 cm/s    MV stenosis pressure 1/2 time 34.73 ms    Mr max rex 3.50 m/s    TR Max Rex 2.09 m/s    Ao root annulus 3.38 cm    Ao peak rex 0.86 m/s    Posterior Wall 0.95 0.6 - 1.1 cm    LVOT diameter 1.95 cm    LVOT peak rex 0.76 m/s    LVOT peak VTI 10.90 cm    E wave deceleration time 119.77 msec    MV Peak A Rex 0.40 m/s    MV Peak E Rex 0.65 m/s    RA Major Axis 2.94 cm    TAPSE 1.82 cm    PV mean gradient 1 mmHg    RVOT peak rex 0.57 m/s    RVOT peak VTI 9.8 cm    IVS 0.96 0.6 - 1.1 cm    Ao VTI 11.90 cm    AV mean gradient 2 mmHg    LVIDd 4.24 3.5 - 6.0 cm    LVIDs 3.71 2.1 - 4.0 cm    Left Atrium Major Axis 4.28 cm    Left Atrium Minor Axis 4.19 cm    RV mid diameter 3.37 cm    STJ 3.35 cm    Ascending aorta 3.41 cm    LV Systolic Volume 58.38 mL    LV Diastolic Volume 80.17 mL    TDI SEPTAL 0.09 m/s    LA size 2.39 cm    IVC diameter 1.42 cm    LV LATERAL E/E' RATIO 6.50 m/s    LV SEPTAL E/E' RATIO 7.22 m/s    FS 13 28 - 44 %    LV mass 130.71 g    ZLVIDD -3.61     ZLVIDS -0.04     Left Ventricle Relative Wall Thickness 0.45 cm    AV valve area 2.73 cm²    AV Velocity Ratio 0.88     AV index (prosthetic) 0.92     MV valve area p 1/2 method 6.33 cm2    E/A ratio 1.63     Mean e' 0.10 m/s    LVOT area 3.0 cm2    LVOT stroke volume 32.54 cm3    AV peak gradient 3 mmHg    E/E' ratio 6.84 m/s    LV Systolic Volume Index 28.6 mL/m2    LV Diastolic Volume Index 39.30  mL/m2    LV Mass Index 64 g/m2    Triscuspid Valve Regurgitation Peak Gradient 17 mmHg    ADDIS by Velocity Ratio 2.64 cm²    LA Volume Index 11.4 mL/m2    LA volume 23.23 cm3    LA WIDTH 2.7 cm    RA Width 2.3 cm    TV resting pulmonary artery pressure 20 mmHg    RV TB RVSP 5 mmHg    Est. RA pres 3 mmHg    Narrative      Left Ventricle: The left ventricle is mildly dilated. Normal wall   thickness. There is concentric remodeling. Moderate global hypokinesis   present. There is severely reduced systolic function with a visually   estimated ejection fraction of 20 - 25%. There is normal diastolic   function.    Right Ventricle: Normal right ventricular cavity size. Wall thickness   is normal. Right ventricle wall motion  is normal. Systolic function is   normal.    Mitral Valve: There is mild to moderate regurgitation with a centrally   directed jet.    Pulmonary Artery: The estimated pulmonary artery systolic pressure is   20 mmHg.    IVC/SVC: Normal venous pressure at 3 mmHg.       Assessment and Plan:     Brief HPI:  Stable experience and will continue add medications guideline directed management LifeVest ordered.    * VT (ventricular tachycardia)  Treated with shock therapy  Continue amiodarone  Ischemic evaluation  ETOH evaluation and cardiac echo pending    10/24/23  -Echo with EF of 20-25%  -Ischemic evaluation pending  -Continue amiodarone gtt for now  -Keep K > 4; Mg > 2    10/25/23  -Stable  -Keep on amiodarone for now  -Repeat LFT's    10/26/23  -Amiodarone switched to po  -Will need LIfeVest    Abdominal distention  -Xray concerning for SBO  -Mgmt as per primary team    Cardiogenic shock  -Clinical condition worsened overnight with increased SOB/lactic acidosis  -LA normalized on dobutamine  -Continue inotropic support  -R/LHC planned  -Will discuss with [facility]    10/25/23  -Not candidate for advanced options given ETOH abuse  -Continue dobutamine  -Possible high risk PCI tmw    10/26/23  -[facility]  with possible high risk PCI today  -Will try to wean dobutamine as tolerated  -OMT    10/27/23  -s/p LHC yesterday---med mgmt recommended  -Continue ASA, Plavix, statin  -Will add BB once off inotrope  -Entresto, po Lasix as tolerated    Alcohol abuse  -ETOH cessation    COPD (chronic obstructive pulmonary disease)  -Mgmt as per primary team    Tobacco dependence  -Cessation advised    Acute on chronic combined systolic and diastolic heart failure  -BNP minimally elevated  -LA improved with inotropic support  -Ischemic evaluation pending  -Will optimize regimen accordingly    10/25/23  -RHC with elevated filling pressures  -Continue IV diuresis  -Will optimize regimen as tolerated  -Possible high risk PCI tmw    10/26/23  -Continue OMT  -LHC with possible high risk PCI today    10/27/23  -Continue OMT-will add BB once off dobutamine  -Entresto, po Lasix when able to tolerate  -LifeVest for SCD prevention    10/29/2023: Continue guideline directed medical management beta-blockers added as well as Entresto continue to follow patient clinically now off dobutamine LifeVest ordered.    Elevated troponin  Will trend  IV heparin  Cardiac echo pending  EKG not showing ischemia    10/24/23  -Troponin bumped to 33, now trending down  -CP free during exam  -Echo showed EF of 20-25%, developed cardiogenic shock overnight, responded well to dobutamine  -Continue ASA, heparin gtt  -Plans for R/LHC today pending discussion with Dr. Ernandez    10/25/23  -s/p R/LHC which showed elevated filling pressures, occluded RCA with left to right collateral, ostial LAD  -Turned down for CABG by CTS  -Continue heparin gtt, ASA, statin   -Keep NPO for possible high risk PCI tmw pending stability of H/H    10/26/23  -LHC with possible high risk PCI today by Dr. Ernandez. All risks, benefits, and treatment alternatives explained to patient in detail. All questions answered. He has agreed to proceed.    10/27/23  -s/p LHC yesterday that showed 50%  ostial LAD lesion, 40% D1 lesion, luminal irregularities of LCX  -CP free  -Continue ASA, Plavix, statin  -Will further optimize meds as tolerated  -Wean dobutamine as tolerated        CHF (congestive heart failure)  Patient is identified as having Combined Systolic and Diastolic heart failure that is Acute on chronic. CHF is currently uncontrolled due to Continued edema of extremities. Latest ECHO performed and demonstrates- Results for orders placed during the hospital encounter of 06/07/23    Echo    Interpretation Summary  · The left ventricle is mildly enlarged with mildly decreased systolic function.  · Overall there is mild to moderate global hypokinesis; Prounounced basal inferior and inferioseptal hypokinesis.  · The estimated ejection fraction is 40%.  · Grade III left ventricular diastolic dysfunction.  · Mild right ventricular enlargement with low normal right ventricular systolic function.  · Mild-to-moderate mitral regurgitation.  · Mild tricuspid regurgitation.  · Mild pulmonic regurgitation.  · The estimated PA systolic pressure is 35 mmHg.  · Mild left atrial enlargement.  · Mild right atrial enlargement.  . Continue Beta Blocker, ACE/ARB and Furosemide and monitor clinical status closely. Monitor on telemetry. Patient is off CHF pathway.  Monitor strict Is&Os and daily weights.  Place on fluid restriction of 2 L. Cardiology has been consulted. Continue to stress to patient importance of self efficacy and  on diet for CHF. Last BNP reviewed- and noted below   Recent Labs   Lab 10/23/23  0950   *   .    Alcoholic cirrhosis  -Per intensive team  -Will monitor LFT's while on amiodarone    10/26/23  -May need paracentesis, abdomen terse    10/27/23  -Mgmt as per primary team        VTE Risk Mitigation (From admission, onward)         Ordered     enoxaparin injection 40 mg  Every 24 hours         10/28/23 0938     IP VTE HIGH RISK PATIENT  Once         10/23/23 1120     Place sequential  compression device  Until discontinued         10/23/23 1120                Gaurav Salomon MD  Cardiology  [facility] - Telemetry ([facility])

## 2023-10-29 NOTE — PLAN OF CARE
Patient updated on plan of care. Instructed  patient to use call light, call light within reach. Hourly rounding performed. Fall precautions maintained; bed alarm on. Vitals [id number]. Chart check complete. Education provided, questions encouraged. Patient awaiting life vest appeal (to be done Monday) for discharge.      Problem: Adult Inpatient Plan of Care  Goal: Plan of Care Review  Outcome: Ongoing, Progressing

## 2023-10-30 PROBLEM — I25.110 CORONARY ARTERY DISEASE INVOLVING NATIVE CORONARY ARTERY OF NATIVE HEART WITH UNSTABLE ANGINA PECTORIS: Status: ACTIVE | Noted: 2023-10-30

## 2023-10-30 PROBLEM — I21.4 NSTEMI (NON-ST ELEVATED MYOCARDIAL INFARCTION): Status: ACTIVE | Noted: 2023-10-30

## 2023-10-30 LAB
ANION GAP SERPL CALC-SCNC: 8 MMOL/L (ref 8–16)
BASOPHILS # BLD AUTO: 0.04 K/UL (ref 0–0.2)
BASOPHILS NFR BLD: 0.3 % (ref 0–1.9)
BUN SERPL-MCNC: 37 MG/DL (ref 8–23)
CALCIUM SERPL-MCNC: 7.5 MG/DL (ref 8.7–10.5)
CHLORIDE SERPL-SCNC: 109 MMOL/L (ref 95–110)
CO2 SERPL-SCNC: 21 MMOL/L (ref 23–29)
CREAT SERPL-MCNC: 1.1 MG/DL (ref 0.5–1.4)
DIFFERENTIAL METHOD: ABNORMAL
EOSINOPHIL # BLD AUTO: 0.1 K/UL (ref 0–0.5)
EOSINOPHIL NFR BLD: 0.5 % (ref 0–8)
ERYTHROCYTE [DISTWIDTH] IN BLOOD BY AUTOMATED COUNT: 12.8 % (ref 11.5–14.5)
EST. GFR  (NO RACE VARIABLE): >60 ML/MIN/1.73 M^2
GLUCOSE SERPL-MCNC: 107 MG/DL (ref 70–110)
HCT VFR BLD AUTO: 29.1 % (ref 40–54)
HGB BLD-MCNC: 9.2 G/DL (ref 14–18)
IMM GRANULOCYTES # BLD AUTO: 0.49 K/UL (ref 0–0.04)
IMM GRANULOCYTES NFR BLD AUTO: 3.3 % (ref 0–0.5)
LYMPHOCYTES # BLD AUTO: 1.2 K/UL (ref 1–4.8)
LYMPHOCYTES NFR BLD: 8 % (ref 18–48)
MAGNESIUM SERPL-MCNC: 2.5 MG/DL (ref 1.6–2.6)
MCH RBC QN AUTO: 31.8 PG (ref 27–31)
MCHC RBC AUTO-ENTMCNC: 31.6 G/DL (ref 32–36)
MCV RBC AUTO: 101 FL (ref 82–98)
MONOCYTES # BLD AUTO: 1.7 K/UL (ref 0.3–1)
MONOCYTES NFR BLD: 11.3 % (ref 4–15)
NEUTROPHILS # BLD AUTO: 11.5 K/UL (ref 1.8–7.7)
NEUTROPHILS NFR BLD: 76.6 % (ref 38–73)
NRBC BLD-RTO: 0 /100 WBC
PHOSPHATE SERPL-MCNC: 2.7 MG/DL (ref 2.7–4.5)
PLATELET # BLD AUTO: 230 K/UL (ref 150–450)
PMV BLD AUTO: 10.5 FL (ref 9.2–12.9)
POCT GLUCOSE: 133 MG/DL (ref 70–110)
POTASSIUM SERPL-SCNC: 4.2 MMOL/L (ref 3.5–5.1)
RBC # BLD AUTO: 2.89 M/UL (ref 4.6–6.2)
SODIUM SERPL-SCNC: 138 MMOL/L (ref 136–145)
WBC # BLD AUTO: 14.97 K/UL (ref 3.9–12.7)

## 2023-10-30 PROCEDURE — 25000003 PHARM REV CODE 250: Performed by: NURSE PRACTITIONER

## 2023-10-30 PROCEDURE — 99232 PR SUBSEQUENT HOSPITAL CARE,LEVL II: ICD-10-PCS | Mod: ,,,

## 2023-10-30 PROCEDURE — 25000242 PHARM REV CODE 250 ALT 637 W/ HCPCS: Performed by: INTERNAL MEDICINE

## 2023-10-30 PROCEDURE — 25000003 PHARM REV CODE 250: Performed by: PHYSICIAN ASSISTANT

## 2023-10-30 PROCEDURE — 99232 SBSQ HOSP IP/OBS MODERATE 35: CPT | Mod: ,,,

## 2023-10-30 PROCEDURE — 25000003 PHARM REV CODE 250

## 2023-10-30 PROCEDURE — 63600175 PHARM REV CODE 636 W HCPCS: Performed by: NURSE PRACTITIONER

## 2023-10-30 PROCEDURE — 94761 N-INVAS EAR/PLS OXIMETRY MLT: CPT

## 2023-10-30 PROCEDURE — 36415 COLL VENOUS BLD VENIPUNCTURE: CPT | Performed by: INTERNAL MEDICINE

## 2023-10-30 PROCEDURE — 25000003 PHARM REV CODE 250: Performed by: INTERNAL MEDICINE

## 2023-10-30 PROCEDURE — 94640 AIRWAY INHALATION TREATMENT: CPT

## 2023-10-30 PROCEDURE — 85025 COMPLETE CBC W/AUTO DIFF WBC: CPT | Performed by: INTERNAL MEDICINE

## 2023-10-30 PROCEDURE — 25000003 PHARM REV CODE 250: Performed by: FAMILY MEDICINE

## 2023-10-30 PROCEDURE — 84100 ASSAY OF PHOSPHORUS: CPT | Performed by: INTERNAL MEDICINE

## 2023-10-30 PROCEDURE — 83735 ASSAY OF MAGNESIUM: CPT | Performed by: INTERNAL MEDICINE

## 2023-10-30 PROCEDURE — 21400001 HC TELEMETRY ROOM

## 2023-10-30 PROCEDURE — 80048 BASIC METABOLIC PNL TOTAL CA: CPT | Performed by: INTERNAL MEDICINE

## 2023-10-30 RX ADMIN — GABAPENTIN 300 MG: 300 CAPSULE ORAL at 08:10

## 2023-10-30 RX ADMIN — AMIODARONE HYDROCHLORIDE 400 MG: 200 TABLET ORAL at 09:10

## 2023-10-30 RX ADMIN — SPIRONOLACTONE 25 MG: 25 TABLET ORAL at 08:10

## 2023-10-30 RX ADMIN — TAMSULOSIN HYDROCHLORIDE 0.4 MG: 0.4 CAPSULE ORAL at 08:10

## 2023-10-30 RX ADMIN — ARFORMOTEROL TARTRATE 15 MCG: 15 SOLUTION RESPIRATORY (INHALATION) at 07:10

## 2023-10-30 RX ADMIN — Medication 100 MG: at 08:10

## 2023-10-30 RX ADMIN — TRAZODONE HYDROCHLORIDE 25 MG: 50 TABLET ORAL at 09:10

## 2023-10-30 RX ADMIN — METOPROLOL SUCCINATE 12.5 MG: 25 TABLET, EXTENDED RELEASE ORAL at 02:10

## 2023-10-30 RX ADMIN — LACTULOSE 20 G: 20 SOLUTION ORAL at 08:10

## 2023-10-30 RX ADMIN — ENOXAPARIN SODIUM 40 MG: 40 INJECTION SUBCUTANEOUS at 05:10

## 2023-10-30 RX ADMIN — ATORVASTATIN CALCIUM 80 MG: 40 TABLET, FILM COATED ORAL at 08:10

## 2023-10-30 RX ADMIN — CLOPIDOGREL BISULFATE 75 MG: 75 TABLET ORAL at 08:10

## 2023-10-30 RX ADMIN — BUDESONIDE INHALATION 0.5 MG: 0.5 SUSPENSION RESPIRATORY (INHALATION) at 07:10

## 2023-10-30 RX ADMIN — PANTOPRAZOLE SODIUM 40 MG: 40 TABLET, DELAYED RELEASE ORAL at 09:10

## 2023-10-30 RX ADMIN — AMIODARONE HYDROCHLORIDE 400 MG: 200 TABLET ORAL at 08:10

## 2023-10-30 RX ADMIN — GABAPENTIN 300 MG: 300 CAPSULE ORAL at 09:10

## 2023-10-30 RX ADMIN — ASPIRIN 81 MG CHEWABLE TABLET 81 MG: 81 TABLET CHEWABLE at 08:10

## 2023-10-30 RX ADMIN — FOLIC ACID 1 MG: 1 TABLET ORAL at 08:10

## 2023-10-30 RX ADMIN — SACUBITRIL AND VALSARTAN 1 TABLET: 24; 26 TABLET, FILM COATED ORAL at 08:10

## 2023-10-30 RX ADMIN — GABAPENTIN 300 MG: 300 CAPSULE ORAL at 02:10

## 2023-10-30 RX ADMIN — PREDNISONE 20 MG: 20 TABLET ORAL at 08:10

## 2023-10-30 RX ADMIN — PANTOPRAZOLE SODIUM 40 MG: 40 TABLET, DELAYED RELEASE ORAL at 08:10

## 2023-10-30 RX ADMIN — SPIRONOLACTONE 25 MG: 25 TABLET ORAL at 09:10

## 2023-10-30 RX ADMIN — SACUBITRIL AND VALSARTAN 1 TABLET: 24; 26 TABLET, FILM COATED ORAL at 09:10

## 2023-10-30 NOTE — ASSESSMENT & PLAN NOTE
Required precedex infusion  librium taper completed  No active signs of withdrawal   Continue gabapentin to help with cravings

## 2023-10-30 NOTE — PROGRESS NOTES
[facility] - Telemetry [facility] Medicine  Progress Note    Patient Name: Hemal Tolbert Jr.  MRN: [id number]  Patient Class: IP- Inpatient   Admission Date: 10/23/2023  Length of Stay: 7 days  Attending Physician: Leelee Dewitt MD  Primary Care Provider: Norma Provider        Subjective:     Principal Problem:VT (ventricular tachycardia)        HPI:  63M  has a past medical history of Alcoholic cirrhosis, CHF (congestive heart failure), COPD (chronic obstructive pulmonary disease), Hypertension, Peripheral artery disease, and Sleep apnea.  Presented to emergency department from outside facility for vtach requiring defibrillation x 2. Initial workup revealed possible Torsades on electrocardiography strips. Cbc with leukocytosis, cmp with hyponatremia, creatinine 1.2, , , and elevated troponin, 3.6 --> 9.9. Patient was given an aspirin, started on heparin infusion, given 4 mg of Mag IV, and 40 mg of Lasix IV and transferred to icu per Cardiology recommendations. Echocardiogram with 20-25% ejection fraction. Patient required precedex infusion while in icu.    Cardiac catheterization revealed severe stenosis. CTS consulted and deemed not a surgical candidate. Amio infusion converted to po medication(s). Dobutamine infusion weaned. Patient is awaiting lifevest prior to discharge.    Us abdomen without significant ascites. On lactulose for cirrhosis.     Hospital medicine consulted for assumption of care.         Overview/Hospital Course:  63M has a past medical history of Alcoholic cirrhosis, CHF (congestive heart failure), COPD (chronic obstructive pulmonary disease), Hypertension, Peripheral artery disease, and Sleep apnea.  Presented to emergency department from outside facility for vtach requiring defibrillation x 2. Initial workup revealed possible Torsades on electrocardiography strips. Cbc with leukocytosis, cmp with hyponatremia, creatinine 1.2, , , and elevated troponin,  3.6 --> 9.9. Patient was given an aspirin, started on heparin infusion, given 4 mg of Mag IV, and 40 mg of Lasix IV and transferred to icu per Cardiology recommendations. Echocardiogram with 20-25% ejection fraction. Patient required precedex infusion while in icu.     Cardiac catheterization revealed severe stenosis. CTS consulted and deemed not a surgical candidate. Amio infusion converted to po medication(s). Dobutamine infusion weaned. Patient is awaiting lifevest prior to discharge.     Us abdomen without significant ascites. On lactulose for cirrhosis.   10/29 transferred from icu to floor status. No acute events overnight. Awaiting lifevest. Voiding trial to discontinue jacobson. Multiple bowel movements reported.  10/30 no acute events overnight. Jacobson discontinued. Awaiting lifevest. Patient ready for discharge.      Interval History: See hospital course for today       Review of Systems   Constitutional:  Negative for activity change, appetite change, fatigue and fever.   Respiratory:  Positive for shortness of breath and wheezing.    Cardiovascular:  Negative for chest pain and leg swelling.   Gastrointestinal:  Negative for abdominal pain, constipation, nausea and vomiting.   Genitourinary:  Negative for difficulty urinating.   Neurological:  Negative for weakness.   Psychiatric/Behavioral:  Negative for agitation, behavioral problems, confusion, decreased concentration and dysphoric mood. The patient is nervous/anxious.      Objective:     Vital Signs (Most Recent):  Temp: 97.7 °F (36.5 °C) (10/30/23 0730)  Pulse: 89 (10/30/23 0905)  Resp: 16 (10/30/23 0730)  BP: 130/70 (10/30/23 0730)  SpO2: 99 % (10/30/23 0730) Vital Signs (24h Range):  Temp:  [97.7 °F (36.5 °C)-98.7 °F (37.1 °C)] 97.7 °F (36.5 °C)  Pulse:  [82-89] 89  Resp:  [16-20] 16  SpO2:  [92 %-99 %] 99 %  BP: ()/(51-70) 130/70     Weight: 83.1 kg (183 lb 3.2 oz)  Body mass index is 27.86 kg/m².    Intake/Output Summary (Last 24 hours) at  10/30/2023 1156  Last data filed at 10/30/2023 0905  Gross per 24 hour   Intake 118 ml   Output 375 ml   Net -257 ml         Physical Exam  Vitals and nursing note reviewed.   Constitutional:       General: He is not in acute distress.     Appearance: He is ill-appearing. He is not toxic-appearing.   HENT:      Head: Normocephalic and atraumatic.   Cardiovascular:      Rate and Rhythm: Normal rate.   Pulmonary:      Effort: Pulmonary effort is normal. No respiratory distress.      Breath sounds: Rales present.   Abdominal:      General: There is distension.      Palpations: Abdomen is soft.      Tenderness: There is no abdominal tenderness.   Musculoskeletal:      Right lower leg: No edema.      Left lower leg: No edema.   Skin:     General: Skin is warm.   Neurological:      Mental Status: He is alert. Mental status is at baseline.      Motor: No weakness.             Significant Labs: All pertinent labs within the past 24 hours have been reviewed.  CBC:   Recent Labs   Lab 10/29/23  0516 10/30/23  0501   WBC 15.99* 14.97*   HGB 9.8* 9.2*   HCT 30.8* 29.1*    230     CMP:   Recent Labs   Lab 10/29/23  0516 10/30/23  0501    138   K 4.2 4.2    109   CO2 22* 21*   GLU 96 107   BUN 38* 37*   CREATININE 1.1 1.1   CALCIUM 7.7* 7.5*   ANIONGAP 10 8       Significant Imaging: I have reviewed all pertinent imaging results/findings within the past 24 hours.      Assessment/Plan:      * VT (ventricular tachycardia)  Required defibrillation x 2  Electrolytes corrected  Cardiology following  Severe CAD, not a surgical candidate  Awaiting lifevest      Abdominal distention  Us abdomen without significant ascites  Continue lactulose      Cardiogenic shock  Awaiting lifevest      Urine retention  Started on flomax  Voiding trial    Resolved     Alcohol abuse  Required precedex infusion  librium taper completed  No active signs of withdrawal   Continue gabapentin to help with cravings     COPD (chronic  obstructive pulmonary disease)  Patient's COPD is with exacerbation noted by continued dyspnea currently.  Patient is currently off COPD Pathway. Continue scheduled inhalers Steroids and Supplemental oxygen and monitor respiratory status closely.   Continue scheduled breathing treatments    Primary hypertension  Chronic, controlled. Latest blood pressure and vitals reviewed-     Temp:  [97.7 °F (36.5 °C)-98.7 °F (37.1 °C)]   Pulse:  [82-89]   Resp:  [16-20]   BP: ()/(51-70)   SpO2:  [92 %-99 %] .   Home meds for hypertension were reviewed and noted below.   Hypertension Medications             furosemide (LASIX) 40 MG tablet Take 40 mg by mouth once daily.    lisinopriL (PRINIVIL,ZESTRIL) 2.5 MG tablet Take 1 tablet (2.5 mg total) by mouth once daily.    metoprolol succinate (TOPROL-XL) 25 MG 24 hr tablet Take 1 tablet (25 mg total) by mouth once daily.    spironolactone (ALDACTONE) 50 MG tablet Take 50 mg by mouth 2 (two) times daily.          While in the hospital, will manage blood pressure as follows; Adjust home antihypertensive regimen as follows- prn hydralazine    Will utilize p.r.n. blood pressure medication only if patient's blood pressure greater than 140/90 and he develops symptoms such as worsening chest pain or shortness of breath.    Tobacco dependence  Dangers of cigarette smoking were reviewed with patient in detail. Patient was Counseled for 3-10 minutes. Nicotine replacement options were discussed. Nicotine replacement was discussed- not prescribed per patient's request    Acute on chronic combined systolic and diastolic heart failure  Attributed to etoh and cad  Fluid and salt restriction  I/os  Veronica discontinued  Awaiting lifevest    Elevated troponin  See vtach      Alcoholic cirrhosis  Patient with known Cirrhosis with Child's class Calculator for Child's score link- https://www.mdcalc.com/calc/340/child-sarmiento-score-cirrhosis-mortality#creator-insights. Co-morbidities are present and  inclusive of ascites, malnutrition, anemia/pancytopenia and anticoagulation.  MELD-Na score calculated; MELD 3.0: 15 at 10/25/2023  5:01 AM  MELD-Na: 9 at 10/25/2023  5:01 AM  Calculated from:  Serum Creatinine: 1.3 mg/dL at 10/25/2023  5:01 AM  Serum Sodium: 130 mmol/L at 10/25/2023  5:01 AM  Total Bilirubin: 0.3 mg/dL (Using min of 1 mg/dL) at 10/24/2023 12:26 PM  Serum Albumin: 3.1 g/dL at 10/24/2023 12:26 PM  INR(ratio): 1.0 at 10/23/2023 11:50 AM  Age at listing (hypothetical): 63 years  Sex: Male at 10/25/2023  5:01 AM      Continue chronic meds. Etiology likely ETOH. Will avoid any hepatotoxic meds, and monitor CBC/CMP/INR for synthetic function.     Continue lactulose      VTE Risk Mitigation (From admission, onward)         Ordered     enoxaparin injection 40 mg  Every 24 hours         10/28/23 0938     IP VTE HIGH RISK PATIENT  Once         10/23/23 1120     Place sequential compression device  Until discontinued         10/23/23 1120                Discharge Planning   SADI: 10/30/2023     Code Status: Full Code   Is the patient medically ready for discharge?: Yes    Reason for patient still in hospital (select all that apply): Patient trending condition, Treatment, Consult recommendations and Pending disposition  Discharge Plan A: Home with family                  Leelee Dewitt MD  Department of Hospital Medicine   '[facility] - Telemetry ([facility])

## 2023-10-30 NOTE — ASSESSMENT & PLAN NOTE
-BNP minimally elevated  -LA improved with inotropic support  -Ischemic evaluation pending  -Will optimize regimen accordingly    10/25/23  -RHC with elevated filling pressures  -Continue IV diuresis  -Will optimize regimen as tolerated  -Possible high risk PCI tmw    10/26/23  -Continue OMT  -LHC with possible high risk PCI today    10/27/23  -Continue OMT-will add BB once off dobutamine  -Entresto, po Lasix when able to tolerate  -LifeVest for SCD prevention    10/29/2023: Continue guideline directed medical management beta-blockers added as well as Entresto continue to follow patient clinically now off dobutamine LifeVest ordered.    10/30/23  LifeVest before DC  Cont entresto, aldactone, BB  Will need PO lasix upon DC

## 2023-10-30 NOTE — CONSULTS
Per chart review, Patient's referral for LifeVest was sent Friday, 10/27 and Merrill Lucas, was notified.  GINA checked in with Lance for status on LifeVest. Per Lance, Patient's insurance never initially approves a LifeVest, and Merrill must appeal to have LifeVest approved. Per Lance, Patient's insurance has denied, so MARQUES Fletcher, with Cardiology, must fill out a Letter of Medical Necessity form to appeal denial. Per Lance, this process does take a few days, but he will reach out to MARQUES Fletcher, to get letter signed for patient's insurance.    10 35 GINA sent Letter of Medical Necessity form to Merrill, to start LV appeal process. Per Lance, it will take a few days for appeal to go through and a decision to be made.    GINA will continue to follow and assist as needed.

## 2023-10-30 NOTE — PROGRESS NOTES
[facility] Tele[facility] ([facility])  Cardiology  Progress Note    Patient Name: Hemal Tolbert Jr.  MRN: [id number]  Admission Date: 10/23/2023  Hospital Length of Stay: 7 days  Code Status: Full Code   Attending Physician: Leelee Dewitt MD   Primary Care Physician: Norma, Provider  Expected Discharge Date: 10/30/2023  Principal Problem:VT (ventricular tachycardia)    Subjective:   63-year-old male with a known past medical history of combined heart failure with the EF of 40% and grade 3 diastolic dysfunction, alcohol abuse (reports 3-4 beers/day), tobacco abuse with alcoholic cirrhosis, hypertension, and COPD that presented to the  ER 10/23 as an ER to ER transfer for vtach that required defibrillation.  Prior to transfer, patient was initiated on amiodarone infusion.  Upon arrival to the ER in [place], Cardiology was consulted.  Is documented that patient again went into V-tach and required defibrillation in the ER with 1 shock with conversion to normal sinus rhythm.  ER nurse reports patient was given multiple nebs back to back for wheezing and was escalated to a Ventimask if he is a mouth breather. Patient's patient will be continued on amnio fusion protocol.  Review of EKG strips in the concerning for possible Torsades. Workup significant for leukocytosis of 17,000, sodium 128, creatinine 1.2, , , and troponin trends up with latest 3.6 --> 9.9.  Patient was given an aspirin, started on heparin infusion, given 4 mg of Mag IV, and 40 mg of Lasix IV.  Echo ordered.  Patient admitted to the ICU per the critical care team with Cardiology following.  Hospital Course:   10/24/23-Patient seen and examined today, resting in bed. Respiratory status worsened overnight, initially required Bipap. LA vasquez to 6.9, dobutamine initiated with improvement. Feeling better at time of exam, less SOB, able to lie flat. No CP. Troponin peaked at 33, now trending down. Repeat LA this AM normalized. Will plan on R/[facility]  today pending discussion with interventionalist, Dr. Ernandez.     10/25/23-Patient seen and examined today, sedated, s/p R/LHC yesterday which showed elevated filling pressure, moderate pulmonary HTN, occlured RCA with left to right collaterals , ostial 80% LAD stenosis. IABP placed. CTS turned down for CABG. No recurrent VT. Labs reviewed. H/H dropped no active signs of bleeding. Creatinine stable. Will re-assess in AM, keep NPO after MN for possible LHC with high risk PCI.     10/26/23-Patient seen and examined today, resting in bed. Repeat LHC with high risk PCI planned by Dr. Ernandez. Feels ok. No CP. SOB stable. Labs reviewed and are stable. Amiodarone switched to po. Will need LifeVest.    10/27/23-Patient seen and examined today, sitting up in bed. Feels ok. No CP/SOB. Complains of constipation, abdomen distended. Xray concerning for SBO, critical care following. Dobutamine being weaned. LifeVest ordered.     10/28/2023 patient seen and examined sitting up in bed feels improved walking out of bed from time to time will do decreased dobutamine and LifeVest ordered.  Plan on eventual discharge.    10/29/2023: Patent patient is stable sitting up in bed able to walk LifeVest order off dobutamine will add Entresto today.  Continue guideline directed medical management of cardiomyopathy ischemia.  Patient is stable this time.    10/30/23 pt seen and examined today, resting in bed. Denies any Cp and CHF sxs at this time. Awaiting LifeVest approval. BP stable. Labs reviewed, stable          Review of Systems   Constitutional: Negative.   HENT: Negative.     Eyes: Negative.    Cardiovascular: Negative.    Respiratory: Negative.     Skin: Negative.    Musculoskeletal: Negative.    Gastrointestinal: Negative.    Genitourinary: Negative.    Neurological: Negative.    Psychiatric/Behavioral: Negative.       Objective:     Vital Signs (Most Recent):  Temp: 97.7 °F (36.5 °C) (10/30/23 0730)  Pulse: 89 (10/30/23 0905)  Resp: 16  (10/30/23 0730)  BP: 130/70 (10/30/23 0730)  SpO2: 99 % (10/30/23 0730) Vital Signs (24h Range):  Temp:  [97.7 °F (36.5 °C)-98.7 °F (37.1 °C)] 97.7 °F (36.5 °C)  Pulse:  [82-89] 89  Resp:  [16-20] 16  SpO2:  [92 %-99 %] 99 %  BP: ()/(51-70) 130/70     Weight: 83.1 kg (183 lb 3.2 oz)  Body mass index is 27.86 kg/m².     SpO2: 99 %         Intake/Output Summary (Last 24 hours) at 10/30/2023 1310  Last data filed at 10/30/2023 0905  Gross per 24 hour   Intake 118 ml   Output 375 ml   Net -257 ml       Lines/Drains/Airways       Peripheral Intravenous Line  Duration                  Peripheral IV - Single Lumen 10/29/23 1520 22 G Posterior;Right Hand <1 day                       Physical Exam  Vitals and nursing note reviewed.   Constitutional:       Appearance: Normal appearance.   HENT:      Head: Normocephalic and atraumatic.   Eyes:      General:         Right eye: No discharge.         Left eye: No discharge.      Pupils: Pupils are equal, round, and reactive to light.   Cardiovascular:      Rate and Rhythm: Normal rate and regular rhythm.      Heart sounds: S1 normal and S2 normal. No murmur heard.     No friction rub.   Pulmonary:      Effort: Pulmonary effort is normal. No respiratory distress.      Breath sounds: Normal breath sounds. No rales.   Abdominal:      Palpations: Abdomen is soft.      Tenderness: There is no abdominal tenderness.   Musculoskeletal:      Cervical back: Neck supple.      Right lower leg: No edema.      Left lower leg: No edema.   Skin:     General: Skin is warm and dry.   Neurological:      General: No focal deficit present.      Mental Status: He is alert and oriented to person, place, and time.   Psychiatric:         Mood and Affect: Mood normal.         Behavior: Behavior normal.         Thought Content: Thought content normal.            Significant Labs: BMP:   Recent Labs   Lab 10/29/23  0516 10/30/23  0501   GLU 96 107    138   K 4.2 4.2    109   CO2 22* 21*  "  BUN 38* 37*   CREATININE 1.1 1.1   CALCIUM 7.7* 7.5*   MG 2.4 2.5   , CMP   Recent Labs   Lab 10/29/23  0516 10/30/23  0501    138   K 4.2 4.2    109   CO2 22* 21*   GLU 96 107   BUN 38* 37*   CREATININE 1.1 1.1   CALCIUM 7.7* 7.5*   ANIONGAP 10 8   , CBC   Recent Labs   Lab 10/29/23  0516 10/30/23  0501   WBC 15.99* 14.97*   HGB 9.8* 9.2*   HCT 30.8* 29.1*    230   , INR No results for input(s): "INR", "PROTIME" in the last 48 hours., Lipid Panel No results for input(s): "CHOL", "HDL", "LDLCALC", "TRIG", "CHOLHDL" in the last 48 hours., Troponin No results for input(s): "TROPONINI" in the last 48 hours., and All pertinent lab results from the last 24 hours have been reviewed.    Significant Imaging: Cardiac Cath: reviewed, Echocardiogram: Transthoracic echo (TTE) complete (Cupid Only):   Results for orders placed or performed during the hospital encounter of 10/23/23   Echo   Result Value Ref Range    BSA 2.08 m2    IVRT 49.48 msec    TDI LATERAL 0.10 m/s    Left Ventricular Outflow Tract Mean Gradient 1.13 mmHg    Left Ventricular Outflow Tract Mean Velocity 0.49 cm/s    MV stenosis pressure 1/2 time 34.73 ms    Mr max rex 3.50 m/s    TR Max Rex 2.09 m/s    Ao root annulus 3.38 cm    Ao peak rex 0.86 m/s    Posterior Wall 0.95 0.6 - 1.1 cm    LVOT diameter 1.95 cm    LVOT peak rex 0.76 m/s    LVOT peak VTI 10.90 cm    E wave deceleration time 119.77 msec    MV Peak A Rex 0.40 m/s    MV Peak E Rex 0.65 m/s    RA Major Axis 2.94 cm    TAPSE 1.82 cm    PV mean gradient 1 mmHg    RVOT peak rex 0.57 m/s    RVOT peak VTI 9.8 cm    IVS 0.96 0.6 - 1.1 cm    Ao VTI 11.90 cm    AV mean gradient 2 mmHg    LVIDd 4.24 3.5 - 6.0 cm    LVIDs 3.71 2.1 - 4.0 cm    Left Atrium Major Axis 4.28 cm    Left Atrium Minor Axis 4.19 cm    RV mid diameter 3.37 cm    STJ 3.35 cm    Ascending aorta 3.41 cm    LV Systolic Volume 58.38 mL    LV Diastolic Volume 80.17 mL    TDI SEPTAL 0.09 m/s    LA size 2.39 cm    IVC " diameter 1.42 cm    LV LATERAL E/E' RATIO 6.50 m/s    LV SEPTAL E/E' RATIO 7.22 m/s    FS 13 28 - 44 %    LV mass 130.71 g    ZLVIDD -3.61     ZLVIDS -0.04     Left Ventricle Relative Wall Thickness 0.45 cm    AV valve area 2.73 cm²    AV Velocity Ratio 0.88     AV index (prosthetic) 0.92     MV valve area p 1/2 method 6.33 cm2    E/A ratio 1.63     Mean e' 0.10 m/s    LVOT area 3.0 cm2    LVOT stroke volume 32.54 cm3    AV peak gradient 3 mmHg    E/E' ratio 6.84 m/s    LV Systolic Volume Index 28.6 mL/m2    LV Diastolic Volume Index 39.30 mL/m2    LV Mass Index 64 g/m2    Triscuspid Valve Regurgitation Peak Gradient 17 mmHg    ADDIS by Velocity Ratio 2.64 cm²    LA Volume Index 11.4 mL/m2    LA volume 23.23 cm3    LA WIDTH 2.7 cm    RA Width 2.3 cm    TV resting pulmonary artery pressure 20 mmHg    RV TB RVSP 5 mmHg    Est. RA pres 3 mmHg    Narrative      Left Ventricle: The left ventricle is mildly dilated. Normal wall   thickness. There is concentric remodeling. Moderate global hypokinesis   present. There is severely reduced systolic function with a visually   estimated ejection fraction of 20 - 25%. There is normal diastolic   function.    Right Ventricle: Normal right ventricular cavity size. Wall thickness   is normal. Right ventricle wall motion  is normal. Systolic function is   normal.    Mitral Valve: There is mild to moderate regurgitation with a centrally   directed jet.    Pulmonary Artery: The estimated pulmonary artery systolic pressure is   20 mmHg.    IVC/SVC: Normal venous pressure at 3 mmHg.     , EKG: reviewed, Stress Test: reviewed, and X-Ray: CXR: X-Ray Chest 1 View (CXR):   Results for orders placed or performed during the hospital encounter of 10/23/23   X-Ray Chest 1 View    Narrative    EXAMINATION:  XR CHEST 1 VIEW    CLINICAL HISTORY:  IABP placement;    TECHNIQUE:  Single frontal view of the chest was performed.    COMPARISON:  10/23/2023    FINDINGS:  Multiple telemetry leads  overlie the chest.  The cardiac silhouette and mediastinum are unremarkable.  Exam is limited as the right costophrenic sulcus is not included.  Developing bilateral perihilar ground-glass opacities along with more prominent interstitial prominence and cephalization compatible with worsening pulmonary venous hypertension.  No visualized pneumothorax or pleural effusion.  The lungs remain hyperinflated.      Impression    Findings compatible with worsening pulmonary venous hypertension.    Unchanged hyperinflation.      Electronically signed by: Paddy Calhoun  Date:    10/25/2023  Time:    07:49     Assessment and Plan:       * VT (ventricular tachycardia)  Treated with shock therapy  Continue amiodarone  Ischemic evaluation  ETOH evaluation and cardiac echo pending    10/24/23  -Echo with EF of 20-25%  -Ischemic evaluation pending  -Continue amiodarone gtt for now  -Keep K > 4; Mg > 2    10/25/23  -Stable  -Keep on amiodarone for now  -Repeat LFT's    10/26/23  -Amiodarone switched to po  -Will need LIfeVest    10/30/23  LifeVest  PO Amio  Follow up in clinic    Abdominal distention  -Xray concerning for SBO  -Mgmt as per primary team    Cardiogenic shock  -Clinical condition worsened overnight with increased SOB/lactic acidosis  -LA normalized on dobutamine  -Continue inotropic support  -R/LHC planned  -Will discuss with [facility]    10/25/23  -Not candidate for advanced options given ETOH abuse  -Continue dobutamine  -Possible high risk PCI tmw    10/26/23  -LHC with possible high risk PCI today  -Will try to wean dobutamine as tolerated  -OMT    10/27/23  -s/p LHC yesterday---med mgmt recommended  -Continue ASA, Plavix, statin  -Will add BB once off inotrope  -Entresto, po Lasix as tolerated    10/30/23  ASA, plavix, statin, entresto, BB  PO lasix as needed  Needs follow up in [facility], [place] vs [place]?    Alcohol abuse  -ETOH cessation    COPD (chronic obstructive pulmonary disease)  -Mgmt as per  primary team    Tobacco dependence  -Cessation advised    Acute on chronic combined systolic and diastolic heart failure  -BNP minimally elevated  -LA improved with inotropic support  -Ischemic evaluation pending  -Will optimize regimen accordingly    10/25/23  -RHC with elevated filling pressures  -Continue IV diuresis  -Will optimize regimen as tolerated  -Possible high risk PCI tmw    10/26/23  -Continue OMT  -LHC with possible high risk PCI today    10/27/23  -Continue OMT-will add BB once off dobutamine  -Entresto, po Lasix when able to tolerate  -LifeVest for SCD prevention    10/29/2023: Continue guideline directed medical management beta-blockers added as well as Entresto continue to follow patient clinically now off dobutamine LifeVest ordered.    10/30/23  LifeVest before DC  Cont entresto, aldactone, BB  Will need PO lasix upon DC    Elevated troponin  Will trend  IV heparin  Cardiac echo pending  EKG not showing ischemia    10/24/23  -Troponin bumped to 33, now trending down  -CP free during exam  -Echo showed EF of 20-25%, developed cardiogenic shock overnight, responded well to dobutamine  -Continue ASA, heparin gtt  -Plans for R/LHC today pending discussion with Dr. Ernandez    10/25/23  -s/p R/LHC which showed elevated filling pressures, occluded RCA with left to right collateral, ostial LAD  -Turned down for CABG by CTS  -Continue heparin gtt, ASA, statin   -Keep NPO for possible high risk PCI tmw pending stability of H/H    10/26/23  -LHC with possible high risk PCI today by Dr. Ernandez. All risks, benefits, and treatment alternatives explained to patient in detail. All questions answered. He has agreed to proceed.    10/27/23  -s/p LHC yesterday that showed 50% ostial LAD lesion, 40% D1 lesion, luminal irregularities of LCX  -CP free  -Continue ASA, Plavix, statin  -Will further optimize meds as tolerated  -Wean dobutamine as tolerated        CHF (congestive heart failure)  Patient is identified as  having Combined Systolic and Diastolic heart failure that is Acute on chronic. CHF is currently uncontrolled due to Continued edema of extremities. Latest ECHO performed and demonstrates- Results for orders placed during the hospital encounter of 06/07/23    Echo    Interpretation Summary  · The left ventricle is mildly enlarged with mildly decreased systolic function.  · Overall there is mild to moderate global hypokinesis; Prounounced basal inferior and inferioseptal hypokinesis.  · The estimated ejection fraction is 40%.  · Grade III left ventricular diastolic dysfunction.  · Mild right ventricular enlargement with low normal right ventricular systolic function.  · Mild-to-moderate mitral regurgitation.  · Mild tricuspid regurgitation.  · Mild pulmonic regurgitation.  · The estimated PA systolic pressure is 35 mmHg.  · Mild left atrial enlargement.  · Mild right atrial enlargement.  . Continue Beta Blocker, ACE/ARB and Furosemide and monitor clinical status closely. Monitor on telemetry. Patient is off CHF pathway.  Monitor strict Is&Os and daily weights.  Place on fluid restriction of 2 L. Cardiology has been consulted. Continue to stress to patient importance of self efficacy and  on diet for CHF. Last BNP reviewed- and noted below   Recent Labs   Lab 10/23/23  0950   *   .    Alcoholic cirrhosis  -Per intensive team  -Will monitor LFT's while on amiodarone    10/26/23  -May need paracentesis, abdomen terse    10/27/23  -Mgmt as per primary team        VTE Risk Mitigation (From admission, onward)         Ordered     enoxaparin injection 40 mg  Every 24 hours         10/28/23 0938     IP VTE HIGH RISK PATIENT  Once         10/23/23 1120     Place sequential compression device  Until discontinued         10/23/23 1120                Cate Bloom NP  Cardiology  [facility] - Telemetry ([facility])

## 2023-10-30 NOTE — SUBJECTIVE & OBJECTIVE
Review of Systems   Constitutional: Negative.   HENT: Negative.     Eyes: Negative.    Cardiovascular: Negative.    Respiratory: Negative.     Skin: Negative.    Musculoskeletal: Negative.    Gastrointestinal: Negative.    Genitourinary: Negative.    Neurological: Negative.    Psychiatric/Behavioral: Negative.       Objective:     Vital Signs (Most Recent):  Temp: 97.7 °F (36.5 °C) (10/30/23 0730)  Pulse: 89 (10/30/23 0905)  Resp: 16 (10/30/23 0730)  BP: 130/70 (10/30/23 0730)  SpO2: 99 % (10/30/23 0730) Vital Signs (24h Range):  Temp:  [97.7 °F (36.5 °C)-98.7 °F (37.1 °C)] 97.7 °F (36.5 °C)  Pulse:  [82-89] 89  Resp:  [16-20] 16  SpO2:  [92 %-99 %] 99 %  BP: ()/(51-70) 130/70     Weight: 83.1 kg (183 lb 3.2 oz)  Body mass index is 27.86 kg/m².     SpO2: 99 %         Intake/Output Summary (Last 24 hours) at 10/30/2023 1310  Last data filed at 10/30/2023 0905  Gross per 24 hour   Intake 118 ml   Output 375 ml   Net -257 ml       Lines/Drains/Airways       Peripheral Intravenous Line  Duration                  Peripheral IV - Single Lumen 10/29/23 1520 22 G Posterior;Right Hand <1 day                       Physical Exam  Vitals and nursing note reviewed.   Constitutional:       Appearance: Normal appearance.   HENT:      Head: Normocephalic and atraumatic.   Eyes:      General:         Right eye: No discharge.         Left eye: No discharge.      Pupils: Pupils are equal, round, and reactive to light.   Cardiovascular:      Rate and Rhythm: Normal rate and regular rhythm.      Heart sounds: S1 normal and S2 normal. No murmur heard.     No friction rub.   Pulmonary:      Effort: Pulmonary effort is normal. No respiratory distress.      Breath sounds: Normal breath sounds. No rales.   Abdominal:      Palpations: Abdomen is soft.      Tenderness: There is no abdominal tenderness.   Musculoskeletal:      Cervical back: Neck supple.      Right lower leg: No edema.      Left lower leg: No edema.   Skin:      "General: Skin is warm and dry.   Neurological:      General: No focal deficit present.      Mental Status: He is alert and oriented to person, place, and time.   Psychiatric:         Mood and Affect: Mood normal.         Behavior: Behavior normal.         Thought Content: Thought content normal.            Significant Labs: BMP:   Recent Labs   Lab 10/29/23  0516 10/30/23  0501   GLU 96 107    138   K 4.2 4.2    109   CO2 22* 21*   BUN 38* 37*   CREATININE 1.1 1.1   CALCIUM 7.7* 7.5*   MG 2.4 2.5   , CMP   Recent Labs   Lab 10/29/23  0516 10/30/23  0501    138   K 4.2 4.2    109   CO2 22* 21*   GLU 96 107   BUN 38* 37*   CREATININE 1.1 1.1   CALCIUM 7.7* 7.5*   ANIONGAP 10 8   , CBC   Recent Labs   Lab 10/29/23  0516 10/30/23  0501   WBC 15.99* 14.97*   HGB 9.8* 9.2*   HCT 30.8* 29.1*    230   , INR No results for input(s): "INR", "PROTIME" in the last 48 hours., Lipid Panel No results for input(s): "CHOL", "HDL", "LDLCALC", "TRIG", "CHOLHDL" in the last 48 hours., Troponin No results for input(s): "TROPONINI" in the last 48 hours., and All pertinent lab results from the last 24 hours have been reviewed.    Significant Imaging: Cardiac Cath: reviewed, Echocardiogram: Transthoracic echo (TTE) complete (Cupid Only):   Results for orders placed or performed during the hospital encounter of 10/23/23   Echo   Result Value Ref Range    BSA 2.08 m2    IVRT 49.48 msec    TDI LATERAL 0.10 m/s    Left Ventricular Outflow Tract Mean Gradient 1.13 mmHg    Left Ventricular Outflow Tract Mean Velocity 0.49 cm/s    MV stenosis pressure 1/2 time 34.73 ms    Mr max rex 3.50 m/s    TR Max Rex 2.09 m/s    Ao root annulus 3.38 cm    Ao peak rex 0.86 m/s    Posterior Wall 0.95 0.6 - 1.1 cm    LVOT diameter 1.95 cm    LVOT peak rex 0.76 m/s    LVOT peak VTI 10.90 cm    E wave deceleration time 119.77 msec    MV Peak A Rex 0.40 m/s    MV Peak E Rex 0.65 m/s    RA Major Axis 2.94 cm    TAPSE 1.82 cm    PV " mean gradient 1 mmHg    RVOT peak rupal 0.57 m/s    RVOT peak VTI 9.8 cm    IVS 0.96 0.6 - 1.1 cm    Ao VTI 11.90 cm    AV mean gradient 2 mmHg    LVIDd 4.24 3.5 - 6.0 cm    LVIDs 3.71 2.1 - 4.0 cm    Left Atrium Major Axis 4.28 cm    Left Atrium Minor Axis 4.19 cm    RV mid diameter 3.37 cm    STJ 3.35 cm    Ascending aorta 3.41 cm    LV Systolic Volume 58.38 mL    LV Diastolic Volume 80.17 mL    TDI SEPTAL 0.09 m/s    LA size 2.39 cm    IVC diameter 1.42 cm    LV LATERAL E/E' RATIO 6.50 m/s    LV SEPTAL E/E' RATIO 7.22 m/s    FS 13 28 - 44 %    LV mass 130.71 g    ZLVIDD -3.61     ZLVIDS -0.04     Left Ventricle Relative Wall Thickness 0.45 cm    AV valve area 2.73 cm²    AV Velocity Ratio 0.88     AV index (prosthetic) 0.92     MV valve area p 1/2 method 6.33 cm2    E/A ratio 1.63     Mean e' 0.10 m/s    LVOT area 3.0 cm2    LVOT stroke volume 32.54 cm3    AV peak gradient 3 mmHg    E/E' ratio 6.84 m/s    LV Systolic Volume Index 28.6 mL/m2    LV Diastolic Volume Index 39.30 mL/m2    LV Mass Index 64 g/m2    Triscuspid Valve Regurgitation Peak Gradient 17 mmHg    ADDIS by Velocity Ratio 2.64 cm²    LA Volume Index 11.4 mL/m2    LA volume 23.23 cm3    LA WIDTH 2.7 cm    RA Width 2.3 cm    TV resting pulmonary artery pressure 20 mmHg    RV TB RVSP 5 mmHg    Est. RA pres 3 mmHg    Narrative      Left Ventricle: The left ventricle is mildly dilated. Normal wall   thickness. There is concentric remodeling. Moderate global hypokinesis   present. There is severely reduced systolic function with a visually   estimated ejection fraction of 20 - 25%. There is normal diastolic   function.    Right Ventricle: Normal right ventricular cavity size. Wall thickness   is normal. Right ventricle wall motion  is normal. Systolic function is   normal.    Mitral Valve: There is mild to moderate regurgitation with a centrally   directed jet.    Pulmonary Artery: The estimated pulmonary artery systolic pressure is   20 mmHg.    IVC/SVC:  Normal venous pressure at 3 mmHg.     , EKG: reviewed, Stress Test: reviewed, and X-Ray: CXR: X-Ray Chest 1 View (CXR):   Results for orders placed or performed during the hospital encounter of 10/23/23   X-Ray Chest 1 View    Narrative    EXAMINATION:  XR CHEST 1 VIEW    CLINICAL HISTORY:  IABP placement;    TECHNIQUE:  Single frontal view of the chest was performed.    COMPARISON:  10/23/2023    FINDINGS:  Multiple telemetry leads overlie the chest.  The cardiac silhouette and mediastinum are unremarkable.  Exam is limited as the right costophrenic sulcus is not included.  Developing bilateral perihilar ground-glass opacities along with more prominent interstitial prominence and cephalization compatible with worsening pulmonary venous hypertension.  No visualized pneumothorax or pleural effusion.  The lungs remain hyperinflated.      Impression    Findings compatible with worsening pulmonary venous hypertension.    Unchanged hyperinflation.      Electronically signed by: Paddy Calhoun  Date:    10/25/2023  Time:    07:49

## 2023-10-30 NOTE — ASSESSMENT & PLAN NOTE
Chronic, controlled. Latest blood pressure and vitals reviewed-     Temp:  [97.7 °F (36.5 °C)-98.7 °F (37.1 °C)]   Pulse:  [82-89]   Resp:  [16-20]   BP: ()/(51-70)   SpO2:  [92 %-99 %] .   Home meds for hypertension were reviewed and noted below.   Hypertension Medications             furosemide (LASIX) 40 MG tablet Take 40 mg by mouth once daily.    lisinopriL (PRINIVIL,ZESTRIL) 2.5 MG tablet Take 1 tablet (2.5 mg total) by mouth once daily.    metoprolol succinate (TOPROL-XL) 25 MG 24 hr tablet Take 1 tablet (25 mg total) by mouth once daily.    spironolactone (ALDACTONE) 50 MG tablet Take 50 mg by mouth 2 (two) times daily.          While in the hospital, will manage blood pressure as follows; Adjust home antihypertensive regimen as follows- prn hydralazine    Will utilize p.r.n. blood pressure medication only if patient's blood pressure greater than 140/90 and he develops symptoms such as worsening chest pain or shortness of breath.

## 2023-10-30 NOTE — ASSESSMENT & PLAN NOTE
Attributed to etoh and cad  Fluid and salt restriction  I/os  Veronica discontinued  Awaiting lifevest

## 2023-10-30 NOTE — ASSESSMENT & PLAN NOTE
Treated with shock therapy  Continue amiodarone  Ischemic evaluation  ETOH evaluation and cardiac echo pending    10/24/23  -Echo with EF of 20-25%  -Ischemic evaluation pending  -Continue amiodarone gtt for now  -Keep K > 4; Mg > 2    10/25/23  -Stable  -Keep on amiodarone for now  -Repeat LFT's    10/26/23  -Amiodarone switched to po  -Will need LIfeVest    10/30/23  LifeVest  PO Amio  Follow up in clinic

## 2023-10-30 NOTE — ASSESSMENT & PLAN NOTE
Patient's COPD is with exacerbation noted by continued dyspnea currently.  Patient is currently off COPD Pathway. Continue scheduled inhalers Steroids and Supplemental oxygen and monitor respiratory status closely.   Continue scheduled breathing treatments

## 2023-10-30 NOTE — ASSESSMENT & PLAN NOTE
Patient with known Cirrhosis with Child's class Calculator for Child's score link- https://www.[employer].com/calc/340/child-sarmiento-score-cirrhosis-mortality#creator-insights. Co-morbidities are present and inclusive of ascites, malnutrition, anemia/pancytopenia and anticoagulation.  MELD-Na score calculated; MELD 3.0: 15 at 10/25/2023  5:01 AM  MELD-Na: 9 at 10/25/2023  5:01 AM  Calculated from:  Serum Creatinine: 1.3 mg/dL at 10/25/2023  5:01 AM  Serum Sodium: 130 mmol/L at 10/25/2023  5:01 AM  Total Bilirubin: 0.3 mg/dL (Using min of 1 mg/dL) at 10/24/2023 12:26 PM  Serum Albumin: 3.1 g/dL at 10/24/2023 12:26 PM  INR(ratio): 1.0 at 10/23/2023 11:50 AM  Age at listing (hypothetical): 63 years  Sex: Male at 10/25/2023  5:01 AM      Continue chronic meds. Etiology likely ETOH. Will avoid any hepatotoxic meds, and monitor CBC/CMP/INR for synthetic function.     Continue lactulose

## 2023-10-30 NOTE — ASSESSMENT & PLAN NOTE
-Clinical condition worsened overnight with increased SOB/lactic acidosis  -LA normalized on dobutamine  -Continue inotropic support  -R/LHC planned  -Will discuss with [facility]    10/25/23  -Not candidate for advanced options given ETOH abuse  -Continue dobutamine  -Possible high risk PCI tmw    10/26/23  -LHC with possible high risk PCI today  -Will try to wean dobutamine as tolerated  -OMT    10/27/23  -s/p LHC yesterday---med mgmt recommended  -Continue ASA, Plavix, statin  -Will add BB once off inotrope  -Entresto, po Lasix as tolerated    10/30/23  ASA, plavix, statin, entresto, BB  PO lasix as needed  Needs follow up in [facility] clinic, [facility] vs [facility]?

## 2023-10-30 NOTE — SUBJECTIVE & OBJECTIVE
Interval History: See hospital course for today       Review of Systems   Constitutional:  Negative for activity change, appetite change, fatigue and fever.   Respiratory:  Positive for shortness of breath and wheezing.    Cardiovascular:  Negative for chest pain and leg swelling.   Gastrointestinal:  Negative for abdominal pain, constipation, nausea and vomiting.   Genitourinary:  Negative for difficulty urinating.   Neurological:  Negative for weakness.   Psychiatric/Behavioral:  Negative for agitation, behavioral problems, confusion, decreased concentration and dysphoric mood. The patient is nervous/anxious.      Objective:     Vital Signs (Most Recent):  Temp: 97.7 °F (36.5 °C) (10/30/23 0730)  Pulse: 89 (10/30/23 0905)  Resp: 16 (10/30/23 0730)  BP: 130/70 (10/30/23 0730)  SpO2: 99 % (10/30/23 0730) Vital Signs (24h Range):  Temp:  [97.7 °F (36.5 °C)-98.7 °F (37.1 °C)] 97.7 °F (36.5 °C)  Pulse:  [82-89] 89  Resp:  [16-20] 16  SpO2:  [92 %-99 %] 99 %  BP: ()/(51-70) 130/70     Weight: 83.1 kg (183 lb 3.2 oz)  Body mass index is 27.86 kg/m².    Intake/Output Summary (Last 24 hours) at 10/30/2023 1156  Last data filed at 10/30/2023 0905  Gross per 24 hour   Intake 118 ml   Output 375 ml   Net -257 ml         Physical Exam  Vitals and nursing note reviewed.   Constitutional:       General: He is not in acute distress.     Appearance: He is ill-appearing. He is not toxic-appearing.   HENT:      Head: Normocephalic and atraumatic.   Cardiovascular:      Rate and Rhythm: Normal rate.   Pulmonary:      Effort: Pulmonary effort is normal. No respiratory distress.      Breath sounds: Rales present.   Abdominal:      General: There is distension.      Palpations: Abdomen is soft.      Tenderness: There is no abdominal tenderness.   Musculoskeletal:      Right lower leg: No edema.      Left lower leg: No edema.   Skin:     General: Skin is warm.   Neurological:      Mental Status: He is alert. Mental status is at  baseline.      Motor: No weakness.             Significant Labs: All pertinent labs within the past 24 hours have been reviewed.  CBC:   Recent Labs   Lab 10/29/23  0516 10/30/23  0501   WBC 15.99* 14.97*   HGB 9.8* 9.2*   HCT 30.8* 29.1*    230     CMP:   Recent Labs   Lab 10/29/23  0516 10/30/23  0501    138   K 4.2 4.2    109   CO2 22* 21*   GLU 96 107   BUN 38* 37*   CREATININE 1.1 1.1   CALCIUM 7.7* 7.5*   ANIONGAP 10 8       Significant Imaging: I have reviewed all pertinent imaging results/findings within the past 24 hours.

## 2023-10-30 NOTE — PLAN OF CARE
Patient updated on plan of care. Instructed patient to use call light for assistance, call light in reach. Hourly rounding performed, fall and safety precautions maintained; bed alarm delcined; patient ambulating independently in room. Vitals q 4hours. Education provided, questions answered/encouraged. IV's and lines CDI, medication administered as ordered.  Sinus rhythm on tele box #[id number].    Problem: Adult Inpatient Plan of Care  Goal: Plan of Care Review  Outcome: Ongoing, Progressing

## 2023-10-31 ENCOUNTER — DOCUMENTATION ONLY (OUTPATIENT)
Dept: CARDIOLOGY | Facility: CLINIC | Age: 63
End: 2023-10-31
Payer: MEDICAID

## 2023-10-31 PROBLEM — R33.9 URINE RETENTION: Status: RESOLVED | Noted: 2023-10-24 | Resolved: 2023-10-31

## 2023-10-31 LAB
POCT GLUCOSE: 126 MG/DL (ref 70–110)
POCT GLUCOSE: 135 MG/DL (ref 70–110)
POCT GLUCOSE: 151 MG/DL (ref 70–110)

## 2023-10-31 PROCEDURE — 94640 AIRWAY INHALATION TREATMENT: CPT

## 2023-10-31 PROCEDURE — 25000003 PHARM REV CODE 250

## 2023-10-31 PROCEDURE — 25000003 PHARM REV CODE 250: Performed by: PHYSICIAN ASSISTANT

## 2023-10-31 PROCEDURE — 25000242 PHARM REV CODE 250 ALT 637 W/ HCPCS: Performed by: INTERNAL MEDICINE

## 2023-10-31 PROCEDURE — 25000003 PHARM REV CODE 250: Performed by: INTERNAL MEDICINE

## 2023-10-31 PROCEDURE — 25000003 PHARM REV CODE 250: Performed by: NURSE PRACTITIONER

## 2023-10-31 PROCEDURE — 94761 N-INVAS EAR/PLS OXIMETRY MLT: CPT

## 2023-10-31 PROCEDURE — 21400001 HC TELEMETRY ROOM

## 2023-10-31 PROCEDURE — 25000003 PHARM REV CODE 250: Performed by: FAMILY MEDICINE

## 2023-10-31 PROCEDURE — 99232 SBSQ HOSP IP/OBS MODERATE 35: CPT | Mod: ,,, | Performed by: INTERNAL MEDICINE

## 2023-10-31 PROCEDURE — 63600175 PHARM REV CODE 636 W HCPCS: Performed by: NURSE PRACTITIONER

## 2023-10-31 PROCEDURE — 99232 PR SUBSEQUENT HOSPITAL CARE,LEVL II: ICD-10-PCS | Mod: ,,, | Performed by: INTERNAL MEDICINE

## 2023-10-31 RX ADMIN — TRAZODONE HYDROCHLORIDE 25 MG: 50 TABLET ORAL at 08:10

## 2023-10-31 RX ADMIN — ARFORMOTEROL TARTRATE 15 MCG: 15 SOLUTION RESPIRATORY (INHALATION) at 08:10

## 2023-10-31 RX ADMIN — DOCUSATE SODIUM 100 MG: 100 CAPSULE, LIQUID FILLED ORAL at 08:10

## 2023-10-31 RX ADMIN — PANTOPRAZOLE SODIUM 40 MG: 40 TABLET, DELAYED RELEASE ORAL at 08:10

## 2023-10-31 RX ADMIN — GABAPENTIN 300 MG: 300 CAPSULE ORAL at 08:10

## 2023-10-31 RX ADMIN — SACUBITRIL AND VALSARTAN 1 TABLET: 24; 26 TABLET, FILM COATED ORAL at 08:10

## 2023-10-31 RX ADMIN — AMIODARONE HYDROCHLORIDE 400 MG: 200 TABLET ORAL at 08:10

## 2023-10-31 RX ADMIN — ATORVASTATIN CALCIUM 80 MG: 40 TABLET, FILM COATED ORAL at 08:10

## 2023-10-31 RX ADMIN — SPIRONOLACTONE 25 MG: 25 TABLET ORAL at 08:10

## 2023-10-31 RX ADMIN — ARFORMOTEROL TARTRATE 15 MCG: 15 SOLUTION RESPIRATORY (INHALATION) at 07:10

## 2023-10-31 RX ADMIN — ASPIRIN 81 MG CHEWABLE TABLET 81 MG: 81 TABLET CHEWABLE at 08:10

## 2023-10-31 RX ADMIN — PREDNISONE 20 MG: 20 TABLET ORAL at 08:10

## 2023-10-31 RX ADMIN — BUDESONIDE INHALATION 0.5 MG: 0.5 SUSPENSION RESPIRATORY (INHALATION) at 07:10

## 2023-10-31 RX ADMIN — ENOXAPARIN SODIUM 40 MG: 40 INJECTION SUBCUTANEOUS at 04:10

## 2023-10-31 RX ADMIN — TAMSULOSIN HYDROCHLORIDE 0.4 MG: 0.4 CAPSULE ORAL at 08:10

## 2023-10-31 RX ADMIN — FOLIC ACID 1 MG: 1 TABLET ORAL at 08:10

## 2023-10-31 RX ADMIN — CLOPIDOGREL BISULFATE 75 MG: 75 TABLET ORAL at 08:10

## 2023-10-31 RX ADMIN — Medication 100 MG: at 08:10

## 2023-10-31 RX ADMIN — GABAPENTIN 300 MG: 300 CAPSULE ORAL at 04:10

## 2023-10-31 RX ADMIN — METOPROLOL SUCCINATE 12.5 MG: 25 TABLET, EXTENDED RELEASE ORAL at 08:10

## 2023-10-31 RX ADMIN — BUDESONIDE INHALATION 0.5 MG: 0.5 SUSPENSION RESPIRATORY (INHALATION) at 08:10

## 2023-10-31 NOTE — PLAN OF CARE
Pt oriented 4 VSS  Plan of care reviewed. pt verbalizes understanding.  Patient moving/ turning independently.  Patient normal sinus on monitor  Bed low, side rails up x2, wheels locked, call light in reach.  Pt instructed to call for assistance

## 2023-10-31 NOTE — ASSESSMENT & PLAN NOTE
Chronic, controlled. Latest blood pressure and vitals reviewed-     Temp:  [97.4 °F (36.3 °C)-98.7 °F (37.1 °C)]   Pulse:  [72-90]   Resp:  [16-18]   BP: ()/(53-70)   SpO2:  [89 %-96 %] .   Home meds for hypertension were reviewed and noted below.   Hypertension Medications             furosemide (LASIX) 40 MG tablet Take 40 mg by mouth once daily.    lisinopriL (PRINIVIL,ZESTRIL) 2.5 MG tablet Take 1 tablet (2.5 mg total) by mouth once daily.    metoprolol succinate (TOPROL-XL) 25 MG 24 hr tablet Take 1 tablet (25 mg total) by mouth once daily.    spironolactone (ALDACTONE) 50 MG tablet Take 50 mg by mouth 2 (two) times daily.          While in the hospital, will manage blood pressure as follows; Adjust home antihypertensive regimen as follows- prn hydralazine    Will utilize p.r.n. blood pressure medication only if patient's blood pressure greater than 140/90 and he develops symptoms such as worsening chest pain or shortness of breath.

## 2023-10-31 NOTE — PROGRESS NOTES
[facility] - Telemetry [facility] Medicine  Progress Note    Patient Name: Hemal Tolbert Jr.  MRN: [id number]  Patient Class: IP- Inpatient   Admission Date: 10/23/2023  Length of Stay: 8 days  Attending Physician: Leelee Dewitt MD  Primary Care Provider: Norma Provider        Subjective:     Principal Problem:VT (ventricular tachycardia)        HPI:  63M  has a past medical history of Alcoholic cirrhosis, CHF (congestive heart failure), COPD (chronic obstructive pulmonary disease), Hypertension, Peripheral artery disease, and Sleep apnea.  Presented to emergency department from outside facility for vtach requiring defibrillation x 2. Initial workup revealed possible Torsades on electrocardiography strips. Cbc with leukocytosis, cmp with hyponatremia, creatinine 1.2, , , and elevated troponin, 3.6 --> 9.9. Patient was given an aspirin, started on heparin infusion, given 4 mg of Mag IV, and 40 mg of Lasix IV and transferred to icu per Cardiology recommendations. Echocardiogram with 20-25% ejection fraction. Patient required precedex infusion while in icu.    Cardiac catheterization revealed severe stenosis. CTS consulted and deemed not a surgical candidate. Amio infusion converted to po medication(s). Dobutamine infusion weaned. Patient is awaiting lifevest prior to discharge.    Us abdomen without significant ascites. On lactulose for cirrhosis.     Hospital medicine consulted for assumption of care.         Overview/Hospital Course:  63M has a past medical history of Alcoholic cirrhosis, CHF (congestive heart failure), COPD (chronic obstructive pulmonary disease), Hypertension, Peripheral artery disease, and Sleep apnea.  Presented to emergency department from outside facility for vtach requiring defibrillation x 2. Initial workup revealed possible Torsades on electrocardiography strips. Cbc with leukocytosis, cmp with hyponatremia, creatinine 1.2, , , and elevated troponin,  3.6 --> 9.9. Patient was given an aspirin, started on heparin infusion, given 4 mg of Mag IV, and 40 mg of Lasix IV and transferred to icu per Cardiology recommendations. Echocardiogram with 20-25% ejection fraction. Patient required precedex infusion while in icu.     Cardiac catheterization revealed severe stenosis. CTS consulted and deemed not a surgical candidate. Amio infusion converted to po medication(s). Dobutamine infusion weaned. Patient is awaiting lifevest prior to discharge.     Us abdomen without significant ascites. On lactulose for cirrhosis.   10/29 transferred from icu to floor status. No acute events overnight. Awaiting lifevest. Voiding trial to discontinue jacobson. Multiple bowel movements reported.  10/30 no acute events overnight. Jacobson discontinued. Awaiting lifevest. Patient ready for discharge.  10/31 complains of abdominal tenderness from coughing, improved with abdominal binder. monitor. Awaiting lifevest      Interval History: See hospital course for today      Review of Systems   Constitutional:  Negative for activity change, appetite change, fatigue and fever.   Respiratory:  Positive for wheezing.    Cardiovascular:  Negative for chest pain and leg swelling.   Gastrointestinal:  Negative for abdominal pain, nausea and vomiting.   Neurological:  Negative for weakness.   Psychiatric/Behavioral:  Negative for agitation, behavioral problems, confusion, decreased concentration and dysphoric mood. The patient is nervous/anxious.      Objective:     Vital Signs (Most Recent):  Temp: 97.7 °F (36.5 °C) (10/31/23 0838)  Pulse: 80 (10/31/23 0838)  Resp: 18 (10/31/23 0838)  BP: (!) 115/57 (10/31/23 0838)  SpO2: 95 % (10/31/23 0838) Vital Signs (24h Range):  Temp:  [97.4 °F (36.3 °C)-98.7 °F (37.1 °C)] 97.7 °F (36.5 °C)  Pulse:  [72-90] 80  Resp:  [16-18] 18  SpO2:  [89 %-96 %] 95 %  BP: ()/(53-70) 115/57     Weight: 82.1 kg (181 lb)  Body mass index is 27.52 kg/m².  No intake or output data  in the 24 hours ending 10/31/23 1126      Physical Exam  Vitals and nursing note reviewed.   Constitutional:       General: He is not in acute distress.     Appearance: He is ill-appearing. He is not toxic-appearing.   HENT:      Head: Normocephalic and atraumatic.        Comments: Abnormal dentition  Cardiovascular:      Rate and Rhythm: Normal rate.   Pulmonary:      Effort: Pulmonary effort is normal. No respiratory distress.      Breath sounds: Rales present.   Abdominal:      General: There is distension.      Palpations: Abdomen is soft.      Tenderness: There is abdominal tenderness in the left upper quadrant.      Hernia: A hernia is present. Hernia is present in the umbilical area (reducible).   Musculoskeletal:      Right lower leg: No edema.      Left lower leg: No edema.   Skin:     General: Skin is warm.   Neurological:      Mental Status: He is alert. Mental status is at baseline.   Psychiatric:         Mood and Affect: Mood is anxious.         Speech: Speech normal.         Behavior: Behavior normal.             Significant Labs: All pertinent labs within the past 24 hours have been reviewed.    Significant Imaging: I have reviewed all pertinent imaging results/findings within the past 24 hours.      Assessment/Plan:      * VT (ventricular tachycardia)  Required defibrillation x 2  Electrolytes corrected  Cardiology following  Severe CAD, not a surgical candidate  Awaiting lifevest      Abdominal distention  Us abdomen without significant ascites  Continue lactulose      Cardiogenic shock  Awaiting lifevest      Alcohol abuse  Required precedex infusion  librium taper completed  No active signs of withdrawal   Continue gabapentin to help with cravings     COPD (chronic obstructive pulmonary disease)  Patient's COPD is with exacerbation noted by continued dyspnea currently.  Patient is currently off COPD Pathway. Continue scheduled inhalers Steroids and Supplemental oxygen and monitor respiratory  status closely.   Continue scheduled breathing treatments    Primary hypertension  Chronic, controlled. Latest blood pressure and vitals reviewed-     Temp:  [97.4 °F (36.3 °C)-98.7 °F (37.1 °C)]   Pulse:  [72-90]   Resp:  [16-18]   BP: ()/(53-70)   SpO2:  [89 %-96 %] .   Home meds for hypertension were reviewed and noted below.   Hypertension Medications             furosemide (LASIX) 40 MG tablet Take 40 mg by mouth once daily.    lisinopriL (PRINIVIL,ZESTRIL) 2.5 MG tablet Take 1 tablet (2.5 mg total) by mouth once daily.    metoprolol succinate (TOPROL-XL) 25 MG 24 hr tablet Take 1 tablet (25 mg total) by mouth once daily.    spironolactone (ALDACTONE) 50 MG tablet Take 50 mg by mouth 2 (two) times daily.          While in the hospital, will manage blood pressure as follows; Adjust home antihypertensive regimen as follows- prn hydralazine    Will utilize p.r.n. blood pressure medication only if patient's blood pressure greater than 140/90 and he develops symptoms such as worsening chest pain or shortness of breath.    Tobacco dependence  Dangers of cigarette smoking were reviewed with patient in detail. Patient was Counseled for 3-10 minutes. Nicotine replacement options were discussed. Nicotine replacement was discussed- not prescribed per patient's request    Acute on chronic combined systolic and diastolic heart failure  Attributed to etoh and cad  Fluid and salt restriction  I/os  Veronica discontinued  Awaiting lifevest  Continue entresto, beta blocker, spironolactone   Po lasix prn     Elevated troponin  See vtach      Alcoholic cirrhosis  Patient with known Cirrhosis with Child's class Calculator for Child's score link- https://www.[employer].com/calc/340/child-sarmiento-score-cirrhosis-mortality#creator-insights. Co-morbidities are present and inclusive of ascites, malnutrition, anemia/pancytopenia and anticoagulation.  MELD-Na score calculated; MELD 3.0: 15 at 10/25/2023  5:01 AM  MELD-Na: 9 at 10/25/2023   5:01 AM  Calculated from:  Serum Creatinine: 1.3 mg/dL at 10/25/2023  5:01 AM  Serum Sodium: 130 mmol/L at 10/25/2023  5:01 AM  Total Bilirubin: 0.3 mg/dL (Using min of 1 mg/dL) at 10/24/2023 12:26 PM  Serum Albumin: 3.1 g/dL at 10/24/2023 12:26 PM  INR(ratio): 1.0 at 10/23/2023 11:50 AM  Age at listing (hypothetical): 63 years  Sex: Male at 10/25/2023  5:01 AM      Continue chronic meds. Etiology likely ETOH. Will avoid any hepatotoxic meds, and monitor CBC/CMP/INR for synthetic function.     Continue lactulose    VTE Risk Mitigation (From admission, onward)         Ordered     enoxaparin injection 40 mg  Every 24 hours         10/28/23 0938     IP VTE HIGH RISK PATIENT  Once         10/23/23 1120     Place sequential compression device  Until discontinued         10/23/23 1120                Discharge Planning   SADI: 10/30/2023     Code Status: Full Code   Is the patient medically ready for discharge?: Yes    Reason for patient still in hospital (select all that apply): Pending disposition  Discharge Plan A: Home with family                  Leelee Dewitt MD  Department of Hospital Medicine   [facility] - Telemetry ([facility])

## 2023-10-31 NOTE — PLAN OF CARE
A237/A237 GEOVANI Tolbert Jr. is a 63 y.o.male admitted on 10/23/2023 for VT (ventricular tachycardia)   Code Status: Full Code MRN: [id number]   Review of patient's allergies indicates:  No Known Allergies  Past Medical History:   Diagnosis Date    Alcoholic cirrhosis     CHF (congestive heart failure)     LV EF 40%    COPD (chronic obstructive pulmonary disease)     Hypertension     Peripheral artery disease     with stents    Sleep apnea       PRN meds    albuterol-ipratropium, 3 mL, Q6H PRN  bisacodyL, 10 mg, Daily PRN  calcium gluconate IVPB, 1 g, PRN  calcium gluconate IVPB, 2 g, PRN  calcium gluconate IVPB, 3 g, PRN  dextrose 10%, 12.5 g, PRN  dextrose 10%, 25 g, PRN  hydrALAZINE, 10 mg, Q6H PRN  influenza, 0.5 mL, vaccine x 1 dose  magnesium sulfate IVPB, 2 g, PRN  magnesium sulfate IVPB, 4 g, PRN  melatonin, 6 mg, Nightly PRN  ondansetron, 4 mg, Q6H PRN  pneumoc 20-jeremy conj-dip cr(PF), 0.5 mL, vaccine x 1 dose  potassium chloride, 40 mEq, PRN   And  potassium chloride, 60 mEq, PRN   And  potassium chloride, 80 mEq, PRN  sodium chloride 0.9%, 10 mL, PRN  sodium phosphate 15 mmol in dextrose 5 % (D5W) 250 mL IVPB, 15 mmol, PRN  sodium phosphate 20.01 mmol in dextrose 5 % (D5W) 250 mL IVPB, 20.01 mmol, PRN  sodium phosphate 30 mmol in dextrose 5 % (D5W) 250 mL IVPB, 30 mmol, PRN  traZODone, 25 mg, Nightly PRN      Chart check completed. Will continue plan of care.      Orientation: oriented x 4  [place] Coma Scale Score: 15     Lead Monitored: Lead II Rhythm: normal sinus rhythm    Cardiac/Telemetry Box Number: [id number]  VTE Required Core Measure: Pharmacological prophylaxis initiated/maintained, (SCDs) Sequential compression device initiated/maintained Last Bowel Movement: 10/30/23  Diet Cardiac Fluid - 2000mL  Voiding Characteristics: voids spontaneously without difficulty  Zachary Score: 22  Fall Risk Score: 12  Accucheck [x]   Freq?      Lines/Drains/Airways       Peripheral Intravenous Line  Duration                   Peripheral IV - Single Lumen 10/29/23 1520 22 G Posterior;Right Hand 1 day                        Problem: Adult Inpatient Plan of Care  Goal: Plan of Care Review  Outcome: Ongoing, Progressing  Goal: Patient-Specific Goal (Individualized)  Outcome: Ongoing, Progressing  Goal: Absence of Hospital-Acquired Illness or Injury  Outcome: Ongoing, Progressing  Goal: Optimal Comfort and Wellbeing  Outcome: Ongoing, Progressing  Goal: Readiness for Transition of Care  Outcome: Ongoing, Progressing     Problem: Skin Injury Risk Increased  Goal: Skin Health and Integrity  Outcome: Ongoing, Progressing     Problem: Fall Injury Risk  Goal: Absence of Fall and Fall-Related Injury  Outcome: Ongoing, Progressing     Problem: Infection  Goal: Absence of Infection Signs and Symptoms  Outcome: Ongoing, Progressing

## 2023-10-31 NOTE — PLAN OF CARE
Nutrition Recommendations 10/31  1. Continue on cardiac diet   2. Continue 2000 mL fluid restriction   3. Weigh daily    Goals   1. Pt will continue to consume 75% or greater EEN/EPN by RD f/u.   2. Pt will maintain stable weight by RD f/u.  Nutrition Goal Status: new  Communication of RD Recs:  (POC; sticky note)  Yvrose Fletcher, Dietetic Intern

## 2023-10-31 NOTE — PROGRESS NOTES
[facility] - Tele[facility] ([facility])  Cardiology  Progress Note    Patient Name: Hemal Tolbert Jr.  MRN: [id number]  Admission Date: 10/23/2023  Hospital Length of Stay: 8 days  Code Status: Full Code   Attending Physician: Leelee Dewitt MD   Primary Care Physician: Norma, Provider  Expected Discharge Date: 10/30/2023  Principal Problem:VT (ventricular tachycardia)    Subjective:   63-year-old male with a known past medical history of combined heart failure with the EF of 40% and grade 3 diastolic dysfunction, alcohol abuse (reports 3-4 beers/day), tobacco abuse with alcoholic cirrhosis, hypertension, and COPD that presented to the  ER 10/23 as an ER to ER transfer for vtach that required defibrillation.  Prior to transfer, patient was initiated on amiodarone infusion.  Upon arrival to the ER in [place], Cardiology was consulted.  Is documented that patient again went into V-tach and required defibrillation in the ER with 1 shock with conversion to normal sinus rhythm.  ER nurse reports patient was given multiple nebs back to back for wheezing and was escalated to a Ventimask if he is a mouth breather. Patient's patient will be continued on amnio fusion protocol.  Review of EKG strips in the concerning for possible Torsades. Workup significant for leukocytosis of 17,000, sodium 128, creatinine 1.2, , , and troponin trends up with latest 3.6 --> 9.9.  Patient was given an aspirin, started on heparin infusion, given 4 mg of Mag IV, and 40 mg of Lasix IV.  Echo ordered.  Patient admitted to the ICU per the critical care team with Cardiology following.  Hospital Course:   10/24/23-Patient seen and examined today, resting in bed. Respiratory status worsened overnight, initially required Bipap. LA vasquez to 6.9, dobutamine initiated with improvement. Feeling better at time of exam, less SOB, able to lie flat. No CP. Troponin peaked at 33, now trending down. Repeat LA this AM normalized. Will plan on R/[facility]  today pending discussion with interventionalist, Dr. Ernandez.     10/25/23-Patient seen and examined today, sedated, s/p R/LHC yesterday which showed elevated filling pressure, moderate pulmonary HTN, occlured RCA with left to right collaterals , ostial 80% LAD stenosis. IABP placed. CTS turned down for CABG. No recurrent VT. Labs reviewed. H/H dropped no active signs of bleeding. Creatinine stable. Will re-assess in AM, keep NPO after MN for possible LHC with high risk PCI.     10/26/23-Patient seen and examined today, resting in bed. Repeat LHC with high risk PCI planned by Dr. Ernandez. Feels ok. No CP. SOB stable. Labs reviewed and are stable. Amiodarone switched to po. Will need LifeVest.    10/27/23-Patient seen and examined today, sitting up in bed. Feels ok. No CP/SOB. Complains of constipation, abdomen distended. Xray concerning for SBO, critical care following. Dobutamine being weaned. LifeVest ordered.     10/28/2023 patient seen and examined sitting up in bed feels improved walking out of bed from time to time will do decreased dobutamine and LifeVest ordered.  Plan on eventual discharge.    10/29/2023: Patent patient is stable sitting up in bed able to walk LifeVest order off dobutamine will add Entresto today.  Continue guideline directed medical management of cardiomyopathy ischemia.  Patient is stable this time.    10/30/23 pt seen and examined today, resting in bed. Denies any Cp and CHF sxs at this time. Awaiting LifeVest approval. BP stable. Labs reviewed, stable    10/31/23 Pt seen and examined today resting comfortably in bed. Denies any CP and CHF sxs at this time Labs reviewed, chart reviewed          Review of Systems   Constitutional: Negative.   HENT: Negative.     Eyes: Negative.    Cardiovascular: Negative.    Respiratory: Negative.     Skin: Negative.    Musculoskeletal: Negative.    Gastrointestinal: Negative.    Genitourinary: Negative.    Neurological: Negative.    Psychiatric/Behavioral:  Negative.       Objective:     Vital Signs (Most Recent):  Temp: 98 °F (36.7 °C) (10/31/23 1133)  Pulse: 80 (10/31/23 1133)  Resp: 18 (10/31/23 1133)  BP: (!) 117/59 (10/31/23 1133)  SpO2: (!) 94 % (10/31/23 1133) Vital Signs (24h Range):  Temp:  [97.4 °F (36.3 °C)-98.7 °F (37.1 °C)] 98 °F (36.7 °C)  Pulse:  [72-90] 80  Resp:  [16-18] 18  SpO2:  [89 %-96 %] 94 %  BP: ()/(53-70) 117/59     Weight: 82.1 kg (181 lb)  Body mass index is 27.52 kg/m².     SpO2: (!) 94 %       No intake or output data in the 24 hours ending 10/31/23 1216    Lines/Drains/Airways       Peripheral Intravenous Line  Duration                  Peripheral IV - Single Lumen 10/29/23 1520 22 G Posterior;Right Hand 1 day                       Physical Exam  Vitals and nursing note reviewed.   Constitutional:       Appearance: Normal appearance.   HENT:      Head: Normocephalic and atraumatic.   Eyes:      General:         Right eye: No discharge.         Left eye: No discharge.      Pupils: Pupils are equal, round, and reactive to light.   Cardiovascular:      Rate and Rhythm: Normal rate and regular rhythm.      Heart sounds: S1 normal and S2 normal. No murmur heard.     No friction rub.   Pulmonary:      Effort: Pulmonary effort is normal. No respiratory distress.      Breath sounds: Normal breath sounds. No rales.   Abdominal:      Palpations: Abdomen is soft.      Tenderness: There is no abdominal tenderness.   Musculoskeletal:      Cervical back: Neck supple.      Right lower leg: No edema.      Left lower leg: No edema.   Skin:     General: Skin is warm and dry.   Neurological:      General: No focal deficit present.      Mental Status: He is alert and oriented to person, place, and time.   Psychiatric:         Mood and Affect: Mood normal.         Behavior: Behavior normal.         Thought Content: Thought content normal.            Significant Labs: BMP:   Recent Labs   Lab 10/30/23  0501         K 4.2      CO2 21*  "  BUN 37*   CREATININE 1.1   CALCIUM 7.5*   MG 2.5   , CMP   Recent Labs   Lab 10/30/23  0501      K 4.2      CO2 21*      BUN 37*   CREATININE 1.1   CALCIUM 7.5*   ANIONGAP 8   , CBC   Recent Labs   Lab 10/30/23  0501   WBC 14.97*   HGB 9.2*   HCT 29.1*      , INR No results for input(s): "INR", "PROTIME" in the last 48 hours., Lipid Panel No results for input(s): "CHOL", "HDL", "LDLCALC", "TRIG", "CHOLHDL" in the last 48 hours., Troponin No results for input(s): "TROPONINI" in the last 48 hours., and All pertinent lab results from the last 24 hours have been reviewed.    Significant Imaging: Echocardiogram: Transthoracic echo (TTE) complete (Cupid Only):   Results for orders placed or performed during the hospital encounter of 10/23/23   Echo   Result Value Ref Range    BSA 2.08 m2    IVRT 49.48 msec    TDI LATERAL 0.10 m/s    Left Ventricular Outflow Tract Mean Gradient 1.13 mmHg    Left Ventricular Outflow Tract Mean Velocity 0.49 cm/s    MV stenosis pressure 1/2 time 34.73 ms    Mr max rex 3.50 m/s    TR Max Rex 2.09 m/s    Ao root annulus 3.38 cm    Ao peak rex 0.86 m/s    Posterior Wall 0.95 0.6 - 1.1 cm    LVOT diameter 1.95 cm    LVOT peak rex 0.76 m/s    LVOT peak VTI 10.90 cm    E wave deceleration time 119.77 msec    MV Peak A Rex 0.40 m/s    MV Peak E Rex 0.65 m/s    RA Major Axis 2.94 cm    TAPSE 1.82 cm    PV mean gradient 1 mmHg    RVOT peak rex 0.57 m/s    RVOT peak VTI 9.8 cm    IVS 0.96 0.6 - 1.1 cm    Ao VTI 11.90 cm    AV mean gradient 2 mmHg    LVIDd 4.24 3.5 - 6.0 cm    LVIDs 3.71 2.1 - 4.0 cm    Left Atrium Major Axis 4.28 cm    Left Atrium Minor Axis 4.19 cm    RV mid diameter 3.37 cm    STJ 3.35 cm    Ascending aorta 3.41 cm    LV Systolic Volume 58.38 mL    LV Diastolic Volume 80.17 mL    TDI SEPTAL 0.09 m/s    LA size 2.39 cm    IVC diameter 1.42 cm    LV LATERAL E/E' RATIO 6.50 m/s    LV SEPTAL E/E' RATIO 7.22 m/s    FS 13 28 - 44 %    LV mass 130.71 g    ZLVIDD " -3.61     ZLVIDS -0.04     Left Ventricle Relative Wall Thickness 0.45 cm    AV valve area 2.73 cm²    AV Velocity Ratio 0.88     AV index (prosthetic) 0.92     MV valve area p 1/2 method 6.33 cm2    E/A ratio 1.63     Mean e' 0.10 m/s    LVOT area 3.0 cm2    LVOT stroke volume 32.54 cm3    AV peak gradient 3 mmHg    E/E' ratio 6.84 m/s    LV Systolic Volume Index 28.6 mL/m2    LV Diastolic Volume Index 39.30 mL/m2    LV Mass Index 64 g/m2    Triscuspid Valve Regurgitation Peak Gradient 17 mmHg    ADDIS by Velocity Ratio 2.64 cm²    LA Volume Index 11.4 mL/m2    LA volume 23.23 cm3    LA WIDTH 2.7 cm    RA Width 2.3 cm    TV resting pulmonary artery pressure 20 mmHg    RV TB RVSP 5 mmHg    Est. RA pres 3 mmHg    Narrative      Left Ventricle: The left ventricle is mildly dilated. Normal wall   thickness. There is concentric remodeling. Moderate global hypokinesis   present. There is severely reduced systolic function with a visually   estimated ejection fraction of 20 - 25%. There is normal diastolic   function.    Right Ventricle: Normal right ventricular cavity size. Wall thickness   is normal. Right ventricle wall motion  is normal. Systolic function is   normal.    Mitral Valve: There is mild to moderate regurgitation with a centrally   directed jet.    Pulmonary Artery: The estimated pulmonary artery systolic pressure is   20 mmHg.    IVC/SVC: Normal venous pressure at 3 mmHg.     , EKG: reviewed, Stress Test: reviewed, and X-Ray: CXR: X-Ray Chest 1 View (CXR):   Results for orders placed or performed during the hospital encounter of 10/23/23   X-Ray Chest 1 View    Narrative    EXAMINATION:  XR CHEST 1 VIEW    CLINICAL HISTORY:  IABP placement;    TECHNIQUE:  Single frontal view of the chest was performed.    COMPARISON:  10/23/2023    FINDINGS:  Multiple telemetry leads overlie the chest.  The cardiac silhouette and mediastinum are unremarkable.  Exam is limited as the right costophrenic sulcus is not  included.  Developing bilateral perihilar ground-glass opacities along with more prominent interstitial prominence and cephalization compatible with worsening pulmonary venous hypertension.  No visualized pneumothorax or pleural effusion.  The lungs remain hyperinflated.      Impression    Findings compatible with worsening pulmonary venous hypertension.    Unchanged hyperinflation.      Electronically signed by: Paddy Calhoun  Date:    10/25/2023  Time:    07:49     Assessment and Plan:       * VT (ventricular tachycardia)  Treated with shock therapy  Continue amiodarone  Ischemic evaluation  ETOH evaluation and cardiac echo pending    10/24/23  -Echo with EF of 20-25%  -Ischemic evaluation pending  -Continue amiodarone gtt for now  -Keep K > 4; Mg > 2    10/25/23  -Stable  -Keep on amiodarone for now  -Repeat LFT's    10/26/23  -Amiodarone switched to po  -Will need LIfeVest    10/30/23  LifeVest  PO Amio  Follow up in clinic    Abdominal distention  -Xray concerning for SBO  -Mgmt as per primary team    Cardiogenic shock  -Clinical condition worsened overnight with increased SOB/lactic acidosis  -LA normalized on dobutamine  -Continue inotropic support  -R/LHC planned  -Will discuss with Main [place]    10/25/23  -Not candidate for advanced options given ETOH abuse  -Continue dobutamine  -Possible high risk PCI tmw    10/26/23  -LHC with possible high risk PCI today  -Will try to wean dobutamine as tolerated  -OMT    10/27/23  -s/p LHC yesterday---med mgmt recommended  -Continue ASA, Plavix, statin  -Will add BB once off inotrope  -Entresto, po Lasix as tolerated    10/30/23  ASA, plavix, statin, entresto, BB  PO lasix as needed  Needs follow up in [facility], [facility] vs [facility]?    Alcohol abuse  -ETOH cessation    COPD (chronic obstructive pulmonary disease)  -Mgmt as per primary team    Tobacco dependence  -Cessation advised    Acute on chronic combined systolic and diastolic heart failure  -BNP minimally  elevated  -LA improved with inotropic support  -Ischemic evaluation pending  -Will optimize regimen accordingly    10/25/23  -RHC with elevated filling pressures  -Continue IV diuresis  -Will optimize regimen as tolerated  -Possible high risk PCI tmw    10/26/23  -Continue OMT  -LHC with possible high risk PCI today    10/27/23  -Continue OMT-will add BB once off dobutamine  -Entresto, po Lasix when able to tolerate  -LifeVest for SCD prevention    10/29/2023: Continue guideline directed medical management beta-blockers added as well as Entresto continue to follow patient clinically now off dobutamine LifeVest ordered.    10/30/23  LifeVest before DC  Cont entresto, aldactone, BB  Will need PO lasix upon DC    10/31/23  Awaiting LifeVest, spoke with rep, should hear by this afternoon  Cont entresto, aldactone BB  PO lasix PRN upon DC    Elevated troponin  Will trend  IV heparin  Cardiac echo pending  EKG not showing ischemia    10/24/23  -Troponin bumped to 33, now trending down  -CP free during exam  -Echo showed EF of 20-25%, developed cardiogenic shock overnight, responded well to dobutamine  -Continue ASA, heparin gtt  -Plans for R/LHC today pending discussion with Dr. Ernandez    10/25/23  -s/p R/LHC which showed elevated filling pressures, occluded RCA with left to right collateral, ostial LAD  -Turned down for CABG by CTS  -Continue heparin gtt, ASA, statin   -Keep NPO for possible high risk PCI tmw pending stability of H/H    10/26/23  -LHC with possible high risk PCI today by Dr. Ernandez. All risks, benefits, and treatment alternatives explained to patient in detail. All questions answered. He has agreed to proceed.    10/27/23  -s/p LHC yesterday that showed 50% ostial LAD lesion, 40% D1 lesion, luminal irregularities of LCX  -CP free  -Continue ASA, Plavix, statin  -Will further optimize meds as tolerated  -Wean dobutamine as tolerated        CHF (congestive heart failure)  Patient is identified as having  Combined Systolic and Diastolic heart failure that is Acute on chronic. CHF is currently uncontrolled due to Continued edema of extremities. Latest ECHO performed and demonstrates- Results for orders placed during the hospital encounter of 06/07/23    Echo    Interpretation Summary  · The left ventricle is mildly enlarged with mildly decreased systolic function.  · Overall there is mild to moderate global hypokinesis; Prounounced basal inferior and inferioseptal hypokinesis.  · The estimated ejection fraction is 40%.  · Grade III left ventricular diastolic dysfunction.  · Mild right ventricular enlargement with low normal right ventricular systolic function.  · Mild-to-moderate mitral regurgitation.  · Mild tricuspid regurgitation.  · Mild pulmonic regurgitation.  · The estimated PA systolic pressure is 35 mmHg.  · Mild left atrial enlargement.  · Mild right atrial enlargement.  . Continue Beta Blocker, ACE/ARB and Furosemide and monitor clinical status closely. Monitor on telemetry. Patient is off CHF pathway.  Monitor strict Is&Os and daily weights.  Place on fluid restriction of 2 L. Cardiology has been consulted. Continue to stress to patient importance of self efficacy and  on diet for CHF. Last BNP reviewed- and noted below   Recent Labs   Lab 10/23/23  0950   *   .    Alcoholic cirrhosis  -Per intensive team  -Will monitor LFT's while on amiodarone    10/26/23  -May need paracentesis, abdomen terse    10/27/23  -Mgmt as per primary team        VTE Risk Mitigation (From admission, onward)         Ordered     enoxaparin injection 40 mg  Every 24 hours         10/28/23 0938     IP VTE HIGH RISK PATIENT  Once         10/23/23 1120     Place sequential compression device  Until discontinued         10/23/23 1120                Cate Bloom NP  Cardiology  [facility] - Telemetry ([facility])

## 2023-10-31 NOTE — SUBJECTIVE & OBJECTIVE
Interval History: See hospital course for today      Review of Systems   Constitutional:  Negative for activity change, appetite change, fatigue and fever.   Respiratory:  Positive for wheezing.    Cardiovascular:  Negative for chest pain and leg swelling.   Gastrointestinal:  Negative for abdominal pain, nausea and vomiting.   Neurological:  Negative for weakness.   Psychiatric/Behavioral:  Negative for agitation, behavioral problems, confusion, decreased concentration and dysphoric mood. The patient is nervous/anxious.      Objective:     Vital Signs (Most Recent):  Temp: 97.7 °F (36.5 °C) (10/31/23 0838)  Pulse: 80 (10/31/23 0838)  Resp: 18 (10/31/23 0838)  BP: (!) 115/57 (10/31/23 0838)  SpO2: 95 % (10/31/23 0838) Vital Signs (24h Range):  Temp:  [97.4 °F (36.3 °C)-98.7 °F (37.1 °C)] 97.7 °F (36.5 °C)  Pulse:  [72-90] 80  Resp:  [16-18] 18  SpO2:  [89 %-96 %] 95 %  BP: ()/(53-70) 115/57     Weight: 82.1 kg (181 lb)  Body mass index is 27.52 kg/m².  No intake or output data in the 24 hours ending 10/31/23 1126      Physical Exam  Vitals and nursing note reviewed.   Constitutional:       General: He is not in acute distress.     Appearance: He is ill-appearing. He is not toxic-appearing.   HENT:      Head: Normocephalic and atraumatic.        Comments: Abnormal dentition  Cardiovascular:      Rate and Rhythm: Normal rate.   Pulmonary:      Effort: Pulmonary effort is normal. No respiratory distress.      Breath sounds: Rales present.   Abdominal:      General: There is distension.      Palpations: Abdomen is soft.      Tenderness: There is abdominal tenderness in the left upper quadrant.      Hernia: A hernia is present. Hernia is present in the umbilical area (reducible).   Musculoskeletal:      Right lower leg: No edema.      Left lower leg: No edema.   Skin:     General: Skin is warm.   Neurological:      Mental Status: He is alert. Mental status is at baseline.   Psychiatric:         Mood and Affect:  Mood is anxious.         Speech: Speech normal.         Behavior: Behavior normal.             Significant Labs: All pertinent labs within the past 24 hours have been reviewed.    Significant Imaging: I have reviewed all pertinent imaging results/findings within the past 24 hours.

## 2023-10-31 NOTE — ASSESSMENT & PLAN NOTE
Attributed to etoh and cad  Fluid and salt restriction  I/os  Veronica discontinued  Awaiting lifevest  Continue entresto, beta blocker, spironolactone   Po lasix prn

## 2023-10-31 NOTE — ASSESSMENT & PLAN NOTE
-Clinical condition worsened overnight with increased SOB/lactic acidosis  -LA normalized on dobutamine  -Continue inotropic support  -R/LHC planned  -Will discuss with Main [place]    10/25/23  -Not candidate for advanced options given ETOH abuse  -Continue dobutamine  -Possible high risk PCI tmw    10/26/23  -LHC with possible high risk PCI today  -Will try to wean dobutamine as tolerated  -OMT    10/27/23  -s/p LHC yesterday---med mgmt recommended  -Continue ASA, Plavix, statin  -Will add BB once off inotrope  -Entresto, po Lasix as tolerated    10/30/23  ASA, plavix, statin, entresto, BB  PO lasix as needed  Needs follow up in [facility] clinic, [facility]?

## 2023-10-31 NOTE — PLAN OF CARE
GINA reached out to Lance, with [employer] about patient's LifeVest appeal. Per Lance, information was submitted and [employer] has 24 hours to make decision on appeal. GINA updated Attending during treatment team rounds.   GINA was secure chatted by attending checking status on LV, this afternoon. GINA checked with Lance, who stated appeal with still pending. Lance stated he would like SW know as soon as they hear something.  SW meet with patient at bedside regarding LV, letting him know that it is still pending by his insurance. GINA stated we were hoping to hear something by today but SW would keep Patient updated.     13 06 Per Lance, LifeVest is still pending authorization.     GINA will continue to follow and assist as needed.

## 2023-10-31 NOTE — PROGRESS NOTES
"[facility]) nurse navigator notified of HFTCC candidate in need of education and introduction to 4-6 week program.      PT aao x 3 while lying in bed. Introduced self to pt as HFTCC nurse navigator.     Patient given "Home Care Guide for Heart Failure Patients" , "[facility]" flyer and "Daily weight and symptom tracker".  Encouraged pt to review information.      Reviewed the following key points of HFTCC program with pt and family:   1.) Take your medications as directed.    2.) Weight yourself daily   3.) Follow low salt and limited fluid diet.    4.) Stop smoking and start exercising   5.) Go to your appointments and call your team.      Pt reminded to follow Symptom tracker and to call at the onset of symptoms according to tracker.     Reviewed plan for follow up once discharged to include phone calls, in person and virtual visits to assist pt optimizing their heart failure medication regimen and encouraging healthy lifestyle modifications.  Reminded pt that program will assist them over the next 4-6 weeks and then patient will be transferred to long term care provider .  Reminded pt how to contact HFTCC navigator via phone and or via [employer].     Pt informed plans to follow up with a cardiologist in [place] where he lives.    Pt also reminded HF nurse will call 48-72 hours after discharge to check on them.     PT  verbalize read back of information given.  Encouraged pt and family to read over information often and contact team with any questions or concerns.      "

## 2023-10-31 NOTE — PROGRESS NOTES
"[facility] - Telemetry ([facility])  Adult Nutrition  Progress Note    SUMMARY       Recommendations  1. Continue on cardiac diet   2. Continue 2000 mL fluid restriction   3. Weigh daily    Goals   1. Pt will continue to consume 75% or greater EEN/EPN by RD f/u.   2. Pt will maintain stable weight by RD f/u.  Nutrition Goal Status: new  Communication of RD Recs:  (POC; sticky note)    Assessment and Plan    NO PES warranted at this time     Malnutrition Assessment     Skin (Micronutrient): bruised (Zachary score = 22)                                 Reason for Assessment    Reason For Assessment: length of stay  Diagnosis: cardiac disease  Relevant Medical History: CHF, Cirrhosis (alcohlolic), HTN  Interdisciplinary Rounds: did not attend    General Information Comments: 64 yo male with active prinicpal problem ventricular tachycardia. Current wt 181# and BMI 27.55 (overweight ). PO intake is 100%. Pt reports having access to food at home. Pt denies n/v/d/c at this time.  LBM 10/30. NFPE was not perfomed pt looks well nourished. RD will continue to follow.  Nutrition Discharge Planning: d/c on cardiac diet    Nutrition Risk Screen    Nutrition Risk Screen: no indicators present    Nutrition/Diet History    Patient Reported Diet/Restrictions/Preferences: general  Spiritual, Cultural Beliefs, Spiritism Practices, Values that Affect Care: no  Food Allergies: NKFA  Factors Affecting Nutritional Intake: None identified at this time    Anthropometrics    Temp: 97.7 °F (36.5 °C)  Height Method: Stated  Height: 5' 8" (172.7 cm)  Height (inches): 68 in  Weight Method: Bed Scale  Weight: 82.1 kg (181 lb)  Weight (lb): 181 lb  Ideal Body Weight (IBW), Male: 154 lb  % Ideal Body Weight, Male (lb): 117.53 %  BMI (Calculated): 27.5  BMI Grade: 25 - 29.9 - overweight     Wt Readings from Last 15 Encounters:   10/31/23 82.1 kg (181 lb)   10/23/23 72.6 kg (160 lb)   06/10/23 70.4 kg (155 lb 3.2 oz)   06/06/23 72.6 kg (160 lb)   12/23/20 " 73.5 kg (162 lb 0.6 oz)     Lab/Procedures/Meds    Pertinent Labs Reviewed: reviewed  Pertinent Medications Reviewed: reviewed  Lab Results   Component Value Date    WBC 14.97 (H) 10/30/2023    HGB 9.2 (L) 10/30/2023    HCT 29.1 (L) 10/30/2023     (H) 10/30/2023     10/30/2023     Lab Results   Component Value Date    ALT 19 10/26/2023    AST 32 10/26/2023    ALKPHOS 41 (L) 10/26/2023    BILITOT 0.3 10/26/2023      Lab Results   Component Value Date     10/30/2023    K 4.2 10/30/2023     10/30/2023    CO2 21 (L) 10/30/2023     Scheduled Meds:   amiodarone  400 mg Oral BID    arformoteroL  15 mcg Nebulization BID    aspirin  81 mg Oral Daily    atorvastatin  80 mg Oral Daily    budesonide  0.5 mg Nebulization Q12H    clopidogreL  75 mg Oral Daily    docusate sodium  100 mg Oral BID    enoxparin  40 mg Subcutaneous Q24H (prophylaxis, 1700)    folic acid  1 mg Oral Daily    gabapentin  300 mg Oral TID    lactulose  20 g Oral TID    metoprolol succinate  12.5 mg Oral Daily    pantoprazole  40 mg Oral BID    sacubitriL-valsartan  1 tablet Oral BID    spironolactone  25 mg Oral BID    tamsulosin  0.4 mg Oral Daily    thiamine  100 mg Oral Daily     Continuous Infusions:  PRN Meds:.albuterol-ipratropium, bisacodyL, calcium gluconate IVPB, calcium gluconate IVPB, calcium gluconate IVPB, dextrose 10%, dextrose 10%, hydrALAZINE, influenza, magnesium sulfate IVPB, magnesium sulfate IVPB, melatonin, ondansetron, pneumoc 20-jeremy conj-dip cr(PF), potassium chloride **AND** potassium chloride **AND** potassium chloride, sodium chloride 0.9%, sodium phosphate 15 mmol in dextrose 5 % (D5W) 250 mL IVPB, sodium phosphate 20.01 mmol in dextrose 5 % (D5W) 250 mL IVPB, sodium phosphate 30 mmol in dextrose 5 % (D5W) 250 mL IVPB, traZODone       Estimated/Assessed Needs    Weight Used For Calorie Calculations: 82.1 kg (181 lb)  Energy Calorie Requirements (kcal): 6887-0043 kcal/kg (CHF vs COPD 22-25kcal/kg (pt  overweight))  Energy Need Method: Kcal/kg  Protein Requirements:  g/kcal (COPD 1.0-1.5)  Weight Used For Protein Calculations: 82.1 kg (181 lb)  Fluid Requirements (mL): 2000 or per MD/NP (2000 fluid restriction in place)     RDA Method (mL): 1800         Nutrition Prescription Ordered    Current Diet Order: cardiac    Evaluation of Received Nutrient/Fluid Intake    I/O: Net Since Admit: -2,391.9 mL  Energy Calories Required: meeting needs  Protein Required: meeting needs  Fluid Required:  (no data)  % Intake of Estimated Energy Needs: 75 - 100 %  % Meal Intake: 75 - 100 %    Nutrition Risk    Level of Risk/Frequency of Follow-up: low (time 1 weekly)     Monitor and Evaluation    Food and Nutrient Intake: food and beverage intake  Food and Nutrient Adminstration: diet order  Knowledge/Beliefs/Attitudes: food and nutrition knowledge/skill, beliefs and attitudes  Anthropometric Measurements: weight change  Nutrition-Focused Physical Findings: overall appearance     Nutrition Follow-Up    RD Follow-up?: Yes  Yvrose Fletcher, Dietetic Intern

## 2023-10-31 NOTE — ASSESSMENT & PLAN NOTE
-BNP minimally elevated  -LA improved with inotropic support  -Ischemic evaluation pending  -Will optimize regimen accordingly    10/25/23  -RHC with elevated filling pressures  -Continue IV diuresis  -Will optimize regimen as tolerated  -Possible high risk PCI tmw    10/26/23  -Continue OMT  -C with possible high risk PCI today    10/27/23  -Continue OMT-will add BB once off dobutamine  -Entresto, po Lasix when able to tolerate  -LifeVest for SCD prevention    10/29/2023: Continue guideline directed medical management beta-blockers added as well as Entresto continue to follow patient clinically now off dobutamine LifeVest ordered.    10/30/23  LifeVest before DC  Cont entresto, aldactone, BB  Will need PO lasix upon DC    10/31/23  Awaiting LifeVest, spoke with rep, should hear by this afternoon  Cont entresto, aldactone BB  PO lasix PRN upon DC

## 2023-10-31 NOTE — ASSESSMENT & PLAN NOTE
Patient with known Cirrhosis with Child's class Calculator for Child's score link- [email or web address]://[email or web address].com/calc/340/child-sarmiento-score-cirrhosis-mortality#creator-insights. Co-morbidities are present and inclusive of ascites, malnutrition, anemia/pancytopenia and anticoagulation.  MELD-Na score calculated; MELD 3.0: 15 at 10/25/2023  5:01 AM  MELD-Na: 9 at 10/25/2023  5:01 AM  Calculated from:  Serum Creatinine: 1.3 mg/dL at 10/25/2023  5:01 AM  Serum Sodium: 130 mmol/L at 10/25/2023  5:01 AM  Total Bilirubin: 0.3 mg/dL (Using min of 1 mg/dL) at 10/24/2023 12:26 PM  Serum Albumin: 3.1 g/dL at 10/24/2023 12:26 PM  INR(ratio): 1.0 at 10/23/2023 11:50 AM  Age at listing (hypothetical): 63 years  Sex: Male at 10/25/2023  5:01 AM      Continue chronic meds. Etiology likely ETOH. Will avoid any hepatotoxic meds, and monitor CBC/CMP/INR for synthetic function.     Continue lactulose

## 2023-10-31 NOTE — SUBJECTIVE & OBJECTIVE
Review of Systems   Constitutional: Negative.   HENT: Negative.     Eyes: Negative.    Cardiovascular: Negative.    Respiratory: Negative.     Skin: Negative.    Musculoskeletal: Negative.    Gastrointestinal: Negative.    Genitourinary: Negative.    Neurological: Negative.    Psychiatric/Behavioral: Negative.       Objective:     Vital Signs (Most Recent):  Temp: 98 °F (36.7 °C) (10/31/23 1133)  Pulse: 80 (10/31/23 1133)  Resp: 18 (10/31/23 1133)  BP: (!) 117/59 (10/31/23 1133)  SpO2: (!) 94 % (10/31/23 1133) Vital Signs (24h Range):  Temp:  [97.4 °F (36.3 °C)-98.7 °F (37.1 °C)] 98 °F (36.7 °C)  Pulse:  [72-90] 80  Resp:  [16-18] 18  SpO2:  [89 %-96 %] 94 %  BP: ()/(53-70) 117/59     Weight: 82.1 kg (181 lb)  Body mass index is 27.52 kg/m².     SpO2: (!) 94 %       No intake or output data in the 24 hours ending 10/31/23 1216    Lines/Drains/Airways       Peripheral Intravenous Line  Duration                  Peripheral IV - Single Lumen 10/29/23 1520 22 G Posterior;Right Hand 1 day                       Physical Exam  Vitals and nursing note reviewed.   Constitutional:       Appearance: Normal appearance.   HENT:      Head: Normocephalic and atraumatic.   Eyes:      General:         Right eye: No discharge.         Left eye: No discharge.      Pupils: Pupils are equal, round, and reactive to light.   Cardiovascular:      Rate and Rhythm: Normal rate and regular rhythm.      Heart sounds: S1 normal and S2 normal. No murmur heard.     No friction rub.   Pulmonary:      Effort: Pulmonary effort is normal. No respiratory distress.      Breath sounds: Normal breath sounds. No rales.   Abdominal:      Palpations: Abdomen is soft.      Tenderness: There is no abdominal tenderness.   Musculoskeletal:      Cervical back: Neck supple.      Right lower leg: No edema.      Left lower leg: No edema.   Skin:     General: Skin is warm and dry.   Neurological:      General: No focal deficit present.      Mental Status:  "He is alert and oriented to person, place, and time.   Psychiatric:         Mood and Affect: Mood normal.         Behavior: Behavior normal.         Thought Content: Thought content normal.            Significant Labs: BMP:   Recent Labs   Lab 10/30/23  0501         K 4.2      CO2 21*   BUN 37*   CREATININE 1.1   CALCIUM 7.5*   MG 2.5   , CMP   Recent Labs   Lab 10/30/23  0501      K 4.2      CO2 21*      BUN 37*   CREATININE 1.1   CALCIUM 7.5*   ANIONGAP 8   , CBC   Recent Labs   Lab 10/30/23  0501   WBC 14.97*   HGB 9.2*   HCT 29.1*      , INR No results for input(s): "INR", "PROTIME" in the last 48 hours., Lipid Panel No results for input(s): "CHOL", "HDL", "LDLCALC", "TRIG", "CHOLHDL" in the last 48 hours., Troponin No results for input(s): "TROPONINI" in the last 48 hours., and All pertinent lab results from the last 24 hours have been reviewed.    Significant Imaging: Echocardiogram: Transthoracic echo (TTE) complete (Cupid Only):   Results for orders placed or performed during the hospital encounter of 10/23/23   Echo   Result Value Ref Range    BSA 2.08 m2    IVRT 49.48 msec    TDI LATERAL 0.10 m/s    Left Ventricular Outflow Tract Mean Gradient 1.13 mmHg    Left Ventricular Outflow Tract Mean Velocity 0.49 cm/s    MV stenosis pressure 1/2 time 34.73 ms    Mr max rex 3.50 m/s    TR Max Rex 2.09 m/s    Ao root annulus 3.38 cm    Ao peak rex 0.86 m/s    Posterior Wall 0.95 0.6 - 1.1 cm    LVOT diameter 1.95 cm    LVOT peak rex 0.76 m/s    LVOT peak VTI 10.90 cm    E wave deceleration time 119.77 msec    MV Peak A Rex 0.40 m/s    MV Peak E Rex 0.65 m/s    RA Major Axis 2.94 cm    TAPSE 1.82 cm    PV mean gradient 1 mmHg    RVOT peak rex 0.57 m/s    RVOT peak VTI 9.8 cm    IVS 0.96 0.6 - 1.1 cm    Ao VTI 11.90 cm    AV mean gradient 2 mmHg    LVIDd 4.24 3.5 - 6.0 cm    LVIDs 3.71 2.1 - 4.0 cm    Left Atrium Major Axis 4.28 cm    Left Atrium Minor Axis 4.19 cm    RV " mid diameter 3.37 cm    STJ 3.35 cm    Ascending aorta 3.41 cm    LV Systolic Volume 58.38 mL    LV Diastolic Volume 80.17 mL    TDI SEPTAL 0.09 m/s    LA size 2.39 cm    IVC diameter 1.42 cm    LV LATERAL E/E' RATIO 6.50 m/s    LV SEPTAL E/E' RATIO 7.22 m/s    FS 13 28 - 44 %    LV mass 130.71 g    ZLVIDD -3.61     ZLVIDS -0.04     Left Ventricle Relative Wall Thickness 0.45 cm    AV valve area 2.73 cm²    AV Velocity Ratio 0.88     AV index (prosthetic) 0.92     MV valve area p 1/2 method 6.33 cm2    E/A ratio 1.63     Mean e' 0.10 m/s    LVOT area 3.0 cm2    LVOT stroke volume 32.54 cm3    AV peak gradient 3 mmHg    E/E' ratio 6.84 m/s    LV Systolic Volume Index 28.6 mL/m2    LV Diastolic Volume Index 39.30 mL/m2    LV Mass Index 64 g/m2    Triscuspid Valve Regurgitation Peak Gradient 17 mmHg    ADDIS by Velocity Ratio 2.64 cm²    LA Volume Index 11.4 mL/m2    LA volume 23.23 cm3    LA WIDTH 2.7 cm    RA Width 2.3 cm    TV resting pulmonary artery pressure 20 mmHg    RV TB RVSP 5 mmHg    Est. RA pres 3 mmHg    Narrative      Left Ventricle: The left ventricle is mildly dilated. Normal wall   thickness. There is concentric remodeling. Moderate global hypokinesis   present. There is severely reduced systolic function with a visually   estimated ejection fraction of 20 - 25%. There is normal diastolic   function.    Right Ventricle: Normal right ventricular cavity size. Wall thickness   is normal. Right ventricle wall motion  is normal. Systolic function is   normal.    Mitral Valve: There is mild to moderate regurgitation with a centrally   directed jet.    Pulmonary Artery: The estimated pulmonary artery systolic pressure is   20 mmHg.    IVC/SVC: Normal venous pressure at 3 mmHg.     , EKG: reviewed, Stress Test: reviewed, and X-Ray: CXR: X-Ray Chest 1 View (CXR):   Results for orders placed or performed during the hospital encounter of 10/23/23   X-Ray Chest 1 View    Narrative    EXAMINATION:  XR CHEST 1  VIEW    CLINICAL HISTORY:  IABP placement;    TECHNIQUE:  Single frontal view of the chest was performed.    COMPARISON:  10/23/2023    FINDINGS:  Multiple telemetry leads overlie the chest.  The cardiac silhouette and mediastinum are unremarkable.  Exam is limited as the right costophrenic sulcus is not included.  Developing bilateral perihilar ground-glass opacities along with more prominent interstitial prominence and cephalization compatible with worsening pulmonary venous hypertension.  No visualized pneumothorax or pleural effusion.  The lungs remain hyperinflated.      Impression    Findings compatible with worsening pulmonary venous hypertension.    Unchanged hyperinflation.      Electronically signed by: Paddy Calhoun  Date:    10/25/2023  Time:    07:49

## 2023-11-01 LAB
POCT GLUCOSE: 100 MG/DL (ref 70–110)
POCT GLUCOSE: 102 MG/DL (ref 70–110)
POCT GLUCOSE: 108 MG/DL (ref 70–110)
POCT GLUCOSE: 84 MG/DL (ref 70–110)

## 2023-11-01 PROCEDURE — 63600175 PHARM REV CODE 636 W HCPCS: Performed by: NURSE PRACTITIONER

## 2023-11-01 PROCEDURE — 25000003 PHARM REV CODE 250: Performed by: FAMILY MEDICINE

## 2023-11-01 PROCEDURE — 21400001 HC TELEMETRY ROOM

## 2023-11-01 PROCEDURE — 94761 N-INVAS EAR/PLS OXIMETRY MLT: CPT

## 2023-11-01 PROCEDURE — 25000003 PHARM REV CODE 250: Performed by: INTERNAL MEDICINE

## 2023-11-01 PROCEDURE — 25000003 PHARM REV CODE 250

## 2023-11-01 PROCEDURE — 25000242 PHARM REV CODE 250 ALT 637 W/ HCPCS: Performed by: INTERNAL MEDICINE

## 2023-11-01 PROCEDURE — 25000003 PHARM REV CODE 250: Performed by: NURSE PRACTITIONER

## 2023-11-01 PROCEDURE — 25000003 PHARM REV CODE 250: Performed by: PHYSICIAN ASSISTANT

## 2023-11-01 PROCEDURE — 94640 AIRWAY INHALATION TREATMENT: CPT

## 2023-11-01 RX ADMIN — DOCUSATE SODIUM 100 MG: 100 CAPSULE, LIQUID FILLED ORAL at 08:11

## 2023-11-01 RX ADMIN — ASPIRIN 81 MG CHEWABLE TABLET 81 MG: 81 TABLET CHEWABLE at 08:11

## 2023-11-01 RX ADMIN — ARFORMOTEROL TARTRATE 15 MCG: 15 SOLUTION RESPIRATORY (INHALATION) at 08:11

## 2023-11-01 RX ADMIN — FOLIC ACID 1 MG: 1 TABLET ORAL at 08:11

## 2023-11-01 RX ADMIN — GABAPENTIN 300 MG: 300 CAPSULE ORAL at 03:11

## 2023-11-01 RX ADMIN — SPIRONOLACTONE 25 MG: 25 TABLET ORAL at 08:11

## 2023-11-01 RX ADMIN — CLOPIDOGREL BISULFATE 75 MG: 75 TABLET ORAL at 08:11

## 2023-11-01 RX ADMIN — GABAPENTIN 300 MG: 300 CAPSULE ORAL at 08:11

## 2023-11-01 RX ADMIN — TRAZODONE HYDROCHLORIDE 25 MG: 50 TABLET ORAL at 09:11

## 2023-11-01 RX ADMIN — PANTOPRAZOLE SODIUM 40 MG: 40 TABLET, DELAYED RELEASE ORAL at 08:11

## 2023-11-01 RX ADMIN — BUDESONIDE INHALATION 0.5 MG: 0.5 SUSPENSION RESPIRATORY (INHALATION) at 07:11

## 2023-11-01 RX ADMIN — ATORVASTATIN CALCIUM 80 MG: 40 TABLET, FILM COATED ORAL at 08:11

## 2023-11-01 RX ADMIN — Medication 100 MG: at 08:11

## 2023-11-01 RX ADMIN — ENOXAPARIN SODIUM 40 MG: 40 INJECTION SUBCUTANEOUS at 05:11

## 2023-11-01 RX ADMIN — METOPROLOL SUCCINATE 12.5 MG: 25 TABLET, EXTENDED RELEASE ORAL at 08:11

## 2023-11-01 RX ADMIN — AMIODARONE HYDROCHLORIDE 400 MG: 200 TABLET ORAL at 08:11

## 2023-11-01 RX ADMIN — SACUBITRIL AND VALSARTAN 1 TABLET: 24; 26 TABLET, FILM COATED ORAL at 08:11

## 2023-11-01 RX ADMIN — LACTULOSE 20 G: 20 SOLUTION ORAL at 08:11

## 2023-11-01 RX ADMIN — BUDESONIDE INHALATION 0.5 MG: 0.5 SUSPENSION RESPIRATORY (INHALATION) at 08:11

## 2023-11-01 RX ADMIN — ARFORMOTEROL TARTRATE 15 MCG: 15 SOLUTION RESPIRATORY (INHALATION) at 07:11

## 2023-11-01 RX ADMIN — TAMSULOSIN HYDROCHLORIDE 0.4 MG: 0.4 CAPSULE ORAL at 08:11

## 2023-11-01 NOTE — ASSESSMENT & PLAN NOTE
Patient with known Cirrhosis with Child's class Calculator for Child's score link- https://[email or web address]/calc/340/child-sarmiento-score-cirrhosis-mortality#creator-insights. Co-morbidities are present and inclusive of ascites, malnutrition, anemia/pancytopenia and anticoagulation.  MELD-Na score calculated; MELD 3.0: 15 at 10/25/2023  5:01 AM  MELD-Na: 9 at 10/25/2023  5:01 AM  Calculated from:  Serum Creatinine: 1.3 mg/dL at 10/25/2023  5:01 AM  Serum Sodium: 130 mmol/L at 10/25/2023  5:01 AM  Total Bilirubin: 0.3 mg/dL (Using min of 1 mg/dL) at 10/24/2023 12:26 PM  Serum Albumin: 3.1 g/dL at 10/24/2023 12:26 PM  INR(ratio): 1.0 at 10/23/2023 11:50 AM  Age at listing (hypothetical): 63 years  Sex: Male at 10/25/2023  5:01 AM      Continue chronic meds. Etiology likely ETOH. Will avoid any hepatotoxic meds, and monitor CBC/CMP/INR for synthetic function.     Continue lactulose

## 2023-11-01 NOTE — ASSESSMENT & PLAN NOTE
Patient with known CAD s/p not amenable to surgery, which is controlled Will continue ASA, Plavix and Statin and monitor for S/Sx of angina/ACS. Continue to monitor on telemetry.     Not amenable to surgery

## 2023-11-01 NOTE — ASSESSMENT & PLAN NOTE
Chronic, controlled. Latest blood pressure and vitals reviewed-     Temp:  [98 °F (36.7 °C)-98.5 °F (36.9 °C)]   Pulse:  [72-84]   Resp:  [18-20]   BP: ()/(46-62)   SpO2:  [91 %-96 %] .   Home meds for hypertension were reviewed and noted below.   Hypertension Medications             furosemide (LASIX) 40 MG tablet Take 40 mg by mouth once daily.    lisinopriL (PRINIVIL,ZESTRIL) 2.5 MG tablet Take 1 tablet (2.5 mg total) by mouth once daily.    metoprolol succinate (TOPROL-XL) 25 MG 24 hr tablet Take 1 tablet (25 mg total) by mouth once daily.    spironolactone (ALDACTONE) 50 MG tablet Take 50 mg by mouth 2 (two) times daily.          While in the hospital, will manage blood pressure as follows; Adjust home antihypertensive regimen as follows- prn hydralazine    Will utilize p.r.n. blood pressure medication only if patient's blood pressure greater than 140/90 and he develops symptoms such as worsening chest pain or shortness of breath.

## 2023-11-01 NOTE — SUBJECTIVE & OBJECTIVE
Interval History: See hospital course for today      Review of Systems   Constitutional:  Negative for activity change, appetite change, fatigue and fever.   Respiratory:  Negative for shortness of breath.    Cardiovascular:  Negative for chest pain.   Gastrointestinal:  Negative for abdominal pain, nausea and vomiting.   Musculoskeletal:  Negative for gait problem.   Neurological:  Negative for weakness.   Psychiatric/Behavioral:  Negative for agitation, behavioral problems, confusion, decreased concentration and dysphoric mood. The patient is not nervous/anxious.      Objective:     Vital Signs (Most Recent):  Temp: 98.5 °F (36.9 °C) (11/01/23 1107)  Pulse: 78 (11/01/23 1107)  Resp: 18 (11/01/23 1107)  BP: (!) 110/56 (11/01/23 1107)  SpO2: (!) 93 % (11/01/23 1107) Vital Signs (24h Range):  Temp:  [98 °F (36.7 °C)-98.5 °F (36.9 °C)] 98.5 °F (36.9 °C)  Pulse:  [72-84] 78  Resp:  [18-20] 18  SpO2:  [91 %-96 %] 93 %  BP: ()/(46-62) 110/56     Weight: 82.1 kg (181 lb)  Body mass index is 27.52 kg/m².    Intake/Output Summary (Last 24 hours) at 11/1/2023 1131  Last data filed at 10/31/2023 1557  Gross per 24 hour   Intake 700 ml   Output --   Net 700 ml         Physical Exam  Vitals and nursing note reviewed. Exam conducted with a chaperone present (case mgt).   Constitutional:       General: He is not in acute distress.     Appearance: He is ill-appearing. He is not toxic-appearing.   HENT:      Head: Normocephalic and atraumatic.        Comments: edentulous  Cardiovascular:      Rate and Rhythm: Normal rate.   Pulmonary:      Effort: Pulmonary effort is normal. No respiratory distress.   Abdominal:      General: There is distension.      Palpations: Abdomen is soft.   Skin:     General: Skin is warm.   Neurological:      Mental Status: He is alert. Mental status is at baseline.      Motor: No weakness.   Psychiatric:         Mood and Affect: Mood is depressed.         Behavior: Behavior normal. Behavior is  cooperative.             Significant Labs: All pertinent labs within the past 24 hours have been reviewed.      Significant Imaging: I have reviewed all pertinent imaging results/findings within the past 24 hours.

## 2023-11-01 NOTE — ASSESSMENT & PLAN NOTE
Patient's COPD is controlled currently.  Patient is currently off COPD Pathway. Continue scheduled inhalers Supplemental oxygen prn and monitor respiratory status closely.   Continue scheduled breathing treatments

## 2023-11-01 NOTE — PROGRESS NOTES
[facility] - Telemetry [facility] Medicine  Progress Note    Patient Name: Hemal Tolbert Jr.  MRN: [id number]  Patient Class: IP- Inpatient   Admission Date: 10/23/2023  Length of Stay: 9 days  Attending Physician: Leelee Dewitt MD  Primary Care Provider: Norma Provider        Subjective:     Principal Problem:VT (ventricular tachycardia)        HPI:  63M  has a past medical history of Alcoholic cirrhosis, CHF (congestive heart failure), COPD (chronic obstructive pulmonary disease), Hypertension, Peripheral artery disease, and Sleep apnea.  Presented to emergency department from outside facility for vtach requiring defibrillation x 2. Initial workup revealed possible Torsades on electrocardiography strips. Cbc with leukocytosis, cmp with hyponatremia, creatinine 1.2, , , and elevated troponin, 3.6 --> 9.9. Patient was given an aspirin, started on heparin infusion, given 4 mg of Mag IV, and 40 mg of Lasix IV and transferred to icu per Cardiology recommendations. Echocardiogram with 20-25% ejection fraction. Patient required precedex infusion while in icu.    Cardiac catheterization revealed severe stenosis. CTS consulted and deemed not a surgical candidate. Amio infusion converted to po medication(s). Dobutamine infusion weaned. Patient is awaiting lifevest prior to discharge.    Us abdomen without significant ascites. On lactulose for cirrhosis.     Hospital medicine consulted for assumption of care.         Overview/Hospital Course:  63M has a past medical history of Alcoholic cirrhosis, CHF (congestive heart failure), COPD (chronic obstructive pulmonary disease), Hypertension, Peripheral artery disease, and Sleep apnea.  Presented to emergency department from outside facility for vtach requiring defibrillation x 2. Initial workup revealed possible Torsades on electrocardiography strips. Cbc with leukocytosis, cmp with hyponatremia, creatinine 1.2, , , and elevated troponin,  3.6 --> 9.9. Patient was given an aspirin, started on heparin infusion, given 4 mg of Mag IV, and 40 mg of Lasix IV and transferred to icu per Cardiology recommendations. Echocardiogram with 20-25% ejection fraction. Patient required precedex infusion while in icu.     Cardiac catheterization revealed severe stenosis. CTS consulted and deemed not a surgical candidate. Amio infusion converted to po medication(s). Dobutamine infusion weaned. Patient is awaiting lifevest prior to discharge.     Us abdomen without significant ascites. On lactulose for cirrhosis.   10/29 transferred from icu to floor status. No acute events overnight. Awaiting lifevest. Voiding trial to discontinue jacobson. Multiple bowel movements reported.  10/30 no acute events overnight. Jacobson discontinued. Awaiting lifevest. Patient ready for discharge.  10/31 complains of abdominal tenderness from coughing, improved with abdominal binder. monitor. Awaiting lifevest  11/1 awaiting lifevest      Interval History: See hospital course for today      Review of Systems   Constitutional:  Negative for activity change, appetite change, fatigue and fever.   Respiratory:  Negative for shortness of breath.    Cardiovascular:  Negative for chest pain.   Gastrointestinal:  Negative for abdominal pain, nausea and vomiting.   Musculoskeletal:  Negative for gait problem.   Neurological:  Negative for weakness.   Psychiatric/Behavioral:  Negative for agitation, behavioral problems, confusion, decreased concentration and dysphoric mood. The patient is not nervous/anxious.      Objective:     Vital Signs (Most Recent):  Temp: 98.5 °F (36.9 °C) (11/01/23 1107)  Pulse: 78 (11/01/23 1107)  Resp: 18 (11/01/23 1107)  BP: (!) 110/56 (11/01/23 1107)  SpO2: (!) 93 % (11/01/23 1107) Vital Signs (24h Range):  Temp:  [98 °F (36.7 °C)-98.5 °F (36.9 °C)] 98.5 °F (36.9 °C)  Pulse:  [72-84] 78  Resp:  [18-20] 18  SpO2:  [91 %-96 %] 93 %  BP: ()/(46-62) 110/56     Weight: 82.1  kg (181 lb)  Body mass index is 27.52 kg/m².    Intake/Output Summary (Last 24 hours) at 11/1/2023 1131  Last data filed at 10/31/2023 1557  Gross per 24 hour   Intake 700 ml   Output --   Net 700 ml         Physical Exam  Vitals and nursing note reviewed. Exam conducted with a chaperone present (case mglisandra).   Constitutional:       General: He is not in acute distress.     Appearance: He is ill-appearing. He is not toxic-appearing.   HENT:      Head: Normocephalic and atraumatic.        Comments: edentulous  Cardiovascular:      Rate and Rhythm: Normal rate.   Pulmonary:      Effort: Pulmonary effort is normal. No respiratory distress.   Abdominal:      General: There is distension.      Palpations: Abdomen is soft.   Skin:     General: Skin is warm.   Neurological:      Mental Status: He is alert. Mental status is at baseline.      Motor: No weakness.   Psychiatric:         Mood and Affect: Mood is depressed.         Behavior: Behavior normal. Behavior is cooperative.             Significant Labs: All pertinent labs within the past 24 hours have been reviewed.      Significant Imaging: I have reviewed all pertinent imaging results/findings within the past 24 hours.      Assessment/Plan:      * VT (ventricular tachycardia)  Required defibrillation x 2  Electrolytes corrected  Cardiology following  Severe CAD, not a surgical candidate  Awaiting lifevest      NSTEMI (non-ST elevated myocardial infarction)        Coronary artery disease involving native coronary artery of native heart with unstable angina pectoris  Patient with known CAD s/p not amenable to surgery, which is controlled Will continue ASA, Plavix and Statin and monitor for S/Sx of angina/ACS. Continue to monitor on telemetry.     Not amenable to surgery    Abdominal distention  Us abdomen without significant ascites  Continue lactulose      Cardiogenic shock  Awaiting lifevest      Alcohol abuse  Required precedex infusion  librium taper completed  No  active signs of withdrawal   Continue gabapentin to help with cravings     COPD (chronic obstructive pulmonary disease)  Patient's COPD is controlled currently.  Patient is currently off COPD Pathway. Continue scheduled inhalers Supplemental oxygen prn and monitor respiratory status closely.   Continue scheduled breathing treatments    Primary hypertension  Chronic, controlled. Latest blood pressure and vitals reviewed-     Temp:  [98 °F (36.7 °C)-98.5 °F (36.9 °C)]   Pulse:  [72-84]   Resp:  [18-20]   BP: ()/(46-62)   SpO2:  [91 %-96 %] .   Home meds for hypertension were reviewed and noted below.   Hypertension Medications             furosemide (LASIX) 40 MG tablet Take 40 mg by mouth once daily.    lisinopriL (PRINIVIL,ZESTRIL) 2.5 MG tablet Take 1 tablet (2.5 mg total) by mouth once daily.    metoprolol succinate (TOPROL-XL) 25 MG 24 hr tablet Take 1 tablet (25 mg total) by mouth once daily.    spironolactone (ALDACTONE) 50 MG tablet Take 50 mg by mouth 2 (two) times daily.          While in the hospital, will manage blood pressure as follows; Adjust home antihypertensive regimen as follows- prn hydralazine    Will utilize p.r.n. blood pressure medication only if patient's blood pressure greater than 140/90 and he develops symptoms such as worsening chest pain or shortness of breath.    Tobacco dependence  Dangers of cigarette smoking were reviewed with patient in detail. Patient was Counseled for 3-10 minutes. Nicotine replacement options were discussed. Nicotine replacement was discussed- not prescribed per patient's request    Acute on chronic combined systolic and diastolic heart failure  Attributed to etoh and cad  Fluid and salt restriction  I/os  Veronica discontinued  Awaiting lifevest  Continue entresto, beta blocker, spironolactone   Po lasix prn     Elevated troponin  See vtach      Alcoholic cirrhosis  Patient with known Cirrhosis with Child's class Calculator for Child's score link-  https://www.mdcalc.com/calc/340/child-sarmiento-score-cirrhosis-mortality#creator-insights. Co-morbidities are present and inclusive of ascites, malnutrition, anemia/pancytopenia and anticoagulation.  MELD-Na score calculated; MELD 3.0: 15 at 10/25/2023  5:01 AM  MELD-Na: 9 at 10/25/2023  5:01 AM  Calculated from:  Serum Creatinine: 1.3 mg/dL at 10/25/2023  5:01 AM  Serum Sodium: 130 mmol/L at 10/25/2023  5:01 AM  Total Bilirubin: 0.3 mg/dL (Using min of 1 mg/dL) at 10/24/2023 12:26 PM  Serum Albumin: 3.1 g/dL at 10/24/2023 12:26 PM  INR(ratio): 1.0 at 10/23/2023 11:50 AM  Age at listing (hypothetical): 63 years  Sex: Male at 10/25/2023  5:01 AM      Continue chronic meds. Etiology likely ETOH. Will avoid any hepatotoxic meds, and monitor CBC/CMP/INR for synthetic function.     Continue lactulose    VTE Risk Mitigation (From admission, onward)         Ordered     enoxaparin injection 40 mg  Every 24 hours         10/28/23 0938     IP VTE HIGH RISK PATIENT  Once         10/23/23 1120     Place sequential compression device  Until discontinued         10/23/23 1120                Discharge Planning   SADI: 11/1/2023     Code Status: Full Code   Is the patient medically ready for discharge?: Yes    Reason for patient still in hospital (select all that apply): Pending disposition  Discharge Plan A: Home with family                  Leelee Dewitt MD  Department of Hospital Medicine   '[place] - Telemetry ([facility])

## 2023-11-01 NOTE — PLAN OF CARE
"Per Lance, with [employer], [employer] has not made a decision on approving LV as of this morning. Status still showing as pending for life vest.     SW and attending meet with patient at bedside to explain options for patient as regarding DC. Per Attending, Patient did have the option to go home and be fitted for LV at home, however it was against advise of hospital medicine and cardiology. Hospital medicine and cardiology are recommending patient stay in patient while pending lifevest approval, due to V-Tach upon admissions. Patient was agreeable to waiting for insurance decision on LV.  GINA stated that she would reach out to [employer] to check on authorization and see what was holding the decision. GINA reached out to Lance, with [employer], to check for decision prior to calling [employer].     12 02 SW called [employer], spoke to Barbara, in the authorization department regarding patient's lifevest. Per Barbara, she did not see an original denial for a Patient's LV in their system. Barbara stated she saw that [employer] had faxed information and appeal Monday, 10/30 at 4 pm, and they began "working" on it at 4:30, but was just now reviewing the referral. GINA stated that [employer] had gotten the initial denial email, which then prompted the appeal of the denial decision. Per Barbara, someone was reviewing the referral now and they would notify [employer] with a decision.  GINA called Lance, with [employer], to update him on what [employer] said. Per Lance, they did receive the denial in an email and provided reference number with screenshot to GINA. GINA stated she would call [employer] later this afternoon for follow up on decision. Lance stated he would let GINA know if he heard about a decision.    16 00 GINA called [employer] Medicaid service line 3 times and spoke to members services. Per Member services, on the 3rd attempt, SW was transferred to authorizations and spoke to Shanda regarding [employer] authorization. " Per Shanda, they have 2 business days to have a decision regarding LifeVest, and today would be business day #2 so a decision would be made today. SW requested to speak with person handling referral for an expedited decision. GINA was transferred to , who stated that nurse, Dereck, was handling referral and she would send an email to Dereck to call SW back with decision or information regarding referral. GINA will update Lance, with [employer] and Attending on LV status.     GIAN will continue to follow and assist as needed.

## 2023-11-02 LAB
ANION GAP SERPL CALC-SCNC: 8 MMOL/L (ref 8–16)
BASOPHILS # BLD AUTO: 0.01 K/UL (ref 0–0.2)
BASOPHILS NFR BLD: 0.1 % (ref 0–1.9)
BUN SERPL-MCNC: 28 MG/DL (ref 8–23)
CALCIUM SERPL-MCNC: 7.8 MG/DL (ref 8.7–10.5)
CHLORIDE SERPL-SCNC: 110 MMOL/L (ref 95–110)
CO2 SERPL-SCNC: 17 MMOL/L (ref 23–29)
CREAT SERPL-MCNC: 1.1 MG/DL (ref 0.5–1.4)
DIFFERENTIAL METHOD: ABNORMAL
EOSINOPHIL # BLD AUTO: 0.1 K/UL (ref 0–0.5)
EOSINOPHIL NFR BLD: 0.6 % (ref 0–8)
ERYTHROCYTE [DISTWIDTH] IN BLOOD BY AUTOMATED COUNT: 13.3 % (ref 11.5–14.5)
EST. GFR  (NO RACE VARIABLE): >60 ML/MIN/1.73 M^2
GLUCOSE SERPL-MCNC: 122 MG/DL (ref 70–110)
HCT VFR BLD AUTO: 28.7 % (ref 40–54)
HGB BLD-MCNC: 9.2 G/DL (ref 14–18)
IMM GRANULOCYTES # BLD AUTO: 0.28 K/UL (ref 0–0.04)
IMM GRANULOCYTES NFR BLD AUTO: 2.3 % (ref 0–0.5)
LYMPHOCYTES # BLD AUTO: 1.5 K/UL (ref 1–4.8)
LYMPHOCYTES NFR BLD: 12.6 % (ref 18–48)
MCH RBC QN AUTO: 32.3 PG (ref 27–31)
MCHC RBC AUTO-ENTMCNC: 32.1 G/DL (ref 32–36)
MCV RBC AUTO: 101 FL (ref 82–98)
MONOCYTES # BLD AUTO: 1.3 K/UL (ref 0.3–1)
MONOCYTES NFR BLD: 10.7 % (ref 4–15)
NEUTROPHILS # BLD AUTO: 8.9 K/UL (ref 1.8–7.7)
NEUTROPHILS NFR BLD: 73.7 % (ref 38–73)
NRBC BLD-RTO: 0 /100 WBC
PLATELET # BLD AUTO: 286 K/UL (ref 150–450)
PMV BLD AUTO: 10.3 FL (ref 9.2–12.9)
POCT GLUCOSE: 105 MG/DL (ref 70–110)
POCT GLUCOSE: 107 MG/DL (ref 70–110)
POCT GLUCOSE: 92 MG/DL (ref 70–110)
POTASSIUM SERPL-SCNC: 4.8 MMOL/L (ref 3.5–5.1)
RBC # BLD AUTO: 2.85 M/UL (ref 4.6–6.2)
SODIUM SERPL-SCNC: 135 MMOL/L (ref 136–145)
WBC # BLD AUTO: 12.11 K/UL (ref 3.9–12.7)

## 2023-11-02 PROCEDURE — 25000003 PHARM REV CODE 250: Performed by: FAMILY MEDICINE

## 2023-11-02 PROCEDURE — 25000003 PHARM REV CODE 250: Performed by: INTERNAL MEDICINE

## 2023-11-02 PROCEDURE — 63600175 PHARM REV CODE 636 W HCPCS: Performed by: NURSE PRACTITIONER

## 2023-11-02 PROCEDURE — 85025 COMPLETE CBC W/AUTO DIFF WBC: CPT | Performed by: FAMILY MEDICINE

## 2023-11-02 PROCEDURE — 80048 BASIC METABOLIC PNL TOTAL CA: CPT | Performed by: FAMILY MEDICINE

## 2023-11-02 PROCEDURE — 25000003 PHARM REV CODE 250

## 2023-11-02 PROCEDURE — 25000003 PHARM REV CODE 250: Performed by: PHYSICIAN ASSISTANT

## 2023-11-02 PROCEDURE — 99900035 HC TECH TIME PER 15 MIN (STAT)

## 2023-11-02 PROCEDURE — 36415 COLL VENOUS BLD VENIPUNCTURE: CPT | Performed by: FAMILY MEDICINE

## 2023-11-02 PROCEDURE — 21400001 HC TELEMETRY ROOM

## 2023-11-02 PROCEDURE — 94640 AIRWAY INHALATION TREATMENT: CPT

## 2023-11-02 PROCEDURE — 25000003 PHARM REV CODE 250: Performed by: NURSE PRACTITIONER

## 2023-11-02 PROCEDURE — 94761 N-INVAS EAR/PLS OXIMETRY MLT: CPT

## 2023-11-02 PROCEDURE — 25000242 PHARM REV CODE 250 ALT 637 W/ HCPCS: Performed by: INTERNAL MEDICINE

## 2023-11-02 RX ADMIN — GABAPENTIN 300 MG: 300 CAPSULE ORAL at 08:11

## 2023-11-02 RX ADMIN — ATORVASTATIN CALCIUM 80 MG: 40 TABLET, FILM COATED ORAL at 08:11

## 2023-11-02 RX ADMIN — Medication 100 MG: at 08:11

## 2023-11-02 RX ADMIN — TAMSULOSIN HYDROCHLORIDE 0.4 MG: 0.4 CAPSULE ORAL at 08:11

## 2023-11-02 RX ADMIN — AMIODARONE HYDROCHLORIDE 400 MG: 200 TABLET ORAL at 08:11

## 2023-11-02 RX ADMIN — ARFORMOTEROL TARTRATE 15 MCG: 15 SOLUTION RESPIRATORY (INHALATION) at 08:11

## 2023-11-02 RX ADMIN — DOCUSATE SODIUM 100 MG: 100 CAPSULE, LIQUID FILLED ORAL at 08:11

## 2023-11-02 RX ADMIN — FOLIC ACID 1 MG: 1 TABLET ORAL at 08:11

## 2023-11-02 RX ADMIN — ARFORMOTEROL TARTRATE 15 MCG: 15 SOLUTION RESPIRATORY (INHALATION) at 09:11

## 2023-11-02 RX ADMIN — PANTOPRAZOLE SODIUM 40 MG: 40 TABLET, DELAYED RELEASE ORAL at 08:11

## 2023-11-02 RX ADMIN — ENOXAPARIN SODIUM 40 MG: 40 INJECTION SUBCUTANEOUS at 04:11

## 2023-11-02 RX ADMIN — CLOPIDOGREL BISULFATE 75 MG: 75 TABLET ORAL at 08:11

## 2023-11-02 RX ADMIN — TRAZODONE HYDROCHLORIDE 25 MG: 50 TABLET ORAL at 08:11

## 2023-11-02 RX ADMIN — SPIRONOLACTONE 25 MG: 25 TABLET ORAL at 08:11

## 2023-11-02 RX ADMIN — BUDESONIDE INHALATION 0.5 MG: 0.5 SUSPENSION RESPIRATORY (INHALATION) at 08:11

## 2023-11-02 RX ADMIN — SACUBITRIL AND VALSARTAN 1 TABLET: 24; 26 TABLET, FILM COATED ORAL at 08:11

## 2023-11-02 RX ADMIN — METOPROLOL SUCCINATE 12.5 MG: 25 TABLET, EXTENDED RELEASE ORAL at 08:11

## 2023-11-02 RX ADMIN — ASPIRIN 81 MG CHEWABLE TABLET 81 MG: 81 TABLET CHEWABLE at 08:11

## 2023-11-02 RX ADMIN — BUDESONIDE INHALATION 0.5 MG: 0.5 SUSPENSION RESPIRATORY (INHALATION) at 09:11

## 2023-11-02 RX ADMIN — GABAPENTIN 300 MG: 300 CAPSULE ORAL at 04:11

## 2023-11-02 NOTE — ASSESSMENT & PLAN NOTE
Patient with known Cirrhosis with Child's class Calculator for Child's score link- https://www.[employer].com/calc/340/child-sarmiento-score-cirrhosis-mortality#creator-insights. Co-morbidities are present and inclusive of ascites, malnutrition, anemia/pancytopenia and anticoagulation.  MELD-Na score calculated; MELD 3.0: 15 at 10/25/2023  5:01 AM  MELD-Na: 9 at 10/25/2023  5:01 AM  Calculated from:  Serum Creatinine: 1.3 mg/dL at 10/25/2023  5:01 AM  Serum Sodium: 130 mmol/L at 10/25/2023  5:01 AM  Total Bilirubin: 0.3 mg/dL (Using min of 1 mg/dL) at 10/24/2023 12:26 PM  Serum Albumin: 3.1 g/dL at 10/24/2023 12:26 PM  INR(ratio): 1.0 at 10/23/2023 11:50 AM  Age at listing (hypothetical): 63 years  Sex: Male at 10/25/2023  5:01 AM      Continue chronic meds. Etiology likely ETOH. Will avoid any hepatotoxic meds, and monitor CBC/CMP/INR for synthetic function.     Continue lactulose

## 2023-11-02 NOTE — ASSESSMENT & PLAN NOTE
Required defibrillation x 2  Electrolytes corrected  Cardiology following  Severe CAD, not a surgical candidate  Awaiting lifevest  Peer to peer 11/3 by cards per case management

## 2023-11-02 NOTE — PROGRESS NOTES
[facility] - Telemetry ([facility] Medicine  Progress Note    Patient Name: Hemal Tolbert Jr.  MRN: [id number]  Patient Class: IP- Inpatient   Admission Date: 10/23/2023  Length of Stay: 10 days  Attending Physician: Bello Pinto MD  Primary Care Provider: Sigifredo Green        Subjective:     Principal Problem:VT (ventricular tachycardia)        HPI:  63M  has a past medical history of Alcoholic cirrhosis, CHF (congestive heart failure), COPD (chronic obstructive pulmonary disease), Hypertension, Peripheral artery disease, and Sleep apnea.  Presented to emergency department from outside facility for vtach requiring defibrillation x 2. Initial workup revealed possible Torsades on electrocardiography strips. Cbc with leukocytosis, cmp with hyponatremia, creatinine 1.2, , , and elevated troponin, 3.6 --> 9.9. Patient was given an aspirin, started on heparin infusion, given 4 mg of Mag IV, and 40 mg of Lasix IV and transferred to icu per Cardiology recommendations. Echocardiogram with 20-25% ejection fraction. Patient required precedex infusion while in icu.    Cardiac catheterization revealed severe stenosis. CTS consulted and deemed not a surgical candidate. Amio infusion converted to po medication(s). Dobutamine infusion weaned. Patient is awaiting lifevest prior to discharge.    Us abdomen without significant ascites. On lactulose for cirrhosis.     Hospital medicine consulted for assumption of care.         Overview/Hospital Course:  63M has a past medical history of Alcoholic cirrhosis, CHF (congestive heart failure), COPD (chronic obstructive pulmonary disease), Hypertension, Peripheral artery disease, and Sleep apnea.  Presented to emergency department from outside facility for vtach requiring defibrillation x 2. Initial workup revealed possible Torsades on electrocardiography strips. Cbc with leukocytosis, cmp with hyponatremia, creatinine 1.2, , , and elevated troponin, 3.6  --> 9.9. Patient was given an aspirin, started on heparin infusion, given 4 mg of Mag IV, and 40 mg of Lasix IV and transferred to icu per Cardiology recommendations. Echocardiogram with 20-25% ejection fraction. Patient required precedex infusion while in icu.     Cardiac catheterization revealed severe stenosis. CTS consulted and deemed not a surgical candidate. Amio infusion converted to po medication(s). Dobutamine infusion weaned. Patient is awaiting lifevest prior to discharge.     Us abdomen without significant ascites. On lactulose for cirrhosis.   10/29 transferred from icu to floor status. No acute events overnight. Awaiting lifevest. Voiding trial to discontinue jacobson. Multiple bowel movements reported.  10/30 no acute events overnight. Jacobson discontinued. Awaiting lifevest. Patient ready for discharge.  10/31 complains of abdominal tenderness from coughing, improved with abdominal binder. monitor. Awaiting lifevest  11/1 - 11/2: awaiting lifevest, discussed with case management, cardiology to do peer to peer 11/3      Interval History: No issues overnight.     Review of Systems   Constitutional:  Negative for fatigue and fever.   HENT:  Negative for sinus pressure.    Eyes:  Negative for visual disturbance.   Respiratory:  Negative for shortness of breath.    Cardiovascular:  Negative for chest pain.   Gastrointestinal:  Negative for nausea and vomiting.   Genitourinary:  Negative for difficulty urinating.   Musculoskeletal:  Negative for back pain.   Skin:  Negative for rash.   Neurological:  Negative for headaches.   Psychiatric/Behavioral:  Negative for confusion.      Objective:     Vital Signs (Most Recent):  Temp: 97.4 °F (36.3 °C) (11/02/23 0757)  Pulse: 85 (11/02/23 0812)  Resp: 18 (11/02/23 0800)  BP: (!) 114/55 (11/02/23 0757)  SpO2: 95 % (11/02/23 0800) Vital Signs (24h Range):  Temp:  [97.4 °F (36.3 °C)-98.8 °F (37.1 °C)] 97.4 °F (36.3 °C)  Pulse:  [76-86] 85  Resp:  [18] 18  SpO2:  [85 %-95  %] 95 %  BP: (105-121)/(55-60) 114/55     Weight: 82.1 kg (181 lb)  Body mass index is 27.52 kg/m².    Intake/Output Summary (Last 24 hours) at 11/2/2023 1110  Last data filed at 11/1/2023 1837  Gross per 24 hour   Intake 800 ml   Output --   Net 800 ml         Physical Exam  Constitutional:       General: He is not in acute distress.     Appearance: He is well-developed. He is not diaphoretic.   HENT:      Head: Normocephalic and atraumatic.   Eyes:      Pupils: Pupils are equal, round, and reactive to light.   Cardiovascular:      Rate and Rhythm: Normal rate and regular rhythm.      Heart sounds: Normal heart sounds. No murmur heard.     No friction rub. No gallop.   Pulmonary:      Effort: Pulmonary effort is normal. No respiratory distress.      Breath sounds: Normal breath sounds. No stridor. No wheezing or rales.   Abdominal:      General: Bowel sounds are normal. There is no distension.      Palpations: Abdomen is soft. There is no mass.      Tenderness: There is no abdominal tenderness. There is no guarding.   Skin:     General: Skin is warm.      Findings: No erythema.   Neurological:      Mental Status: He is alert and oriented to person, place, and time.             Significant Labs: All pertinent labs within the past 24 hours have been reviewed.    Significant Imaging: I have reviewed all pertinent imaging results/findings within the past 24 hours.        Assessment/Plan:      * VT (ventricular tachycardia)  Required defibrillation x 2  Electrolytes corrected  Cardiology following  Severe CAD, not a surgical candidate  Awaiting lifevest  Peer to peer 11/3 by cards per case management     NSTEMI (non-ST elevated myocardial infarction)        Coronary artery disease involving native coronary artery of native heart with unstable angina pectoris  Patient with known CAD s/p not amenable to surgery, which is controlled Will continue ASA, Plavix and Statin and monitor for S/Sx of angina/ACS. Continue to  monitor on telemetry.     Not amenable to surgery    Abdominal distention  Us abdomen without significant ascites  Continue lactulose      Cardiogenic shock  Awaiting lifevest      Alcohol abuse  Required precedex infusion  librium taper completed  No active signs of withdrawal   Continue gabapentin to help with cravings     COPD (chronic obstructive pulmonary disease)  Patient's COPD is controlled currently.  Patient is currently off COPD Pathway. Continue scheduled inhalers Supplemental oxygen prn and monitor respiratory status closely.   Continue scheduled breathing treatments    Primary hypertension  Chronic, controlled. Latest blood pressure and vitals reviewed-     Temp:  [98 °F (36.7 °C)-98.5 °F (36.9 °C)]   Pulse:  [72-84]   Resp:  [18-20]   BP: ()/(46-62)   SpO2:  [91 %-96 %] .   Home meds for hypertension were reviewed and noted below.   Hypertension Medications             furosemide (LASIX) 40 MG tablet Take 40 mg by mouth once daily.    lisinopriL (PRINIVIL,ZESTRIL) 2.5 MG tablet Take 1 tablet (2.5 mg total) by mouth once daily.    metoprolol succinate (TOPROL-XL) 25 MG 24 hr tablet Take 1 tablet (25 mg total) by mouth once daily.    spironolactone (ALDACTONE) 50 MG tablet Take 50 mg by mouth 2 (two) times daily.          While in the hospital, will manage blood pressure as follows; Adjust home antihypertensive regimen as follows- prn hydralazine    Will utilize p.r.n. blood pressure medication only if patient's blood pressure greater than 140/90 and he develops symptoms such as worsening chest pain or shortness of breath.    Tobacco dependence  Dangers of cigarette smoking were reviewed with patient in detail. Patient was Counseled for 3-10 minutes. Nicotine replacement options were discussed. Nicotine replacement was discussed- not prescribed per patient's request    Acute on chronic combined systolic and diastolic heart failure  Attributed to etoh and cad  Fluid and salt  restriction  I/os  Veronica discontinued  Awaiting lifevest  Continue entresto, beta blocker, spironolactone   Po lasix prn     Elevated troponin  See vtach      Alcoholic cirrhosis  Patient with known Cirrhosis with Child's class Calculator for Child's score link- https://www.[employer].com/calc/340/child-sarmiento-score-cirrhosis-mortality#creator-insights. Co-morbidities are present and inclusive of ascites, malnutrition, anemia/pancytopenia and anticoagulation.  MELD-Na score calculated; MELD 3.0: 15 at 10/25/2023  5:01 AM  MELD-Na: 9 at 10/25/2023  5:01 AM  Calculated from:  Serum Creatinine: 1.3 mg/dL at 10/25/2023  5:01 AM  Serum Sodium: 130 mmol/L at 10/25/2023  5:01 AM  Total Bilirubin: 0.3 mg/dL (Using min of 1 mg/dL) at 10/24/2023 12:26 PM  Serum Albumin: 3.1 g/dL at 10/24/2023 12:26 PM  INR(ratio): 1.0 at 10/23/2023 11:50 AM  Age at listing (hypothetical): 63 years  Sex: Male at 10/25/2023  5:01 AM      Continue chronic meds. Etiology likely ETOH. Will avoid any hepatotoxic meds, and monitor CBC/CMP/INR for synthetic function.     Continue lactulose      VTE Risk Mitigation (From admission, onward)         Ordered     enoxaparin injection 40 mg  Every 24 hours         10/28/23 0938     IP VTE HIGH RISK PATIENT  Once         10/23/23 1120     Place sequential compression device  Until discontinued         10/23/23 1120                Discharge Planning   SADI: 11/2/2023     Code Status: Full Code   Is the patient medically ready for discharge?: Yes    Reason for patient still in hospital (select all that apply): Patient trending condition  Discharge Plan A: Home with family   Discharge Delays: (!) Home Medical Equipment (Insurance, Delivery)              Bello Pinto MD  Department of Hospital Medicine   [place] - Telemetry ([facility])

## 2023-11-02 NOTE — SUBJECTIVE & OBJECTIVE
Interval History: No issues overnight.     Review of Systems   Constitutional:  Negative for fatigue and fever.   HENT:  Negative for sinus pressure.    Eyes:  Negative for visual disturbance.   Respiratory:  Negative for shortness of breath.    Cardiovascular:  Negative for chest pain.   Gastrointestinal:  Negative for nausea and vomiting.   Genitourinary:  Negative for difficulty urinating.   Musculoskeletal:  Negative for back pain.   Skin:  Negative for rash.   Neurological:  Negative for headaches.   Psychiatric/Behavioral:  Negative for confusion.      Objective:     Vital Signs (Most Recent):  Temp: 97.4 °F (36.3 °C) (11/02/23 0757)  Pulse: 85 (11/02/23 0812)  Resp: 18 (11/02/23 0800)  BP: (!) 114/55 (11/02/23 0757)  SpO2: 95 % (11/02/23 0800) Vital Signs (24h Range):  Temp:  [97.4 °F (36.3 °C)-98.8 °F (37.1 °C)] 97.4 °F (36.3 °C)  Pulse:  [76-86] 85  Resp:  [18] 18  SpO2:  [85 %-95 %] 95 %  BP: (105-121)/(55-60) 114/55     Weight: 82.1 kg (181 lb)  Body mass index is 27.52 kg/m².    Intake/Output Summary (Last 24 hours) at 11/2/2023 1110  Last data filed at 11/1/2023 1837  Gross per 24 hour   Intake 800 ml   Output --   Net 800 ml         Physical Exam  Constitutional:       General: He is not in acute distress.     Appearance: He is well-developed. He is not diaphoretic.   HENT:      Head: Normocephalic and atraumatic.   Eyes:      Pupils: Pupils are equal, round, and reactive to light.   Cardiovascular:      Rate and Rhythm: Normal rate and regular rhythm.      Heart sounds: Normal heart sounds. No murmur heard.     No friction rub. No gallop.   Pulmonary:      Effort: Pulmonary effort is normal. No respiratory distress.      Breath sounds: Normal breath sounds. No stridor. No wheezing or rales.   Abdominal:      General: Bowel sounds are normal. There is no distension.      Palpations: Abdomen is soft. There is no mass.      Tenderness: There is no abdominal tenderness. There is no guarding.   Skin:      General: Skin is warm.      Findings: No erythema.   Neurological:      Mental Status: He is alert and oriented to person, place, and time.             Significant Labs: All pertinent labs within the past 24 hours have been reviewed.    Significant Imaging: I have reviewed all pertinent imaging results/findings within the past 24 hours.

## 2023-11-02 NOTE — PLAN OF CARE
A237/A237 GEOVANI Tolbert Jr. is a 63 y.o.male admitted on 10/23/2023 for VT (ventricular tachycardia)   Code Status: Full Code MRN: [id number]   Review of patient's allergies indicates:  No Known Allergies  Past Medical History:   Diagnosis Date    Alcoholic cirrhosis     CHF (congestive heart failure)     LV EF 40%    COPD (chronic obstructive pulmonary disease)     Hypertension     Peripheral artery disease     with stents    Sleep apnea       PRN meds    albuterol-ipratropium, 3 mL, Q6H PRN  bisacodyL, 10 mg, Daily PRN  calcium gluconate IVPB, 1 g, PRN  calcium gluconate IVPB, 2 g, PRN  calcium gluconate IVPB, 3 g, PRN  dextrose 10%, 12.5 g, PRN  dextrose 10%, 25 g, PRN  hydrALAZINE, 10 mg, Q6H PRN  influenza, 0.5 mL, vaccine x 1 dose  magnesium sulfate IVPB, 2 g, PRN  magnesium sulfate IVPB, 4 g, PRN  melatonin, 6 mg, Nightly PRN  ondansetron, 4 mg, Q6H PRN  pneumoc 20-jeremy conj-dip cr(PF), 0.5 mL, vaccine x 1 dose  potassium chloride, 40 mEq, PRN   And  potassium chloride, 60 mEq, PRN   And  potassium chloride, 80 mEq, PRN  sodium chloride 0.9%, 10 mL, PRN  sodium phosphate 15 mmol in dextrose 5 % (D5W) 250 mL IVPB, 15 mmol, PRN  sodium phosphate 20.01 mmol in dextrose 5 % (D5W) 250 mL IVPB, 20.01 mmol, PRN  sodium phosphate 30 mmol in dextrose 5 % (D5W) 250 mL IVPB, 30 mmol, PRN  traZODone, 25 mg, Nightly PRN      Chart check completed. Will continue plan of care.      Orientation: oriented x 4  [place] Coma Scale Score: 15     Lead Monitored: Lead II Rhythm: normal sinus rhythm    Cardiac/Telemetry Box Number: [id number]  VTE Required Core Measure: Pharmacological prophylaxis initiated/maintained, (SCDs) Sequential compression device initiated/maintained Last Bowel Movement: 11/01/23  Diet Cardiac Fluid - 2000mL  Voiding Characteristics: voids spontaneously without difficulty  Zachary Score: 21  Fall Risk Score: 12  Accucheck [x]   Freq?      Lines/Drains/Airways       Peripheral Intravenous Line  Duration                   Peripheral IV - Single Lumen 11/02/23 0835 22 G Posterior;Right Forearm <1 day                        Problem: Adult Inpatient Plan of Care  Goal: Plan of Care Review  Outcome: Ongoing, Progressing  Goal: Patient-Specific Goal (Individualized)  Outcome: Ongoing, Progressing  Goal: Absence of Hospital-Acquired Illness or Injury  Outcome: Ongoing, Progressing  Goal: Optimal Comfort and Wellbeing  Outcome: Ongoing, Progressing  Goal: Readiness for Transition of Care  Outcome: Ongoing, Progressing

## 2023-11-02 NOTE — PLAN OF CARE
11/02/23 0838   Post-Acute Status   Post-Acute Authorization HME   HME Status Pending payor review   Coverage [employer]   Discharge Delays (!) Home Medical Equipment (Insurance, Delivery)   Discharge Plan   Discharge Plan A Home with family       Per Lance, with [employer], [employer] denied Patient's LifeVest set up. However, [employer] did leave an appeal option to do a peer to peer. Per Lance, he reached out to MARQUES Fletcher, Cardiology - who is agreeable to completing Peer to Peer. MARQUES Fletcher, Cardiology, schedule Peer to Peer to happen tomorrow. SW will update attending and inform patient.     15 59 SW meet with Patient at bedside to discuss status of LV. SW stated that [employer] had officially denied and our Cardiology department will appeal by performing a peer to peer with their medical director. SW stated that we should have a final decision tomorrow about LV vs new plan from Cardiology. Patient verbalized understanding and SW stated she would check in with Patient in the morning.     SW will continue to follow and assist as needed.

## 2023-11-03 VITALS
OXYGEN SATURATION: 99 % | RESPIRATION RATE: 16 BRPM | DIASTOLIC BLOOD PRESSURE: 58 MMHG | BODY MASS INDEX: 27.43 KG/M2 | SYSTOLIC BLOOD PRESSURE: 120 MMHG | WEIGHT: 181 LBS | TEMPERATURE: 99 F | HEART RATE: 83 BPM | HEIGHT: 68 IN

## 2023-11-03 LAB
POC ACTIVATED CLOTTING TIME K: 185 SEC (ref 74–137)
POC ACTIVATED CLOTTING TIME K: 215 SEC (ref 74–137)
POC ACTIVATED CLOTTING TIME K: 239 SEC (ref 74–137)
POC ACTIVATED CLOTTING TIME K: 245 SEC (ref 74–137)
POCT GLUCOSE: 101 MG/DL (ref 70–110)
SAMPLE: ABNORMAL

## 2023-11-03 PROCEDURE — 94640 AIRWAY INHALATION TREATMENT: CPT

## 2023-11-03 PROCEDURE — 25000242 PHARM REV CODE 250 ALT 637 W/ HCPCS: Performed by: INTERNAL MEDICINE

## 2023-11-03 PROCEDURE — 25000003 PHARM REV CODE 250

## 2023-11-03 PROCEDURE — 25000003 PHARM REV CODE 250: Performed by: INTERNAL MEDICINE

## 2023-11-03 PROCEDURE — 63600175 PHARM REV CODE 636 W HCPCS: Performed by: NURSE PRACTITIONER

## 2023-11-03 PROCEDURE — 25000003 PHARM REV CODE 250: Performed by: PHYSICIAN ASSISTANT

## 2023-11-03 PROCEDURE — 25000003 PHARM REV CODE 250: Performed by: NURSE PRACTITIONER

## 2023-11-03 PROCEDURE — 94761 N-INVAS EAR/PLS OXIMETRY MLT: CPT

## 2023-11-03 RX ORDER — AMIODARONE HYDROCHLORIDE 200 MG/1
400 TABLET ORAL 2 TIMES DAILY
Qty: 120 TABLET | Refills: 11 | Status: SHIPPED | OUTPATIENT
Start: 2023-11-03 | End: 2023-11-03 | Stop reason: SDUPTHER

## 2023-11-03 RX ORDER — CLOPIDOGREL BISULFATE 75 MG/1
75 TABLET ORAL DAILY
Qty: 30 TABLET | Refills: 11 | Status: SHIPPED | OUTPATIENT
Start: 2023-11-04 | End: 2024-01-10

## 2023-11-03 RX ORDER — METOPROLOL SUCCINATE 25 MG/1
12.5 TABLET, EXTENDED RELEASE ORAL DAILY
Qty: 15 TABLET | Refills: 11 | Status: SHIPPED | OUTPATIENT
Start: 2023-11-04 | End: 2024-11-03

## 2023-11-03 RX ORDER — NAPROXEN SODIUM 220 MG/1
81 TABLET, FILM COATED ORAL DAILY
Qty: 30 TABLET | Refills: 11 | Status: SHIPPED | OUTPATIENT
Start: 2023-11-04 | End: 2024-01-10

## 2023-11-03 RX ORDER — SPIRONOLACTONE 25 MG/1
25 TABLET ORAL 2 TIMES DAILY
Qty: 60 TABLET | Refills: 11 | Status: ON HOLD | OUTPATIENT
Start: 2023-11-03 | End: 2023-11-21 | Stop reason: HOSPADM

## 2023-11-03 RX ORDER — TAMSULOSIN HYDROCHLORIDE 0.4 MG/1
0.4 CAPSULE ORAL DAILY
Qty: 30 CAPSULE | Refills: 11 | Status: SHIPPED | OUTPATIENT
Start: 2023-11-04 | End: 2023-12-13 | Stop reason: SDUPTHER

## 2023-11-03 RX ORDER — AMIODARONE HYDROCHLORIDE 200 MG/1
400 TABLET ORAL DAILY
Qty: 60 TABLET | Refills: 11 | Status: SHIPPED | OUTPATIENT
Start: 2023-11-03 | End: 2024-11-02

## 2023-11-03 RX ORDER — ATORVASTATIN CALCIUM 80 MG/1
80 TABLET, FILM COATED ORAL DAILY
Qty: 90 TABLET | Refills: 3 | Status: SHIPPED | OUTPATIENT
Start: 2023-11-04 | End: 2024-11-03

## 2023-11-03 RX ADMIN — AMIODARONE HYDROCHLORIDE 400 MG: 200 TABLET ORAL at 08:11

## 2023-11-03 RX ADMIN — CLOPIDOGREL BISULFATE 75 MG: 75 TABLET ORAL at 08:11

## 2023-11-03 RX ADMIN — ATORVASTATIN CALCIUM 80 MG: 40 TABLET, FILM COATED ORAL at 08:11

## 2023-11-03 RX ADMIN — IPRATROPIUM BROMIDE AND ALBUTEROL SULFATE 3 ML: 2.5; .5 SOLUTION RESPIRATORY (INHALATION) at 05:11

## 2023-11-03 RX ADMIN — SACUBITRIL AND VALSARTAN 1 TABLET: 24; 26 TABLET, FILM COATED ORAL at 08:11

## 2023-11-03 RX ADMIN — Medication 100 MG: at 08:11

## 2023-11-03 RX ADMIN — DOCUSATE SODIUM 100 MG: 100 CAPSULE, LIQUID FILLED ORAL at 08:11

## 2023-11-03 RX ADMIN — FOLIC ACID 1 MG: 1 TABLET ORAL at 08:11

## 2023-11-03 RX ADMIN — ENOXAPARIN SODIUM 40 MG: 40 INJECTION SUBCUTANEOUS at 04:11

## 2023-11-03 RX ADMIN — IPRATROPIUM BROMIDE AND ALBUTEROL SULFATE 3 ML: 2.5; .5 SOLUTION RESPIRATORY (INHALATION) at 01:11

## 2023-11-03 RX ADMIN — BUDESONIDE INHALATION 0.5 MG: 0.5 SUSPENSION RESPIRATORY (INHALATION) at 08:11

## 2023-11-03 RX ADMIN — ASPIRIN 81 MG CHEWABLE TABLET 81 MG: 81 TABLET CHEWABLE at 08:11

## 2023-11-03 RX ADMIN — GABAPENTIN 300 MG: 300 CAPSULE ORAL at 08:11

## 2023-11-03 RX ADMIN — GABAPENTIN 300 MG: 300 CAPSULE ORAL at 04:11

## 2023-11-03 RX ADMIN — SPIRONOLACTONE 25 MG: 25 TABLET ORAL at 08:11

## 2023-11-03 RX ADMIN — METOPROLOL SUCCINATE 12.5 MG: 25 TABLET, EXTENDED RELEASE ORAL at 08:11

## 2023-11-03 RX ADMIN — ARFORMOTEROL TARTRATE 15 MCG: 15 SOLUTION RESPIRATORY (INHALATION) at 08:11

## 2023-11-03 RX ADMIN — TAMSULOSIN HYDROCHLORIDE 0.4 MG: 0.4 CAPSULE ORAL at 08:11

## 2023-11-03 RX ADMIN — PANTOPRAZOLE SODIUM 40 MG: 40 TABLET, DELAYED RELEASE ORAL at 08:11

## 2023-11-03 NOTE — PLAN OF CARE
Patient remained free from falls throughout shift, call bell within reach. Vitals stable, no complaints. Peer to peer evaluation today. Life vest pending.

## 2023-11-03 NOTE — PLAN OF CARE
A237/A237 GEOVANI Tolbert Jr. is a 63 y.o.male admitted on 10/23/2023 for VT (ventricular tachycardia)   Code Status: Full Code MRN: [id number]   Review of patient's allergies indicates:  No Known Allergies  Past Medical History:   Diagnosis Date    Alcoholic cirrhosis     CHF (congestive heart failure)     LV EF 40%    COPD (chronic obstructive pulmonary disease)     Hypertension     Peripheral artery disease     with stents    Sleep apnea       PRN meds    albuterol-ipratropium, 3 mL, Q6H PRN  bisacodyL, 10 mg, Daily PRN  calcium gluconate IVPB, 1 g, PRN  calcium gluconate IVPB, 2 g, PRN  calcium gluconate IVPB, 3 g, PRN  dextrose 10%, 12.5 g, PRN  dextrose 10%, 25 g, PRN  hydrALAZINE, 10 mg, Q6H PRN  influenza, 0.5 mL, vaccine x 1 dose  magnesium sulfate IVPB, 2 g, PRN  magnesium sulfate IVPB, 4 g, PRN  melatonin, 6 mg, Nightly PRN  ondansetron, 4 mg, Q6H PRN  pneumoc 20-jeremy conj-dip cr(PF), 0.5 mL, vaccine x 1 dose  potassium chloride, 40 mEq, PRN   And  potassium chloride, 60 mEq, PRN   And  potassium chloride, 80 mEq, PRN  sodium chloride 0.9%, 10 mL, PRN  sodium phosphate 15 mmol in dextrose 5 % (D5W) 250 mL IVPB, 15 mmol, PRN  sodium phosphate 20.01 mmol in dextrose 5 % (D5W) 250 mL IVPB, 20.01 mmol, PRN  sodium phosphate 30 mmol in dextrose 5 % (D5W) 250 mL IVPB, 30 mmol, PRN  traZODone, 25 mg, Nightly PRN      AVS Discharge instructions received and reviewed with pt.  Pt voiced understanding and all questions answered to satisfaction.  Stressed importance to making and keeping all follow up appointments.  Medications at bedside and reviewed with pt.  Tele monitor removed and brought to monitor tech.  IV d/c'd with tip intact, pressure dressing applied.  Pt will call when ready to be transported to [facility] via w/c to be discharged home.      Orientation: oriented x 4  [place] Coma Scale Score: 15     Lead Monitored: Lead II Rhythm: normal sinus rhythm    Cardiac/Telemetry Box Number: [id number]  VTE  Required Core Measure: Pharmacological prophylaxis initiated/maintained Last Bowel Movement: 11/01/23  Diet Cardiac Fluid - 2000mL  Voiding Characteristics: voids spontaneously without difficulty  Zachary Score: 21  Fall Risk Score: 12  Accucheck []   Freq?      Lines/Drains/Airways       Peripheral Intravenous Line  Duration                  Peripheral IV - Single Lumen 11/02/23 0835 22 G Posterior;Right Forearm 1 day

## 2023-11-03 NOTE — DISCHARGE SUMMARY
[facility] - Telemetry ([facility])  [facility] Medicine  Discharge Summary      Patient Name: Hemal Tolbert Jr.  MRN: [id number]  MARGARET: [id number]  Patient Class: IP- Inpatient  Admission Date: 10/23/2023  Hospital Length of Stay: 11 days  Discharge Date and Time:  11/03/2023 2:49 PM  Attending Physician: Bello Pinto MD   Discharging Provider: Bello Pinto MD  Primary Care Provider: Norma Provider    Primary Care Team: [facility] MEDICINE D    HPI:   63M  has a past medical history of Alcoholic cirrhosis, CHF (congestive heart failure), COPD (chronic obstructive pulmonary disease), Hypertension, Peripheral artery disease, and Sleep apnea.  Presented to emergency department from outside facility for vtach requiring defibrillation x 2. Initial workup revealed possible Torsades on electrocardiography strips. Cbc with leukocytosis, cmp with hyponatremia, creatinine 1.2, , , and elevated troponin, 3.6 --> 9.9. Patient was given an aspirin, started on heparin infusion, given 4 mg of Mag IV, and 40 mg of Lasix IV and transferred to icu per Cardiology recommendations. Echocardiogram with 20-25% ejection fraction. Patient required precedex infusion while in icu.    Cardiac catheterization revealed severe stenosis. CTS consulted and deemed not a surgical candidate. Amio infusion converted to po medication(s). Dobutamine infusion weaned. Patient is awaiting lifevest prior to discharge.    Us abdomen without significant ascites. On lactulose for cirrhosis.     Hospital medicine consulted for assumption of care.         Procedure(s) (LRB):  Percutaneous coronary intervention- with Impella (N/A)  INSERTION, IMPELLA (N/A)  Ultrasound-coronary (N/A)  Instantaneous Wave-Free Ratio (IFR) (N/A)      Hospital Course:   63M has a past medical history of Alcoholic cirrhosis, CHF (congestive heart failure), COPD (chronic obstructive pulmonary disease), Hypertension, Peripheral artery disease, and Sleep apnea.  Presented to emergency  department from outside facility for vtach requiring defibrillation x 2. Initial workup revealed possible Torsades on electrocardiography strips. Cbc with leukocytosis, cmp with hyponatremia, creatinine 1.2, , , and elevated troponin, 3.6 --> 9.9. Patient was given an aspirin, started on heparin infusion, given 4 mg of Mag IV, and 40 mg of Lasix IV and transferred to icu per Cardiology recommendations. Echocardiogram with 20-25% ejection fraction. Patient required precedex infusion while in icu.     Cardiac catheterization revealed severe stenosis. CTS consulted and deemed not a surgical candidate. Amio infusion converted to po medication(s). Dobutamine infusion weaned. Patient is awaiting lifevest prior to discharge.     Us abdomen without significant ascites. On lactulose for cirrhosis.   10/29 transferred from icu to floor status. No acute events overnight. Awaiting lifevest. Voiding trial to discontinue jacobson. Multiple bowel movements reported.  10/30 no acute events overnight. Jacobson discontinued. Awaiting lifevest. Patient ready for discharge.  10/31 complains of abdominal tenderness from coughing, improved with abdominal binder. monitor. Awaiting lifevest  11/1 - 11/2: awaiting lifevest, discussed with case management, cardiology to do peer to peer 11/3  11/3: cards performed p2p and lifevest was still denied. Patient discharged home on amio 400mg daily, entresto, metoprolol, lasix, and aldactone. Outpatient referral to cards in [place] placed.        Goals of Care Treatment Preferences:  Code Status: Full Code      Consults:   Consults (From admission, onward)        Status Ordering Provider     Inpatient consult to Social Work/Case Management  Once        Provider:  (Not yet assigned)    Completed KERRIE MAC     Inpatient consult to Midline team  Once        Provider:  (Not yet assigned)    Acknowledged SUDARSHAN MOSLEY     Inpatient consult to Cardiothoracic Surgery  Once         Provider:  John Amaya MD    Acknowledged QUENTIN MULLIGAN     IP consult to case management  Once        Provider:  (Not yet assigned)    Completed CIRILO BAER     Inpatient consult to Cardiology  Once        Provider:  Gaurav Salomon MD    Completed PERCY HERNANDEZ          No new Assessment & Plan notes have been filed under this hospital service since the last note was generated.  Service: Hospital Medicine    Final Active Diagnoses:    Diagnosis Date Noted POA    PRINCIPAL PROBLEM:  VT (ventricular tachycardia) [I47.20] 10/23/2023 Yes    Coronary artery disease involving native coronary artery of native heart with unstable angina pectoris [I25.110] 10/30/2023 Yes    NSTEMI (non-ST elevated myocardial infarction) [I21.4] 10/30/2023 Yes    Abdominal distention [R14.0] 10/27/2023 No    Cardiogenic shock [R57.0] 10/24/2023 Yes    Elevated troponin [R79.89] 10/23/2023 Yes    Acute on chronic combined systolic and diastolic heart failure [I50.43] 10/23/2023 Yes    Tobacco dependence [F17.200] 10/23/2023 Yes    Primary hypertension [I10] 10/23/2023 Yes    COPD (chronic obstructive pulmonary disease) [J44.9] 10/23/2023 Yes    Alcohol abuse [F10.10] 10/23/2023 Yes    Alcoholic cirrhosis [K70.30] 06/07/2023 Yes      Problems Resolved During this Admission:    Diagnosis Date Noted Date Resolved POA    Urine retention [R33.9] 10/24/2023 10/31/2023 Yes    Hyponatremia [E87.1] 10/24/2023 10/29/2023 Yes       Discharged Condition: good    Disposition: Home or Self Care    Follow Up:   Follow-up Information     [facility], Provider Follow up in 1 week(s).                     Patient Instructions:      Ambulatory referral/consult to Cardiology   Standing Status: Future   Referral Priority: Routine Referral Type: Consultation   Referral Reason: Specialty Services Required   Requested Specialty: Cardiology   Number of Visits Requested: 1     Reason for not Ordering Smoking Cessation Referral      Order Specific Question Answer Comments   Reason for not ordering: Patient refused      Reason for not Prescribing Nicotine Replacement     Order Specific Question Answer Comments   Reason for not Prescribing: Patient refused        Significant Diagnostic Studies: N/A    Pending Diagnostic Studies:     None         Medications:  Reconciled Home Medications:      Medication List      START taking these medications    amiodarone 200 MG Tab  Commonly known as: PACERONE  Take 2 tablets (400 mg total) by mouth once daily.     aspirin 81 MG Chew  CHEW 1 tablet (81 mg total) by mouth once daily.  Start taking on: November 4, 2023     atorvastatin 80 MG tablet  Commonly known as: LIPITOR  Take 1 tablet (80 mg total) by mouth once daily.  Start taking on: November 4, 2023     clopidogreL 75 mg tablet  Commonly known as: PLAVIX  Take 1 tablet (75 mg total) by mouth once daily.  Start taking on: November 4, 2023     sacubitriL-valsartan 24-26 mg per tablet  Commonly known as: ENTRESTO  Take 1 tablet by mouth 2 (two) times daily.     tamsulosin 0.4 mg Cap  Commonly known as: FLOMAX  Take 1 capsule (0.4 mg total) by mouth once daily.  Start taking on: November 4, 2023        CHANGE how you take these medications    metoprolol succinate 25 MG 24 hr tablet  Commonly known as: TOPROL-XL  Take ½ ablet (12.5 mg total) by mouth once daily.  Start taking on: November 4, 2023  What changed: how much to take     spironolactone 25 MG tablet  Commonly known as: ALDACTONE  Take 1 tablet (25 mg total) by mouth 2 (two) times daily.  What changed:   · medication strength  · how much to take        CONTINUE taking these medications    albuterol 90 mcg/actuation inhaler  Commonly known as: PROVENTIL/VENTOLIN HFA  Inhale 1-2 puffs into the lungs every 4 (four) hours as needed.     folic acid 1 MG tablet  Commonly known as: FOLVITE  Take 1 tablet (1 mg total) by mouth once daily.     furosemide 40 MG tablet  Commonly known as: LASIX  Take 40  mg by mouth once daily.     gabapentin 300 MG capsule  Commonly known as: NEURONTIN  Take 300 mg by mouth 3 (three) times daily.     pantoprazole 40 MG tablet  Commonly known as: PROTONIX  Take 1 tablet (40 mg total) by mouth 2 (two) times daily.     STIOLTO RESPIMAT 2.5-2.5 mcg/actuation Mist  Generic drug: tiotropium-olodateroL  Inhale 2 puffs into the lungs once daily.        STOP taking these medications    lisinopriL 2.5 MG tablet  Commonly known as: PRINIVIL,ZESTRIL     multivitamin Tab     thiamine 100 MG tablet            Indwelling Lines/Drains at time of discharge:   Lines/Drains/Airways     None                 Time spent on the discharge of patient: 36 minutes         Bello Pinto MD  Department of Hospital Medicine  [facility] - Telemetry ([facility])

## 2023-11-03 NOTE — PLAN OF CARE
[facility] - Telemetry (Hospital)  Discharge Final Note    Primary Care Provider: Sigifredo Green    Expected Discharge Date: 11/3/2023    Final Discharge Note (most recent)       Final Note - 11/03/23 1437          Final Note    Assessment Type Final Discharge Note     Anticipated Discharge Disposition Home or Self Care     Hospital Resources/Appts/Education Provided Appointments scheduled and added to AVS        Post-Acute Status    Discharge Delays None known at this time                   SW met with patient at bedside regarding discharge. SW explained that [employer] upheld their denial for Life Vest following P2P with cardiology team. Pt verbalized understanding. SW advised need to establish care for follow ups. Pt states that he can get to appointments in [place]. Pt reports being treated at [facility] in [place]. Ambulatory for cardiology placed. Pt has soonest available cardiology and PCP scheduled on AVS:     PCP: New Patient with Jessica Sierra DO  Tuesday Nov 28, 2023 7:50 AM  Cards: New Patient with Beatrice Yap PA-C  Thursday Dec 7, 2023 12:45 PM    No additional CM needs for discharge.     Important Message from Medicare             Contact Info       Norma Provider   Relationship: PCP - General        Next Steps: Follow up in 1 week(s)

## 2023-11-04 ENCOUNTER — HOSPITAL ENCOUNTER (OUTPATIENT)
Facility: HOSPITAL | Age: 63
Discharge: HOME | End: 2023-11-06
Attending: SPECIALIST | Admitting: INTERNAL MEDICINE
Payer: MEDICAID

## 2023-11-04 DIAGNOSIS — R06.02 SHORTNESS OF BREATH: ICD-10-CM

## 2023-11-04 DIAGNOSIS — J18.9 HOSPITAL-ACQUIRED PNEUMONIA: Primary | ICD-10-CM
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
## 2023-11-04 DIAGNOSIS — R91.8 RIGHT LOWER LOBE PULMONARY INFILTRATE: ICD-10-CM

## 2023-11-04 LAB
ALBUMIN SERPL-MCNC: 2.9 G/DL (ref 3.4–4.8)
ALBUMIN/GLOB SERPL: 0.7 RATIO (ref 1.1–2)
ALP SERPL-CCNC: 42 UNIT/L (ref 40–150)
ALT SERPL-CCNC: 14 UNIT/L (ref 0–55)
AST SERPL-CCNC: 18 UNIT/L (ref 5–34)
BASOPHILS # BLD AUTO: 0.02 X10(3)/MCL
BASOPHILS NFR BLD AUTO: 0.1 %
BILIRUB SERPL-MCNC: 0.6 MG/DL
BNP BLD-MCNC: 1118 PG/ML
BUN SERPL-MCNC: 21.1 MG/DL (ref 8.4–25.7)
CALCIUM SERPL-MCNC: 8.6 MG/DL (ref 8.8–10)
CHLORIDE SERPL-SCNC: 103 MMOL/L (ref 98–107)
CO2 SERPL-SCNC: 21 MMOL/L (ref 23–31)
CREAT SERPL-MCNC: 1.24 MG/DL (ref 0.73–1.18)
D DIMER PPP IA.FEU-MCNC: 3.12 UG/ML FEU (ref 0–0.5)
EOSINOPHIL # BLD AUTO: 0.09 X10(3)/MCL (ref 0–0.9)
EOSINOPHIL NFR BLD AUTO: 0.5 %
ERYTHROCYTE [DISTWIDTH] IN BLOOD BY AUTOMATED COUNT: 13.6 % (ref 11.5–17)
FLUAV AG UPPER RESP QL IA.RAPID: NOT DETECTED
FLUBV AG UPPER RESP QL IA.RAPID: NOT DETECTED
GFR SERPLBLD CREATININE-BSD FMLA CKD-EPI: >60 MLS/MIN/1.73/M2
GLOBULIN SER-MCNC: 4.2 GM/DL (ref 2.4–3.5)
GLUCOSE SERPL-MCNC: 87 MG/DL (ref 82–115)
HCT VFR BLD AUTO: 29.4 % (ref 42–52)
HGB BLD-MCNC: 8.9 G/DL (ref 14–18)
IMM GRANULOCYTES # BLD AUTO: 0.14 X10(3)/MCL (ref 0–0.04)
IMM GRANULOCYTES NFR BLD AUTO: 0.8 %
LACTATE SERPL-SCNC: 0.7 MMOL/L (ref 0.5–2.2)
LYMPHOCYTES # BLD AUTO: 1.4 X10(3)/MCL (ref 0.6–4.6)
LYMPHOCYTES NFR BLD AUTO: 7.7 %
MAGNESIUM SERPL-MCNC: 2 MG/DL (ref 1.6–2.6)
MCH RBC QN AUTO: 31.1 PG (ref 27–31)
MCHC RBC AUTO-ENTMCNC: 30.3 G/DL (ref 33–36)
MCV RBC AUTO: 102.8 FL (ref 80–94)
MONOCYTES # BLD AUTO: 1.42 X10(3)/MCL (ref 0.1–1.3)
MONOCYTES NFR BLD AUTO: 7.9 %
NEUTROPHILS # BLD AUTO: 15 X10(3)/MCL (ref 2.1–9.2)
NEUTROPHILS NFR BLD AUTO: 83 %
PLATELET # BLD AUTO: 306 X10(3)/MCL (ref 130–400)
PMV BLD AUTO: 9.5 FL (ref 7.4–10.4)
POC CARDIAC TROPONIN I: 0.15 NG/ML (ref 0–0.08)
POTASSIUM SERPL-SCNC: 4.7 MMOL/L (ref 3.5–5.1)
PROT SERPL-MCNC: 7.1 GM/DL (ref 5.8–7.6)
RBC # BLD AUTO: 2.86 X10(6)/MCL (ref 4.7–6.1)
SAMPLE: ABNORMAL
SARS-COV-2 RNA RESP QL NAA+PROBE: NOT DETECTED
SODIUM SERPL-SCNC: 133 MMOL/L (ref 136–145)
WBC # SPEC AUTO: 18.07 X10(3)/MCL (ref 4.5–11.5)

## 2023-11-04 PROCEDURE — 96367 TX/PROPH/DG ADDL SEQ IV INF: CPT

## 2023-11-04 PROCEDURE — 83735 ASSAY OF MAGNESIUM: CPT | Performed by: SPECIALIST

## 2023-11-04 PROCEDURE — 85025 COMPLETE CBC W/AUTO DIFF WBC: CPT | Performed by: SPECIALIST

## 2023-11-04 PROCEDURE — 80053 COMPREHEN METABOLIC PANEL: CPT | Performed by: SPECIALIST

## 2023-11-04 PROCEDURE — 94640 AIRWAY INHALATION TREATMENT: CPT

## 2023-11-04 PROCEDURE — 83605 ASSAY OF LACTIC ACID: CPT | Performed by: SPECIALIST

## 2023-11-04 PROCEDURE — G0378 HOSPITAL OBSERVATION PER HR: HCPCS

## 2023-11-04 PROCEDURE — 63600175 PHARM REV CODE 636 W HCPCS: Performed by: SPECIALIST

## 2023-11-04 PROCEDURE — 0240U COVID/FLU A&B PCR: CPT | Performed by: SPECIALIST

## 2023-11-04 PROCEDURE — 25000003 PHARM REV CODE 250: Performed by: INTERNAL MEDICINE

## 2023-11-04 PROCEDURE — 99285 EMERGENCY DEPT VISIT HI MDM: CPT | Mod: 25

## 2023-11-04 PROCEDURE — 96365 THER/PROPH/DIAG IV INF INIT: CPT

## 2023-11-04 PROCEDURE — 93010 EKG 12-LEAD: ICD-10-PCS | Mod: ,,, | Performed by: INTERNAL MEDICINE

## 2023-11-04 PROCEDURE — 94761 N-INVAS EAR/PLS OXIMETRY MLT: CPT

## 2023-11-04 PROCEDURE — 93005 ELECTROCARDIOGRAM TRACING: CPT

## 2023-11-04 PROCEDURE — 83880 ASSAY OF NATRIURETIC PEPTIDE: CPT | Performed by: SPECIALIST

## 2023-11-04 PROCEDURE — 25000003 PHARM REV CODE 250: Performed by: SPECIALIST

## 2023-11-04 PROCEDURE — 93010 ELECTROCARDIOGRAM REPORT: CPT | Mod: ,,, | Performed by: INTERNAL MEDICINE

## 2023-11-04 PROCEDURE — 85379 FIBRIN DEGRADATION QUANT: CPT | Performed by: SPECIALIST

## 2023-11-04 PROCEDURE — 25000242 PHARM REV CODE 250 ALT 637 W/ HCPCS: Performed by: SPECIALIST

## 2023-11-04 PROCEDURE — 87040 BLOOD CULTURE FOR BACTERIA: CPT | Mod: 91 | Performed by: SPECIALIST

## 2023-11-04 RX ORDER — IPRATROPIUM BROMIDE AND ALBUTEROL SULFATE 2.5; .5 MG/3ML; MG/3ML
3 SOLUTION RESPIRATORY (INHALATION)
Status: COMPLETED | OUTPATIENT
Start: 2023-11-04 | End: 2023-11-04

## 2023-11-04 RX ORDER — ACETAMINOPHEN 325 MG/1
650 TABLET ORAL EVERY 8 HOURS PRN
Status: DISCONTINUED | OUTPATIENT
Start: 2023-11-04 | End: 2023-11-06 | Stop reason: HOSPADM

## 2023-11-04 RX ORDER — LOPERAMIDE HYDROCHLORIDE 2 MG/1
2 CAPSULE ORAL
Status: DISCONTINUED | OUTPATIENT
Start: 2023-11-04 | End: 2023-11-06 | Stop reason: HOSPADM

## 2023-11-04 RX ORDER — HYDROCODONE BITARTRATE AND ACETAMINOPHEN 5; 325 MG/1; MG/1
1 TABLET ORAL EVERY 4 HOURS PRN
Status: DISCONTINUED | OUTPATIENT
Start: 2023-11-04 | End: 2023-11-06 | Stop reason: HOSPADM

## 2023-11-04 RX ORDER — POLYETHYLENE GLYCOL 3350 17 G/17G
17 POWDER, FOR SOLUTION ORAL DAILY
Status: DISCONTINUED | OUTPATIENT
Start: 2023-11-05 | End: 2023-11-06 | Stop reason: HOSPADM

## 2023-11-04 RX ORDER — SODIUM CHLORIDE 0.9 % (FLUSH) 0.9 %
3 SYRINGE (ML) INJECTION EVERY 6 HOURS PRN
Status: DISCONTINUED | OUTPATIENT
Start: 2023-11-04 | End: 2023-11-06 | Stop reason: HOSPADM

## 2023-11-04 RX ORDER — AMOXICILLIN 250 MG
1 CAPSULE ORAL 2 TIMES DAILY
Status: DISCONTINUED | OUTPATIENT
Start: 2023-11-04 | End: 2023-11-06 | Stop reason: HOSPADM

## 2023-11-04 RX ORDER — TALC
6 POWDER (GRAM) TOPICAL NIGHTLY PRN
Status: DISCONTINUED | OUTPATIENT
Start: 2023-11-04 | End: 2023-11-06 | Stop reason: HOSPADM

## 2023-11-04 RX ORDER — FAMOTIDINE 20 MG/1
20 TABLET, FILM COATED ORAL 2 TIMES DAILY
Status: DISCONTINUED | OUTPATIENT
Start: 2023-11-04 | End: 2023-11-06 | Stop reason: HOSPADM

## 2023-11-04 RX ORDER — ONDANSETRON 4 MG/1
8 TABLET, ORALLY DISINTEGRATING ORAL EVERY 8 HOURS PRN
Status: DISCONTINUED | OUTPATIENT
Start: 2023-11-04 | End: 2023-11-06 | Stop reason: HOSPADM

## 2023-11-04 RX ORDER — ONDANSETRON 2 MG/ML
4 INJECTION INTRAMUSCULAR; INTRAVENOUS EVERY 8 HOURS PRN
Status: DISCONTINUED | OUTPATIENT
Start: 2023-11-04 | End: 2023-11-06 | Stop reason: HOSPADM

## 2023-11-04 RX ORDER — IPRATROPIUM BROMIDE AND ALBUTEROL SULFATE 2.5; .5 MG/3ML; MG/3ML
3 SOLUTION RESPIRATORY (INHALATION) EVERY 6 HOURS
Status: DISCONTINUED | OUTPATIENT
Start: 2023-11-05 | End: 2023-11-06 | Stop reason: HOSPADM

## 2023-11-04 RX ORDER — SODIUM CHLORIDE 9 MG/ML
INJECTION, SOLUTION INTRAVENOUS
Status: DISCONTINUED | OUTPATIENT
Start: 2023-11-04 | End: 2023-11-06 | Stop reason: HOSPADM

## 2023-11-04 RX ADMIN — SENNOSIDES AND DOCUSATE SODIUM 1 TABLET: 50; 8.6 TABLET ORAL at 10:11

## 2023-11-04 RX ADMIN — VANCOMYCIN HYDROCHLORIDE 2000 MG: 1 INJECTION, POWDER, LYOPHILIZED, FOR SOLUTION INTRAVENOUS at 10:11

## 2023-11-04 RX ADMIN — CEFEPIME 2 G: 2 INJECTION, POWDER, FOR SOLUTION INTRAVENOUS at 08:11

## 2023-11-04 RX ADMIN — MELATONIN TAB 3 MG 6 MG: 3 TAB at 10:11

## 2023-11-04 RX ADMIN — SODIUM CHLORIDE: 9 INJECTION, SOLUTION INTRAVENOUS at 10:11

## 2023-11-04 RX ADMIN — IPRATROPIUM BROMIDE AND ALBUTEROL SULFATE 3 ML: .5; 3 SOLUTION RESPIRATORY (INHALATION) at 09:11

## 2023-11-04 RX ADMIN — FAMOTIDINE 20 MG: 20 TABLET ORAL at 10:11

## 2023-11-04 NOTE — ED PROVIDER NOTES
Encounter Date: 11/4/2023       History     Chief Complaint   Patient presents with    Fatigue     Pt states he just doesn't feel right. Feels like he is burning up.  Was discharged from  [facility] in [place] last night, was there 12 days. States he had heart attack here and one in [place]. States he has pain all over. States that he has blockages in heart but they werent able to fix. Was supposed to be discharged with life vest but insurance didn't cover.       Patient was seen here 10/23 at [facility] at which time he went into ventricular fibrillation and was defibrillated and normal sinus rhythm and transferred to [facility] in [place]; patient had a heart catheterization which revealed severe coronary artery disease not amenable to surgery; patient was hospitalized until yesterday when he was discharged and he presents today feeling weak with a cough and low-grade fever and chills; he notes lower chest discomfort when he coughs; he denies any fluid retention    The history is provided by the patient and medical records.     Review of patient's allergies indicates:  No Known Allergies  Past Medical History:   Diagnosis Date    Alcoholic cirrhosis     CHF (congestive heart failure)     LV EF 40%    COPD (chronic obstructive pulmonary disease)     Hypertension     Peripheral artery disease     with stents    Sleep apnea      Past Surgical History:   Procedure Laterality Date    ANGIOGRAM, ABDOMINAL AORTA  10/24/2023    Procedure: Angiogram, Abdominal Aorta;  Surgeon: Janette Ernandez MD;  Location: [facility] CATH LAB;  Service: Cardiology;;    ARTERIOGRAPHY OF SUBCLAVIAN ARTERY Left 10/24/2023    Procedure: ARTERIOGRAM, SUBCLAVIAN;  Surgeon: Janette Ernandez MD;  Location: [facility] CATH LAB;  Service: Cardiology;  Laterality: Left;    CATHETERIZATION OF BOTH LEFT AND RIGHT HEART N/A 10/24/2023    Procedure: CATHETERIZATION, HEART, BOTH LEFT AND RIGHT;  Surgeon: Janette Ernandez MD;  Location: [facility]  CATH LAB;  Service: Cardiology;  Laterality: N/A;    COLONOSCOPY N/A 6/10/2023    Procedure: COLONOSCOPY;  Surgeon: Johan Flower MD;  Location: [facility] OR;  Service: Gastroenterology;  Laterality: N/A;    ESOPHAGOGASTRODUODENOSCOPY N/A 6/9/2023    Procedure: EGD;  Surgeon: Tima Teixeira MD;  Location: [facility] ENDOSCOPY;  Service: Gastroenterology;  Laterality: N/A;    INSERTION OF INTRA-AORTIC BALLOON ASSIST DEVICE  10/24/2023    Procedure: INSERTION, INTRA-AORTIC BALLOON PUMP;  Surgeon: Janette Ernandez MD;  Location: [facility] CATH LAB;  Service: Cardiology;;    INSERTION OF INTRAVASCULAR MICROAXIAL BLOOD PUMP N/A 10/26/2023    Procedure: INSERTION, IMPELLA;  Surgeon: Janette Ernandez MD;  Location: [facility] CATH LAB;  Service: Cardiology;  Laterality: N/A;    INSTANTANEOUS WAVE-FREE RATIO (IFR) N/A 10/26/2023    Procedure: Instantaneous Wave-Free Ratio (IFR);  Surgeon: Janette Ernandez MD;  Location: [facility] CATH LAB;  Service: Cardiology;  Laterality: N/A;    INTRAVASCULAR ULTRASOUND, CORONARY N/A 10/26/2023    Procedure: Ultrasound-coronary;  Surgeon: Janette Ernandez MD;  Location: [facility] CATH LAB;  Service: Cardiology;  Laterality: N/A;    PERCUTANEOUS CORONARY INTERVENTION, ARTERY N/A 10/26/2023    Procedure: Percutaneous coronary intervention- with Impella;  Surgeon: Janette Ernandez MD;  Location: [facility] CATH LAB;  Service: Cardiology;  Laterality: N/A;    PLACEMENT, IABP  10/24/2023    Procedure: Placement, IABP;  Surgeon: Janette Ernandez MD;  Location: [facility] CATH LAB;  Service: Cardiology;;     No family history on file.  Social History     Tobacco Use    Smoking status: Every Day     Current packs/day: 1.00     Types: Cigarettes    Smokeless tobacco: Never   Substance Use Topics    Alcohol use: Yes     Alcohol/week: 24.0 standard drinks of alcohol     Types: 24 Cans of beer per week     Comment: Refused to quit in June 23    Drug use: Yes     Types: Marijuana     Review of Systems   Constitutional:  Positive  for fatigue.   HENT: Negative.     Respiratory:  Positive for shortness of breath.    Cardiovascular: Negative.    Gastrointestinal: Negative.    Genitourinary: Negative.    Musculoskeletal: Negative.    Neurological:  Positive for weakness.       Physical Exam     Initial Vitals [11/04/23 1830]   BP Pulse Resp Temp SpO2   126/61 84 18 99.5 °F (37.5 °C) (!) 94 %      MAP       --         Physical Exam    Nursing note and vitals reviewed.  Constitutional: He appears well-developed and well-nourished.   HENT:   Head: Normocephalic and atraumatic.   Eyes: EOM are normal. Pupils are equal, round, and reactive to light.   Neck: Neck supple.   Normal range of motion.  Cardiovascular:  Normal rate, regular rhythm and normal heart sounds.           Pulmonary/Chest:   Diminished breath sounds with rhonchi in the right middle and lower lung   Abdominal: Abdomen is soft.   Protuberant abdomen   Musculoskeletal:         General: Normal range of motion.      Cervical back: Normal range of motion and neck supple.     Neurological: He is alert and oriented to person, place, and time.   Skin: Skin is warm and dry.         ED Course   Procedures  Labs Reviewed   B-TYPE NATRIURETIC PEPTIDE - Abnormal; Notable for the following components:       Result Value    Natriuretic Peptide 1,118.0 (*)     All other components within normal limits   COMPREHENSIVE METABOLIC PANEL - Abnormal; Notable for the following components:    Sodium Level 133 (*)     Carbon Dioxide 21 (*)     Creatinine 1.24 (*)     Calcium Level Total 8.6 (*)     Albumin Level 2.9 (*)     Globulin 4.2 (*)     Albumin/Globulin Ratio 0.7 (*)     All other components within normal limits   D DIMER, QUANTITATIVE - Abnormal; Notable for the following components:    D-Dimer 3.12 (*)     All other components within normal limits   CBC WITH DIFFERENTIAL - Abnormal; Notable for the following components:    WBC 18.07 (*)     RBC 2.86 (*)     Hgb 8.9 (*)     Hct 29.4 (*)     MCV  102.8 (*)     MCH 31.1 (*)     MCHC 30.3 (*)     Neut # 15.00 (*)     Mono # 1.42 (*)     IG# 0.14 (*)     All other components within normal limits   TROPONIN ISTAT - Abnormal; Notable for the following components:    POC Cardiac Troponin I 0.15 (*)     All other components within normal limits   MAGNESIUM - Normal   COVID/FLU A&B PCR - Normal    Narrative:     The Xpert Xpress SARS-CoV-2/FLU/RSV plus is a rapid, multiplexed real-time PCR test intended for the simultaneous qualitative detection and differentiation of SARS-CoV-2, Influenza A, Influenza B, and respiratory syncytial virus (RSV) viral RNA in either nasopharyngeal swab or nasal swab specimens.         LACTIC ACID, PLASMA - Normal   BLOOD CULTURE [facility]   BLOOD CULTURE [facility]   CBC W/ AUTO DIFFERENTIAL    Narrative:     The following orders were created for panel order CBC auto differential.  Procedure                               Abnormality         Status                     ---------                               -----------         ------                     CBC with Differential[[id number]]       Abnormal            Final result                 Please view results for these tests on the individual orders.   POCT TROPONIN     EKG Readings: (Independently Interpreted)   Rhythm: Normal Sinus Rhythm. Heart Rate: 83. Ectopy: No Ectopy. Conduction: Normal. ST Segments: Normal ST Segments. T Waves: Normal. Clinical Impression: Normal Sinus Rhythm       Imaging Results              X-Ray Chest AP Portable (Final result)  Result time 11/04/23 19:09:49      Final result by Ramesh Wei MD (11/04/23 19:09:49)                   Impression:      Findings concerning for right mid to lower lung zone process.  Recommend follow-up within 3 months to document resolution.      Electronically signed by: Ramesh Wei  Date:    11/04/2023  Time:    19:09               Narrative:    EXAMINATION:  XR CHEST AP PORTABLE    CLINICAL HISTORY:  Shortness of  breath;    TECHNIQUE:  Single view of the chest    COMPARISON:  10/25/2023    FINDINGS:  Increased opacification of the right mid to lower lung zone.    The cardiomediastinal silhouette is within normal limits.    No acute osseous abnormality.                        Wet Read by Grabiel Hanson MD (11/04/23 19:08:50, [facility] Dept, Emergency Medicine)    Right lower lobe infiltrate                                     Medications   sodium chloride 0.9% flush 3 mL (has no administration in time range)   melatonin tablet 6 mg (6 mg Oral Given 11/4/23 2208)   ceFEPIme (MAXIPIME) 2 g in dextrose 5 % in water (D5W) 100 mL IVPB (MB+) (2 g Intravenous Not Given 11/4/23 2145)   acetaminophen tablet 650 mg (has no administration in time range)   HYDROcodone-acetaminophen 5-325 mg per tablet 1 tablet (has no administration in time range)   senna-docusate 8.6-50 mg per tablet 1 tablet (1 tablet Oral Given 11/4/23 2208)   polyethylene glycol packet 17 g (has no administration in time range)   loperamide capsule 2 mg (has no administration in time range)   famotidine tablet 20 mg (20 mg Oral Given 11/4/23 2208)   ondansetron disintegrating tablet 8 mg (has no administration in time range)   ondansetron injection 4 mg (has no administration in time range)   acetaminophen tablet 650 mg (has no administration in time range)   albuterol-ipratropium 2.5 mg-0.5 mg/3 mL nebulizer solution 3 mL (has no administration in time range)   vancomycin (VANCOCIN) 2,000 mg in dextrose 5 % (D5W) 250 mL IVPB (Vial-Mate) (2,000 mg Intravenous New Bag 11/4/23 2213)   vancomycin - pharmacy to dose (has no administration in time range)   0.9%  NaCl infusion ( Intravenous New Bag 11/4/23 2212)   ceFEPIme (MAXIPIME) 2 g in dextrose 5 % in water (D5W) 100 mL IVPB (MB+) (0 g Intravenous Stopped 11/4/23 2036)   albuterol-ipratropium 2.5 mg-0.5 mg/3 mL nebulizer solution 3 mL (3 mLs Nebulization Given 11/4/23 2105)     Medical Decision  Making  Patient was seen here 10/23 at [facility] at which time he went into ventricular fibrillation and was defibrillated and normal sinus rhythm and transferred to [facility] in [place]; patient had a heart catheterization which revealed severe coronary artery disease not amenable to surgery; patient was hospitalized until yesterday when he was discharged and he presents today feeling weak with a cough and low-grade fever and chills; he notes lower chest discomfort when he coughs; he denies any fluid retention; patient has a past medical history of severe CAD, CHF, alcoholic cirrhosis, COPD, hypertension, PAD with stents, DEON    DIFFERENTIAL DIAGNOSIS- pneumonia, pleural effusion, COPD exacerbation, CHF, AMI    Amount and/or Complexity of Data Reviewed  External Data Reviewed: labs, radiology, ECG and notes.  Labs: ordered. Decision-making details documented in ED Course.  Radiology: ordered and independent interpretation performed. Decision-making details documented in ED Course.  ECG/medicine tests: ordered and independent interpretation performed. Decision-making details documented in ED Course.  Discussion of management or test interpretation with external provider(s): Discussed patient's case with hospitalist Dr. Sepulveda; reviewed HPI, past medical history, physical exam, lab findings and x-ray findings, treatment and response to treatment; patient will be admitted for hospital-acquired pneumonia    Risk  OTC drugs.  Prescription drug management.  Decision regarding hospitalization.  Risk Details: Patient given cefepime 2 g IV in the emergency room and will also start vancomycin; DuoNeb given in the emergency room; patient did have a troponin that was 0.15 but recently had an elevation up to 9; he denies any chest pain except with cough; he also had an elevated D-dimer but has had this in the past as well; his lactate level was 0.7; his BNP was elevated at 1118 and this is consistent with  "his previous level               ED Course as of 11/04/23 2311   Sat Nov 04, 2023 1906 WBC(!): 18.07 [DD]   1907 POC Cardiac Troponin I(!): 0.15 [DD]   2015 Lactate, Carlos: 0.7 [DD]   2015 BNP(!): 1,118.0 [DD]      ED Course User Index  [DD] Grabiel Hanson MD        Patient Vitals for the past 24 hrs:   BP Temp Temp src Pulse Resp SpO2 Height Weight   11/04/23 2128 128/66 98.4 °F (36.9 °C) Oral 82 (!) 22 (!) 94 % 5' 9" (1.753 m) 81.5 kg (179 lb 10.8 oz)   11/04/23 2105 -- -- -- 82 (!) 24 (!) 91 % -- --   11/04/23 1955 -- -- -- -- 20 -- -- --   11/04/23 1954 (!) 111/56 -- -- 82 (!) 28 (!) 91 % -- --   11/04/23 1830 126/61 99.5 °F (37.5 °C) -- 84 18 (!) 94 % 5' 9" (1.753 m) 83.6 kg (184 lb 3.2 oz)                 Clinical Impression:   Final diagnoses:  [R06.02] Shortness of breath  [J18.9, Y95] Hospital-acquired pneumonia (Primary)  [R91.8] Right lower lobe pulmonary infiltrate        ED Disposition Condition    Observation Stable                Grabiel Hanson MD  11/04/23 2311    "
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
## 2023-11-05 PROBLEM — J18.9 HOSPITAL-ACQUIRED PNEUMONIA: Status: ACTIVE | Noted: 2023-11-05

## 2023-11-05 PROBLEM — R06.02 SHORTNESS OF BREATH: Status: ACTIVE | Noted: 2023-11-05

## 2023-11-05 PROCEDURE — G0378 HOSPITAL OBSERVATION PER HR: HCPCS

## 2023-11-05 PROCEDURE — 25000242 PHARM REV CODE 250 ALT 637 W/ HCPCS: Performed by: INTERNAL MEDICINE

## 2023-11-05 PROCEDURE — 63600175 PHARM REV CODE 636 W HCPCS: Performed by: INTERNAL MEDICINE

## 2023-11-05 PROCEDURE — 25000003 PHARM REV CODE 250: Performed by: INTERNAL MEDICINE

## 2023-11-05 PROCEDURE — 94760 N-INVAS EAR/PLS OXIMETRY 1: CPT

## 2023-11-05 PROCEDURE — 96366 THER/PROPH/DIAG IV INF ADDON: CPT

## 2023-11-05 PROCEDURE — 25000242 PHARM REV CODE 250 ALT 637 W/ HCPCS: Performed by: SPECIALIST

## 2023-11-05 PROCEDURE — 94640 AIRWAY INHALATION TREATMENT: CPT | Mod: XB

## 2023-11-05 PROCEDURE — 27000221 HC OXYGEN, UP TO 24 HOURS

## 2023-11-05 PROCEDURE — 63600175 PHARM REV CODE 636 W HCPCS: Performed by: SPECIALIST

## 2023-11-05 PROCEDURE — 96372 THER/PROPH/DIAG INJ SC/IM: CPT | Performed by: INTERNAL MEDICINE

## 2023-11-05 PROCEDURE — 94761 N-INVAS EAR/PLS OXIMETRY MLT: CPT

## 2023-11-05 PROCEDURE — 25000003 PHARM REV CODE 250: Performed by: SPECIALIST

## 2023-11-05 RX ORDER — CLOPIDOGREL BISULFATE 75 MG/1
75 TABLET ORAL DAILY
Status: DISCONTINUED | OUTPATIENT
Start: 2023-11-05 | End: 2023-11-06 | Stop reason: HOSPADM

## 2023-11-05 RX ORDER — PANTOPRAZOLE SODIUM 40 MG/1
40 TABLET, DELAYED RELEASE ORAL 2 TIMES DAILY
Status: DISCONTINUED | OUTPATIENT
Start: 2023-11-05 | End: 2023-11-06 | Stop reason: HOSPADM

## 2023-11-05 RX ORDER — ATORVASTATIN CALCIUM 40 MG/1
80 TABLET, FILM COATED ORAL DAILY
Status: DISCONTINUED | OUTPATIENT
Start: 2023-11-05 | End: 2023-11-06 | Stop reason: HOSPADM

## 2023-11-05 RX ORDER — TAMSULOSIN HYDROCHLORIDE 0.4 MG/1
0.4 CAPSULE ORAL DAILY
Status: DISCONTINUED | OUTPATIENT
Start: 2023-11-05 | End: 2023-11-06 | Stop reason: HOSPADM

## 2023-11-05 RX ORDER — NAPROXEN SODIUM 220 MG/1
81 TABLET, FILM COATED ORAL DAILY
Status: DISCONTINUED | OUTPATIENT
Start: 2023-11-05 | End: 2023-11-06 | Stop reason: HOSPADM

## 2023-11-05 RX ORDER — FUROSEMIDE 40 MG/1
40 TABLET ORAL DAILY
Status: DISCONTINUED | OUTPATIENT
Start: 2023-11-05 | End: 2023-11-06 | Stop reason: HOSPADM

## 2023-11-05 RX ORDER — AMIODARONE HYDROCHLORIDE 200 MG/1
400 TABLET ORAL DAILY
Status: DISCONTINUED | OUTPATIENT
Start: 2023-11-05 | End: 2023-11-06 | Stop reason: HOSPADM

## 2023-11-05 RX ORDER — ENOXAPARIN SODIUM 100 MG/ML
40 INJECTION SUBCUTANEOUS EVERY 24 HOURS
Status: DISCONTINUED | OUTPATIENT
Start: 2023-11-05 | End: 2023-11-06 | Stop reason: HOSPADM

## 2023-11-05 RX ORDER — FOLIC ACID 1 MG/1
1 TABLET ORAL DAILY
Status: DISCONTINUED | OUTPATIENT
Start: 2023-11-05 | End: 2023-11-06 | Stop reason: HOSPADM

## 2023-11-05 RX ORDER — GABAPENTIN 300 MG/1
300 CAPSULE ORAL 3 TIMES DAILY
Status: DISCONTINUED | OUTPATIENT
Start: 2023-11-05 | End: 2023-11-06 | Stop reason: HOSPADM

## 2023-11-05 RX ORDER — SPIRONOLACTONE 25 MG/1
25 TABLET ORAL 2 TIMES DAILY
Status: DISCONTINUED | OUTPATIENT
Start: 2023-11-05 | End: 2023-11-06 | Stop reason: HOSPADM

## 2023-11-05 RX ADMIN — ENOXAPARIN SODIUM 40 MG: 40 INJECTION SUBCUTANEOUS at 05:11

## 2023-11-05 RX ADMIN — CEFEPIME 2 G: 2 INJECTION, POWDER, FOR SOLUTION INTRAVENOUS at 05:11

## 2023-11-05 RX ADMIN — SODIUM CHLORIDE: 9 INJECTION, SOLUTION INTRAVENOUS at 02:11

## 2023-11-05 RX ADMIN — GABAPENTIN 300 MG: 300 CAPSULE ORAL at 09:11

## 2023-11-05 RX ADMIN — SACUBITRIL AND VALSARTAN 1 TABLET: 24; 26 TABLET, FILM COATED ORAL at 09:11

## 2023-11-05 RX ADMIN — FUROSEMIDE 40 MG: 40 TABLET ORAL at 09:11

## 2023-11-05 RX ADMIN — IPRATROPIUM BROMIDE AND ALBUTEROL SULFATE 3 ML: .5; 3 SOLUTION RESPIRATORY (INHALATION) at 07:11

## 2023-11-05 RX ADMIN — GABAPENTIN 300 MG: 300 CAPSULE ORAL at 02:11

## 2023-11-05 RX ADMIN — SPIRONOLACTONE 25 MG: 25 TABLET, FILM COATED ORAL at 09:11

## 2023-11-05 RX ADMIN — AMIODARONE HYDROCHLORIDE 400 MG: 200 TABLET ORAL at 09:11

## 2023-11-05 RX ADMIN — SODIUM CHLORIDE 20 ML: 9 INJECTION, SOLUTION INTRAVENOUS at 09:11

## 2023-11-05 RX ADMIN — CEFEPIME 2 G: 2 INJECTION, POWDER, FOR SOLUTION INTRAVENOUS at 02:11

## 2023-11-05 RX ADMIN — SODIUM CHLORIDE 35 ML/HR: 9 INJECTION, SOLUTION INTRAVENOUS at 05:11

## 2023-11-05 RX ADMIN — ASPIRIN 81 MG 81 MG: 81 TABLET ORAL at 09:11

## 2023-11-05 RX ADMIN — SENNOSIDES AND DOCUSATE SODIUM 1 TABLET: 50; 8.6 TABLET ORAL at 09:11

## 2023-11-05 RX ADMIN — CEFEPIME 2 G: 2 INJECTION, POWDER, FOR SOLUTION INTRAVENOUS at 09:11

## 2023-11-05 RX ADMIN — IPRATROPIUM BROMIDE AND ALBUTEROL SULFATE 3 ML: .5; 3 SOLUTION RESPIRATORY (INHALATION) at 06:11

## 2023-11-05 RX ADMIN — FOLIC ACID 1 MG: 1 TABLET ORAL at 09:11

## 2023-11-05 RX ADMIN — PREDNISONE 50 MG: 20 TABLET ORAL at 10:11

## 2023-11-05 RX ADMIN — METOPROLOL SUCCINATE 12.5 MG: 25 TABLET, EXTENDED RELEASE ORAL at 09:11

## 2023-11-05 RX ADMIN — VANCOMYCIN HYDROCHLORIDE 2000 MG: 1 INJECTION, POWDER, LYOPHILIZED, FOR SOLUTION INTRAVENOUS at 09:11

## 2023-11-05 RX ADMIN — FAMOTIDINE 20 MG: 20 TABLET ORAL at 09:11

## 2023-11-05 RX ADMIN — CLOPIDOGREL BISULFATE 75 MG: 75 TABLET ORAL at 09:11

## 2023-11-05 RX ADMIN — TAMSULOSIN HYDROCHLORIDE 0.4 MG: 0.4 CAPSULE ORAL at 09:11

## 2023-11-05 RX ADMIN — IPRATROPIUM BROMIDE AND ALBUTEROL SULFATE 3 ML: .5; 3 SOLUTION RESPIRATORY (INHALATION) at 01:11

## 2023-11-05 RX ADMIN — PANTOPRAZOLE SODIUM 40 MG: 40 TABLET, DELAYED RELEASE ORAL at 09:11

## 2023-11-05 RX ADMIN — ATORVASTATIN CALCIUM 80 MG: 40 TABLET, FILM COATED ORAL at 09:11

## 2023-11-05 RX ADMIN — IPRATROPIUM BROMIDE AND ALBUTEROL SULFATE 3 ML: .5; 3 SOLUTION RESPIRATORY (INHALATION) at 02:11

## 2023-11-05 RX ADMIN — SODIUM CHLORIDE 25 ML: 9 INJECTION, SOLUTION INTRAVENOUS at 09:11

## 2023-11-05 NOTE — PROGRESS NOTES
Pharmacokinetic Initial Assessment: IV Vancomycin    Assessment/Plan:    Initiate Vancomycin 2000 mg Q24H regimen  Desired empiric serum trough concentration is 15 to 20 mcg/mL  Draw vancomycin trough level 60 min prior to third dose on 11/06 at approximately 2100  Pharmacy will continue to follow and monitor vancomycin.      Please contact pharmacy at extension [phone] with any questions regarding this assessment.     Thank you for the consult,   Christen Anderson       Patient brief summary:  Hemal Tolbert Jr. is a 63 y.o. male initiated on antimicrobial therapy with IV Vancomycin for treatment of suspected lower respiratory infection/HAP    Drug Allergies:   Review of patient's allergies indicates:  No Known Allergies    Actual Body Weight:   83.6    Renal Function:   Estimated Creatinine Clearance: 60.2 mL/min (A) (based on SCr of 1.24 mg/dL (H)).,     Dialysis Method (if applicable):  N/A    CBC (last 72 hours):  Recent Labs   Lab Result Units 11/02/23  0843 11/04/23  1853   WBC x10(3)/mcL 12.11 18.07*   Hemoglobin g/dL 9.2*  --    Hgb g/dL  --  8.9*   Hematocrit % 28.7*  --    Hct %  --  29.4*   Platelets K/uL 286  --    Platelet x10(3)/mcL  --  306   Gran % % 73.7*  --    Lymph % % 12.6*  --    Mono % % 10.7 7.9   Eosinophil % % 0.6  --    Eos % %  --  0.5   Basophil % % 0.1 0.1   Differential Method  Automated  --        Metabolic Panel (last 72 hours):  Recent Labs   Lab Result Units 11/02/23  0843 11/04/23  1853   Sodium mmol/L 135*  --    Sodium Level mmol/L  --  133*   Potassium mmol/L 4.8  --    Potassium Level mmol/L  --  4.7   Chloride mmol/L 110 103   CO2 mmol/L 17*  --    Carbon Dioxide mmol/L  --  21*   Glucose mg/dL 122*  --    Glucose Level mg/dL  --  87   BUN mg/dL 28*  --    Blood Urea Nitrogen mg/dL  --  21.1   Creatinine mg/dL 1.1 1.24*   Albumin Level g/dL  --  2.9*   Bilirubin Total mg/dL  --  0.6   Alkaline Phosphatase unit/L  --  42   Aspartate Aminotransferase unit/L  --  18   Alanine  "Aminotransferase unit/L  --  14   Magnesium Level mg/dL  --  2.00       Drug levels (last 3 results):  No results for input(s): "VANCOMYCINRA", "VANCORANDOM", "VANCOMYCINPE", "VANCOPEAK", "VANCOMYCINTR", "VANCOTROUGH" in the last 72 hours.    Microbiologic Results:  Microbiology Results (last 7 days)       Procedure Component Value Units Date/Time    Blood culture #1 **CANNOT BE ORDERED STAT** [[id number]] Collected: 11/04/23 1943    Order Status: Sent Specimen: Blood Updated: 11/04/23 1943    Blood culture #2 **CANNOT BE ORDERED STAT** [[id number]] Collected: 11/04/23 1941    Order Status: Sent Specimen: Blood Updated: 11/04/23 1943            "

## 2023-11-05 NOTE — NURSING
Nurses Note -- 4 Eyes      11/5/2023   12:54 AM      Skin assessed during: Admit      [x] No Altered Skin Integrity Present    []Prevention Measures Documented      [] Yes- Altered Skin Integrity Present or Discovered   [] LDA Added if Not in Epic (Describe Wound)   [] New Altered Skin Integrity was Present on Admit and Documented in LDA   [] Wound Image Taken    Wound Care Consulted? No    Attending Nurse:  Sabina Westbrook RN/Staff Member:   Geena Bonner RN

## 2023-11-05 NOTE — H&P
"[facility]  HOSPITAL MEDICINE - H&P ADMISSION NOTE      Patient Name: Hemal Tolbert Jr.  MRN: [id number]  Patient Class: OP- Observation   Admission Date: 11/4/2023   Admitting Physician: DAVINA Service   Attending Physician: Tawana Sepulveda MD  Primary Care Provider: Sigifredo Green  Face-to-Face encounter date: 11/05/2023        CHIEF COMPLAINT     Chief Complaint   Patient presents with    Fatigue     Pt states he just doesn't feel right. Feels like he is burning up.  Was discharged from  [facility] in [place] last night, was there 12 days. States he had heart attack here and one in [place]. States he has pain all over. States that he has blockages in heart but they werent able to fix. Was supposed to be discharged with life vest but insurance didn't cover.         HISTORY OF PRESENTING ILLNESS   This is a 63 year old white male who presented  to the ED with "burning up". Patient was seen here 10/23 at [facility] at which time he went into ventricular fibrillation and was defibrillated and normal sinus rhythm and transferred to [facility] in [place]; patient had a heart catheterization which revealed severe coronary artery disease not amenable to surgery; patient was hospitalized until yesterday when he was discharged and he presents today feeling weak with a cough and low-grade fever and chills; he notes lower chest discomfort when he coughs; he denies any fluid retention     The history is provided by the patient and medical records.       He is being admitted to observation under my care with   Hospital-acquired pneumonia.      In the ER it was noted that he had an infiltrate on x-ray right to mid lower lung zone processes.  He also had [place] [id number].  In the ER he was started on the antibiotics of cefepime 2 g every 8 hours the 1st dose was on 11/04/2023.  He also was started on vancomycin on 11/04/2023 per pharmacy dosing.      Review of " systems is positive for fever and chills.  He says it is not more short of breath usual.  As mentioned above he does have a few cough.  He says he does have some chest discomfort when he coughs.  He denies any edema.  He is a smoker.      I will continue antibiotics as well as neb treatments.  I will restart his home meds.  He was meant to have a LifeVest but his insurance did not cover it.  I will consult Cardiology to see if they have any recommendations.   I will also order PT OT.  Lovenox for DVT prophylaxis. I will also add steroids.    As clarification, on (today) patient should be admitted for hospital observation services under my care.          PAST MEDICAL HISTORY     Past Medical History:   Diagnosis Date    Alcoholic cirrhosis     CHF (congestive heart failure)     LV EF 40%    COPD (chronic obstructive pulmonary disease)     Hypertension     Peripheral artery disease     with stents    Sleep apnea        PAST SURGICAL HISTORY     Past Surgical History:   Procedure Laterality Date    ANGIOGRAM, ABDOMINAL AORTA  10/24/2023    Procedure: Angiogram, Abdominal Aorta;  Surgeon: Janette Ernandez MD;  Location: [facility] CATH LAB;  Service: Cardiology;;    ARTERIOGRAPHY OF SUBCLAVIAN ARTERY Left 10/24/2023    Procedure: ARTERIOGRAM, SUBCLAVIAN;  Surgeon: Janette Ernandez MD;  Location: [facility] CATH LAB;  Service: Cardiology;  Laterality: Left;    CATHETERIZATION OF BOTH LEFT AND RIGHT HEART N/A 10/24/2023    Procedure: CATHETERIZATION, HEART, BOTH LEFT AND RIGHT;  Surgeon: Janette Ernandez MD;  Location: [facility] CATH LAB;  Service: Cardiology;  Laterality: N/A;    COLONOSCOPY N/A 6/10/2023    Procedure: COLONOSCOPY;  Surgeon: Johan Flower MD;  Location: [facility];  Service: Gastroenterology;  Laterality: N/A;    ESOPHAGOGASTRODUODENOSCOPY N/A 6/9/2023    Procedure: EGD;  Surgeon: Tima Teixeira MD;  Location: [facility] ENDOSCOPY;  Service: Gastroenterology;  Laterality: N/A;    INSERTION OF INTRA-AORTIC BALLOON  ASSIST DEVICE  10/24/2023    Procedure: INSERTION, INTRA-AORTIC BALLOON PUMP;  Surgeon: Janette Ernandez MD;  Location: [facility] CATH LAB;  Service: Cardiology;;    INSERTION OF INTRAVASCULAR MICROAXIAL BLOOD PUMP N/A 10/26/2023    Procedure: INSERTION, IMPELLA;  Surgeon: Janette Ernandez MD;  Location: [facility] CATH LAB;  Service: Cardiology;  Laterality: N/A;    INSTANTANEOUS WAVE-FREE RATIO (IFR) N/A 10/26/2023    Procedure: Instantaneous Wave-Free Ratio (IFR);  Surgeon: Janette Ernandez MD;  Location: [facility] CATH LAB;  Service: Cardiology;  Laterality: N/A;    INTRAVASCULAR ULTRASOUND, CORONARY N/A 10/26/2023    Procedure: Ultrasound-coronary;  Surgeon: Janette Ernandez MD;  Location: [facility] CATH LAB;  Service: Cardiology;  Laterality: N/A;    PERCUTANEOUS CORONARY INTERVENTION, ARTERY N/A 10/26/2023    Procedure: Percutaneous coronary intervention- with Impella;  Surgeon: Janette Ernandez MD;  Location: [facility] CATH LAB;  Service: Cardiology;  Laterality: N/A;    PLACEMENT, IABP  10/24/2023    Procedure: Placement, IABP;  Surgeon: Janette Ernandez MD;  Location: [facility] CATH LAB;  Service: Cardiology;;       FAMILY HISTORY   Reviewed and noncontributory to this case    SOCIAL HISTORY     Social History     Socioeconomic History    Marital status: Single   Tobacco Use    Smoking status: Every Day     Current packs/day: 1.00     Types: Cigarettes    Smokeless tobacco: Never   Substance and Sexual Activity    Alcohol use: Yes     Alcohol/week: 24.0 standard drinks of alcohol     Types: 24 Cans of beer per week     Comment: Refused to quit in June 23    Drug use: Yes     Types: Marijuana     Social Determinants of Health     Financial Resource Strain: High Risk (6/8/2023)    Overall Financial Resource Strain (CARDIA)     Difficulty of Paying Living Expenses: Hard   Food Insecurity: No Food Insecurity (6/8/2023)    Hunger Vital Sign     Worried About Running Out of Food in the Last Year: Never true     Ran Out of Food in the Last  Year: Never true   Transportation Needs: Unmet Transportation Needs (6/8/2023)    PRAPARE - Transportation     Lack of Transportation (Medical): Yes     Lack of Transportation (Non-Medical): Yes   Physical Activity: Unknown (6/8/2023)    Exercise Vital Sign     Minutes of Exercise per Session: 120 min   Stress: No Stress Concern Present (6/8/2023)    [employer] - Occupational Stress Questionnaire     Feeling of Stress : Not at all   Social Connections: Socially Isolated (6/8/2023)    Social Connection and Isolation Panel [NHANES]     Frequency of Communication with Friends and Family: Once a week     Frequency of Social Gatherings with Friends and Family: Once a week     Attends Roman Catholic Services: Never     Active Member of Clubs or Organizations: No     Attends Club or Organization Meetings: Never     Marital Status:    Housing Stability: High Risk (6/8/2023)    Housing Stability Vital Sign     Unable to Pay for Housing in the Last Year: Yes     Number of Places Lived in the Last Year: 5     Unstable Housing in the Last Year: Yes       HOME MEDICATIONS     Prior to Admission medications    Medication Sig Start Date End Date Taking? Authorizing Provider   albuterol (PROVENTIL/VENTOLIN HFA) 90 mcg/actuation inhaler Inhale 1-2 puffs into the lungs every 4 (four) hours as needed. 5/19/23  Yes Provider, Historical   amiodarone (PACERONE) 200 MG Tab Take 2 tablets (400 mg total) by mouth once daily. 11/3/23 11/2/24 Yes Bello Pinto MD   aspirin 81 MG Chew CHEW 1 tablet (81 mg total) by mouth once daily. 11/4/23 11/3/24 Yes Bello Pinto MD   atorvastatin (LIPITOR) 80 MG tablet Take 1 tablet (80 mg total) by mouth once daily. 11/4/23 11/3/24 Yes Bello Pinto MD   clopidogreL (PLAVIX) 75 mg tablet Take 1 tablet (75 mg total) by mouth once daily. 11/4/23 11/3/24 Yes Bello Pinto MD   folic acid (FOLVITE) 1 MG tablet Take 1 tablet (1 mg total) by mouth once daily. 6/12/23  Yes Bill Epps MD    gabapentin (NEURONTIN) 300 MG capsule Take 300 mg by mouth 3 (three) times daily. 5/25/23  Yes Provider, Historical   metoprolol succinate (TOPROL-XL) 25 MG 24 hr tablet Take ½ ablet (12.5 mg total) by mouth once daily. 11/4/23 11/3/24 Yes Bello Pinto MD   pantoprazole (PROTONIX) 40 MG tablet Take 1 tablet (40 mg total) by mouth 2 (two) times daily. 6/12/23  Yes Bill Epps MD   sacubitriL-valsartan (ENTRESTO) 24-26 mg per tablet Take 1 tablet by mouth 2 (two) times daily. 11/3/23  Yes Bello Pinto MD   spironolactone (ALDACTONE) 25 MG tablet Take 1 tablet (25 mg total) by mouth 2 (two) times daily. 11/3/23 11/2/24 Yes Bello Pinto MD   STIOLTO RESPIMAT 2.5-2.5 mcg/actuation Mist Inhale 2 puffs into the lungs once daily. 5/25/23  Yes Provider, Historical   tamsulosin (FLOMAX) 0.4 mg Cap Take 1 capsule (0.4 mg total) by mouth once daily. 11/4/23 11/3/24 Yes Bello Pinto MD   furosemide (LASIX) 40 MG tablet Take 40 mg by mouth once daily. 10/9/23   Provider, Historical       ALLERGIES   Patient has no known allergies.          REVIEW OF SYSTEMS   Except as documented above, all other systems reviewed and negative    PHYSICAL EXAM     Vitals:    11/05/23 0907   BP: 127/62   Pulse:    Resp:    Temp:       General:  In no acute distress, resting comfortably  Head and neck:  Atraumatic, normocephalic, moist mucous membranes, supple neck  Chest:    Positive for wheezes.  Heart:  S1, S2, no appreciable murmur  Abdomen:  Soft, nontender, BS +  MSK:  Warm, no lower extremity edema, no clubbing or cyanosis  Neuro:  Alert and oriented x4, moving all extremities with good strength  Integumentary:  No obvious skin rash  Psychiatry:  Appropriate mood and affect          ASSESSMENT AND PLAN     Active Hospital Problems    Diagnosis  POA    Shortness of breath [R06.02]  Yes    Hospital-acquired pneumonia [J18.9, Y95]  Yes    Acute on chronic combined systolic and diastolic heart failure [I50.43]  Yes    Primary hypertension [I10]  Yes    COPD  (chronic obstructive pulmonary disease) [J44.9]  Yes    CHF (congestive heart failure) [I50.9]  Yes    Alcoholic cirrhosis [K70.30]  Yes      Resolved Hospital Problems   No resolved problems to display.        I will continue antibiotics as well as neb treatments.  I will restart his home meds.  He was meant to have a LifeVest but his insurance did not cover it.  I will consult Cardiology to see if they have any recommendations.   I will also order PT OT.  Lovenox for DVT prophylaxis. I will also add steroids.       DVT prophylaxis:    Lovenox  __________________________________________________________________  LABS/MICRO/MEDS/DIAGNOSTICS       LABS  Recent Labs     11/04/23 1853   *   K 4.7   CHLORIDE 103   CO2 21*   BUN 21.1   CREATININE 1.24*   GLUCOSE 87   CALCIUM 8.6*   ALKPHOS 42   AST 18   ALT 14   ALBUMIN 2.9*     Recent Labs     11/04/23 1853   WBC 18.07*   RBC 2.86*   HCT 29.4*   .8*          MICROBIOLOGY  Microbiology Results (last 7 days)       Procedure Component Value Units Date/Time    Blood culture #1 **CANNOT BE ORDERED STAT** [[id number]] Collected: 11/04/23 1943    Order Status: Sent Specimen: Blood Updated: 11/04/23 1943    Blood culture #2 **CANNOT BE ORDERED STAT** [[id number]] Collected: 11/04/23 1941    Order Status: Sent Specimen: Blood Updated: 11/04/23 1943            MEDICATIONS   albuterol-ipratropium  3 mL Nebulization Q6H    amiodarone  400 mg Oral Daily    aspirin  81 mg Oral Daily    atorvastatin  80 mg Oral Daily    ceFEPime (MAXIPIME) IVPB  2 g Intravenous Q8H    clopidogreL  75 mg Oral Daily    famotidine  20 mg Oral BID    folic acid  1 mg Oral Daily    furosemide  40 mg Oral Daily    gabapentin  300 mg Oral TID    metoprolol succinate  12.5 mg Oral Daily    pantoprazole  40 mg Oral BID    polyethylene glycol  17 g Oral Daily    sacubitriL-valsartan  1 tablet Oral BID    senna-docusate 8.6-50 mg  1 tablet Oral BID    spironolactone  25 mg Oral BID     tamsulosin  0.4 mg Oral Daily    vancomycin (VANCOCIN) IV (PEDS and ADULTS)  2,000 mg Intravenous Q24H      INFUSIONS      DIAGNOSTIC TESTS  X-Ray Chest AP Portable   ED Interpretation   Right lower lobe infiltrate      Final Result      Findings concerning for right mid to lower lung zone process.  Recommend follow-up within 3 months to document resolution.         Electronically signed by: Ramesh Wei   Date:    11/04/2023   Time:    19:09             Patient information was obtained from patient, patient's family, past medical records and ER records.   All diagnosis and differential diagnosis have been reviewed; assessment and plan has been documented. I have personally reviewed the labs and test results that are presently available; I have reviewed the patients medication list. I have reviewed the consulting providers response and recommendations. I have reviewed or attempted to review medical records based upon their availability.  All of the patient's questions have been addressed and answered. Patient's is agreeable to the above stated plan. I will continue to monitor closely and make adjustments to medical management as needed.  This note was created using [employer] voice recognition software that occasionally misinterpreted phrases or words.  Please contact me if any questions may rise regarding documentation to clarify verbiage.        Tawana Sepulveda MD   Internal Medicine  Department of Hospital Medicine [facility]

## 2023-11-05 NOTE — PLAN OF CARE
Problem: Adult Inpatient Plan of Care  Goal: Plan of Care Review  Outcome: Ongoing, Progressing  Flowsheets (Taken 11/4/2023 2141)  Plan of Care Reviewed With: patient  Goal: Patient-Specific Goal (Individualized)  Outcome: Ongoing, Progressing  Flowsheets (Taken 11/4/2023 2141)  Individualized Care Needs: IV abx, promote good oxygenation, safety  Goal: Absence of Hospital-Acquired Illness or Injury  Outcome: Ongoing, Progressing  Intervention: Identify and Manage Fall Risk  Flowsheets (Taken 11/4/2023 2141)  Safety Promotion/Fall Prevention:   assistive device/personal item within reach   bed alarm set   commode/urinal/bedpan at bedside   Fall Risk reviewed with patient/family   gait belt with ambulation   muscle strengthening facilitated   nonskid shoes/socks when out of bed   side rails raised x 3   supervised activity   Supervised toileting - stay within arms reach   instructed to call staff for mobility  Intervention: Prevent Skin Injury  Flowsheets (Taken 11/4/2023 2141)  Body Position: position changed independently  Intervention: Prevent and Manage VTE (Venous Thromboembolism) Risk  Flowsheets (Taken 11/4/2023 2141)  Activity Management: Ambulated in room - L4  VTE Prevention/Management: ambulation promoted  Intervention: Prevent Infection  Flowsheets (Taken 11/4/2023 2141)  Infection Prevention:   cohorting utilized   environmental surveillance performed   hand hygiene promoted   single patient room provided   rest/sleep promoted  Goal: Optimal Comfort and Wellbeing  Outcome: Ongoing, Progressing  Intervention: Monitor Pain and Promote Comfort  Flowsheets (Taken 11/4/2023 2141)  Pain Management Interventions:   care clustered   pain management plan reviewed with patient/caregiver   pillow support provided   quiet environment facilitated  Intervention: Provide Person-Centered Care  Flowsheets (Taken 11/4/2023 2141)  Trust Relationship/Rapport:   care explained   choices provided   emotional support  provided   empathic listening provided   questions answered   questions encouraged   reassurance provided   thoughts/feelings acknowledged

## 2023-11-05 NOTE — PLAN OF CARE
[facility]  Initial Discharge Assessment       Primary Care Provider: Sigifredo Green    Admission Diagnosis: Shortness of breath [R06.02]    Admission Date: 11/4/2023  Expected Discharge Date:     Transition of Care Barriers: None    Payor: MEDICAID / Plan: [place]UE [place] / Product Type: Managed Medicaid /     Extended Emergency Contact Information  Primary Emergency Contact: Elda Neal  Mobile Phone: [phone]  Relation: Friend  Preferred language: English   needed? No  Secondary Emergency Contact: Luis Alfredo Tolbert  Mobile Phone: [phone]  Relation: Relative    Discharge Plan A: Home with family         [facility] - [place], [place] - [address]  [address] [address]  Phone: [phone] Fax: [phone]    [id number] - [place], [place] - [address]  [address]  [place] [address]  Phone: [phone] Fax: [phone]    [id number] - [place], [place] - [address] [facility] [address]  [address]  [facility] [address]  Phone: [phone] Fax: [phone]      Initial Assessment (most recent)       Adult Discharge Assessment - 11/05/23 1730          Discharge Assessment    Assessment Type Discharge Planning Assessment     Confirmed/corrected address, phone number and insurance Yes     Confirmed Demographics Correct on Facesheet     Source of Information family     If unable to respond/provide information was family/caregiver contacted? Yes     Contact Name/Number Elda Solorzano friend      Does patient/caregiver understand observation status Yes     Communicated SADI with patient/caregiver Date not available/Unable to determine     Reason For Admission Chest pain     People in Home alone     Facility Arrived From: home     Do you expect to return to your current living situation? Yes     Do you have help at home or someone to help  you manage your care at home? Yes     Who are your caregiver(s) and their phone number(s)? Elda Solorzano friend      Prior to hospitilization cognitive status: Alert/Oriented     Current cognitive status: Alert/Oriented     Home Accessibility wheelchair accessible     Home Layout Able to live on 1st floor     Equipment Currently Used at Home none     Readmission within 30 days? No     Patient currently being followed by outpatient case management? No     Do you currently have service(s) that help you manage your care at home? No     Do you take prescription medications? Yes     Do you have prescription coverage? Yes     Coverage [employer]     Do you have any problems affording any of your prescribed medications? TBD     Is the patient taking medications as prescribed? yes     Who is going to help you get home at discharge? Elda Solorzano friend      How do you get to doctors appointments? family or friend will provide     Are you on dialysis? No     Do you take coumadin? No     DME Needed Upon Discharge  none     Discharge Plan discussed with: Friend     Name(s) and Number(s) Elda Solorzano friend      Transition of Care Barriers None     Discharge Plan A Home with family        Physical Activity    On average, how many days per week do you engage in moderate to strenuous exercise (like a brisk walk)? 0 days     On average, how many minutes do you engage in exercise at this level? 0 min        Financial Resource Strain    How hard is it for you to pay for the very basics like food, housing, medical care, and heating? Not hard at all        Housing Stability    In the last 12 months, was there a time when you were not able to pay the mortgage or rent on time? No     In the last 12 months, how many places have you lived? 1     In the last 12 months, was there a time when you did not have a steady place to sleep or slept in a shelter (including now)? No        Transportation Needs    In  the past 12 months, has lack of transportation kept you from medical appointments or from getting medications? No     In the past 12 months, has lack of transportation kept you from meetings, work, or from getting things needed for daily living? No        Food Insecurity    Within the past 12 months, you worried that your food would run out before you got the money to buy more. Never true     Within the past 12 months, the food you bought just didn't last and you didn't have money to get more. Never true        Stress    Do you feel stress - tense, restless, nervous, or anxious, or unable to sleep at night because your mind is troubled all the time - these days? Only a little        Social Connections    In a typical week, how many times do you talk on the phone with family, friends, or neighbors? More than three times a week     How often do you get together with friends or relatives? More than three times a week     How often do you attend Christian or Yarsani services? 1 to 4 times per year     Do you belong to any clubs or organizations such as Christian groups, unions, fraternal or athletic groups, or school groups? No     How often do you attend meetings of the clubs or organizations you belong to? Never     Are you , , , , never , or living with a partner? Never         Alcohol Use    Q1: How often do you have a drink containing alcohol? 2-4 times a month     Q2: How many drinks containing alcohol do you have on a typical day when you are drinking? 3 or 4     Q3: How often do you have six or more drinks on one occasion? Less than monthly        OTHER    Name(s) of People in Home Elda Solorzano friend

## 2023-11-05 NOTE — PLAN OF CARE
Problem: Adult Inpatient Plan of Care  Goal: Plan of Care Review  Outcome: Ongoing, Progressing  Flowsheets (Taken 11/5/2023 1119)  Plan of Care Reviewed With: patient  Goal: Patient-Specific Goal (Individualized)  Outcome: Ongoing, Progressing  Flowsheets (Taken 11/5/2023 1119)  Anxieties, Fears or Concerns: Pt states he wants to go home  Individualized Care Needs: Iv abx, monitor o2 saturation, safety  Goal: Absence of Hospital-Acquired Illness or Injury  Outcome: Ongoing, Progressing  Intervention: Identify and Manage Fall Risk  Flowsheets (Taken 11/5/2023 1119)  Safety Promotion/Fall Prevention:   assistive device/personal item within reach   bed alarm set   nonskid shoes/socks when out of bed   side rails raised x 3  Intervention: Prevent Skin Injury  Flowsheets (Taken 11/5/2023 1119)  Body Position: sitting up in bed  Skin Protection:   adhesive use limited   incontinence pads utilized   tubing/devices free from skin contact  Intervention: Prevent and Manage VTE (Venous Thromboembolism) Risk  Flowsheets (Taken 11/5/2023 1119)  Activity Management: Rolling - L1  VTE Prevention/Management:   bleeding precations maintained   bleeding risk assessed   ambulation promoted   fluids promoted  Range of Motion: active ROM (range of motion) encouraged  Intervention: Prevent Infection  Flowsheets (Taken 11/5/2023 1119)  Infection Prevention:   cohorting utilized   environmental surveillance performed   hand hygiene promoted   single patient room provided   rest/sleep promoted  Goal: Optimal Comfort and Wellbeing  Outcome: Ongoing, Progressing  Intervention: Monitor Pain and Promote Comfort  Flowsheets (Taken 11/5/2023 1119)  Pain Management Interventions:   care clustered   pain management plan reviewed with patient/caregiver   position adjusted   pillow support provided   quiet environment facilitated   relaxation techniques promoted  Intervention: Provide Person-Centered Care  Flowsheets (Taken 11/5/2023 1119)  Trust  Relationship/Rapport:   care explained   questions answered   choices provided   questions encouraged   emotional support provided   reassurance provided   empathic listening provided   thoughts/feelings acknowledged  Goal: Readiness for Transition of Care  Outcome: Ongoing, Progressing  Intervention: Mutually Develop Transition Plan  Flowsheets (Taken 11/5/2023 1119)  Communicated SADI with patient/caregiver: Date not available/Unable to determine     Problem: Fluid Imbalance (Pneumonia)  Goal: Fluid Balance  Outcome: Ongoing, Progressing  Intervention: Monitor and Manage Fluid Balance  Flowsheets (Taken 11/5/2023 1119)  Fluid/Electrolyte Management: fluids provided     Problem: Infection (Pneumonia)  Goal: Resolution of Infection Signs and Symptoms  Outcome: Ongoing, Progressing  Intervention: Prevent Infection Progression  Flowsheets (Taken 11/5/2023 1119)  Fever Reduction/Comfort Measures:   lightweight clothing   lightweight bedding  Infection Management: aseptic technique maintained  Isolation Precautions: precautions maintained     Problem: Respiratory Compromise (Pneumonia)  Goal: Effective Oxygenation and Ventilation  Outcome: Ongoing, Progressing  Intervention: Promote Airway Secretion Clearance  Flowsheets (Taken 11/5/2023 1119)  Breathing Techniques/Airway Clearance: deep/controlled cough encouraged  Cough And Deep Breathing: done independently per patient  Intervention: Optimize Oxygenation and Ventilation  Flowsheets (Taken 11/5/2023 1119)  Airway/Ventilation Management:   airway patency maintained   calming measures promoted  Head of Bed (HOB) Positioning: HOB at 45 degrees     Problem: Fall Injury Risk  Goal: Absence of Fall and Fall-Related Injury  Outcome: Ongoing, Progressing  Intervention: Identify and Manage Contributors  Flowsheets (Taken 11/5/2023 1119)  Self-Care Promotion:   independence encouraged   BADL personal objects within reach   BADL personal routines maintained   safe use of adaptive  equipment encouraged  Medication Review/Management:   medications reviewed   high-risk medications identified  Intervention: Promote Injury-Free Environment  Flowsheets (Taken 11/5/2023 1119)  Safety Promotion/Fall Prevention:   assistive device/personal item within reach   bed alarm set   nonskid shoes/socks when out of bed   side rails raised x 3     Problem: Gas Exchange Impaired  Goal: Optimal Gas Exchange  Outcome: Ongoing, Progressing  Intervention: Optimize Oxygenation and Ventilation  Flowsheets (Taken 11/5/2023 1119)  Airway/Ventilation Management:   airway patency maintained   calming measures promoted  Head of Bed (HOB) Positioning: HOB at 45 degrees     Problem: Cardiac Output Decreased  Goal: Effective Cardiac Output  Outcome: Ongoing, Progressing  Intervention: Optimize Cardiac Output  Flowsheets (Taken 11/5/2023 1119)  VTE Prevention/Management:   bleeding precations maintained   bleeding risk assessed   ambulation promoted   fluids promoted  Head of Bed (HOB) Positioning: HOB at 45 degrees  Environmental Support:   calm environment promoted   distractions minimized   personal routine supported   rest periods encouraged

## 2023-11-06 VITALS
WEIGHT: 179.69 LBS | RESPIRATION RATE: 16 BRPM | SYSTOLIC BLOOD PRESSURE: 117 MMHG | TEMPERATURE: 98 F | HEIGHT: 69 IN | BODY MASS INDEX: 26.62 KG/M2 | DIASTOLIC BLOOD PRESSURE: 58 MMHG | HEART RATE: 81 BPM | OXYGEN SATURATION: 95 %

## 2023-11-06 LAB
ALBUMIN SERPL-MCNC: 2.6 G/DL (ref 3.4–4.8)
ALBUMIN/GLOB SERPL: 0.7 RATIO (ref 1.1–2)
ALP SERPL-CCNC: 39 UNIT/L (ref 40–150)
ALT SERPL-CCNC: 14 UNIT/L (ref 0–55)
AST SERPL-CCNC: 14 UNIT/L (ref 5–34)
BASOPHILS # BLD AUTO: 0.04 X10(3)/MCL
BASOPHILS NFR BLD AUTO: 0.3 %
BILIRUB SERPL-MCNC: 0.3 MG/DL
BUN SERPL-MCNC: 21.7 MG/DL (ref 8.4–25.7)
CALCIUM SERPL-MCNC: 8.2 MG/DL (ref 8.8–10)
CHLORIDE SERPL-SCNC: 102 MMOL/L (ref 98–107)
CO2 SERPL-SCNC: 20 MMOL/L (ref 23–31)
CREAT SERPL-MCNC: 1.3 MG/DL (ref 0.73–1.18)
EOSINOPHIL # BLD AUTO: 0 X10(3)/MCL (ref 0–0.9)
EOSINOPHIL NFR BLD AUTO: 0 %
ERYTHROCYTE [DISTWIDTH] IN BLOOD BY AUTOMATED COUNT: 13.6 % (ref 11.5–17)
GFR SERPLBLD CREATININE-BSD FMLA CKD-EPI: >60 MLS/MIN/1.73/M2
GLOBULIN SER-MCNC: 3.9 GM/DL (ref 2.4–3.5)
GLUCOSE SERPL-MCNC: 155 MG/DL (ref 82–115)
HCT VFR BLD AUTO: 27.3 % (ref 42–52)
HGB BLD-MCNC: 8.5 G/DL (ref 14–18)
IMM GRANULOCYTES # BLD AUTO: 0.08 X10(3)/MCL (ref 0–0.04)
IMM GRANULOCYTES NFR BLD AUTO: 0.7 %
LYMPHOCYTES # BLD AUTO: 0.43 X10(3)/MCL (ref 0.6–4.6)
LYMPHOCYTES NFR BLD AUTO: 3.5 %
MAGNESIUM SERPL-MCNC: 2.1 MG/DL (ref 1.6–2.6)
MCH RBC QN AUTO: 31.5 PG (ref 27–31)
MCHC RBC AUTO-ENTMCNC: 31.1 G/DL (ref 33–36)
MCV RBC AUTO: 101.1 FL (ref 80–94)
MONOCYTES # BLD AUTO: 0.48 X10(3)/MCL (ref 0.1–1.3)
MONOCYTES NFR BLD AUTO: 3.9 %
NEUTROPHILS # BLD AUTO: 11.2 X10(3)/MCL (ref 2.1–9.2)
NEUTROPHILS NFR BLD AUTO: 91.6 %
PHOSPHATE SERPL-MCNC: 3.9 MG/DL (ref 2.3–4.7)
PLATELET # BLD AUTO: 263 X10(3)/MCL (ref 130–400)
PMV BLD AUTO: 10.1 FL (ref 7.4–10.4)
POTASSIUM SERPL-SCNC: 4.7 MMOL/L (ref 3.5–5.1)
PROT SERPL-MCNC: 6.5 GM/DL (ref 5.8–7.6)
RBC # BLD AUTO: 2.7 X10(6)/MCL (ref 4.7–6.1)
SODIUM SERPL-SCNC: 130 MMOL/L (ref 136–145)
WBC # SPEC AUTO: 12.23 X10(3)/MCL (ref 4.5–11.5)

## 2023-11-06 PROCEDURE — 27000221 HC OXYGEN, UP TO 24 HOURS

## 2023-11-06 PROCEDURE — 94760 N-INVAS EAR/PLS OXIMETRY 1: CPT

## 2023-11-06 PROCEDURE — 63600175 PHARM REV CODE 636 W HCPCS: Performed by: INTERNAL MEDICINE

## 2023-11-06 PROCEDURE — 94640 AIRWAY INHALATION TREATMENT: CPT

## 2023-11-06 PROCEDURE — 96376 TX/PRO/DX INJ SAME DRUG ADON: CPT

## 2023-11-06 PROCEDURE — 25000003 PHARM REV CODE 250: Performed by: INTERNAL MEDICINE

## 2023-11-06 PROCEDURE — 94761 N-INVAS EAR/PLS OXIMETRY MLT: CPT

## 2023-11-06 PROCEDURE — 80053 COMPREHEN METABOLIC PANEL: CPT | Performed by: INTERNAL MEDICINE

## 2023-11-06 PROCEDURE — 83735 ASSAY OF MAGNESIUM: CPT | Performed by: INTERNAL MEDICINE

## 2023-11-06 PROCEDURE — G0378 HOSPITAL OBSERVATION PER HR: HCPCS

## 2023-11-06 PROCEDURE — 85025 COMPLETE CBC W/AUTO DIFF WBC: CPT | Performed by: INTERNAL MEDICINE

## 2023-11-06 PROCEDURE — 84100 ASSAY OF PHOSPHORUS: CPT | Performed by: INTERNAL MEDICINE

## 2023-11-06 PROCEDURE — 25000242 PHARM REV CODE 250 ALT 637 W/ HCPCS: Performed by: INTERNAL MEDICINE

## 2023-11-06 RX ORDER — LEVOFLOXACIN 500 MG/1
500 TABLET, FILM COATED ORAL DAILY
Qty: 10 TABLET | Refills: 0 | Status: ON HOLD | OUTPATIENT
Start: 2023-11-06 | End: 2023-11-18 | Stop reason: ALTCHOICE

## 2023-11-06 RX ORDER — PREDNISONE 50 MG/1
50 TABLET ORAL DAILY
Qty: 4 TABLET | Refills: 0 | Status: SHIPPED | OUTPATIENT
Start: 2023-11-07 | End: 2023-11-11

## 2023-11-06 RX ADMIN — TAMSULOSIN HYDROCHLORIDE 0.4 MG: 0.4 CAPSULE ORAL at 09:11

## 2023-11-06 RX ADMIN — PREDNISONE 50 MG: 20 TABLET ORAL at 09:11

## 2023-11-06 RX ADMIN — FOLIC ACID 1 MG: 1 TABLET ORAL at 09:11

## 2023-11-06 RX ADMIN — PANTOPRAZOLE SODIUM 40 MG: 40 TABLET, DELAYED RELEASE ORAL at 09:11

## 2023-11-06 RX ADMIN — GABAPENTIN 300 MG: 300 CAPSULE ORAL at 09:11

## 2023-11-06 RX ADMIN — SODIUM CHLORIDE 20 ML: 9 INJECTION, SOLUTION INTRAVENOUS at 05:11

## 2023-11-06 RX ADMIN — AMIODARONE HYDROCHLORIDE 400 MG: 200 TABLET ORAL at 09:11

## 2023-11-06 RX ADMIN — IPRATROPIUM BROMIDE AND ALBUTEROL SULFATE 3 ML: .5; 3 SOLUTION RESPIRATORY (INHALATION) at 01:11

## 2023-11-06 RX ADMIN — CEFEPIME 2 G: 2 INJECTION, POWDER, FOR SOLUTION INTRAVENOUS at 05:11

## 2023-11-06 RX ADMIN — ASPIRIN 81 MG 81 MG: 81 TABLET ORAL at 09:11

## 2023-11-06 RX ADMIN — CLOPIDOGREL BISULFATE 75 MG: 75 TABLET ORAL at 09:11

## 2023-11-06 RX ADMIN — FAMOTIDINE 20 MG: 20 TABLET ORAL at 09:11

## 2023-11-06 RX ADMIN — ATORVASTATIN CALCIUM 80 MG: 40 TABLET, FILM COATED ORAL at 09:11

## 2023-11-06 RX ADMIN — SENNOSIDES AND DOCUSATE SODIUM 1 TABLET: 50; 8.6 TABLET ORAL at 09:11

## 2023-11-06 RX ADMIN — IPRATROPIUM BROMIDE AND ALBUTEROL SULFATE 3 ML: .5; 3 SOLUTION RESPIRATORY (INHALATION) at 06:11

## 2023-11-06 NOTE — DISCHARGE SUMMARY
"[facility]  HOSPITAL MEDICINE - DISCHARGE SUMMARY    Patient Name: Hemal Tolbert Jr.  MRN: [id number]  Admission Date: 11/4/2023  Discharge Date: 11/06/2023  Hospital Length of Stay: 0 days  Discharge Provider: Tawana Sepulveda MD  Primary Care Provider: Norma, Sigifredo      HOSPITAL COURSE     This is a 63 year old white male who presented  to the ED with "burning up". Patient was seen here 10/23 at [facility] at which time he went into ventricular fibrillation and was defibrillated and normal sinus rhythm and transferred to [facility] in [place]; patient had a heart catheterization which revealed severe coronary artery disease not amenable to surgery; patient was hospitalized until yesterday when he was discharged and he presents today feeling weak with a cough and low-grade fever and chills; he notes lower chest discomfort when he coughs; he denies any fluid retention     The history is provided by the patient and medical records.        He is being admitted to observation under my care with   Hospital-acquired pneumonia.       In the ER it was noted that he had an infiltrate on x-ray right to mid lower lung zone processes.  He also had a white count of [id number].  In the ER he was started on the antibiotics of cefepime 2 g every 8 hours the 1st dose was on 11/04/2023.  He also was started on vancomycin on 11/04/2023 per pharmacy dosing.       Review of systems is positive for fever and chills.  He says it is not more short of breath usual.  As mentioned above he does have a few cough.  He says he does have some chest discomfort when he coughs.  He denies any edema.  He is a smoker.       I will continue antibiotics as well as neb treatments.  I will restart his home meds.  He was meant to have a LifeVest but his insurance did not cover it.  I will consult Cardiology to see if they have any recommendations.   I will also order PT OT.  Lovenox for DVT " prophylaxis. I will also add steroids.     As clarification, on (today) patient should be admitted for hospital observation services under my care.    11/6:  Patient feels much better and does not qualify for home O2.  He can be discharged today on Levaquin and prednisone.  Need to follow up his primary care doctor and Cardiology.      PHYSICAL EXAM     Most Recent Vital Signs:  Temp: 97.8 °F (36.6 °C) (11/06/23 0700)  Pulse: 81 (11/06/23 0901)  Resp: (!) 27 (11/06/23 0654)  BP: (!) 117/58 (11/06/23 0902)  SpO2: (!) 94 % (11/06/23 0700)   GENERAL: In no acute distress, afebrile  HEENT:  CHEST: Clear to auscultation bilaterally  HEART: S1, S2, no appreciable murmur  ABDOMEN: Soft, nontender, BS +  MSK: Warm, no lower extremity edema, no clubbing or cyanosis  NEUROLOGIC: Alert and oriented x4, moving all extremities with good strength   INTEGUMENTARY:  PSYCHIATRY:          DISCHARGE DIAGNOSIS     Active Hospital Problems    Diagnosis  POA    *Hospital-acquired pneumonia [J18.9, Y95]  Yes    Shortness of breath [R06.02]  Yes    Acute on chronic combined systolic and diastolic heart failure [I50.43]  Yes    Primary hypertension [I10]  Yes    COPD (chronic obstructive pulmonary disease) [J44.9]  Yes    CHF (congestive heart failure) [I50.9]  Yes    Alcoholic cirrhosis [K70.30]  Yes      Resolved Hospital Problems   No resolved problems to display.            _____________________________________________________________________________      DISCHARGE MED REC     Current Discharge Medication List        START taking these medications    Details   levoFLOXacin (LEVAQUIN) 500 MG tablet Take 1 tablet (500 mg total) by mouth once daily.  Qty: 10 tablet, Refills: 0      predniSONE (DELTASONE) 50 MG Tab Take 1 tablet (50 mg total) by mouth once daily. for 4 days  Qty: 4 tablet, Refills: 0           CONTINUE these medications which have NOT CHANGED    Details   albuterol (PROVENTIL/VENTOLIN HFA) 90 mcg/actuation inhaler Inhale  1-2 puffs into the lungs every 4 (four) hours as needed.      amiodarone (PACERONE) 200 MG Tab Take 2 tablets (400 mg total) by mouth once daily.  Qty: 60 tablet, Refills: 11      aspirin 81 MG Chew CHEW 1 tablet (81 mg total) by mouth once daily.  Qty: 30 tablet, Refills: 11      atorvastatin (LIPITOR) 80 MG tablet Take 1 tablet (80 mg total) by mouth once daily.  Qty: 90 tablet, Refills: 3      clopidogreL (PLAVIX) 75 mg tablet Take 1 tablet (75 mg total) by mouth once daily.  Qty: 30 tablet, Refills: 11      folic acid (FOLVITE) 1 MG tablet Take 1 tablet (1 mg total) by mouth once daily.  Qty: 90 tablet, Refills: 1    Comments: Further refill to come from your PCP      gabapentin (NEURONTIN) 300 MG capsule Take 300 mg by mouth 3 (three) times daily.      metoprolol succinate (TOPROL-XL) 25 MG 24 hr tablet Take ½ ablet (12.5 mg total) by mouth once daily.  Qty: 15 tablet, Refills: 11    Comments: .      pantoprazole (PROTONIX) 40 MG tablet Take 1 tablet (40 mg total) by mouth 2 (two) times daily.  Qty: 60 tablet, Refills: 2    Comments: Further refill to come from your PCP      sacubitriL-valsartan (ENTRESTO) 24-26 mg per tablet Take 1 tablet by mouth 2 (two) times daily.  Qty: 60 tablet, Refills: 11      spironolactone (ALDACTONE) 25 MG tablet Take 1 tablet (25 mg total) by mouth 2 (two) times daily.  Qty: 60 tablet, Refills: 11    Comments: .      STIOLTO RESPIMAT 2.5-2.5 mcg/actuation Mist Inhale 2 puffs into the lungs once daily.      tamsulosin (FLOMAX) 0.4 mg Cap Take 1 capsule (0.4 mg total) by mouth once daily.  Qty: 30 capsule, Refills: 11      furosemide (LASIX) 40 MG tablet Take 40 mg by mouth once daily.                CONSULTS     Consults (From admission, onward)          Status Ordering Provider     Pharmacy to dose Vancomycin consult  Once        Provider:  (Not yet assigned)   See Ronald for full Linked Orders Report.    Acknowledged OLY CASTRO              FOLLOW UP           DISCHARGE  INSTRUCTIONS     Explained in detail to the patient about the discharge plan, medications, and follow-up visits. Pt understands and agrees with the treatment plan.  Discharged Condition: stable  Diet as tolerated  Activities as tolerated  Discharge to: Home or Self Care    TIME SPENT ON DISCHARGE   35 minutes        Tawana Sepulveda MD  Internal Medicine  Department of Hospital Medicine [facility]      This document was created using electronic dictation services.  Please excuse any errors that may have been made.  Contact me if any questions regarding documentation to clarify verbiage.

## 2023-11-06 NOTE — PLAN OF CARE
"  Problem: Adult Inpatient Plan of Care  Goal: Plan of Care Review  Outcome: Ongoing, Progressing  Flowsheets (Taken 11/6/2023 0944)  Plan of Care Reviewed With: patient  Goal: Patient-Specific Goal (Individualized)  Outcome: Ongoing, Progressing  Flowsheets (Taken 11/6/2023 0944)  Anxieties, Fears or Concerns: Pt states "he feels fine and is ready to go home"  Individualized Care Needs: IV abx, monitor o2 saturation  Goal: Absence of Hospital-Acquired Illness or Injury  Outcome: Ongoing, Progressing  Intervention: Identify and Manage Fall Risk  Flowsheets (Taken 11/6/2023 0944)  Safety Promotion/Fall Prevention:   assistive device/personal item within reach   nonskid shoes/socks when out of bed   side rails raised x 2  Intervention: Prevent Skin Injury  Flowsheets (Taken 11/6/2023 0944)  Body Position: sitting up in bed  Skin Protection:   adhesive use limited   incontinence pads utilized   tubing/devices free from skin contact  Intervention: Prevent and Manage VTE (Venous Thromboembolism) Risk  Flowsheets (Taken 11/6/2023 0944)  Activity Management: Ambulated to bathroom - L4  VTE Prevention/Management:   bleeding precations maintained   bleeding risk assessed   ambulation promoted   fluids promoted  Range of Motion: active ROM (range of motion) encouraged  Intervention: Prevent Infection  Flowsheets (Taken 11/6/2023 0944)  Infection Prevention:   cohorting utilized   environmental surveillance performed   hand hygiene promoted   single patient room provided  Goal: Optimal Comfort and Wellbeing  Outcome: Ongoing, Progressing  Intervention: Monitor Pain and Promote Comfort  Flowsheets (Taken 11/6/2023 0944)  Pain Management Interventions:   care clustered   position adjusted   pillow support provided  Intervention: Provide Person-Centered Care  Flowsheets (Taken 11/6/2023 0944)  Trust Relationship/Rapport:   care explained   questions answered   choices provided   questions encouraged   emotional support provided   " reassurance provided   empathic listening provided   thoughts/feelings acknowledged  Goal: Readiness for Transition of Care  Outcome: Ongoing, Progressing  Intervention: Mutually Develop Transition Plan  Flowsheets (Taken 11/6/2023 0944)  Communicated SADI with patient/caregiver: Date not available/Unable to determine     Problem: Fluid Imbalance (Pneumonia)  Goal: Fluid Balance  Outcome: Ongoing, Progressing  Intervention: Monitor and Manage Fluid Balance  Flowsheets (Taken 11/6/2023 0944)  Fluid/Electrolyte Management: fluids provided     Problem: Infection (Pneumonia)  Goal: Resolution of Infection Signs and Symptoms  Outcome: Ongoing, Progressing  Intervention: Prevent Infection Progression  Flowsheets (Taken 11/6/2023 0944)  Fever Reduction/Comfort Measures:   lightweight clothing   lightweight bedding  Infection Management: aseptic technique maintained  Isolation Precautions: precautions maintained     Problem: Respiratory Compromise (Pneumonia)  Goal: Effective Oxygenation and Ventilation  Outcome: Ongoing, Progressing  Intervention: Promote Airway Secretion Clearance  Flowsheets (Taken 11/6/2023 0944)  Breathing Techniques/Airway Clearance: deep/controlled cough encouraged  Cough And Deep Breathing: done independently per patient  Intervention: Optimize Oxygenation and Ventilation  Flowsheets (Taken 11/6/2023 0944)  Airway/Ventilation Management: airway patency maintained  Head of Bed (HOB) Positioning: HOB at 60 degrees     Problem: Fall Injury Risk  Goal: Absence of Fall and Fall-Related Injury  Outcome: Ongoing, Progressing  Intervention: Identify and Manage Contributors  Flowsheets (Taken 11/6/2023 0944)  Self-Care Promotion:   independence encouraged   BADL personal objects within reach   BADL personal routines maintained   safe use of adaptive equipment encouraged  Medication Review/Management:   medications reviewed   high-risk medications identified  Intervention: Promote Injury-Free  Environment  Flowsheets (Taken 11/6/2023 0944)  Safety Promotion/Fall Prevention:   assistive device/personal item within reach   nonskid shoes/socks when out of bed   side rails raised x 2     Problem: Gas Exchange Impaired  Goal: Optimal Gas Exchange  Outcome: Ongoing, Progressing  Intervention: Optimize Oxygenation and Ventilation  Flowsheets (Taken 11/6/2023 0944)  Airway/Ventilation Management: airway patency maintained  Head of Bed (HOB) Positioning: HOB at 60 degrees     Problem: Cardiac Output Decreased  Goal: Effective Cardiac Output  Outcome: Ongoing, Progressing  Intervention: Optimize Cardiac Output  Flowsheets (Taken 11/6/2023 0944)  VTE Prevention/Management:   bleeding precations maintained   bleeding risk assessed   ambulation promoted   fluids promoted  Head of Bed (HOB) Positioning: HOB at 60 degrees  Environmental Support:   calm environment promoted   caregiver consistency promoted   environmental consistency promoted   personal routine supported

## 2023-11-06 NOTE — PROGRESS NOTES
Discharge instructions explained to patient. All questions answered at this time. IV removed, catheter intact. Pt wheeled to personal vehicle with all belongings.

## 2023-11-06 NOTE — PLAN OF CARE
Problem: Adult Inpatient Plan of Care  Goal: Plan of Care Review  Outcome: Ongoing, Progressing  Flowsheets (Taken 11/5/2023 1957)  Plan of Care Reviewed With: patient  Goal: Patient-Specific Goal (Individualized)  Outcome: Ongoing, Progressing  Flowsheets (Taken 11/5/2023 1957)  Individualized Care Needs: IV abx, monitor O2 sats  Goal: Absence of Hospital-Acquired Illness or Injury  Outcome: Ongoing, Progressing  Intervention: Identify and Manage Fall Risk  Flowsheets (Taken 11/5/2023 1957)  Safety Promotion/Fall Prevention:   assistive device/personal item within reach   bed alarm set   Fall Risk reviewed with patient/family   muscle strengthening facilitated   nonskid shoes/socks when out of bed   side rails raised x 3   supervised activity   Supervised toileting - stay within arms reach   instructed to call staff for mobility  Intervention: Prevent Skin Injury  Flowsheets (Taken 11/5/2023 1957)  Body Position: position changed independently  Intervention: Prevent and Manage VTE (Venous Thromboembolism) Risk  Flowsheets (Taken 11/5/2023 1957)  Activity Management: Ambulated to bathroom - L4  VTE Prevention/Management:   ambulation promoted   bleeding precations maintained  Intervention: Prevent Infection  Flowsheets (Taken 11/5/2023 1957)  Infection Prevention:   cohorting utilized   environmental surveillance performed   hand hygiene promoted   single patient room provided   rest/sleep promoted  Goal: Optimal Comfort and Wellbeing  Outcome: Ongoing, Progressing  Intervention: Monitor Pain and Promote Comfort  Flowsheets (Taken 11/5/2023 1957)  Pain Management Interventions:   care clustered   pillow support provided   quiet environment facilitated  Intervention: Provide Person-Centered Care  Flowsheets (Taken 11/5/2023 1957)  Trust Relationship/Rapport:   care explained   questions answered   questions encouraged   thoughts/feelings acknowledged     Problem: Infection (Pneumonia)  Goal: Resolution of  Infection Signs and Symptoms  Outcome: Ongoing, Progressing  Intervention: Prevent Infection Progression  Flowsheets (Taken 11/5/2023 1957)  Fever Reduction/Comfort Measures:   lightweight clothing   lightweight bedding  Infection Management: aseptic technique maintained     Problem: Fall Injury Risk  Goal: Absence of Fall and Fall-Related Injury  Outcome: Ongoing, Progressing  Intervention: Identify and Manage Contributors  Flowsheets (Taken 11/5/2023 1957)  Self-Care Promotion:   independence encouraged   BADL personal objects within reach   BADL personal routines maintained   safe use of adaptive equipment encouraged  Intervention: Promote Injury-Free Environment  Flowsheets (Taken 11/5/2023 1957)  Safety Promotion/Fall Prevention:   assistive device/personal item within reach   bed alarm set   Fall Risk reviewed with patient/family   muscle strengthening facilitated   nonskid shoes/socks when out of bed   side rails raised x 3   supervised activity   Supervised toileting - stay within arms reach   instructed to call staff for mobility     Problem: Gas Exchange Impaired  Goal: Optimal Gas Exchange  Outcome: Ongoing, Progressing  Intervention: Optimize Oxygenation and Ventilation  Flowsheets (Taken 11/5/2023 1957)  Airway/Ventilation Management: airway patency maintained  Head of Bed (HOB) Positioning: HOB at 20-30 degrees     Problem: Cardiac Output Decreased  Goal: Effective Cardiac Output  Outcome: Ongoing, Progressing  Intervention: Optimize Cardiac Output  Flowsheets (Taken 11/5/2023 1957)  VTE Prevention/Management:   ambulation promoted   bleeding precations maintained  Head of Bed (HOB) Positioning: HOB at 20-30 degrees  Environmental Support:   calm environment promoted   caregiver consistency promoted   environmental consistency promoted   personal routine supported

## 2023-11-07 ENCOUNTER — PATIENT OUTREACH (OUTPATIENT)
Dept: ADMINISTRATIVE | Facility: CLINIC | Age: 63
End: 2023-11-07
Payer: MEDICAID

## 2023-11-07 NOTE — PROGRESS NOTES
C3 nurse attempted to contact Hemal Tolbert Jr. for a TCC post hospital discharge follow up call. No answer. Left voicemail with callback information. The patient has a scheduled HOS appointment with  Ivette Hearn FNP (Family Medicine) on 11/13/23 @ 9:30.

## 2023-11-08 NOTE — PROGRESS NOTES
C3 nurse spoke with Hemal Tolbert Jr. for a TCC post hospital discharge follow up call. The patient has a scheduled [facility] appointment with  Ivette Hearn FNP (Family Medicine) on 11/13/23 @ 9:30.

## 2023-11-10 LAB
BACTERIA BLD CULT: NORMAL
BACTERIA BLD CULT: NORMAL

## 2023-11-12 NOTE — PLAN OF CARE
[facility]  Discharge Final Note    Primary Care Provider: Sigifredo Green    Expected Discharge Date: 11/6/2023    Final Discharge Note (most recent)       Final Note - 11/12/23 1413          Final Note    Assessment Type Final Discharge Note     Anticipated Discharge Disposition Home-Health Care [facility]     What phone number can be called within the next 1-3 days to see how you are doing after discharge? [phone]     Hospital Resources/Appts/Education Provided Provided patient/caregiver with written discharge plan information        Post-Acute Status    Post-Acute Authorization Home Health     Home Health Status Set-up Complete/Auth obtained     Discharge Delays None known at this time                     Important Message from Medicare             Contact Info       [facility]   Specialty: Home Health Services, Home Therapy Services, Home Living Aide Services    [address] [facility] [address]   Phone: [phone]       Next Steps: Follow up    Ivette Hearn FNP   Specialty: Family [address] [address]   Phone: [phone]       Next Steps: Go on 11/13/2023    Instructions: Follow up appointment with LINA Sears on  Monday, November 13, 2023 @ 9:30 am.  Spoke to Coretta Rob MD   Specialty: Cardiovascular Disease, Cardiology    [address] [facility] [address]   Phone: [phone]       Next Steps: Call    Instructions: Referral / Consult sent to Coretta Lira MD's office and they will call the patient with an appointment date and time.  Spoke to Nieves

## 2023-11-13 ENCOUNTER — OFFICE VISIT (OUTPATIENT)
Dept: FAMILY MEDICINE | Facility: CLINIC | Age: 63
End: 2023-11-13
Payer: MEDICAID

## 2023-11-13 VITALS
BODY MASS INDEX: 27.37 KG/M2 | HEART RATE: 76 BPM | HEIGHT: 68 IN | TEMPERATURE: 98 F | OXYGEN SATURATION: 98 % | RESPIRATION RATE: 16 BRPM | SYSTOLIC BLOOD PRESSURE: 119 MMHG | WEIGHT: 180.63 LBS | DIASTOLIC BLOOD PRESSURE: 67 MMHG

## 2023-11-13 DIAGNOSIS — Z09 HOSPITAL DISCHARGE FOLLOW-UP: Primary | ICD-10-CM

## 2023-11-13 DIAGNOSIS — I25.110 CORONARY ARTERY DISEASE INVOLVING NATIVE CORONARY ARTERY OF NATIVE HEART WITH UNSTABLE ANGINA PECTORIS: ICD-10-CM

## 2023-11-13 DIAGNOSIS — I50.9 CONGESTIVE HEART FAILURE, UNSPECIFIED HF CHRONICITY, UNSPECIFIED HEART FAILURE TYPE: ICD-10-CM

## 2023-11-13 PROCEDURE — 3078F PR MOST RECENT DIASTOLIC BLOOD PRESSURE < 80 MM HG: ICD-10-PCS | Mod: CPTII,,, | Performed by: NURSE PRACTITIONER

## 2023-11-13 PROCEDURE — 3074F SYST BP LT 130 MM HG: CPT | Mod: CPTII,,, | Performed by: NURSE PRACTITIONER

## 2023-11-13 PROCEDURE — 1111F DSCHRG MED/CURRENT MED MERGE: CPT | Mod: CPTII,,, | Performed by: NURSE PRACTITIONER

## 2023-11-13 PROCEDURE — 4010F PR ACE/ARB THEARPY RXD/TAKEN: ICD-10-PCS | Mod: CPTII,,, | Performed by: NURSE PRACTITIONER

## 2023-11-13 PROCEDURE — 3074F PR MOST RECENT SYSTOLIC BLOOD PRESSURE < 130 MM HG: ICD-10-PCS | Mod: CPTII,,, | Performed by: NURSE PRACTITIONER

## 2023-11-13 PROCEDURE — 4010F ACE/ARB THERAPY RXD/TAKEN: CPT | Mod: CPTII,,, | Performed by: NURSE PRACTITIONER

## 2023-11-13 PROCEDURE — 1159F MED LIST DOCD IN RCRD: CPT | Mod: CPTII,,, | Performed by: NURSE PRACTITIONER

## 2023-11-13 PROCEDURE — 1111F PR DISCHARGE MEDS RECONCILED W/ CURRENT OUTPATIENT MED LIST: ICD-10-PCS | Mod: CPTII,,, | Performed by: NURSE PRACTITIONER

## 2023-11-13 PROCEDURE — 3078F DIAST BP <80 MM HG: CPT | Mod: CPTII,,, | Performed by: NURSE PRACTITIONER

## 2023-11-13 PROCEDURE — 99214 PR OFFICE/OUTPT VISIT, EST, LEVL IV, 30-39 MIN: ICD-10-PCS | Mod: ,,, | Performed by: NURSE PRACTITIONER

## 2023-11-13 PROCEDURE — 1159F PR MEDICATION LIST DOCUMENTED IN MEDICAL RECORD: ICD-10-PCS | Mod: CPTII,,, | Performed by: NURSE PRACTITIONER

## 2023-11-13 PROCEDURE — 99214 OFFICE O/P EST MOD 30 MIN: CPT | Mod: ,,, | Performed by: NURSE PRACTITIONER

## 2023-11-13 NOTE — PROGRESS NOTES
SUBJECTIVE:     History of Present Illness      Chief Complaint: Establish Care (Hospital follow up for hospital acquired pneumonia (initially went for 2 MI s).  No stents or open heart just angiogram. Using [facility] (Ludmila Cabrera Rn his nurse).)    HPI:  Patient is a 63 y.o. year old male who presents to clinic for hospital follow up to establish care as a new patient.  Patient has recently seen in the hospital for pneumonia at that time he was noted to have to severe coronary artery disease    Review of Systems:    Review of Systems    12 point review of systems conducted, negative except as stated in the history of present illness. See HPI for details.     Previous History      Review of patient's allergies indicates:  No Known Allergies    Past Medical History:   Diagnosis Date    Alcoholic cirrhosis     CHF (congestive heart failure)     LV EF 40%    COPD (chronic obstructive pulmonary disease)     Hypertension     Myocardial infarction     Peripheral artery disease     with stents    Sleep apnea      Current Outpatient Medications   Medication Instructions    albuterol (PROVENTIL/VENTOLIN HFA) 90 mcg/actuation inhaler 1-2 puffs, Inhalation, Every 4 hours PRN    amiodarone (PACERONE) 400 mg, Oral, Daily    aspirin 81 MG Chew CHEW 1 tablet (81 mg total) by mouth once daily.    atorvastatin (LIPITOR) 80 mg, Oral, Daily    clopidogreL (PLAVIX) 75 mg, Oral, Daily    folic acid (FOLVITE) 1 mg, Oral, Daily    furosemide (LASIX) 40 mg, Oral, Daily    gabapentin (NEURONTIN) 300 mg, Oral, 3 times daily    levoFLOXacin (LEVAQUIN) 500 mg, Oral, Daily    metoprolol succinate (TOPROL-XL) 25 MG 24 hr tablet Take ½ ablet (12.5 mg total) by mouth once daily.    pantoprazole (PROTONIX) 40 mg, Oral, 2 times daily    sacubitriL-valsartan (ENTRESTO) 24-26 mg per tablet 1 tablet, Oral, 2 times daily    spironolactone (ALDACTONE) 25 mg, Oral, 2 times daily    STIOLTO RESPIMAT 2.5-2.5 mcg/actuation Mist 2  puffs, Inhalation, Daily    tamsulosin (FLOMAX) 0.4 mg, Oral, Daily     Past Surgical History:   Procedure Laterality Date    ANGIOGRAM, ABDOMINAL AORTA  10/24/2023    Procedure: Angiogram, Abdominal Aorta;  Surgeon: Janette Ernandez MD;  Location: [facility] CATH LAB;  Service: Cardiology;;    ARTERIOGRAPHY OF SUBCLAVIAN ARTERY Left 10/24/2023    Procedure: ARTERIOGRAM, SUBCLAVIAN;  Surgeon: Janette Ernandez MD;  Location: [facility] CATH LAB;  Service: Cardiology;  Laterality: Left;    CATHETERIZATION OF BOTH LEFT AND RIGHT HEART N/A 10/24/2023    Procedure: CATHETERIZATION, HEART, BOTH LEFT AND RIGHT;  Surgeon: Janette Ernandez MD;  Location: [facility] CATH LAB;  Service: Cardiology;  Laterality: N/A;    COLONOSCOPY N/A 6/10/2023    Procedure: COLONOSCOPY;  Surgeon: Johan Flower MD;  Location: [facility] OR;  Service: Gastroenterology;  Laterality: N/A;    ESOPHAGOGASTRODUODENOSCOPY N/A 6/9/2023    Procedure: EGD;  Surgeon: Tima Teixeira MD;  Location: [facility] ENDOSCOPY;  Service: Gastroenterology;  Laterality: N/A;    INSERTION OF INTRA-AORTIC BALLOON ASSIST DEVICE  10/24/2023    Procedure: INSERTION, INTRA-AORTIC BALLOON PUMP;  Surgeon: Janette Ernandez MD;  Location: [facility] CATH LAB;  Service: Cardiology;;    INSERTION OF INTRAVASCULAR MICROAXIAL BLOOD PUMP N/A 10/26/2023    Procedure: INSERTION, IMPELLA;  Surgeon: Janette Ernandez MD;  Location: [facility] CATH LAB;  Service: Cardiology;  Laterality: N/A;    INSTANTANEOUS WAVE-FREE RATIO (IFR) N/A 10/26/2023    Procedure: Instantaneous Wave-Free Ratio (IFR);  Surgeon: Janette Ernandez MD;  Location: [facility] CATH LAB;  Service: Cardiology;  Laterality: N/A;    INTRAVASCULAR ULTRASOUND, CORONARY N/A 10/26/2023    Procedure: Ultrasound-coronary;  Surgeon: Janette Ernandez MD;  Location: [facility] CATH LAB;  Service: Cardiology;  Laterality: N/A;    PERCUTANEOUS CORONARY INTERVENTION, ARTERY N/A 10/26/2023    Procedure: Percutaneous coronary intervention- with Impella;  Surgeon: Awais  "Janette BARNETT MD;  Location: [facility] CATH LAB;  Service: Cardiology;  Laterality: N/A;    PLACEMENT, IABP  10/24/2023    Procedure: Placement, IABP;  Surgeon: Janette Ernandez MD;  Location: [facility] CATH LAB;  Service: Cardiology;;     History reviewed. No pertinent family history.    Social History     Tobacco Use    Smoking status: Every Day     Current packs/day: 1.00     Types: Cigarettes    Smokeless tobacco: Never   Substance Use Topics    Alcohol use: Yes     Alcohol/week: 24.0 standard drinks of alcohol     Types: 24 Cans of beer per week     Comment: Refused to quit in June 23    Drug use: Yes     Types: Marijuana        Health Maintenance      Health Maintenance   Topic Date Due    TETANUS VACCINE  Never done    Shingles Vaccine (1 of 2) Never done    Lipid Panel  10/24/2028    Colorectal Cancer Screening  06/10/2033    Hepatitis C Screening  Completed       OBJECTIVE:     Physical Exam      Vital Signs Reviewed   Visit Vitals  /67 (BP Location: Left arm, Patient Position: Sitting)   Pulse 76   Temp 97.9 °F (36.6 °C) (Oral)   Resp 16   Ht 5' 8" (1.727 m)   Wt 81.9 kg (180 lb 9.6 oz)   SpO2 98%   BMI 27.46 kg/m²       Physical Exam    Physical Exam:  General: Alert, well nourished, no acute distress, non-toxic appearing.   Eyes: Anicteric sclera, without conjunctival injection, normal lids, no purulent drainage, EOMs grossly intact.   Ears: No tragal tenderness. Tympanic membranes intact, pearly grey, without effusion or erythema and with a positive light reflex.   Mouth: Posterior pharynx without erythema. No exudate, ulcerations, or lesion. No tonsillar swelling.   Neck: Supple, full ROM, no rigidity, no cervical adenopathy.   Cardio: Normal rate and rhythm    Resp: Respirations even and unlabored, clear to auscultation bilaterally.   Abd: No ecchymosis or distension. Normal bowel sounds in all 4 quadrants. No tenderness to palpation. No rebound tenderness or guarding. No CVA tenderness.   Skin: No rashes or " open lesions noted.   MSK: No swelling. No abrasions or signs of trauma. Ambulating without assistance.   Neuro: Alert,oriented No focal deficits noted. Facial expressions even.   Psych: Cooperative, Normal affect      Procedures    Procedures     Labs     Results for orders placed or performed during the hospital encounter of 11/04/23   Blood culture #1 **CANNOT BE ORDERED STAT**    Specimen: Blood   Result Value Ref Range    CULTURE, BLOOD ([facility]) No Growth at 5 days    Blood culture #2 **CANNOT BE ORDERED STAT**    Specimen: Blood   Result Value Ref Range    CULTURE, BLOOD ([facility]) No Growth at 5 days    Brain natriuretic peptide   Result Value Ref Range    Natriuretic Peptide 1,118.0 (H) <=100.0 pg/mL   Comprehensive metabolic panel   Result Value Ref Range    Sodium Level 133 (L) 136 - 145 mmol/L    Potassium Level 4.7 3.5 - 5.1 mmol/L    Chloride 103 98 - 107 mmol/L    Carbon Dioxide 21 (L) 23 - 31 mmol/L    Glucose Level 87 82 - 115 mg/dL    Blood Urea Nitrogen 21.1 8.4 - 25.7 mg/dL    Creatinine 1.24 (H) 0.73 - 1.18 mg/dL    Calcium Level Total 8.6 (L) 8.8 - 10.0 mg/dL    Protein Total 7.1 5.8 - 7.6 gm/dL    Albumin Level 2.9 (L) 3.4 - 4.8 g/dL    Globulin 4.2 (H) 2.4 - 3.5 gm/dL    Albumin/Globulin Ratio 0.7 (L) 1.1 - 2.0 ratio    Bilirubin Total 0.6 <=1.5 mg/dL    Alkaline Phosphatase 42 40 - 150 unit/L    Alanine Aminotransferase 14 0 - 55 unit/L    Aspartate Aminotransferase 18 5 - 34 unit/L    eGFR >60 mls/min/1.73/m2   Magnesium   Result Value Ref Range    Magnesium Level 2.00 1.60 - 2.60 mg/dL   COVID/FLU A&B PCR   Result Value Ref Range    Influenza A PCR Not Detected Not Detected    Influenza B PCR Not Detected Not Detected    SARS-CoV-2 PCR Not Detected Not Detected, Negative, Invalid   D dimer, quantitative   Result Value Ref Range    D-Dimer 3.12 (H) 0.00 - 0.50 ug/mL FEU   CBC with Differential   Result Value Ref Range    WBC 18.07 (H) 4.50 - 11.50 x10(3)/mcL    RBC 2.86 (L) 4.70 - 6.10 x10(6)/mcL     Hgb 8.9 (L) 14.0 - 18.0 g/dL    Hct 29.4 (L) 42.0 - 52.0 %    .8 (H) 80.0 - 94.0 fL    MCH 31.1 (H) 27.0 - 31.0 pg    MCHC 30.3 (L) 33.0 - 36.0 g/dL    RDW 13.6 11.5 - 17.0 %    Platelet 306 130 - 400 x10(3)/mcL    MPV 9.5 7.4 - 10.4 fL    Neut % 83.0 %    Lymph % 7.7 %    Mono % 7.9 %    Eos % 0.5 %    Basophil % 0.1 %    Lymph # 1.40 0.6 - 4.6 x10(3)/mcL    Neut # 15.00 (H) 2.1 - 9.2 x10(3)/mcL    Mono # 1.42 (H) 0.1 - 1.3 x10(3)/mcL    Eos # 0.09 0 - 0.9 x10(3)/mcL    Baso # 0.02 <=0.2 x10(3)/mcL    IG# 0.14 (H) 0 - 0.04 x10(3)/mcL    IG% 0.8 %   Troponin ISTAT   Result Value Ref Range    POC Cardiac Troponin I 0.15 (H) 0.00 - 0.08 ng/mL    Sample unknown    Lactic acid, plasma   Result Value Ref Range    Lactic Acid Level 0.7 0.5 - 2.2 mmol/L   Comprehensive Metabolic Panel   Result Value Ref Range    Sodium Level 130 (L) 136 - 145 mmol/L    Potassium Level 4.7 3.5 - 5.1 mmol/L    Chloride 102 98 - 107 mmol/L    Carbon Dioxide 20 (L) 23 - 31 mmol/L    Glucose Level 155 (H) 82 - 115 mg/dL    Blood Urea Nitrogen 21.7 8.4 - 25.7 mg/dL    Creatinine 1.30 (H) 0.73 - 1.18 mg/dL    Calcium Level Total 8.2 (L) 8.8 - 10.0 mg/dL    Protein Total 6.5 5.8 - 7.6 gm/dL    Albumin Level 2.6 (L) 3.4 - 4.8 g/dL    Globulin 3.9 (H) 2.4 - 3.5 gm/dL    Albumin/Globulin Ratio 0.7 (L) 1.1 - 2.0 ratio    Bilirubin Total 0.3 <=1.5 mg/dL    Alkaline Phosphatase 39 (L) 40 - 150 unit/L    Alanine Aminotransferase 14 0 - 55 unit/L    Aspartate Aminotransferase 14 5 - 34 unit/L    eGFR >60 mls/min/1.73/m2   Magnesium   Result Value Ref Range    Magnesium Level 2.10 1.60 - 2.60 mg/dL   Phosphorus   Result Value Ref Range    Phosphorus Level 3.9 2.3 - 4.7 mg/dL   CBC with Differential   Result Value Ref Range    WBC 12.23 (H) 4.50 - 11.50 x10(3)/mcL    RBC 2.70 (L) 4.70 - 6.10 x10(6)/mcL    Hgb 8.5 (L) 14.0 - 18.0 g/dL    Hct 27.3 (L) 42.0 - 52.0 %    .1 (H) 80.0 - 94.0 fL    MCH 31.5 (H) 27.0 - 31.0 pg    MCHC 31.1 (L) 33.0 -  36.0 g/dL    RDW 13.6 11.5 - 17.0 %    Platelet 263 130 - 400 x10(3)/mcL    MPV 10.1 7.4 - 10.4 fL    Neut % 91.6 %    Lymph % 3.5 %    Mono % 3.9 %    Eos % 0.0 %    Basophil % 0.3 %    Lymph # 0.43 (L) 0.6 - 4.6 x10(3)/mcL    Neut # 11.20 (H) 2.1 - 9.2 x10(3)/mcL    Mono # 0.48 0.1 - 1.3 x10(3)/mcL    Eos # 0.00 0 - 0.9 x10(3)/mcL    Baso # 0.04 <=0.2 x10(3)/mcL    IG# 0.08 (H) 0 - 0.04 x10(3)/mcL    IG% 0.7 %       Chemistry:  Lab Results   Component Value Date     (L) 11/06/2023    K 4.7 11/06/2023    CHLORIDE 102 11/06/2023    BUN 21.7 11/06/2023    CREATININE 1.30 (H) 11/06/2023    EGFRNORACEVR >60 11/06/2023    GLUCOSE 155 (H) 11/06/2023    CALCIUM 8.2 (L) 11/06/2023    ALKPHOS 39 (L) 11/06/2023    LABPROT 6.5 11/06/2023    ALBUMIN 2.6 (L) 11/06/2023    BILIDIR 0.2 10/26/2023    IBILI 0.50 12/22/2020    AST 14 11/06/2023    ALT 14 11/06/2023    MG 2.10 11/06/2023    PHOS 3.9 11/06/2023    [id number] 15.2 (L) 12/22/2020    TSH 2.666 10/23/2023    DSGKED7GCPC 0.92 06/07/2023    PSA 0.6 03/06/2019        Lab Results   Component Value Date    HGBA1C 4.6 12/22/2020        Hematology:  Lab Results   Component Value Date    WBC 12.23 (H) 11/06/2023    HGB 8.5 (L) 11/06/2023    HCT 27.3 (L) 11/06/2023     11/06/2023       Lipid Panel:  Lab Results   Component Value Date    CHOL 136 10/24/2023    HDL 35 (L) 10/24/2023    LDL 44.00 (L) 06/10/2023    TRIG 88 10/24/2023    TOTALCHOLEST 3.9 10/24/2023        Urine:  Lab Results   Component Value Date    COLORUA Yellow 06/06/2023    APPEARANCEUA Clear 10/24/2023    APPEARANCEUA Clear 10/24/2023    SGUA 1.015 06/06/2023    PHUA 6.5 06/06/2023    PROTEINUA Negative 10/24/2023    PROTEINUA Negative 10/24/2023    GLUCOSEUA Negative 06/06/2023    KETONESUA Negative 06/06/2023    BLOODUA Negative 06/06/2023    NITRITESUA Negative 06/06/2023    LEUKOCYTESUR Negative 10/24/2023    LEUKOCYTESUR Negative 10/24/2023    RBCUA None Seen 06/06/2023    WBCUA None Seen  06/06/2023    BACTERIA None Seen 06/06/2023    SQEPUA 1-2 12/22/2020    HYALINECASTS 0-2 (A) 12/22/2020    CREATRANDUR 142.7 10/24/2023         Assessment            ICD-10-CM ICD-9-CM   1. Hospital discharge follow-up  Z09 V67.59   2. Congestive heart failure, unspecified HF chronicity, unspecified heart failure type  I50.9 428.0   3. Coronary artery disease involving native coronary artery of native heart with unstable angina pectoris  I25.110 414.01     411.1       Plan       1. Hospital discharge follow-up    2. Congestive heart failure, unspecified HF chronicity, unspecified heart failure type  Stable continue recommendations per cardiologist.  Patient has a appointment November 17th with Dr. Lira at the [facility].  Phone number given to patient today during clinic  3. Coronary artery disease involving native coronary artery of native heart with unstable angina pectoris  Stable         Medication List with Changes/Refills   Current Medications    ALBUTEROL (PROVENTIL/VENTOLIN HFA) 90 MCG/ACTUATION INHALER    Inhale 1-2 puffs into the lungs every 4 (four) hours as needed.    AMIODARONE (PACERONE) 200 MG TAB    Take 2 tablets (400 mg total) by mouth once daily.    ASPIRIN 81 MG CHEW    CHEW 1 tablet (81 mg total) by mouth once daily.    ATORVASTATIN (LIPITOR) 80 MG TABLET    Take 1 tablet (80 mg total) by mouth once daily.    CLOPIDOGREL (PLAVIX) 75 MG TABLET    Take 1 tablet (75 mg total) by mouth once daily.    FOLIC ACID (FOLVITE) 1 MG TABLET    Take 1 tablet (1 mg total) by mouth once daily.    FUROSEMIDE (LASIX) 40 MG TABLET    Take 40 mg by mouth once daily.    GABAPENTIN (NEURONTIN) 300 MG CAPSULE    Take 300 mg by mouth 3 (three) times daily.    LEVOFLOXACIN (LEVAQUIN) 500 MG TABLET    Take 1 tablet (500 mg total) by mouth once daily.    METOPROLOL SUCCINATE (TOPROL-XL) 25 MG 24 HR TABLET    Take ½ ablet (12.5 mg total) by mouth once daily.    PANTOPRAZOLE (PROTONIX) 40 MG TABLET    Take 1  tablet (40 mg total) by mouth 2 (two) times daily.    SACUBITRIL-VALSARTAN (ENTRESTO) 24-26 MG PER TABLET    Take 1 tablet by mouth 2 (two) times daily.    SPIRONOLACTONE (ALDACTONE) 25 MG TABLET    Take 1 tablet (25 mg total) by mouth 2 (two) times daily.    STIOLTO RESPIMAT 2.5-2.5 MCG/ACTUATION MIST    Inhale 2 puffs into the lungs once daily.    TAMSULOSIN (FLOMAX) 0.4 MG CAP    Take 1 capsule (0.4 mg total) by mouth once daily.       Follow up in about 5 weeks (around 12/18/2023).   Follow up in about 5 weeks (around 12/18/2023). In addition to their scheduled follow up, the patient has also been instructed to follow up on as needed basis.   Future Appointments   Date Time Provider Department Center   11/28/2023  7:50 AM Jessica Sierra DO [facility]    12/7/2023 12:45 PM Beatrice Yap PA-C [facility]    12/20/2023 10:00 AM Ivette Hearn, CONNIEP [facility]

## 2023-11-18 ENCOUNTER — HOSPITAL ENCOUNTER (INPATIENT)
Facility: HOSPITAL | Age: 63
LOS: 3 days | Discharge: HOME | DRG: 378 | End: 2023-11-21
Attending: INTERNAL MEDICINE | Admitting: INTERNAL MEDICINE
Payer: MEDICAID

## 2023-11-18 ENCOUNTER — HOSPITAL ENCOUNTER (EMERGENCY)
Facility: HOSPITAL | Age: 63
Discharge: ANOTHER HOSPITAL | End: 2023-11-18
Attending: STUDENT IN AN ORGANIZED HEALTH CARE EDUCATION/TRAINING PROGRAM
Payer: MEDICAID

## 2023-11-18 VITALS
HEIGHT: 69 IN | BODY MASS INDEX: 26.51 KG/M2 | TEMPERATURE: 99 F | SYSTOLIC BLOOD PRESSURE: 97 MMHG | HEART RATE: 71 BPM | OXYGEN SATURATION: 96 % | WEIGHT: 179 LBS | RESPIRATION RATE: 22 BRPM | DIASTOLIC BLOOD PRESSURE: 58 MMHG

## 2023-11-18 DIAGNOSIS — R53.83 FATIGUE: ICD-10-CM

## 2023-11-18 DIAGNOSIS — E87.1 HYPONATREMIA: ICD-10-CM

## 2023-11-18 DIAGNOSIS — R53.1 WEAKNESS: ICD-10-CM

## 2023-11-18 DIAGNOSIS — K92.2 GIB (GASTROINTESTINAL BLEEDING): ICD-10-CM

## 2023-11-18 DIAGNOSIS — D64.9 SYMPTOMATIC ANEMIA: Primary | ICD-10-CM

## 2023-11-18 DIAGNOSIS — K92.2 GASTROINTESTINAL HEMORRHAGE, UNSPECIFIED GASTROINTESTINAL HEMORRHAGE TYPE: ICD-10-CM

## 2023-11-18 DIAGNOSIS — R07.9 CHEST PAIN: ICD-10-CM

## 2023-11-18 DIAGNOSIS — N17.9 AKI (ACUTE KIDNEY INJURY): ICD-10-CM

## 2023-11-18 LAB
ABO + RH BLD: NORMAL
ALBUMIN SERPL-MCNC: 3 G/DL (ref 3.4–4.8)
ALBUMIN/GLOB SERPL: 0.7 RATIO (ref 1.1–2)
ALP SERPL-CCNC: 54 UNIT/L (ref 40–150)
ALT SERPL-CCNC: 14 UNIT/L (ref 0–55)
APPEARANCE UR: CLEAR
APTT PPP: 20.7 SECONDS (ref 23.2–33.7)
AST SERPL-CCNC: 19 UNIT/L (ref 5–34)
BACTERIA #/AREA URNS AUTO: NORMAL /HPF
BASOPHILS # BLD AUTO: 0.03 X10(3)/MCL
BASOPHILS NFR BLD AUTO: 0.4 %
BILIRUB SERPL-MCNC: 0.3 MG/DL
BILIRUB UR QL STRIP.AUTO: NEGATIVE
BLD PROD TYP BPU: NORMAL
BLOOD UNIT EXPIRATION DATE: NORMAL
BLOOD UNIT TYPE CODE: 6200
BNP BLD-MCNC: 483.8 PG/ML
BUN SERPL-MCNC: 23.6 MG/DL (ref 8.4–25.7)
CALCIUM SERPL-MCNC: 8.6 MG/DL (ref 8.8–10)
CHLORIDE SERPL-SCNC: 96 MMOL/L (ref 98–107)
CK SERPL-CCNC: 37 U/L (ref 30–200)
CO2 SERPL-SCNC: 20 MMOL/L (ref 23–31)
COLOR UR AUTO: YELLOW
CREAT SERPL-MCNC: 1.64 MG/DL (ref 0.73–1.18)
CROSSMATCH INTERPRETATION: NORMAL
DISPENSE STATUS: NORMAL
EOSINOPHIL # BLD AUTO: 0.08 X10(3)/MCL (ref 0–0.9)
EOSINOPHIL NFR BLD AUTO: 1 %
ERYTHROCYTE [DISTWIDTH] IN BLOOD BY AUTOMATED COUNT: 15.4 % (ref 11.5–17)
FLUAV AG UPPER RESP QL IA.RAPID: NOT DETECTED
FLUBV AG UPPER RESP QL IA.RAPID: NOT DETECTED
GFR SERPLBLD CREATININE-BSD FMLA CKD-EPI: 46 MLS/MIN/1.73/M2
GLOBULIN SER-MCNC: 4.5 GM/DL (ref 2.4–3.5)
GLUCOSE SERPL-MCNC: 101 MG/DL (ref 82–115)
GLUCOSE UR QL STRIP.AUTO: NEGATIVE
GROUP & RH: NORMAL
GROUP & RH: NORMAL
HCT VFR BLD AUTO: 21.2 % (ref 42–52)
HCT VFR BLD AUTO: 21.6 % (ref 42–52)
HEMOCCULT SP1 STL QL: NEGATIVE
HGB BLD-MCNC: 6.6 G/DL (ref 14–18)
HGB BLD-MCNC: 6.9 G/DL (ref 14–18)
IMM GRANULOCYTES # BLD AUTO: 0.1 X10(3)/MCL (ref 0–0.04)
IMM GRANULOCYTES NFR BLD AUTO: 1.3 %
INDIRECT COOMBS GEL: NORMAL
INDIRECT COOMBS GEL: NORMAL
INR PPP: 1.1
KETONES UR QL STRIP.AUTO: NEGATIVE
LACTATE SERPL-SCNC: 0.9 MMOL/L (ref 0.5–2.2)
LEUKOCYTE ESTERASE UR QL STRIP.AUTO: NEGATIVE
LYMPHOCYTES # BLD AUTO: 1.52 X10(3)/MCL (ref 0.6–4.6)
LYMPHOCYTES NFR BLD AUTO: 19.9 %
MCH RBC QN AUTO: 29.1 PG (ref 27–31)
MCHC RBC AUTO-ENTMCNC: 30.6 G/DL (ref 33–36)
MCV RBC AUTO: 95.2 FL (ref 80–94)
MONOCYTES # BLD AUTO: 1.04 X10(3)/MCL (ref 0.1–1.3)
MONOCYTES NFR BLD AUTO: 13.6 %
NEUTROPHILS # BLD AUTO: 4.85 X10(3)/MCL (ref 2.1–9.2)
NEUTROPHILS NFR BLD AUTO: 63.8 %
NITRITE UR QL STRIP.AUTO: NEGATIVE
PH UR STRIP.AUTO: 7 [PH]
PLATELET # BLD AUTO: 364 X10(3)/MCL (ref 130–400)
PMV BLD AUTO: 9 FL (ref 7.4–10.4)
POC CARDIAC TROPONIN I: 0.07 NG/ML (ref 0–0.08)
POTASSIUM SERPL-SCNC: 5.1 MMOL/L (ref 3.5–5.1)
PROT SERPL-MCNC: 7.5 GM/DL (ref 5.8–7.6)
PROT UR QL STRIP.AUTO: NEGATIVE
PROTHROMBIN TIME: 10.7 SECONDS (ref 12.5–14.5)
RBC # BLD AUTO: 2.27 X10(6)/MCL (ref 4.7–6.1)
RBC #/AREA URNS AUTO: NORMAL /HPF
RBC UR QL AUTO: NEGATIVE
SAMPLE: NORMAL
SARS-COV-2 RNA RESP QL NAA+PROBE: NOT DETECTED
SODIUM SERPL-SCNC: 127 MMOL/L (ref 136–145)
SP GR UR STRIP.AUTO: 1.01 (ref 1–1.03)
SPECIMEN OUTDATE: NORMAL
SPECIMEN OUTDATE: NORMAL
SQUAMOUS #/AREA URNS AUTO: NORMAL /HPF
UNIT NUMBER: NORMAL
UROBILINOGEN UR STRIP-ACNC: 0.2
WBC # SPEC AUTO: 7.62 X10(3)/MCL (ref 4.5–11.5)
WBC #/AREA URNS AUTO: NORMAL /HPF

## 2023-11-18 PROCEDURE — 63600175 PHARM REV CODE 636 W HCPCS: Performed by: STUDENT IN AN ORGANIZED HEALTH CARE EDUCATION/TRAINING PROGRAM

## 2023-11-18 PROCEDURE — 86923 COMPATIBILITY TEST ELECTRIC: CPT | Performed by: INTERNAL MEDICINE

## 2023-11-18 PROCEDURE — 85014 HEMATOCRIT: CPT | Performed by: INTERNAL MEDICINE

## 2023-11-18 PROCEDURE — 85610 PROTHROMBIN TIME: CPT | Performed by: STUDENT IN AN ORGANIZED HEALTH CARE EDUCATION/TRAINING PROGRAM

## 2023-11-18 PROCEDURE — C9113 INJ PANTOPRAZOLE SODIUM, VIA: HCPCS | Performed by: STUDENT IN AN ORGANIZED HEALTH CARE EDUCATION/TRAINING PROGRAM

## 2023-11-18 PROCEDURE — 83880 ASSAY OF NATRIURETIC PEPTIDE: CPT | Performed by: INTERNAL MEDICINE

## 2023-11-18 PROCEDURE — 83605 ASSAY OF LACTIC ACID: CPT | Performed by: STUDENT IN AN ORGANIZED HEALTH CARE EDUCATION/TRAINING PROGRAM

## 2023-11-18 PROCEDURE — 11000001 HC ACUTE MED/SURG PRIVATE ROOM

## 2023-11-18 PROCEDURE — 84484 ASSAY OF TROPONIN QUANT: CPT

## 2023-11-18 PROCEDURE — 86901 BLOOD TYPING SEROLOGIC RH(D): CPT | Performed by: STUDENT IN AN ORGANIZED HEALTH CARE EDUCATION/TRAINING PROGRAM

## 2023-11-18 PROCEDURE — 86920 COMPATIBILITY TEST SPIN: CPT | Performed by: STUDENT IN AN ORGANIZED HEALTH CARE EDUCATION/TRAINING PROGRAM

## 2023-11-18 PROCEDURE — P9016 RBC LEUKOCYTES REDUCED: HCPCS | Performed by: STUDENT IN AN ORGANIZED HEALTH CARE EDUCATION/TRAINING PROGRAM

## 2023-11-18 PROCEDURE — 86850 RBC ANTIBODY SCREEN: CPT | Mod: 91 | Performed by: INTERNAL MEDICINE

## 2023-11-18 PROCEDURE — C9113 INJ PANTOPRAZOLE SODIUM, VIA: HCPCS | Performed by: INTERNAL MEDICINE

## 2023-11-18 PROCEDURE — 25000003 PHARM REV CODE 250: Performed by: INTERNAL MEDICINE

## 2023-11-18 PROCEDURE — 93010 ELECTROCARDIOGRAM REPORT: CPT | Mod: ,,, | Performed by: INTERNAL MEDICINE

## 2023-11-18 PROCEDURE — 36430 TRANSFUSION BLD/BLD COMPNT: CPT

## 2023-11-18 PROCEDURE — 93005 ELECTROCARDIOGRAM TRACING: CPT

## 2023-11-18 PROCEDURE — 93010 EKG 12-LEAD: ICD-10-PCS | Mod: ,,, | Performed by: INTERNAL MEDICINE

## 2023-11-18 PROCEDURE — 99285 EMERGENCY DEPT VISIT HI MDM: CPT | Mod: 25

## 2023-11-18 PROCEDURE — 85025 COMPLETE CBC W/AUTO DIFF WBC: CPT | Performed by: STUDENT IN AN ORGANIZED HEALTH CARE EDUCATION/TRAINING PROGRAM

## 2023-11-18 PROCEDURE — 85730 THROMBOPLASTIN TIME PARTIAL: CPT | Performed by: STUDENT IN AN ORGANIZED HEALTH CARE EDUCATION/TRAINING PROGRAM

## 2023-11-18 PROCEDURE — 96375 TX/PRO/DX INJ NEW DRUG ADDON: CPT

## 2023-11-18 PROCEDURE — 21400001 HC TELEMETRY ROOM

## 2023-11-18 PROCEDURE — 0240U COVID/FLU A&B PCR: CPT | Performed by: STUDENT IN AN ORGANIZED HEALTH CARE EDUCATION/TRAINING PROGRAM

## 2023-11-18 PROCEDURE — 82272 OCCULT BLD FECES 1-3 TESTS: CPT | Performed by: STUDENT IN AN ORGANIZED HEALTH CARE EDUCATION/TRAINING PROGRAM

## 2023-11-18 PROCEDURE — 82550 ASSAY OF CK (CPK): CPT | Performed by: STUDENT IN AN ORGANIZED HEALTH CARE EDUCATION/TRAINING PROGRAM

## 2023-11-18 PROCEDURE — 25000003 PHARM REV CODE 250: Performed by: STUDENT IN AN ORGANIZED HEALTH CARE EDUCATION/TRAINING PROGRAM

## 2023-11-18 PROCEDURE — 63600175 PHARM REV CODE 636 W HCPCS: Performed by: INTERNAL MEDICINE

## 2023-11-18 PROCEDURE — 80053 COMPREHEN METABOLIC PANEL: CPT | Performed by: STUDENT IN AN ORGANIZED HEALTH CARE EDUCATION/TRAINING PROGRAM

## 2023-11-18 PROCEDURE — 81001 URINALYSIS AUTO W/SCOPE: CPT | Performed by: STUDENT IN AN ORGANIZED HEALTH CARE EDUCATION/TRAINING PROGRAM

## 2023-11-18 PROCEDURE — 96374 THER/PROPH/DIAG INJ IV PUSH: CPT

## 2023-11-18 RX ORDER — PROCHLORPERAZINE EDISYLATE 5 MG/ML
5 INJECTION INTRAMUSCULAR; INTRAVENOUS EVERY 6 HOURS PRN
Status: DISCONTINUED | OUTPATIENT
Start: 2023-11-18 | End: 2023-11-21 | Stop reason: HOSPADM

## 2023-11-18 RX ORDER — ALBUTEROL SULFATE 90 UG/1
2 AEROSOL, METERED RESPIRATORY (INHALATION) EVERY 4 HOURS PRN
Status: DISCONTINUED | OUTPATIENT
Start: 2023-11-18 | End: 2023-11-21 | Stop reason: HOSPADM

## 2023-11-18 RX ORDER — FOLIC ACID 1 MG/1
1 TABLET ORAL DAILY
Status: DISCONTINUED | OUTPATIENT
Start: 2023-11-19 | End: 2023-11-20

## 2023-11-18 RX ORDER — ATORVASTATIN CALCIUM 40 MG/1
80 TABLET, FILM COATED ORAL DAILY
Status: DISCONTINUED | OUTPATIENT
Start: 2023-11-19 | End: 2023-11-21 | Stop reason: HOSPADM

## 2023-11-18 RX ORDER — AMOXICILLIN 250 MG
2 CAPSULE ORAL 2 TIMES DAILY PRN
Status: DISCONTINUED | OUTPATIENT
Start: 2023-11-18 | End: 2023-11-21 | Stop reason: HOSPADM

## 2023-11-18 RX ORDER — TAMSULOSIN HYDROCHLORIDE 0.4 MG/1
0.4 CAPSULE ORAL DAILY
Status: DISCONTINUED | OUTPATIENT
Start: 2023-11-19 | End: 2023-11-21 | Stop reason: HOSPADM

## 2023-11-18 RX ORDER — GABAPENTIN 300 MG/1
300 CAPSULE ORAL 3 TIMES DAILY
Status: DISCONTINUED | OUTPATIENT
Start: 2023-11-18 | End: 2023-11-21 | Stop reason: HOSPADM

## 2023-11-18 RX ORDER — IBUPROFEN 600 MG/1
600 TABLET ORAL
Status: COMPLETED | OUTPATIENT
Start: 2023-11-18 | End: 2023-11-18

## 2023-11-18 RX ORDER — TALC
6 POWDER (GRAM) TOPICAL NIGHTLY PRN
Status: DISCONTINUED | OUTPATIENT
Start: 2023-11-18 | End: 2023-11-21 | Stop reason: HOSPADM

## 2023-11-18 RX ORDER — ONDANSETRON 2 MG/ML
4 INJECTION INTRAMUSCULAR; INTRAVENOUS EVERY 4 HOURS PRN
Status: DISCONTINUED | OUTPATIENT
Start: 2023-11-18 | End: 2023-11-21 | Stop reason: HOSPADM

## 2023-11-18 RX ORDER — SODIUM CHLORIDE 0.9 % (FLUSH) 0.9 %
10 SYRINGE (ML) INJECTION
Status: DISCONTINUED | OUTPATIENT
Start: 2023-11-18 | End: 2023-11-21 | Stop reason: HOSPADM

## 2023-11-18 RX ORDER — MAG HYDROX/ALUMINUM HYD/SIMETH 200-200-20
30 SUSPENSION, ORAL (FINAL DOSE FORM) ORAL 4 TIMES DAILY PRN
Status: DISCONTINUED | OUTPATIENT
Start: 2023-11-18 | End: 2023-11-21 | Stop reason: HOSPADM

## 2023-11-18 RX ORDER — FUROSEMIDE 40 MG/1
40 TABLET ORAL DAILY
Status: DISCONTINUED | OUTPATIENT
Start: 2023-11-19 | End: 2023-11-21 | Stop reason: HOSPADM

## 2023-11-18 RX ORDER — ACETAMINOPHEN 325 MG/1
650 TABLET ORAL EVERY 4 HOURS PRN
Status: DISCONTINUED | OUTPATIENT
Start: 2023-11-18 | End: 2023-11-21 | Stop reason: HOSPADM

## 2023-11-18 RX ORDER — HYDROCODONE BITARTRATE AND ACETAMINOPHEN 500; 5 MG/1; MG/1
TABLET ORAL
Status: DISCONTINUED | OUTPATIENT
Start: 2023-11-18 | End: 2023-11-18 | Stop reason: HOSPADM

## 2023-11-18 RX ORDER — POLYETHYLENE GLYCOL 3350 17 G/17G
17 POWDER, FOR SOLUTION ORAL 2 TIMES DAILY PRN
Status: DISCONTINUED | OUTPATIENT
Start: 2023-11-18 | End: 2023-11-21 | Stop reason: HOSPADM

## 2023-11-18 RX ORDER — ACETAMINOPHEN 500 MG
1000 TABLET ORAL EVERY 6 HOURS PRN
Status: DISCONTINUED | OUTPATIENT
Start: 2023-11-18 | End: 2023-11-21 | Stop reason: HOSPADM

## 2023-11-18 RX ORDER — AMIODARONE HYDROCHLORIDE 200 MG/1
400 TABLET ORAL DAILY
Status: DISCONTINUED | OUTPATIENT
Start: 2023-11-19 | End: 2023-11-21 | Stop reason: HOSPADM

## 2023-11-18 RX ORDER — PANTOPRAZOLE SODIUM 40 MG/10ML
40 INJECTION, POWDER, LYOPHILIZED, FOR SOLUTION INTRAVENOUS EVERY 12 HOURS
Status: DISCONTINUED | OUTPATIENT
Start: 2023-11-18 | End: 2023-11-21 | Stop reason: HOSPADM

## 2023-11-18 RX ORDER — PANTOPRAZOLE SODIUM 40 MG/10ML
40 INJECTION, POWDER, LYOPHILIZED, FOR SOLUTION INTRAVENOUS
Status: DISCONTINUED | OUTPATIENT
Start: 2023-11-18 | End: 2023-11-18

## 2023-11-18 RX ORDER — PANTOPRAZOLE SODIUM 40 MG/10ML
80 INJECTION, POWDER, LYOPHILIZED, FOR SOLUTION INTRAVENOUS
Status: COMPLETED | OUTPATIENT
Start: 2023-11-18 | End: 2023-11-18

## 2023-11-18 RX ORDER — HYDROCODONE BITARTRATE AND ACETAMINOPHEN 500; 5 MG/1; MG/1
TABLET ORAL
Status: DISCONTINUED | OUTPATIENT
Start: 2023-11-18 | End: 2023-11-21 | Stop reason: HOSPADM

## 2023-11-18 RX ADMIN — IBUPROFEN 600 MG: 600 TABLET, FILM COATED ORAL at 05:11

## 2023-11-18 RX ADMIN — PANTOPRAZOLE SODIUM 40 MG: 40 INJECTION, POWDER, FOR SOLUTION INTRAVENOUS at 09:11

## 2023-11-18 RX ADMIN — GABAPENTIN 300 MG: 300 CAPSULE ORAL at 09:11

## 2023-11-18 RX ADMIN — PANTOPRAZOLE SODIUM 80 MG: 40 INJECTION, POWDER, FOR SOLUTION INTRAVENOUS at 05:11

## 2023-11-18 RX ADMIN — PANTOPRAZOLE SODIUM 8 MG/HR: 40 INJECTION, POWDER, FOR SOLUTION INTRAVENOUS at 05:11

## 2023-11-18 NOTE — ED PROVIDER NOTES
Encounter Date: 11/18/2023       History     Chief Complaint   Patient presents with    Fatigue     Complains of weakness, can't stand up, and headache     HPI  Patient is a 63-year-old male past medical history of alcoholic cirrhosis, CHF, COPD, hypertension, recent admission for hospital-acquired pneumonia, who presents to the ER complaining of fatigue, weakness that started today.  Patient states he was at home, cooking and then suddenly had weakness without strength to get up and continue cooking his meal.  Secondary to symptoms patient presented to ER for further evaluation.  He denies any chest pain, shortness of breath, dark or bloody stools.  Patient states he has not drank alcohol in over 1 month.    Review of patient's allergies indicates:  No Known Allergies  Past Medical History:   Diagnosis Date    Alcoholic cirrhosis     CHF (congestive heart failure)     LV EF 40%    COPD (chronic obstructive pulmonary disease)     Hypertension     Myocardial infarction     Peripheral artery disease     with stents    Sleep apnea      Past Surgical History:   Procedure Laterality Date    ANGIOGRAM, ABDOMINAL AORTA  10/24/2023    Procedure: Angiogram, Abdominal Aorta;  Surgeon: Janette Ernandez MD;  Location: [facility] CATH LAB;  Service: Cardiology;;    ARTERIOGRAPHY OF SUBCLAVIAN ARTERY Left 10/24/2023    Procedure: ARTERIOGRAM, SUBCLAVIAN;  Surgeon: Janette Ernandez MD;  Location: [facility] CATH LAB;  Service: Cardiology;  Laterality: Left;    CATHETERIZATION OF BOTH LEFT AND RIGHT HEART N/A 10/24/2023    Procedure: CATHETERIZATION, HEART, BOTH LEFT AND RIGHT;  Surgeon: Janette Ernandez MD;  Location: [facility] CATH LAB;  Service: Cardiology;  Laterality: N/A;    COLONOSCOPY N/A 6/10/2023    Procedure: COLONOSCOPY;  Surgeon: Johan Flower MD;  Location: [facility];  Service: Gastroenterology;  Laterality: N/A;    ESOPHAGOGASTRODUODENOSCOPY N/A 6/9/2023    Procedure: EGD;  Surgeon: Tima Teixeira MD;  Location: [facility]  ENDOSCOPY;  Service: Gastroenterology;  Laterality: N/A;    INSERTION OF INTRA-AORTIC BALLOON ASSIST DEVICE  10/24/2023    Procedure: INSERTION, INTRA-AORTIC BALLOON PUMP;  Surgeon: Janette Ernandez MD;  Location: [facility] CATH LAB;  Service: Cardiology;;    INSERTION OF INTRAVASCULAR MICROAXIAL BLOOD PUMP N/A 10/26/2023    Procedure: INSERTION, IMPELLA;  Surgeon: Janette Ernandez MD;  Location: [facility] CATH LAB;  Service: Cardiology;  Laterality: N/A;    INSTANTANEOUS WAVE-FREE RATIO (IFR) N/A 10/26/2023    Procedure: Instantaneous Wave-Free Ratio (IFR);  Surgeon: Janette Ernandez MD;  Location: [facility] CATH LAB;  Service: Cardiology;  Laterality: N/A;    INTRAVASCULAR ULTRASOUND, CORONARY N/A 10/26/2023    Procedure: Ultrasound-coronary;  Surgeon: Janette Ernandez MD;  Location: [facility] CATH LAB;  Service: Cardiology;  Laterality: N/A;    PERCUTANEOUS CORONARY INTERVENTION, ARTERY N/A 10/26/2023    Procedure: Percutaneous coronary intervention- with Impella;  Surgeon: Janette Ernandez MD;  Location: [facility] CATH LAB;  Service: Cardiology;  Laterality: N/A;    PLACEMENT, IABP  10/24/2023    Procedure: Placement, IABP;  Surgeon: Janette Ernandez MD;  Location: [facility] CATH LAB;  Service: Cardiology;;     No family history on file.  Social History     Tobacco Use    Smoking status: Every Day     Current packs/day: 1.00     Types: Cigarettes    Smokeless tobacco: Never   Substance Use Topics    Alcohol use: Yes     Alcohol/week: 24.0 standard drinks of alcohol     Types: 24 Cans of beer per week     Comment: Refused to quit in June 23    Drug use: Yes     Types: Marijuana     Review of Systems   Constitutional:  Positive for fatigue. Negative for fever.   HENT:  Negative for sore throat.    Eyes:  Negative for visual disturbance.   Respiratory:  Negative for shortness of breath.    Cardiovascular:  Negative for chest pain.   Gastrointestinal:  Negative for nausea.   Endocrine: Negative for polyuria.   Genitourinary:  Negative for  dysuria.   Musculoskeletal:  Negative for back pain.   Skin:  Negative for rash.   Neurological:  Positive for weakness.   Hematological:  Does not bruise/bleed easily.   All other systems reviewed and are negative.      Physical Exam     Initial Vitals [11/18/23 1554]   BP Pulse Resp Temp SpO2   (!) 98/52 75 18 99.5 °F (37.5 °C) 97 %      MAP       --         Physical Exam    Nursing note and vitals reviewed.  Constitutional: Vital signs are normal. He appears well-developed and well-nourished. He is not diaphoretic. He is active.  Non-toxic appearance. He does not appear ill. No distress.   HENT:   Head: Normocephalic and atraumatic.   Eyes: Conjunctivae are normal. Pupils are equal, round, and reactive to light. Right conjunctiva is not injected. Left conjunctiva is not injected.   Neck: Trachea normal. Neck supple.   Normal range of motion.   Full passive range of motion without pain.     Cardiovascular:  Normal rate, regular rhythm, S1 normal, S2 normal, intact distal pulses and normal pulses.           Pulmonary/Chest: Breath sounds normal. No respiratory distress.   Abdominal: Abdomen is soft. Bowel sounds are normal. He exhibits no distension. There is no abdominal tenderness. There is no rebound.   Genitourinary:    Genitourinary Comments: Chaperoned rectal exam negative for any gross blood on gloved finger.     Musculoskeletal:         General: No edema. Normal range of motion.      Cervical back: Full passive range of motion without pain, normal range of motion and neck supple. No rigidity.      Right lower leg: No swelling. No edema.      Left lower leg: No swelling. No edema.     Neurological: He is alert and oriented to person, place, and time.   Skin: Skin is warm and dry. Capillary refill takes less than 2 seconds.         ED Course   Procedures  Labs Reviewed   COMPREHENSIVE METABOLIC PANEL - Abnormal; Notable for the following components:       Result Value    Sodium Level 127 (*)     Chloride 96  (*)     Carbon Dioxide 20 (*)     Creatinine 1.64 (*)     Calcium Level Total 8.6 (*)     Albumin Level 3.0 (*)     Globulin 4.5 (*)     Albumin/Globulin Ratio 0.7 (*)     All other components within normal limits   CBC WITH DIFFERENTIAL - Abnormal; Notable for the following components:    RBC 2.27 (*)     Hgb 6.6 (*)     Hct 21.6 (*)     MCV 95.2 (*)     MCHC 30.6 (*)     IG# 0.10 (*)     All other components within normal limits   LACTIC ACID, PLASMA - Normal   COVID/FLU A&B PCR - Normal    Narrative:     The Xpert Xpress SARS-CoV-2/FLU/RSV plus is a rapid, multiplexed real-time PCR test intended for the simultaneous qualitative detection and differentiation of SARS-CoV-2, Influenza A, Influenza B, and respiratory syncytial virus (RSV) viral RNA in either nasopharyngeal swab or nasal swab specimens.         URINALYSIS, REFLEX TO URINE CULTURE - Normal   CK - Normal   URINALYSIS, MICROSCOPIC - Normal   CBC W/ AUTO DIFFERENTIAL    Narrative:     The following orders were created for panel order CBC auto differential.  Procedure                               Abnormality         Status                     ---------                               -----------         ------                     CBC with Differential[[id number]]       Abnormal            Final result                 Please view results for these tests on the individual orders.   TROPONIN ISTAT   OCCULT BLOOD,STOOL,DIAGNOSTIC (1-3)    Narrative:     The following orders were created for panel order Occult Blood, Stool, Diagnostic (1-3).  Procedure                               Abnormality         Status                     ---------                               -----------         ------                     Occult Blood, Stool 1st...[[id number]]                                                 Occult Blood, Stool 2nd...[[id number]]                                                   Please view results for these tests on the individual orders.   PROTIME-INR    APTT   OCCULT BLOOD, STOOL 1ST SPECIMEN   OCCULT BLOOD, STOOL 2ND SPECIMEN   TYPE & SCREEN   POCT TROPONIN   PREPARE RBC SOFT     EKG Readings: (Independently Interpreted)   Initial Reading: No STEMI. Rhythm: Normal Sinus Rhythm. Heart Rate: 75. Axis: Normal.   Normal sinus rhythm, rate of 75 beats per minute, no acute ST elevations noted.  Intervals within normal limits, normal axis.       Imaging Results              X-Ray Chest AP Portable (Final result)  Result time 11/18/23 17:24:08      Final result by Carl Elam MD (11/18/23 17:24:08)                   Impression:      Improved aeration of the right lung base.  Otherwise similar appearance of the chest.      Electronically signed by: Carl Elam  Date:    11/18/2023  Time:    17:24               Narrative:    EXAMINATION:  XR CHEST AP PORTABLE    CLINICAL HISTORY:  Other fatigue    COMPARISON:  4 November 2023    FINDINGS:  Portable frontal view of the chest was obtained. The heart is not significantly enlarged.  The right lung base is better aerated compared to prior.  Some vague interstitial opacities elsewhere are similar to prior.                                    X-Rays:   Independently Interpreted Readings:   Other Readings:  Chest x-ray negative for any acute cardiopulmonary disease process per my interpretation.    Medications   0.9%  NaCl infusion (for blood administration) (has no administration in time range)   pantoprazole (PROTONIX) 40 mg in sodium chloride 0.9 % 100 mL IVPB (MB+) (has no administration in time range)   ibuprofen tablet 600 mg (600 mg Oral Given 11/18/23 1716)   pantoprazole injection 80 mg (80 mg Intravenous Given 11/18/23 1731)     Medical Decision Making  Patient is a 63-year-old male past medical history of alcoholic cirrhosis, CHF, COPD, hypertension, recent admission for hospital-acquired pneumonia, who presents to the ER complaining of fatigue, weakness that started today.     Differential diagnosis includes but  not limited to:  symptomatic anemia, electrolyte derangement, ACS, arrhythmia, COVID, UTI , KARY    Patient labs returned noted no significant leukocytosis however patient did have a drop in his hemoglobin from 9.8 3 weeks ago now down to 6.6.  Patient denies any gross bloody stools at home.  Patient also noted to have slight KARY with BUN/creatinine of 23/1.64.  Mild hyponatremia with sodium 127.  Troponin was negative.  Rectal exam was negative for any gross blood on gloved finger.  Secondary to patient's likely GI bleed, significant cardiac history, will transfer patient to outside hospital for further evaluation, and management.  IV Protonix bolus and infusion has been ordered in addition to 1 unit of packed red blood cells.  Will monitor until transfer to outside facility.      Philip Rosenthal M.D.  Emergency Medicine        Amount and/or Complexity of Data Reviewed  Labs: ordered.  Radiology: ordered.    Risk  Prescription drug management.                                   Clinical Impression:  Final diagnoses:  [R53.1] Weakness  [R53.83] Fatigue  [D64.9] Symptomatic anemia (Primary)  [N17.9] KARY (acute kidney injury)  [K92.2] Gastrointestinal hemorrhage, unspecified gastrointestinal hemorrhage type  [E87.1] Hyponatremia          ED Disposition Condition    Transfer to Another Facility Stable                   Philip Rosenthal MD  11/18/23 7255

## 2023-11-18 NOTE — ED NOTES
Pt accepted [facility] for Dr Presley to Rm [phone] [facility] called per Katelyn at transfer center

## 2023-11-19 LAB
ABO + RH BLD: NORMAL
ABO + RH BLD: NORMAL
ANION GAP SERPL CALC-SCNC: 9 MEQ/L
BLD PROD TYP BPU: NORMAL
BLD PROD TYP BPU: NORMAL
BLOOD UNIT EXPIRATION DATE: NORMAL
BLOOD UNIT EXPIRATION DATE: NORMAL
BLOOD UNIT TYPE CODE: 6200
BLOOD UNIT TYPE CODE: 6200
BUN SERPL-MCNC: 21.6 MG/DL (ref 8.4–25.7)
CALCIUM SERPL-MCNC: 9.3 MG/DL (ref 8.8–10)
CHLORIDE SERPL-SCNC: 104 MMOL/L (ref 98–107)
CO2 SERPL-SCNC: 19 MMOL/L (ref 23–31)
CREAT SERPL-MCNC: 1.33 MG/DL (ref 0.73–1.18)
CREAT/UREA NIT SERPL: 16
CROSSMATCH INTERPRETATION: NORMAL
CROSSMATCH INTERPRETATION: NORMAL
DISPENSE STATUS: NORMAL
DISPENSE STATUS: NORMAL
ERYTHROCYTE [DISTWIDTH] IN BLOOD BY AUTOMATED COUNT: 17.5 % (ref 11.5–17)
FERRITIN SERPL-MCNC: 64.19 NG/ML (ref 21.81–274.66)
FOLATE SERPL-MCNC: 19 NG/ML (ref 7–31.4)
GFR SERPLBLD CREATININE-BSD FMLA CKD-EPI: 60 MLS/MIN/1.73/M2
GLUCOSE SERPL-MCNC: 82 MG/DL (ref 82–115)
HCT VFR BLD AUTO: 34.9 % (ref 42–52)
HGB BLD-MCNC: 11.4 G/DL (ref 14–18)
IRON SATN MFR SERPL: 80 % (ref 20–50)
IRON SERPL-MCNC: 277 UG/DL (ref 65–175)
MAGNESIUM SERPL-MCNC: 2.1 MG/DL (ref 1.6–2.6)
MCH RBC QN AUTO: 29.6 PG (ref 27–31)
MCHC RBC AUTO-ENTMCNC: 32.7 G/DL (ref 33–36)
MCV RBC AUTO: 90.6 FL (ref 80–94)
NRBC BLD AUTO-RTO: 0 %
PHOSPHATE SERPL-MCNC: 3.6 MG/DL (ref 2.3–4.7)
PLATELET # BLD AUTO: 293 X10(3)/MCL (ref 130–400)
PMV BLD AUTO: 9.3 FL (ref 7.4–10.4)
POTASSIUM SERPL-SCNC: 5.1 MMOL/L (ref 3.5–5.1)
RBC # BLD AUTO: 3.85 X10(6)/MCL (ref 4.7–6.1)
SODIUM SERPL-SCNC: 132 MMOL/L (ref 136–145)
TIBC SERPL-MCNC: 348 UG/DL (ref 250–450)
TIBC SERPL-MCNC: 71 UG/DL (ref 69–240)
TRANSFERRIN SERPL-MCNC: 306 MG/DL (ref 163–344)
UNIT NUMBER: NORMAL
UNIT NUMBER: NORMAL
VIT B12 SERPL-MCNC: 258 PG/ML (ref 213–816)
WBC # SPEC AUTO: 6.75 X10(3)/MCL (ref 4.5–11.5)

## 2023-11-19 PROCEDURE — C9113 INJ PANTOPRAZOLE SODIUM, VIA: HCPCS | Performed by: INTERNAL MEDICINE

## 2023-11-19 PROCEDURE — 82607 VITAMIN B-12: CPT | Performed by: INTERNAL MEDICINE

## 2023-11-19 PROCEDURE — 25000242 PHARM REV CODE 250 ALT 637 W/ HCPCS: Performed by: INTERNAL MEDICINE

## 2023-11-19 PROCEDURE — 83735 ASSAY OF MAGNESIUM: CPT | Performed by: INTERNAL MEDICINE

## 2023-11-19 PROCEDURE — 85027 COMPLETE CBC AUTOMATED: CPT | Performed by: INTERNAL MEDICINE

## 2023-11-19 PROCEDURE — 63600175 PHARM REV CODE 636 W HCPCS: Performed by: INTERNAL MEDICINE

## 2023-11-19 PROCEDURE — P9016 RBC LEUKOCYTES REDUCED: HCPCS | Performed by: INTERNAL MEDICINE

## 2023-11-19 PROCEDURE — 84100 ASSAY OF PHOSPHORUS: CPT | Performed by: INTERNAL MEDICINE

## 2023-11-19 PROCEDURE — 11000001 HC ACUTE MED/SURG PRIVATE ROOM

## 2023-11-19 PROCEDURE — 82728 ASSAY OF FERRITIN: CPT | Performed by: INTERNAL MEDICINE

## 2023-11-19 PROCEDURE — 25000003 PHARM REV CODE 250: Performed by: INTERNAL MEDICINE

## 2023-11-19 PROCEDURE — 82746 ASSAY OF FOLIC ACID SERUM: CPT | Performed by: INTERNAL MEDICINE

## 2023-11-19 PROCEDURE — 80048 BASIC METABOLIC PNL TOTAL CA: CPT | Performed by: INTERNAL MEDICINE

## 2023-11-19 PROCEDURE — 21400001 HC TELEMETRY ROOM

## 2023-11-19 PROCEDURE — 83540 ASSAY OF IRON: CPT | Performed by: INTERNAL MEDICINE

## 2023-11-19 PROCEDURE — 36430 TRANSFUSION BLD/BLD COMPNT: CPT

## 2023-11-19 RX ORDER — SYRING-NEEDL,DISP,INSUL,0.3 ML 29 G X1/2"
296 SYRINGE, EMPTY DISPOSABLE MISCELLANEOUS ONCE
Status: COMPLETED | OUTPATIENT
Start: 2023-11-20 | End: 2023-11-20

## 2023-11-19 RX ORDER — SIMETHICONE 80 MG
2 TABLET,CHEWABLE ORAL 4 TIMES DAILY PRN
Status: DISCONTINUED | OUTPATIENT
Start: 2023-11-20 | End: 2023-11-21 | Stop reason: HOSPADM

## 2023-11-19 RX ORDER — FUROSEMIDE 10 MG/ML
40 INJECTION INTRAMUSCULAR; INTRAVENOUS ONCE
Status: COMPLETED | OUTPATIENT
Start: 2023-11-19 | End: 2023-11-19

## 2023-11-19 RX ADMIN — Medication 6 MG: at 08:11

## 2023-11-19 RX ADMIN — METOPROLOL SUCCINATE 12.5 MG: 25 TABLET, EXTENDED RELEASE ORAL at 08:11

## 2023-11-19 RX ADMIN — ATORVASTATIN CALCIUM 80 MG: 40 TABLET, FILM COATED ORAL at 08:11

## 2023-11-19 RX ADMIN — GABAPENTIN 300 MG: 300 CAPSULE ORAL at 08:11

## 2023-11-19 RX ADMIN — PANTOPRAZOLE SODIUM 40 MG: 40 INJECTION, POWDER, FOR SOLUTION INTRAVENOUS at 08:11

## 2023-11-19 RX ADMIN — GABAPENTIN 300 MG: 300 CAPSULE ORAL at 03:11

## 2023-11-19 RX ADMIN — FOLIC ACID 1 MG: 1 TABLET ORAL at 08:11

## 2023-11-19 RX ADMIN — TAMSULOSIN HYDROCHLORIDE 0.4 MG: 0.4 CAPSULE ORAL at 08:11

## 2023-11-19 RX ADMIN — AMIODARONE HYDROCHLORIDE 400 MG: 200 TABLET ORAL at 08:11

## 2023-11-19 RX ADMIN — FUROSEMIDE 40 MG: 10 INJECTION, SOLUTION INTRAMUSCULAR; INTRAVENOUS at 01:11

## 2023-11-19 RX ADMIN — UMECLIDINIUM BROMIDE AND VILANTEROL TRIFENATATE 1 PUFF: 62.5; 25 POWDER RESPIRATORY (INHALATION) at 08:11

## 2023-11-19 RX ADMIN — FUROSEMIDE 40 MG: 40 TABLET ORAL at 08:11

## 2023-11-19 NOTE — ED NOTES
Report given to mat Valdez accepted per ABDIAS Medrano contacted by BENJA Mcwilliams requesting stat transfer.

## 2023-11-19 NOTE — H&P
[facility] Hospital Medicine - H&P Note    Patient Name: Hemal Tolbert Jr.  MRN: [id number]  PCP: Ivette Hearn FNP  Admitting Physician: Anjelica Sotelo MD  Admission Class: IP- Inpatient   Date of Service: 11/18/2023  Code status: Full    Chief Complaint   Transfer for acute anemia    History of Present Illness   This is 63-year-old male with medical history of alcoholic cirrhosis, COPD, hypertension, PAD on aspirin and Plavix, Chronic HFrEF with recent hospitalization [facility] in [place] for NSTEMI and VT/torsades on 10/23/2023 on your hospital requiring defibrillation x2, placed on amiodarone, dobutamine as well as temporary IABP followed by Impella with echo showing EF 20- 25% and left heart catheterization revealed multivessel CAD with occluded proximal LAD 80% and ostial %, he was deemed not a surgical candidate by Cardiothoracic surgery, Impella removed and weaned off dobutamine and transitioned to oral amiodarone and started on maximal goal-directed medical therapy, he did not have coronary intervention, his life vest was declined and has referral to cardiology here in [place] and discharged home on 11/03/2023.    Presented to [facility] ED on 11/18/2023 with chief complaint of feeling weak and lightheaded.  Reports symptoms started while he was cooking a meal this afternoon in the kitchen where he felt dizzy and lightheaded.  He did not fall or lose consciousness.  Denies any nausea, vomiting, abdominal pain, melena or hematochezia.  Report last bowel movement about 3 days ago.  On arrival to ED he was afebrile borderline hypotensive otherwise hemodynamically stable.  Labs notable for hemoglobin 6.6, platelets 364, INR 1.1, creatinine 1.64.  Rectal exam by ED physician show no obvious blood or melena.  He was given Protonix 80 mg and transfuse 1 unit PRBC and transferred to [facility].     Upon my evaluation he is resting comfortably, denying any  complaints at this time.  Repeat labs show hemoglobin 6.9.    ROS   Except as documented, all other systems reviewed and negative     Past Medical History     Past Medical History:   Diagnosis Date    Alcoholic cirrhosis     CHF (congestive heart failure)     LV EF 40%    COPD (chronic obstructive pulmonary disease)     Hypertension     Myocardial infarction     Peripheral artery disease     with stents    Sleep apnea        Past Surgical History     Past Surgical History:   Procedure Laterality Date    ANGIOGRAM, ABDOMINAL AORTA  10/24/2023    Procedure: Angiogram, Abdominal Aorta;  Surgeon: Janette Ernandez MD;  Location: [facility] CATH LAB;  Service: Cardiology;;    ARTERIOGRAPHY OF SUBCLAVIAN ARTERY Left 10/24/2023    Procedure: ARTERIOGRAM, SUBCLAVIAN;  Surgeon: Janette Ernandez MD;  Location: [facility] CATH LAB;  Service: Cardiology;  Laterality: Left;    CATHETERIZATION OF BOTH LEFT AND RIGHT HEART N/A 10/24/2023    Procedure: CATHETERIZATION, HEART, BOTH LEFT AND RIGHT;  Surgeon: Janette Ernandez MD;  Location: [facility] CATH LAB;  Service: Cardiology;  Laterality: N/A;    COLONOSCOPY N/A 6/10/2023    Procedure: COLONOSCOPY;  Surgeon: Johan Flower MD;  Location: [facility] OR;  Service: Gastroenterology;  Laterality: N/A;    ESOPHAGOGASTRODUODENOSCOPY N/A 6/9/2023    Procedure: EGD;  Surgeon: Tima Teixeira MD;  Location: [facility] ENDOSCOPY;  Service: Gastroenterology;  Laterality: N/A;    INSERTION OF INTRA-AORTIC BALLOON ASSIST DEVICE  10/24/2023    Procedure: INSERTION, INTRA-AORTIC BALLOON PUMP;  Surgeon: Janette Ernandez MD;  Location: [facility] CATH LAB;  Service: Cardiology;;    INSERTION OF INTRAVASCULAR MICROAXIAL BLOOD PUMP N/A 10/26/2023    Procedure: INSERTION, IMPELLA;  Surgeon: Janette Ernandez MD;  Location: [facility] CATH LAB;  Service: Cardiology;  Laterality: N/A;    INSTANTANEOUS WAVE-FREE RATIO (IFR) N/A 10/26/2023    Procedure: Instantaneous Wave-Free Ratio (IFR);  Surgeon: Janette Ernandez MD;  Location:  [facility] CATH LAB;  Service: Cardiology;  Laterality: N/A;    INTRAVASCULAR ULTRASOUND, CORONARY N/A 10/26/2023    Procedure: Ultrasound-coronary;  Surgeon: Janette Ernandez MD;  Location: [facility] CATH LAB;  Service: Cardiology;  Laterality: N/A;    PERCUTANEOUS CORONARY INTERVENTION, ARTERY N/A 10/26/2023    Procedure: Percutaneous coronary intervention- with Impella;  Surgeon: Janette Ernandez MD;  Location: [facility] CATH LAB;  Service: Cardiology;  Laterality: N/A;    PLACEMENT, IABP  10/24/2023    Procedure: Placement, IABP;  Surgeon: Janette Ernandez MD;  Location: [facility] CATH LAB;  Service: Cardiology;;       Social History     Social History     Tobacco Use    Smoking status: Every Day     Current packs/day: 1.00     Types: Cigarettes    Smokeless tobacco: Never   Substance Use Topics    Alcohol use: Yes     Alcohol/week: 24.0 standard drinks of alcohol     Types: 24 Cans of beer per week     Comment: Refused to quit in June 23        Family History   Reviewed and negative    Allergies   Patient has no known allergies.    Home Medications     Prior to Admission medications    Medication Sig Start Date End Date Taking? Authorizing Provider   albuterol (PROVENTIL/VENTOLIN HFA) 90 mcg/actuation inhaler Inhale 1-2 puffs into the lungs every 4 (four) hours as needed. 5/19/23  Yes Provider, Historical   amiodarone (PACERONE) 200 MG Tab Take 2 tablets (400 mg total) by mouth once daily. 11/3/23 11/2/24 Yes Bello Pinto MD   aspirin 81 MG Chew CHEW 1 tablet (81 mg total) by mouth once daily. 11/4/23 11/3/24 Yes Bello Pinto MD   atorvastatin (LIPITOR) 80 MG tablet Take 1 tablet (80 mg total) by mouth once daily. 11/4/23 11/3/24 Yes Bello Pinto MD   clopidogreL (PLAVIX) 75 mg tablet Take 1 tablet (75 mg total) by mouth once daily. 11/4/23 11/3/24 Yes Bello Pinto MD   folic acid (FOLVITE) 1 MG tablet Take 1 tablet (1 mg total) by mouth once daily. 6/12/23  Yes Bill Epps MD   furosemide (LASIX) 40 MG tablet Take 40 mg by mouth once daily.  10/9/23  Yes Provider, Historical   gabapentin (NEURONTIN) 300 MG capsule Take 300 mg by mouth 3 (three) times daily. 5/25/23  Yes Provider, Historical   metoprolol succinate (TOPROL-XL) 25 MG 24 hr tablet Take ½ ablet (12.5 mg total) by mouth once daily. 11/4/23 11/3/24 Yes Bello Pinto MD   pantoprazole (PROTONIX) 40 MG tablet Take 1 tablet (40 mg total) by mouth 2 (two) times daily. 6/12/23  Yes Bill Epps MD   sacubitriL-valsartan (ENTRESTO) 24-26 mg per tablet Take 1 tablet by mouth 2 (two) times daily. 11/3/23  Yes Bello Pinto MD   spironolactone (ALDACTONE) 25 MG tablet Take 1 tablet (25 mg total) by mouth 2 (two) times daily. 11/3/23 11/2/24 Yes Bello Pinto MD   STIOLTO RESPIMAT 2.5-2.5 mcg/actuation Mist Inhale 2 puffs into the lungs once daily. 5/25/23  Yes Provider, Historical   tamsulosin (FLOMAX) 0.4 mg Cap Take 1 capsule (0.4 mg total) by mouth once daily. 11/4/23 11/3/24 Yes Bello Pinto MD   levoFLOXacin (LEVAQUIN) 500 MG tablet Take 1 tablet (500 mg total) by mouth once daily. 11/6/23 11/18/23  Tawana Sepulveda MD        Physical Exam   Vital Signs  Temp:  [97.3 °F (36.3 °C)-99.5 °F (37.5 °C)]   Pulse:  [71-75]   Resp:  [18-22]   BP: ()/(47-83)   SpO2:  [94 %-99 %]    General: Appears comfortable  HEENT: NC/AT  Neck:  No JVD  Chest: CTABL  CVS: Regular rhythm. Normal S1/S2.  Trace pedal edema  Abdomen: nondistended, normoactive BS, soft and non-tender.  MSK: No obvious deformity or joint swelling  Skin: Warm and dry  Neuro: AAOx3, no focal neurological deficit  Psych: Cooperative    Labs     Recent Labs     11/18/23  1639   WBC 7.62   RBC 2.27*   HGB 6.6*   HCT 21.6*   MCV 95.2*   MCH 29.1   MCHC 30.6*   RDW 15.4        Recent Labs     11/18/23  1639   PROTIME 10.7*   INR 1.1   PTT 20.7*      Recent Labs     11/18/23  1639   *   K 5.1   CHLORIDE 96*   CO2 20*   BUN 23.6   CREATININE 1.64*   EGFRNORACEVR 46   GLUCOSE 101   CALCIUM 8.6*   ALBUMIN 3.0*   GLOBULIN 4.5*   ALKPHOS 54   ALT 14    AST 19   BILITOT 0.3     Recent Labs     11/18/23  1639   LACTIC 0.9     Recent Labs     11/18/23  1639   CPK 37        Microbiology Results (last 7 days)       ** No results found for the last 168 hours. **           Imaging     No orders to display     X-Ray Chest AP Portable  Narrative: EXAMINATION:  XR CHEST AP PORTABLE    CLINICAL HISTORY:  Other fatigue    COMPARISON:  4 November 2023    FINDINGS:  Portable frontal view of the chest was obtained. The heart is not significantly enlarged.  The right lung base is better aerated compared to prior.  Some vague interstitial opacities elsewhere are similar to prior.  Impression: Improved aeration of the right lung base.  Otherwise similar appearance of the chest.    Electronically signed by: Carl Elam  Date:    11/18/2023  Time:    17:24    Assessment   Symptomatic acute on chronic anemia  Concern for acute GI bleed  KARY superimposed on CKD2  Ischemic cardiomyopathy/Chronic HFrEF -compensated  History of VT/torsades-on amiodarone    History of alcoholic cirrhosis, COPD, Chronic HFrEF, PAD/remote stents and MvCAD no stents on aspirin Plavix, BPH, chronic tobacco smoker    Plan   Status post 1 unit PRBC on 11/18/2023 - inappropriate response to transfusion  Transfuse additional 2 units PRBC, Lasix 40 mg IV after completion.  Continue Protonix 40 mg IV BID  Check ferritin, iron profile, B12 and folate  GI consult - EGD and colonoscopy both normal in June 2023  Home medication reviewed and resumed -Hold antihypertensives  VTE Prophylaxis: SCD     Critical care time:  35 minutes  Critical care diagnosis:  Symptomatic anemia    Anjelica Sotelo MD  Internal Medicine

## 2023-11-19 NOTE — CONSULTS
"Consult Note    Reason for Consult:      We were consulted by Dr. Green to evaluate this patient for cirrhosis/anemia.   As above. Attending is Dr. Sotelo.    HPI:   This is a 62 YO CM recently evaluated by our group with a past medical history of etoh cirrhosis, CHF, COPD, HTN. Presented to [facility] 11/18/23 for complaints of weakness/fatigue. On arrival, hypotensive 94/49 but otherwise stable. Labs revealed hemoglobin 6.6g/dL, mcv 95.2, INR 1.1, sodium 127, creatinine 1.64, preserved BUN, albumim 3.0, , normal lactate, UA wnl. FOBT negative. No recent imaging available for review. Transferred to [facility] for further management. GI consulted for cirrhosis/anemia.    Recently admitted for similar concerns with normal EGD and colonoscopy as outlined below. Planned for VCE; patient no-showed that appointment.    Acutely, reports worsening malaise/fatigue and weakness and dizziness which led him to report to the ED. Transfusion taking place currently; 2 units PRBCs planned. Feels better. Denies unintentional weight loss or anorexia. Denies nausea or vomiting. Denies hematemesis. Denies reflux, pyrosis, or dyspepsia. Denies abdominal pain. Denies recent changes in bowel habits without constipation, diarrhea, melena, or hematochezia. Denies abdominal swelling or BLE edema. Denies jaundice, choluria, scleral icterus, or pruritus. Denies s/s HE.     Apparently on Plavix and 81mg ASA daily due to cardiac issues. Copied from hospital medicine note:  "PAD on aspirin and Plavix, Chronic HFrEF with recent hospitalization [facility] in [place] for NSTEMI and VT/torsades on 10/23/2023 on your hospital requiring defibrillation x2, placed on amiodarone, dobutamine as well as temporary IABP followed by Impella with echo showing EF 20- 25% and left heart catheterization revealed multivessel CAD with occluded proximal LAD 80% and ostial %, he was deemed not a surgical candidate by Cardiothoracic surgery, " "Impella removed and weaned off dobutamine and transitioned to oral amiodarone and started on maximal goal-directed medical therapy, he did not have coronary intervention, his life vest was declined and has referral to cardiology here in [place] and discharged home on 11/03/2023."    Origin: likely etoh  MELD 11/18/23: 12  Rehab: no etoh in the last 2 months  Transplant: can consider if he continues to avoid etoh  Varices: none on recent EGD  EGD 6/9/23 by Dr. Teixeira: entirely normal.  HCC: last US in June. No recent AFP.   HE: no history  Ascites: none since CT in 2018  Infection: recommend vaccinations  CRC Screen: up to date  Colonoscopy 6/10/23 for ANGEL by Dr. Flower: entirely normal.    Previous records reviewed.   PCP:  Ivette Hearn FNP    Review of patient's allergies indicates:  No Known Allergies     Current Facility-Administered Medications   Medication Dose Route Frequency Provider Last Rate Last Admin    0.9%  NaCl infusion (for blood administration)   Intravenous Q24H PRN Anjelica Sotelo MD        acetaminophen tablet 1,000 mg  1,000 mg Oral Q6H PRN Anjelica Sotelo MD        acetaminophen tablet 650 mg  650 mg Oral Q4H PRN Anjelica Sotelo MD        albuterol inhaler 2 puff  2 puff Inhalation Q4H PRN Anjelica Sotelo MD        aluminum-magnesium hydroxide-simethicone 200-200-20 mg/5 mL suspension 30 mL  30 mL Oral QID PRN Anjelica Sotelo MD        amiodarone tablet 400 mg  400 mg Oral Daily Anjelica Sotelo MD        atorvastatin tablet 80 mg  80 mg Oral Daily Anjelica Sotelo MD        folic acid tablet 1 mg  1 mg Oral Daily DeepakAnjelica MD        furosemide tablet 40 mg  40 mg Oral Daily Anjelica Sotelo MD        gabapentin capsule 300 mg  300 mg Oral TID DeepakAnjelica goodman MD   300 mg at 11/18/23 2147    melatonin tablet 6 mg  6 mg Oral Nightly PRN Anjelica Sotelo MD        metoprolol succinate (TOPROL-XL) 24 hr split tablet 12.5 mg  12.5 mg Oral Daily Anjelica Sotelo MD        ondansetron injection 4 mg  4 mg " Intravenous Q4H PRN Anjelica Sotelo MD        pantoprazole injection 40 mg  40 mg Intravenous Q12H Anjelica Sotelo MD   40 mg at 11/18/23 2146    polyethylene glycol packet 17 g  17 g Oral BID PRN Anjelica Sotelo MD        prochlorperazine injection Soln 5 mg  5 mg Intravenous Q6H PRN Anjelica Sotelo MD        senna-docusate 8.6-50 mg per tablet 2 tablet  2 tablet Oral BID PRN Anjelica Sotelo MD        sodium chloride 0.9% flush 10 mL  10 mL Intravenous PRN Anjelica Sotelo MD        tamsulosin 24 hr capsule 0.4 mg  0.4 mg Oral Daily Anjelica Sotelo MD        umeclidinium-vilanteroL 62.5-25 mcg/actuation DsDv 1 puff  1 puff Inhalation Daily Anjelica Sotelo MD         Medications Prior to Admission   Medication Sig Dispense Refill Last Dose    albuterol (PROVENTIL/VENTOLIN HFA) 90 mcg/actuation inhaler Inhale 1-2 puffs into the lungs every 4 (four) hours as needed.   11/18/2023 at 1600    amiodarone (PACERONE) 200 MG Tab Take 2 tablets (400 mg total) by mouth once daily. 60 tablet 11 11/18/2023 at 0800    aspirin 81 MG Chew CHEW 1 tablet (81 mg total) by mouth once daily. 30 tablet 11 11/18/2023 at 1200    atorvastatin (LIPITOR) 80 MG tablet Take 1 tablet (80 mg total) by mouth once daily. 90 tablet 3 11/17/2023 at 2000    clopidogreL (PLAVIX) 75 mg tablet Take 1 tablet (75 mg total) by mouth once daily. 30 tablet 11 11/18/2023 at 0800    folic acid (FOLVITE) 1 MG tablet Take 1 tablet (1 mg total) by mouth once daily. 90 tablet 1 11/17/2023 at 2000    furosemide (LASIX) 40 MG tablet Take 40 mg by mouth once daily.   11/18/2023 at 0800    gabapentin (NEURONTIN) 300 MG capsule Take 300 mg by mouth 3 (three) times daily.   11/18/2023 at 1200    metoprolol succinate (TOPROL-XL) 25 MG 24 hr tablet Take ½ ablet (12.5 mg total) by mouth once daily. 15 tablet 11 11/18/2023 at 0800    pantoprazole (PROTONIX) 40 MG tablet Take 1 tablet (40 mg total) by mouth 2 (two) times daily. 60 tablet 2 11/18/2023 at 0800    sacubitriL-valsartan (ENTRESTO)  24-26 mg per tablet Take 1 tablet by mouth 2 (two) times daily. 60 tablet 11 11/18/2023 at 0800    spironolactone (ALDACTONE) 25 MG tablet Take 1 tablet (25 mg total) by mouth 2 (two) times daily. 60 tablet 11 11/18/2023 at 0800    STIOLTO RESPIMAT 2.5-2.5 mcg/actuation Mist Inhale 2 puffs into the lungs once daily.   11/18/2023 at 1200    tamsulosin (FLOMAX) 0.4 mg Cap Take 1 capsule (0.4 mg total) by mouth once daily. 30 capsule 11 11/18/2023 at 0800     Past Medical History:  Past Medical History:   Diagnosis Date    Alcoholic cirrhosis     CHF (congestive heart failure)     LV EF 40%    COPD (chronic obstructive pulmonary disease)     Hypertension     Myocardial infarction     Peripheral artery disease     with stents    Sleep apnea       Past Surgical History:  Past Surgical History:   Procedure Laterality Date    ANGIOGRAM, ABDOMINAL AORTA  10/24/2023    Procedure: Angiogram, Abdominal Aorta;  Surgeon: Janette Ernandez MD;  Location: [facility] CATH LAB;  Service: Cardiology;;    ARTERIOGRAPHY OF SUBCLAVIAN ARTERY Left 10/24/2023    Procedure: ARTERIOGRAM, SUBCLAVIAN;  Surgeon: Janette Ernandez MD;  Location: [facility] CATH LAB;  Service: Cardiology;  Laterality: Left;    CATHETERIZATION OF BOTH LEFT AND RIGHT HEART N/A 10/24/2023    Procedure: CATHETERIZATION, HEART, BOTH LEFT AND RIGHT;  Surgeon: Janette Ernandez MD;  Location: [facility] CATH LAB;  Service: Cardiology;  Laterality: N/A;    COLONOSCOPY N/A 6/10/2023    Procedure: COLONOSCOPY;  Surgeon: Johan Flower MD;  Location: [facility] OR;  Service: Gastroenterology;  Laterality: N/A;    ESOPHAGOGASTRODUODENOSCOPY N/A 6/9/2023    Procedure: EGD;  Surgeon: Tima Teixeira MD;  Location: [facility] ENDOSCOPY;  Service: Gastroenterology;  Laterality: N/A;    INSERTION OF INTRA-AORTIC BALLOON ASSIST DEVICE  10/24/2023    Procedure: INSERTION, INTRA-AORTIC BALLOON PUMP;  Surgeon: Janette Ernandez MD;  Location: [facility] CATH LAB;  Service: Cardiology;;    INSERTION OF  INTRAVASCULAR MICROAXIAL BLOOD PUMP N/A 10/26/2023    Procedure: INSERTION, IMPELLA;  Surgeon: Janette Ernandez MD;  Location: [facility] CATH LAB;  Service: Cardiology;  Laterality: N/A;    INSTANTANEOUS WAVE-FREE RATIO (IFR) N/A 10/26/2023    Procedure: Instantaneous Wave-Free Ratio (IFR);  Surgeon: Janette Ernandez MD;  Location: [facility] CATH LAB;  Service: Cardiology;  Laterality: N/A;    INTRAVASCULAR ULTRASOUND, CORONARY N/A 10/26/2023    Procedure: Ultrasound-coronary;  Surgeon: Janette Ernandez MD;  Location: [facility] CATH LAB;  Service: Cardiology;  Laterality: N/A;    PERCUTANEOUS CORONARY INTERVENTION, ARTERY N/A 10/26/2023    Procedure: Percutaneous coronary intervention- with Impella;  Surgeon: Janette Ernandez MD;  Location: [facility] CATH LAB;  Service: Cardiology;  Laterality: N/A;    PLACEMENT, IABP  10/24/2023    Procedure: Placement, IABP;  Surgeon: Janette Ernandez MD;  Location: [facility] CATH LAB;  Service: Cardiology;;      Family History:  No family history on file.  Social History:  Social History     Tobacco Use    Smoking status: Every Day     Current packs/day: 1.00     Types: Cigarettes    Smokeless tobacco: Never   Substance Use Topics    Alcohol use: Yes     Alcohol/week: 24.0 standard drinks of alcohol     Types: 24 Cans of beer per week     Comment: Refused to quit in June 23       Review of Systems:     Review of Systems   Constitutional:  Positive for activity change and fatigue. Negative for appetite change, chills, diaphoresis, fever and unexpected weight change.   HENT:  Negative for sore throat and trouble swallowing.    Eyes:  Negative for visual disturbance.        Scleral icterus   Respiratory:  Negative for cough, choking and shortness of breath.    Cardiovascular:  Negative for chest pain, palpitations and leg swelling.   Gastrointestinal:  Negative for abdominal distention, abdominal pain, anal bleeding, blood in stool, constipation, diarrhea, nausea, rectal pain and vomiting.    Musculoskeletal:  Negative for arthralgias.   Skin:  Negative for color change, pallor and rash.   Neurological:  Positive for dizziness and weakness. Negative for syncope, light-headedness and numbness.   Psychiatric/Behavioral:  Negative for confusion. The patient is not nervous/anxious.      Objective:     VITAL SIGNS: 24 HR MIN & MAX LAST    Temp  Min: 97.3 °F (36.3 °C)  Max: 99.5 °F (37.5 °C)  98 °F (36.7 °C)        BP  Min: 85/37  Max: 124/83  (!) 110/51     Pulse  Min: 61  Max: 75  65     Resp  Min: 16  Max: 22  18    SpO2  Min: 94 %  Max: 99 %  97 %        Intake/Output Summary (Last 24 hours) at 11/19/2023 0812  Last data filed at 11/19/2023 0357  Gross per 24 hour   Intake 0 ml   Output --   Net 0 ml     Physical Exam  Vitals and nursing note reviewed.   Constitutional:       General: He is not in acute distress.     Appearance: Normal appearance. He is not ill-appearing.   HENT:      Head: Normocephalic and atraumatic.      Nose: Nose normal.      Mouth/Throat:      Mouth: Mucous membranes are moist.   Eyes:      General: No scleral icterus.     Extraocular Movements: Extraocular movements intact.      Conjunctiva/sclera: Conjunctivae normal.   Cardiovascular:      Rate and Rhythm: Normal rate and regular rhythm.      Heart sounds: Normal heart sounds. No murmur heard.  Pulmonary:      Effort: Pulmonary effort is normal. No respiratory distress.      Breath sounds: Normal breath sounds.   Abdominal:      General: Bowel sounds are normal. There is no distension.      Palpations: Abdomen is soft. There is no mass.      Tenderness: There is no abdominal tenderness. There is no guarding or rebound.      Hernia: No hernia is present.      Comments: Protuberant abdomen, multiple scars evident   Musculoskeletal:         General: Normal range of motion.      Right lower leg: No edema.      Left lower leg: No edema.   Skin:     General: Skin is warm and dry.   Neurological:      General: No focal deficit  present.      Mental Status: He is alert and oriented to person, place, and time.   Psychiatric:         Mood and Affect: Mood normal.         Behavior: Behavior normal.         Thought Content: Thought content normal.         Judgment: Judgment normal.        Recent Results (from the past 48 hour(s))   Comprehensive metabolic panel    Collection Time: 11/18/23  4:39 PM   Result Value Ref Range    Sodium Level 127 (L) 136 - 145 mmol/L    Potassium Level 5.1 3.5 - 5.1 mmol/L    Chloride 96 (L) 98 - 107 mmol/L    Carbon Dioxide 20 (L) 23 - 31 mmol/L    Glucose Level 101 82 - 115 mg/dL    Blood Urea Nitrogen 23.6 8.4 - 25.7 mg/dL    Creatinine 1.64 (H) 0.73 - 1.18 mg/dL    Calcium Level Total 8.6 (L) 8.8 - 10.0 mg/dL    Protein Total 7.5 5.8 - 7.6 gm/dL    Albumin Level 3.0 (L) 3.4 - 4.8 g/dL    Globulin 4.5 (H) 2.4 - 3.5 gm/dL    Albumin/Globulin Ratio 0.7 (L) 1.1 - 2.0 ratio    Bilirubin Total 0.3 <=1.5 mg/dL    Alkaline Phosphatase 54 40 - 150 unit/L    Alanine Aminotransferase 14 0 - 55 unit/L    Aspartate Aminotransferase 19 5 - 34 unit/L    eGFR 46 mls/min/1.73/m2   Lactic acid, plasma    Collection Time: 11/18/23  4:39 PM   Result Value Ref Range    Lactic Acid Level 0.9 0.5 - 2.2 mmol/L   CK    Collection Time: 11/18/23  4:39 PM   Result Value Ref Range    Creatine Kinase 37 30 - 200 U/L   CBC with Differential    Collection Time: 11/18/23  4:39 PM   Result Value Ref Range    WBC 7.62 4.50 - 11.50 x10(3)/mcL    RBC 2.27 (L) 4.70 - 6.10 x10(6)/mcL    Hgb 6.6 (L) 14.0 - 18.0 g/dL    Hct 21.6 (L) 42.0 - 52.0 %    MCV 95.2 (H) 80.0 - 94.0 fL    MCH 29.1 27.0 - 31.0 pg    MCHC 30.6 (L) 33.0 - 36.0 g/dL    RDW 15.4 11.5 - 17.0 %    Platelet 364 130 - 400 x10(3)/mcL    MPV 9.0 7.4 - 10.4 fL    Neut % 63.8 %    Lymph % 19.9 %    Mono % 13.6 %    Eos % 1.0 %    Basophil % 0.4 %    Lymph # 1.52 0.6 - 4.6 x10(3)/mcL    Neut # 4.85 2.1 - 9.2 x10(3)/mcL    Mono # 1.04 0.1 - 1.3 x10(3)/mcL    Eos # 0.08 0 - 0.9 x10(3)/mcL     Baso # 0.03 <=0.2 x10(3)/mcL    IG# 0.10 (H) 0 - 0.04 x10(3)/mcL    IG% 1.3 %   Protime-INR    Collection Time: 11/18/23  4:39 PM   Result Value Ref Range    PT 10.7 (L) 12.5 - 14.5 seconds    INR 1.1 <=1.3   APTT    Collection Time: 11/18/23  4:39 PM   Result Value Ref Range    PTT 20.7 (L) 23.2 - 33.7 seconds   Troponin ISTAT    Collection Time: 11/18/23  4:41 PM   Result Value Ref Range    POC Cardiac Troponin I 0.07 0.00 - 0.08 ng/mL    Sample unknown    COVID/FLU A&B PCR    Collection Time: 11/18/23  4:44 PM   Result Value Ref Range    Influenza A PCR Not Detected Not Detected    Influenza B PCR Not Detected Not Detected    SARS-CoV-2 PCR Not Detected Not Detected, Negative, Invalid   Urinalysis, Reflex to Urine Culture    Collection Time: 11/18/23  5:09 PM    Specimen: Urine   Result Value Ref Range    Color, UA Yellow Yellow, Light-Yellow, Dark Yellow, Luma, Straw    Appearance, UA Clear Clear    Specific Gravity, UA 1.015 1.005 - 1.030    pH, UA 7.0 5.0 - 8.5    Protein, UA Negative Negative    Glucose, UA Negative Negative, Normal    Ketones, UA Negative Negative    Blood, UA Negative Negative    Bilirubin, UA Negative Negative    Urobilinogen, UA 0.2 0.2, 1.0, Normal    Nitrites, UA Negative Negative    Leukocyte Esterase, UA Negative Negative   Urinalysis, Microscopic    Collection Time: 11/18/23  5:09 PM   Result Value Ref Range    Bacteria, UA None Seen None Seen, Rare, Occasional /HPF    RBC, UA None Seen None Seen, 0-2, 3-5, 0-5 /HPF    WBC, UA None Seen None Seen, 0-2, 3-5, 0-5 /HPF    Squamous Epithelial Cells, UA None Seen None Seen, Rare, Occasional, Occ /HPF   Prepare RBC 1 Unit    Collection Time: 11/18/23  5:25 PM   Result Value Ref Range    UNIT NUMBER [id number]     UNIT ABO/RH A POS     DISPENSE STATUS Transfused     Unit Expiration [id number]     Product Code E7824Q34     Unit Blood Type Code 6200     CROSSMATCH INTERPRETATION Compatible    Type & Screen    Collection Time: 11/18/23   5:25 PM   Result Value Ref Range    Group & Rh A POS     Indirect Martinez GEL NEG     Specimen Outdate 11/21/2023 23:59    Occult Blood, Stool 1st Specimen    Collection Time: 11/18/23  6:00 PM   Result Value Ref Range    Occult Blood Stool 1 Negative Negative   Hemoglobin and Hematocrit    Collection Time: 11/18/23 11:07 PM   Result Value Ref Range    Hgb 6.9 (L) 14.0 - 18.0 g/dL    Hct 21.2 (L) 42.0 - 52.0 %   Type & Screen    Collection Time: 11/18/23 11:07 PM   Result Value Ref Range    Group & Rh A POS     Indirect Martinez GEL NEG     Specimen Outdate 11/21/2023 23:59    BNP    Collection Time: 11/18/23 11:07 PM   Result Value Ref Range    Natriuretic Peptide 483.8 (H) <=100.0 pg/mL   Prepare RBC 2 Units; needed    Collection Time: 11/18/23 11:07 PM   Result Value Ref Range    UNIT NUMBER [id number]     UNIT ABO/RH A POS     DISPENSE STATUS Issued     Unit Expiration [id number]     Product Code X2472V24     Unit Blood Type Code 6200     CROSSMATCH INTERPRETATION Compatible     UNIT NUMBER [id number]     UNIT ABO/RH A POS     DISPENSE STATUS Selected     Unit Expiration [id number]     Product Code X7603L51     Unit Blood Type Code 6200     CROSSMATCH INTERPRETATION Compatible      No recent imaging available for review.    Assessment / Plan:   62 YO CM recently evaluated by our group with a past medical history of etoh cirrhosis, CHF, COPD, HTN. Transferred from [facility] to [facility] for acute on chronic anemia without overt GI bleeding. Hgb 6.6g/dL on arrival. GI consulted for cirrhosis/anemia.    Etoh cirrhosis  Origin: likely etoh  MELD 11/18/23: 12  Rehab: no etoh in the last 2 months  Transplant: can consider if he continues to avoid etoh  Varices: none on recent EGD  EGD 6/9/23 by Dr. Teixeira: entirely normal.  HCC: last US in June. No recent AFP.   HE: no history  Ascites: none since CT in 2018  Infection: recommend vaccinations  CRC Screen: up to date  Colonoscopy 6/10/23 for ANGEL by   Ching: entirely normal.  - Continue routine medical management  - Obtain abdominal US and AFP  - Low Na+ diet  - Complete etoh abstinence  - If truly cirrhotic, he is very well compensated.    2. Acute on chronic normocytic anemia  Hgb average 9-10g/dL  Hgb 6.6 --> 6.9 --> 2 units PRBCs  - Recheck labs s/p transfusion  - Monitor for s/s overt GI bleeding  - PPI daily  - Follow-up iron studies/vitamin B12/folate  - Plavix and 81mg ASA on hold  - Chronic anemia of unknown etiology. Recent work-up negative for GI source with plans for VCE as next step. Now on Plavix and ASA with worsening hemoglobin.    - No plans for endoscopy today. Will discuss timing for VCE with Dr. Flower.        Thank you for this consult and for allowing us to participate in this patient's care.    Miley Sánchez PA-C  [facility]  [phone]    Proceed w vce in am

## 2023-11-19 NOTE — NURSING
Nurses Note -- 4 Eyes      11/18/2023   8:24 PM      Skin assessed during: Admit      [x] No Altered Skin Integrity Present    [x]Prevention Measures Documented      [] Yes- Altered Skin Integrity Present or Discovered   [] LDA Added if Not in Epic (Describe Wound)   [] New Altered Skin Integrity was Present on Admit and Documented in LDA   [] Wound Image Taken    Wound Care Consulted? No    Attending Nurse:  Yazan Westbrook RN/Staff Member:   Bruno

## 2023-11-19 NOTE — PROGRESS NOTES
[facility] - [facility]  [facility] MEDICINE ~ PROGRESS NOTE        CHIEF COMPLAINT   Hospital follow up    HOSPITAL COURSE   63-year-old male with medical history of alcoholic cirrhosis, COPD, hypertension, PAD on aspirin and Plavix, Chronic HFrEF with recent hospitalization [facility] in [place] for NSTEMI and VT/torsades on 10/23/2023 on your hospital requiring defibrillation x2, placed on amiodarone, dobutamine as well as temporary IABP followed by Impella with echo showing EF 20- 25% and left heart catheterization revealed multivessel CAD with occluded proximal LAD 80% and ostial %, he was deemed not a surgical candidate by Cardiothoracic surgery, Impella removed and weaned off dobutamine and transitioned to oral amiodarone and started on maximal goal-directed medical therapy, he did not have coronary intervention, his life vest was declined and has referral to cardiology here in [place] and discharged home on 11/03/2023.     Presented to [facility] ED on 11/18/2023 with chief complaint of feeling weak and lightheaded.  Reports symptoms started while he was cooking a meal this afternoon in the kitchen where he felt dizzy and lightheaded.  He did not fall or lose consciousness.  Denies any nausea, vomiting, abdominal pain, melena or hematochezia.  Report last bowel movement about 3 days ago.  On arrival to ED he was afebrile borderline hypotensive otherwise hemodynamically stable.  Labs notable for hemoglobin 6.6, platelets 364, INR 1.1, creatinine 1.64.  Rectal exam by ED physician show no obvious blood or melena.  He was given Protonix 80 mg and transfuse 1 unit PRBC and transferred to [facility].      Upon my evaluation he is resting comfortably, denying any complaints at this time.  Repeat labs show hemoglobin 6.9.    Today  Seen and examined this morning.  Feeling better after getting blood transfusion.  Reviewed his chart and just last month he had a angiogram done  which initially had showed severe CAD apparently but repeat angiogram showed ostial LAD 50%?  He is on aspirin and Plavix with medical management of his CAD and was not a surgical candidate.  Denies having any obvious signs of bleeding.        OBJECTIVE/PHYSICAL EXAM     VITAL SIGNS (MOST RECENT):  Temp: 97.8 °F (36.6 °C) (11/19/23 0847)  Pulse: 63 (11/19/23 0847)  Resp: 18 (11/19/23 0847)  BP: 105/62 (11/19/23 0847)  SpO2: 98 % (11/19/23 0847) VITAL SIGNS (24 HOUR RANGE):  Temp:  [97.3 °F (36.3 °C)-99.5 °F (37.5 °C)] 97.8 °F (36.6 °C)  Pulse:  [61-75] 63  Resp:  [16-22] 18  SpO2:  [94 %-99 %] 98 %  BP: ()/(37-83) 105/62   GENERAL: In no acute distress, afebrile  HEENT:  CHEST: Clear to auscultation bilaterally  HEART: S1, S2, no appreciable murmur  ABDOMEN: Soft, nontender, BS +  MSK: Warm, no lower extremity edema, no clubbing or cyanosis  NEUROLOGIC: Alert and oriented x4, moving all extremities with good strength   INTEGUMENTARY:  PSYCHIATRY:        ASSESSMENT/PLAN   Symptomatic anemia requiring blood transfusion   Possible occult GI bleed   Acute kidney injury on CKD 2    History of: Ischemic cardiomyopathy, chronic heart failure reduced ejection fraction 20%-compensated, VT/torsades on amiodarone, coronary artery disease obstructive medical management, alcoholic cirrhosis, COPD, BPH, smoker      GI consulted.  May need EGD given requirement for antiplatelets and history of cirrhosis.    Cardiology consulted.  Need recommendations regarding dual antiplatelet therapy in the setting of anemia and recent angiogram.  Pending repeat lab work for today    DVT prophylaxis:  Anticoagulation not safe at this time    Critical care diagnosis:  Symptomatic anemia requiring blood transfusion  Critical care time spent:  40 minutes    Anticipated discharge and disposition:   __________________________________________________________________________    LABS/MICRO/MEDS/DIAGNOSTICS       LABS  Recent Labs      11/18/23  1639   *   K 5.1   CHLORIDE 96*   CO2 20*   BUN 23.6   CREATININE 1.64*   GLUCOSE 101   CALCIUM 8.6*   ALKPHOS 54   AST 19   ALT 14   ALBUMIN 3.0*     Recent Labs     11/18/23  1639 11/18/23  2307   WBC 7.62  --    RBC 2.27*  --    HCT 21.6* 21.2*   MCV 95.2*  --      --        MICROBIOLOGY  Microbiology Results (last 7 days)       ** No results found for the last 168 hours. **               MEDICATIONS   amiodarone  400 mg Oral Daily    atorvastatin  80 mg Oral Daily    folic acid  1 mg Oral Daily    furosemide (LASIX) injection  40 mg Intravenous Once    furosemide  40 mg Oral Daily    gabapentin  300 mg Oral TID    metoprolol succinate  12.5 mg Oral Daily    pantoprazole  40 mg Intravenous Q12H    tamsulosin  0.4 mg Oral Daily    umeclidinium-vilanteroL  1 puff Inhalation Daily         INFUSIONS         DIAGNOSTIC TESTS  No orders to display        EF   Date Value Ref Range Status   06/08/2023 40 % Final          NUTRITION STATUS  Patient meets ASPEN criteria for   malnutrition of   per RD assessment as evidenced by:                       A minimum of two characteristics is recommended for diagnosis of either severe or non-severe malnutrition.       Case related differential diagnoses have been reviewed; assessment and plan has been documented. I have personally reviewed the labs and test results that are currently available; I have reviewed the patients medication list. I have reviewed the consulting providers recommendations. I have reviewed or attempted to review medical records based upon their availability.  All of the patient's and/or family's questions have been addressed and answered to the best of my ability.  I will continue to monitor closely and make adjustments to medical management as needed.  This document was created using M*Modal Fluency Direct.  Transcription errors may have been made.  Please contact me if any questions may rise regarding documentation to clarify  transcription.        Diaz Green MD   Internal Medicine  Department of Hospital Medicine [facility] - [facility]

## 2023-11-19 NOTE — ED NOTES
[facility] here with pt on stretcher in stable condition in route to [facility]  Rm 847, no s/s of blood transfusion reactions. Blood transfusing to 20g RAC at 120mL/ hr and protonix infusing to 22g left upper arm at 20mL/hr

## 2023-11-20 LAB
AFP-TM SERPL-MCNC: 3.3 NG/ML
ALBUMIN SERPL-MCNC: 3.1 G/DL (ref 3.4–4.8)
ALP SERPL-CCNC: 59 UNIT/L (ref 40–150)
ALT SERPL-CCNC: 13 UNIT/L (ref 0–55)
ANION GAP SERPL CALC-SCNC: 7 MEQ/L
AST SERPL-CCNC: 16 UNIT/L (ref 5–34)
BASOPHILS # BLD AUTO: 0.04 X10(3)/MCL
BASOPHILS NFR BLD AUTO: 0.6 %
BILIRUB SERPL-MCNC: 0.5 MG/DL
BILIRUBIN DIRECT+TOT PNL SERPL-MCNC: 0.2 MG/DL (ref 0–?)
BILIRUBIN DIRECT+TOT PNL SERPL-MCNC: 0.3 MG/DL (ref 0–0.8)
BUN SERPL-MCNC: 27.6 MG/DL (ref 8.4–25.7)
CALCIUM SERPL-MCNC: 8 MG/DL (ref 8.8–10)
CHLORIDE SERPL-SCNC: 99 MMOL/L (ref 98–107)
CO2 SERPL-SCNC: 26 MMOL/L (ref 23–31)
CREAT SERPL-MCNC: 1.47 MG/DL (ref 0.73–1.18)
CREAT/UREA NIT SERPL: 19
EOSINOPHIL # BLD AUTO: 0.15 X10(3)/MCL (ref 0–0.9)
EOSINOPHIL NFR BLD AUTO: 2.3 %
ERYTHROCYTE [DISTWIDTH] IN BLOOD BY AUTOMATED COUNT: 17.1 % (ref 11.5–17)
GFR SERPLBLD CREATININE-BSD FMLA CKD-EPI: 53 MLS/MIN/1.73/M2
GLUCOSE SERPL-MCNC: 94 MG/DL (ref 82–115)
HCT VFR BLD AUTO: 29 % (ref 42–52)
HGB BLD-MCNC: 9.9 G/DL (ref 14–18)
IMM GRANULOCYTES # BLD AUTO: 0.18 X10(3)/MCL (ref 0–0.04)
IMM GRANULOCYTES NFR BLD AUTO: 2.8 %
INR PPP: 1.1
LYMPHOCYTES # BLD AUTO: 1.19 X10(3)/MCL (ref 0.6–4.6)
LYMPHOCYTES NFR BLD AUTO: 18.3 %
MCH RBC QN AUTO: 29.3 PG (ref 27–31)
MCHC RBC AUTO-ENTMCNC: 34.1 G/DL (ref 33–36)
MCV RBC AUTO: 85.8 FL (ref 80–94)
MONOCYTES # BLD AUTO: 1.07 X10(3)/MCL (ref 0.1–1.3)
MONOCYTES NFR BLD AUTO: 16.5 %
NEUTROPHILS # BLD AUTO: 3.87 X10(3)/MCL (ref 2.1–9.2)
NEUTROPHILS NFR BLD AUTO: 59.5 %
NRBC BLD AUTO-RTO: 0 %
PATH REV: NORMAL
PLATELET # BLD AUTO: 324 X10(3)/MCL (ref 130–400)
PMV BLD AUTO: 9 FL (ref 7.4–10.4)
POTASSIUM SERPL-SCNC: 4.7 MMOL/L (ref 3.5–5.1)
PROT SERPL-MCNC: 6.4 GM/DL (ref 5.8–7.6)
PROTHROMBIN TIME: 13.6 SECONDS (ref 12.5–14.5)
RBC # BLD AUTO: 3.38 X10(6)/MCL (ref 4.7–6.1)
SODIUM SERPL-SCNC: 132 MMOL/L (ref 136–145)
WBC # SPEC AUTO: 6.5 X10(3)/MCL (ref 4.5–11.5)

## 2023-11-20 PROCEDURE — 63600175 PHARM REV CODE 636 W HCPCS: Performed by: INTERNAL MEDICINE

## 2023-11-20 PROCEDURE — 27201423 OPTIME MED/SURG SUP & DEVICES STERILE SUPPLY: Performed by: INTERNAL MEDICINE

## 2023-11-20 PROCEDURE — 91110 GI TRC IMG INTRAL ESOPH-ILE: CPT | Mod: TC | Performed by: INTERNAL MEDICINE

## 2023-11-20 PROCEDURE — 21400001 HC TELEMETRY ROOM

## 2023-11-20 PROCEDURE — 25000242 PHARM REV CODE 250 ALT 637 W/ HCPCS: Performed by: INTERNAL MEDICINE

## 2023-11-20 PROCEDURE — 25000003 PHARM REV CODE 250: Performed by: INTERNAL MEDICINE

## 2023-11-20 PROCEDURE — 85610 PROTHROMBIN TIME: CPT

## 2023-11-20 PROCEDURE — C9113 INJ PANTOPRAZOLE SODIUM, VIA: HCPCS | Performed by: INTERNAL MEDICINE

## 2023-11-20 PROCEDURE — 80048 BASIC METABOLIC PNL TOTAL CA: CPT | Performed by: INTERNAL MEDICINE

## 2023-11-20 PROCEDURE — 80076 HEPATIC FUNCTION PANEL: CPT

## 2023-11-20 PROCEDURE — 82105 ALPHA-FETOPROTEIN SERUM: CPT | Performed by: PHYSICIAN ASSISTANT

## 2023-11-20 PROCEDURE — 85025 COMPLETE CBC W/AUTO DIFF WBC: CPT | Performed by: INTERNAL MEDICINE

## 2023-11-20 RX ORDER — CYANOCOBALAMIN 1000 UG/ML
1000 INJECTION, SOLUTION INTRAMUSCULAR; SUBCUTANEOUS ONCE
Status: COMPLETED | OUTPATIENT
Start: 2023-11-20 | End: 2023-11-20

## 2023-11-20 RX ADMIN — METOPROLOL SUCCINATE 12.5 MG: 25 TABLET, EXTENDED RELEASE ORAL at 10:11

## 2023-11-20 RX ADMIN — Medication 6 MG: at 08:11

## 2023-11-20 RX ADMIN — GABAPENTIN 300 MG: 300 CAPSULE ORAL at 04:11

## 2023-11-20 RX ADMIN — GABAPENTIN 300 MG: 300 CAPSULE ORAL at 08:11

## 2023-11-20 RX ADMIN — FUROSEMIDE 40 MG: 40 TABLET ORAL at 10:11

## 2023-11-20 RX ADMIN — PANTOPRAZOLE SODIUM 40 MG: 40 INJECTION, POWDER, FOR SOLUTION INTRAVENOUS at 08:11

## 2023-11-20 RX ADMIN — UMECLIDINIUM BROMIDE AND VILANTEROL TRIFENATATE 1 PUFF: 62.5; 25 POWDER RESPIRATORY (INHALATION) at 10:11

## 2023-11-20 RX ADMIN — SIMETHICONE 160 MG: 80 TABLET, CHEWABLE ORAL at 07:11

## 2023-11-20 RX ADMIN — BE HEALTH MAGNESIUM CITRATE ORAL SOLUTION - CHERRY 296 ML: 1.75 LIQUID ORAL at 05:11

## 2023-11-20 RX ADMIN — PANTOPRAZOLE SODIUM 40 MG: 40 INJECTION, POWDER, FOR SOLUTION INTRAVENOUS at 10:11

## 2023-11-20 RX ADMIN — AMIODARONE HYDROCHLORIDE 400 MG: 200 TABLET ORAL at 10:11

## 2023-11-20 RX ADMIN — ATORVASTATIN CALCIUM 80 MG: 40 TABLET, FILM COATED ORAL at 10:11

## 2023-11-20 RX ADMIN — CYANOCOBALAMIN 1000 MCG: 1000 INJECTION, SOLUTION INTRAMUSCULAR; SUBCUTANEOUS at 07:11

## 2023-11-20 RX ADMIN — TAMSULOSIN HYDROCHLORIDE 0.4 MG: 0.4 CAPSULE ORAL at 10:11

## 2023-11-20 RX ADMIN — GABAPENTIN 300 MG: 300 CAPSULE ORAL at 10:11

## 2023-11-20 NOTE — PROGRESS NOTES
"Gastroenterology Progress Note    Subjective/Interval History:  H&H 9.9/29.0 - decrease from yesterday    Spoke with nurse regarding patient. VCE administered at 0800.     Patient resting in bed, VCE monitor in place. He is tolerating VCE diet. Denies n/v, abd pain/tenderness. He states yesterday he had 4 BM, denies diarrhea or loose stooling. He was told by nursing staff it was "normal" but he did not see the color of the stool. He states prior to yesterday he was constipated from his meds.    ROS:  Review of Systems   Constitutional:  Positive for malaise/fatigue.   Respiratory:  Negative for cough and shortness of breath.    Cardiovascular:  Negative for chest pain.   Gastrointestinal:  Negative for abdominal pain, constipation, diarrhea, nausea and vomiting.   Neurological:  Negative for weakness.       Vital Signs:  BP (!) 102/47   Pulse 63   Temp 97.4 °F (36.3 °C) (Oral)   Resp 20   Ht 5' 9" (1.753 m)   Wt 81.2 kg (179 lb)   SpO2 95%   BMI 26.43 kg/m²   Body mass index is 26.43 kg/m².    Physical Exam:  Physical Exam  Constitutional:       General: He is not in acute distress.     Appearance: He is obese. He is ill-appearing.   HENT:      Head: Normocephalic and atraumatic.      Right Ear: External ear normal.      Left Ear: External ear normal.      Nose: Nose normal.      Mouth/Throat:      Pharynx: Oropharynx is clear.   Eyes:      Conjunctiva/sclera: Conjunctivae normal.   Pulmonary:      Effort: Pulmonary effort is normal. No respiratory distress.   Abdominal:      General: Bowel sounds are normal. There is distension.      Tenderness: There is no abdominal tenderness. There is no guarding.      Comments: Abd firm   Musculoskeletal:         General: Normal range of motion.      Cervical back: Normal range of motion.   Skin:     General: Skin is warm and dry.      Coloration: Skin is pale.   Neurological:      Mental Status: He is alert. Mental status is at baseline.   Psychiatric:         Mood and " Affect: Mood normal.         Behavior: Behavior normal.         Labs:  Recent Results (from the past 24 hour(s))   Iron and TIBC    Collection Time: 11/19/23 12:59 PM   Result Value Ref Range    Iron Binding Capacity Unsaturated 71 69 - 240 ug/dL    Iron Level 277 (H) 65 - 175 ug/dL    Transferrin 306 163 - 344 mg/dL    Iron Binding Capacity Total 348 250 - 450 ug/dL    Iron Saturation 80 (H) 20 - 50 %   Ferritin    Collection Time: 11/19/23 12:59 PM   Result Value Ref Range    Ferritin Level 64.19 21.81 - 274.66 ng/mL   Vitamin B12    Collection Time: 11/19/23 12:59 PM   Result Value Ref Range    Vitamin B12 Level 258 213 - 816 pg/mL   Folate    Collection Time: 11/19/23 12:59 PM   Result Value Ref Range    Folate Level 19.0 7.0 - 31.4 ng/mL   CBC Without Differential    Collection Time: 11/19/23 12:59 PM   Result Value Ref Range    WBC 6.75 4.50 - 11.50 x10(3)/mcL    RBC 3.85 (L) 4.70 - 6.10 x10(6)/mcL    Hgb 11.4 (L) 14.0 - 18.0 g/dL    Hct 34.9 (L) 42.0 - 52.0 %    MCV 90.6 80.0 - 94.0 fL    MCH 29.6 27.0 - 31.0 pg    MCHC 32.7 (L) 33.0 - 36.0 g/dL    RDW 17.5 (H) 11.5 - 17.0 %    Platelet 293 130 - 400 x10(3)/mcL    MPV 9.3 7.4 - 10.4 fL    NRBC% 0.0 %   Basic Metabolic Panel    Collection Time: 11/19/23 12:59 PM   Result Value Ref Range    Sodium Level 132 (L) 136 - 145 mmol/L    Potassium Level 5.1 3.5 - 5.1 mmol/L    Chloride 104 98 - 107 mmol/L    Carbon Dioxide 19 (L) 23 - 31 mmol/L    Glucose Level 82 82 - 115 mg/dL    Blood Urea Nitrogen 21.6 8.4 - 25.7 mg/dL    Creatinine 1.33 (H) 0.73 - 1.18 mg/dL    BUN/Creatinine Ratio 16     Calcium Level Total 9.3 8.8 - 10.0 mg/dL    Anion Gap 9.0 mEq/L    eGFR 60 mls/min/1.73/m2   Phosphorus    Collection Time: 11/19/23 12:59 PM   Result Value Ref Range    Phosphorus Level 3.6 2.3 - 4.7 mg/dL   Magnesium    Collection Time: 11/19/23 12:59 PM   Result Value Ref Range    Magnesium Level 2.10 1.60 - 2.60 mg/dL   Basic Metabolic Panel    Collection Time: 11/20/23   3:28 AM   Result Value Ref Range    Sodium Level 132 (L) 136 - 145 mmol/L    Potassium Level 4.7 3.5 - 5.1 mmol/L    Chloride 99 98 - 107 mmol/L    Carbon Dioxide 26 23 - 31 mmol/L    Glucose Level 94 82 - 115 mg/dL    Blood Urea Nitrogen 27.6 (H) 8.4 - 25.7 mg/dL    Creatinine 1.47 (H) 0.73 - 1.18 mg/dL    BUN/Creatinine Ratio 19     Calcium Level Total 8.0 (L) 8.8 - 10.0 mg/dL    Anion Gap 7.0 mEq/L    eGFR 53 mls/min/1.73/m2   AFP Tumor Marker    Collection Time: 11/20/23  3:28 AM   Result Value Ref Range    Alpha Fetoprotein Level 3.30 <=8.90 ng/mL   CBC with Differential    Collection Time: 11/20/23  3:28 AM   Result Value Ref Range    WBC 6.50 4.50 - 11.50 x10(3)/mcL    RBC 3.38 (L) 4.70 - 6.10 x10(6)/mcL    Hgb 9.9 (L) 14.0 - 18.0 g/dL    Hct 29.0 (L) 42.0 - 52.0 %    MCV 85.8 80.0 - 94.0 fL    MCH 29.3 27.0 - 31.0 pg    MCHC 34.1 33.0 - 36.0 g/dL    RDW 17.1 (H) 11.5 - 17.0 %    Platelet 324 130 - 400 x10(3)/mcL    MPV 9.0 7.4 - 10.4 fL    Neut % 59.5 %    Lymph % 18.3 %    Mono % 16.5 %    Eos % 2.3 %    Basophil % 0.6 %    Lymph # 1.19 0.6 - 4.6 x10(3)/mcL    Neut # 3.87 2.1 - 9.2 x10(3)/mcL    Mono # 1.07 0.1 - 1.3 x10(3)/mcL    Eos # 0.15 0 - 0.9 x10(3)/mcL    Baso # 0.04 <=0.2 x10(3)/mcL    IG# 0.18 (H) 0 - 0.04 x10(3)/mcL    IG% 2.8 %    NRBC% 0.0 %         Assessment/Plan:  This is a 62 YO CM recently evaluated by our group with a past medical history of etoh cirrhosis, CHF, COPD, HTN. Presented to [facility] 11/18/23 for complaints of weakness/fatigue. On arrival, hypotensive 94/49 but otherwise stable. Labs revealed hemoglobin 6.6g/dL, mcv 95.2, INR 1.1, sodium 127, creatinine 1.64, preserved BUN, albumim 3.0, , normal lactate, UA wnl. FOBT negative. No recent imaging available for review. Transferred to [facility] for further management. GI consulted for cirrhosis/anemia.     Symptomatic anemia with possible component 2/2 GI blood loss  - monitor and transfuse as needed to keep Hgb  >7-8  - monitor stool for color/blood  - PPI BID  - folate, vitB12 wnl  - EGD and colonoscopy in June 2023 wnl. Planned for outpatient VCE - patient did not show up at this appointment  - VCE in process today. Diet per VCE instructions. Pending results consider endoscopic intervention.    ETOH cirrhosis  - Origin: likely etoh  - MELD 11/18/23: 12  - Rehab: no etoh in the last 2 months  - Transplant: can consider if he continues to avoid etoh  - Varices: none on recent EGD 6/9/23 by Dr. Teixeira: entirely normal.  - HCC: US 11/19/23: no focal liver mass seen. AFP 3.3  - HE: no history  - Ascites: none since CT in 2018  - Infection: recommend vaccinations  - CRC Screen: up to date - Colonoscopy 6/10/23 for ANGEL by Dr. Flower: entirely normal.  - Continue routine medical management  - Low Na+ diet  - Complete etoh abstinence  - If truly cirrhotic, he is very well compensated.    3. Cardiovascular comorbidities requiring anticoagulation  - on plavix and ASA 81mg at home - held at this time     JERRY AdamsC  Gastroenterology  [facility]

## 2023-11-20 NOTE — PLAN OF CARE
Problem: Adult Inpatient Plan of Care  Goal: Plan of Care Review  Outcome: Ongoing, Progressing  Flowsheets (Taken 11/19/2023 2001)  Plan of Care Reviewed With: patient  Goal: Patient-Specific Goal (Individualized)  Outcome: Ongoing, Progressing  Flowsheets (Taken 11/19/2023 2001)  Anxieties, Fears or Concerns: none  Individualized Care Needs: none  Goal: Absence of Hospital-Acquired Illness or Injury  Outcome: Ongoing, Progressing  Intervention: Identify and Manage Fall Risk  Flowsheets (Taken 11/19/2023 2001)  Safety Promotion/Fall Prevention:   assistive device/personal item within reach   Fall Risk signage in place   Fall Risk reviewed with patient/family   lighting adjusted   nonskid shoes/socks when out of bed   side rails raised x 2  Intervention: Prevent Skin Injury  Flowsheets (Taken 11/19/2023 2001)  Body Position:   position changed independently   weight shifting  Skin Protection:   adhesive use limited   tubing/devices free from skin contact   transparent dressing maintained  Intervention: Prevent and Manage VTE (Venous Thromboembolism) Risk  Flowsheets (Taken 11/19/2023 2001)  Activity Management: Ambulated to bathroom - L4  VTE Prevention/Management:   ROM (active) performed   ROM (passive) performed   fluids promoted   bleeding precations maintained   ambulation promoted  Range of Motion:   active ROM (range of motion) encouraged   ROM (range of motion) performed  Intervention: Prevent Infection  Flowsheets (Taken 11/19/2023 2001)  Infection Prevention:   hand hygiene promoted   equipment surfaces disinfected   rest/sleep promoted   single patient room provided  Goal: Optimal Comfort and Wellbeing  Outcome: Ongoing, Progressing  Intervention: Monitor Pain and Promote Comfort  Flowsheets (Taken 11/19/2023 2001)  Pain Management Interventions:   care clustered   relaxation techniques promoted   quiet environment facilitated  Intervention: Provide Person-Centered Care  Flowsheets (Taken 11/19/2023  2001)  Trust Relationship/Rapport:   care explained   choices provided   emotional support provided   empathic listening provided   questions answered   questions encouraged   reassurance provided   thoughts/feelings acknowledged  Goal: Readiness for Transition of Care  Outcome: Ongoing, Progressing     Problem: Fluid Imbalance (Pneumonia)  Goal: Fluid Balance  Outcome: Ongoing, Progressing  Intervention: Monitor and Manage Fluid Balance  Flowsheets (Taken 11/19/2023 2001)  Fluid/Electrolyte Management: fluids provided     Problem: Infection (Pneumonia)  Goal: Resolution of Infection Signs and Symptoms  Outcome: Ongoing, Progressing  Intervention: Prevent Infection Progression  Flowsheets (Taken 11/19/2023 2001)  Fever Reduction/Comfort Measures:   lightweight bedding   lightweight clothing  Infection Management: aseptic technique maintained  Isolation Precautions: precautions maintained     Problem: Respiratory Compromise (Pneumonia)  Goal: Effective Oxygenation and Ventilation  Outcome: Ongoing, Progressing  Intervention: Promote Airway Secretion Clearance  Flowsheets (Taken 11/19/2023 2001)  Breathing Techniques/Airway Clearance: deep/controlled cough encouraged  Cough And Deep Breathing: done independently per patient  Intervention: Optimize Oxygenation and Ventilation  Flowsheets (Taken 11/19/2023 2001)  Airway/Ventilation Management: calming measures promoted  Head of Bed (HOB) Positioning: HOB at 15 degrees     Problem: Fall Injury Risk  Goal: Absence of Fall and Fall-Related Injury  Outcome: Ongoing, Progressing  Intervention: Identify and Manage Contributors  Flowsheets (Taken 11/19/2023 2001)  Self-Care Promotion:   independence encouraged   BADL personal objects within reach   BADL personal routines maintained   safe use of adaptive equipment encouraged  Medication Review/Management: medications reviewed

## 2023-11-20 NOTE — CONSULTS
[facility] - On[facility] Acute    Cardiology  Consult Note    Patient Name: Hemal Tolbert Jr.  MRN: [id number]  Admission Date: 11/18/2023  Hospital Length of Stay: 2 days  Code Status: Full Code   Attending Provider:  Dr. Hicks  Consulting Provider: Va Rudd MD  Primary Care Physician: Ivette Hearn FNP  Principal Problem:Symptomatic anemia    Patient information was obtained from patient and primary team.     Subjective:     Chief Complaint:  Antiplatelet therapy     HPI: 62 y/o male w/ PMH of alcoholic cirrhosis, COPD, hypertension, PAD on aspirin and Plavix, Chronic HFrEF. He was recently hospitalized for NSTEMI and arrhythmia for which he received defibrillation x 2, amiodarone and dobutamine followed by Impella 10/24/23. Presented to outside facility on 11/18/23 w/ weakness and lightheadedness; admitted for symptomatic anemia. Cardiology consulted for antiplatelet therapy. EGD negative 6/9/23. GI consulted.      PMH: alcoholic cirrhosis, COPD, hypertension, PAD on aspirin and Plavix, Chronic HFrEF     Previous Cardiac Diagnostics:     Echo 10/23/23:    Left Ventricle: The left ventricle is mildly dilated. Normal wall thickness. There is concentric remodeling. Moderate global hypokinesis present. There is severely reduced systolic function with a visually estimated ejection fraction of 20 - 25%. There is normal diastolic function.    Right Ventricle: Normal right ventricular cavity size. Wall thickness is normal. Right ventricle wall motion  is normal. Systolic function is normal.    Mitral Valve: There is mild to moderate regurgitation with a centrally directed jet.    Pulmonary Artery: The estimated pulmonary artery systolic pressure is 20 mmHg.    IVC/SVC: Normal venous pressure at 3 mmHg.    Catheterization 10/24/23:    The Ost RCA to Prox RCA lesion was 100% stenosed.    The Ost LAD to Prox LAD lesion was 80% stenosed.    The pre-procedure left ventricular end diastolic pressure was  31.    The estimated blood loss was none.    There was two vessel coronary artery disease.    There was diastolic dysfunction.    The filling pressures on the right and left were moderately elevated. Pulmonary hypertension was mild to moderate.    There was no aortic valve stenosis.    DISCUSS HIGH RISK IMPELLA SUPPORTED INTERVENTION VS CABG    Impella insertion 10/26/23:      The Ost LAD lesion was 50% stenosed.    The estimated blood loss was none.    There was two vessel coronary artery disease.    THE IABP WAS REMOVED AND EXCHANGED FOR IMPELLA IN ANTICIPATION OF HIGH RISK INTERVENTION    ABNORMAL IFR PROBABLY SECONDARY TO HIS COLLATERALS TO OCCLUDED RCA.    IVUS FINDINGS DO NOT SUGGEST SIGNIFICANT DISEASE TO INTERVENE UPON. DESPITE ABNORMAL IFR.    Past Medical History:   Diagnosis Date    Alcoholic cirrhosis     CHF (congestive heart failure)     LV EF 40%    COPD (chronic obstructive pulmonary disease)     Hypertension     Myocardial infarction     Peripheral artery disease     with stents    Sleep apnea        Past Surgical History:   Procedure Laterality Date    ANGIOGRAM, ABDOMINAL AORTA  10/24/2023    Procedure: Angiogram, Abdominal Aorta;  Surgeon: Janette Ernandez MD;  Location: [facility] CATH LAB;  Service: Cardiology;;    ARTERIOGRAPHY OF SUBCLAVIAN ARTERY Left 10/24/2023    Procedure: ARTERIOGRAM, SUBCLAVIAN;  Surgeon: Janette Ernandez MD;  Location: [facility] CATH LAB;  Service: Cardiology;  Laterality: Left;    CATHETERIZATION OF BOTH LEFT AND RIGHT HEART N/A 10/24/2023    Procedure: CATHETERIZATION, HEART, BOTH LEFT AND RIGHT;  Surgeon: Janette Ernandez MD;  Location: [facility] CATH LAB;  Service: Cardiology;  Laterality: N/A;    COLONOSCOPY N/A 6/10/2023    Procedure: COLONOSCOPY;  Surgeon: Johan Flower MD;  Location: [facility] OR;  Service: Gastroenterology;  Laterality: N/A;    ESOPHAGOGASTRODUODENOSCOPY N/A 6/9/2023    Procedure: EGD;  Surgeon: Tima Teixeira MD;  Location: [facility] ENDOSCOPY;  Service:  Gastroenterology;  Laterality: N/A;    INSERTION OF INTRA-AORTIC BALLOON ASSIST DEVICE  10/24/2023    Procedure: INSERTION, INTRA-AORTIC BALLOON PUMP;  Surgeon: Janette Ernandez MD;  Location: [facility] CATH LAB;  Service: Cardiology;;    INSERTION OF INTRAVASCULAR MICROAXIAL BLOOD PUMP N/A 10/26/2023    Procedure: INSERTION, IMPELLA;  Surgeon: Janette Ernandez MD;  Location: [facility] CATH LAB;  Service: Cardiology;  Laterality: N/A;    INSTANTANEOUS WAVE-FREE RATIO (IFR) N/A 10/26/2023    Procedure: Instantaneous Wave-Free Ratio (IFR);  Surgeon: Janette Ernandez MD;  Location: [facility] CATH LAB;  Service: Cardiology;  Laterality: N/A;    INTRAVASCULAR ULTRASOUND, CORONARY N/A 10/26/2023    Procedure: Ultrasound-coronary;  Surgeon: Janette Ernandez MD;  Location: [facility] CATH LAB;  Service: Cardiology;  Laterality: N/A;    PERCUTANEOUS CORONARY INTERVENTION, ARTERY N/A 10/26/2023    Procedure: Percutaneous coronary intervention- with Impella;  Surgeon: Janette Ernandez MD;  Location: [facility] CATH LAB;  Service: Cardiology;  Laterality: N/A;    PLACEMENT, IABP  10/24/2023    Procedure: Placement, IABP;  Surgeon: Janette Ernandez MD;  Location: [facility] CATH LAB;  Service: Cardiology;;       Review of patient's allergies indicates:  No Known Allergies    No current facility-administered medications on file prior to encounter.     Current Outpatient Medications on File Prior to Encounter   Medication Sig    albuterol (PROVENTIL/VENTOLIN HFA) 90 mcg/actuation inhaler Inhale 1-2 puffs into the lungs every 4 (four) hours as needed.    amiodarone (PACERONE) 200 MG Tab Take 2 tablets (400 mg total) by mouth once daily.    aspirin 81 MG Chew CHEW 1 tablet (81 mg total) by mouth once daily.    atorvastatin (LIPITOR) 80 MG tablet Take 1 tablet (80 mg total) by mouth once daily.    clopidogreL (PLAVIX) 75 mg tablet Take 1 tablet (75 mg total) by mouth once daily.    folic acid (FOLVITE) 1 MG tablet Take 1 tablet (1 mg total) by mouth once daily.     furosemide (LASIX) 40 MG tablet Take 40 mg by mouth once daily.    gabapentin (NEURONTIN) 300 MG capsule Take 300 mg by mouth 3 (three) times daily.    metoprolol succinate (TOPROL-XL) 25 MG 24 hr tablet Take ½ ablet (12.5 mg total) by mouth once daily.    pantoprazole (PROTONIX) 40 MG tablet Take 1 tablet (40 mg total) by mouth 2 (two) times daily.    sacubitriL-valsartan (ENTRESTO) 24-26 mg per tablet Take 1 tablet by mouth 2 (two) times daily.    spironolactone (ALDACTONE) 25 MG tablet Take 1 tablet (25 mg total) by mouth 2 (two) times daily.    STIOLTO RESPIMAT 2.5-2.5 mcg/actuation Mist Inhale 2 puffs into the lungs once daily.    tamsulosin (FLOMAX) 0.4 mg Cap Take 1 capsule (0.4 mg total) by mouth once daily.     Family History    None       Tobacco Use    Smoking status: Every Day     Current packs/day: 1.00     Types: Cigarettes    Smokeless tobacco: Never   Substance and Sexual Activity    Alcohol use: Yes     Alcohol/week: 24.0 standard drinks of alcohol     Types: 24 Cans of beer per week     Comment: Refused to quit in June 23    Drug use: Yes     Types: Marijuana    Sexual activity: Not Currently       Review of Systems   Constitutional:  Negative for diaphoresis, fatigue and fever.   Respiratory:  Negative for cough and shortness of breath.    Cardiovascular:  Negative for chest pain and palpitations.   Gastrointestinal:  Negative for blood in stool, diarrhea, nausea and vomiting.   Neurological:  Negative for dizziness, syncope and light-headedness.       Objective:     Vital Signs (Most Recent):  Temp: 97.4 °F (36.3 °C) (11/20/23 0739)  Pulse: 63 (11/20/23 0739)  Resp: 18 (11/20/23 0739)  BP: 108/63 (11/20/23 0739)  SpO2: (!) 94 % (11/20/23 0739) Vital Signs (24h Range):  Temp:  [97.4 °F (36.3 °C)-98.9 °F (37.2 °C)] 97.4 °F (36.3 °C)  Pulse:  [63-75] 63  Resp:  [17-19] 18  SpO2:  [92 %-98 %] 94 %  BP: ()/(45-72) 108/63     Weight: 81.2 kg (179 lb)  Body mass index is 26.43 kg/m².    SpO2: (!)  94 %         Intake/Output Summary (Last 24 hours) at 11/20/2023 0817  Last data filed at 11/19/2023 1223  Gross per 24 hour   Intake 688.33 ml   Output --   Net 688.33 ml       Lines/Drains/Airways       Peripheral Intravenous Line  Duration                  Peripheral IV - Single Lumen 11/18/23 1700 20 G Anterior;Left;Proximal Upper Arm 1 day         Peripheral IV - Single Lumen 11/18/23 1700 20 G Right Antecubital 1 day                    Significant Labs:  Recent Results (from the past 72 hour(s))   Comprehensive metabolic panel    Collection Time: 11/18/23  4:39 PM   Result Value Ref Range    Sodium Level 127 (L) 136 - 145 mmol/L    Potassium Level 5.1 3.5 - 5.1 mmol/L    Chloride 96 (L) 98 - 107 mmol/L    Carbon Dioxide 20 (L) 23 - 31 mmol/L    Glucose Level 101 82 - 115 mg/dL    Blood Urea Nitrogen 23.6 8.4 - 25.7 mg/dL    Creatinine 1.64 (H) 0.73 - 1.18 mg/dL    Calcium Level Total 8.6 (L) 8.8 - 10.0 mg/dL    Protein Total 7.5 5.8 - 7.6 gm/dL    Albumin Level 3.0 (L) 3.4 - 4.8 g/dL    Globulin 4.5 (H) 2.4 - 3.5 gm/dL    Albumin/Globulin Ratio 0.7 (L) 1.1 - 2.0 ratio    Bilirubin Total 0.3 <=1.5 mg/dL    Alkaline Phosphatase 54 40 - 150 unit/L    Alanine Aminotransferase 14 0 - 55 unit/L    Aspartate Aminotransferase 19 5 - 34 unit/L    eGFR 46 mls/min/1.73/m2   Lactic acid, plasma    Collection Time: 11/18/23  4:39 PM   Result Value Ref Range    Lactic Acid Level 0.9 0.5 - 2.2 mmol/L   CK    Collection Time: 11/18/23  4:39 PM   Result Value Ref Range    Creatine Kinase 37 30 - 200 U/L   CBC with Differential    Collection Time: 11/18/23  4:39 PM   Result Value Ref Range    WBC 7.62 4.50 - 11.50 x10(3)/mcL    RBC 2.27 (L) 4.70 - 6.10 x10(6)/mcL    Hgb 6.6 (L) 14.0 - 18.0 g/dL    Hct 21.6 (L) 42.0 - 52.0 %    MCV 95.2 (H) 80.0 - 94.0 fL    MCH 29.1 27.0 - 31.0 pg    MCHC 30.6 (L) 33.0 - 36.0 g/dL    RDW 15.4 11.5 - 17.0 %    Platelet 364 130 - 400 x10(3)/mcL    MPV 9.0 7.4 - 10.4 fL    Neut % 63.8 %    Lymph %  19.9 %    Mono % 13.6 %    Eos % 1.0 %    Basophil % 0.4 %    Lymph # 1.52 0.6 - 4.6 x10(3)/mcL    Neut # 4.85 2.1 - 9.2 x10(3)/mcL    Mono # 1.04 0.1 - 1.3 x10(3)/mcL    Eos # 0.08 0 - 0.9 x10(3)/mcL    Baso # 0.03 <=0.2 x10(3)/mcL    IG# 0.10 (H) 0 - 0.04 x10(3)/mcL    IG% 1.3 %   Protime-INR    Collection Time: 11/18/23  4:39 PM   Result Value Ref Range    PT 10.7 (L) 12.5 - 14.5 seconds    INR 1.1 <=1.3   APTT    Collection Time: 11/18/23  4:39 PM   Result Value Ref Range    PTT 20.7 (L) 23.2 - 33.7 seconds   Troponin ISTAT    Collection Time: 11/18/23  4:41 PM   Result Value Ref Range    POC Cardiac Troponin I 0.07 0.00 - 0.08 ng/mL    Sample unknown    COVID/FLU A&B PCR    Collection Time: 11/18/23  4:44 PM   Result Value Ref Range    Influenza A PCR Not Detected Not Detected    Influenza B PCR Not Detected Not Detected    SARS-CoV-2 PCR Not Detected Not Detected, Negative, Invalid   Urinalysis, Reflex to Urine Culture    Collection Time: 11/18/23  5:09 PM    Specimen: Urine   Result Value Ref Range    Color, UA Yellow Yellow, Light-Yellow, Dark Yellow, Luma, Straw    Appearance, UA Clear Clear    Specific Gravity, UA 1.015 1.005 - 1.030    pH, UA 7.0 5.0 - 8.5    Protein, UA Negative Negative    Glucose, UA Negative Negative, Normal    Ketones, UA Negative Negative    Blood, UA Negative Negative    Bilirubin, UA Negative Negative    Urobilinogen, UA 0.2 0.2, 1.0, Normal    Nitrites, UA Negative Negative    Leukocyte Esterase, UA Negative Negative   Urinalysis, Microscopic    Collection Time: 11/18/23  5:09 PM   Result Value Ref Range    Bacteria, UA None Seen None Seen, Rare, Occasional /HPF    RBC, UA None Seen None Seen, 0-2, 3-5, 0-5 /HPF    WBC, UA None Seen None Seen, 0-2, 3-5, 0-5 /HPF    Squamous Epithelial Cells, UA None Seen None Seen, Rare, Occasional, Occ /HPF   Prepare RBC 1 Unit    Collection Time: 11/18/23  5:25 PM   Result Value Ref Range    UNIT NUMBER [id number]     UNIT ABO/RH A POS      DISPENSE STATUS Transfused     Unit Expiration [id number]     Product Code A9720B28     Unit Blood Type Code 6200     CROSSMATCH INTERPRETATION Compatible    Type & Screen    Collection Time: 11/18/23  5:25 PM   Result Value Ref Range    Group & Rh A POS     Indirect Martinez GEL NEG     Specimen Outdate 11/21/2023 23:59    Occult Blood, Stool 1st Specimen    Collection Time: 11/18/23  6:00 PM   Result Value Ref Range    Occult Blood Stool 1 Negative Negative   Hemoglobin and Hematocrit    Collection Time: 11/18/23 11:07 PM   Result Value Ref Range    Hgb 6.9 (L) 14.0 - 18.0 g/dL    Hct 21.2 (L) 42.0 - 52.0 %   Type & Screen    Collection Time: 11/18/23 11:07 PM   Result Value Ref Range    Group & Rh A POS     Indirect Martinez GEL NEG     Specimen Outdate 11/21/2023 23:59    BNP    Collection Time: 11/18/23 11:07 PM   Result Value Ref Range    Natriuretic Peptide 483.8 (H) <=100.0 pg/mL   Prepare RBC 2 Units; needed    Collection Time: 11/18/23 11:07 PM   Result Value Ref Range    UNIT NUMBER [id number]     UNIT ABO/RH A POS     DISPENSE STATUS Transfused     Unit Expiration [id number]     Product Code J6103V46     Unit Blood Type Code 6200     CROSSMATCH INTERPRETATION Compatible     UNIT NUMBER [id number]     UNIT ABO/RH A POS     DISPENSE STATUS Transfused     Unit Expiration [id number]     Product Code G9138D61     Unit Blood Type Code 6200     CROSSMATCH INTERPRETATION Compatible    Iron and TIBC    Collection Time: 11/19/23 12:59 PM   Result Value Ref Range    Iron Binding Capacity Unsaturated 71 69 - 240 ug/dL    Iron Level 277 (H) 65 - 175 ug/dL    Transferrin 306 163 - 344 mg/dL    Iron Binding Capacity Total 348 250 - 450 ug/dL    Iron Saturation 80 (H) 20 - 50 %   Ferritin    Collection Time: 11/19/23 12:59 PM   Result Value Ref Range    Ferritin Level 64.19 21.81 - 274.66 ng/mL   Vitamin B12    Collection Time: 11/19/23 12:59 PM   Result Value Ref Range    Vitamin B12 Level 258 213 - 816 pg/mL    Folate    Collection Time: 11/19/23 12:59 PM   Result Value Ref Range    Folate Level 19.0 7.0 - 31.4 ng/mL   CBC Without Differential    Collection Time: 11/19/23 12:59 PM   Result Value Ref Range    WBC 6.75 4.50 - 11.50 x10(3)/mcL    RBC 3.85 (L) 4.70 - 6.10 x10(6)/mcL    Hgb 11.4 (L) 14.0 - 18.0 g/dL    Hct 34.9 (L) 42.0 - 52.0 %    MCV 90.6 80.0 - 94.0 fL    MCH 29.6 27.0 - 31.0 pg    MCHC 32.7 (L) 33.0 - 36.0 g/dL    RDW 17.5 (H) 11.5 - 17.0 %    Platelet 293 130 - 400 x10(3)/mcL    MPV 9.3 7.4 - 10.4 fL    NRBC% 0.0 %   Basic Metabolic Panel    Collection Time: 11/19/23 12:59 PM   Result Value Ref Range    Sodium Level 132 (L) 136 - 145 mmol/L    Potassium Level 5.1 3.5 - 5.1 mmol/L    Chloride 104 98 - 107 mmol/L    Carbon Dioxide 19 (L) 23 - 31 mmol/L    Glucose Level 82 82 - 115 mg/dL    Blood Urea Nitrogen 21.6 8.4 - 25.7 mg/dL    Creatinine 1.33 (H) 0.73 - 1.18 mg/dL    BUN/Creatinine Ratio 16     Calcium Level Total 9.3 8.8 - 10.0 mg/dL    Anion Gap 9.0 mEq/L    eGFR 60 mls/min/1.73/m2   Phosphorus    Collection Time: 11/19/23 12:59 PM   Result Value Ref Range    Phosphorus Level 3.6 2.3 - 4.7 mg/dL   Magnesium    Collection Time: 11/19/23 12:59 PM   Result Value Ref Range    Magnesium Level 2.10 1.60 - 2.60 mg/dL   Basic Metabolic Panel    Collection Time: 11/20/23  3:28 AM   Result Value Ref Range    Sodium Level 132 (L) 136 - 145 mmol/L    Potassium Level 4.7 3.5 - 5.1 mmol/L    Chloride 99 98 - 107 mmol/L    Carbon Dioxide 26 23 - 31 mmol/L    Glucose Level 94 82 - 115 mg/dL    Blood Urea Nitrogen 27.6 (H) 8.4 - 25.7 mg/dL    Creatinine 1.47 (H) 0.73 - 1.18 mg/dL    BUN/Creatinine Ratio 19     Calcium Level Total 8.0 (L) 8.8 - 10.0 mg/dL    Anion Gap 7.0 mEq/L    eGFR 53 mls/min/1.73/m2   AFP Tumor Marker    Collection Time: 11/20/23  3:28 AM   Result Value Ref Range    Alpha Fetoprotein Level 3.30 <=8.90 ng/mL   CBC with Differential    Collection Time: 11/20/23  3:28 AM   Result Value Ref Range     WBC 6.50 4.50 - 11.50 x10(3)/mcL    RBC 3.38 (L) 4.70 - 6.10 x10(6)/mcL    Hgb 9.9 (L) 14.0 - 18.0 g/dL    Hct 29.0 (L) 42.0 - 52.0 %    MCV 85.8 80.0 - 94.0 fL    MCH 29.3 27.0 - 31.0 pg    MCHC 34.1 33.0 - 36.0 g/dL    RDW 17.1 (H) 11.5 - 17.0 %    Platelet 324 130 - 400 x10(3)/mcL    MPV 9.0 7.4 - 10.4 fL    Neut % 59.5 %    Lymph % 18.3 %    Mono % 16.5 %    Eos % 2.3 %    Basophil % 0.6 %    Lymph # 1.19 0.6 - 4.6 x10(3)/mcL    Neut # 3.87 2.1 - 9.2 x10(3)/mcL    Mono # 1.07 0.1 - 1.3 x10(3)/mcL    Eos # 0.15 0 - 0.9 x10(3)/mcL    Baso # 0.04 <=0.2 x10(3)/mcL    IG# 0.18 (H) 0 - 0.04 x10(3)/mcL    IG% 2.8 %    NRBC% 0.0 %         EKG: Normal sinus rhythm. Normal ECG. When compared with ECG of 04-NOV-2023 18:42,   No significant change was found  11/18/23.      Physical Exam  Constitutional:       General: He is not in acute distress.     Appearance: He is not ill-appearing.   Cardiovascular:      Rate and Rhythm: Normal rate and regular rhythm.      Pulses: Normal pulses.      Heart sounds: No murmur heard.  Pulmonary:      Effort: Pulmonary effort is normal.      Breath sounds: Normal breath sounds.   Musculoskeletal:      Right lower leg: No edema.      Left lower leg: No edema.   Skin:     General: Skin is warm and dry.   Psychiatric:         Mood and Affect: Mood normal.         Behavior: Behavior normal.         Thought Content: Thought content normal.         Judgment: Judgment normal.         Home Medications:   No current facility-administered medications on file prior to encounter.     Current Outpatient Medications on File Prior to Encounter   Medication Sig Dispense Refill    albuterol (PROVENTIL/VENTOLIN HFA) 90 mcg/actuation inhaler Inhale 1-2 puffs into the lungs every 4 (four) hours as needed.      amiodarone (PACERONE) 200 MG Tab Take 2 tablets (400 mg total) by mouth once daily. 60 tablet 11    aspirin 81 MG Chew CHEW 1 tablet (81 mg total) by mouth once daily. 30 tablet 11    atorvastatin  (LIPITOR) 80 MG tablet Take 1 tablet (80 mg total) by mouth once daily. 90 tablet 3    clopidogreL (PLAVIX) 75 mg tablet Take 1 tablet (75 mg total) by mouth once daily. 30 tablet 11    folic acid (FOLVITE) 1 MG tablet Take 1 tablet (1 mg total) by mouth once daily. 90 tablet 1    furosemide (LASIX) 40 MG tablet Take 40 mg by mouth once daily.      gabapentin (NEURONTIN) 300 MG capsule Take 300 mg by mouth 3 (three) times daily.      metoprolol succinate (TOPROL-XL) 25 MG 24 hr tablet Take ½ ablet (12.5 mg total) by mouth once daily. 15 tablet 11    pantoprazole (PROTONIX) 40 MG tablet Take 1 tablet (40 mg total) by mouth 2 (two) times daily. 60 tablet 2    sacubitriL-valsartan (ENTRESTO) 24-26 mg per tablet Take 1 tablet by mouth 2 (two) times daily. 60 tablet 11    spironolactone (ALDACTONE) 25 MG tablet Take 1 tablet (25 mg total) by mouth 2 (two) times daily. 60 tablet 11    STIOLTO RESPIMAT 2.5-2.5 mcg/actuation Mist Inhale 2 puffs into the lungs once daily.      tamsulosin (FLOMAX) 0.4 mg Cap Take 1 capsule (0.4 mg total) by mouth once daily. 30 capsule 11       Current Inpatient Medications:    Current Facility-Administered Medications:     0.9%  NaCl infusion (for blood administration), , Intravenous, Q24H PRN, Anjelica Sotelo MD    acetaminophen tablet 1,000 mg, 1,000 mg, Oral, Q6H PRN, Anjelica Sotelo MD    acetaminophen tablet 650 mg, 650 mg, Oral, Q4H PRN, Anjelica Sotelo MD    albuterol inhaler 2 puff, 2 puff, Inhalation, Q4H PRN, Anjelica Sotelo MD    aluminum-magnesium hydroxide-simethicone 200-200-20 mg/5 mL suspension 30 mL, 30 mL, Oral, QID PRN, Anjelica Sotelo MD    amiodarone tablet 400 mg, 400 mg, Oral, Daily, Anjelica Sotelo MD, 400 mg at 11/19/23 0856    atorvastatin tablet 80 mg, 80 mg, Oral, Daily, Anjelica Sotelo MD, 80 mg at 11/19/23 0855    furosemide tablet 40 mg, 40 mg, Oral, Daily, DeepakAnjelica MD, 40 mg at 11/19/23 0856    gabapentin capsule 300 mg, 300 mg, Oral, TID, DeepakAnjelica MD, 300 mg at  11/19/23 2054    melatonin tablet 6 mg, 6 mg, Oral, Nightly PRN, Anjelica Sotelo MD, 6 mg at 11/19/23 2054    metoprolol succinate (TOPROL-XL) 24 hr split tablet 12.5 mg, 12.5 mg, Oral, Daily, Anjelica Sotelo MD, 12.5 mg at 11/19/23 0856    ondansetron injection 4 mg, 4 mg, Intravenous, Q4H PRN, Anjelica Sotelo MD    pantoprazole injection 40 mg, 40 mg, Intravenous, Q12H, Anjelica Sotelo MD, 40 mg at 11/19/23 2054    polyethylene glycol packet 17 g, 17 g, Oral, BID PRN, Anjelica Sotelo MD    prochlorperazine injection Soln 5 mg, 5 mg, Intravenous, Q6H PRN, Anjelica Sotelo MD    senna-docusate 8.6-50 mg per tablet 2 tablet, 2 tablet, Oral, BID PRN, Anjelica Sotelo MD    simethicone chewable tablet 160 mg, 2 tablet, Oral, QID PRN, Johan Flower MD, 160 mg at 11/20/23 0745    sodium chloride 0.9% flush 10 mL, 10 mL, Intravenous, PRN, Anjelica Soetlo MD    tamsulosin 24 hr capsule 0.4 mg, 0.4 mg, Oral, Daily, Anjelica Sotelo MD, 0.4 mg at 11/19/23 0856    umeclidinium-vilanteroL 62.5-25 mcg/actuation DsDv 1 puff, 1 puff, Inhalation, Daily, Anjelica Sotelo MD, 1 puff at 11/19/23 0856         VTE Risk Mitigation (From admission, onward)           Ordered     IP VTE HIGH RISK PATIENT  Once         11/18/23 2050     Place sequential compression device  Until discontinued         11/18/23 2050                    Assessment:   CAD- RCA & LAD stenosis on angio 10/24/23 w/out stent placement  HFrEF - EF 20-25% on 10/23/23  Symptomatic Anemia  HTN  PAD    Plan:       Hold antiplatelet therapy for GI evaluation given no stent placed during recent angiogram    Thank you for your consult.     Va Rudd M.D.  [facility] HO-3  11/20/2023 8:17 AM    I have seen the patient, reviewed the resident's note, assessment and plan. I have personally interviewed and examined the patient at bedside and agree with the findings. Medical decision making listed above were done under my guidance.    Physical exam:  Cardiovascular system: regular  rhythm, no murmur.  Lungs: CTAB.  Extremities: No leg edema.    Plan:  Hold Antiplatlets  Pt scheduled for AICD 11/28   Signing off   normal

## 2023-11-20 NOTE — PLAN OF CARE
11/20/23 0941   Discharge Assessment   Assessment Type Discharge Planning Assessment   Confirmed/corrected address, phone number and insurance Yes   Confirmed Demographics Correct on Facesheet   Source of Information patient   Communicated SADI with patient/caregiver Yes   Reason For Admission GIB   People in Home friend(s)   Do you expect to return to your current living situation? Yes   Do you have help at home or someone to help you manage your care at home? Yes   Who are your caregiver(s) and their phone number(s)? Bestfriend: Elda Neal: [phone]   Prior to hospitilization cognitive status: Unable to Assess   Current cognitive status: Alert/Oriented   Walking or Climbing Stairs ambulation difficulty, requires equipment   Home Accessibility wheelchair accessible   Home Layout Able to live on 1st floor   Equipment Currently Used at Home cane, straight;walker, rolling;shower chair;wheelchair   Readmission within 30 days? No   Patient currently being followed by outpatient case management? No   Do you currently have service(s) that help you manage your care at home? Yes   Name and Contact number of agency [employer] Phone: [phone]   Is the pt/caregiver preference to resume services with current agency Yes   Do you take prescription medications? Yes   Do you have prescription coverage? Yes   Coverage Medicaid   Do you have any problems affording any of your prescribed medications? No   Is the patient taking medications as prescribed? yes   Who is going to help you get home at discharge? Patient reports his bestfriend, Elda Neal   How do you get to doctors appointments? family or friend will provide   Are you on dialysis? No   Do you take coumadin? No  (Plavix and Aspirin)   DME Needed Upon Discharge  none   Discharge Plan discussed with: Patient   Transition of Care Barriers None   Discharge Plan A Home with family   Discharge Plan B Home Health   OTHER   Name(s) of People in Home  Patient resides with his friend, Elda at home.       Patient's PCP is LINA Packer.  Patient is current with [facility] Phone: [phone]. CM sent available clinicals via [employer].  Patient's best friend, Elda will transport patient at discharge.  No barriers to discharge at this time.

## 2023-11-20 NOTE — PROGRESS NOTES
[facility] - [facility]  [facility] MEDICINE ~ PROGRESS NOTE        CHIEF COMPLAINT   Hospital follow up    HOSPITAL COURSE   63-year-old male with medical history of alcoholic cirrhosis, COPD, hypertension, PAD on aspirin and Plavix, Chronic HFrEF with recent hospitalization [facility] in [place] for NSTEMI and VT/torsades on 10/23/2023 on your hospital requiring defibrillation x2, placed on amiodarone, dobutamine as well as temporary IABP followed by Impella with echo showing EF 20- 25% and left heart catheterization revealed multivessel CAD with occluded proximal LAD 80% and ostial %, he was deemed not a surgical candidate by Cardiothoracic surgery, Impella removed and weaned off dobutamine and transitioned to oral amiodarone and started on maximal goal-directed medical therapy, he did not have coronary intervention, his life vest was declined and has referral to cardiology here in [place] and discharged home on 11/03/2023.     Presented to [facility] ED on 11/18/2023 with chief complaint of feeling weak and lightheaded.  Reports symptoms started while he was cooking a meal this afternoon in the kitchen where he felt dizzy and lightheaded.  He did not fall or lose consciousness.  Denies any nausea, vomiting, abdominal pain, melena or hematochezia.  Report last bowel movement about 3 days ago.  On arrival to ED he was afebrile borderline hypotensive otherwise hemodynamically stable.  Labs notable for hemoglobin 6.6, platelets 364, INR 1.1, creatinine 1.64.  Rectal exam by ED physician show no obvious blood or melena.  He was given Protonix 80 mg and transfuse 1 unit PRBC and transferred to [facility].      Upon my evaluation he is resting comfortably, denying any complaints at this time.  Repeat labs show hemoglobin 6.9.    Today  Discussed with nurse we need to put him on telemetry given his cardiac history.  Hemoglobin is stable otherwise when accounting for blood  equilibrating.        OBJECTIVE/PHYSICAL EXAM     VITAL SIGNS (MOST RECENT):  Temp: 97.4 °F (36.3 °C) (11/20/23 0739)  Pulse: 63 (11/20/23 0739)  Resp: 18 (11/20/23 0739)  BP: 108/63 (11/20/23 0739)  SpO2: (!) 94 % (11/20/23 0739) VITAL SIGNS (24 HOUR RANGE):  Temp:  [97.4 °F (36.3 °C)-98.9 °F (37.2 °C)] 97.4 °F (36.3 °C)  Pulse:  [63-75] 63  Resp:  [17-19] 18  SpO2:  [92 %-98 %] 94 %  BP: ()/(45-72) 108/63   GENERAL: In no acute distress, afebrile  HEENT:  CHEST: Clear to auscultation bilaterally  HEART: S1, S2, no appreciable murmur  ABDOMEN: Soft, nontender, BS +  MSK: Warm, no lower extremity edema, no clubbing or cyanosis  NEUROLOGIC: Alert and oriented x4, moving all extremities with good strength   INTEGUMENTARY:  PSYCHIATRY:        ASSESSMENT/PLAN   Symptomatic anemia requiring blood transfusion   Possible occult GI bleed   Acute kidney injury on CKD 2    History of: Ischemic cardiomyopathy, chronic heart failure reduced ejection fraction 20%-compensated, VT/torsades on amiodarone, coronary artery disease obstructive medical management, alcoholic cirrhosis, COPD, BPH, smoker      GI following.  Planning for VC today.  Cardiology consulted.  Need recommendations regarding dual antiplatelet therapy in the setting of anemia and recent angiogram.  Place on telemetry while in the hospital    DVT prophylaxis:  Anticoagulation not safe at this time    Anticipated discharge and disposition:   __________________________________________________________________________    LABS/MICRO/MEDS/DIAGNOSTICS       LABS  Recent Labs     11/18/23  1639 11/19/23  1259 11/20/23  0328   *   < > 132*   K 5.1   < > 4.7   CHLORIDE 96*   < > 99   CO2 20*   < > 26   BUN 23.6   < > 27.6*   CREATININE 1.64*   < > 1.47*   GLUCOSE 101   < > 94   CALCIUM 8.6*   < > 8.0*   ALKPHOS 54  --   --    AST 19  --   --    ALT 14  --   --    ALBUMIN 3.0*  --   --     < > = values in this interval not displayed.       Recent Labs      11/20/23  0328   WBC 6.50   RBC 3.38*   HCT 29.0*   MCV 85.8            MICROBIOLOGY  Microbiology Results (last 7 days)       ** No results found for the last 168 hours. **               MEDICATIONS   amiodarone  400 mg Oral Daily    atorvastatin  80 mg Oral Daily    furosemide  40 mg Oral Daily    gabapentin  300 mg Oral TID    metoprolol succinate  12.5 mg Oral Daily    pantoprazole  40 mg Intravenous Q12H    tamsulosin  0.4 mg Oral Daily    umeclidinium-vilanteroL  1 puff Inhalation Daily         INFUSIONS         DIAGNOSTIC TESTS  US Abdomen Complete   Final Result      1. Coarsened hepatic echotexture which could be seen with chronic liver disease.   2. No focal liver mass seen.   3. Cholelithiasis without sonographic evidence of acute cholecystitis.   4. No biliary ductal dilatation.         Electronically signed by: Carl Elam   Date:    11/19/2023   Time:    12:13             EF   Date Value Ref Range Status   06/08/2023 40 % Final          NUTRITION STATUS  Patient meets ASPEN criteria for   malnutrition of   per RD assessment as evidenced by:                       A minimum of two characteristics is recommended for diagnosis of either severe or non-severe malnutrition.       Case related differential diagnoses have been reviewed; assessment and plan has been documented. I have personally reviewed the labs and test results that are currently available; I have reviewed the patients medication list. I have reviewed the consulting providers recommendations. I have reviewed or attempted to review medical records based upon their availability.  All of the patient's and/or family's questions have been addressed and answered to the best of my ability.  I will continue to monitor closely and make adjustments to medical management as needed.  This document was created using M*Modal Fluency Direct.  Transcription errors may have been made.  Please contact me if any questions may rise regarding documentation  to clarify transcription.        Diaz Green MD   Internal Medicine  Department of Hospital Medicine [facility] - On[facility]

## 2023-11-21 VITALS
DIASTOLIC BLOOD PRESSURE: 64 MMHG | WEIGHT: 179 LBS | OXYGEN SATURATION: 94 % | HEIGHT: 69 IN | RESPIRATION RATE: 18 BRPM | TEMPERATURE: 98 F | BODY MASS INDEX: 26.51 KG/M2 | SYSTOLIC BLOOD PRESSURE: 111 MMHG | HEART RATE: 69 BPM

## 2023-11-21 LAB
ALBUMIN SERPL-MCNC: 2.9 G/DL (ref 3.4–4.8)
ALP SERPL-CCNC: 59 UNIT/L (ref 40–150)
ALT SERPL-CCNC: 12 UNIT/L (ref 0–55)
ANION GAP SERPL CALC-SCNC: 8 MEQ/L
AST SERPL-CCNC: 14 UNIT/L (ref 5–34)
BASOPHILS # BLD AUTO: 0.05 X10(3)/MCL
BASOPHILS NFR BLD AUTO: 0.6 %
BILIRUB SERPL-MCNC: 0.4 MG/DL
BILIRUBIN DIRECT+TOT PNL SERPL-MCNC: 0.2 MG/DL (ref 0–0.8)
BILIRUBIN DIRECT+TOT PNL SERPL-MCNC: 0.2 MG/DL (ref 0–?)
BUN SERPL-MCNC: 21.4 MG/DL (ref 8.4–25.7)
CALCIUM SERPL-MCNC: 8.2 MG/DL (ref 8.8–10)
CHLORIDE SERPL-SCNC: 100 MMOL/L (ref 98–107)
CO2 SERPL-SCNC: 25 MMOL/L (ref 23–31)
CREAT SERPL-MCNC: 1.36 MG/DL (ref 0.73–1.18)
CREAT/UREA NIT SERPL: 16
EOSINOPHIL # BLD AUTO: 0.11 X10(3)/MCL (ref 0–0.9)
EOSINOPHIL NFR BLD AUTO: 1.4 %
ERYTHROCYTE [DISTWIDTH] IN BLOOD BY AUTOMATED COUNT: 16.8 % (ref 11.5–17)
GFR SERPLBLD CREATININE-BSD FMLA CKD-EPI: 58 MLS/MIN/1.73/M2
GLUCOSE SERPL-MCNC: 87 MG/DL (ref 82–115)
HCT VFR BLD AUTO: 30.1 % (ref 42–52)
HGB BLD-MCNC: 9.9 G/DL (ref 14–18)
IMM GRANULOCYTES # BLD AUTO: 0.22 X10(3)/MCL (ref 0–0.04)
IMM GRANULOCYTES NFR BLD AUTO: 2.8 %
INR PPP: 1.1
LYMPHOCYTES # BLD AUTO: 1.37 X10(3)/MCL (ref 0.6–4.6)
LYMPHOCYTES NFR BLD AUTO: 17.5 %
MCH RBC QN AUTO: 28.7 PG (ref 27–31)
MCHC RBC AUTO-ENTMCNC: 32.9 G/DL (ref 33–36)
MCV RBC AUTO: 87.2 FL (ref 80–94)
MONOCYTES # BLD AUTO: 0.94 X10(3)/MCL (ref 0.1–1.3)
MONOCYTES NFR BLD AUTO: 12 %
NEUTROPHILS # BLD AUTO: 5.15 X10(3)/MCL (ref 2.1–9.2)
NEUTROPHILS NFR BLD AUTO: 65.7 %
NRBC BLD AUTO-RTO: 0 %
PATH REV: NORMAL
PLATELET # BLD AUTO: 315 X10(3)/MCL (ref 130–400)
PMV BLD AUTO: 9.2 FL (ref 7.4–10.4)
POTASSIUM SERPL-SCNC: 5 MMOL/L (ref 3.5–5.1)
PROT SERPL-MCNC: 6.2 GM/DL (ref 5.8–7.6)
PROTHROMBIN TIME: 13.6 SECONDS (ref 12.5–14.5)
RBC # BLD AUTO: 3.45 X10(6)/MCL (ref 4.7–6.1)
SODIUM SERPL-SCNC: 133 MMOL/L (ref 136–145)
WBC # SPEC AUTO: 7.84 X10(3)/MCL (ref 4.5–11.5)

## 2023-11-21 PROCEDURE — 85025 COMPLETE CBC W/AUTO DIFF WBC: CPT | Performed by: INTERNAL MEDICINE

## 2023-11-21 PROCEDURE — C9113 INJ PANTOPRAZOLE SODIUM, VIA: HCPCS | Performed by: INTERNAL MEDICINE

## 2023-11-21 PROCEDURE — 85610 PROTHROMBIN TIME: CPT

## 2023-11-21 PROCEDURE — 25000003 PHARM REV CODE 250: Performed by: INTERNAL MEDICINE

## 2023-11-21 PROCEDURE — 25000242 PHARM REV CODE 250 ALT 637 W/ HCPCS: Performed by: INTERNAL MEDICINE

## 2023-11-21 PROCEDURE — 80076 HEPATIC FUNCTION PANEL: CPT

## 2023-11-21 PROCEDURE — 80048 BASIC METABOLIC PNL TOTAL CA: CPT | Performed by: INTERNAL MEDICINE

## 2023-11-21 PROCEDURE — 63600175 PHARM REV CODE 636 W HCPCS: Performed by: INTERNAL MEDICINE

## 2023-11-21 RX ADMIN — FUROSEMIDE 40 MG: 40 TABLET ORAL at 09:11

## 2023-11-21 RX ADMIN — METOPROLOL SUCCINATE 12.5 MG: 25 TABLET, EXTENDED RELEASE ORAL at 09:11

## 2023-11-21 RX ADMIN — AMIODARONE HYDROCHLORIDE 400 MG: 200 TABLET ORAL at 09:11

## 2023-11-21 RX ADMIN — ATORVASTATIN CALCIUM 80 MG: 40 TABLET, FILM COATED ORAL at 09:11

## 2023-11-21 RX ADMIN — UMECLIDINIUM BROMIDE AND VILANTEROL TRIFENATATE 1 PUFF: 62.5; 25 POWDER RESPIRATORY (INHALATION) at 09:11

## 2023-11-21 RX ADMIN — TAMSULOSIN HYDROCHLORIDE 0.4 MG: 0.4 CAPSULE ORAL at 09:11

## 2023-11-21 RX ADMIN — GABAPENTIN 300 MG: 300 CAPSULE ORAL at 09:11

## 2023-11-21 RX ADMIN — PANTOPRAZOLE SODIUM 40 MG: 40 INJECTION, POWDER, FOR SOLUTION INTRAVENOUS at 09:11

## 2023-11-21 NOTE — PLAN OF CARE
Problem: Adult Inpatient Plan of Care  Goal: Plan of Care Review  Outcome: Ongoing, Progressing  Flowsheets (Taken 11/20/2023 1950)  Plan of Care Reviewed With: patient  Goal: Patient-Specific Goal (Individualized)  Outcome: Ongoing, Progressing  Flowsheets (Taken 11/20/2023 1950)  Anxieties, Fears or Concerns: none  Individualized Care Needs: none  Goal: Absence of Hospital-Acquired Illness or Injury  Outcome: Ongoing, Progressing  Intervention: Identify and Manage Fall Risk  Flowsheets (Taken 11/20/2023 1950)  Safety Promotion/Fall Prevention:   assistive device/personal item within reach   side rails raised x 2   lighting adjusted   nonskid shoes/socks when out of bed   Fall Risk signage in place   Fall Risk reviewed with patient/family  Intervention: Prevent Skin Injury  Flowsheets (Taken 11/20/2023 1950)  Body Position:   position changed independently   weight shifting  Skin Protection:   adhesive use limited   tubing/devices free from skin contact   transparent dressing maintained  Intervention: Prevent and Manage VTE (Venous Thromboembolism) Risk  Flowsheets (Taken 11/20/2023 1950)  Activity Management: Ambulated in room - L4  VTE Prevention/Management:   ambulation promoted   ROM (active) performed   ROM (passive) performed   dorsiflexion/plantar flexion performed  Range of Motion:   active ROM (range of motion) encouraged   ROM (range of motion) performed  Intervention: Prevent Infection  Flowsheets (Taken 11/20/2023 1950)  Infection Prevention:   hand hygiene promoted   equipment surfaces disinfected   rest/sleep promoted   single patient room provided  Goal: Optimal Comfort and Wellbeing  Outcome: Ongoing, Progressing  Intervention: Monitor Pain and Promote Comfort  Flowsheets (Taken 11/20/2023 1950)  Pain Management Interventions:   care clustered   relaxation techniques promoted   quiet environment facilitated  Intervention: Provide Person-Centered Care  Flowsheets (Taken 11/20/2023 1950)  Trust  Relationship/Rapport:   care explained   choices provided   emotional support provided   empathic listening provided   questions answered   questions encouraged   reassurance provided   thoughts/feelings acknowledged  Goal: Readiness for Transition of Care  Outcome: Ongoing, Progressing     Problem: Fluid Imbalance (Pneumonia)  Goal: Fluid Balance  Outcome: Ongoing, Progressing  Intervention: Monitor and Manage Fluid Balance  Flowsheets (Taken 11/20/2023 1950)  Fluid/Electrolyte Management: fluids provided     Problem: Infection (Pneumonia)  Goal: Resolution of Infection Signs and Symptoms  Outcome: Ongoing, Progressing  Intervention: Prevent Infection Progression  Flowsheets (Taken 11/20/2023 1950)  Fever Reduction/Comfort Measures:   lightweight bedding   lightweight clothing  Infection Management: aseptic technique maintained  Isolation Precautions: precautions maintained     Problem: Respiratory Compromise (Pneumonia)  Goal: Effective Oxygenation and Ventilation  Outcome: Ongoing, Progressing  Intervention: Promote Airway Secretion Clearance  Flowsheets (Taken 11/20/2023 1950)  Breathing Techniques/Airway Clearance: deep/controlled cough encouraged  Cough And Deep Breathing: done independently per patient  Intervention: Optimize Oxygenation and Ventilation  Flowsheets (Taken 11/20/2023 1950)  Airway/Ventilation Management: airway patency maintained  Head of Bed (HOB) Positioning: HOB at 20 degrees     Problem: Fall Injury Risk  Goal: Absence of Fall and Fall-Related Injury  Outcome: Ongoing, Progressing  Intervention: Identify and Manage Contributors  Flowsheets (Taken 11/20/2023 1950)  Self-Care Promotion:   independence encouraged   BADL personal objects within reach   BADL personal routines maintained  Medication Review/Management: medications reviewed  Intervention: Promote Injury-Free Environment  Flowsheets (Taken 11/20/2023 1950)  Safety Promotion/Fall Prevention:   assistive device/personal item within  reach   side rails raised x 2   lighting adjusted   nonskid shoes/socks when out of bed   Fall Risk signage in place   Fall Risk reviewed with patient/family

## 2023-11-21 NOTE — DISCHARGE SUMMARY
[facility]  HOSPITAL MEDICINE - DISCHARGE SUMMARY    Patient Name: Hemal Tolbert Jr.  MRN: [id number]  Admission Date: 11/18/2023  Discharge Date: 11/21/2023  Hospital Length of Stay: 3 days  Discharge Provider: Diaz Green MD  Primary Care Provider: Ivette Hearn, [facility]      HOSPITAL COURSE   63-year-old male with medical history of alcoholic cirrhosis, COPD, hypertension, PAD on aspirin and Plavix, Chronic HFrEF with recent hospitalization [facility] in [place] for NSTEMI and VT/torsades on 10/23/2023 on your hospital requiring defibrillation x2, placed on amiodarone, dobutamine as well as temporary IABP followed by Impella with echo showing EF 20- 25% and left heart catheterization revealed multivessel CAD with occluded proximal LAD 80% and ostial %, he was deemed not a surgical candidate by Cardiothoracic surgery, Impella removed and weaned off dobutamine and transitioned to oral amiodarone and started on maximal goal-directed medical therapy, he did not have coronary intervention, his life vest was declined and has referral to cardiology here in [place] and discharged home on 11/03/2023.     Presented to [facility] ED on 11/18/2023 with chief complaint of feeling weak and lightheaded.  Reports symptoms started while he was cooking a meal this afternoon in the kitchen where he felt dizzy and lightheaded.  He did not fall or lose consciousness.  Denies any nausea, vomiting, abdominal pain, melena or hematochezia.  Report last bowel movement about 3 days ago.  On arrival to ED he was afebrile borderline hypotensive otherwise hemodynamically stable.  Labs notable for hemoglobin 6.6, platelets 364, INR 1.1, creatinine 1.64.  Rectal exam by ED physician show no obvious blood or melena.  He was given Protonix 80 mg and transfuse 1 unit PRBC and transferred to [facility].      Upon my evaluation he is resting comfortably, denying any complaints at this time.   Repeat labs show hemoglobin 6.9.  Patient received 3 units of blood transfusion and hemoglobin remained stable now and resolution of his symptoms.  Underwent video capsule endoscopy which showed angiectasia and Gastroenterology recommending octreotide injections for which case management has been consulted.  Otherwise no issues anymore and plan for discharge home today with hemoglobin remaining stable.  Of note he does have a history of heart failure however his blood pressure has remained low normal with systolic ranging between 90s to 100s.  At this time we will have to hold his home Aldactone and Entresto, continue metoprolol.  We will have him follow-up with cardiology clinic to see if that time if it is okay to resume his heart failure regimen.        PHYSICAL EXAM     Most Recent Vital Signs:  Temp: 98.4 °F (36.9 °C) (11/21/23 0700)  Pulse: 65 (11/21/23 0700)  Resp: 20 (11/21/23 0700)  BP: (!) 109/55 (11/21/23 0921)  SpO2: (!) 94 % (11/21/23 0700)   GENERAL: In no acute distress, afebrile  HEENT:  CHEST: Clear to auscultation bilaterally  HEART: S1, S2, no appreciable murmur  ABDOMEN: Soft, nontender, BS +  MSK: Warm, no lower extremity edema, no clubbing or cyanosis  NEUROLOGIC: Alert and oriented x4, moving all extremities with good strength   INTEGUMENTARY:  PSYCHIATRY:          DISCHARGE DIAGNOSIS   Symptomatic anemia requiring blood transfusion   Possible occult GI bleed   Small-bowel angiectasia  Acute kidney injury on CKD 2     History of: Ischemic cardiomyopathy, chronic heart failure reduced ejection fraction 20%-compensated, VT/torsades on amiodarone, coronary artery disease obstructive medical management, alcoholic cirrhosis, COPD, BPH, smoker        _____________________________________________________________________________      DISCHARGE MED REC     Current Discharge Medication List        CONTINUE these medications which have NOT CHANGED    Details   albuterol (PROVENTIL/VENTOLIN HFA) 90  mcg/actuation inhaler Inhale 1-2 puffs into the lungs every 4 (four) hours as needed.      amiodarone (PACERONE) 200 MG Tab Take 2 tablets (400 mg total) by mouth once daily.  Qty: 60 tablet, Refills: 11      aspirin 81 MG Chew CHEW 1 tablet (81 mg total) by mouth once daily.  Qty: 30 tablet, Refills: 11      atorvastatin (LIPITOR) 80 MG tablet Take 1 tablet (80 mg total) by mouth once daily.  Qty: 90 tablet, Refills: 3      clopidogreL (PLAVIX) 75 mg tablet Take 1 tablet (75 mg total) by mouth once daily.  Qty: 30 tablet, Refills: 11      furosemide (LASIX) 40 MG tablet Take 40 mg by mouth once daily.      gabapentin (NEURONTIN) 300 MG capsule Take 300 mg by mouth 3 (three) times daily.      metoprolol succinate (TOPROL-XL) 25 MG 24 hr tablet Take ½ ablet (12.5 mg total) by mouth once daily.  Qty: 15 tablet, Refills: 11    Comments: .      pantoprazole (PROTONIX) 40 MG tablet Take 1 tablet (40 mg total) by mouth 2 (two) times daily.  Qty: 60 tablet, Refills: 2    Comments: Further refill to come from your PCP      STIOLTO RESPIMAT 2.5-2.5 mcg/actuation Mist Inhale 2 puffs into the lungs once daily.      tamsulosin (FLOMAX) 0.4 mg Cap Take 1 capsule (0.4 mg total) by mouth once daily.  Qty: 30 capsule, Refills: 11           STOP taking these medications       folic acid (FOLVITE) 1 MG tablet Comments:   Reason for Stopping:         sacubitriL-valsartan (ENTRESTO) 24-26 mg per tablet Comments:   Reason for Stopping:         spironolactone (ALDACTONE) 25 MG tablet Comments:   Reason for Stopping:                  CONSULTS     Consults (From admission, onward)          Status Ordering Provider     Inpatient consult to Social Work/Case Management  Once        Provider:  (Not yet assigned)    Acknowledged JEREMIAS JULES     Inpatient consult to Cardiology  Once        Provider:  Kaden Ramirez MD    Completed JEREMIAS JULES     Inpatient consult to Gastroenterology  Once        Provider:  Johan Flower MD     Completed JEREMIAS GREEN              FOLLOW UP      Follow-up Information       Ivette Hearn FNP. Go to.    Specialty: Family Medicine  Why: with CBC  Follow-up appointment scheduled for 12/8/23 @ 9:15am.  Contact information:  [address] [place] [address]  [phone]               Coretta Lira MD. Go to.    Specialties: Cardiovascular Disease, Cardiology  Why: Office will call to schedule a follow-up appointment.  Contact information:  [address]  [place] [address]  [phone]               Johan Flower MD. Go to.    Specialty: Gastroenterology  Why: Office will call with follow-up appointment.  Contact information:  [address] [facility].  [place] [address]  [phone]                                 DISCHARGE INSTRUCTIONS     Explained in detail to the patient about the discharge plan, medications, and follow-up visits. Pt understands and agrees with the treatment plan.  Discharged Condition: stable  Diet as tolerated  Activities as tolerated  Discharge to: Home or Self Care    TIME SPENT ON DISCHARGE   35 minutes        Jeremias Green MD  Internal Medicine  Department of [facility] Medicine  [facility]  Oncology Acute      This document was created using electronic dictation services.  Please excuse any errors that may have been made.  Contact me if any questions regarding documentation to clarify verbiage.

## 2023-11-21 NOTE — PLAN OF CARE
"CM received a consult for "Set up long acting Sandostatin outpatient". Following, RODNEY contacted [facility] ([phone]) ans spoke with Poppy who transferred CM to Courtney (kacey Remy). Courtney stated she will speak with her supervisor and update RODNEY afterwards. CM awaiting a call back at this time.  "

## 2023-11-22 ENCOUNTER — TELEPHONE (OUTPATIENT)
Dept: FAMILY MEDICINE | Facility: CLINIC | Age: 63
End: 2023-11-22

## 2023-11-22 NOTE — TELEPHONE ENCOUNTER
----- Message from Rebecca Burnette sent at 11/21/2023  4:23 PM CST -----  .Type:  Needs Medical Advice    Who Called: [facility]  Symptoms (please be specific):    How long has patient had these symptoms:    Pharmacy name and phone #:    Would the patient rather a call back or a response via [facility]? Call back   Best Call Back Number: [phone]  Additional Information: requesting a resumption of care order fax [phone]

## 2023-11-22 NOTE — TELEPHONE ENCOUNTER
11/22/23 verbal order given for resumption of home health to kimberly with [facility] care

## 2023-11-22 NOTE — PLAN OF CARE
"RODNEY contacted [facility] and spoke with Courtney who transferred CM to her supervisor, Carlos Lemus RN regarding arranging "long acting Sandostatin outpatient". Carlos indicated she will need the following information (see below) to move forward. Following, RODNEY added HENRY Fernandez to a secure chat so she can be updated with the information obtained.       You will need to follow these steps; Per Carlos GARZA    "OPEN AN ORDERS ONLY ENCOUNTER, THEN   SEARCH FOR THERAPY PLAN UNDER THERAPY PLAN SEARCH FOR YOUR MEDICATION THEN YOU WILL GET A POP UP WINDOW. ENTER START DATE, DX, REASON FOR TREATMENT (THERAPEUTIC) VERY IMPORTANT TREATMENT LOCATION IS [facility].   AFTER YOU ACCEPT THE PLAN, YOU CAN EDIT THE FREQUENCY THEN SIGN THE PLAN THEN SIGN THE ENCOUNTER. After following these steps, "patient will undergo the pre-service authorization process which will verify his insurance. Once that is approve then will be be place in a work Queue which will allow [facility] to schedule patient with an apt". Carlos Lemus RN will follow up on Friday and she is willing to assist if needed.   "

## 2023-11-28 ENCOUNTER — HOSPITAL ENCOUNTER (OUTPATIENT)
Facility: HOSPITAL | Age: 63
Discharge: HOME | End: 2023-11-28
Attending: INTERNAL MEDICINE | Admitting: INTERNAL MEDICINE
Payer: MEDICAID

## 2023-11-28 DIAGNOSIS — I49.01 VENTRICULAR FIBRILLATION: ICD-10-CM

## 2023-11-28 DIAGNOSIS — I49.9 ARRHYTHMIA: ICD-10-CM

## 2023-11-28 PROCEDURE — 99152 MOD SED SAME PHYS/QHP 5/>YRS: CPT | Performed by: INTERNAL MEDICINE

## 2023-11-28 PROCEDURE — 93005 ELECTROCARDIOGRAM TRACING: CPT | Mod: 59

## 2023-11-28 PROCEDURE — 33249 INSJ/RPLCMT DEFIB W/LEAD(S): CPT | Performed by: INTERNAL MEDICINE

## 2023-11-28 PROCEDURE — 99153 MOD SED SAME PHYS/QHP EA: CPT | Performed by: INTERNAL MEDICINE

## 2023-11-28 PROCEDURE — 25500020 PHARM REV CODE 255: Performed by: INTERNAL MEDICINE

## 2023-11-28 PROCEDURE — C1777 LEAD, AICD, ENDO SINGLE COIL: HCPCS | Performed by: INTERNAL MEDICINE

## 2023-11-28 PROCEDURE — 25000003 PHARM REV CODE 250: Performed by: INTERNAL MEDICINE

## 2023-11-28 PROCEDURE — 93010 ELECTROCARDIOGRAM REPORT: CPT | Mod: ,,, | Performed by: INTERNAL MEDICINE

## 2023-11-28 PROCEDURE — C1721 AICD, DUAL CHAMBER: HCPCS | Performed by: INTERNAL MEDICINE

## 2023-11-28 PROCEDURE — 93010 EKG 12-LEAD: ICD-10-PCS | Mod: ,,, | Performed by: INTERNAL MEDICINE

## 2023-11-28 PROCEDURE — 27201423 OPTIME MED/SURG SUP & DEVICES STERILE SUPPLY: Performed by: INTERNAL MEDICINE

## 2023-11-28 PROCEDURE — 63600175 PHARM REV CODE 636 W HCPCS: Performed by: INTERNAL MEDICINE

## 2023-11-28 DEVICE — DEFIBRILLATION LEAD
Type: IMPLANTABLE DEVICE | Site: HEART | Status: FUNCTIONAL
Brand: DURATA™

## 2023-11-28 DEVICE — IMPLANTABLE CARDIOVERTER DEFIBRILLATOR
Type: IMPLANTABLE DEVICE | Site: CHEST | Status: FUNCTIONAL
Brand: GALLANT™

## 2023-11-28 RX ORDER — MORPHINE SULFATE 4 MG/ML
2 INJECTION, SOLUTION INTRAMUSCULAR; INTRAVENOUS EVERY 4 HOURS PRN
Status: DISCONTINUED | OUTPATIENT
Start: 2023-11-28 | End: 2023-11-28 | Stop reason: HOSPADM

## 2023-11-28 RX ORDER — SPIRONOLACTONE 25 MG/1
25 TABLET ORAL 2 TIMES DAILY
Status: ON HOLD | COMMUNITY
End: 2023-11-28 | Stop reason: CLARIF

## 2023-11-28 RX ORDER — FENTANYL CITRATE 50 UG/ML
INJECTION, SOLUTION INTRAMUSCULAR; INTRAVENOUS
Status: DISCONTINUED | OUTPATIENT
Start: 2023-11-28 | End: 2023-11-28 | Stop reason: HOSPADM

## 2023-11-28 RX ORDER — FOLIC ACID 1 MG/1
1 TABLET ORAL DAILY
COMMUNITY
End: 2024-01-03 | Stop reason: SDUPTHER

## 2023-11-28 RX ORDER — MIDAZOLAM HYDROCHLORIDE 1 MG/ML
INJECTION INTRAMUSCULAR; INTRAVENOUS
Status: DISCONTINUED | OUTPATIENT
Start: 2023-11-28 | End: 2023-11-28 | Stop reason: HOSPADM

## 2023-11-28 RX ORDER — ACETAMINOPHEN 325 MG/1
650 TABLET ORAL EVERY 4 HOURS PRN
Status: DISCONTINUED | OUTPATIENT
Start: 2023-11-28 | End: 2023-11-28 | Stop reason: HOSPADM

## 2023-11-28 RX ORDER — HYDROCODONE BITARTRATE AND ACETAMINOPHEN 5; 325 MG/1; MG/1
1 TABLET ORAL EVERY 4 HOURS PRN
Status: DISCONTINUED | OUTPATIENT
Start: 2023-11-28 | End: 2023-11-28 | Stop reason: HOSPADM

## 2023-11-28 RX ORDER — SACUBITRIL AND VALSARTAN 24; 26 MG/1; MG/1
1 TABLET, FILM COATED ORAL 2 TIMES DAILY
Status: ON HOLD | COMMUNITY
End: 2023-11-28 | Stop reason: CLARIF

## 2023-11-28 RX ORDER — CEFAZOLIN SODIUM 1 G/3ML
INJECTION, POWDER, FOR SOLUTION INTRAMUSCULAR; INTRAVENOUS
Status: DISCONTINUED | OUTPATIENT
Start: 2023-11-28 | End: 2023-11-28 | Stop reason: HOSPADM

## 2023-11-28 RX ORDER — SODIUM CHLORIDE 0.9 % (FLUSH) 0.9 %
10 SYRINGE (ML) INJECTION
Status: DISCONTINUED | OUTPATIENT
Start: 2023-11-28 | End: 2023-11-28 | Stop reason: HOSPADM

## 2023-11-28 RX ORDER — DOXYCYCLINE 100 MG/1
100 CAPSULE ORAL 2 TIMES DAILY
Qty: 14 CAPSULE | Refills: 0 | Status: SHIPPED | OUTPATIENT
Start: 2023-11-28 | End: 2023-12-05

## 2023-11-28 RX ORDER — LIDOCAINE HYDROCHLORIDE 10 MG/ML
INJECTION, SOLUTION EPIDURAL; INFILTRATION; INTRACAUDAL; PERINEURAL
Status: DISCONTINUED | OUTPATIENT
Start: 2023-11-28 | End: 2023-11-28 | Stop reason: HOSPADM

## 2023-11-28 NOTE — NURSING
Per José Miguel Triggers assessment - foams applied to bilateral heels and sacrum. Notified BENJA Mina in handoff for procedure.

## 2023-11-28 NOTE — DISCHARGE INSTRUCTIONS
Wound Care    You may get the incision wet 3 days for dermabond glue after your surgery. That is no sooner than Friday. After Friday wash the incision (gently) with soap and water, using a clean washcloth. You should rinse over the area thoroughly and pat dry with a towel. DO NOT SCRUB THE WOUND. The incision should be closed with no drainage or gaping edges. If it opens up you should call the office.  Do not put anything on the incision such as (ointments, creams, powders, alcohol, peroxide, etc.)  Do NOT use a Band-aid or any other dressing.  If steri strips (paper stitches) are used, do not remove them. Leave them in place, trim the ends if they curl up. DO NOT PUT STRIPS BACK ON THE WOUND IF THEY FALL OFF.  If it appears that there are no stitches or steri-strips, then a sterile glue was used, it will flake off like a scab over time. Do not pick at it as it is serving as the wound cover.  Watch the incision/surgery site for: increased redness, increased swelling, bleeding or drainage. Also, note if you have fever or chills.  Treat pain at your incision with Tylenol. If you are not on Coumadin, you may use Ibuprofen(Advil) or Aspirin.     Activity Post Implant    Do not drive for two days  Wear your sling for the next 24 hours then at night for the next 6 weeks.   _You received new leads in your procedure today. Keep your elbow below the shoulder where the pacemaker/ICD was implanted and do not lift more than 10 to 15 pounds for at least 4 weeks to prevent dislodgment of the leads.  Activities otherwise tolerated or as specified by the physician are allowed. We want you to get back to normal activities as soon as possible. If you feel pulling or discomfort in the area, either slow down or stop the activity altogether. Your body and common sense are your best guides.  Take antibiotics as prescribed until last dose is taken.  You may resume your regular diet.    When to call the office: [phone]    Excessive  drainage, swelling or pain at the incision site, fever 100 degrees or greater.  Return of symptoms occurring before the pacemaker/ICD was implanted.    New Medications prescribed include: Doxycycline 100mg Twice a Day for 7 days                                                             Resume Plavix tomorrow

## 2023-12-02 NOTE — PLAN OF CARE
"12/1/23 @ 3:15 PM      CM received a call from Venus ([phone]) with [facility] regarding requested order for patient's SANDOSTATIN. Once again, CM was told the following steps must be completed for them to then submit for insurance authorization.      You will need to follow these steps; Per Carlos GARZA     "OPEN AN ORDERS ONLY ENCOUNTER, THEN   SEARCH FOR THERAPY PLAN UNDER THERAPY PLAN SEARCH FOR YOUR MEDICATION THEN YOU WILL GET A POP UP WINDOW. ENTER START DATE, DX, REASON FOR TREATMENT (THERAPEUTIC) VERY IMPORTANT TREATMENT LOCATION IS [facility].   AFTER YOU ACCEPT THE PLAN, YOU CAN EDIT THE FREQUENCY THEN SIGN THE PLAN THEN SIGN THE ENCOUNTER. she is willing to assist if needed.  "

## 2023-12-04 NOTE — DISCHARGE SUMMARY
Procedure(s) (LRB):  Insertion, Single chamber ICD (SJM) (N/A)    OUTCOME: Patient tolerated treatment/procedure well without complication and is now ready for discharge.    DIAGNOSIS:vfib / icd placement     DISPOSITION: Home or Self Care    FOLLOWUP: In clinic    DISCHARGE INSTRUCTIONS: No discharge procedures on file.    TIME SPENT ON DISCHARGE:   31 minutes

## 2023-12-05 DIAGNOSIS — K55.20 AVM (ARTERIOVENOUS MALFORMATION) OF SMALL BOWEL, ACQUIRED: Primary | ICD-10-CM

## 2023-12-06 VITALS
BODY MASS INDEX: 24.82 KG/M2 | DIASTOLIC BLOOD PRESSURE: 73 MMHG | SYSTOLIC BLOOD PRESSURE: 117 MMHG | RESPIRATION RATE: 17 BRPM | TEMPERATURE: 98 F | WEIGHT: 167.56 LBS | HEIGHT: 69 IN | OXYGEN SATURATION: 95 % | HEART RATE: 67 BPM

## 2023-12-13 ENCOUNTER — TELEPHONE (OUTPATIENT)
Dept: FAMILY MEDICINE | Facility: CLINIC | Age: 63
End: 2023-12-13

## 2023-12-13 ENCOUNTER — OFFICE VISIT (OUTPATIENT)
Dept: FAMILY MEDICINE | Facility: CLINIC | Age: 63
End: 2023-12-13
Payer: MEDICAID

## 2023-12-13 ENCOUNTER — HOSPITAL ENCOUNTER (EMERGENCY)
Facility: HOSPITAL | Age: 63
Discharge: HOME | End: 2023-12-14
Attending: FAMILY MEDICINE
Payer: MEDICAID

## 2023-12-13 VITALS
WEIGHT: 175 LBS | SYSTOLIC BLOOD PRESSURE: 105 MMHG | TEMPERATURE: 98 F | DIASTOLIC BLOOD PRESSURE: 63 MMHG | HEIGHT: 68 IN | BODY MASS INDEX: 26.52 KG/M2 | OXYGEN SATURATION: 98 % | RESPIRATION RATE: 17 BRPM | HEART RATE: 74 BPM

## 2023-12-13 DIAGNOSIS — M79.604 PAIN IN BOTH LOWER EXTREMITIES: ICD-10-CM

## 2023-12-13 DIAGNOSIS — M79.605 PAIN IN BOTH LOWER EXTREMITIES: ICD-10-CM

## 2023-12-13 DIAGNOSIS — I25.110 CORONARY ARTERY DISEASE INVOLVING NATIVE CORONARY ARTERY OF NATIVE HEART WITH UNSTABLE ANGINA PECTORIS: ICD-10-CM

## 2023-12-13 DIAGNOSIS — K21.9 GASTROESOPHAGEAL REFLUX DISEASE WITHOUT ESOPHAGITIS: ICD-10-CM

## 2023-12-13 DIAGNOSIS — R53.1 WEAKNESS: ICD-10-CM

## 2023-12-13 DIAGNOSIS — N40.0 BENIGN PROSTATIC HYPERPLASIA, UNSPECIFIED WHETHER LOWER URINARY TRACT SYMPTOMS PRESENT: Primary | ICD-10-CM

## 2023-12-13 DIAGNOSIS — D64.9 ANEMIA, UNSPECIFIED TYPE: Primary | ICD-10-CM

## 2023-12-13 LAB
ALBUMIN SERPL-MCNC: 3.3 G/DL (ref 3.4–4.8)
ALBUMIN/GLOB SERPL: 0.9 RATIO (ref 1.1–2)
ALP SERPL-CCNC: 65 UNIT/L (ref 40–150)
ALT SERPL-CCNC: 12 UNIT/L (ref 0–55)
APPEARANCE UR: CLEAR
AST SERPL-CCNC: 26 UNIT/L (ref 5–34)
BACTERIA #/AREA URNS AUTO: NORMAL /HPF
BASOPHILS # BLD AUTO: 0.04 X10(3)/MCL
BASOPHILS NFR BLD AUTO: 0.6 %
BILIRUB SERPL-MCNC: 0.5 MG/DL
BILIRUB UR QL STRIP.AUTO: NEGATIVE
BUN SERPL-MCNC: 29.2 MG/DL (ref 8.4–25.7)
CALCIUM SERPL-MCNC: 8.6 MG/DL (ref 8.8–10)
CHLORIDE SERPL-SCNC: 102 MMOL/L (ref 98–107)
CO2 SERPL-SCNC: 25 MMOL/L (ref 23–31)
COLOR UR AUTO: YELLOW
CREAT SERPL-MCNC: 1.33 MG/DL (ref 0.73–1.18)
EOSINOPHIL # BLD AUTO: 0.07 X10(3)/MCL (ref 0–0.9)
EOSINOPHIL NFR BLD AUTO: 1 %
ERYTHROCYTE [DISTWIDTH] IN BLOOD BY AUTOMATED COUNT: 17.2 % (ref 11.5–17)
FERRITIN SERPL-MCNC: 22.5 NG/ML (ref 21.81–274.66)
GFR SERPLBLD CREATININE-BSD FMLA CKD-EPI: 60 MLS/MIN/1.73/M2
GLOBULIN SER-MCNC: 3.8 GM/DL (ref 2.4–3.5)
GLUCOSE SERPL-MCNC: 94 MG/DL (ref 82–115)
GLUCOSE UR QL STRIP.AUTO: NEGATIVE
GROUP & RH: NORMAL
HCT VFR BLD AUTO: 16.9 % (ref 42–52)
HEMOCCULT SP1 STL QL: POSITIVE
HGB BLD-MCNC: 4.9 G/DL (ref 14–18)
IMM GRANULOCYTES # BLD AUTO: 0.03 X10(3)/MCL (ref 0–0.04)
IMM GRANULOCYTES NFR BLD AUTO: 0.4 %
INDIRECT COOMBS GEL: NORMAL
IRON SATN MFR SERPL: 3 % (ref 20–50)
IRON SERPL-MCNC: 13 UG/DL (ref 65–175)
KETONES UR QL STRIP.AUTO: NEGATIVE
LEUKOCYTE ESTERASE UR QL STRIP.AUTO: NEGATIVE
LIPASE SERPL-CCNC: 26 U/L
LYMPHOCYTES # BLD AUTO: 1.62 X10(3)/MCL (ref 0.6–4.6)
LYMPHOCYTES NFR BLD AUTO: 22.7 %
MCH RBC QN AUTO: 25.4 PG (ref 27–31)
MCHC RBC AUTO-ENTMCNC: 29 G/DL (ref 33–36)
MCV RBC AUTO: 87.6 FL (ref 80–94)
MONOCYTES # BLD AUTO: 0.78 X10(3)/MCL (ref 0.1–1.3)
MONOCYTES NFR BLD AUTO: 10.9 %
NEUTROPHILS # BLD AUTO: 4.6 X10(3)/MCL (ref 2.1–9.2)
NEUTROPHILS NFR BLD AUTO: 64.4 %
NITRITE UR QL STRIP.AUTO: NEGATIVE
PH UR STRIP.AUTO: 7 [PH]
PLATELET # BLD AUTO: 293 X10(3)/MCL (ref 130–400)
PMV BLD AUTO: 10.5 FL (ref 7.4–10.4)
POC CARDIAC TROPONIN I: 0.02 NG/ML (ref 0–0.08)
POTASSIUM SERPL-SCNC: 4.2 MMOL/L (ref 3.5–5.1)
PROT SERPL-MCNC: 7.1 GM/DL (ref 5.8–7.6)
PROT UR QL STRIP.AUTO: NEGATIVE
RBC # BLD AUTO: 1.93 X10(6)/MCL (ref 4.7–6.1)
RBC #/AREA URNS AUTO: NORMAL /HPF
RBC UR QL AUTO: NEGATIVE
SAMPLE: NORMAL
SODIUM SERPL-SCNC: 136 MMOL/L (ref 136–145)
SP GR UR STRIP.AUTO: 1.01 (ref 1–1.03)
SPECIMEN OUTDATE: NORMAL
SQUAMOUS #/AREA URNS AUTO: NORMAL /HPF
TIBC SERPL-MCNC: 368 UG/DL (ref 69–240)
TIBC SERPL-MCNC: 381 UG/DL (ref 250–450)
TRANSFERRIN SERPL-MCNC: 352 MG/DL (ref 163–344)
UROBILINOGEN UR STRIP-ACNC: 0.2
WBC # SPEC AUTO: 7.14 X10(3)/MCL (ref 4.5–11.5)
WBC #/AREA URNS AUTO: NORMAL /HPF

## 2023-12-13 PROCEDURE — 63600175 PHARM REV CODE 636 W HCPCS: Performed by: SPECIALIST

## 2023-12-13 PROCEDURE — 1159F PR MEDICATION LIST DOCUMENTED IN MEDICAL RECORD: ICD-10-PCS | Mod: CPTII,,, | Performed by: NURSE PRACTITIONER

## 2023-12-13 PROCEDURE — 83540 ASSAY OF IRON: CPT | Performed by: SPECIALIST

## 2023-12-13 PROCEDURE — 1159F MED LIST DOCD IN RCRD: CPT | Mod: CPTII,,, | Performed by: NURSE PRACTITIONER

## 2023-12-13 PROCEDURE — 36430 TRANSFUSION BLD/BLD COMPNT: CPT

## 2023-12-13 PROCEDURE — 3078F DIAST BP <80 MM HG: CPT | Mod: CPTII,,, | Performed by: NURSE PRACTITIONER

## 2023-12-13 PROCEDURE — 99214 OFFICE O/P EST MOD 30 MIN: CPT | Mod: ,,, | Performed by: NURSE PRACTITIONER

## 2023-12-13 PROCEDURE — 85025 COMPLETE CBC W/AUTO DIFF WBC: CPT | Performed by: FAMILY MEDICINE

## 2023-12-13 PROCEDURE — 1111F PR DISCHARGE MEDS RECONCILED W/ CURRENT OUTPATIENT MED LIST: ICD-10-PCS | Mod: CPTII,,, | Performed by: NURSE PRACTITIONER

## 2023-12-13 PROCEDURE — 82272 OCCULT BLD FECES 1-3 TESTS: CPT | Performed by: FAMILY MEDICINE

## 2023-12-13 PROCEDURE — P9016 RBC LEUKOCYTES REDUCED: HCPCS | Performed by: FAMILY MEDICINE

## 2023-12-13 PROCEDURE — 93005 ELECTROCARDIOGRAM TRACING: CPT

## 2023-12-13 PROCEDURE — 4010F ACE/ARB THERAPY RXD/TAKEN: CPT | Mod: CPTII,,, | Performed by: NURSE PRACTITIONER

## 2023-12-13 PROCEDURE — 96375 TX/PRO/DX INJ NEW DRUG ADDON: CPT

## 2023-12-13 PROCEDURE — 86920 COMPATIBILITY TEST SPIN: CPT | Performed by: FAMILY MEDICINE

## 2023-12-13 PROCEDURE — 4010F PR ACE/ARB THEARPY RXD/TAKEN: ICD-10-PCS | Mod: CPTII,,, | Performed by: NURSE PRACTITIONER

## 2023-12-13 PROCEDURE — 82728 ASSAY OF FERRITIN: CPT | Performed by: SPECIALIST

## 2023-12-13 PROCEDURE — 80053 COMPREHEN METABOLIC PANEL: CPT | Mod: 91 | Performed by: FAMILY MEDICINE

## 2023-12-13 PROCEDURE — 3008F BODY MASS INDEX DOCD: CPT | Mod: CPTII,,, | Performed by: NURSE PRACTITIONER

## 2023-12-13 PROCEDURE — 3008F PR BODY MASS INDEX (BMI) DOCUMENTED: ICD-10-PCS | Mod: CPTII,,, | Performed by: NURSE PRACTITIONER

## 2023-12-13 PROCEDURE — 3044F PR MOST RECENT HEMOGLOBIN A1C LEVEL <7.0%: ICD-10-PCS | Mod: CPTII,,, | Performed by: NURSE PRACTITIONER

## 2023-12-13 PROCEDURE — 99285 EMERGENCY DEPT VISIT HI MDM: CPT | Mod: 25

## 2023-12-13 PROCEDURE — 1111F DSCHRG MED/CURRENT MED MERGE: CPT | Mod: CPTII,,, | Performed by: NURSE PRACTITIONER

## 2023-12-13 PROCEDURE — 3074F PR MOST RECENT SYSTOLIC BLOOD PRESSURE < 130 MM HG: ICD-10-PCS | Mod: CPTII,,, | Performed by: NURSE PRACTITIONER

## 2023-12-13 PROCEDURE — 3074F SYST BP LT 130 MM HG: CPT | Mod: CPTII,,, | Performed by: NURSE PRACTITIONER

## 2023-12-13 PROCEDURE — 99214 PR OFFICE/OUTPT VISIT, EST, LEVL IV, 30-39 MIN: ICD-10-PCS | Mod: ,,, | Performed by: NURSE PRACTITIONER

## 2023-12-13 PROCEDURE — 81001 URINALYSIS AUTO W/SCOPE: CPT | Performed by: FAMILY MEDICINE

## 2023-12-13 PROCEDURE — 83690 ASSAY OF LIPASE: CPT | Performed by: FAMILY MEDICINE

## 2023-12-13 PROCEDURE — 25000003 PHARM REV CODE 250: Performed by: FAMILY MEDICINE

## 2023-12-13 PROCEDURE — 3078F PR MOST RECENT DIASTOLIC BLOOD PRESSURE < 80 MM HG: ICD-10-PCS | Mod: CPTII,,, | Performed by: NURSE PRACTITIONER

## 2023-12-13 PROCEDURE — 96374 THER/PROPH/DIAG INJ IV PUSH: CPT

## 2023-12-13 PROCEDURE — 86850 RBC ANTIBODY SCREEN: CPT | Performed by: FAMILY MEDICINE

## 2023-12-13 PROCEDURE — 96361 HYDRATE IV INFUSION ADD-ON: CPT

## 2023-12-13 PROCEDURE — 84484 ASSAY OF TROPONIN QUANT: CPT

## 2023-12-13 PROCEDURE — C9113 INJ PANTOPRAZOLE SODIUM, VIA: HCPCS | Performed by: SPECIALIST

## 2023-12-13 PROCEDURE — 63600175 PHARM REV CODE 636 W HCPCS: Performed by: FAMILY MEDICINE

## 2023-12-13 PROCEDURE — 3044F HG A1C LEVEL LT 7.0%: CPT | Mod: CPTII,,, | Performed by: NURSE PRACTITIONER

## 2023-12-13 RX ORDER — FUROSEMIDE 10 MG/ML
40 INJECTION INTRAMUSCULAR; INTRAVENOUS
Status: COMPLETED | OUTPATIENT
Start: 2023-12-13 | End: 2023-12-13

## 2023-12-13 RX ORDER — TAMSULOSIN HYDROCHLORIDE 0.4 MG/1
0.4 CAPSULE ORAL DAILY
Qty: 30 CAPSULE | Refills: 5 | Status: SHIPPED | OUTPATIENT
Start: 2023-12-13 | End: 2024-03-20

## 2023-12-13 RX ORDER — PANTOPRAZOLE SODIUM 40 MG/10ML
80 INJECTION, POWDER, LYOPHILIZED, FOR SOLUTION INTRAVENOUS
Status: COMPLETED | OUTPATIENT
Start: 2023-12-13 | End: 2023-12-13

## 2023-12-13 RX ORDER — HYDROCODONE BITARTRATE AND ACETAMINOPHEN 500; 5 MG/1; MG/1
TABLET ORAL
Status: DISCONTINUED | OUTPATIENT
Start: 2023-12-13 | End: 2023-12-14 | Stop reason: HOSPADM

## 2023-12-13 RX ORDER — PANTOPRAZOLE SODIUM 40 MG/1
40 TABLET, DELAYED RELEASE ORAL 2 TIMES DAILY
Qty: 60 TABLET | Refills: 5 | Status: SHIPPED | OUTPATIENT
Start: 2023-12-13 | End: 2024-03-20

## 2023-12-13 RX ADMIN — SODIUM CHLORIDE: 9 INJECTION, SOLUTION INTRAVENOUS at 08:12

## 2023-12-13 RX ADMIN — PANTOPRAZOLE SODIUM 80 MG: 40 INJECTION, POWDER, LYOPHILIZED, FOR SOLUTION INTRAVENOUS at 09:12

## 2023-12-13 RX ADMIN — FUROSEMIDE 40 MG: 10 INJECTION, SOLUTION INTRAMUSCULAR; INTRAVENOUS at 08:12

## 2023-12-13 NOTE — ED PROVIDER NOTES
Encounter Date: 12/13/2023       History     Chief Complaint   Patient presents with    Abnormal Labs     Reports he had blood work done today. Reports was called and instructed to come to ER to get some blood. Complains of headache and dizziness since yesterday     Anemia patient has hemoglobin hematocrit of 5 in 18 was called and told to come back in patient has history of alcoholic cirrhosis and CHF patient otherwise has no other complaints at this time other than shortness of breath and headache chronic condition as above otherwise patient is his own history source        Review of patient's allergies indicates:  No Known Allergies  Past Medical History:   Diagnosis Date    Alcoholic cirrhosis     CHF (congestive heart failure)     LV EF 40%    COPD (chronic obstructive pulmonary disease)     Hypertension     Myocardial infarction     Peripheral artery disease     with stents    Sleep apnea      Past Surgical History:   Procedure Laterality Date    ANGIOGRAM, ABDOMINAL AORTA  10/24/2023    Procedure: Angiogram, Abdominal Aorta;  Surgeon: Janette Ernandez MD;  Location: [facility] CATH LAB;  Service: Cardiology;;    ARTERIOGRAPHY OF SUBCLAVIAN ARTERY Left 10/24/2023    Procedure: ARTERIOGRAM, SUBCLAVIAN;  Surgeon: Janette Ernandez MD;  Location: [facility] CATH LAB;  Service: Cardiology;  Laterality: Left;    CAPSULE ENDOSCOPY, ESOPHAGUS THROUGH ILEUM N/A 11/20/2023    Procedure: M2A;  Surgeon: Johan Flower MD;  Location: [facility] ENDOSCOPY;  Service: Gastroenterology;  Laterality: N/A;  To be done at any time today when staff is available.    CARDIAC DEFIBRILLATOR PLACEMENT N/A 11/28/2023    Procedure: Insertion, Single chamber ICD (SJM);  Surgeon: Suyapa Oakes MD;  Location: [facility] CATH LAB;  Service: Cardiology;  Laterality: N/A;    CATHETERIZATION OF BOTH LEFT AND RIGHT HEART N/A 10/24/2023    Procedure: CATHETERIZATION, HEART, BOTH LEFT AND RIGHT;  Surgeon: Janette Ernandez MD;  Location: [facility] CATH LAB;   Service: Cardiology;  Laterality: N/A;    COLONOSCOPY N/A 6/10/2023    Procedure: COLONOSCOPY;  Surgeon: Johan Flower MD;  Location: [facility] OR;  Service: Gastroenterology;  Laterality: N/A;    ESOPHAGOGASTRODUODENOSCOPY N/A 6/9/2023    Procedure: EGD;  Surgeon: Tima Teixeira MD;  Location: [facility] ENDOSCOPY;  Service: Gastroenterology;  Laterality: N/A;    INSERTION OF INTRA-AORTIC BALLOON ASSIST DEVICE  10/24/2023    Procedure: INSERTION, INTRA-AORTIC BALLOON PUMP;  Surgeon: Janette Ernandez MD;  Location: [facility] CATH LAB;  Service: Cardiology;;    INSERTION OF INTRAVASCULAR MICROAXIAL BLOOD PUMP N/A 10/26/2023    Procedure: INSERTION, IMPELLA;  Surgeon: Janette Ernandez MD;  Location: [facility] CATH LAB;  Service: Cardiology;  Laterality: N/A;    INSTANTANEOUS WAVE-FREE RATIO (IFR) N/A 10/26/2023    Procedure: Instantaneous Wave-Free Ratio (IFR);  Surgeon: Janette Ernandez MD;  Location: [facility] CATH LAB;  Service: Cardiology;  Laterality: N/A;    INTRAVASCULAR ULTRASOUND, CORONARY N/A 10/26/2023    Procedure: Ultrasound-coronary;  Surgeon: Janette Ernandez MD;  Location: [facility] CATH LAB;  Service: Cardiology;  Laterality: N/A;    PERCUTANEOUS CORONARY INTERVENTION, ARTERY N/A 10/26/2023    Procedure: Percutaneous coronary intervention- with Impella;  Surgeon: Janette Ernandez MD;  Location: [facility] CATH LAB;  Service: Cardiology;  Laterality: N/A;    PLACEMENT, IABP  10/24/2023    Procedure: Placement, IABP;  Surgeon: Janette Ernandez MD;  Location: [facility] CATH LAB;  Service: Cardiology;;     No family history on file.  Social History     Tobacco Use    Smoking status: Every Day     Types: Vaping with nicotine    Smokeless tobacco: Current     Types: Chew   Substance Use Topics    Alcohol use: Not Currently     Comment: currently not drinking per pt    Drug use: Yes     Types: Marijuana     Review of Systems   Constitutional:  Negative for fever.   HENT:  Negative for sore throat.    Respiratory:  Negative for shortness  of breath.    Cardiovascular:  Negative for chest pain.   Gastrointestinal:  Negative for nausea.   Genitourinary:  Negative for dysuria.   Musculoskeletal:  Negative for back pain.   Skin:  Negative for rash.   Neurological:  Positive for weakness.   Hematological:  Does not bruise/bleed easily.   All other systems reviewed and are negative.      Physical Exam     Initial Vitals [12/13/23 1439]   BP Pulse Resp Temp SpO2   (!) 88/43 81 20 97.5 °F (36.4 °C) 100 %      MAP       --         Physical Exam    Nursing note and vitals reviewed.  Constitutional: He appears well-developed and well-nourished. He is not diaphoretic. No distress.   HENT:   Head: Normocephalic and atraumatic.   Right Ear: External ear normal.   Left Ear: External ear normal.   Nose: Nose normal.   Mouth/Throat: Oropharynx is clear and moist. No oropharyngeal exudate.   Eyes: Conjunctivae and EOM are normal. Pupils are equal, round, and reactive to light. Right eye exhibits no discharge. Left eye exhibits no discharge.   Neck: Neck supple. No thyromegaly present. No tracheal deviation present. No JVD present.   Normal range of motion.  Cardiovascular:  Normal rate, regular rhythm, normal heart sounds and intact distal pulses.     Exam reveals no gallop and no friction rub.       No murmur heard.  Pulmonary/Chest: Breath sounds normal. No stridor. No respiratory distress. He has no wheezes. He has no rhonchi. He has no rales. He exhibits no tenderness.   Abdominal: Abdomen is soft. Bowel sounds are normal. He exhibits no distension. There is no abdominal tenderness. There is no rebound and no guarding.   Musculoskeletal:         General: No tenderness or edema. Normal range of motion.      Cervical back: Normal range of motion and neck supple.     Lymphadenopathy:     He has no cervical adenopathy.   Neurological: He is alert and oriented to person, place, and time. He has normal strength and normal reflexes. No cranial nerve deficit or sensory  deficit. GCS score is 15. GCS eye subscore is 4. GCS verbal subscore is 5. GCS motor subscore is 6.   Skin: Skin is warm and dry. No rash and no abscess noted. No erythema.   Psychiatric: He has a normal mood and affect. His behavior is normal. Judgment and thought content normal.         ED Course   Procedures  Labs Reviewed   COMPREHENSIVE METABOLIC PANEL - Abnormal; Notable for the following components:       Result Value    Blood Urea Nitrogen 29.2 (*)     Creatinine 1.33 (*)     Calcium Level Total 8.6 (*)     Albumin Level 3.3 (*)     Globulin 3.8 (*)     Albumin/Globulin Ratio 0.9 (*)     All other components within normal limits   CBC WITH DIFFERENTIAL - Abnormal; Notable for the following components:    RBC 1.93 (*)     Hgb 4.9 (*)     Hct 16.9 (*)     MCH 25.4 (*)     MCHC 29.0 (*)     RDW 17.2 (*)     MPV 10.5 (*)     All other components within normal limits   OCCULT BLOOD, STOOL 1ST SPECIMEN - Abnormal; Notable for the following components:    Occult Blood Stool 1 Positive (*)     All other components within normal limits   IRON AND TIBC - Abnormal; Notable for the following components:    Iron Binding Capacity Unsaturated 368 (*)     Iron Level 13 (*)     Transferrin 352 (*)     Iron Saturation 3 (*)     All other components within normal limits   CBC WITH DIFFERENTIAL - Abnormal; Notable for the following components:    RBC 2.53 (*)     Hgb 6.6 (*)     Hct 21.6 (*)     MCH 26.1 (*)     MCHC 30.6 (*)     All other components within normal limits   CBC WITH DIFFERENTIAL - Abnormal; Notable for the following components:    RBC 2.75 (*)     Hgb 7.4 (*)     Hct 23.9 (*)     MCH 26.9 (*)     MCHC 31.0 (*)     All other components within normal limits   LIPASE - Normal   URINALYSIS, REFLEX TO URINE CULTURE - Normal   URINALYSIS, MICROSCOPIC - Normal   FERRITIN - Normal   CBC W/ AUTO DIFFERENTIAL    Narrative:     The following orders were created for panel order CBC W/ AUTO DIFFERENTIAL.  Procedure                                Abnormality         Status                     ---------                               -----------         ------                     CBC with Differential[[id number]]       Abnormal            Final result                 Please view results for these tests on the individual orders.   TROPONIN ISTAT   CBC W/ AUTO DIFFERENTIAL    Narrative:     The following orders were created for panel order CBC auto differential.  Procedure                               Abnormality         Status                     ---------                               -----------         ------                     CBC with Differential[[id number]]       Abnormal            Final result                 Please view results for these tests on the individual orders.   CBC W/ AUTO DIFFERENTIAL    Narrative:     The following orders were created for panel order CBC auto differential.  Procedure                               Abnormality         Status                     ---------                               -----------         ------                     CBC with Differential[[id number]]       Abnormal            Final result                 Please view results for these tests on the individual orders.   OCCULT BLOOD,STOOL,DIAGNOSTIC (1-3)    Narrative:     The following orders were created for panel order Occult Blood, Stool, Diagnostic (1-3).  Procedure                               Abnormality         Status                     ---------                               -----------         ------                     Occult Blood, Stool 1st...[[id number]]  Abnormal            Final result               Occult Blood, Stool 2nd...[[id number]]                                                   Please view results for these tests on the individual orders.   OCCULT BLOOD, STOOL 2ND SPECIMEN   TYPE & SCREEN   PREPARE RBC SOFT   PREPARE RBC SOFT        ECG Results              EKG 12-lead (Final result)  Result time 12/13/23 21:36:02       Final result by Interface, Lab In [facility] (12/13/23 21:36:02)                   Narrative:    Test Reason : R53.1,    Vent. Rate : 073 BPM     Atrial Rate : 073 BPM     P-R Int : 166 ms          QRS Dur : 092 ms      QT Int : 450 ms       P-R-T Axes : 022 075 052 degrees     QTc Int : 495 ms    Normal sinus rhythm  Prolonged QT  Abnormal ECG  When compared with ECG of 28-NOV-2023 08:03,  No significant change was found  Confirmed by Ramesh Hicks MD ([id number]) on 12/13/2023 9:35:51 PM    Referred By: [email or web address]   SELF           Confirmed By:Ramesh Hicks MD                                  Imaging Results    None          Medications   0.9%  NaCl infusion (for blood administration) ( Intravenous Stopped 12/14/23 0041)   0.9%  NaCl infusion (for blood administration) ( Intravenous Stopped 12/14/23 0503)   albuterol-ipratropium 2.5 mg-0.5 mg/3 mL nebulizer solution 3 mL (has no administration in time range)   furosemide injection 40 mg (40 mg Intravenous Given 12/13/23 2040)   pantoprazole injection 80 mg (80 mg Intravenous Given 12/13/23 2102)   melatonin tablet 6 mg (6 mg Oral Given 12/14/23 0100)     Medical Decision Making  Anemia GI bleed cirrhosis    I, Dr. Hanson, assumed care of this patient at 6:00 p.m. from Dr. Escalera; patient presents with a hemoglobin of 4.9 which was drawn through his family practice doctors office and he was sent to the emergency room for further evaluation; patient reports feeling weak and dizzy over the last week; old records indicate he has been anemic previously requiring blood transfusion both in July and in November of this year and had colonoscopy an EGD which were unremarkable both times; he also had a video capsule which showed small intestinal AVMs but did not appear to be actively bleeding; patient takes aspirin and Plavix; he denies melena or blood in his stool;    Past admit revealed:  63-year-old male with medical history of alcoholic cirrhosis, COPD, hypertension, PAD on  aspirin and Plavix, Chronic HFrEF with recent hospitalization \[facility] [place] [facility]\"" in [place] for NSTEMI and VT/torsades on 10/23/2023 on your hospital requiring defibrillation x2, placed on amiodarone, dobutamine as well as temporary IABP followed by Impella with echo showing EF 20- 25% and left heart catheterization revealed multivessel CAD with occluded proximal LAD 80% and ostial %, he was deemed not a surgical candidate by Cardiothoracic surgery, Impella removed and weaned off dobutamine and transitioned to oral amiodarone and started on maximal goal-directed medical therapy, he did not have coronary intervention, his life vest was declined and has referral to cardiology here in [place] and discharged home on 11/03/2023.    DIFFERENTIAL DIAGNOSIS- GI bleed, iron-deficiency anemia, myelodysplasia    Amount and/or Complexity of Data Reviewed  Labs: ordered. Decision-making details documented in ED Course.    Risk  OTC drugs.  Prescription drug management.  Risk Details: Patient was told that he would have to be off her Plavix for 5 days so decision was made that patient will be discharged home talk to his physician since he has a GI doctor get off the Plavix at home and in received scope as an outpatient patient did not want to be stuck in the hospital for 5 days waiting for the scope to happened patient otherwise will return if black stool start or dizziness chest pain shortness of breath or just feels uncomfortable being at home               ED Course as of 12/14/23 0824   Wed Dec 13, 2023   2126 Iron(!): 13 [DD]   2126 Iron Saturation(!): 3 [DD]   Thu Dec 14, 2023   0128 Initial hemoglobin 4.9 [DD]   0128 Hemoglobin(!): 6.6 [DD]   0528 Hemoglobin(!): 7.4 [DD]      ED Course User Index  [DD] Grabiel Hanson MD                           Clinical Impression:  Final diagnoses:  [R53.1] Weakness  [D64.9] Anemia, unspecified type (Primary)          ED Disposition Condition    Discharge Stable          ED  Prescriptions    None       Follow-up Information       Follow up With Specialties Details Why Contact Info    [facility]   [address]  [facility]  [place] [address]  [phone]      Ivette Hearn, [facility] Schedule an appointment as soon as possible for a visit in 1 day  [address]  [facility]  [place] [address]  [phone]               Laron Escalera MD  12/14/23 0824

## 2023-12-13 NOTE — PROGRESS NOTES
SUBJECTIVE:     History of Present Illness      Chief Complaint: Hospital Follow Up (C/O pain to lower legs, after walking (shins, calf muscles), feels may have walked to much.  )    HPI:  Patient is a 63 y.o. year old male who presents to clinic for outpatient hospital follow-up after the defibrillator placed in November 28 with [facility] cardiologist.  Patient reports that he has seen a cardiologist since then and should be having a upcoming appointment.  The patient is complaining of bilateral leg pain states that he thinks he may have overdone it has been walking around [employer] in neighborhood stores few stores.    Patient does have a history of GI bleed from hospitalization back in November  Patient doing well no complaints denies shortness or breath or dizziness    Review of Systems:    Review of Systems    12 point review of systems conducted, negative except as stated in the history of present illness. See HPI for details.     Previous History    Ivette Hearn, LINA  Review of patient's allergies indicates:  No Known Allergies    Past Medical History:   Diagnosis Date    Alcoholic cirrhosis     CHF (congestive heart failure)     LV EF 40%    COPD (chronic obstructive pulmonary disease)     Hypertension     Myocardial infarction     Peripheral artery disease     with stents    Sleep apnea      Current Outpatient Medications   Medication Instructions    albuterol (PROVENTIL/VENTOLIN HFA) 90 mcg/actuation inhaler 1-2 puffs, Inhalation, Every 4 hours PRN    amiodarone (PACERONE) 400 mg, Oral, Daily    aspirin 81 MG Chew CHEW 1 tablet (81 mg total) by mouth once daily.    atorvastatin (LIPITOR) 80 mg, Oral, Daily    clopidogreL (PLAVIX) 75 mg, Oral, Daily    folic acid (FOLVITE) 1 mg, Oral, Daily    furosemide (LASIX) 40 mg, Oral, Daily    gabapentin (NEURONTIN) 300 mg, Oral, 3 times daily    metoprolol succinate (TOPROL-XL) 25 MG 24 hr tablet Take ½ ablet (12.5 mg total) by mouth once daily.    pantoprazole  (PROTONIX) 40 mg, Oral, 2 times daily    STIOLTO RESPIMAT 2.5-2.5 mcg/actuation Mist 2 puffs, Inhalation, Daily    tamsulosin (FLOMAX) 0.4 mg, Oral, Daily     Past Surgical History:   Procedure Laterality Date    ANGIOGRAM, ABDOMINAL AORTA  10/24/2023    Procedure: Angiogram, Abdominal Aorta;  Surgeon: Janette Ernandez MD;  Location: [facility] CATH LAB;  Service: Cardiology;;    ARTERIOGRAPHY OF SUBCLAVIAN ARTERY Left 10/24/2023    Procedure: ARTERIOGRAM, SUBCLAVIAN;  Surgeon: Janette Ernandez MD;  Location: [facility] CATH LAB;  Service: Cardiology;  Laterality: Left;    CAPSULE ENDOSCOPY, ESOPHAGUS THROUGH ILEUM N/A 11/20/2023    Procedure: M2A;  Surgeon: Johan Flower MD;  Location: [facility] ENDOSCOPY;  Service: Gastroenterology;  Laterality: N/A;  To be done at any time today when staff is available.    CARDIAC DEFIBRILLATOR PLACEMENT N/A 11/28/2023    Procedure: Insertion, Single chamber ICD (SJM);  Surgeon: Suyapa Oakes MD;  Location: [facility] CATH LAB;  Service: Cardiology;  Laterality: N/A;    CATHETERIZATION OF BOTH LEFT AND RIGHT HEART N/A 10/24/2023    Procedure: CATHETERIZATION, HEART, BOTH LEFT AND RIGHT;  Surgeon: Janette Ernandez MD;  Location: [facility] CATH LAB;  Service: Cardiology;  Laterality: N/A;    COLONOSCOPY N/A 6/10/2023    Procedure: COLONOSCOPY;  Surgeon: Johan Flower MD;  Location: [facility] OR;  Service: Gastroenterology;  Laterality: N/A;    ESOPHAGOGASTRODUODENOSCOPY N/A 6/9/2023    Procedure: EGD;  Surgeon: Tima Teixeira MD;  Location: [facility] ENDOSCOPY;  Service: Gastroenterology;  Laterality: N/A;    INSERTION OF INTRA-AORTIC BALLOON ASSIST DEVICE  10/24/2023    Procedure: INSERTION, INTRA-AORTIC BALLOON PUMP;  Surgeon: Janette Ernandez MD;  Location: [facility] CATH LAB;  Service: Cardiology;;    INSERTION OF INTRAVASCULAR MICROAXIAL BLOOD PUMP N/A 10/26/2023    Procedure: INSERTION, IMPELLA;  Surgeon: Janette Ernandez MD;  Location: [facility] CATH LAB;  Service: Cardiology;  Laterality:  "N/A;    INSTANTANEOUS WAVE-FREE RATIO (IFR) N/A 10/26/2023    Procedure: Instantaneous Wave-Free Ratio (IFR);  Surgeon: Janette Ernandez MD;  Location: [facility] CATH LAB;  Service: Cardiology;  Laterality: N/A;    INTRAVASCULAR ULTRASOUND, CORONARY N/A 10/26/2023    Procedure: Ultrasound-coronary;  Surgeon: Janette Ernandez MD;  Location: [facility] CATH LAB;  Service: Cardiology;  Laterality: N/A;    PERCUTANEOUS CORONARY INTERVENTION, ARTERY N/A 10/26/2023    Procedure: Percutaneous coronary intervention- with Impella;  Surgeon: Janette Ernandez MD;  Location: [facility] CATH LAB;  Service: Cardiology;  Laterality: N/A;    PLACEMENT, IABP  10/24/2023    Procedure: Placement, IABP;  Surgeon: Janette Ernandez MD;  Location: [facility] CATH LAB;  Service: Cardiology;;     History reviewed. No pertinent family history.    Social History     Tobacco Use    Smoking status: Every Day     Types: Vaping with nicotine    Smokeless tobacco: Current     Types: Chew   Substance Use Topics    Alcohol use: Not Currently     Comment: currently not drinking per pt    Drug use: Yes     Types: Marijuana        Health Maintenance      Health Maintenance   Topic Date Due    Shingles Vaccine (1 of 2) Never done    Lipid Panel  10/24/2028    TETANUS VACCINE  12/17/2028    Colorectal Cancer Screening  06/10/2033    Hepatitis C Screening  Completed       OBJECTIVE:     Physical Exam      Vital Signs Reviewed   Visit Vitals  /63 (BP Location: Left arm, Patient Position: Sitting)   Pulse 74   Temp 98 °F (36.7 °C) (Oral)   Resp 17   Ht 5' 8" (1.727 m)   Wt 79.4 kg (175 lb)   SpO2 98%   BMI 26.61 kg/m²       Physical Exam    Physical Exam:  General: Alert, well nourished, pale .   Eyes: Anicteric sclera, without conjunctival injection, normal lids, no purulent drainage, EOMs grossly intact.   Ears: No tragal tenderness. Tympanic membranes intact, pearly grey, without effusion or erythema and with a positive light reflex.   Mouth: Posterior pharynx without " erythema. No exudate, ulcerations, or lesion. No tonsillar swelling.   Neck: Supple, full ROM, no rigidity, no cervical adenopathy.   Cardio: Normal rate and rhythm    Resp: Respirations even and unlabored, clear to auscultation bilaterally.   Abd: No ecchymosis or distension. Normal bowel sounds in all 4 quadrants. No tenderness to palpation. No rebound tenderness or guarding. No CVA tenderness.   Skin: No rashes or open lesions noted.   MSK: No swelling. No abrasions or signs of trauma. Ambulating with cane  assistance.   Neuro: Alert,oriented No focal deficits noted. Facial expressions even.   Psych: Cooperative, Normal affect      Procedures    Procedures     Labs     Results for orders placed or performed during the hospital encounter of 11/18/23   Hemoglobin and Hematocrit   Result Value Ref Range    Hgb 6.9 (L) 14.0 - 18.0 g/dL    Hct 21.2 (L) 42.0 - 52.0 %   BNP   Result Value Ref Range    Natriuretic Peptide 483.8 (H) <=100.0 pg/mL   Iron and TIBC   Result Value Ref Range    Iron Binding Capacity Unsaturated 71 69 - 240 ug/dL    Iron Level 277 (H) 65 - 175 ug/dL    Transferrin 306 163 - 344 mg/dL    Iron Binding Capacity Total 348 250 - 450 ug/dL    Iron Saturation 80 (H) 20 - 50 %   Ferritin   Result Value Ref Range    Ferritin Level 64.19 21.81 - 274.66 ng/mL   Vitamin B12   Result Value Ref Range    Vitamin B12 Level 258 213 - 816 pg/mL   Folate   Result Value Ref Range    Folate Level 19.0 7.0 - 31.4 ng/mL   CBC Without Differential   Result Value Ref Range    WBC 6.75 4.50 - 11.50 x10(3)/mcL    RBC 3.85 (L) 4.70 - 6.10 x10(6)/mcL    Hgb 11.4 (L) 14.0 - 18.0 g/dL    Hct 34.9 (L) 42.0 - 52.0 %    MCV 90.6 80.0 - 94.0 fL    MCH 29.6 27.0 - 31.0 pg    MCHC 32.7 (L) 33.0 - 36.0 g/dL    RDW 17.5 (H) 11.5 - 17.0 %    Platelet 293 130 - 400 x10(3)/mcL    MPV 9.3 7.4 - 10.4 fL    NRBC% 0.0 %   Basic Metabolic Panel   Result Value Ref Range    Sodium Level 132 (L) 136 - 145 mmol/L    Potassium Level 5.1 3.5 -  5.1 mmol/L    Chloride 104 98 - 107 mmol/L    Carbon Dioxide 19 (L) 23 - 31 mmol/L    Glucose Level 82 82 - 115 mg/dL    Blood Urea Nitrogen 21.6 8.4 - 25.7 mg/dL    Creatinine 1.33 (H) 0.73 - 1.18 mg/dL    BUN/Creatinine Ratio 16     Calcium Level Total 9.3 8.8 - 10.0 mg/dL    Anion Gap 9.0 mEq/L    eGFR 60 mls/min/1.73/m2   Phosphorus   Result Value Ref Range    Phosphorus Level 3.6 2.3 - 4.7 mg/dL   Magnesium   Result Value Ref Range    Magnesium Level 2.10 1.60 - 2.60 mg/dL   Basic Metabolic Panel   Result Value Ref Range    Sodium Level 132 (L) 136 - 145 mmol/L    Potassium Level 4.7 3.5 - 5.1 mmol/L    Chloride 99 98 - 107 mmol/L    Carbon Dioxide 26 23 - 31 mmol/L    Glucose Level 94 82 - 115 mg/dL    Blood Urea Nitrogen 27.6 (H) 8.4 - 25.7 mg/dL    Creatinine 1.47 (H) 0.73 - 1.18 mg/dL    BUN/Creatinine Ratio 19     Calcium Level Total 8.0 (L) 8.8 - 10.0 mg/dL    Anion Gap 7.0 mEq/L    eGFR 53 mls/min/1.73/m2   AFP Tumor Marker   Result Value Ref Range    Alpha Fetoprotein Level 3.30 <=8.90 ng/mL   CBC with Differential   Result Value Ref Range    WBC 6.50 4.50 - 11.50 x10(3)/mcL    RBC 3.38 (L) 4.70 - 6.10 x10(6)/mcL    Hgb 9.9 (L) 14.0 - 18.0 g/dL    Hct 29.0 (L) 42.0 - 52.0 %    MCV 85.8 80.0 - 94.0 fL    MCH 29.3 27.0 - 31.0 pg    MCHC 34.1 33.0 - 36.0 g/dL    RDW 17.1 (H) 11.5 - 17.0 %    Platelet 324 130 - 400 x10(3)/mcL    MPV 9.0 7.4 - 10.4 fL    Neut % 59.5 %    Lymph % 18.3 %    Mono % 16.5 %    Eos % 2.3 %    Basophil % 0.6 %    Lymph # 1.19 0.6 - 4.6 x10(3)/mcL    Neut # 3.87 2.1 - 9.2 x10(3)/mcL    Mono # 1.07 0.1 - 1.3 x10(3)/mcL    Eos # 0.15 0 - 0.9 x10(3)/mcL    Baso # 0.04 <=0.2 x10(3)/mcL    IG# 0.18 (H) 0 - 0.04 x10(3)/mcL    IG% 2.8 %    NRBC% 0.0 %   Protime-INR   Result Value Ref Range    PT 13.6 12.5 - 14.5 seconds    INR 1.1 <=1.3   Hepatic Function Panel   Result Value Ref Range    Albumin Level 3.1 (L) 3.4 - 4.8 g/dL    Alkaline Phosphatase 59 40 - 150 unit/L    Alanine  Aminotransferase 13 0 - 55 unit/L    Aspartate Aminotransferase 16 5 - 34 unit/L    Bilirubin Direct 0.2 0.0 - <0.5 mg/dL    Bilirubin Indirect 0.30 0.00 - 0.80 mg/dL    Bilirubin Total 0.5 <=1.5 mg/dL    Protein Total 6.4 5.8 - 7.6 gm/dL   Pathologist Interpretation   Result Value Ref Range    Pathology Review       No demonstrated chemical evidence of hepatic or biliary injury or insufficiency.  Hypoalbuminemia.     Brandin Terrazas M.D.   Basic Metabolic Panel   Result Value Ref Range    Sodium Level 133 (L) 136 - 145 mmol/L    Potassium Level 5.0 3.5 - 5.1 mmol/L    Chloride 100 98 - 107 mmol/L    Carbon Dioxide 25 23 - 31 mmol/L    Glucose Level 87 82 - 115 mg/dL    Blood Urea Nitrogen 21.4 8.4 - 25.7 mg/dL    Creatinine 1.36 (H) 0.73 - 1.18 mg/dL    BUN/Creatinine Ratio 16     Calcium Level Total 8.2 (L) 8.8 - 10.0 mg/dL    Anion Gap 8.0 mEq/L    eGFR 58 mls/min/1.73/m2   Protime-INR   Result Value Ref Range    PT 13.6 12.5 - 14.5 seconds    INR 1.1 <=1.3   Hepatic Function Panel   Result Value Ref Range    Albumin Level 2.9 (L) 3.4 - 4.8 g/dL    Alkaline Phosphatase 59 40 - 150 unit/L    Alanine Aminotransferase 12 0 - 55 unit/L    Aspartate Aminotransferase 14 5 - 34 unit/L    Bilirubin Direct 0.2 0.0 - <0.5 mg/dL    Bilirubin Indirect 0.20 0.00 - 0.80 mg/dL    Bilirubin Total 0.4 <=1.5 mg/dL    Protein Total 6.2 5.8 - 7.6 gm/dL   CBC with Differential   Result Value Ref Range    WBC 7.84 4.50 - 11.50 x10(3)/mcL    RBC 3.45 (L) 4.70 - 6.10 x10(6)/mcL    Hgb 9.9 (L) 14.0 - 18.0 g/dL    Hct 30.1 (L) 42.0 - 52.0 %    MCV 87.2 80.0 - 94.0 fL    MCH 28.7 27.0 - 31.0 pg    MCHC 32.9 (L) 33.0 - 36.0 g/dL    RDW 16.8 11.5 - 17.0 %    Platelet 315 130 - 400 x10(3)/mcL    MPV 9.2 7.4 - 10.4 fL    Neut % 65.7 %    Lymph % 17.5 %    Mono % 12.0 %    Eos % 1.4 %    Basophil % 0.6 %    Lymph # 1.37 0.6 - 4.6 x10(3)/mcL    Neut # 5.15 2.1 - 9.2 x10(3)/mcL    Mono # 0.94 0.1 - 1.3 x10(3)/mcL    Eos # 0.11 0 - 0.9 x10(3)/mcL     Baso # 0.05 <=0.2 x10(3)/mcL    IG# 0.22 (H) 0 - 0.04 x10(3)/mcL    IG% 2.8 %    NRBC% 0.0 %   Pathologist Interpretation   Result Value Ref Range    Pathology Review       No demonstrated chemical evidence of hepatic or biliary injury or insufficiency.       Brandin Terrazas M.D.   Type & Screen   Result Value Ref Range    Group & Rh A POS     Indirect Martinez GEL NEG     Specimen Outdate 11/21/2023 23:59    Prepare RBC 2 Units; needed   Result Value Ref Range    UNIT NUMBER [id number]     UNIT ABO/RH A POS     DISPENSE STATUS Transfused     Unit Expiration [id number]     Product Code C1064P97     Unit Blood Type Code 6200     CROSSMATCH INTERPRETATION Compatible     UNIT NUMBER [id number]     UNIT ABO/RH A POS     DISPENSE STATUS Transfused     Unit Expiration [id number]     Product Code X4298V44     Unit Blood Type Code 6200     CROSSMATCH INTERPRETATION Compatible        Chemistry:  Lab Results   Component Value Date     (L) 11/21/2023    K 5.0 11/21/2023    CHLORIDE 100 11/21/2023    BUN 21.4 11/21/2023    CREATININE 1.36 (H) 11/21/2023    EGFRNORACEVR 58 11/21/2023    GLUCOSE 87 11/21/2023    CALCIUM 8.2 (L) 11/21/2023    ALKPHOS 59 11/21/2023    LABPROT 6.2 11/21/2023    ALBUMIN 2.9 (L) 11/21/2023    BILIDIR 0.2 11/21/2023    IBILI 0.20 11/21/2023    AST 14 11/21/2023    ALT 12 11/21/2023    MG 2.10 11/19/2023    PHOS 3.6 11/19/2023    HRBEMMUR14ST 15.2 (L) 12/22/2020    TSH 2.666 10/23/2023    GEEOGA5RDXI 0.92 06/07/2023    PSA 0.6 03/06/2019        Lab Results   Component Value Date    HGBA1C 6.1 12/13/2023        Hematology:  Lab Results   Component Value Date    WBC 8.26 12/13/2023    HGB 5.2 (LL) 12/13/2023    HCT 18.3 (LL) 12/13/2023     12/13/2023       Lipid Panel:  Lab Results   Component Value Date    CHOL 136 10/24/2023    HDL 35 (L) 10/24/2023    LDL 44.00 (L) 06/10/2023    TRIG 88 10/24/2023    TOTALCHOLEST 3.9 10/24/2023        Urine:  Lab Results   Component Value Date     COLORUA Yellow 11/18/2023    APPEARANCEUA Clear 11/18/2023    SGUA 1.015 11/18/2023    PHUA 7.0 11/18/2023    PROTEINUA Negative 11/18/2023    GLUCOSEUA Negative 11/18/2023    KETONESUA Negative 11/18/2023    BLOODUA Negative 11/18/2023    NITRITESUA Negative 11/18/2023    LEUKOCYTESUR Negative 11/18/2023    RBCUA None Seen 11/18/2023    WBCUA None Seen 11/18/2023    BACTERIA None Seen 11/18/2023    SQEPUA 1-2 12/22/2020    HYALINECASTS 0-2 (A) 12/22/2020    CREATRANDUR 142.7 10/24/2023         Assessment            ICD-10-CM ICD-9-CM   1. Benign prostatic hyperplasia, unspecified whether lower urinary tract symptoms present  N40.0 600.00   2. Gastroesophageal reflux disease without esophagitis  K21.9 530.81   3. Coronary artery disease involving native coronary artery of native heart with unstable angina pectoris  I25.110 414.01     411.1   4. Pain in both lower extremities  M79.604 729.5    M79.605        Plan       1. Gastroesophageal reflux disease without esophagitis  - pantoprazole (PROTONIX) 40 MG tablet; Take 1 tablet (40 mg total) by mouth 2 (two) times daily.  Dispense: 60 tablet; Refill: 5    2. Benign prostatic hyperplasia, unspecified whether lower urinary tract symptoms present  - tamsulosin (FLOMAX) 0.4 mg Cap; Take 1 capsule (0.4 mg total) by mouth once daily.  Dispense: 30 capsule; Refill: 5    3. Coronary artery disease involving native coronary artery of native heart with unstable angina pectoris  Stable continue current medication as prescribed by cardiologist  4. Pain in both lower extremities  Samples Voltaren given to patient  Orders Placed This Encounter    pantoprazole (PROTONIX) 40 MG tablet    tamsulosin (FLOMAX) 0.4 mg Cap      Medication List with Changes/Refills   Current Medications    ALBUTEROL (PROVENTIL/VENTOLIN HFA) 90 MCG/ACTUATION INHALER    Inhale 1-2 puffs into the lungs every 4 (four) hours as needed.    AMIODARONE (PACERONE) 200 MG TAB    Take 2 tablets (400 mg total) by  mouth once daily.    ASPIRIN 81 MG CHEW    CHEW 1 tablet (81 mg total) by mouth once daily.    ATORVASTATIN (LIPITOR) 80 MG TABLET    Take 1 tablet (80 mg total) by mouth once daily.    CLOPIDOGREL (PLAVIX) 75 MG TABLET    Take 1 tablet (75 mg total) by mouth once daily.    FOLIC ACID (FOLVITE) 1 MG TABLET    Take 1 mg by mouth once daily.    FUROSEMIDE (LASIX) 40 MG TABLET    Take 40 mg by mouth once daily.    GABAPENTIN (NEURONTIN) 300 MG CAPSULE    Take 300 mg by mouth 3 (three) times daily.    METOPROLOL SUCCINATE (TOPROL-XL) 25 MG 24 HR TABLET    Take ½ ablet (12.5 mg total) by mouth once daily.    STIOLTO RESPIMAT 2.5-2.5 MCG/ACTUATION MIST    Inhale 2 puffs into the lungs once daily.   Changed and/or Refilled Medications    Modified Medication Previous Medication    PANTOPRAZOLE (PROTONIX) 40 MG TABLET pantoprazole (PROTONIX) 40 MG tablet       Take 1 tablet (40 mg total) by mouth 2 (two) times daily.    Take 1 tablet (40 mg total) by mouth 2 (two) times daily.    TAMSULOSIN (FLOMAX) 0.4 MG CAP tamsulosin (FLOMAX) 0.4 mg Cap       Take 1 capsule (0.4 mg total) by mouth once daily.    Take 1 capsule (0.4 mg total) by mouth once daily.       No follow-ups on file.   No follow-ups on file. In addition to their scheduled follow up, the patient has also been instructed to follow up on as needed basis.   Future Appointments   Date Time Provider Department Center   12/20/2023 10:00 AM Ivette Hearn FNP [facility]         Addendum 12/13/23 after visit   critical lab called H&H 5 and 18.  Instructed patient to go to nearest emergency room due to history of GI bleed,  heart failure, co morbidities .  Patient denies any shortness a breath or bleeding in his stools.  Due to history patient needs emergency room evaluation discussed  the risks versus the benefits and importance of following in the emergency room due to his chronic comorbid's  Pt verbalized understanding

## 2023-12-14 ENCOUNTER — TELEPHONE (OUTPATIENT)
Dept: FAMILY MEDICINE | Facility: CLINIC | Age: 63
End: 2023-12-14
Payer: MEDICAID

## 2023-12-14 VITALS
RESPIRATION RATE: 18 BRPM | HEIGHT: 68 IN | DIASTOLIC BLOOD PRESSURE: 55 MMHG | TEMPERATURE: 98 F | SYSTOLIC BLOOD PRESSURE: 105 MMHG | HEART RATE: 67 BPM | BODY MASS INDEX: 26.49 KG/M2 | OXYGEN SATURATION: 93 % | WEIGHT: 174.81 LBS

## 2023-12-14 LAB
ABO + RH BLD: NORMAL
BASOPHILS # BLD AUTO: 0.02 X10(3)/MCL
BASOPHILS # BLD AUTO: 0.02 X10(3)/MCL
BASOPHILS NFR BLD AUTO: 0.3 %
BASOPHILS NFR BLD AUTO: 0.3 %
BLD PROD TYP BPU: NORMAL
BLOOD UNIT EXPIRATION DATE: NORMAL
BLOOD UNIT TYPE CODE: 6200
CROSSMATCH INTERPRETATION: NORMAL
DISPENSE STATUS: NORMAL
EOSINOPHIL # BLD AUTO: 0.01 X10(3)/MCL (ref 0–0.9)
EOSINOPHIL # BLD AUTO: 0.09 X10(3)/MCL (ref 0–0.9)
EOSINOPHIL NFR BLD AUTO: 0.1 %
EOSINOPHIL NFR BLD AUTO: 1.3 %
ERYTHROCYTE [DISTWIDTH] IN BLOOD BY AUTOMATED COUNT: 16.1 % (ref 11.5–17)
ERYTHROCYTE [DISTWIDTH] IN BLOOD BY AUTOMATED COUNT: 16.8 % (ref 11.5–17)
HCT VFR BLD AUTO: 21.6 % (ref 42–52)
HCT VFR BLD AUTO: 23.9 % (ref 42–52)
HGB BLD-MCNC: 6.6 G/DL (ref 14–18)
HGB BLD-MCNC: 7.4 G/DL (ref 14–18)
IMM GRANULOCYTES # BLD AUTO: 0.03 X10(3)/MCL (ref 0–0.04)
IMM GRANULOCYTES # BLD AUTO: 0.04 X10(3)/MCL (ref 0–0.04)
IMM GRANULOCYTES NFR BLD AUTO: 0.4 %
IMM GRANULOCYTES NFR BLD AUTO: 0.6 %
LYMPHOCYTES # BLD AUTO: 1.46 X10(3)/MCL (ref 0.6–4.6)
LYMPHOCYTES # BLD AUTO: 1.48 X10(3)/MCL (ref 0.6–4.6)
LYMPHOCYTES NFR BLD AUTO: 20.7 %
LYMPHOCYTES NFR BLD AUTO: 21.2 %
MCH RBC QN AUTO: 26.1 PG (ref 27–31)
MCH RBC QN AUTO: 26.9 PG (ref 27–31)
MCHC RBC AUTO-ENTMCNC: 30.6 G/DL (ref 33–36)
MCHC RBC AUTO-ENTMCNC: 31 G/DL (ref 33–36)
MCV RBC AUTO: 85.4 FL (ref 80–94)
MCV RBC AUTO: 86.9 FL (ref 80–94)
MONOCYTES # BLD AUTO: 0.76 X10(3)/MCL (ref 0.1–1.3)
MONOCYTES # BLD AUTO: 0.77 X10(3)/MCL (ref 0.1–1.3)
MONOCYTES NFR BLD AUTO: 10.6 %
MONOCYTES NFR BLD AUTO: 11.2 %
NEUTROPHILS # BLD AUTO: 4.61 X10(3)/MCL (ref 2.1–9.2)
NEUTROPHILS # BLD AUTO: 4.77 X10(3)/MCL (ref 2.1–9.2)
NEUTROPHILS NFR BLD AUTO: 66.5 %
NEUTROPHILS NFR BLD AUTO: 66.8 %
PLATELET # BLD AUTO: 262 X10(3)/MCL (ref 130–400)
PLATELET # BLD AUTO: 271 X10(3)/MCL (ref 130–400)
PMV BLD AUTO: 10.1 FL (ref 7.4–10.4)
PMV BLD AUTO: 10.3 FL (ref 7.4–10.4)
RBC # BLD AUTO: 2.53 X10(6)/MCL (ref 4.7–6.1)
RBC # BLD AUTO: 2.75 X10(6)/MCL (ref 4.7–6.1)
UNIT NUMBER: NORMAL
WBC # SPEC AUTO: 6.9 X10(3)/MCL (ref 4.5–11.5)
WBC # SPEC AUTO: 7.16 X10(3)/MCL (ref 4.5–11.5)

## 2023-12-14 PROCEDURE — P9016 RBC LEUKOCYTES REDUCED: HCPCS | Performed by: SPECIALIST

## 2023-12-14 PROCEDURE — 85025 COMPLETE CBC W/AUTO DIFF WBC: CPT | Performed by: SPECIALIST

## 2023-12-14 PROCEDURE — 99900031 HC PATIENT EDUCATION (STAT)

## 2023-12-14 PROCEDURE — 86920 COMPATIBILITY TEST SPIN: CPT | Performed by: SPECIALIST

## 2023-12-14 PROCEDURE — 94640 AIRWAY INHALATION TREATMENT: CPT

## 2023-12-14 PROCEDURE — 25000003 PHARM REV CODE 250: Performed by: SPECIALIST

## 2023-12-14 PROCEDURE — 25000242 PHARM REV CODE 250 ALT 637 W/ HCPCS: Performed by: FAMILY MEDICINE

## 2023-12-14 PROCEDURE — 94760 N-INVAS EAR/PLS OXIMETRY 1: CPT

## 2023-12-14 RX ORDER — HYDROCODONE BITARTRATE AND ACETAMINOPHEN 500; 5 MG/1; MG/1
TABLET ORAL
Status: DISCONTINUED | OUTPATIENT
Start: 2023-12-14 | End: 2023-12-14 | Stop reason: HOSPADM

## 2023-12-14 RX ORDER — IPRATROPIUM BROMIDE AND ALBUTEROL SULFATE 2.5; .5 MG/3ML; MG/3ML
3 SOLUTION RESPIRATORY (INHALATION)
Status: COMPLETED | OUTPATIENT
Start: 2023-12-14 | End: 2023-12-14

## 2023-12-14 RX ORDER — TALC
6 POWDER (GRAM) TOPICAL ONCE
Status: COMPLETED | OUTPATIENT
Start: 2023-12-14 | End: 2023-12-14

## 2023-12-14 RX ADMIN — IPRATROPIUM BROMIDE AND ALBUTEROL SULFATE 3 ML: .5; 3 SOLUTION RESPIRATORY (INHALATION) at 08:12

## 2023-12-14 RX ADMIN — SODIUM CHLORIDE: 9 INJECTION, SOLUTION INTRAVENOUS at 02:12

## 2023-12-14 RX ADMIN — MELATONIN TAB 3 MG 6 MG: 3 TAB at 01:12

## 2023-12-14 NOTE — ED NOTES
Pt  trasnfusion continues w/o any issues- pt resting - gcs 15 - odrank applie juice- voided in urinal.   Lt piv site free  of edema or redness. Report to  concetta wahl chargenurse/

## 2023-12-14 NOTE — TELEPHONE ENCOUNTER
Spoke with Yvrose @ [facility] re patients status.  Patient was admitted yesterday, given blood and D/C this am. (Plan initially said transfer to [facility].  He states GI MD in [place] would not accept, inst to stop ASA & Plavix x 5 days, follow up with GI here).  Patient in waiting room wanting appt.  I called Dr STACIA Soni (GI) spoke with Darcy, she will talk with him and call patient with ASAP appt.  Patient made aware.  Also instructed patient to call [facility] today for appt to discuss the stopping of ASA & Plavix.  Patient voices understanding and agrees with plan.

## 2023-12-14 NOTE — ED NOTES
Transfer center consulted  GI MD - dr Niki Diaz at [facility] recommends pt be off plavix  x5 days then he needs small bowel enteroscopy- dr dial aware-  plan is to finish 2 nd unit prbcs- repaet cbc and  go from there.  Pt updated and agrees to this, resting- resps even unlabored- cm sr no ectopy.

## 2023-12-14 NOTE — ED NOTES
Dr padilla assuming care of pt now and reassessed pt- blood transfusion in progress still via pump- pt asaf of any s/s of reactions- resting now after meal tray given . Consulted dr Reyes - needs gi admit/consult- not a candidate her admit here at [facility]/.

## 2023-12-14 NOTE — ED NOTES
Instructed to stop plavix and aspirin per Dr. Escalera and to follow up with pcp and gi as soon as possible.

## 2023-12-17 LAB
ABO + RH BLD: NORMAL
ABO + RH BLD: NORMAL
BLD PROD TYP BPU: NORMAL
BLD PROD TYP BPU: NORMAL
BLOOD UNIT EXPIRATION DATE: NORMAL
BLOOD UNIT EXPIRATION DATE: NORMAL
BLOOD UNIT TYPE CODE: 6200
BLOOD UNIT TYPE CODE: 6200
CROSSMATCH INTERPRETATION: NORMAL
CROSSMATCH INTERPRETATION: NORMAL
DISPENSE STATUS: NORMAL
DISPENSE STATUS: NORMAL
UNIT NUMBER: NORMAL
UNIT NUMBER: NORMAL

## 2023-12-18 ENCOUNTER — ANESTHESIA (OUTPATIENT)
Dept: ENDOSCOPY | Facility: HOSPITAL | Age: 63
End: 2023-12-18
Payer: MEDICAID

## 2023-12-18 ENCOUNTER — ANESTHESIA EVENT (OUTPATIENT)
Dept: ENDOSCOPY | Facility: HOSPITAL | Age: 63
End: 2023-12-18
Payer: MEDICAID

## 2023-12-18 ENCOUNTER — HOSPITAL ENCOUNTER (OUTPATIENT)
Facility: HOSPITAL | Age: 63
Discharge: HOME | End: 2023-12-18
Attending: INTERNAL MEDICINE | Admitting: INTERNAL MEDICINE
Payer: MEDICAID

## 2023-12-18 VITALS
HEART RATE: 78 BPM | OXYGEN SATURATION: 97 % | SYSTOLIC BLOOD PRESSURE: 110 MMHG | RESPIRATION RATE: 20 BRPM | DIASTOLIC BLOOD PRESSURE: 56 MMHG | BODY MASS INDEX: 24.29 KG/M2 | TEMPERATURE: 98 F | HEIGHT: 69 IN | WEIGHT: 164 LBS

## 2023-12-18 DIAGNOSIS — D64.9 ANEMIA, UNSPECIFIED TYPE: ICD-10-CM

## 2023-12-18 DIAGNOSIS — R19.5 POSITIVE OCCULT STOOL BLOOD TEST: ICD-10-CM

## 2023-12-18 PROCEDURE — 25000242 PHARM REV CODE 250 ALT 637 W/ HCPCS

## 2023-12-18 PROCEDURE — 63600175 PHARM REV CODE 636 W HCPCS: Performed by: NURSE ANESTHETIST, CERTIFIED REGISTERED

## 2023-12-18 PROCEDURE — 25000003 PHARM REV CODE 250: Performed by: NURSE ANESTHETIST, CERTIFIED REGISTERED

## 2023-12-18 PROCEDURE — 43239 EGD BIOPSY SINGLE/MULTIPLE: CPT | Performed by: INTERNAL MEDICINE

## 2023-12-18 PROCEDURE — 37000008 HC ANESTHESIA 1ST 15 MINUTES: Performed by: INTERNAL MEDICINE

## 2023-12-18 PROCEDURE — D9220A PRA ANESTHESIA: Mod: CRNA,,, | Performed by: NURSE ANESTHETIST, CERTIFIED REGISTERED

## 2023-12-18 PROCEDURE — D9220A PRA ANESTHESIA: Mod: ANES,,, | Performed by: ANESTHESIOLOGY

## 2023-12-18 PROCEDURE — D9220A PRA ANESTHESIA: ICD-10-PCS | Mod: ANES,,, | Performed by: ANESTHESIOLOGY

## 2023-12-18 PROCEDURE — D9220A PRA ANESTHESIA: ICD-10-PCS | Mod: CRNA,,, | Performed by: NURSE ANESTHETIST, CERTIFIED REGISTERED

## 2023-12-18 PROCEDURE — 88305 TISSUE EXAM BY PATHOLOGIST: CPT | Performed by: INTERNAL MEDICINE

## 2023-12-18 RX ORDER — ETOMIDATE 2 MG/ML
INJECTION INTRAVENOUS
Status: DISCONTINUED | OUTPATIENT
Start: 2023-12-18 | End: 2023-12-18

## 2023-12-18 RX ORDER — LIDOCAINE HYDROCHLORIDE 20 MG/ML
INJECTION, SOLUTION EPIDURAL; INFILTRATION; INTRACAUDAL; PERINEURAL
Status: DISCONTINUED | OUTPATIENT
Start: 2023-12-18 | End: 2023-12-18

## 2023-12-18 RX ORDER — PHENYLEPHRINE HYDROCHLORIDE 10 MG/ML
INJECTION INTRAVENOUS
Status: DISCONTINUED | OUTPATIENT
Start: 2023-12-18 | End: 2023-12-18

## 2023-12-18 RX ORDER — PROPOFOL 10 MG/ML
VIAL (ML) INTRAVENOUS
Status: DISCONTINUED | OUTPATIENT
Start: 2023-12-18 | End: 2023-12-18

## 2023-12-18 RX ORDER — IPRATROPIUM BROMIDE AND ALBUTEROL SULFATE 2.5; .5 MG/3ML; MG/3ML
3 SOLUTION RESPIRATORY (INHALATION) ONCE
Status: CANCELLED | OUTPATIENT
Start: 2023-12-18 | End: 2023-12-18

## 2023-12-18 RX ORDER — IPRATROPIUM BROMIDE AND ALBUTEROL SULFATE 2.5; .5 MG/3ML; MG/3ML
SOLUTION RESPIRATORY (INHALATION)
Status: COMPLETED
Start: 2023-12-18 | End: 2023-12-18

## 2023-12-18 RX ADMIN — PHENYLEPHRINE HYDROCHLORIDE 100 MCG: 10 INJECTION INTRAVENOUS at 03:12

## 2023-12-18 RX ADMIN — ETOMIDATE 6 MG: 2 INJECTION INTRAVENOUS at 03:12

## 2023-12-18 RX ADMIN — LIDOCAINE HYDROCHLORIDE 4 ML: 20 INJECTION, SOLUTION INTRAVENOUS at 03:12

## 2023-12-18 RX ADMIN — PROPOFOL 30 MG: 10 INJECTION, EMULSION INTRAVENOUS at 03:12

## 2023-12-18 RX ADMIN — SODIUM CHLORIDE: 9 INJECTION, SOLUTION INTRAVENOUS at 03:12

## 2023-12-18 RX ADMIN — IPRATROPIUM BROMIDE AND ALBUTEROL SULFATE 3 ML: .5; 3 SOLUTION RESPIRATORY (INHALATION) at 02:12

## 2023-12-18 NOTE — H&P
[facility] Endoscopy  Gastroenterology  H&P    Patient Name: Hemal Tolbert Jr.  MRN: [id number]  Admission Date: 12/18/2023  Code Status: Prior    Attending Provider: Johan Flower MD   Primary Care Physician: Ivette Hearn FNP  Principal Problem:<principal problem not specified>    Subjective:     History of Present Illness: anemia--chronic/intermittent and progressive without overt gi bleeding. Previous egd/colon/vce only notable for small bowel angioectasia per vce. Presents for egd secondary to recent er visit for hb 5.    Past Medical History:   Diagnosis Date    Alcoholic cirrhosis     CHF (congestive heart failure)     LV EF 40%    COPD (chronic obstructive pulmonary disease)     Hypertension     Myocardial infarction     Peripheral artery disease     with stents    Sleep apnea        Past Surgical History:   Procedure Laterality Date    ANGIOGRAM, ABDOMINAL AORTA  10/24/2023    Procedure: Angiogram, Abdominal Aorta;  Surgeon: Janette Ernandez MD;  Location: [facility] CATH LAB;  Service: Cardiology;;    ARTERIOGRAPHY OF SUBCLAVIAN ARTERY Left 10/24/2023    Procedure: ARTERIOGRAM, SUBCLAVIAN;  Surgeon: Janette Ernandez MD;  Location: [facility] CATH LAB;  Service: Cardiology;  Laterality: Left;    CAPSULE ENDOSCOPY, ESOPHAGUS THROUGH ILEUM N/A 11/20/2023    Procedure: M2A;  Surgeon: Johan Flower MD;  Location: [facility] ENDOSCOPY;  Service: Gastroenterology;  Laterality: N/A;  To be done at any time today when staff is available.    CARDIAC DEFIBRILLATOR PLACEMENT N/A 11/28/2023    Procedure: Insertion, Single chamber ICD (SJM);  Surgeon: Suyapa Oakes MD;  Location: [facility] CATH LAB;  Service: Cardiology;  Laterality: N/A;    CATHETERIZATION OF BOTH LEFT AND RIGHT HEART N/A 10/24/2023    Procedure: CATHETERIZATION, HEART, BOTH LEFT AND RIGHT;  Surgeon: Janette Ernandez MD;  Location: [facility] CATH LAB;  Service: Cardiology;  Laterality: N/A;    COLONOSCOPY N/A 6/10/2023     Procedure: COLONOSCOPY;  Surgeon: Johan Flower MD;  Location: [facility] OR;  Service: Gastroenterology;  Laterality: N/A;    ESOPHAGOGASTRODUODENOSCOPY N/A 6/9/2023    Procedure: EGD;  Surgeon: Tima Teixeira MD;  Location: [facility] ENDOSCOPY;  Service: Gastroenterology;  Laterality: N/A;    INSERTION OF INTRA-AORTIC BALLOON ASSIST DEVICE  10/24/2023    Procedure: INSERTION, INTRA-AORTIC BALLOON PUMP;  Surgeon: Janette Ernandez MD;  Location: [facility] CATH LAB;  Service: Cardiology;;    INSERTION OF INTRAVASCULAR MICROAXIAL BLOOD PUMP N/A 10/26/2023    Procedure: INSERTION, IMPELLA;  Surgeon: Janette Ernandez MD;  Location: [facility] CATH LAB;  Service: Cardiology;  Laterality: N/A;    INSTANTANEOUS WAVE-FREE RATIO (IFR) N/A 10/26/2023    Procedure: Instantaneous Wave-Free Ratio (IFR);  Surgeon: Janette Ernandez MD;  Location: [facility] CATH LAB;  Service: Cardiology;  Laterality: N/A;    INTRAVASCULAR ULTRASOUND, CORONARY N/A 10/26/2023    Procedure: Ultrasound-coronary;  Surgeon: Janette Ernandez MD;  Location: [facility] CATH LAB;  Service: Cardiology;  Laterality: N/A;    PERCUTANEOUS CORONARY INTERVENTION, ARTERY N/A 10/26/2023    Procedure: Percutaneous coronary intervention- with Impella;  Surgeon: Janette Ernandez MD;  Location: [facility] CATH LAB;  Service: Cardiology;  Laterality: N/A;    PLACEMENT, IABP  10/24/2023    Procedure: Placement, IABP;  Surgeon: Janette Ernandez MD;  Location: [facility] CATH LAB;  Service: Cardiology;;       Review of patient's allergies indicates:  No Known Allergies  Family History    None       Tobacco Use    Smoking status: Every Day     Types: Vaping with nicotine    Smokeless tobacco: Current     Types: Chew   Substance and Sexual Activity    Alcohol use: Not Currently     Comment: currently not drinking per pt    Drug use: Yes     Types: Marijuana    Sexual activity: Not Currently     Review of Systems   Allergic/Immunologic: Positive for immunocompromised state.     Objective:     Vital Signs  (Most Recent):  Temp: 97.7 °F (36.5 °C) (12/18/23 1411)  Pulse: 67 (12/18/23 1445)  Resp: 19 (12/18/23 1445)  BP: 120/68 (12/18/23 1411)  SpO2: 97 % (12/18/23 1445) Vital Signs (24h Range):  Temp:  [97.7 °F (36.5 °C)] 97.7 °F (36.5 °C)  Pulse:  [67] 67  Resp:  [19] 19  SpO2:  [97 %] 97 %  BP: (120)/(68) 120/68     Weight: 74.4 kg (164 lb) (12/15/23 1541)  Body mass index is 24.22 kg/m².    No intake or output data in the 24 hours ending 12/18/23 1447    Lines/Drains/Airways       Peripheral Intravenous Line  Duration                  Peripheral IV - Single Lumen 12/18/23 1425 22 G Left;Posterior Hand <1 day                    Physical Exam    Significant Labs:  All pertinent lab results from the last 24 hours have been reviewed.    Significant Imaging:  Imaging results within the past 24 hours have been reviewed.    Assessment/Plan:     There are no hospital problems to display for this patient.      anemia--chronic/intermittent and progressive without overt gi bleeding. Previous egd/colon/vce only notable for small bowel angioectasia per vce. Presents for egd secondary to recent er visit for hb 5.    Johan Flower MD  Gastroenterology  [facility]

## 2023-12-18 NOTE — PROVATION PATIENT INSTRUCTIONS
Discharge Summary/Instructions after an Endoscopic Procedure  Patient Name: Hemal Tolbert  Patient MRN: [id number]  Patient YOB: 1960  Monday, December 18, 2023  oJhan Flower MD  Dear patient,  As a result of recent federal legislation (The Federal Cures Act), you may   receive lab or pathology results from your procedure in your [facility]   account before your physician is able to contact you. Your physician or   their representative will relay the results to you with their   recommendations at their soonest availability.  Thank you,  RESTRICTIONS:  During your procedure today, you received medications for sedation.  These   medications may affect your judgment, balance and coordination.  Therefore,   for 24 hours, you have the following restrictions:   - DO NOT drive a car, operate machinery, make legal/financial decisions,   sign important papers or drink alcohol.    ACTIVITY:  Today: no heavy lifting, straining or running due to procedural   sedation/anesthesia.  The following day: return to full activity including work.  DIET:  Eat and drink normally unless instructed otherwise.     TREATMENT FOR COMMON SIDE EFFECTS:  - Mild abdominal pain, nausea, belching, bloating or excessive gas:  rest,   eat lightly and use a heating pad.  - Sore Throat: treat with throat lozenges and/or gargle with warm salt   water.  - Because air was used during the procedure, expelling large amounts of air   from your rectum or belching is normal.  - If a bowel prep was taken, you may not have a bowel movement for 1-3 days.    This is normal.  SYMPTOMS TO WATCH FOR AND REPORT TO YOUR PHYSICIAN:  1. Abdominal pain or bloating, other than gas cramps.  2. Chest pain.  3. Back pain.  4. Signs of infection such as: chills or fever occurring within 24 hours   after the procedure.  5. Rectal bleeding, which would show as bright red, maroon, or black stools.   (A tablespoon of blood from the rectum is not serious,  especially if   hemorrhoids are present.)  6. Vomiting.  7. Weakness or dizziness.  GO DIRECTLY TO THE NEAREST EMERGENCY ROOM IF YOU HAVE ANY OF THE FOLLOWING:      Difficulty breathing              Chills and/or fever over 101 F   Persistent vomiting and/or vomiting blood   Severe abdominal pain   Severe chest pain   Black, tarry stools   Bleeding- more than one tablespoon   Any other symptom or condition that you feel may need urgent attention  Your doctor recommends these additional instructions:  If any biopsies were taken, your doctors clinic will contact you in 1 to 2   weeks with any results.  - Discharge patient to home.   - Resume previous diet.   - Continue present medications.   - Await pathology results.   - Observe patient's clinical course.   - refer to hematology for eval/follow up of chronic anemia  - consider long acting somatostatin  For questions, problems or results please call your physician - Johan Flower MD at Work:  [phone].  [facility], EMERGENCY ROOM PHONE NUMBER: [phone]  IF A COMPLICATION OR EMERGENCY SITUATION ARISES AND YOU ARE UNABLE TO REACH   YOUR PHYSICIAN - GO DIRECTLY TO THE EMERGENCY ROOM.  MD Johan Guaman MD  12/18/2023 3:26:53 PM  This report has been verified and signed electronically.  Dear patient,  As a result of recent federal legislation (The Federal Cures Act), you may   receive lab or pathology results from your procedure in your [facility]   account before your physician is able to contact you. Your physician or   their representative will relay the results to you with their   recommendations at their soonest availability.  Thank you,  PROVATION

## 2023-12-18 NOTE — ANESTHESIA POSTPROCEDURE EVALUATION
Anesthesia Post Evaluation    Patient: Hemal Stricklandantionette Bernal    Procedure(s) Performed: Procedure(s) (LRB):  EGD (N/A)    Final Anesthesia Type: general (/Regional//MAC)      Patient location during evaluation: PACU  Post-procedure mental status: @ basline.  Pain management: adequate    PONV status: See postop meds for drugs used to control n/v if any.  Anesthetic complications: no      Cardiovascular status: blood pressure returned to baseline  Respiratory status: @ baseline.  Hydration status: euvolemic                      No case tracking events are documented in the log.      Pain/Noah Score: No data recorded

## 2023-12-18 NOTE — TRANSFER OF CARE
"Anesthesia Transfer of Care Note    Patient: Hemal Tolbert JrAlley    Procedure(s) Performed: Procedure(s) (LRB):  EGD (N/A)    Patient location: GI    Anesthesia Type: MAC    Transport from OR: Transported from OR on room air with adequate spontaneous ventilation    Post pain: adequate analgesia    Post assessment: no apparent anesthetic complications    Post vital signs: stable    Level of consciousness: awake, alert and oriented    Nausea/Vomiting: no nausea/vomiting    Complications: none    Transfer of care protocol was followed      Last vitals: Visit Vitals  BP (!) 111/52 (BP Location: Right arm, Patient Position: Lying)   Pulse 68   Temp 36.5 °C (97.7 °F) (Skin)   Resp 18   Ht 5' 9" (1.753 m)   Wt 74.4 kg (164 lb)   SpO2 100%   BMI 24.22 kg/m²     "

## 2023-12-19 ENCOUNTER — DOCUMENT SCAN (OUTPATIENT)
Dept: HOME HEALTH SERVICES | Facility: HOSPITAL | Age: 63
End: 2023-12-19
Payer: MEDICAID

## 2023-12-19 LAB — PSYCHE PATHOLOGY RESULT: NORMAL

## 2023-12-20 ENCOUNTER — OFFICE VISIT (OUTPATIENT)
Dept: FAMILY MEDICINE | Facility: CLINIC | Age: 63
End: 2023-12-20
Payer: MEDICAID

## 2023-12-20 VITALS
HEART RATE: 72 BPM | HEIGHT: 68 IN | WEIGHT: 181.81 LBS | SYSTOLIC BLOOD PRESSURE: 115 MMHG | DIASTOLIC BLOOD PRESSURE: 68 MMHG | BODY MASS INDEX: 27.56 KG/M2 | RESPIRATION RATE: 18 BRPM | OXYGEN SATURATION: 98 % | TEMPERATURE: 97 F

## 2023-12-20 DIAGNOSIS — Z00.00 WELLNESS EXAMINATION: ICD-10-CM

## 2023-12-20 DIAGNOSIS — I25.110 CORONARY ARTERY DISEASE INVOLVING NATIVE CORONARY ARTERY OF NATIVE HEART WITH UNSTABLE ANGINA PECTORIS: ICD-10-CM

## 2023-12-20 DIAGNOSIS — D64.9 ANEMIA, UNSPECIFIED TYPE: Primary | ICD-10-CM

## 2023-12-20 PROCEDURE — 1159F MED LIST DOCD IN RCRD: CPT | Mod: CPTII,,, | Performed by: NURSE PRACTITIONER

## 2023-12-20 PROCEDURE — 1111F DSCHRG MED/CURRENT MED MERGE: CPT | Mod: CPTII,,, | Performed by: NURSE PRACTITIONER

## 2023-12-20 PROCEDURE — 99396 PREV VISIT EST AGE 40-64: CPT | Mod: ,,, | Performed by: NURSE PRACTITIONER

## 2023-12-20 PROCEDURE — 3008F BODY MASS INDEX DOCD: CPT | Mod: CPTII,,, | Performed by: NURSE PRACTITIONER

## 2023-12-20 PROCEDURE — 3074F SYST BP LT 130 MM HG: CPT | Mod: CPTII,,, | Performed by: NURSE PRACTITIONER

## 2023-12-20 PROCEDURE — 3078F DIAST BP <80 MM HG: CPT | Mod: CPTII,,, | Performed by: NURSE PRACTITIONER

## 2023-12-20 RX ORDER — FERROUS SULFATE 325(65) MG
325 TABLET, DELAYED RELEASE (ENTERIC COATED) ORAL 2 TIMES DAILY
Qty: 60 TABLET | Refills: 1 | Status: SHIPPED | OUTPATIENT
Start: 2023-12-20

## 2023-12-20 NOTE — PROGRESS NOTES
SUBJECTIVE:     History of Present Illness      Chief Complaint: Annual Exam (Discuss lab results.  Slight SOB, denies weakness.  Had EGD on 12/18/2023 with Dr Soni (patient states normal))    HPI:  Patient is a 63 y.o. year old male who presents to clinic for  her annual wellness exam after hospitalization for anemia last visit patient was sent to the emergency critical H&H 5 and 18 point arrival to emergency room H&H dropped to 4.916.9.  Patient states that he was given some units of blood and followed up with his gastro doctor outpatient for bleeding.  Patient had EGD with gastro doctor on December 18 and states everything was normal.  Bleeding most likely related to blood thinners due to recent coronary artery disease.  Patient denies any weakness states he is feels fine denies chest pain    Review of Systems:    Review of Systems    12 point review of systems conducted, negative except as stated in the history of present illness. See HPI for details.     Previous History    Ivette Hearn, LINA  Review of patient's allergies indicates:  No Known Allergies    Past Medical History:   Diagnosis Date    Alcoholic cirrhosis     Anemia     CHF (congestive heart failure)     LV EF 40%    COPD (chronic obstructive pulmonary disease)     Hypertension     Myocardial infarction     Peripheral artery disease     with stents    Sleep apnea      Current Outpatient Medications   Medication Instructions    albuterol (PROVENTIL/VENTOLIN HFA) 90 mcg/actuation inhaler 1-2 puffs, Inhalation, Every 4 hours PRN    amiodarone (PACERONE) 400 mg, Oral, Daily    aspirin 81 MG Chew CHEW 1 tablet (81 mg total) by mouth once daily.    atorvastatin (LIPITOR) 80 mg, Oral, Daily    clopidogreL (PLAVIX) 75 mg, Oral, Daily    docusate sodium (STOOL SOFTENER) 50 mg, Oral, 2 times daily    ferrous sulfate 325 mg, Oral, 2 times daily    folic acid (FOLVITE) 1 mg, Oral, Daily    furosemide (LASIX) 40 mg, Oral, Daily    gabapentin  (NEURONTIN) 300 MG capsule TAKE ONE (1) CAPSULE BY MOUTH (3) TIMES DAILY    metoprolol succinate (TOPROL-XL) 25 MG 24 hr tablet Take ½ ablet (12.5 mg total) by mouth once daily.    pantoprazole (PROTONIX) 40 mg, Oral, 2 times daily    STIOLTO RESPIMAT 2.5-2.5 mcg/actuation Mist 2 puffs, Inhalation, Daily    tamsulosin (FLOMAX) 0.4 mg, Oral, Daily     Past Surgical History:   Procedure Laterality Date    ANGIOGRAM, ABDOMINAL AORTA  10/24/2023    Procedure: Angiogram, Abdominal Aorta;  Surgeon: Janette Ernandez MD;  Location: [facility] CATH LAB;  Service: Cardiology;;    ARTERIOGRAPHY OF SUBCLAVIAN ARTERY Left 10/24/2023    Procedure: ARTERIOGRAM, SUBCLAVIAN;  Surgeon: Janette Ernandez MD;  Location: [facility] CATH LAB;  Service: Cardiology;  Laterality: Left;    CAPSULE ENDOSCOPY, ESOPHAGUS THROUGH ILEUM N/A 11/20/2023    Procedure: M2A;  Surgeon: Johan Flower MD;  Location: [facility] ENDOSCOPY;  Service: Gastroenterology;  Laterality: N/A;  To be done at any time today when staff is available.    CARDIAC DEFIBRILLATOR PLACEMENT N/A 11/28/2023    Procedure: Insertion, Single chamber ICD (SJM);  Surgeon: Suyapa Oakes MD;  Location: [facility] CATH LAB;  Service: Cardiology;  Laterality: N/A;    CATHETERIZATION OF BOTH LEFT AND RIGHT HEART N/A 10/24/2023    Procedure: CATHETERIZATION, HEART, BOTH LEFT AND RIGHT;  Surgeon: Janette Ernandez MD;  Location: [facility] CATH LAB;  Service: Cardiology;  Laterality: N/A;    COLONOSCOPY N/A 6/10/2023    Procedure: COLONOSCOPY;  Surgeon: Johan Flower MD;  Location: [facility] OR;  Service: Gastroenterology;  Laterality: N/A;    EGD, WITH CLOSED BIOPSY N/A 12/18/2023    Procedure: EGD;  Surgeon: Johan Flower MD;  Location: [facility] ENDOSCOPY;  Service: Gastroenterology;  Laterality: N/A;    ESOPHAGOGASTRODUODENOSCOPY N/A 6/9/2023    Procedure: EGD;  Surgeon: Tima Teixeira MD;  Location: [facility] ENDOSCOPY;  Service: Gastroenterology;  Laterality: N/A;    INSERTION OF  "INTRA-AORTIC BALLOON ASSIST DEVICE  10/24/2023    Procedure: INSERTION, INTRA-AORTIC BALLOON PUMP;  Surgeon: Janette Ernandez MD;  Location: [facility] CATH LAB;  Service: Cardiology;;    INSERTION OF INTRAVASCULAR MICROAXIAL BLOOD PUMP N/A 10/26/2023    Procedure: INSERTION, IMPELLA;  Surgeon: Janette Ernandez MD;  Location: [facility] CATH LAB;  Service: Cardiology;  Laterality: N/A;    INSTANTANEOUS WAVE-FREE RATIO (IFR) N/A 10/26/2023    Procedure: Instantaneous Wave-Free Ratio (IFR);  Surgeon: Janette Ernandez MD;  Location: [facility] CATH LAB;  Service: Cardiology;  Laterality: N/A;    INTRAVASCULAR ULTRASOUND, CORONARY N/A 10/26/2023    Procedure: Ultrasound-coronary;  Surgeon: Janette Ernandez MD;  Location: [facility] CATH LAB;  Service: Cardiology;  Laterality: N/A;    PERCUTANEOUS CORONARY INTERVENTION, ARTERY N/A 10/26/2023    Procedure: Percutaneous coronary intervention- with Impella;  Surgeon: Janette Ernandez MD;  Location: [facility] CATH LAB;  Service: Cardiology;  Laterality: N/A;    PLACEMENT, IABP  10/24/2023    Procedure: Placement, IABP;  Surgeon: Janette Ernandez MD;  Location: [facility] CATH LAB;  Service: Cardiology;;     History reviewed. No pertinent family history.    Social History     Tobacco Use    Smoking status: Every Day     Types: Vaping with nicotine    Smokeless tobacco: Current     Types: Chew   Substance Use Topics    Alcohol use: Not Currently     Comment: currently not drinking per pt    Drug use: Yes     Types: Marijuana        Health Maintenance      Health Maintenance   Topic Date Due    Shingles Vaccine (1 of 2) Never done    Lipid Panel  12/13/2028    TETANUS VACCINE  12/17/2028    Colorectal Cancer Screening  06/10/2033    Hepatitis C Screening  Completed       OBJECTIVE:     Physical Exam      Vital Signs Reviewed   Visit Vitals  /68 (BP Location: Left arm, Patient Position: Sitting)   Pulse 72   Temp 97.3 °F (36.3 °C) (Oral)   Resp 18   Ht 5' 8" (1.727 m)   Wt 82.5 kg (181 lb 12.8 oz)   SpO2 " 98%   BMI 27.64 kg/m²       Physical Exam    Physical Exam:  General: Alert, well nourished, no acute distress, non-toxic appearing.   Eyes: Anicteric sclera, without conjunctival injection, normal lids, no purulent drainage, EOMs grossly intact.   Ears: No tragal tenderness. Tympanic membranes intact, pearly grey, without effusion or erythema and with a positive light reflex.   Mouth: Posterior pharynx without erythema. No exudate, ulcerations, or lesion. No tonsillar swelling.   Neck: Supple, full ROM, no rigidity, no cervical adenopathy.   Cardio: Normal rate and rhythm    Resp: Respirations even and unlabored, clear to auscultation bilaterally.   Abd: No ecchymosis or distension. Normal bowel sounds in all 4 quadrants. No tenderness to palpation. No rebound tenderness or guarding. No CVA tenderness.   Skin: No rashes or open lesions noted.   MSK: No swelling. No abrasions or signs of trauma. Ambulating without assistance.   Neuro: Alert,oriented No focal deficits noted. Facial expressions even.   Psych: Cooperative, Normal affect      Procedures    Procedures     Labs     Results for orders placed or performed during the hospital encounter of 12/18/23   Specimen to Pathology   Result Value Ref Range    Pathology Result         Chemistry:  Lab Results   Component Value Date     12/13/2023    K 4.2 12/13/2023    CHLORIDE 102 12/13/2023    BUN 29.2 (H) 12/13/2023    CREATININE 1.33 (H) 12/13/2023    EGFRNORACEVR 60 12/13/2023    GLUCOSE 94 12/13/2023    CALCIUM 8.6 (L) 12/13/2023    ALKPHOS 65 12/13/2023    LABPROT 7.1 12/13/2023    ALBUMIN 3.3 (L) 12/13/2023    BILIDIR 0.2 11/21/2023    IBILI 0.20 11/21/2023    AST 26 12/13/2023    ALT 12 12/13/2023    MG 2.10 11/19/2023    PHOS 3.6 11/19/2023    PKYYGVSE06LJ 21.3 (L) 12/13/2023    TSH 3.221 12/13/2023    GMVRTK2JXIS 0.92 06/07/2023    PSA 0.10 12/13/2023        Lab Results   Component Value Date    HGBA1C 6.1 12/13/2023        Hematology:  Lab Results    Component Value Date    WBC 7.16 12/14/2023    HGB 7.4 (L) 12/14/2023    HCT 23.9 (L) 12/14/2023     12/14/2023       Lipid Panel:  Lab Results   Component Value Date    CHOL 56 12/13/2023    HDL 16 (L) 12/13/2023    LDL 24.00 (L) 12/13/2023    TRIG 79 12/13/2023    TOTALCHOLEST 4 12/13/2023        Urine:  Lab Results   Component Value Date    COLORUA Yellow 12/13/2023    APPEARANCEUA Clear 12/13/2023    SGUA 1.015 12/13/2023    PHUA 7.0 12/13/2023    PROTEINUA Negative 12/13/2023    GLUCOSEUA Negative 12/13/2023    KETONESUA Negative 12/13/2023    BLOODUA Negative 12/13/2023    NITRITESUA Negative 12/13/2023    LEUKOCYTESUR Negative 12/13/2023    RBCUA None Seen 12/13/2023    WBCUA None Seen 12/13/2023    BACTERIA None Seen 12/13/2023    SQEPUA 1-2 12/22/2020    HYALINECASTS 0-2 (A) 12/22/2020    CREATRANDUR 142.7 10/24/2023         Assessment            ICD-10-CM ICD-9-CM   1. Anemia, unspecified type  D64.9 285.9   2. Coronary artery disease involving native coronary artery of native heart with unstable angina pectoris  I25.110 414.01     411.1   3. Wellness examination  Z00.00 V70.0       Plan       1. Anemia, unspecified type  - Ambulatory referral/consult to Hematology / Oncology; Future  - ferrous sulfate 325 (65 FE) MG EC tablet; Take 1 tablet (325 mg total) by mouth 2 (two) times daily.  Dispense: 60 tablet; Refill: 1   Repeat lab work  2. Coronary artery disease involving native coronary artery of native heart with unstable angina pectoris   Unchanged continue recommendations and medications per cardiologist   Clopidogrel ,metoprolol  3. Wellness examination  Discussed labs and preventative screenings   Overall health status reviewed.    Significant chronic conditions addressed, including ongoing treatment plans.   Good health habits reinforced.    Cardiovascular disease risk factors discussed.   Appropriate recommendations and preventative care medical   information provided with annual wellness  exams encouraged.  Vaccination status       Orders Placed This Encounter    Ambulatory referral/consult to Hematology / Oncology    ferrous sulfate 325 (65 FE) MG EC tablet    docusate sodium (STOOL SOFTENER) 50 MG capsule      Medication List with Changes/Refills   New Medications    DOCUSATE SODIUM (STOOL SOFTENER) 50 MG CAPSULE    Take 1 capsule (50 mg total) by mouth 2 (two) times daily.    FERROUS SULFATE 325 (65 FE) MG EC TABLET    Take 1 tablet (325 mg total) by mouth 2 (two) times daily.   Current Medications    ALBUTEROL (PROVENTIL/VENTOLIN HFA) 90 MCG/ACTUATION INHALER    Inhale 1-2 puffs into the lungs every 4 (four) hours as needed.    AMIODARONE (PACERONE) 200 MG TAB    Take 2 tablets (400 mg total) by mouth once daily.    ASPIRIN 81 MG CHEW    CHEW 1 tablet (81 mg total) by mouth once daily.    ATORVASTATIN (LIPITOR) 80 MG TABLET    Take 1 tablet (80 mg total) by mouth once daily.    CLOPIDOGREL (PLAVIX) 75 MG TABLET    Take 1 tablet (75 mg total) by mouth once daily.    FOLIC ACID (FOLVITE) 1 MG TABLET    Take 1 mg by mouth once daily.    FUROSEMIDE (LASIX) 40 MG TABLET    Take 40 mg by mouth once daily.    METOPROLOL SUCCINATE (TOPROL-XL) 25 MG 24 HR TABLET    Take ½ ablet (12.5 mg total) by mouth once daily.    PANTOPRAZOLE (PROTONIX) 40 MG TABLET    Take 1 tablet (40 mg total) by mouth 2 (two) times daily.    STIOLTO RESPIMAT 2.5-2.5 MCG/ACTUATION MIST    Inhale 2 puffs into the lungs once daily.    TAMSULOSIN (FLOMAX) 0.4 MG CAP    Take 1 capsule (0.4 mg total) by mouth once daily.   Changed and/or Refilled Medications    Modified Medication Previous Medication    GABAPENTIN (NEURONTIN) 300 MG CAPSULE gabapentin (NEURONTIN) 300 MG capsule       TAKE ONE (1) CAPSULE BY MOUTH (3) TIMES DAILY    Take 300 mg by mouth 3 (three) times daily.       Follow up in about 6 weeks (around 1/31/2024).   Follow up in about 6 weeks (around 1/31/2024). In addition to their scheduled follow up, the patient has also  been instructed to follow up on as needed basis.   Future Appointments   Date Time Provider Department Center   1/31/2024  9:30 AM [facility]

## 2023-12-23 ENCOUNTER — DOCUMENT SCAN (OUTPATIENT)
Dept: HOME HEALTH SERVICES | Facility: HOSPITAL | Age: 63
End: 2023-12-23
Payer: MEDICAID

## 2023-12-26 DIAGNOSIS — M79.604 PAIN IN BOTH LOWER EXTREMITIES: Primary | ICD-10-CM

## 2023-12-26 DIAGNOSIS — M79.605 PAIN IN BOTH LOWER EXTREMITIES: Primary | ICD-10-CM

## 2023-12-27 RX ORDER — GABAPENTIN 300 MG/1
CAPSULE ORAL
Qty: 90 CAPSULE | Refills: 1 | Status: SHIPPED | OUTPATIENT
Start: 2023-12-27 | End: 2024-02-19

## 2024-01-02 NOTE — ADDENDUM NOTE
Addended by: HAILE RUGGIERO on: 1/2/2024 12:33 PM     Modules accepted: Orders, Level of Service

## 2024-01-03 ENCOUNTER — DOCUMENT SCAN (OUTPATIENT)
Dept: HOME HEALTH SERVICES | Facility: HOSPITAL | Age: 64
End: 2024-01-03
Payer: MEDICAID

## 2024-01-03 DIAGNOSIS — D64.9 ANEMIA, UNSPECIFIED TYPE: Primary | ICD-10-CM

## 2024-01-03 RX ORDER — FOLIC ACID 1 MG/1
1 TABLET ORAL DAILY
Qty: 30 TABLET | Refills: 3 | Status: SHIPPED | OUTPATIENT
Start: 2024-01-03

## 2024-01-05 ENCOUNTER — DOCUMENT SCAN (OUTPATIENT)
Dept: HOME HEALTH SERVICES | Facility: HOSPITAL | Age: 64
End: 2024-01-05
Payer: MEDICAID

## 2024-01-10 ENCOUNTER — HOSPITAL ENCOUNTER (INPATIENT)
Facility: HOSPITAL | Age: 64
LOS: 2 days | Discharge: HOME | DRG: 377 | End: 2024-01-12
Attending: INTERNAL MEDICINE | Admitting: INTERNAL MEDICINE
Payer: MEDICAID

## 2024-01-10 ENCOUNTER — HOSPITAL ENCOUNTER (EMERGENCY)
Facility: HOSPITAL | Age: 64
Discharge: ANOTHER HOSPITAL | End: 2024-01-10
Attending: FAMILY MEDICINE
Payer: MEDICAID

## 2024-01-10 VITALS
WEIGHT: 190 LBS | TEMPERATURE: 98 F | SYSTOLIC BLOOD PRESSURE: 92 MMHG | HEIGHT: 68 IN | DIASTOLIC BLOOD PRESSURE: 45 MMHG | OXYGEN SATURATION: 93 % | HEART RATE: 61 BPM | BODY MASS INDEX: 28.79 KG/M2 | RESPIRATION RATE: 13 BRPM

## 2024-01-10 DIAGNOSIS — J44.1 COPD EXACERBATION: ICD-10-CM

## 2024-01-10 DIAGNOSIS — R06.02 SOB (SHORTNESS OF BREATH): ICD-10-CM

## 2024-01-10 DIAGNOSIS — R06.02 SOB (SHORTNESS OF BREATH) ON EXERTION: ICD-10-CM

## 2024-01-10 DIAGNOSIS — D64.9 ANEMIA: ICD-10-CM

## 2024-01-10 DIAGNOSIS — K92.2 GIB (GASTROINTESTINAL BLEEDING): ICD-10-CM

## 2024-01-10 DIAGNOSIS — R06.02 SHORTNESS OF BREATH: ICD-10-CM

## 2024-01-10 DIAGNOSIS — K92.2 GASTROINTESTINAL HEMORRHAGE, UNSPECIFIED GASTROINTESTINAL HEMORRHAGE TYPE: Primary | ICD-10-CM

## 2024-01-10 DIAGNOSIS — R07.9 CHEST PAIN: ICD-10-CM

## 2024-01-10 DIAGNOSIS — J18.9 PNEUMONIA OF RIGHT LOWER LOBE DUE TO INFECTIOUS ORGANISM: ICD-10-CM

## 2024-01-10 LAB
ABO + RH BLD: NORMAL
ABO + RH BLD: NORMAL
ANION GAP SERPL CALC-SCNC: 10 MEQ/L
BASOPHILS # BLD AUTO: 0.03 X10(3)/MCL
BASOPHILS NFR BLD AUTO: 0.2 %
BLD PROD TYP BPU: NORMAL
BLD PROD TYP BPU: NORMAL
BLOOD UNIT EXPIRATION DATE: NORMAL
BLOOD UNIT EXPIRATION DATE: NORMAL
BLOOD UNIT TYPE CODE: 6200
BLOOD UNIT TYPE CODE: 6200
BNP BLD-MCNC: 1138.4 PG/ML
BUN SERPL-MCNC: 23.6 MG/DL (ref 8.4–25.7)
CALCIUM SERPL-MCNC: 8.4 MG/DL (ref 8.8–10)
CHLORIDE SERPL-SCNC: 104 MMOL/L (ref 98–107)
CO2 SERPL-SCNC: 24 MMOL/L (ref 23–31)
CREAT SERPL-MCNC: 1.79 MG/DL (ref 0.73–1.18)
CREAT/UREA NIT SERPL: 13
CROSSMATCH INTERPRETATION: NORMAL
CROSSMATCH INTERPRETATION: NORMAL
CRP SERPL HS-MCNC: 10.35 MG/L
DISPENSE STATUS: NORMAL
DISPENSE STATUS: NORMAL
EOSINOPHIL # BLD AUTO: 0 X10(3)/MCL (ref 0–0.9)
EOSINOPHIL NFR BLD AUTO: 0 %
ERYTHROCYTE [DISTWIDTH] IN BLOOD BY AUTOMATED COUNT: 18.9 % (ref 11.5–17)
FLUAV AG UPPER RESP QL IA.RAPID: NOT DETECTED
FLUBV AG UPPER RESP QL IA.RAPID: NOT DETECTED
GFR SERPLBLD CREATININE-BSD FMLA CKD-EPI: 42 MLS/MIN/1.73/M2
GLUCOSE SERPL-MCNC: 121 MG/DL (ref 82–115)
GROUP & RH: NORMAL
HCT VFR BLD AUTO: 23.3 % (ref 42–52)
HEMOCCULT SP1 STL QL: NEGATIVE
HGB BLD-MCNC: 6.3 G/DL (ref 14–18)
IMM GRANULOCYTES # BLD AUTO: 0.02 X10(3)/MCL (ref 0–0.04)
IMM GRANULOCYTES NFR BLD AUTO: 0.2 %
INDIRECT COOMBS: NORMAL
INR PPP: 1.2
LYMPHOCYTES # BLD AUTO: 0.76 X10(3)/MCL (ref 0.6–4.6)
LYMPHOCYTES NFR BLD AUTO: 6.3 %
MAGNESIUM SERPL-MCNC: 2.3 MG/DL (ref 1.6–2.6)
MCH RBC QN AUTO: 25.2 PG (ref 27–31)
MCHC RBC AUTO-ENTMCNC: 27 G/DL (ref 33–36)
MCV RBC AUTO: 93.2 FL (ref 80–94)
MONOCYTES # BLD AUTO: 0.9 X10(3)/MCL (ref 0.1–1.3)
MONOCYTES NFR BLD AUTO: 7.4 %
NEUTROPHILS # BLD AUTO: 10.42 X10(3)/MCL (ref 2.1–9.2)
NEUTROPHILS NFR BLD AUTO: 85.9 %
PLATELET # BLD AUTO: 284 X10(3)/MCL (ref 130–400)
PMV BLD AUTO: 10.5 FL (ref 7.4–10.4)
POC CARDIAC TROPONIN I: 0.02 NG/ML (ref 0–0.08)
POTASSIUM SERPL-SCNC: 4.4 MMOL/L (ref 3.5–5.1)
PROTHROMBIN TIME: 11.7 SECONDS (ref 12.5–14.5)
RBC # BLD AUTO: 2.5 X10(6)/MCL (ref 4.7–6.1)
RSV A 5' UTR RNA NPH QL NAA+PROBE: NOT DETECTED
SAMPLE: NORMAL
SARS-COV-2 RNA RESP QL NAA+PROBE: NOT DETECTED
SODIUM SERPL-SCNC: 138 MMOL/L (ref 136–145)
SPECIMEN OUTDATE: NORMAL
UNIT NUMBER: NORMAL
UNIT NUMBER: NORMAL
WBC # SPEC AUTO: 12.13 X10(3)/MCL (ref 4.5–11.5)

## 2024-01-10 PROCEDURE — 82272 OCCULT BLD FECES 1-3 TESTS: CPT | Performed by: FAMILY MEDICINE

## 2024-01-10 PROCEDURE — 99291 CRITICAL CARE FIRST HOUR: CPT

## 2024-01-10 PROCEDURE — 25000242 PHARM REV CODE 250 ALT 637 W/ HCPCS: Performed by: FAMILY MEDICINE

## 2024-01-10 PROCEDURE — 11000001 HC ACUTE MED/SURG PRIVATE ROOM

## 2024-01-10 PROCEDURE — 85610 PROTHROMBIN TIME: CPT | Performed by: FAMILY MEDICINE

## 2024-01-10 PROCEDURE — P9016 RBC LEUKOCYTES REDUCED: HCPCS | Performed by: FAMILY MEDICINE

## 2024-01-10 PROCEDURE — 27000221 HC OXYGEN, UP TO 24 HOURS

## 2024-01-10 PROCEDURE — 99292 CRITICAL CARE ADDL 30 MIN: CPT

## 2024-01-10 PROCEDURE — 63600175 PHARM REV CODE 636 W HCPCS: Performed by: INTERNAL MEDICINE

## 2024-01-10 PROCEDURE — 96374 THER/PROPH/DIAG INJ IV PUSH: CPT | Mod: 59

## 2024-01-10 PROCEDURE — C9113 INJ PANTOPRAZOLE SODIUM, VIA: HCPCS | Performed by: FAMILY MEDICINE

## 2024-01-10 PROCEDURE — 83880 ASSAY OF NATRIURETIC PEPTIDE: CPT | Performed by: FAMILY MEDICINE

## 2024-01-10 PROCEDURE — 36430 TRANSFUSION BLD/BLD COMPNT: CPT

## 2024-01-10 PROCEDURE — 86901 BLOOD TYPING SEROLOGIC RH(D): CPT | Performed by: FAMILY MEDICINE

## 2024-01-10 PROCEDURE — 85025 COMPLETE CBC W/AUTO DIFF WBC: CPT | Performed by: FAMILY MEDICINE

## 2024-01-10 PROCEDURE — 87040 BLOOD CULTURE FOR BACTERIA: CPT | Performed by: FAMILY MEDICINE

## 2024-01-10 PROCEDURE — 0241U COVID/RSV/FLU A&B PCR: CPT | Performed by: FAMILY MEDICINE

## 2024-01-10 PROCEDURE — 86141 C-REACTIVE PROTEIN HS: CPT | Performed by: FAMILY MEDICINE

## 2024-01-10 PROCEDURE — 86920 COMPATIBILITY TEST SPIN: CPT | Performed by: FAMILY MEDICINE

## 2024-01-10 PROCEDURE — 25000003 PHARM REV CODE 250: Performed by: FAMILY MEDICINE

## 2024-01-10 PROCEDURE — 93010 ELECTROCARDIOGRAM REPORT: CPT | Mod: ,,, | Performed by: INTERNAL MEDICINE

## 2024-01-10 PROCEDURE — 96367 TX/PROPH/DG ADDL SEQ IV INF: CPT

## 2024-01-10 PROCEDURE — 83735 ASSAY OF MAGNESIUM: CPT | Performed by: FAMILY MEDICINE

## 2024-01-10 PROCEDURE — 80048 BASIC METABOLIC PNL TOTAL CA: CPT | Performed by: FAMILY MEDICINE

## 2024-01-10 PROCEDURE — 93005 ELECTROCARDIOGRAM TRACING: CPT

## 2024-01-10 PROCEDURE — 96366 THER/PROPH/DIAG IV INF ADDON: CPT

## 2024-01-10 PROCEDURE — 96365 THER/PROPH/DIAG IV INF INIT: CPT

## 2024-01-10 PROCEDURE — 96376 TX/PRO/DX INJ SAME DRUG ADON: CPT

## 2024-01-10 PROCEDURE — 63600175 PHARM REV CODE 636 W HCPCS: Performed by: FAMILY MEDICINE

## 2024-01-10 PROCEDURE — 94640 AIRWAY INHALATION TREATMENT: CPT

## 2024-01-10 RX ORDER — IBUPROFEN 200 MG
24 TABLET ORAL
Status: DISCONTINUED | OUTPATIENT
Start: 2024-01-10 | End: 2024-01-12 | Stop reason: HOSPADM

## 2024-01-10 RX ORDER — IPRATROPIUM BROMIDE AND ALBUTEROL SULFATE 2.5; .5 MG/3ML; MG/3ML
3 SOLUTION RESPIRATORY (INHALATION)
Status: COMPLETED | OUTPATIENT
Start: 2024-01-10 | End: 2024-01-10

## 2024-01-10 RX ORDER — IPRATROPIUM BROMIDE AND ALBUTEROL SULFATE 2.5; .5 MG/3ML; MG/3ML
3 SOLUTION RESPIRATORY (INHALATION) EVERY 4 HOURS PRN
Status: DISCONTINUED | OUTPATIENT
Start: 2024-01-10 | End: 2024-01-12 | Stop reason: HOSPADM

## 2024-01-10 RX ORDER — PANTOPRAZOLE SODIUM 40 MG/10ML
80 INJECTION, POWDER, LYOPHILIZED, FOR SOLUTION INTRAVENOUS
Status: COMPLETED | OUTPATIENT
Start: 2024-01-10 | End: 2024-01-10

## 2024-01-10 RX ORDER — ACETAMINOPHEN 325 MG/1
650 TABLET ORAL EVERY 4 HOURS PRN
Status: DISCONTINUED | OUTPATIENT
Start: 2024-01-10 | End: 2024-01-12 | Stop reason: HOSPADM

## 2024-01-10 RX ORDER — NALOXONE HCL 0.4 MG/ML
0.02 VIAL (ML) INJECTION
Status: DISCONTINUED | OUTPATIENT
Start: 2024-01-10 | End: 2024-01-12 | Stop reason: HOSPADM

## 2024-01-10 RX ORDER — FUROSEMIDE 10 MG/ML
40 INJECTION INTRAMUSCULAR; INTRAVENOUS DAILY
Status: COMPLETED | OUTPATIENT
Start: 2024-01-10 | End: 2024-01-12

## 2024-01-10 RX ORDER — ONDANSETRON HYDROCHLORIDE 2 MG/ML
4 INJECTION, SOLUTION INTRAVENOUS EVERY 8 HOURS PRN
Status: DISCONTINUED | OUTPATIENT
Start: 2024-01-10 | End: 2024-01-12 | Stop reason: HOSPADM

## 2024-01-10 RX ORDER — IBUPROFEN 200 MG
16 TABLET ORAL
Status: DISCONTINUED | OUTPATIENT
Start: 2024-01-10 | End: 2024-01-12 | Stop reason: HOSPADM

## 2024-01-10 RX ORDER — GLUCAGON 1 MG
1 KIT INJECTION
Status: DISCONTINUED | OUTPATIENT
Start: 2024-01-10 | End: 2024-01-12 | Stop reason: HOSPADM

## 2024-01-10 RX ORDER — SODIUM CHLORIDE 0.9 % (FLUSH) 0.9 %
10 SYRINGE (ML) INJECTION EVERY 12 HOURS PRN
Status: DISCONTINUED | OUTPATIENT
Start: 2024-01-10 | End: 2024-01-12 | Stop reason: HOSPADM

## 2024-01-10 RX ORDER — MORPHINE SULFATE 4 MG/ML
2 INJECTION, SOLUTION INTRAMUSCULAR; INTRAVENOUS EVERY 4 HOURS PRN
Status: DISCONTINUED | OUTPATIENT
Start: 2024-01-10 | End: 2024-01-12 | Stop reason: HOSPADM

## 2024-01-10 RX ORDER — HYDROCODONE BITARTRATE AND ACETAMINOPHEN 500; 5 MG/1; MG/1
TABLET ORAL
Status: DISCONTINUED | OUTPATIENT
Start: 2024-01-10 | End: 2024-01-10 | Stop reason: HOSPADM

## 2024-01-10 RX ADMIN — FUROSEMIDE 40 MG: 10 INJECTION, SOLUTION INTRAMUSCULAR; INTRAVENOUS at 11:01

## 2024-01-10 RX ADMIN — PANTOPRAZOLE SODIUM 80 MG: 40 INJECTION, POWDER, FOR SOLUTION INTRAVENOUS at 03:01

## 2024-01-10 RX ADMIN — AZITHROMYCIN MONOHYDRATE 500 MG: 500 INJECTION, POWDER, LYOPHILIZED, FOR SOLUTION INTRAVENOUS at 11:01

## 2024-01-10 RX ADMIN — IPRATROPIUM BROMIDE AND ALBUTEROL SULFATE 3 ML: .5; 3 SOLUTION RESPIRATORY (INHALATION) at 01:01

## 2024-01-10 RX ADMIN — IPRATROPIUM BROMIDE AND ALBUTEROL SULFATE 3 ML: .5; 3 SOLUTION RESPIRATORY (INHALATION) at 02:01

## 2024-01-10 RX ADMIN — CEFTRIAXONE SODIUM 1 G: 1 INJECTION, POWDER, FOR SOLUTION INTRAMUSCULAR; INTRAVENOUS at 02:01

## 2024-01-10 RX ADMIN — SODIUM CHLORIDE 8 MG/HR: 9 INJECTION, SOLUTION INTRAVENOUS at 08:01

## 2024-01-10 RX ADMIN — PANTOPRAZOLE SODIUM 8 MG/HR: 40 INJECTION, POWDER, FOR SOLUTION INTRAVENOUS at 03:01

## 2024-01-10 NOTE — ED NOTES
Transfer center called, spoke with Ruth Ann YOUSIF; states [facility] is accepting, currently cleaning a room and pt will be admitted to room, will call back with room assignment when ready

## 2024-01-10 NOTE — ED PROVIDER NOTES
Encounter Date: 1/10/2024       History     Chief Complaint   Patient presents with    Shortness of Breath     Complains of shortness of breath, coughing, and blood pressure problems     63-year-old male presents complaining of some shortness of breath wheezing has been coughing since last night has history of COPD says it felt like a she is worse with coughing and wheezing no weight gain also has a history of CHF hypertension coronary artery disease sleep apnea no fevers and chills just coughing congestion and wheezing.  Patient says he has a frequent history of GI bleed in his head he gets several pt of blood in the past.  Says the last 2 days he has been passing some black tarry stool with a was worse today.  He has a scope the said in the did not find with the bleeding was coming from but it still happens now and then        Review of patient's allergies indicates:  No Known Allergies  Past Medical History:   Diagnosis Date    Alcoholic cirrhosis     Anemia     CHF (congestive heart failure)     LV EF 40%    COPD (chronic obstructive pulmonary disease)     Hypertension     Myocardial infarction     Peripheral artery disease     with stents    Sleep apnea      Past Surgical History:   Procedure Laterality Date    ANGIOGRAM, ABDOMINAL AORTA  10/24/2023    Procedure: Angiogram, Abdominal Aorta;  Surgeon: Janette Ernandez MD;  Location: [facility] CATH LAB;  Service: Cardiology;;    ARTERIOGRAPHY OF SUBCLAVIAN ARTERY Left 10/24/2023    Procedure: ARTERIOGRAM, SUBCLAVIAN;  Surgeon: Janette Ernandez MD;  Location: [facility] CATH LAB;  Service: Cardiology;  Laterality: Left;    CAPSULE ENDOSCOPY, ESOPHAGUS THROUGH ILEUM N/A 11/20/2023    Procedure: M2A;  Surgeon: Johan Flower MD;  Location: [facility] ENDOSCOPY;  Service: Gastroenterology;  Laterality: N/A;  To be done at any time today when staff is available.    CARDIAC DEFIBRILLATOR PLACEMENT N/A 11/28/2023    Procedure: Insertion, Single chamber ICD (SJM);  Surgeon:  Suyapa Oakes MD;  Location: [facility] CATH LAB;  Service: Cardiology;  Laterality: N/A;    CATHETERIZATION OF BOTH LEFT AND RIGHT HEART N/A 10/24/2023    Procedure: CATHETERIZATION, HEART, BOTH LEFT AND RIGHT;  Surgeon: Janette Ernandez MD;  Location: [facility] CATH LAB;  Service: Cardiology;  Laterality: N/A;    COLONOSCOPY N/A 6/10/2023    Procedure: COLONOSCOPY;  Surgeon: Johan Flower MD;  Location: [facility] OR;  Service: Gastroenterology;  Laterality: N/A;    EGD, WITH CLOSED BIOPSY N/A 12/18/2023    Procedure: EGD;  Surgeon: Johan Flower MD;  Location: [facility] ENDOSCOPY;  Service: Gastroenterology;  Laterality: N/A;    ESOPHAGOGASTRODUODENOSCOPY N/A 6/9/2023    Procedure: EGD;  Surgeon: Tima Teixeira MD;  Location: [facility] ENDOSCOPY;  Service: Gastroenterology;  Laterality: N/A;    INSERTION OF INTRA-AORTIC BALLOON ASSIST DEVICE  10/24/2023    Procedure: INSERTION, INTRA-AORTIC BALLOON PUMP;  Surgeon: Janette Ernandez MD;  Location: [facility] CATH LAB;  Service: Cardiology;;    INSERTION OF INTRAVASCULAR MICROAXIAL BLOOD PUMP N/A 10/26/2023    Procedure: INSERTION, IMPELLA;  Surgeon: Janette Ernandez MD;  Location: [facility] CATH LAB;  Service: Cardiology;  Laterality: N/A;    INSTANTANEOUS WAVE-FREE RATIO (IFR) N/A 10/26/2023    Procedure: Instantaneous Wave-Free Ratio (IFR);  Surgeon: Janette Ernandez MD;  Location: [facility] CATH LAB;  Service: Cardiology;  Laterality: N/A;    INTRAVASCULAR ULTRASOUND, CORONARY N/A 10/26/2023    Procedure: Ultrasound-coronary;  Surgeon: Janette Ernandez MD;  Location: [facility] CATH LAB;  Service: Cardiology;  Laterality: N/A;    PERCUTANEOUS CORONARY INTERVENTION, ARTERY N/A 10/26/2023    Procedure: Percutaneous coronary intervention- with Impella;  Surgeon: Janette Ernandez MD;  Location: [facility] CATH LAB;  Service: Cardiology;  Laterality: N/A;    PLACEMENT, IABP  10/24/2023    Procedure: Placement, IABP;  Surgeon: Janette Ernandez MD;  Location: [facility] CATH LAB;  Service:  Cardiology;;     History reviewed. No pertinent family history.  Social History     Tobacco Use    Smoking status: Every Day     Types: Vaping with nicotine    Smokeless tobacco: Current     Types: Chew   Substance Use Topics    Alcohol use: Not Currently     Comment: currently not drinking per pt    Drug use: Yes     Types: Marijuana     Review of Systems   Respiratory:  Positive for cough, shortness of breath and wheezing.    Gastrointestinal:  Positive for blood in stool.   All other systems reviewed and are negative.      Physical Exam     Initial Vitals [01/10/24 1321]   BP Pulse Resp Temp SpO2   130/68 72 20 98.4 °F (36.9 °C) 95 %      MAP       --         Physical Exam    Nursing note and vitals reviewed.  Constitutional: He appears well-developed and well-nourished. He is active.   HENT:   Head: Normocephalic and atraumatic.   Nose: Nose normal.   Mouth/Throat: Oropharynx is clear and moist.   Eyes: Conjunctivae, EOM and lids are normal. Pupils are equal, round, and reactive to light.   Neck: Trachea normal and phonation normal. Neck supple. No thyroid mass present.   Normal range of motion.  Cardiovascular:  Normal rate, regular rhythm, normal heart sounds and normal pulses.           Pulmonary/Chest: He is in respiratory distress. He has wheezes.   Abdominal: Abdomen is soft. Bowel sounds are normal.   Genitourinary:    Genitourinary Comments: Rectal exam revealed no stool in the rectal vault     Musculoskeletal:         General: Normal range of motion.      Cervical back: Normal range of motion and neck supple.     Neurological: He is alert and oriented to person, place, and time. GCS score is 15. GCS eye subscore is 4. GCS verbal subscore is 5. GCS motor subscore is 6.   Skin: Skin is warm and intact.   Psychiatric: He has a normal mood and affect. His speech is normal and behavior is normal. Judgment and thought content normal. Cognition and memory are normal.         ED Course   Critical  Care    Date/Time: 1/10/2024 5:11 PM    Performed by: Kamaljit Paulson MD  Authorized by: Kamaljit Paulson MD  Direct patient critical care time: 60 minutes  Additional history critical care time: 15 minutes  Ordering / reviewing critical care time: 15 minutes  Documentation critical care time: 15 minutes  Total critical care time (exclusive of procedural time) : 105 minutes  Critical care was necessary to treat or prevent imminent or life-threatening deterioration of the following conditions: circulatory failure, respiratory failure, renal failure and shock.  Critical care was time spent personally by me on the following activities: development of treatment plan with patient or surrogate, discussions with consultants, examination of patient, evaluation of patient's response to treatment, obtaining history from patient or surrogate, ordering and performing treatments and interventions, ordering and review of laboratory studies, ordering and review of radiographic studies, pulse oximetry, re-evaluation of patient's condition and review of old charts.        Labs Reviewed   BASIC METABOLIC PANEL - Abnormal; Notable for the following components:       Result Value    Glucose Level 121 (*)     Creatinine 1.79 (*)     Calcium Level Total 8.4 (*)     All other components within normal limits   HIGH SENSITIVITY CRP - Abnormal; Notable for the following components:    C-Reactive Protein High Sensitivity 10.35 (*)     All other components within normal limits   PROTIME-INR - Abnormal; Notable for the following components:    PT 11.7 (*)     All other components within normal limits   CBC WITH DIFFERENTIAL - Abnormal; Notable for the following components:    WBC 12.13 (*)     RBC 2.50 (*)     Hgb 6.3 (*)     Hct 23.3 (*)     MCH 25.2 (*)     MCHC 27.0 (*)     RDW 18.9 (*)     MPV 10.5 (*)     Neut # 10.42 (*)     All other components within normal limits   B-TYPE NATRIURETIC PEPTIDE - Abnormal; Notable for the  following components:    Natriuretic Peptide 1,138.4 (*)     All other components within normal limits   COVID/RSV/FLU A&B PCR - Normal    Narrative:     The Xpert Xpress SARS-CoV-2/FLU/RSV plus is a rapid, multiplexed real-time PCR test intended for the simultaneous qualitative detection and differentiation of SARS-CoV-2, Influenza A, Influenza B, and respiratory syncytial virus (RSV) viral RNA in either nasopharyngeal swab or nasal swab specimens.         MAGNESIUM - Normal   OCCULT BLOOD, STOOL 1ST SPECIMEN - Normal   BLOOD CULTURE OLG   BLOOD CULTURE OLG   CBC W/ AUTO DIFFERENTIAL    Narrative:     The following orders were created for panel order CBC Auto Differential.  Procedure                               Abnormality         Status                     ---------                               -----------         ------                     CBC with Differential[[id number]]       Abnormal            Final result                 Please view results for these tests on the individual orders.   TROPONIN ISTAT   TYPE & SCREEN   POCT TROPONIN   PREPARE RBC SOFT     EKG Readings: (Independently Interpreted)   Initial Reading: No STEMI. Rhythm: Accelerated Junctional Rhythm. Heart Rate: 72. ST Segments: Non-Specific ST Segment Depression. Clinical Impression: Accelerated Junctional Rhythm       Imaging Results              X-Ray Chest 1 View (Preliminary result)  Result time 01/10/24 14:53:42      Wet Read by Kamaljit Paulson MD (01/10/24 14:53:42, [facility] - Emergency Dept, Emergency Medicine)    Right lower lobe infiltrate                                     Medications   cefTRIAXone (ROCEPHIN) 1 g in dextrose 5 % in water (D5W) 100 mL IVPB (MB+) (0 g Intravenous Stopped 1/10/24 1522)   0.9%  NaCl infusion (for blood administration) (has no administration in time range)   albuterol-ipratropium 2.5 mg-0.5 mg/3 mL nebulizer solution 3 mL (3 mLs Nebulization Given 1/10/24 1352)   pantoprazole injection 80  mg (80 mg Intravenous Given 1/10/24 1501)   pantoprazole (PROTONIX) 40 mg in sodium chloride 0.9 % 100 mL IVPB (MB+) (8 mg/hr Intravenous New Bag 1/10/24 1539)   albuterol-ipratropium 2.5 mg-0.5 mg/3 mL nebulizer solution 3 mL (3 mLs Nebulization Given 1/10/24 8505)     Medical Decision Making  63-year-old male presents complaining of some shortness of breath wheezing has been coughing since last night has history of COPD says it felt like a she is worse with coughing and wheezing no weight gain also has a history of CHF hypertension coronary artery disease sleep apnea no fevers and chills just coughing congestion and wheezing.  Patient says he has a frequent history of GI bleed in his head he gets several pt of blood in the past.  Says the last 2 days he has been passing some black tarry stool with a was worse today.  He has a scope the said in the did not find with the bleeding was coming from but it still happens now and then      Patient given his history of frequent GI bleeds in the past having received transfusions saw on blood thinners with a 3 day history of black tarry stools in his low H&H even though there was no blood in the vault we will treat as an acute GI bleed we will transfuse give Protonix patient also appears to have a right lower lobe pneumonia and COPD does we will transfer to start the 1st unit but here he is getting his blood and that is stabilized this we will make him more stable for transport    Amount and/or Complexity of Data Reviewed  Labs: ordered. Decision-making details documented in ED Course.  Radiology: ordered and independent interpretation performed.  ECG/medicine tests: ordered and independent interpretation performed.  Discussion of management or test interpretation with external provider(s): Patient accepted [facility] by hospitalist and General surgery    Risk  Prescription drug management.  Risk Details: Differential diagnosis CHF COPD exacerbation pneumonia COVID flu  RSV shortness of breath from anemia acute GI bleed               ED Course as of 01/10/24 1712   Wed Allen 10, 2024   1352 POC Cardiac Troponin I: 0.02 [BL]   1412 WBC(!): 12.13 [BL]   1412 Hemoglobin(!): 6.3 [BL]   1412 Hematocrit(!): 23.3 [BL]   1412 Protime(!): 11.7 [BL]   1412 INR: 1.2 [BL]   1414 CRP, High Sensitivity(!): 10.35 [BL]   1414 BUN: 23.6 [BL]   1414 Creatinine(!): 1.79 [BL]   1414 Glucose(!): 121 [BL]   1414 CO2: 24 [BL]   1414 Magnesium : 2.30 [BL]   1414 POC Cardiac Troponin I: 0.02 [BL]   1444 Influenza A, Molecular: Not Detected [BL]   1444 Influenza B, Molecular: Not Detected [BL]   1444 SARS-CoV2 (COVID-19) Qualitative PCR: Not Detected [BL]   1444 BNP(!): 1,138.4 [BL]   1445 Magnesium : 2.30 [BL]   1445 BUN: 23.6 [BL]   1445 Creatinine(!): 1.79 [BL]   1557 BNP(!): 1,138.4 [BL]      ED Course User Index  [BL] Kamaljit Paulson MD                           Clinical Impression:  Final diagnoses:  [R06.02] Shortness of breath  [R06.02] SOB (shortness of breath) on exertion  [K92.2] Gastrointestinal hemorrhage, unspecified gastrointestinal hemorrhage type (Primary)  [R06.02] SOB (shortness of breath)  [J44.1] COPD exacerbation  [J18.9] Pneumonia of right lower lobe due to infectious organism          ED Disposition Condition    Transfer to Another Facility Stable                Kamaljit Paulson MD  01/10/24 1711       Kamaljit Paulson MD  01/10/24 1713

## 2024-01-10 NOTE — ED NOTES
62yo hx emphasema with c/o sob since yesterday; worse this am. O2 Sat 90, resp 26. Dr. Paulson present for exam.

## 2024-01-11 ENCOUNTER — ANESTHESIA EVENT (OUTPATIENT)
Dept: SURGERY | Facility: HOSPITAL | Age: 64
DRG: 377 | End: 2024-01-11
Payer: MEDICAID

## 2024-01-11 ENCOUNTER — ANESTHESIA (OUTPATIENT)
Dept: SURGERY | Facility: HOSPITAL | Age: 64
DRG: 377 | End: 2024-01-11
Payer: MEDICAID

## 2024-01-11 PROBLEM — N17.9 ACUTE RENAL FAILURE: Status: ACTIVE | Noted: 2024-01-11

## 2024-01-11 PROBLEM — D63.8 ANEMIA IN OTHER CHRONIC DISEASES CLASSIFIED ELSEWHERE: Status: ACTIVE | Noted: 2023-11-18

## 2024-01-11 PROBLEM — D62 ACUTE BLOOD LOSS ANEMIA: Status: ACTIVE | Noted: 2024-01-11

## 2024-01-11 PROBLEM — K92.2 GIB (GASTROINTESTINAL BLEEDING): Status: ACTIVE | Noted: 2024-01-11

## 2024-01-11 LAB
ABO + RH BLD: NORMAL
ANION GAP SERPL CALC-SCNC: 9 MEQ/L
BASOPHILS # BLD AUTO: 0.02 X10(3)/MCL
BASOPHILS # BLD AUTO: 0.03 X10(3)/MCL
BASOPHILS NFR BLD AUTO: 0.2 %
BASOPHILS NFR BLD AUTO: 0.3 %
BLD PROD TYP BPU: NORMAL
BLOOD UNIT EXPIRATION DATE: NORMAL
BLOOD UNIT TYPE CODE: 5100
BUN SERPL-MCNC: 25 MG/DL (ref 8.4–25.7)
CALCIUM SERPL-MCNC: 7.7 MG/DL (ref 8.8–10)
CHLORIDE SERPL-SCNC: 103 MMOL/L (ref 98–107)
CO2 SERPL-SCNC: 25 MMOL/L (ref 23–31)
CREAT SERPL-MCNC: 1.89 MG/DL (ref 0.73–1.18)
CREAT/UREA NIT SERPL: 13
CROSSMATCH INTERPRETATION: NORMAL
DISPENSE STATUS: NORMAL
EOSINOPHIL # BLD AUTO: 0 X10(3)/MCL (ref 0–0.9)
EOSINOPHIL # BLD AUTO: 0 X10(3)/MCL (ref 0–0.9)
EOSINOPHIL NFR BLD AUTO: 0 %
EOSINOPHIL NFR BLD AUTO: 0 %
ERYTHROCYTE [DISTWIDTH] IN BLOOD BY AUTOMATED COUNT: 18.7 % (ref 11.5–17)
ERYTHROCYTE [DISTWIDTH] IN BLOOD BY AUTOMATED COUNT: 18.8 % (ref 11.5–17)
GFR SERPLBLD CREATININE-BSD FMLA CKD-EPI: 39 MLS/MIN/1.73/M2
GLUCOSE SERPL-MCNC: 101 MG/DL (ref 82–115)
GROUP & RH: NORMAL
HCT VFR BLD AUTO: 26.8 % (ref 42–52)
HCT VFR BLD AUTO: 26.9 % (ref 42–52)
HGB BLD-MCNC: 7.8 G/DL (ref 14–18)
HGB BLD-MCNC: 7.9 G/DL (ref 14–18)
IMM GRANULOCYTES # BLD AUTO: 0.02 X10(3)/MCL (ref 0–0.04)
IMM GRANULOCYTES # BLD AUTO: 0.03 X10(3)/MCL (ref 0–0.04)
IMM GRANULOCYTES NFR BLD AUTO: 0.2 %
IMM GRANULOCYTES NFR BLD AUTO: 0.3 %
INDIRECT COOMBS: NORMAL
LYMPHOCYTES # BLD AUTO: 0.97 X10(3)/MCL (ref 0.6–4.6)
LYMPHOCYTES # BLD AUTO: 1.08 X10(3)/MCL (ref 0.6–4.6)
LYMPHOCYTES NFR BLD AUTO: 10.7 %
LYMPHOCYTES NFR BLD AUTO: 12.6 %
MCH RBC QN AUTO: 26.4 PG (ref 27–31)
MCH RBC QN AUTO: 26.5 PG (ref 27–31)
MCHC RBC AUTO-ENTMCNC: 29.1 G/DL (ref 33–36)
MCHC RBC AUTO-ENTMCNC: 29.4 G/DL (ref 33–36)
MCV RBC AUTO: 90.3 FL (ref 80–94)
MCV RBC AUTO: 90.8 FL (ref 80–94)
MONOCYTES # BLD AUTO: 0.81 X10(3)/MCL (ref 0.1–1.3)
MONOCYTES # BLD AUTO: 0.85 X10(3)/MCL (ref 0.1–1.3)
MONOCYTES NFR BLD AUTO: 10 %
MONOCYTES NFR BLD AUTO: 9 %
NEUTROPHILS # BLD AUTO: 6.57 X10(3)/MCL (ref 2.1–9.2)
NEUTROPHILS # BLD AUTO: 7.2 X10(3)/MCL (ref 2.1–9.2)
NEUTROPHILS NFR BLD AUTO: 77 %
NEUTROPHILS NFR BLD AUTO: 79.7 %
PLATELET # BLD AUTO: 136 X10(3)/MCL (ref 130–400)
PLATELET # BLD AUTO: 254 X10(3)/MCL (ref 130–400)
PMV BLD AUTO: 11.3 FL (ref 7.4–10.4)
PMV BLD AUTO: 12.7 FL (ref 7.4–10.4)
POTASSIUM SERPL-SCNC: 4 MMOL/L (ref 3.5–5.1)
RBC # BLD AUTO: 2.95 X10(6)/MCL (ref 4.7–6.1)
RBC # BLD AUTO: 2.98 X10(6)/MCL (ref 4.7–6.1)
SODIUM SERPL-SCNC: 137 MMOL/L (ref 136–145)
SPECIMEN OUTDATE: NORMAL
UNIT NUMBER: NORMAL
WBC # SPEC AUTO: 8.54 X10(3)/MCL (ref 4.5–11.5)
WBC # SPEC AUTO: 9.04 X10(3)/MCL (ref 4.5–11.5)

## 2024-01-11 PROCEDURE — 0DJ08ZZ INSPECTION OF UPPER INTESTINAL TRACT, VIA NATURAL OR ARTIFICIAL OPENING ENDOSCOPIC: ICD-10-PCS | Performed by: SURGERY

## 2024-01-11 PROCEDURE — C9113 INJ PANTOPRAZOLE SODIUM, VIA: HCPCS | Performed by: INTERNAL MEDICINE

## 2024-01-11 PROCEDURE — 11000001 HC ACUTE MED/SURG PRIVATE ROOM

## 2024-01-11 PROCEDURE — 25000003 PHARM REV CODE 250: Performed by: INTERNAL MEDICINE

## 2024-01-11 PROCEDURE — 94761 N-INVAS EAR/PLS OXIMETRY MLT: CPT

## 2024-01-11 PROCEDURE — P9035 PLATELET PHERES LEUKOREDUCED: HCPCS | Performed by: INTERNAL MEDICINE

## 2024-01-11 PROCEDURE — 80048 BASIC METABOLIC PNL TOTAL CA: CPT | Performed by: INTERNAL MEDICINE

## 2024-01-11 PROCEDURE — 30233N1 TRANSFUSION OF NONAUTOLOGOUS RED BLOOD CELLS INTO PERIPHERAL VEIN, PERCUTANEOUS APPROACH: ICD-10-PCS | Performed by: INTERNAL MEDICINE

## 2024-01-11 PROCEDURE — 25000003 PHARM REV CODE 250: Performed by: NURSE ANESTHETIST, CERTIFIED REGISTERED

## 2024-01-11 PROCEDURE — 27000221 HC OXYGEN, UP TO 24 HOURS

## 2024-01-11 PROCEDURE — 36430 TRANSFUSION BLD/BLD COMPNT: CPT

## 2024-01-11 PROCEDURE — 85025 COMPLETE CBC W/AUTO DIFF WBC: CPT | Performed by: INTERNAL MEDICINE

## 2024-01-11 PROCEDURE — 25000242 PHARM REV CODE 250 ALT 637 W/ HCPCS: Performed by: INTERNAL MEDICINE

## 2024-01-11 PROCEDURE — 43235 EGD DIAGNOSTIC BRUSH WASH: CPT | Performed by: SURGERY

## 2024-01-11 PROCEDURE — 63600175 PHARM REV CODE 636 W HCPCS: Performed by: NURSE ANESTHETIST, CERTIFIED REGISTERED

## 2024-01-11 PROCEDURE — 86901 BLOOD TYPING SEROLOGIC RH(D): CPT | Performed by: INTERNAL MEDICINE

## 2024-01-11 PROCEDURE — 25000003 PHARM REV CODE 250: Performed by: SURGERY

## 2024-01-11 PROCEDURE — D9220A PRA ANESTHESIA: Mod: ,,, | Performed by: NURSE ANESTHETIST, CERTIFIED REGISTERED

## 2024-01-11 PROCEDURE — 94640 AIRWAY INHALATION TREATMENT: CPT

## 2024-01-11 PROCEDURE — 99900035 HC TECH TIME PER 15 MIN (STAT)

## 2024-01-11 PROCEDURE — P9016 RBC LEUKOCYTES REDUCED: HCPCS | Performed by: INTERNAL MEDICINE

## 2024-01-11 PROCEDURE — 37000008 HC ANESTHESIA 1ST 15 MINUTES: Performed by: SURGERY

## 2024-01-11 PROCEDURE — 63600175 PHARM REV CODE 636 W HCPCS: Performed by: INTERNAL MEDICINE

## 2024-01-11 PROCEDURE — 86923 COMPATIBILITY TEST ELECTRIC: CPT | Mod: 91 | Performed by: INTERNAL MEDICINE

## 2024-01-11 RX ORDER — MIDAZOLAM HYDROCHLORIDE 1 MG/ML
INJECTION INTRAMUSCULAR; INTRAVENOUS
Status: DISCONTINUED | OUTPATIENT
Start: 2024-01-11 | End: 2024-01-11

## 2024-01-11 RX ORDER — TALC
6 POWDER (GRAM) TOPICAL NIGHTLY PRN
Status: DISCONTINUED | OUTPATIENT
Start: 2024-01-11 | End: 2024-01-12 | Stop reason: HOSPADM

## 2024-01-11 RX ORDER — DEXMEDETOMIDINE HYDROCHLORIDE 100 UG/ML
INJECTION, SOLUTION INTRAVENOUS
Status: DISCONTINUED | OUTPATIENT
Start: 2024-01-11 | End: 2024-01-11

## 2024-01-11 RX ORDER — PANTOPRAZOLE SODIUM 40 MG/10ML
40 INJECTION, POWDER, LYOPHILIZED, FOR SOLUTION INTRAVENOUS 2 TIMES DAILY
Status: DISCONTINUED | OUTPATIENT
Start: 2024-01-11 | End: 2024-01-12 | Stop reason: HOSPADM

## 2024-01-11 RX ORDER — HYDROCODONE BITARTRATE AND ACETAMINOPHEN 500; 5 MG/1; MG/1
TABLET ORAL
Status: DISCONTINUED | OUTPATIENT
Start: 2024-01-11 | End: 2024-01-12 | Stop reason: HOSPADM

## 2024-01-11 RX ORDER — KETAMINE HYDROCHLORIDE 50 MG/ML
INJECTION, SOLUTION INTRAMUSCULAR; INTRAVENOUS
Status: DISCONTINUED | OUTPATIENT
Start: 2024-01-11 | End: 2024-01-11

## 2024-01-11 RX ORDER — LIDOCAINE HYDROCHLORIDE 20 MG/ML
INJECTION INTRAVENOUS
Status: DISCONTINUED | OUTPATIENT
Start: 2024-01-11 | End: 2024-01-11

## 2024-01-11 RX ADMIN — MIDAZOLAM HYDROCHLORIDE 2 MG: 1 INJECTION, SOLUTION INTRAMUSCULAR; INTRAVENOUS at 03:01

## 2024-01-11 RX ADMIN — LIDOCAINE HYDROCHLORIDE 100 MG: 20 INJECTION, SOLUTION INTRAVENOUS at 03:01

## 2024-01-11 RX ADMIN — SODIUM CHLORIDE: 9 INJECTION, SOLUTION INTRAVENOUS at 04:01

## 2024-01-11 RX ADMIN — IPRATROPIUM BROMIDE AND ALBUTEROL SULFATE 3 ML: 2.5; .5 SOLUTION RESPIRATORY (INHALATION) at 08:01

## 2024-01-11 RX ADMIN — IPRATROPIUM BROMIDE AND ALBUTEROL SULFATE 3 ML: 2.5; .5 SOLUTION RESPIRATORY (INHALATION) at 01:01

## 2024-01-11 RX ADMIN — IPRATROPIUM BROMIDE AND ALBUTEROL SULFATE 3 ML: 2.5; .5 SOLUTION RESPIRATORY (INHALATION) at 03:01

## 2024-01-11 RX ADMIN — PANTOPRAZOLE SODIUM 40 MG: 40 INJECTION, POWDER, FOR SOLUTION INTRAVENOUS at 05:01

## 2024-01-11 RX ADMIN — KETAMINE HYDROCHLORIDE 20 MG: 50 INJECTION, SOLUTION, CONCENTRATE INTRAMUSCULAR; INTRAVENOUS at 03:01

## 2024-01-11 RX ADMIN — AZITHROMYCIN MONOHYDRATE 500 MG: 500 INJECTION, POWDER, LYOPHILIZED, FOR SOLUTION INTRAVENOUS at 11:01

## 2024-01-11 RX ADMIN — DEXMEDETOMIDINE 10 MCG: 200 INJECTION, SOLUTION INTRAVENOUS at 03:01

## 2024-01-11 RX ADMIN — CEFTRIAXONE SODIUM 1 G: 1 INJECTION, POWDER, FOR SOLUTION INTRAMUSCULAR; INTRAVENOUS at 04:01

## 2024-01-11 RX ADMIN — PANTOPRAZOLE SODIUM 40 MG: 40 INJECTION, POWDER, FOR SOLUTION INTRAVENOUS at 08:01

## 2024-01-11 RX ADMIN — IPRATROPIUM BROMIDE AND ALBUTEROL SULFATE 3 ML: 2.5; .5 SOLUTION RESPIRATORY (INHALATION) at 11:01

## 2024-01-11 RX ADMIN — IPRATROPIUM BROMIDE AND ALBUTEROL SULFATE 3 ML: 2.5; .5 SOLUTION RESPIRATORY (INHALATION) at 12:01

## 2024-01-11 RX ADMIN — BENZOCAINE 2 EACH: 200 SPRAY DENTAL; ORAL; PERIODONTAL at 03:01

## 2024-01-11 RX ADMIN — FUROSEMIDE 40 MG: 10 INJECTION, SOLUTION INTRAMUSCULAR; INTRAVENOUS at 08:01

## 2024-01-11 NOTE — PLAN OF CARE
Problem: Adult Inpatient Plan of Care  Goal: Plan of Care Review  Outcome: Ongoing, Progressing  Flowsheets (Taken 1/11/2024 0112)  Plan of Care Reviewed With: patient  Goal: Patient-Specific Goal (Individualized)  Outcome: Ongoing, Progressing  Goal: Absence of Hospital-Acquired Illness or Injury  Outcome: Ongoing, Progressing  Intervention: Identify and Manage Fall Risk  Flowsheets (Taken 1/11/2024 0112)  Safety Promotion/Fall Prevention:   assistive device/personal item within reach   instructed to call staff for mobility   medications reviewed   muscle strengthening facilitated   side rails raised x 2  Intervention: Prevent Skin Injury  Flowsheets (Taken 1/11/2024 0112)  Body Position: position changed independently  Intervention: Prevent and Manage VTE (Venous Thromboembolism) Risk  Flowsheets (Taken 1/11/2024 0112)  Activity Management: Ambulated in room - L4  VTE Prevention/Management: prepared for procedure/surgery  Intervention: Prevent Infection  Flowsheets (Taken 1/11/2024 0112)  Infection Prevention:   single patient room provided   visitors restricted/screened   rest/sleep promoted  Goal: Optimal Comfort and Wellbeing  Outcome: Ongoing, Progressing  Intervention: Monitor Pain and Promote Comfort  Flowsheets (Taken 1/11/2024 0112)  Pain Management Interventions:   care clustered   quiet environment facilitated  Intervention: Provide Person-Centered Care  Flowsheets (Taken 1/11/2024 0112)  Trust Relationship/Rapport:   care explained   questions answered   questions encouraged   reassurance provided   thoughts/feelings acknowledged   empathic listening provided  Goal: Readiness for Transition of Care  Outcome: Ongoing, Progressing  Intervention: Mutually Develop Transition Plan  Flowsheets (Taken 1/11/2024 0112)  Equipment Currently Used at Home: cane, straight     Problem: Fluid Imbalance (Pneumonia)  Goal: Fluid Balance  Outcome: Ongoing, Progressing     Problem: Infection (Pneumonia)  Goal:  Resolution of Infection Signs and Symptoms  Outcome: Ongoing, Progressing     Problem: Respiratory Compromise (Pneumonia)  Goal: Effective Oxygenation and Ventilation  Outcome: Ongoing, Progressing  Intervention: Promote Airway Secretion Clearance  Flowsheets (Taken 1/11/2024 0112)  Cough And Deep Breathing: done independently per patient  Intervention: Optimize Oxygenation and Ventilation  Flowsheets (Taken 1/11/2024 0112)  Airway/Ventilation Management: airway patency maintained  Head of Bed (HOB) Positioning: HOB at 20-30 degrees     Problem: Gas Exchange Impaired  Goal: Optimal Gas Exchange  Outcome: Ongoing, Progressing  Intervention: Optimize Oxygenation and Ventilation  Flowsheets (Taken 1/11/2024 0112)  Airway/Ventilation Management: airway patency maintained  Head of Bed (HOB) Positioning: HOB at 20-30 degrees     Problem: Anemia  Goal: Anemia Symptom Improvement  Outcome: Ongoing, Progressing  Intervention: Monitor and Manage Anemia  Flowsheets (Taken 1/11/2024 0112)  Safety Promotion/Fall Prevention:   assistive device/personal item within reach   instructed to call staff for mobility   medications reviewed   muscle strengthening facilitated   side rails raised x 2     Problem: COPD (Chronic Obstructive Pulmonary Disease) Comorbidity  Goal: Maintenance of COPD Symptom Control  Outcome: Ongoing, Progressing  Intervention: Maintain COPD-Symptom Control  Flowsheets (Taken 1/11/2024 0112)  Supportive Measures: relaxation techniques promoted     Problem: Heart Failure Comorbidity  Goal: Maintenance of Heart Failure Symptom Control  Outcome: Ongoing, Progressing     Problem: Hypertension Comorbidity  Goal: Blood Pressure in Desired Range  Outcome: Ongoing, Progressing  Intervention: Maintain Blood Pressure Management  Flowsheets (Taken 1/11/2024 0112)  Syncope Management:   head lowered   position changed slowly     Problem: Obstructive Sleep Apnea Risk or Actual Comorbidity Management  Goal: Unobstructed  Breathing During Sleep  Outcome: Ongoing, Progressing  Intervention: Monitor and Manage Obstructive Sleep Apnea  Flowsheets (Taken 1/11/2024 0112)  NPPV/CPAP Maintenance:   nasal prongs secure   nasal pillows secure

## 2024-01-11 NOTE — ANESTHESIA PREPROCEDURE EVALUATION
01/11/2024  Hemal Tolbert Jr. is a 63 y.o., male who presents with anemia for an EGD for Dr. Castellon.      PMHx    Myocardial infarction    Other Medical History   COPD (chronic obstructive pulmonary disease) Sleep apnea   Alcoholic cirrhosis Hypertension   CHF (congestive heart failure) Peripheral artery disease   Anemia      Surgical History    ESOPHAGOGASTRODUODENOSCOPY COLONOSCOPY   CATHETERIZATION OF BOTH LEFT AND RIGHT HEART ANGIOGRAM, ABDOMINAL AORTA   INSERTION OF INTRA-AORTIC BALLOON ASSIST DEVICE ARTERIOGRAPHY OF SUBCLAVIAN ARTERY   PLACEMENT, IABP PERCUTANEOUS CORONARY INTERVENTION, ARTERY   INSERTION OF INTRAVASCULAR MICROAXIAL BLOOD PUMP INTRAVASCULAR ULTRASOUND, CORONARY   INSTANTANEOUS WAVE-FREE RATIO (IFR) CAPSULE ENDOSCOPY, ESOPHAGUS THROUGH ILEUM   CARDIAC DEFIBRILLATOR PLACEMENT EGD, WITH CLOSED BIOPSY             Echo Results 10/23/23      Left Ventricle: The left ventricle is mildly dilated. Normal wall thickness. There is concentric remodeling. Moderate global hypokinesis present. There is severely reduced systolic function with a visually estimated ejection fraction of 20 - 25%. There is normal diastolic function.    Right Ventricle: Normal right ventricular cavity size. Wall thickness is normal. Right ventricle wall motion  is normal. Systolic function is normal.    Mitral Valve: There is mild to moderate regurgitation with a centrally directed jet.    Pulmonary Artery: The estimated pulmonary artery systolic pressure is 20 mmHg.    IVC/SVC: Normal venous pressure at 3 mmHg.         Pre-op Assessment    I have reviewed the Patient Summary Reports.     I have reviewed the Nursing Notes. I have reviewed the NPO Status.   I have reviewed the Medications.     Review of Systems  Anesthesia Hx:  No problems with previous Anesthesia             Denies Family Hx of Anesthesia complications.     Denies Personal Hx of Anesthesia complications.                    Social:  Smoker, Former Smoker       Hematology/Oncology:  Hematology Normal   Oncology Normal                                   EENT/Dental:  EENT/Dental Normal           Cardiovascular:    Pacemaker   Past MI CAD      Angina, with exertion CHF       ECG has been reviewed.          Peripheral Arterial Disease                   Pulmonary:  Pneumonia COPD   Shortness of breath  Sleep Apnea                Renal/:  Chronic Renal Disease, CKD                Hepatic/GI:      Liver Disease,            Musculoskeletal:  Musculoskeletal Normal                Neurological:  Neurology Normal                                      Endocrine:  Endocrine Normal            Dermatological:  Skin Normal    Psych:  Psychiatric Normal                    Physical Exam  General: Well nourished, Cooperative, Alert and Oriented    Airway:  Mallampati: I   Mouth Opening: Normal  TM Distance: Normal  Tongue: Normal  Neck ROM: Normal ROM    Dental:  Edentulous    Chest/Lungs:  Clear to auscultation    Heart:  Rate: Normal  Rhythm: Regular Rhythm  Sounds: Normal    Abdomen:  Normal        Anesthesia Plan  Type of Anesthesia, risks & benefits discussed:    Anesthesia Type: MAC, Gen Natural Airway  Intra-op Monitoring Plan: Standard ASA Monitors  Post Op Pain Control Plan: multimodal analgesia  Induction:  IV  Informed Consent: Informed consent signed with the Patient and all parties understand the risks and agree with anesthesia plan.  All questions answered. Patient consented to blood products? Yes  ASA Score: 4 Emergent  Day of Surgery Review of History & Physical: H&P Update referred to the surgeon/provider.H&P completed by Anesthesiologist.    Ready For Surgery From Anesthesia Perspective.     .

## 2024-01-11 NOTE — ED NOTES
Otilio Prince from transfer center, pt accepted by Dr. Brenda Jc at [facility] - going to room 305. # for report [phone].

## 2024-01-11 NOTE — PROGRESS NOTES
"Inpatient Nutrition Evaluation    Admit Date: 1/10/2024   Total duration of encounter: 1 day    Nutrition Recommendation/Prescription     Pt will likely benefit from a GI Soft, Heart Healthy Diet as tolerated.   Recommend adding MVI.   Monitor NPO status, weight, and labs.     RD following and available as needed. Thank you.     Nutrition Assessment     Chart Review    Reason Seen: continuous nutrition monitoring    Malnutrition Screening Tool Results   Have you recently lost weight without trying?: No  Have you been eating poorly because of a decreased appetite?: No   MST Score: 0     Diagnosis:  GI Bleed.     Relevant Medical History:   CHF.  Acute Renal Failure.  Liver Cirrhosis secondary to ETOH abuse.    Nutrition-Related Medications:   Protonix.     Nutrition-Related Labs:  1/11: H/H 7.8/26.8(L); Crea 1.89(H); Ca+ 7.7(L).    Diet Order: Diet NPO  Oral Supplement Order: none  Appetite/Oral Intake: NPO/NPO  Factors Affecting Nutritional Intake: NPO  Food/Mandaeism/Cultural Preferences: none reported  Food Allergies: none reported    Skin Integrity: scab, excoriation  Wound(s):       Comments    1/11: Pt currently NPO for testing (EGD). No recent weight loss noted/reported. Labs and meds reviewed. Will continue to monitor during stay.     Anthropometrics    Height: 5' 7.99" (172.7 cm)    Last Weight: 84.8 kg (187 lb) (01/11/24 1141) Weight Method: Bed Scale  BMI (Calculated): 28.4  BMI Classification: overweight (BMI 25-29.9)        Ideal Body Weight (IBW), Male: 153.94 lb     % Ideal Body Weight, Male (lb): 121.48 %                          Usual Weight Provided By: unable to obtain usual weight    Wt Readings from Last 5 Encounters:   01/11/24 84.8 kg (187 lb)   01/10/24 86.2 kg (190 lb)   12/20/23 82.5 kg (181 lb 12.8 oz)   12/15/23 74.4 kg (164 lb)   12/13/23 79.3 kg (174 lb 13.2 oz)     Weight Change(s) Since Admission:  Admit Weight: 85.1 kg (187 lb 9.6 oz) (01/11/24 0001)      Patient Education    Not " applicable.    Monitoring & Evaluation     Dietitian will monitor energy intake, weight, weight change, electrolyte/renal panel, glucose/endocrine profile, and gastrointestinal profile.  Nutrition Risk/Follow-Up: low (follow-up in 5-7 days)  Patients assigned 'low nutrition risk' status do not qualify for a full nutritional assessment but will be monitored and re-evaluated in a 5-7 day time period. Please consult if re-evaluation needed sooner.

## 2024-01-11 NOTE — PROVIDER PROGRESS NOTES - EMERGENCY DEPT.
Hospital Medicine Progress Note        Chief Complaint: Inpatient Follow-up for     HPI:   63 year old male with pmh COPD, CHF (EF 40%), Alcoholic Cirrhosis, DEON, HTN presented to outside facility with complaints of worsening shortness of breath and cough since last night. He also reported dark stools for the past 2 days that worsened today so he presented for evaluation. He reported history of recurrent gastrointestinal bleeding and per patient he has had endoscopy studies in past without ascertaining source of bleed. He is transferred to this facility for higher level of care with surgery evaluation and possible repeat endoscopy.      January 11, 2024-hemoglobin improved to 7.8 from 6.3 after 2 units of packed RBC this morning, no nausea, no vomiting, no abdominal pain, no black or bloody stool overnight    Case was discussed with patient's nurse and  on the floor.    Objective/physical exam:  General: In no acute distress, afebrile  Chest:  A bilateral wheezing  Heart: RRR, +S1, S2, no appreciable murmur  Abdomen: Soft, nontender, BS +, distended, looks like 7 month pregnancy  MSK: Warm, no lower extremity edema, no clubbing or cyanosis  Neurologic: Alert and oriented x4, Cranial nerve II-XII intact, Strength 5/5 in all 4 extremities    VITAL SIGNS: 24 HRS MIN & MAX LAST   Temp  Min: 97.2 °F (36.2 °C)  Max: 99.5 °F (37.5 °C) 97.9 °F (36.6 °C)   BP  Min: 88/44  Max: 130/68 122/67   Pulse  Min: 60  Max: 156  61   Resp  Min: 13  Max: 28 20   SpO2  Min: 90 %  Max: 97 % (!) 93 %     I have reviewed the following labs:  Recent Labs   Lab 01/10/24  1347 01/11/24  0030 01/11/24  0341   WBC 12.13* 9.04 8.54   RBC 2.50* 2.98* 2.95*   HGB 6.3* 7.9* 7.8*   HCT 23.3* 26.9* 26.8*   MCV 93.2 90.3 90.8   MCH 25.2* 26.5* 26.4*   MCHC 27.0* 29.4* 29.1*   RDW 18.9* 18.8* 18.7*    136 254   MPV 10.5* 12.7* 11.3*     Recent Labs   Lab 01/10/24  1347 01/11/24  0341    137   K 4.4 4.0   CO2 24 25   BUN 23.6  25.0   CREATININE 1.79* 1.89*   CALCIUM 8.4* 7.7*   MG 2.30  --      Microbiology Results (last 7 days)       ** No results found for the last 168 hours. **             See below for Radiology    Scheduled Med:   azithromycin  500 mg Intravenous Q24H    cefTRIAXone (ROCEPHIN) IVPB  1 g Intravenous Q24H    furosemide (LASIX) injection  40 mg Intravenous Daily    pantoprazole  40 mg Intravenous BID      Continuous Infusions:     PRN Meds:  0.9%  NaCl infusion (for blood administration), 0.9%  NaCl infusion (for blood administration), acetaminophen, albuterol-ipratropium, dextrose 10%, dextrose 10%, glucagon (human recombinant), glucose, glucose, melatonin, morphine, naloxone, ondansetron, sodium chloride 0.9%     Assessment/Plan:  Acute GI bleeding   Status post 2 units of packed RBC January 10, 2024   Community-acquired pneumonia   Acute on chronic systolic CHF exacerbation-ejection fraction 40%   Acute renal failure   Liver cirrhosis secondary to EtOH abuse       Consult general Surgeon-EGD today  Continue IV Zithromax and IV Rocephin   Continue Lasix 40 mg IV q.d.   Continue Protonix for GI prophylaxis   Consult general    VTE prophylaxis:     Patient condition:  Stable/Fair/Guarded/ Serious/ Critical    Anticipated discharge and Disposition:         All diagnosis and differential diagnosis have been reviewed; assessment and plan has been documented; I have personally reviewed the labs and test results that are presently available; I have reviewed the patients medication list; I have reviewed the consulting providers response and recommendations. I have reviewed or attempted to review medical records based upon their availability    All of the patient's questions have been  addressed and answered. Patient's is agreeable to the above stated plan. I will continue to monitor closely and make adjustments to medical management as needed.  _____________________________________________________________________    Nutrition  Status:    Radiology:  I have personally reviewed the following imaging and agree with the radiologist.     X-Ray Chest 1 View  Narrative: EXAMINATION:  XR CHEST 1 VIEW    CPT 94422    CLINICAL HISTORY:  Shortness of breath    COMPARISON:  November 28, 2023    FINDINGS:  Examination reveals mediastinal silhouette to be within normal limits cardiac silhouette is not enlarged left lung field is clear and free of gross infiltrates atelectasis or effusions.    There is blunting of the right costophrenic angle indicating the presence of a right-sided pleural effusion.    Consolidative changes are also identified at the right base as well as consolidative changes with silhouetting of the right cardiac border that might indicate the presence of an infiltrate in the right middle lobe.    No other focal consolidative changes  Impression: Changes of right-sided pleural effusion.    Consolidative changes at the right base as well as changes that might reflect an infiltrate in the right middle lobe.    No other focal consolidative changes    Electronically signed by: Antonio Jerez  Date:    01/11/2024  Time:    10:22      Brenda Jc MD   01/11/2024

## 2024-01-11 NOTE — ANESTHESIA POSTPROCEDURE EVALUATION
Anesthesia Post Evaluation    Patient: Hemal Tolbert     Procedure(s) Performed: Procedure(s) (LRB):  EGD (ESOPHAGOGASTRODUODENOSCOPY) (N/A)    Final Anesthesia Type: MAC      Patient location during evaluation: OPS  Patient participation: Yes- Able to Participate  Level of consciousness: awake  Post-procedure vital signs: reviewed and stable  Pain management: adequate  Airway patency: patent  DEON mitigation strategies: Multimodal analgesia  PONV status at discharge: No PONV  Anesthetic complications: no      Cardiovascular status: hemodynamically stable  Respiratory status: unassisted, spontaneous ventilation and room air  Hydration status: euvolemic  Follow-up not needed.  Comments: Patient to bed per self              Vitals Value Taken Time   /63 01/11/24 1413   Temp 36.8 °C (98.2 °F) 01/11/24 1413   Pulse 63 01/11/24 1413   Resp 20 01/11/24 1413   SpO2 93 % 01/11/24 1413         No case tracking events are documented in the log.      Pain/Noah Score: No data recorded

## 2024-01-11 NOTE — H&P
[facility] - Medical Surgical Unit    History & Physical      Patient Name: Hemal Tolbert Jr.  MRN: [id number]  Admission Date: 1/10/2024  Attending Physician: Brenda Jc MD   Primary Care Provider: Ivette Hearn FNP         Patient information was obtained from patient and ER records.     Subjective:     Principal Problem:Acute blood loss anemia    Chief Complaint: No chief complaint on file.       HPI: 63 year old male with pmh COPD, CHF (EF 40%), Alcoholic Cirrhosis, DEON, HTN presented to outside facility with complaints of worsening shortness of breath and cough since last night. He also reported dark stools for the past 2 days that worsened today so he presented for evaluation. He reported history of recurrent gastrointestinal bleeding and per patient he has had endoscopy studies in past without ascertaining source of bleed. He is transferred to this facility for higher level of care with surgery evaluation and possible repeat endoscopy.     Past Medical History:   Diagnosis Date    Alcoholic cirrhosis     Anemia     CHF (congestive heart failure)     LV EF 40%    COPD (chronic obstructive pulmonary disease)     Hypertension     Myocardial infarction     Peripheral artery disease     with stents    Sleep apnea        Past Surgical History:   Procedure Laterality Date    ANGIOGRAM, ABDOMINAL AORTA  10/24/2023    Procedure: Angiogram, Abdominal Aorta;  Surgeon: Janette Ernandez MD;  Location: [facility] CATH LAB;  Service: Cardiology;;    ARTERIOGRAPHY OF SUBCLAVIAN ARTERY Left 10/24/2023    Procedure: ARTERIOGRAM, SUBCLAVIAN;  Surgeon: Janette Ernandez MD;  Location: [facility] CATH LAB;  Service: Cardiology;  Laterality: Left;    CAPSULE ENDOSCOPY, ESOPHAGUS THROUGH ILEUM N/A 11/20/2023    Procedure: M2A;  Surgeon: Johan Flower MD;  Location: [facility] ENDOSCOPY;  Service: Gastroenterology;  Laterality: N/A;  To be done at any time today when staff is available.    CARDIAC DEFIBRILLATOR  PLACEMENT N/A 11/28/2023    Procedure: Insertion, Single chamber ICD (SJM);  Surgeon: Suyapa Oakes MD;  Location: [facility] CATH LAB;  Service: Cardiology;  Laterality: N/A;    CATHETERIZATION OF BOTH LEFT AND RIGHT HEART N/A 10/24/2023    Procedure: CATHETERIZATION, HEART, BOTH LEFT AND RIGHT;  Surgeon: Janette Ernandez MD;  Location: [facility] CATH LAB;  Service: Cardiology;  Laterality: N/A;    COLONOSCOPY N/A 6/10/2023    Procedure: COLONOSCOPY;  Surgeon: Johan Flower MD;  Location: [facility] OR;  Service: Gastroenterology;  Laterality: N/A;    EGD, WITH CLOSED BIOPSY N/A 12/18/2023    Procedure: EGD;  Surgeon: Johan Flower MD;  Location: [facility] ENDOSCOPY;  Service: Gastroenterology;  Laterality: N/A;    ESOPHAGOGASTRODUODENOSCOPY N/A 6/9/2023    Procedure: EGD;  Surgeon: Tima Teixeira MD;  Location: [facility] ENDOSCOPY;  Service: Gastroenterology;  Laterality: N/A;    INSERTION OF INTRA-AORTIC BALLOON ASSIST DEVICE  10/24/2023    Procedure: INSERTION, INTRA-AORTIC BALLOON PUMP;  Surgeon: Janette Ernandez MD;  Location: [facility] CATH LAB;  Service: Cardiology;;    INSERTION OF INTRAVASCULAR MICROAXIAL BLOOD PUMP N/A 10/26/2023    Procedure: INSERTION, IMPELLA;  Surgeon: Janette Ernandez MD;  Location: [facility] CATH LAB;  Service: Cardiology;  Laterality: N/A;    INSTANTANEOUS WAVE-FREE RATIO (IFR) N/A 10/26/2023    Procedure: Instantaneous Wave-Free Ratio (IFR);  Surgeon: Janette Ernandez MD;  Location: [facility] CATH LAB;  Service: Cardiology;  Laterality: N/A;    INTRAVASCULAR ULTRASOUND, CORONARY N/A 10/26/2023    Procedure: Ultrasound-coronary;  Surgeon: Janette Ernandez MD;  Location: [facility] CATH LAB;  Service: Cardiology;  Laterality: N/A;    PERCUTANEOUS CORONARY INTERVENTION, ARTERY N/A 10/26/2023    Procedure: Percutaneous coronary intervention- with Impella;  Surgeon: Janette Ernandez MD;  Location: [facility] CATH LAB;  Service: Cardiology;  Laterality: N/A;    PLACEMENT, IABP  10/24/2023    Procedure:  Placement, IABP;  Surgeon: Janette Ernandez MD;  Location: [facility] CATH LAB;  Service: Cardiology;;       Review of patient's allergies indicates:  No Known Allergies    Current Facility-Administered Medications on File Prior to Encounter   Medication    [COMPLETED] albuterol-ipratropium 2.5 mg-0.5 mg/3 mL nebulizer solution 3 mL    [COMPLETED] albuterol-ipratropium 2.5 mg-0.5 mg/3 mL nebulizer solution 3 mL    [COMPLETED] pantoprazole (PROTONIX) 40 mg in sodium chloride 0.9 % 100 mL IVPB (MB+)    [COMPLETED] pantoprazole (PROTONIX) 40 mg in sodium chloride 0.9 % 100 mL IVPB (MB+)    [COMPLETED] pantoprazole injection 80 mg    [DISCONTINUED] 0.9%  NaCl infusion (for blood administration)    [DISCONTINUED] cefTRIAXone (ROCEPHIN) 1 g in dextrose 5 % in water (D5W) 100 mL IVPB (MB+)     Current Outpatient Medications on File Prior to Encounter   Medication Sig    albuterol (PROVENTIL/VENTOLIN HFA) 90 mcg/actuation inhaler Inhale 1-2 puffs into the lungs every 4 (four) hours as needed.    amiodarone (PACERONE) 200 MG Tab Take 2 tablets (400 mg total) by mouth once daily.    atorvastatin (LIPITOR) 80 MG tablet Take 1 tablet (80 mg total) by mouth once daily.    docusate sodium (STOOL SOFTENER) 50 MG capsule Take 1 capsule (50 mg total) by mouth 2 (two) times daily.    furosemide (LASIX) 40 MG tablet Take 40 mg by mouth once daily.    gabapentin (NEURONTIN) 300 MG capsule TAKE ONE (1) CAPSULE BY MOUTH (3) TIMES DAILY    metoprolol succinate (TOPROL-XL) 25 MG 24 hr tablet Take ½ ablet (12.5 mg total) by mouth once daily.    pantoprazole (PROTONIX) 40 MG tablet Take 1 tablet (40 mg total) by mouth 2 (two) times daily.    STIOLTO RESPIMAT 2.5-2.5 mcg/actuation Mist Inhale 2 puffs into the lungs once daily.    tamsulosin (FLOMAX) 0.4 mg Cap Take 1 capsule (0.4 mg total) by mouth once daily.    ferrous sulfate 325 (65 FE) MG EC tablet Take 1 tablet (325 mg total) by mouth 2 (two) times daily.    folic acid (FOLVITE) 1 MG tablet  Take 1 tablet (1 mg total) by mouth once daily.     Family History    None       Tobacco Use    Smoking status: Every Day     Current packs/day: 0.25     Average packs/day: 0.3 packs/day for 50.0 years (12.5 ttl pk-yrs)     Types: Vaping with nicotine, Cigarettes     Start date: 1/10/1974    Smokeless tobacco: Former     Types: Chew   Substance and Sexual Activity    Alcohol use: Not Currently     Comment: currently not drinking per pt. 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 Quit drinking in 10/2023.    Drug use: Not Currently    Sexual activity: Not Currently     Review of Systems   Constitutional:  Positive for fatigue.   Respiratory:  Positive for cough, shortness of breath and wheezing.    Gastrointestinal:  Positive for blood in stool.   Skin:  Positive for pallor.   Neurological:  Positive for weakness.   All other systems reviewed and are negative.    Objective:     Vital Signs (Most Recent):  Temp: 97.2 °F (36.2 °C) (01/11/24 0435)  Pulse: 60 (01/11/24 0435)  Resp: 20 (01/11/24 0317)  BP: (!) 105/56 (01/11/24 0435)  SpO2: (!) 90 % (01/11/24 0435) Vital Signs (24h Range):  Temp:  [97.2 °F (36.2 °C)-99.5 °F (37.5 °C)] 97.2 °F (36.2 °C)  Pulse:  [] 60  Resp:  [13-28] 20  SpO2:  [90 %-97 %] 90 %  BP: ()/(44-82) 105/56     Weight: 85.1 kg (187 lb 9.6 oz)  Body mass index is 28.52 kg/m².    Physical Exam  Constitutional:       Appearance: He is ill-appearing.   HENT:      Head: Normocephalic and atraumatic.      Nose: Nose normal.   Eyes:      Extraocular Movements: Extraocular movements intact.      Pupils: Pupils are equal, round, and reactive to light.   Cardiovascular:      Rate and Rhythm: Normal rate and regular rhythm.      Pulses: Normal pulses.      Heart sounds: Normal heart sounds.   Pulmonary:      Effort: Respiratory distress present.      Breath sounds: Wheezing present.   Abdominal:      Palpations: Abdomen is soft.   Musculoskeletal:         General: Normal range of motion.   Skin:     Capillary Refill:  Capillary refill takes less than 2 seconds.      Coloration: Skin is pale.   Neurological:      General: No focal deficit present.      Mental Status: He is alert. Mental status is at baseline.      Motor: Weakness present.           CRANIAL NERVES     CN III, IV, VI   Pupils are equal, round, and reactive to light.      Significant Labs: All pertinent labs within the past 24 hours have been reviewed.  Recent Lab Results  (Last 5 results in the past 24 hours)        01/11/24  0341   01/11/24  0030   01/10/24  1615   01/10/24  1440   01/10/24  1405        RSV Ag by Molecular Method         Not Detected       Influenza A, Molecular         Not Detected       Influenza B, Molecular         Not Detected       Unit Blood Type Code       6200                6200         Unit Expiration       [id number]                [id number]         Anion Gap 9.0               Baso # 0.02   0.03             Basophil % 0.2   0.3             BUN 25.0               BUN/CREAT RATIO 13               Calcium 7.7               Chloride 103               CO2 25               Creatinine 1.89               Crossmatch Interpretation       Compatible                Compatible         DISPENSE STATUS       Transfused                Transfused         eGFR 39               Eos # 0.00   0.00             Eosinophil % 0.0   0.0             Glucose 101               Group & Rh       A POS         Hematocrit 26.8   26.9             Hemoglobin 7.8   7.9             Immature Grans (Abs) 0.02   0.03             Immature Granulocytes 0.2   0.3             Indirect [place] GEL       NEG         Lymph # 1.08   0.97             LYMPH % 12.6   10.7             MCH 26.4   26.5             MCHC 29.1   29.4             MCV 90.8   90.3             Mono # 0.85   0.81             Mono % 10.0   9.0             MPV 11.3   12.7             Neut # 6.57   7.20             Neut % 77.0   79.7             Occult Blood     Negative           Platelet Count 254   136              Potassium 4.0               Product Code       [id number]                [id number]         RBC 2.95   2.98             RDW 18.7   18.8             SARS-CoV2 (COVID-19) Qualitative PCR         Not Detected       Sodium 137               Specimen Outdate       01/13/2024 23:59         Unit ABO/Rh       A POS                A POS         UNIT NUMBER       [id number]                [id number]         WBC 8.54   9.04                                    Significant Imaging: I have reviewed all pertinent imaging results/findings within the past 24 hours.    Assessment/Plan:     Active Diagnoses:    Diagnosis Date Noted POA    PRINCIPAL PROBLEM:  Acute blood loss anemia [D62] 01/11/2024 Yes    GIB (gastrointestinal bleeding) [K92.2] 01/11/2024 Yes    Acute renal failure [N17.9] 01/11/2024 Yes    Shortness of breath [R06.02] 11/05/2023 Yes    Acute on chronic combined systolic and diastolic heart failure [I50.43] 10/23/2023 Yes    Primary hypertension [I10] 10/23/2023 Yes    COPD (chronic obstructive pulmonary disease) [J44.9] 10/23/2023 Yes    Alcoholic cirrhosis [K70.30] 06/07/2023 Yes      Problems Resolved During this Admission:     VTE Risk Mitigation (From admission, onward)           Ordered     IP VTE HIGH RISK PATIENT  Once         01/10/24 2244     Place sequential compression device  Until discontinued         01/10/24 2244     Reason for No Pharmacological VTE Prophylaxis  Once        Question:  Reasons:  Answer:  Active Bleeding    01/10/24 2244                  Acute Blood Loss Anemia:  2/2 to GI bleed  Hgb/Hct 6.3/23.3  Reported dark stools last  few nights   Place on telemetry; monitor hemodynamics closely  Order serial H/H  Type and cross match  Transfusing 2 units PRBCs   PPI BID  No chemical DVT ppx, SCDs unless also contraindicated  Surgery Consulted for possible endoscopic study  NPO    Bacterial Pneumonia:  affecting right lower lobe  Concern for possible gram -/+ bacteremia  Empiric abx coverage  (Azithro, Rocephin)  NIPPV and titrate as needed to Keep O2 sats > 90%  Duoneb therapy Q4 prn    CHF exacerbation:  Acute on chronic  With associated dyspnea and BLE edema  BNP 1,138  Diuretic therapy with Furosemide IV daily  Daily weights, I/Os  Place on telemetry    Acute Renal Failure:  Scr 1.8  Close monitor during diuretic therapy  Avoid nephrotoxins  Monitor labs   Consider Nephrology if worsened during admission        Code: FULL   PPX: BSCDs        The patient is expected to have a LOS more than 2 midnights and will be admitted to inpatient status.      Service was provided using HIPAA compliant web platform using SOC for audio/visual equipment.  Patient location: Hospital  Provider Location: [place], [place]  Participants on call: Bedside RN, Patient  Consent was obtained and the patient was seen with nurse assiting from the bedside.     Jenaro Yañez MD  Department of Hospital Medicine   [facility] - Medical Surgical Unit

## 2024-01-11 NOTE — OP NOTE
Procedure date:  01/11/2024      Indications:  63-year-old white male transferred from outside medical facility for monitoring and evaluation of chronic anemia.  Patient has had multiple admissions for anemia which could be related to chronic comorbid states inclusive of alcoholic cirrhosis hepatosplenomegaly, congestive heart failure, DEON, hypertension, COPD.  Patient has undergone multiple workups for history of anemia without underlying etiology identified.  The patient recently undergoing endoscopy a proximally 3 months ago.  Plan for EGD.      Preoperative diagnosis:  1. DEON 2. Congestive heart failure 3. COPD 4.  Hepatosplenomegaly 5. Alcoholic cirrhosis   Postoperative diagnosis:  The same     Procedure performed: Diagnostic EGD     Procedure in detail:  Patient was brought to the endoscopy suite laid in a slight supine position.  Careful anesthesia was provided.  Oral bite plate placed in the mouth.  An endoscope was then passed through the oropharynx intubating the esophagus with easy transit into the stomach.  Upon entering the stomach was fully insufflated for evaluation.  The anterior gastric mucosa within the body appeared to be mildly inflamed without active bleeding.  Previous areas of erosions were identified.  The scope was then advanced into duodenum reaching the 2nd 3rd portion which appeared to be grossly normal appears withdrawn back in the stomach retroflexed revealing a small type 1 sliding hiatal hernia but no other mucosal changes.  Scope was returned to neutral position the stomach was evacuated of air.  The Z-line which measured about 40 cm from the incisors.  Overall the GE junction appeared to be grossly normal the remainder of the esophagus was normal during retrieval of the scope.  Patient was then relieved of anesthesia stable condition and transferred to postanesthesia care unit.    Complications: None  Estimated blood loss:  None   Specimens:  None     Disposition: Upon recovering  from anesthesia patient will be transferred back to the floor for further medical management of anemia.    Chaya Castellon MD

## 2024-01-12 VITALS
SYSTOLIC BLOOD PRESSURE: 128 MMHG | WEIGHT: 187 LBS | OXYGEN SATURATION: 91 % | HEART RATE: 67 BPM | TEMPERATURE: 98 F | HEIGHT: 68 IN | DIASTOLIC BLOOD PRESSURE: 65 MMHG | RESPIRATION RATE: 20 BRPM | BODY MASS INDEX: 28.34 KG/M2

## 2024-01-12 LAB
ABO + RH BLD: NORMAL
ABO + RH BLD: NORMAL
ANION GAP SERPL CALC-SCNC: 8 MEQ/L
BASOPHILS # BLD AUTO: 0.03 X10(3)/MCL
BASOPHILS NFR BLD AUTO: 0.4 %
BLD PROD TYP BPU: NORMAL
BLD PROD TYP BPU: NORMAL
BLOOD UNIT EXPIRATION DATE: NORMAL
BLOOD UNIT EXPIRATION DATE: NORMAL
BLOOD UNIT TYPE CODE: 6200
BLOOD UNIT TYPE CODE: 6200
BUN SERPL-MCNC: 21 MG/DL (ref 8.4–25.7)
CALCIUM SERPL-MCNC: 8.1 MG/DL (ref 8.8–10)
CHLORIDE SERPL-SCNC: 104 MMOL/L (ref 98–107)
CO2 SERPL-SCNC: 26 MMOL/L (ref 23–31)
CREAT SERPL-MCNC: 1.45 MG/DL (ref 0.73–1.18)
CREAT/UREA NIT SERPL: 14
CROSSMATCH INTERPRETATION: NORMAL
CROSSMATCH INTERPRETATION: NORMAL
DISPENSE STATUS: NORMAL
DISPENSE STATUS: NORMAL
EOSINOPHIL # BLD AUTO: 0 X10(3)/MCL (ref 0–0.9)
EOSINOPHIL NFR BLD AUTO: 0 %
ERYTHROCYTE [DISTWIDTH] IN BLOOD BY AUTOMATED COUNT: 17.5 % (ref 11.5–17)
GFR SERPLBLD CREATININE-BSD FMLA CKD-EPI: 54 MLS/MIN/1.73/M2
GLUCOSE SERPL-MCNC: 105 MG/DL (ref 82–115)
HCT VFR BLD AUTO: 32.9 % (ref 42–52)
HGB BLD-MCNC: 10 G/DL (ref 14–18)
IMM GRANULOCYTES # BLD AUTO: 0.03 X10(3)/MCL (ref 0–0.04)
IMM GRANULOCYTES NFR BLD AUTO: 0.4 %
LYMPHOCYTES # BLD AUTO: 1.1 X10(3)/MCL (ref 0.6–4.6)
LYMPHOCYTES NFR BLD AUTO: 15.2 %
MCH RBC QN AUTO: 27.5 PG (ref 27–31)
MCHC RBC AUTO-ENTMCNC: 30.4 G/DL (ref 33–36)
MCV RBC AUTO: 90.4 FL (ref 80–94)
MONOCYTES # BLD AUTO: 0.79 X10(3)/MCL (ref 0.1–1.3)
MONOCYTES NFR BLD AUTO: 10.9 %
NEUTROPHILS # BLD AUTO: 5.31 X10(3)/MCL (ref 2.1–9.2)
NEUTROPHILS NFR BLD AUTO: 73.1 %
PLATELET # BLD AUTO: 257 X10(3)/MCL (ref 130–400)
PMV BLD AUTO: 10.4 FL (ref 7.4–10.4)
POTASSIUM SERPL-SCNC: 3.7 MMOL/L (ref 3.5–5.1)
RBC # BLD AUTO: 3.64 X10(6)/MCL (ref 4.7–6.1)
SODIUM SERPL-SCNC: 138 MMOL/L (ref 136–145)
UNIT NUMBER: NORMAL
UNIT NUMBER: NORMAL
WBC # SPEC AUTO: 7.26 X10(3)/MCL (ref 4.5–11.5)

## 2024-01-12 PROCEDURE — 94640 AIRWAY INHALATION TREATMENT: CPT

## 2024-01-12 PROCEDURE — 80048 BASIC METABOLIC PNL TOTAL CA: CPT | Performed by: INTERNAL MEDICINE

## 2024-01-12 PROCEDURE — 27000221 HC OXYGEN, UP TO 24 HOURS

## 2024-01-12 PROCEDURE — 85025 COMPLETE CBC W/AUTO DIFF WBC: CPT | Performed by: INTERNAL MEDICINE

## 2024-01-12 PROCEDURE — 63600175 PHARM REV CODE 636 W HCPCS: Performed by: INTERNAL MEDICINE

## 2024-01-12 PROCEDURE — 36430 TRANSFUSION BLD/BLD COMPNT: CPT

## 2024-01-12 PROCEDURE — 94761 N-INVAS EAR/PLS OXIMETRY MLT: CPT

## 2024-01-12 PROCEDURE — P9016 RBC LEUKOCYTES REDUCED: HCPCS | Performed by: INTERNAL MEDICINE

## 2024-01-12 PROCEDURE — 25000242 PHARM REV CODE 250 ALT 637 W/ HCPCS: Performed by: INTERNAL MEDICINE

## 2024-01-12 PROCEDURE — C9113 INJ PANTOPRAZOLE SODIUM, VIA: HCPCS | Performed by: INTERNAL MEDICINE

## 2024-01-12 PROCEDURE — 99900035 HC TECH TIME PER 15 MIN (STAT)

## 2024-01-12 RX ORDER — ALBUTEROL SULFATE 90 UG/1
1-2 AEROSOL, METERED RESPIRATORY (INHALATION) EVERY 4 HOURS PRN
Qty: 18 G | Refills: 11 | Status: SHIPPED | OUTPATIENT
Start: 2024-01-12 | End: 2024-03-25

## 2024-01-12 RX ADMIN — IPRATROPIUM BROMIDE AND ALBUTEROL SULFATE 3 ML: 2.5; .5 SOLUTION RESPIRATORY (INHALATION) at 11:01

## 2024-01-12 RX ADMIN — PANTOPRAZOLE SODIUM 40 MG: 40 INJECTION, POWDER, FOR SOLUTION INTRAVENOUS at 09:01

## 2024-01-12 RX ADMIN — FUROSEMIDE 40 MG: 10 INJECTION, SOLUTION INTRAMUSCULAR; INTRAVENOUS at 09:01

## 2024-01-12 RX ADMIN — IPRATROPIUM BROMIDE AND ALBUTEROL SULFATE 3 ML: 2.5; .5 SOLUTION RESPIRATORY (INHALATION) at 03:01

## 2024-01-12 RX ADMIN — IPRATROPIUM BROMIDE AND ALBUTEROL SULFATE 3 ML: 2.5; .5 SOLUTION RESPIRATORY (INHALATION) at 07:01

## 2024-01-12 NOTE — PLAN OF CARE
Problem: Adult Inpatient Plan of Care  Goal: Plan of Care Review  Outcome: Ongoing, Progressing  Goal: Patient-Specific Goal (Individualized)  Outcome: Ongoing, Progressing  Goal: Absence of Hospital-Acquired Illness or Injury  Outcome: Ongoing, Progressing  Goal: Optimal Comfort and Wellbeing  Outcome: Ongoing, Progressing  Goal: Readiness for Transition of Care  Outcome: Ongoing, Progressing     Problem: Fluid Imbalance (Pneumonia)  Goal: Fluid Balance  Outcome: Ongoing, Progressing     Problem: Infection (Pneumonia)  Goal: Resolution of Infection Signs and Symptoms  Outcome: Ongoing, Progressing     Problem: Respiratory Compromise (Pneumonia)  Goal: Effective Oxygenation and Ventilation  Outcome: Ongoing, Progressing     Problem: Gas Exchange Impaired  Goal: Optimal Gas Exchange  Outcome: Ongoing, Progressing     Problem: Anemia  Goal: Anemia Symptom Improvement  Outcome: Ongoing, Progressing     Problem: COPD (Chronic Obstructive Pulmonary Disease) Comorbidity  Goal: Maintenance of COPD Symptom Control  Outcome: Ongoing, Progressing     Problem: Heart Failure Comorbidity  Goal: Maintenance of Heart Failure Symptom Control  Outcome: Ongoing, Progressing     Problem: Hypertension Comorbidity  Goal: Blood Pressure in Desired Range  Outcome: Ongoing, Progressing     Problem: Obstructive Sleep Apnea Risk or Actual Comorbidity Management  Goal: Unobstructed Breathing During Sleep  Outcome: Ongoing, Progressing     Problem: Fluid and Electrolyte Imbalance (Acute Kidney Injury/Impairment)  Goal: Fluid and Electrolyte Balance  Outcome: Ongoing, Progressing     Problem: Oral Intake Inadequate (Acute Kidney Injury/Impairment)  Goal: Optimal Nutrition Intake  Outcome: Ongoing, Progressing     Problem: Renal Function Impairment (Acute Kidney Injury/Impairment)  Goal: Effective Renal Function  Outcome: Ongoing, Progressing

## 2024-01-12 NOTE — NURSING
Phoned Dr. Castellon in regard to if patient is  to receive Colonoscopy today. States that he will not and will need a consult for bilary services in regard to the dx of cirrohosis. Noted and DR. Jc notified

## 2024-01-12 NOTE — DISCHARGE SUMMARY
Hospital Medicine discharge summary      Chief Complaint: Inpatient Follow-up for     HPI:   63 year old male with pmh COPD, CHF (EF 40%), Alcoholic Cirrhosis, DEON, HTN presented to outside facility with complaints of worsening shortness of breath and cough since last night. He also reported dark stools for the past 2 days that worsened today so he presented for evaluation. He reported history of recurrent gastrointestinal bleeding and per patient he has had endoscopy studies in past without ascertaining source of bleed. He is transferred to this facility for higher level of care with surgery evaluation and possible repeat endoscopy.      January 11, 2024-hemoglobin improved to 7.8 from 6.3 after 2 units of packed RBC this morning, no nausea, no vomiting, no abdominal pain, no black or bloody stool overnight    January 12, 2024-hemoglobin improved to 10.0 from 7.8 after 2 units of packed RBC, and creatinine improved to 1.4 from 1.8, EGD shows hiatal hernia, otherwise unremarkable     General surgeon recommended outpatient colonoscopy    Patient can go home today and have a follow-up with the gastroenterology    Case was discussed with patient's nurse and  on the floor.    Objective/physical exam:  General: In no acute distress, afebrile  Chest:  A bilateral wheezing  Heart: RRR, +S1, S2, no appreciable murmur  Abdomen: Soft, nontender, BS +, distended, looks like 7 month pregnancy  MSK: Warm, no lower extremity edema, no clubbing or cyanosis  Neurologic: Alert and oriented x4, Cranial nerve II-XII intact, Strength 5/5 in all 4 extremities    VITAL SIGNS: 24 HRS MIN & MAX LAST   Temp  Min: 97.2 °F (36.2 °C)  Max: 98.4 °F (36.9 °C) 98 °F (36.7 °C)   BP  Min: 91/48  Max: 131/68 127/66   Pulse  Min: 57  Max: 64  64   Resp  Min: 16  Max: 22 (!) 22   SpO2  Min: 83 %  Max: 95 % (!) 92 %     I have reviewed the following labs:  Recent Labs   Lab 01/11/24  0030 01/11/24  0341 01/12/24  0609   WBC 9.04 8.54 7.26    RBC 2.98* 2.95* 3.64*   HGB 7.9* 7.8* 10.0*   HCT 26.9* 26.8* 32.9*   MCV 90.3 90.8 90.4   MCH 26.5* 26.4* 27.5   MCHC 29.4* 29.1* 30.4*   RDW 18.8* 18.7* 17.5*    254 257   MPV 12.7* 11.3* 10.4       Recent Labs   Lab 01/10/24  1347 01/11/24  0341 01/12/24  0609    137 138   K 4.4 4.0 3.7   CO2 24 25 26   BUN 23.6 25.0 21.0   CREATININE 1.79* 1.89* 1.45*   CALCIUM 8.4* 7.7* 8.1*   MG 2.30  --   --        Microbiology Results (last 7 days)       ** No results found for the last 168 hours. **             See below for Radiology    Scheduled Med:   azithromycin  500 mg Intravenous Q24H    cefTRIAXone (ROCEPHIN) IVPB  1 g Intravenous Q24H    pantoprazole  40 mg Intravenous BID      Continuous Infusions:     PRN Meds:  0.9%  NaCl infusion (for blood administration), 0.9%  NaCl infusion (for blood administration), acetaminophen, albuterol-ipratropium, dextrose 10%, dextrose 10%, glucagon (human recombinant), glucose, glucose, melatonin, morphine, naloxone, ondansetron, sodium chloride 0.9%     Discharge diagnosis  Acute GI bleeding   Status post 2 units of packed RBC January 10, 2024   Community-acquired pneumonia   Acute on chronic systolic CHF exacerbation-ejection fraction 40%   Acute renal failure   Liver cirrhosis secondary to EtOH abuse     Discharge condition-stable     Discharge destination-home-discharge time 30 minutes    VTE prophylaxis:     Patient condition:  Stable/Fair/Guarded/ Serious/ Critical    Anticipated discharge and Disposition:         All diagnosis and differential diagnosis have been reviewed; assessment and plan has been documented; I have personally reviewed the labs and test results that are presently available; I have reviewed the patients medication list; I have reviewed the consulting providers response and recommendations. I have reviewed or attempted to review medical records based upon their availability    All of the patient's questions have been  addressed and answered.  Patient's is agreeable to the above stated plan. I will continue to monitor closely and make adjustments to medical management as needed.  _____________________________________________________________________    Nutrition Status:    Radiology:  I have personally reviewed the following imaging and agree with the radiologist.     X-Ray Chest 1 View  Narrative: EXAMINATION:  XR CHEST 1 VIEW    CPT 71594    CLINICAL HISTORY:  Shortness of breath    COMPARISON:  November 28, 2023    FINDINGS:  Examination reveals mediastinal silhouette to be within normal limits cardiac silhouette is not enlarged left lung field is clear and free of gross infiltrates atelectasis or effusions.    There is blunting of the right costophrenic angle indicating the presence of a right-sided pleural effusion.    Consolidative changes are also identified at the right base as well as consolidative changes with silhouetting of the right cardiac border that might indicate the presence of an infiltrate in the right middle lobe.    No other focal consolidative changes  Impression: Changes of right-sided pleural effusion.    Consolidative changes at the right base as well as changes that might reflect an infiltrate in the right middle lobe.    No other focal consolidative changes    Electronically signed by: Antonio Jerez  Date:    01/11/2024  Time:    10:22      Brenda Jc MD   01/12/2024

## 2024-01-12 NOTE — DISCHARGE INSTRUCTIONS
F/U WITH BILARY SERVICES/GATROENTEROLOGY FOR  CIRRHOSIS sPOKE WITH BELLA AT DR VILLARREAL FAX FAX NUMBER TO SEND INFO TO SO THAT  CAN REFER TO BILARY SERVICE [phone] CHUY GALVIN

## 2024-01-15 LAB
BACTERIA BLD CULT: NORMAL
BACTERIA BLD CULT: NORMAL

## 2024-01-19 ENCOUNTER — DOCUMENT SCAN (OUTPATIENT)
Dept: HOME HEALTH SERVICES | Facility: HOSPITAL | Age: 64
End: 2024-01-19
Payer: MEDICAID

## 2024-01-28 ENCOUNTER — EXTERNAL HOME HEALTH (OUTPATIENT)
Dept: HOME HEALTH SERVICES | Facility: HOSPITAL | Age: 64
End: 2024-01-28
Payer: MEDICAID

## 2024-01-29 PROBLEM — I21.4 NSTEMI (NON-ST ELEVATED MYOCARDIAL INFARCTION): Status: RESOLVED | Noted: 2023-10-30 | Resolved: 2024-01-29

## 2024-01-30 ENCOUNTER — OFFICE VISIT (OUTPATIENT)
Dept: FAMILY MEDICINE | Facility: CLINIC | Age: 64
End: 2024-01-30
Payer: MEDICAID

## 2024-01-30 VITALS
RESPIRATION RATE: 18 BRPM | SYSTOLIC BLOOD PRESSURE: 121 MMHG | OXYGEN SATURATION: 98 % | TEMPERATURE: 98 F | HEART RATE: 65 BPM | HEIGHT: 68 IN | WEIGHT: 178.69 LBS | DIASTOLIC BLOOD PRESSURE: 72 MMHG | BODY MASS INDEX: 27.08 KG/M2

## 2024-01-30 DIAGNOSIS — K70.30 ALCOHOLIC CIRRHOSIS, UNSPECIFIED WHETHER ASCITES PRESENT: Primary | ICD-10-CM

## 2024-01-30 PROCEDURE — 3008F BODY MASS INDEX DOCD: CPT | Mod: CPTII,,, | Performed by: NURSE PRACTITIONER

## 2024-01-30 PROCEDURE — 3078F DIAST BP <80 MM HG: CPT | Mod: CPTII,,, | Performed by: NURSE PRACTITIONER

## 2024-01-30 PROCEDURE — 3074F SYST BP LT 130 MM HG: CPT | Mod: CPTII,,, | Performed by: NURSE PRACTITIONER

## 2024-01-30 PROCEDURE — 1111F DSCHRG MED/CURRENT MED MERGE: CPT | Mod: CPTII,,, | Performed by: NURSE PRACTITIONER

## 2024-01-30 PROCEDURE — 4010F ACE/ARB THERAPY RXD/TAKEN: CPT | Mod: CPTII,,, | Performed by: NURSE PRACTITIONER

## 2024-01-30 PROCEDURE — 99213 OFFICE O/P EST LOW 20 MIN: CPT | Mod: ,,, | Performed by: NURSE PRACTITIONER

## 2024-01-30 PROCEDURE — 1159F MED LIST DOCD IN RCRD: CPT | Mod: CPTII,,, | Performed by: NURSE PRACTITIONER

## 2024-01-30 RX ORDER — SACUBITRIL AND VALSARTAN 24; 26 MG/1; MG/1
1 TABLET, FILM COATED ORAL 2 TIMES DAILY
COMMUNITY
Start: 2024-01-15

## 2024-01-30 RX ORDER — OCTREOTIDE ACETATE,MI-SPHERES 20 MG
20 VIAL (EA) INTRAMUSCULAR
COMMUNITY
Start: 2024-01-23

## 2024-01-30 NOTE — PROGRESS NOTES
SUBJECTIVE:     History of Present Illness      Chief Complaint: Follow-up (Hospital follow up for anemia.  No complaints at this time, denies SOB, CP or dizziness.  States here for Sandostatin LAR depot 20mg injection (Dr Flower).)    HPI:  Patient is a 63 y.o. year old male who presents to clinic for follow-up.  History as listed alcoholic cirrhosis anemia, CHF patient does have appointment with gastro currently on Sandostatin  injections weekly    Review of Systems:    Review of Systems    12 point review of systems conducted, negative except as stated in the history of present illness. See HPI for details.     Previous History    Ivette Hearn, LINA  Review of patient's allergies indicates:  No Known Allergies    Past Medical History:   Diagnosis Date    Alcoholic cirrhosis     Anemia     CHF (congestive heart failure)     LV EF 40%    COPD (chronic obstructive pulmonary disease)     Hypertension     Myocardial infarction     Peripheral artery disease     with stents    Sleep apnea      Current Outpatient Medications   Medication Instructions    albuterol (PROVENTIL/VENTOLIN HFA) 90 mcg/actuation inhaler 1-2 puffs, Inhalation, Every 4 hours PRN    amiodarone (PACERONE) 400 mg, Oral, Daily    atorvastatin (LIPITOR) 80 mg, Oral, Daily    docusate sodium (STOOL SOFTENER) 50 mg, Oral, 2 times daily    ENTRESTO 24-26 mg per tablet 1 tablet, Oral, 2 times daily    ferrous sulfate 325 mg, Oral, 2 times daily    folic acid (FOLVITE) 1 mg, Oral, Daily    furosemide (LASIX) 40 mg, Oral, Daily    gabapentin (NEURONTIN) 300 MG capsule TAKE ONE (1) CAPSULE BY MOUTH (3) TIMES DAILY    metoprolol succinate (TOPROL-XL) 25 MG 24 hr tablet Take ½ ablet (12.5 mg total) by mouth once daily.    pantoprazole (PROTONIX) 40 mg, Oral, 2 times daily    SandoSTATIN LAR Depot 20 mg, Intramuscular, Every 30 days    STIOLTO RESPIMAT 2.5-2.5 mcg/actuation Mist 2 puffs, Inhalation, Daily    tamsulosin (FLOMAX) 0.4 mg, Oral, Daily      Past Surgical History:   Procedure Laterality Date    ANGIOGRAM, ABDOMINAL AORTA  10/24/2023    Procedure: Angiogram, Abdominal Aorta;  Surgeon: Janette Ernandez MD;  Location: [facility] CATH LAB;  Service: Cardiology;;    ARTERIOGRAPHY OF SUBCLAVIAN ARTERY Left 10/24/2023    Procedure: ARTERIOGRAM, SUBCLAVIAN;  Surgeon: Janette Ernandez MD;  Location: [facility] CATH LAB;  Service: Cardiology;  Laterality: Left;    CAPSULE ENDOSCOPY, ESOPHAGUS THROUGH ILEUM N/A 11/20/2023    Procedure: M2A;  Surgeon: Johan Flower MD;  Location: [facility] ENDOSCOPY;  Service: Gastroenterology;  Laterality: N/A;  To be done at any time today when staff is available.    CARDIAC DEFIBRILLATOR PLACEMENT N/A 11/28/2023    Procedure: Insertion, Single chamber ICD (SJM);  Surgeon: Suyapa Oakes MD;  Location: [facility] CATH LAB;  Service: Cardiology;  Laterality: N/A;    CATHETERIZATION OF BOTH LEFT AND RIGHT HEART N/A 10/24/2023    Procedure: CATHETERIZATION, HEART, BOTH LEFT AND RIGHT;  Surgeon: Janette Ernandez MD;  Location: [facility] CATH LAB;  Service: Cardiology;  Laterality: N/A;    COLONOSCOPY N/A 6/10/2023    Procedure: COLONOSCOPY;  Surgeon: Johan Flower MD;  Location: [facility] OR;  Service: Gastroenterology;  Laterality: N/A;    EGD, WITH CLOSED BIOPSY N/A 12/18/2023    Procedure: EGD;  Surgeon: Johan Flower MD;  Location: [facility] ENDOSCOPY;  Service: Gastroenterology;  Laterality: N/A;    ESOPHAGOGASTRODUODENOSCOPY N/A 6/9/2023    Procedure: EGD;  Surgeon: Tima Teixeira MD;  Location: [facility] ENDOSCOPY;  Service: Gastroenterology;  Laterality: N/A;    ESOPHAGOGASTRODUODENOSCOPY N/A 1/11/2024    Procedure: EGD (ESOPHAGOGASTRODUODENOSCOPY);  Surgeon: Chaya Castellon MD;  Location: [facility];  Service: Endoscopy;  Laterality: N/A;  POST OP: MILD GASTRITIS W/ NO ULCER    INSERTION OF INTRA-AORTIC BALLOON ASSIST DEVICE  10/24/2023    Procedure: INSERTION, INTRA-AORTIC BALLOON PUMP;  Surgeon: Janette Ernandez MD;   "Location: [facility] CATH LAB;  Service: Cardiology;;    INSERTION OF INTRAVASCULAR MICROAXIAL BLOOD PUMP N/A 10/26/2023    Procedure: INSERTION, IMPELLA;  Surgeon: Janette Ernandez MD;  Location: [facility] CATH LAB;  Service: Cardiology;  Laterality: N/A;    INSTANTANEOUS WAVE-FREE RATIO (IFR) N/A 10/26/2023    Procedure: Instantaneous Wave-Free Ratio (IFR);  Surgeon: Janette Ernandez MD;  Location: [facility] CATH LAB;  Service: Cardiology;  Laterality: N/A;    INTRAVASCULAR ULTRASOUND, CORONARY N/A 10/26/2023    Procedure: Ultrasound-coronary;  Surgeon: Janette Ernandez MD;  Location: [facility] CATH LAB;  Service: Cardiology;  Laterality: N/A;    PERCUTANEOUS CORONARY INTERVENTION, ARTERY N/A 10/26/2023    Procedure: Percutaneous coronary intervention- with Impella;  Surgeon: Janette Ernandez MD;  Location: [facility] CATH LAB;  Service: Cardiology;  Laterality: N/A;    PLACEMENT, IABP  10/24/2023    Procedure: Placement, IABP;  Surgeon: Janette Ernandez MD;  Location: [facility] CATH LAB;  Service: Cardiology;;     History reviewed. No pertinent family history.    Social History     Tobacco Use    Smoking status: Every Day     Current packs/day: 0.25     Average packs/day: 0.3 packs/day for 50.1 years (12.5 ttl pk-yrs)     Types: Vaping with nicotine, Cigarettes     Start date: 1/10/1974    Smokeless tobacco: Former     Types: Chew   Substance Use Topics    Alcohol use: Not Currently     Comment: currently not drinking per pt. 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 Quit drinking in 10/2023.    Drug use: Not Currently        Health Maintenance      Health Maintenance   Topic Date Due    Shingles Vaccine (1 of 2) Never done    Lipid Panel  12/13/2028    TETANUS VACCINE  12/17/2028    Colorectal Cancer Screening  06/10/2033    Hepatitis C Screening  Completed       OBJECTIVE:     Physical Exam      Vital Signs Reviewed   Visit Vitals  /72 (BP Location: Left arm, Patient Position: Sitting)   Pulse 65   Temp 98.2 °F (36.8 °C) (Oral)   Resp 18   Ht 5' 8" (1.727 m) "   Wt 81.1 kg (178 lb 11.2 oz)   SpO2 98%   BMI 27.17 kg/m²       Physical Exam    Physical Exam:  General: Alert, well nourished, no acute distress, non-toxic appearing.   Eyes: Anicteric sclera, without conjunctival injection, normal lids, no purulent drainage, EOMs grossly intact.   Ears: No tragal tenderness. Tympanic membranes intact, pearly grey, without effusion or erythema and with a positive light reflex.   Mouth: Posterior pharynx without erythema. No exudate, ulcerations, or lesion. No tonsillar swelling.   Neck: Supple, full ROM, no rigidity, no cervical adenopathy.   Cardio: Normal rate and rhythm    Resp: Respirations even and unlabored, clear to auscultation bilaterally.   Abd: No ecchymosis or distension. Normal bowel sounds in all 4 quadrants. No tenderness to palpation. No rebound tenderness or guarding. No CVA tenderness.   Skin: No rashes or open lesions noted.   MSK: No swelling. No abrasions or signs of trauma. Ambulating without assistance.   Neuro: Alert,oriented No focal deficits noted. Facial expressions even.   Psych: Cooperative, Normal affect      Procedures    Procedures     Labs     Results for orders placed or performed during the hospital encounter of 01/10/24   CBC with Differential   Result Value Ref Range    WBC 9.04 4.50 - 11.50 x10(3)/mcL    RBC 2.98 (L) 4.70 - 6.10 x10(6)/mcL    Hgb 7.9 (L) 14.0 - 18.0 g/dL    Hct 26.9 (L) 42.0 - 52.0 %    MCV 90.3 80.0 - 94.0 fL    MCH 26.5 (L) 27.0 - 31.0 pg    MCHC 29.4 (L) 33.0 - 36.0 g/dL    RDW 18.8 (H) 11.5 - 17.0 %    Platelet 136 130 - 400 x10(3)/mcL    MPV 12.7 (H) 7.4 - 10.4 fL    Neut % 79.7 %    Lymph % 10.7 %    Mono % 9.0 %    Eos % 0.0 %    Basophil % 0.3 %    Lymph # 0.97 0.6 - 4.6 x10(3)/mcL    Neut # 7.20 2.1 - 9.2 x10(3)/mcL    Mono # 0.81 0.1 - 1.3 x10(3)/mcL    Eos # 0.00 0 - 0.9 x10(3)/mcL    Baso # 0.03 <=0.2 x10(3)/mcL    IG# 0.03 0 - 0.04 x10(3)/mcL    IG% 0.3 %   Basic Metabolic Panel (BMP)   Result Value Ref Range     Sodium Level 137 136 - 145 mmol/L    Potassium Level 4.0 3.5 - 5.1 mmol/L    Chloride 103 98 - 107 mmol/L    Carbon Dioxide 25 23 - 31 mmol/L    Glucose Level 101 82 - 115 mg/dL    Blood Urea Nitrogen 25.0 8.4 - 25.7 mg/dL    Creatinine 1.89 (H) 0.73 - 1.18 mg/dL    BUN/Creatinine Ratio 13     Calcium Level Total 7.7 (L) 8.8 - 10.0 mg/dL    Anion Gap 9.0 mEq/L    eGFR 39 mls/min/1.73/m2   CBC with Differential   Result Value Ref Range    WBC 8.54 4.50 - 11.50 x10(3)/mcL    RBC 2.95 (L) 4.70 - 6.10 x10(6)/mcL    Hgb 7.8 (L) 14.0 - 18.0 g/dL    Hct 26.8 (L) 42.0 - 52.0 %    MCV 90.8 80.0 - 94.0 fL    MCH 26.4 (L) 27.0 - 31.0 pg    MCHC 29.1 (L) 33.0 - 36.0 g/dL    RDW 18.7 (H) 11.5 - 17.0 %    Platelet 254 130 - 400 x10(3)/mcL    MPV 11.3 (H) 7.4 - 10.4 fL    Neut % 77.0 %    Lymph % 12.6 %    Mono % 10.0 %    Eos % 0.0 %    Basophil % 0.2 %    Lymph # 1.08 0.6 - 4.6 x10(3)/mcL    Neut # 6.57 2.1 - 9.2 x10(3)/mcL    Mono # 0.85 0.1 - 1.3 x10(3)/mcL    Eos # 0.00 0 - 0.9 x10(3)/mcL    Baso # 0.02 <=0.2 x10(3)/mcL    IG# 0.02 0 - 0.04 x10(3)/mcL    IG% 0.2 %   Basic Metabolic Panel (BMP)   Result Value Ref Range    Sodium Level 138 136 - 145 mmol/L    Potassium Level 3.7 3.5 - 5.1 mmol/L    Chloride 104 98 - 107 mmol/L    Carbon Dioxide 26 23 - 31 mmol/L    Glucose Level 105 82 - 115 mg/dL    Blood Urea Nitrogen 21.0 8.4 - 25.7 mg/dL    Creatinine 1.45 (H) 0.73 - 1.18 mg/dL    BUN/Creatinine Ratio 14     Calcium Level Total 8.1 (L) 8.8 - 10.0 mg/dL    Anion Gap 8.0 mEq/L    eGFR 54 mls/min/1.73/m2   CBC with Differential   Result Value Ref Range    WBC 7.26 4.50 - 11.50 x10(3)/mcL    RBC 3.64 (L) 4.70 - 6.10 x10(6)/mcL    Hgb 10.0 (L) 14.0 - 18.0 g/dL    Hct 32.9 (L) 42.0 - 52.0 %    MCV 90.4 80.0 - 94.0 fL    MCH 27.5 27.0 - 31.0 pg    MCHC 30.4 (L) 33.0 - 36.0 g/dL    RDW 17.5 (H) 11.5 - 17.0 %    Platelet 257 130 - 400 x10(3)/mcL    MPV 10.4 7.4 - 10.4 fL    Neut % 73.1 %    Lymph % 15.2 %    Mono % 10.9 %    Eos %  0.0 %    Basophil % 0.4 %    Lymph # 1.10 0.6 - 4.6 x10(3)/mcL    Neut # 5.31 2.1 - 9.2 x10(3)/mcL    Mono # 0.79 0.1 - 1.3 x10(3)/mcL    Eos # 0.00 0 - 0.9 x10(3)/mcL    Baso # 0.03 <=0.2 x10(3)/mcL    IG# 0.03 0 - 0.04 x10(3)/mcL    IG% 0.4 %   Type & Screen   Result Value Ref Range    Group & Rh A POS     Indirect Martinez GEL NEG     Specimen Outdate 01/14/2024 23:59    Prepare RBC 2 Units; GI BLEED   Result Value Ref Range    UNIT NUMBER [id number]     UNIT ABO/RH A POS     DISPENSE STATUS Transfused     Unit Expiration [id number]     Product Code R3761S12     Unit Blood Type Code 6200     CROSSMATCH INTERPRETATION Compatible     UNIT NUMBER [id number]     UNIT ABO/RH A POS     DISPENSE STATUS Transfused     Unit Expiration [id number]     Product Code Y6839Z51     Unit Blood Type Code 6200     CROSSMATCH INTERPRETATION Compatible    Prepare Platelets 1 Dose   Result Value Ref Range    UNIT NUMBER [id number]     UNIT ABO/RH O POS     DISPENSE STATUS Transfused     Unit Expiration [id number]     Product Code H7052B23     Unit Blood Type Code 5100     CROSSMATCH INTERPRETATION Not required        Chemistry:  Lab Results   Component Value Date     02/09/2024    K 4.7 02/09/2024    CHLORIDE 105 02/09/2024    BUN 23.7 02/09/2024    CREATININE 1.47 (H) 02/09/2024    EGFRNORACEVR 53 02/09/2024    GLUCOSE 96 02/09/2024    CALCIUM 9.0 02/09/2024    ALKPHOS 75 02/09/2024    LABPROT 7.8 (H) 02/09/2024    ALBUMIN 3.6 02/09/2024    BILIDIR 0.2 11/21/2023    IBILI 0.20 11/21/2023    AST 18 02/09/2024    ALT 14 02/09/2024    MG 2.30 01/10/2024    PHOS 3.6 11/19/2023    LURPFXXZ20SA 21.3 (L) 12/13/2023    TSH 3.221 12/13/2023    SZQNCI1WLNE 0.92 06/07/2023    PSA 0.10 12/13/2023        Lab Results   Component Value Date    HGBA1C 6.1 12/13/2023        Hematology:  Lab Results   Component Value Date    WBC 7.26 01/12/2024    HGB 10.0 (L) 01/12/2024    HCT 32.9 (L) 01/12/2024     01/12/2024       Lipid  Panel:  Lab Results   Component Value Date    CHOL 56 12/13/2023    HDL 16 (L) 12/13/2023    LDL 24.00 (L) 12/13/2023    TRIG 79 12/13/2023    TOTALCHOLEST 4 12/13/2023        Urine:  Lab Results   Component Value Date    COLORUA Yellow 12/13/2023    APPEARANCEUA Clear 12/13/2023    SGUA 1.015 12/13/2023    PHUA 7.0 12/13/2023    PROTEINUA Negative 12/13/2023    GLUCOSEUA Negative 12/13/2023    KETONESUA Negative 12/13/2023    BLOODUA Negative 12/13/2023    NITRITESUA Negative 12/13/2023    LEUKOCYTESUR Negative 12/13/2023    RBCUA None Seen 12/13/2023    WBCUA None Seen 12/13/2023    BACTERIA None Seen 12/13/2023    SQEPUA 1-2 12/22/2020    HYALINECASTS 0-2 (A) 12/22/2020    CREATRANDUR 142.7 10/24/2023         Assessment            ICD-10-CM ICD-9-CM   1. Alcoholic cirrhosis, unspecified whether ascites present  K70.30 571.2       Plan       1. Alcoholic cirrhosis, unspecified whether ascites present        Medication List with Changes/Refills   Current Medications    ALBUTEROL (PROVENTIL/VENTOLIN HFA) 90 MCG/ACTUATION INHALER    Inhale 1-2 puffs into the lungs every 4 (four) hours as needed.    AMIODARONE (PACERONE) 200 MG TAB    Take 2 tablets (400 mg total) by mouth once daily.    ATORVASTATIN (LIPITOR) 80 MG TABLET    Take 1 tablet (80 mg total) by mouth once daily.    DOCUSATE SODIUM (STOOL SOFTENER) 50 MG CAPSULE    Take 1 capsule (50 mg total) by mouth 2 (two) times daily.    ENTRESTO 24-26 MG PER TABLET    Take 1 tablet by mouth 2 (two) times daily.    FERROUS SULFATE 325 (65 FE) MG EC TABLET    Take 1 tablet (325 mg total) by mouth 2 (two) times daily.    FOLIC ACID (FOLVITE) 1 MG TABLET    Take 1 tablet (1 mg total) by mouth once daily.    FUROSEMIDE (LASIX) 40 MG TABLET    Take 40 mg by mouth once daily.    GABAPENTIN (NEURONTIN) 300 MG CAPSULE    TAKE ONE (1) CAPSULE BY MOUTH (3) TIMES DAILY    METOPROLOL SUCCINATE (TOPROL-XL) 25 MG 24 HR TABLET    Take ½ ablet (12.5 mg total) by mouth once daily.     PANTOPRAZOLE (PROTONIX) 40 MG TABLET    Take 1 tablet (40 mg total) by mouth 2 (two) times daily.    SANDOSTATIN LAR DEPOT 20 MG SSRR INJECTION    Inject 20 mg into the muscle every 30 days.    STIOLTO RESPIMAT 2.5-2.5 MCG/ACTUATION MIST    Inhale 2 puffs into the lungs once daily.    TAMSULOSIN (FLOMAX) 0.4 MG CAP    Take 1 capsule (0.4 mg total) by mouth once daily.       Follow up in about 2 months (around 3/30/2024).   Follow up in about 2 months (around 3/30/2024). In addition to their scheduled follow up, the patient has also been instructed to follow up on as needed basis.   Future Appointments   Date Time Provider Department Center   4/1/2024  9:30 AM Ivette Hearn FNP [facility]

## 2024-02-05 PROBLEM — J18.9 HOSPITAL-ACQUIRED PNEUMONIA: Status: RESOLVED | Noted: 2023-11-05 | Resolved: 2024-02-05
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
## 2024-02-09 ENCOUNTER — LAB REQUISITION (OUTPATIENT)
Dept: LAB | Facility: HOSPITAL | Age: 64
End: 2024-02-09
Payer: MEDICAID

## 2024-02-09 DIAGNOSIS — I50.1 LEFT VENTRICULAR FAILURE, UNSPECIFIED: ICD-10-CM

## 2024-02-09 LAB
ALBUMIN SERPL-MCNC: 3.6 G/DL (ref 3.4–4.8)
ALBUMIN/GLOB SERPL: 0.9 RATIO (ref 1.1–2)
ALP SERPL-CCNC: 75 UNIT/L (ref 40–150)
ALT SERPL-CCNC: 14 UNIT/L (ref 0–55)
AST SERPL-CCNC: 18 UNIT/L (ref 5–34)
BILIRUB SERPL-MCNC: 0.3 MG/DL
BUN SERPL-MCNC: 23.7 MG/DL (ref 8.4–25.7)
CALCIUM SERPL-MCNC: 9 MG/DL (ref 8.8–10)
CHLORIDE SERPL-SCNC: 105 MMOL/L (ref 98–107)
CO2 SERPL-SCNC: 26 MMOL/L (ref 23–31)
CREAT SERPL-MCNC: 1.47 MG/DL (ref 0.73–1.18)
GFR SERPLBLD CREATININE-BSD FMLA CKD-EPI: 53 MLS/MIN/1.73/M2
GLOBULIN SER-MCNC: 4.2 GM/DL (ref 2.4–3.5)
GLUCOSE SERPL-MCNC: 96 MG/DL (ref 82–115)
POTASSIUM SERPL-SCNC: 4.7 MMOL/L (ref 3.5–5.1)
PROT SERPL-MCNC: 7.8 GM/DL (ref 5.8–7.6)
SODIUM SERPL-SCNC: 142 MMOL/L (ref 136–145)

## 2024-02-09 PROCEDURE — 80053 COMPREHEN METABOLIC PANEL: CPT | Performed by: NURSE PRACTITIONER

## 2024-02-19 DIAGNOSIS — M79.604 PAIN IN BOTH LOWER EXTREMITIES: ICD-10-CM

## 2024-02-19 DIAGNOSIS — M79.605 PAIN IN BOTH LOWER EXTREMITIES: ICD-10-CM

## 2024-02-19 RX ORDER — GABAPENTIN 300 MG/1
CAPSULE ORAL
Qty: 90 CAPSULE | Refills: 1 | Status: SHIPPED | OUTPATIENT
Start: 2024-02-19 | End: 2024-03-26

## 2024-02-21 ENCOUNTER — HOSPITAL ENCOUNTER (EMERGENCY)
Facility: HOSPITAL | Age: 64
Discharge: HOME | End: 2024-02-21
Attending: FAMILY MEDICINE
Payer: MEDICAID

## 2024-02-21 VITALS
WEIGHT: 182 LBS | RESPIRATION RATE: 23 BRPM | HEART RATE: 66 BPM | HEIGHT: 68 IN | BODY MASS INDEX: 27.58 KG/M2 | SYSTOLIC BLOOD PRESSURE: 110 MMHG | TEMPERATURE: 98 F | DIASTOLIC BLOOD PRESSURE: 57 MMHG | OXYGEN SATURATION: 95 %

## 2024-02-21 DIAGNOSIS — R07.9 CHEST PAIN, UNSPECIFIED TYPE: ICD-10-CM

## 2024-02-21 DIAGNOSIS — Z95.0 PACEMAKER: ICD-10-CM

## 2024-02-21 DIAGNOSIS — R42 DIZZINESS: Primary | ICD-10-CM

## 2024-02-21 LAB
ALBUMIN SERPL-MCNC: 4.2 G/DL (ref 3.4–5)
ALBUMIN/GLOB SERPL: 1.1 RATIO
ALP SERPL-CCNC: 60 UNIT/L (ref 50–144)
ALT SERPL-CCNC: 22 UNIT/L (ref 1–45)
ANION GAP SERPL CALC-SCNC: 15 MEQ/L (ref 2–13)
AST SERPL-CCNC: 30 UNIT/L (ref 17–59)
BASOPHILS # BLD AUTO: 0.07 X10(3)/MCL (ref 0.01–0.08)
BASOPHILS NFR BLD AUTO: 1 % (ref 0.1–1.2)
BILIRUB SERPL-MCNC: 0.5 MG/DL (ref 0–1)
BUN SERPL-MCNC: 22 MG/DL (ref 7–20)
CALCIUM SERPL-MCNC: 8.5 MG/DL (ref 8.4–10.2)
CHLORIDE SERPL-SCNC: 101 MMOL/L (ref 98–110)
CO2 SERPL-SCNC: 19 MMOL/L (ref 21–32)
CREAT SERPL-MCNC: 1.66 MG/DL (ref 0.66–1.25)
CREAT/UREA NIT SERPL: 13 (ref 12–20)
EOSINOPHIL # BLD AUTO: 0.15 X10(3)/MCL (ref 0.04–0.54)
EOSINOPHIL NFR BLD AUTO: 2.2 % (ref 0.7–7)
ERYTHROCYTE [DISTWIDTH] IN BLOOD BY AUTOMATED COUNT: 16.2 %
GFR SERPLBLD CREATININE-BSD FMLA CKD-EPI: 46 MLS/MIN/1.73/M2
GLOBULIN SER-MCNC: 4 GM/DL (ref 2–3.9)
GLUCOSE SERPL-MCNC: 86 MG/DL (ref 70–115)
HCT VFR BLD AUTO: 34.5 % (ref 36–52)
HGB BLD-MCNC: 11.1 G/DL (ref 13–18)
IMM GRANULOCYTES # BLD AUTO: 0.01 X10(3)/MCL (ref 0–0.03)
IMM GRANULOCYTES NFR BLD AUTO: 0.1 % (ref 0–0.5)
LYMPHOCYTES # BLD AUTO: 1.56 X10(3)/MCL (ref 1.32–3.57)
LYMPHOCYTES NFR BLD AUTO: 22.9 % (ref 20–55)
MAGNESIUM SERPL-MCNC: 2.1 MG/DL (ref 1.8–2.4)
MCH RBC QN AUTO: 28.9 PG (ref 27–34)
MCHC RBC AUTO-ENTMCNC: 32.2 G/DL (ref 31–37)
MCV RBC AUTO: 89.8 FL (ref 79–99)
MONOCYTES # BLD AUTO: 0.68 X10(3)/MCL (ref 0.3–0.82)
MONOCYTES NFR BLD AUTO: 10 % (ref 4.7–12.5)
NEUTROPHILS # BLD AUTO: 4.34 X10(3)/MCL (ref 1.78–5.38)
NEUTROPHILS NFR BLD AUTO: 63.8 % (ref 37–73)
NRBC BLD AUTO-RTO: 0 %
PLATELET # BLD AUTO: 194 X10(3)/MCL (ref 140–371)
PMV BLD AUTO: 10.4 FL (ref 9.4–12.4)
POTASSIUM SERPL-SCNC: 4 MMOL/L (ref 3.5–5.1)
PROT SERPL-MCNC: 8.2 GM/DL (ref 6.3–8.2)
RBC # BLD AUTO: 3.84 X10(6)/MCL (ref 4–6)
SODIUM SERPL-SCNC: 135 MMOL/L (ref 135–145)
TROPONIN I SERPL-MCNC: <0.012 NG/ML (ref 0–0.03)
WBC # SPEC AUTO: 6.81 X10(3)/MCL (ref 4–11.5)

## 2024-02-21 PROCEDURE — 99284 EMERGENCY DEPT VISIT MOD MDM: CPT | Mod: 25

## 2024-02-21 PROCEDURE — 84484 ASSAY OF TROPONIN QUANT: CPT | Performed by: FAMILY MEDICINE

## 2024-02-21 PROCEDURE — 85025 COMPLETE CBC W/AUTO DIFF WBC: CPT | Performed by: FAMILY MEDICINE

## 2024-02-21 PROCEDURE — 80053 COMPREHEN METABOLIC PANEL: CPT | Performed by: FAMILY MEDICINE

## 2024-02-21 PROCEDURE — 93010 ELECTROCARDIOGRAM REPORT: CPT | Mod: ,,, | Performed by: INTERNAL MEDICINE

## 2024-02-21 PROCEDURE — 83735 ASSAY OF MAGNESIUM: CPT | Performed by: FAMILY MEDICINE

## 2024-02-21 PROCEDURE — 93005 ELECTROCARDIOGRAM TRACING: CPT

## 2024-02-21 NOTE — OP NOTE
Vce small bowel exam    Pre op gi bleeding, anemia    Post op limited vce exam secondary to retained gastric lumen retention of vce capsule. Small bowel not seen.     findings  limited vce exam secondary to retained gastric lumen retention of vce capsule. Small bowel not seen.     Plan  Consider repeat exam w endoscopy  and capsule placed in small bowel..

## 2024-02-22 NOTE — ED PROVIDER NOTES
Encounter Date: 2/21/2024       History     Chief Complaint   Patient presents with    AICD Problem    Shortness of Breath     Pt reports increase in SOB during exertion today. Pt also reports left shoulder pain earlier today. Reports having AICD placed x 2 months ago and phone joyce for device is alarming. Pt denies shock.      Patient presents for evaluation of possible defibrillator firing.  Patient notes feeling odd feeling prior to arrival with some chest soreness and dizziness.  Symptoms lasted for brief several minutes and has resolved completely at present emergency room.  Patient denies having any chest pain at present shortness of breath or any other symptoms.  Patient denies fevers chills cough.    The history is provided by the patient.     Review of patient's allergies indicates:  No Known Allergies  Past Medical History:   Diagnosis Date    Alcoholic cirrhosis     Anemia     CHF (congestive heart failure)     LV EF 40%    COPD (chronic obstructive pulmonary disease)     Hypertension     Myocardial infarction     Peripheral artery disease     with stents    Sleep apnea      Past Surgical History:   Procedure Laterality Date    ANGIOGRAM, ABDOMINAL AORTA  10/24/2023    Procedure: Angiogram, Abdominal Aorta;  Surgeon: Janette Ernandez MD;  Location: [facility] CATH LAB;  Service: Cardiology;;    ARTERIOGRAPHY OF SUBCLAVIAN ARTERY Left 10/24/2023    Procedure: ARTERIOGRAM, SUBCLAVIAN;  Surgeon: Janette Ernandez MD;  Location: [facility] CATH LAB;  Service: Cardiology;  Laterality: Left;    CAPSULE ENDOSCOPY, ESOPHAGUS THROUGH ILEUM N/A 11/20/2023    Procedure: M2A;  Surgeon: Johan Flower MD;  Location: [facility] ENDOSCOPY;  Service: Gastroenterology;  Laterality: N/A;  To be done at any time today when staff is available.    CARDIAC DEFIBRILLATOR PLACEMENT N/A 11/28/2023    Procedure: Insertion, Single chamber ICD (SJM);  Surgeon: Suyapa Oakes MD;  Location: [facility] CATH LAB;  Service: Cardiology;   Laterality: N/A;    CATHETERIZATION OF BOTH LEFT AND RIGHT HEART N/A 10/24/2023    Procedure: CATHETERIZATION, HEART, BOTH LEFT AND RIGHT;  Surgeon: Janette Ernandez MD;  Location: [facility] CATH LAB;  Service: Cardiology;  Laterality: N/A;    COLONOSCOPY N/A 6/10/2023    Procedure: COLONOSCOPY;  Surgeon: Johan Flower MD;  Location: [facility] OR;  Service: Gastroenterology;  Laterality: N/A;    EGD, WITH CLOSED BIOPSY N/A 12/18/2023    Procedure: EGD;  Surgeon: Johan Flower MD;  Location: [facility] ENDOSCOPY;  Service: Gastroenterology;  Laterality: N/A;    ESOPHAGOGASTRODUODENOSCOPY N/A 6/9/2023    Procedure: EGD;  Surgeon: Tima Teixeira MD;  Location: [facility] ENDOSCOPY;  Service: Gastroenterology;  Laterality: N/A;    ESOPHAGOGASTRODUODENOSCOPY N/A 1/11/2024    Procedure: EGD (ESOPHAGOGASTRODUODENOSCOPY);  Surgeon: Chaya Castellon MD;  Location: [facility];  Service: Endoscopy;  Laterality: N/A;  POST OP: MILD GASTRITIS W/ NO ULCER    INSERTION OF INTRA-AORTIC BALLOON ASSIST DEVICE  10/24/2023    Procedure: INSERTION, INTRA-AORTIC BALLOON PUMP;  Surgeon: Janette Ernandez MD;  Location: [facility] CATH LAB;  Service: Cardiology;;    INSERTION OF INTRAVASCULAR MICROAXIAL BLOOD PUMP N/A 10/26/2023    Procedure: INSERTION, IMPELLA;  Surgeon: Janette Ernandez MD;  Location: [facility] CATH LAB;  Service: Cardiology;  Laterality: N/A;    INSTANTANEOUS WAVE-FREE RATIO (IFR) N/A 10/26/2023    Procedure: Instantaneous Wave-Free Ratio (IFR);  Surgeon: Janette Ernandez MD;  Location: [facility] CATH LAB;  Service: Cardiology;  Laterality: N/A;    INTRAVASCULAR ULTRASOUND, CORONARY N/A 10/26/2023    Procedure: Ultrasound-coronary;  Surgeon: Janette Ernandez MD;  Location: [facility] CATH LAB;  Service: Cardiology;  Laterality: N/A;    PERCUTANEOUS CORONARY INTERVENTION, ARTERY N/A 10/26/2023    Procedure: Percutaneous coronary intervention- with Impella;  Surgeon: Janette Ernandez MD;  Location: [facility] CATH LAB;  Service: Cardiology;   Laterality: N/A;    PLACEMENT, IABP  10/24/2023    Procedure: Placement, IABP;  Surgeon: Janette Ernandez MD;  Location: [facility] CATH LAB;  Service: Cardiology;;     History reviewed. No pertinent family history.  Social History     Tobacco Use    Smoking status: Every Day     Current packs/day: 0.25     Average packs/day: 0.3 packs/day for 50.1 years (12.5 ttl pk-yrs)     Types: Vaping with nicotine, Cigarettes     Start date: 1/10/1974    Smokeless tobacco: Former     Types: Chew   Substance Use Topics    Alcohol use: Not Currently     Comment: currently not drinking per pt. 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 Quit drinking in 10/2023.    Drug use: Not Currently     Review of Systems   Constitutional: Negative.    HENT: Negative.     Eyes: Negative.    Respiratory: Negative.     Cardiovascular:  Positive for chest pain.   Gastrointestinal: Negative.    Endocrine: Negative.    Genitourinary: Negative.    Musculoskeletal: Negative.    Skin: Negative.    Allergic/Immunologic: Negative.    Neurological:  Positive for dizziness.   Hematological: Negative.    Psychiatric/Behavioral: Negative.         Physical Exam     Initial Vitals [02/21/24 1858]   BP Pulse Resp Temp SpO2   111/65 65 20 97.8 °F (36.6 °C) 95 %      MAP       --         Physical Exam    Vitals reviewed.  Constitutional: He appears well-developed and well-nourished.   HENT:   Head: Normocephalic and atraumatic.   Eyes: EOM are normal. Pupils are equal, round, and reactive to light.   Neck: Neck supple.   Normal range of motion.  Cardiovascular:  Normal rate and normal heart sounds.           Pulmonary/Chest: Breath sounds normal.   Abdominal: Abdomen is soft.   Musculoskeletal:         General: Normal range of motion.      Cervical back: Normal range of motion and neck supple.     Neurological: He is alert and oriented to person, place, and time. He has normal reflexes. GCS score is 15. GCS eye subscore is 4. GCS verbal subscore is 5. GCS motor subscore is 6.   Skin: Skin  is warm.   Psychiatric: He has a normal mood and affect.         ED Course   Procedures  Labs Reviewed   COMPREHENSIVE METABOLIC PANEL - Abnormal; Notable for the following components:       Result Value    Carbon Dioxide 19 (*)     Blood Urea Nitrogen 22.0 (*)     Creatinine 1.66 (*)     Globulin 4.0 (*)     Anion Gap 15.0 (*)     All other components within normal limits   CBC WITH DIFFERENTIAL - Abnormal; Notable for the following components:    RBC 3.84 (*)     Hgb 11.1 (*)     Hct 34.5 (*)     All other components within normal limits   MAGNESIUM - Normal   TROPONIN I - Normal   CBC W/ AUTO DIFFERENTIAL    Narrative:     The following orders were created for panel order CBC auto differential.  Procedure                               Abnormality         Status                     ---------                               -----------         ------                     CBC with Differential[[id number]]       Abnormal            Final result                 Please view results for these tests on the individual orders.     EKG Readings: (Independently Interpreted)   Initial Reading: No STEMI. Ectopy: No Ectopy. Conduction: Normal.   Normal sinus rhythm at 64 beats per minute.  Borderline prolonged QT.  nonspecific ST-T changes present.       Imaging Results    None          Medications - No data to display  Medical Decision Making  Amount and/or Complexity of Data Reviewed  Labs: ordered.               Contacted pacemaker company of interrogated pacemaker no alarming events observed or recorded.  Patient asked to follow-up primary care physician as an outpatient.                        Clinical Impression:  Final diagnoses:  [Z95.0] Pacemaker  [R42] Dizziness (Primary)  [R07.9] Chest pain, unspecified type          ED Disposition Condition    Discharge Stable          ED Prescriptions    None       Follow-up Information    None          Alfa Shaffer MD  02/21/24 1951

## 2024-02-23 DIAGNOSIS — D50.9 IRON DEFICIENCY ANEMIA, UNSPECIFIED: Primary | ICD-10-CM

## 2024-02-23 DIAGNOSIS — K70.30 ALCOHOLIC CIRRHOSIS: ICD-10-CM

## 2024-02-23 LAB
OHS QRS DURATION: 98 MS
OHS QTC CALCULATION: 499 MS

## 2024-02-26 ENCOUNTER — LAB VISIT (OUTPATIENT)
Dept: LAB | Facility: HOSPITAL | Age: 64
End: 2024-02-26
Attending: PHYSICIAN ASSISTANT
Payer: MEDICAID

## 2024-02-26 DIAGNOSIS — D64.9 ANEMIA, UNSPECIFIED TYPE: ICD-10-CM

## 2024-02-26 DIAGNOSIS — K70.30 ALCOHOLIC CIRRHOSIS OF LIVER: ICD-10-CM

## 2024-02-26 DIAGNOSIS — D50.9 IRON DEFICIENCY ANEMIA, UNSPECIFIED: Primary | ICD-10-CM

## 2024-02-26 LAB
AFP-TM SERPL-MCNC: 2.5 NG/ML
ALBUMIN SERPL-MCNC: 3.6 G/DL (ref 3.4–4.8)
ALBUMIN/GLOB SERPL: 0.8 RATIO (ref 1.1–2)
ALP SERPL-CCNC: 70 UNIT/L (ref 40–150)
ALT SERPL-CCNC: 12 UNIT/L (ref 0–55)
AST SERPL-CCNC: 17 UNIT/L (ref 5–34)
BASOPHILS # BLD AUTO: 0.03 X10(3)/MCL
BASOPHILS NFR BLD AUTO: 0.5 %
BILIRUB SERPL-MCNC: 0.4 MG/DL
BUN SERPL-MCNC: 20.5 MG/DL (ref 8.4–25.7)
CALCIUM SERPL-MCNC: 8.8 MG/DL (ref 8.8–10)
CHLORIDE SERPL-SCNC: 100 MMOL/L (ref 98–107)
CO2 SERPL-SCNC: 29 MMOL/L (ref 23–31)
CREAT SERPL-MCNC: 1.89 MG/DL (ref 0.73–1.18)
EOSINOPHIL # BLD AUTO: 0.21 X10(3)/MCL (ref 0–0.9)
EOSINOPHIL NFR BLD AUTO: 3.3 %
ERYTHROCYTE [DISTWIDTH] IN BLOOD BY AUTOMATED COUNT: 17 % (ref 11.5–17)
FERRITIN SERPL-MCNC: 78 NG/ML (ref 21.81–274.66)
FOLATE SERPL-MCNC: 17.9 NG/ML (ref 7–31.4)
GFR SERPLBLD CREATININE-BSD FMLA CKD-EPI: 39 MLS/MIN/1.73/M2
GLOBULIN SER-MCNC: 4.3 GM/DL (ref 2.4–3.5)
GLUCOSE SERPL-MCNC: 136 MG/DL (ref 82–115)
HBV CORE IGM SERPL QL IA: NONREACTIVE
HBV SURFACE AB SER-ACNC: 102.91 MIU/ML
HBV SURFACE AB SERPL IA-ACNC: REACTIVE M[IU]/ML
HBV SURFACE AG SERPL QL IA: NONREACTIVE
HCT VFR BLD AUTO: 37.4 % (ref 42–52)
HGB BLD-MCNC: 11.4 G/DL (ref 14–18)
IMM GRANULOCYTES # BLD AUTO: 0.01 X10(3)/MCL (ref 0–0.04)
IMM GRANULOCYTES NFR BLD AUTO: 0.2 %
INR PPP: 1.1
IRON SATN MFR SERPL: 36 % (ref 20–50)
IRON SERPL-MCNC: 96 UG/DL (ref 65–175)
LYMPHOCYTES # BLD AUTO: 1.31 X10(3)/MCL (ref 0.6–4.6)
LYMPHOCYTES NFR BLD AUTO: 20.4 %
MCH RBC QN AUTO: 29.3 PG (ref 27–31)
MCHC RBC AUTO-ENTMCNC: 30.5 G/DL (ref 33–36)
MCV RBC AUTO: 96.1 FL (ref 80–94)
MONOCYTES # BLD AUTO: 0.6 X10(3)/MCL (ref 0.1–1.3)
MONOCYTES NFR BLD AUTO: 9.4 %
NEUTROPHILS # BLD AUTO: 4.25 X10(3)/MCL (ref 2.1–9.2)
NEUTROPHILS NFR BLD AUTO: 66.2 %
PLATELET # BLD AUTO: 215 X10(3)/MCL (ref 130–400)
PMV BLD AUTO: 10.4 FL (ref 7.4–10.4)
POTASSIUM SERPL-SCNC: 4.6 MMOL/L (ref 3.5–5.1)
PROT SERPL-MCNC: 7.9 GM/DL (ref 5.8–7.6)
PROTHROMBIN TIME: 11.4 SECONDS (ref 12.5–14.5)
RBC # BLD AUTO: 3.89 X10(6)/MCL (ref 4.7–6.1)
SODIUM SERPL-SCNC: 137 MMOL/L (ref 136–145)
TIBC SERPL-MCNC: 169 UG/DL (ref 69–240)
TIBC SERPL-MCNC: 265 UG/DL (ref 250–450)
TRANSFERRIN SERPL-MCNC: 238 MG/DL (ref 163–344)
VIT B12 SERPL-MCNC: 371 PG/ML (ref 213–816)
WBC # SPEC AUTO: 6.41 X10(3)/MCL (ref 4.5–11.5)

## 2024-02-26 PROCEDURE — 86706 HEP B SURFACE ANTIBODY: CPT

## 2024-02-26 PROCEDURE — 86705 HEP B CORE ANTIBODY IGM: CPT

## 2024-02-26 PROCEDURE — 86708 HEPATITIS A ANTIBODY: CPT

## 2024-02-26 PROCEDURE — 82105 ALPHA-FETOPROTEIN SERUM: CPT

## 2024-02-26 PROCEDURE — 83540 ASSAY OF IRON: CPT

## 2024-02-26 PROCEDURE — 80053 COMPREHEN METABOLIC PANEL: CPT

## 2024-02-26 PROCEDURE — 82728 ASSAY OF FERRITIN: CPT

## 2024-02-26 PROCEDURE — 85025 COMPLETE CBC W/AUTO DIFF WBC: CPT

## 2024-02-26 PROCEDURE — 87340 HEPATITIS B SURFACE AG IA: CPT

## 2024-02-26 PROCEDURE — 85610 PROTHROMBIN TIME: CPT

## 2024-02-26 PROCEDURE — 36415 COLL VENOUS BLD VENIPUNCTURE: CPT

## 2024-02-26 PROCEDURE — 82607 VITAMIN B-12: CPT

## 2024-02-26 PROCEDURE — 82746 ASSAY OF FOLIC ACID SERUM: CPT

## 2024-02-29 LAB — BEAKER SEE SCANNED REPORT: NORMAL

## 2024-03-01 ENCOUNTER — HOSPITAL ENCOUNTER (OUTPATIENT)
Dept: RADIOLOGY | Facility: HOSPITAL | Age: 64
Discharge: HOME | End: 2024-03-01
Attending: PHYSICIAN ASSISTANT
Payer: MEDICARE

## 2024-03-01 ENCOUNTER — TELEPHONE (OUTPATIENT)
Dept: INFECTIOUS DISEASES | Facility: CLINIC | Age: 64
End: 2024-03-01
Payer: MEDICARE

## 2024-03-01 DIAGNOSIS — B18.2 CHRONIC HEPATITIS C WITHOUT HEPATIC COMA: Primary | ICD-10-CM

## 2024-03-01 DIAGNOSIS — K70.30 ALCOHOLIC CIRRHOSIS: ICD-10-CM

## 2024-03-01 DIAGNOSIS — D50.9 IRON DEFICIENCY ANEMIA, UNSPECIFIED: ICD-10-CM

## 2024-03-01 PROCEDURE — 76700 US EXAM ABDOM COMPLETE: CPT | Mod: TC

## 2024-03-01 NOTE — TELEPHONE ENCOUNTER
----- Message from Brianna Pina sent at 2/28/2024  2:20 PM CST -----  Regarding: New Hep C Labs  [facility] PT - ALEJANDRO       Pt is scheduled for a New Hep C appt on 3/28 and will complete labs on 3/1.

## 2024-03-20 DIAGNOSIS — N40.0 BENIGN PROSTATIC HYPERPLASIA, UNSPECIFIED WHETHER LOWER URINARY TRACT SYMPTOMS PRESENT: ICD-10-CM

## 2024-03-20 DIAGNOSIS — K21.9 GASTROESOPHAGEAL REFLUX DISEASE WITHOUT ESOPHAGITIS: ICD-10-CM

## 2024-03-20 RX ORDER — TAMSULOSIN HYDROCHLORIDE 0.4 MG/1
0.4 CAPSULE ORAL DAILY
Qty: 90 CAPSULE | Refills: 1 | Status: SHIPPED | OUTPATIENT
Start: 2024-03-20 | End: 2024-05-28

## 2024-03-20 RX ORDER — PANTOPRAZOLE SODIUM 40 MG/1
40 TABLET, DELAYED RELEASE ORAL 2 TIMES DAILY
Qty: 180 TABLET | Refills: 1 | Status: SHIPPED | OUTPATIENT
Start: 2024-03-20 | End: 2024-05-28

## 2024-03-25 ENCOUNTER — TELEPHONE (OUTPATIENT)
Dept: FAMILY MEDICINE | Facility: CLINIC | Age: 64
End: 2024-03-25
Payer: MEDICARE

## 2024-03-25 DIAGNOSIS — J44.9 CHRONIC OBSTRUCTIVE PULMONARY DISEASE, UNSPECIFIED COPD TYPE: Primary | ICD-10-CM

## 2024-03-25 RX ORDER — TIOTROPIUM BROMIDE AND OLODATEROL 3.124; 2.736 UG/1; UG/1
1 SPRAY, METERED RESPIRATORY (INHALATION) 2 TIMES DAILY
Qty: 12 G | Refills: 0 | Status: SHIPPED | OUTPATIENT
Start: 2024-03-25 | End: 2024-06-04

## 2024-03-25 RX ORDER — ALBUTEROL SULFATE 90 UG/1
1 AEROSOL, METERED RESPIRATORY (INHALATION) EVERY 4 HOURS PRN
Qty: 18 G | Refills: 0 | Status: SHIPPED | OUTPATIENT
Start: 2024-03-25 | End: 2024-06-04 | Stop reason: SDUPTHER

## 2024-03-25 NOTE — TELEPHONE ENCOUNTER
----- Message from Yady Beard sent at 3/25/2024  9:57 AM CDT -----  Type:  RX Refill Request    Who Called: Hemal     Refill or New Rx: refill     RX Name and Strength: SANDOSTATIN LAR DEPOT 20 mg SSRR injection    How is the patient currently taking it? (ex. 1XDay):    Is this a 30 day or 90 day RX:    Preferred Pharmacy with phone number: [facility] or Mail Order:    Ordering Provider:    Would the patient rather a call back or a response via [facility]?     Best Call Back Number: [phone]      Additional Information: needs prior auth

## 2024-03-26 DIAGNOSIS — D64.9 ANEMIA, UNSPECIFIED TYPE: ICD-10-CM

## 2024-03-26 DIAGNOSIS — M79.605 PAIN IN BOTH LOWER EXTREMITIES: ICD-10-CM

## 2024-03-26 DIAGNOSIS — M79.604 PAIN IN BOTH LOWER EXTREMITIES: ICD-10-CM

## 2024-03-26 RX ORDER — FOLIC ACID 1 MG/1
1 TABLET ORAL DAILY
Qty: 90 TABLET | Refills: 1 | Status: SHIPPED | OUTPATIENT
Start: 2024-03-26

## 2024-03-26 RX ORDER — GABAPENTIN 300 MG/1
300 CAPSULE ORAL 3 TIMES DAILY
Qty: 90 CAPSULE | Refills: 2 | Status: SHIPPED | OUTPATIENT
Start: 2024-03-26 | End: 2024-04-01 | Stop reason: SDUPTHER

## 2024-04-01 ENCOUNTER — OFFICE VISIT (OUTPATIENT)
Dept: FAMILY MEDICINE | Facility: CLINIC | Age: 64
End: 2024-04-01
Payer: MEDICAID

## 2024-04-01 VITALS
HEIGHT: 69 IN | HEART RATE: 51 BPM | WEIGHT: 189.5 LBS | SYSTOLIC BLOOD PRESSURE: 122 MMHG | TEMPERATURE: 98 F | DIASTOLIC BLOOD PRESSURE: 65 MMHG | BODY MASS INDEX: 28.07 KG/M2 | OXYGEN SATURATION: 92 %

## 2024-04-01 DIAGNOSIS — I50.9 CONGESTIVE HEART FAILURE, UNSPECIFIED HF CHRONICITY, UNSPECIFIED HEART FAILURE TYPE: ICD-10-CM

## 2024-04-01 DIAGNOSIS — K70.30 ALCOHOLIC CIRRHOSIS, UNSPECIFIED WHETHER ASCITES PRESENT: ICD-10-CM

## 2024-04-01 DIAGNOSIS — I48.91 ATRIAL FIBRILLATION, UNSPECIFIED TYPE: ICD-10-CM

## 2024-04-01 DIAGNOSIS — M79.604 PAIN IN BOTH LOWER EXTREMITIES: ICD-10-CM

## 2024-04-01 DIAGNOSIS — F17.200 TOBACCO DEPENDENCE: ICD-10-CM

## 2024-04-01 DIAGNOSIS — M79.605 PAIN IN BOTH LOWER EXTREMITIES: ICD-10-CM

## 2024-04-01 DIAGNOSIS — Z76.0 MEDICATION REFILL: Primary | ICD-10-CM

## 2024-04-01 PROCEDURE — 1101F PT FALLS ASSESS-DOCD LE1/YR: CPT | Mod: CPTII,,, | Performed by: NURSE PRACTITIONER

## 2024-04-01 PROCEDURE — 99214 OFFICE O/P EST MOD 30 MIN: CPT | Mod: 25,,, | Performed by: NURSE PRACTITIONER

## 2024-04-01 PROCEDURE — 3008F BODY MASS INDEX DOCD: CPT | Mod: CPTII,,, | Performed by: NURSE PRACTITIONER

## 2024-04-01 PROCEDURE — 3078F DIAST BP <80 MM HG: CPT | Mod: CPTII,,, | Performed by: NURSE PRACTITIONER

## 2024-04-01 PROCEDURE — 4010F ACE/ARB THERAPY RXD/TAKEN: CPT | Mod: CPTII,,, | Performed by: NURSE PRACTITIONER

## 2024-04-01 PROCEDURE — 1159F MED LIST DOCD IN RCRD: CPT | Mod: CPTII,,, | Performed by: NURSE PRACTITIONER

## 2024-04-01 PROCEDURE — 3288F FALL RISK ASSESSMENT DOCD: CPT | Mod: CPTII,,, | Performed by: NURSE PRACTITIONER

## 2024-04-01 PROCEDURE — 3074F SYST BP LT 130 MM HG: CPT | Mod: CPTII,,, | Performed by: NURSE PRACTITIONER

## 2024-04-01 PROCEDURE — 99406 BEHAV CHNG SMOKING 3-10 MIN: CPT | Mod: ,,, | Performed by: NURSE PRACTITIONER

## 2024-04-01 RX ORDER — GABAPENTIN 300 MG/1
300 CAPSULE ORAL 3 TIMES DAILY
Qty: 90 CAPSULE | Refills: 2 | Status: SHIPPED | OUTPATIENT
Start: 2024-04-01

## 2024-04-01 RX ORDER — IPRATROPIUM BROMIDE AND ALBUTEROL SULFATE 2.5; .5 MG/3ML; MG/3ML
3 SOLUTION RESPIRATORY (INHALATION) EVERY 6 HOURS PRN
COMMUNITY
Start: 2024-03-22 | End: 2024-04-25 | Stop reason: SDUPTHER

## 2024-04-01 NOTE — PATIENT INSTRUCTIONS
Doug Recio,     If you are due for any health screening(s) below please notify me so we can arrange them to be ordered and scheduled. Most healthy patients at your age complete them, but you are free to accept or refuse.     If you can't do it, I'll definitely understand. If you can, I'd certainly appreciate it!    Tests to Keep You Healthy    Colon Cancer Screening: Met on 6/10/2023  Last Blood Pressure <= 139/89 (4/1/2024): Yes  Tobacco Cessation: NO      Were here to help you quit smoking     Our records indicated that you are still smoking. One of the best things you can do for your health is to stop smoking and we are here to help.     Talk with your provider about our Smoking Cessation Program and how we can support you on your journey.

## 2024-04-01 NOTE — PROGRESS NOTES
SUBJECTIVE:     History of Present Illness      Chief Complaint: Follow-up (2 month )    HPI:  Patient is a 64 y.o. year old male who presents to clinic for 2 month follow-up.  History includes congestive heart failure atrial fibrillation alcohol cirrhosis.  Patient states that he has not drinking as much as before.  He does have upcoming appointment with Hematology and ID for alcoholic cirrhosis.  Patient is still following up with gastro states that he is having trouble received in his Sandostatin injection.  Recommend he follows up with gastro for recommendations    Review of Systems:    Review of Systems    12 point review of systems conducted, negative except as stated in the history of present illness. See HPI for details.     Previous History    Ivette Hearn, CONNIEP  Review of patient's allergies indicates:  No Known Allergies    Past Medical History:   Diagnosis Date    Alcoholic cirrhosis     Anemia     CHF (congestive heart failure)     LV EF 40%    COPD (chronic obstructive pulmonary disease)     Hypertension     Myocardial infarction     Peripheral artery disease     with stents    Sleep apnea      Current Outpatient Medications   Medication Instructions    albuterol (PROVENTIL/VENTOLIN HFA) 90 mcg/actuation inhaler 1 puff, Inhalation, Every 4 hours PRN    albuterol-ipratropium (DUO-NEB) 2.5 mg-0.5 mg/3 mL nebulizer solution 3 mLs, Nebulization, Every 6 hours PRN    amiodarone (PACERONE) 400 mg, Oral, Daily    atorvastatin (LIPITOR) 80 mg, Oral, Daily    docusate sodium (STOOL SOFTENER) 50 mg, Oral, 2 times daily    ENTRESTO 24-26 mg per tablet 1 tablet, Oral, 2 times daily    ferrous sulfate 325 mg, Oral, 2 times daily    folic acid (FOLVITE) 1 mg, Oral, Daily    furosemide (LASIX) 40 mg, Oral, Daily    gabapentin (NEURONTIN) 300 mg, Oral, 3 times daily    metoprolol succinate (TOPROL-XL) 25 MG 24 hr tablet Take ½ ablet (12.5 mg total) by mouth once daily.    pantoprazole (PROTONIX) 40 mg, Oral, 2  times daily    SandoSTATIN LAR [place] 20 mg, Intramuscular, Every 30 days    tamsulosin (FLOMAX) 0.4 mg, Oral, Daily    tiotropium-olodateroL (STIOLTO RESPIMAT) 2.5-2.5 mcg/actuation Mist 1 puff, Oral, 2 times daily     Past Surgical History:   Procedure Laterality Date    ANGIOGRAM, ABDOMINAL AORTA  10/24/2023    Procedure: Angiogram, Abdominal Aorta;  Surgeon: Janette Ernandez MD;  Location: [facility] CATH LAB;  Service: Cardiology;;    ARTERIOGRAPHY OF SUBCLAVIAN ARTERY Left 10/24/2023    Procedure: ARTERIOGRAM, SUBCLAVIAN;  Surgeon: Janette Ernandez MD;  Location: [facility] CATH LAB;  Service: Cardiology;  Laterality: Left;    CAPSULE ENDOSCOPY, ESOPHAGUS THROUGH ILEUM N/A 11/20/2023    Procedure: M2A;  Surgeon: Johan Flower MD;  Location: [facility] ENDOSCOPY;  Service: Gastroenterology;  Laterality: N/A;  To be done at any time today when staff is available.    CARDIAC DEFIBRILLATOR PLACEMENT N/A 11/28/2023    Procedure: Insertion, Single chamber ICD (SJM);  Surgeon: Suyapa Oakes MD;  Location: [facility] CATH LAB;  Service: Cardiology;  Laterality: N/A;    CATHETERIZATION OF BOTH LEFT AND RIGHT HEART N/A 10/24/2023    Procedure: CATHETERIZATION, HEART, BOTH LEFT AND RIGHT;  Surgeon: Janette Ernandez MD;  Location: [facility] CATH LAB;  Service: Cardiology;  Laterality: N/A;    COLONOSCOPY N/A 6/10/2023    Procedure: COLONOSCOPY;  Surgeon: Johan Flower MD;  Location: [facility] OR;  Service: Gastroenterology;  Laterality: N/A;    EGD, WITH CLOSED BIOPSY N/A 12/18/2023    Procedure: EGD;  Surgeon: Johan Flower MD;  Location: [facility] ENDOSCOPY;  Service: Gastroenterology;  Laterality: N/A;    ESOPHAGOGASTRODUODENOSCOPY N/A 6/9/2023    Procedure: EGD;  Surgeon: Tima Teixeira MD;  Location: [facility] ENDOSCOPY;  Service: Gastroenterology;  Laterality: N/A;    ESOPHAGOGASTRODUODENOSCOPY N/A 1/11/2024    Procedure: EGD (ESOPHAGOGASTRODUODENOSCOPY);  Surgeon: Chaya Castellon MD;  Location: [facility];   Service: Endoscopy;  Laterality: N/A;  POST OP: MILD GASTRITIS W/ NO ULCER    INSERTION OF INTRA-AORTIC BALLOON ASSIST DEVICE  10/24/2023    Procedure: INSERTION, INTRA-AORTIC BALLOON PUMP;  Surgeon: Janette Ernandez MD;  Location: [facility] CATH LAB;  Service: Cardiology;;    INSERTION OF INTRAVASCULAR MICROAXIAL BLOOD PUMP N/A 10/26/2023    Procedure: INSERTION, IMPELLA;  Surgeon: Janette Ernandez MD;  Location: [facility] CATH LAB;  Service: Cardiology;  Laterality: N/A;    INSTANTANEOUS WAVE-FREE RATIO (IFR) N/A 10/26/2023    Procedure: Instantaneous Wave-Free Ratio (IFR);  Surgeon: Janette Ernandez MD;  Location: [facility] CATH LAB;  Service: Cardiology;  Laterality: N/A;    INTRAVASCULAR ULTRASOUND, CORONARY N/A 10/26/2023    Procedure: Ultrasound-coronary;  Surgeon: Janette Ernandez MD;  Location: [facility] CATH LAB;  Service: Cardiology;  Laterality: N/A;    PERCUTANEOUS CORONARY INTERVENTION, ARTERY N/A 10/26/2023    Procedure: Percutaneous coronary intervention- with Impella;  Surgeon: Janette Ernandez MD;  Location: [facility] CATH LAB;  Service: Cardiology;  Laterality: N/A;    PLACEMENT, IABP  10/24/2023    Procedure: Placement, IABP;  Surgeon: Janette Ernandez MD;  Location: [facility] CATH LAB;  Service: Cardiology;;     History reviewed. No pertinent family history.    Social History     Tobacco Use    Smoking status: Every Day     Current packs/day: 0.25     Average packs/day: 0.3 packs/day for 50.2 years (12.6 ttl pk-yrs)     Types: Vaping with nicotine, Cigarettes     Start date: 1/10/1974    Smokeless tobacco: Former     Types: Chew   Substance Use Topics    Alcohol use: Not Currently     Comment: currently not drinking per pt. 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 Quit drinking in 10/2023.    Drug use: Not Currently        Health Maintenance      Health Maintenance   Topic Date Due    Shingles Vaccine (1 of 2) Never done    Lipid Panel  12/13/2028    TETANUS VACCINE  12/17/2028    Colorectal Cancer Screening  06/10/2033    Hepatitis C Screening   "Completed    Abdominal Aortic Aneurysm Screening  Completed       OBJECTIVE:     Physical Exam      Vital Signs Reviewed   Visit Vitals  /65   Pulse (!) 51   Temp 97.5 °F (36.4 °C)   Ht 5' 9" (1.753 m)   Wt 86 kg (189 lb 8 oz)   SpO2 (!) 92%   BMI 27.98 kg/m²       Physical Exam    Physical Exam:  General: Alert, well nourished, no acute distress, non-toxic appearing.   Eyes: Anicteric sclera, without conjunctival injection, normal lids, no purulent drainage, EOMs grossly intact.   Ears: No tragal tenderness. Tympanic membranes intact, pearly grey, without effusion or erythema and with a positive light reflex.   Mouth: Posterior pharynx without erythema. No exudate, ulcerations, or lesion. No tonsillar swelling.   Neck: Supple, full ROM, no rigidity, no cervical adenopathy.   Cardio: Normal rate and rhythm    Resp: Respirations even and unlabored, clear to auscultation bilaterally.   Abd: No ecchymosis or distension. Normal bowel sounds in all 4 quadrants. No tenderness to palpation. No rebound tenderness or guarding. No CVA tenderness.   Skin: No rashes or open lesions noted.   MSK: No swelling. No abrasions or signs of trauma. Ambulating without assistance.   Neuro: Alert,oriented No focal deficits noted. Facial expressions even.   Psych: Cooperative, Normal affect      Procedures    Procedures     Labs     Results for orders placed or performed in visit on 03/01/24   Hepatitis C Antibody   Result Value Ref Range    Hep C Ab Interp Reactive (A) Nonreactive   [place] GENERIC ORDERABLE HCVQN   Result Value Ref Range    Vo Generic Orderable SEE COMMENTS        Chemistry:  Lab Results   Component Value Date     02/26/2024    K 4.6 02/26/2024    CHLORIDE 100 02/26/2024    BUN 20.5 02/26/2024    CREATININE 1.89 (H) 02/26/2024    EGFRNORACEVR 39 02/26/2024    GLUCOSE 136 (H) 02/26/2024    CALCIUM 8.8 02/26/2024    ALKPHOS 70 02/26/2024    LABPROT 7.9 (H) 02/26/2024    ALBUMIN 3.6 02/26/2024    BILIDIR 0.2 " 11/21/2023    IBILI 0.20 11/21/2023    AST 17 02/26/2024    ALT 12 02/26/2024    MG 2.10 02/21/2024    PHOS 3.6 11/19/2023    FVCRZHGD48CZ 21.3 (L) 12/13/2023    TSH 3.221 12/13/2023    GFMIQN5ZAOP 0.92 06/07/2023    PSA 0.10 12/13/2023        Lab Results   Component Value Date    HGBA1C 6.1 12/13/2023        Hematology:  Lab Results   Component Value Date    WBC 6.41 02/26/2024    HGB 11.4 (L) 02/26/2024    HCT 37.4 (L) 02/26/2024     02/26/2024       Lipid Panel:  Lab Results   Component Value Date    CHOL 56 12/13/2023    HDL 16 (L) 12/13/2023    LDL 24.00 (L) 12/13/2023    TRIG 79 12/13/2023    TOTALCHOLEST 4 12/13/2023        Urine:  Lab Results   Component Value Date    COLORUA Yellow 12/13/2023    APPEARANCEUA Clear 12/13/2023    SGUA 1.015 12/13/2023    PHUA 7.0 12/13/2023    PROTEINUA Negative 12/13/2023    GLUCOSEUA Negative 12/13/2023    KETONESUA Negative 12/13/2023    BLOODUA Negative 12/13/2023    NITRITESUA Negative 12/13/2023    LEUKOCYTESUR Negative 12/13/2023    RBCUA None Seen 12/13/2023    WBCUA None Seen 12/13/2023    BACTERIA None Seen 12/13/2023    SQEPUA 1-2 12/22/2020    HYALINECASTS 0-2 (A) 12/22/2020    CREATRANDUR 142.7 10/24/2023         Assessment            ICD-10-CM ICD-9-CM   1. Medication refill  Z76.0 V68.1   2. Pain in both lower extremities  M79.604 729.5    M79.605    3. Congestive heart failure, unspecified HF chronicity, unspecified heart failure type  I50.9 428.0   4. Atrial fibrillation, unspecified type  I48.91 427.31   5. Alcoholic cirrhosis, unspecified whether ascites present  K70.30 571.2   6. Tobacco dependence  F17.200 305.1       Plan       1. Pain in both lower extremities    2. Medication refill  - gabapentin (NEURONTIN) 300 MG capsule; Take 1 capsule (300 mg total) by mouth 3 (three) times daily.  Dispense: 90 capsule; Refill: 2    3. Congestive heart failure, unspecified HF chronicity, unspecified heart failure type  Unchanged continue Entresto and  medication management and follow-up per Cardiology  4. Atrial fibrillation, unspecified type  Rate controlled continue amiodarone per Cardiology.  5. Alcoholic cirrhosis, unspecified whether ascites present  Patient pending appointment  6. Tobacco dependence  The patient was counseled on the dangers of tobacco use, and was advised to quit.  Reviewed strategies to maximize success, including removing cigarettes and smoking materials from environment.    Orders Placed This Encounter    gabapentin (NEURONTIN) 300 MG capsule      Medication List with Changes/Refills   Current Medications    ALBUTEROL (PROVENTIL/VENTOLIN HFA) 90 MCG/ACTUATION INHALER    Inhale 1 puff into the lungs every 4 (four) hours as needed for Wheezing.    ALBUTEROL-IPRATROPIUM (DUO-NEB) 2.5 MG-0.5 MG/3 ML NEBULIZER SOLUTION    Take 3 mLs by nebulization every 6 (six) hours as needed for Shortness of Breath or Wheezing.    AMIODARONE (PACERONE) 200 MG TAB    Take 2 tablets (400 mg total) by mouth once daily.    ATORVASTATIN (LIPITOR) 80 MG TABLET    Take 1 tablet (80 mg total) by mouth once daily.    DOCUSATE SODIUM (STOOL SOFTENER) 50 MG CAPSULE    Take 1 capsule (50 mg total) by mouth 2 (two) times daily.    ENTRESTO 24-26 MG PER TABLET    Take 1 tablet by mouth 2 (two) times daily.    FERROUS SULFATE 325 (65 FE) MG EC TABLET    Take 1 tablet (325 mg total) by mouth 2 (two) times daily.    FOLIC ACID (FOLVITE) 1 MG TABLET    Take 1 tablet (1 mg total) by mouth once daily.    FUROSEMIDE (LASIX) 40 MG TABLET    Take 40 mg by mouth once daily.    METOPROLOL SUCCINATE (TOPROL-XL) 25 MG 24 HR TABLET    Take ½ ablet (12.5 mg total) by mouth once daily.    PANTOPRAZOLE (PROTONIX) 40 MG TABLET    Take 1 tablet (40 mg total) by mouth 2 (two) times daily.    SANDOSTATIN LAR DEPOT 20 MG SSRR INJECTION    Inject 20 mg into the muscle every 30 days.    TAMSULOSIN (FLOMAX) 0.4 MG CAP    Take 1 capsule (0.4 mg total) by mouth once daily.     TIOTROPIUM-OLODATEROL (STIOLTO RESPIMAT) 2.5-2.5 MCG/ACTUATION MIST    Take 1 puff by mouth 2 (two) times daily.   Changed and/or Refilled Medications    Modified Medication Previous Medication    GABAPENTIN (NEURONTIN) 300 MG CAPSULE gabapentin (NEURONTIN) 300 MG capsule       Take 1 capsule (300 mg total) by mouth 3 (three) times daily.    Take 1 capsule (300 mg total) by mouth 3 (three) times daily.       Follow up in about 2 months (around 6/1/2024), or if symptoms worsen or fail to improve.   Follow up in about 2 months (around 6/1/2024), or if symptoms worsen or fail to improve. In addition to their scheduled follow up, the patient has also been instructed to follow up on as needed basis.   Future Appointments   Date Time Provider Department Center   4/30/2024 10:40 AM Evan Cochran MD [facility] HEMOM [place]    5/23/2024 10:30 AM Payal Carter FNP [facility] INFDIS [place]    6/3/2024  9:30 AM Ivette Hearn FNP [facility]

## 2024-04-02 ENCOUNTER — EXTERNAL HOME HEALTH (OUTPATIENT)
Dept: HOME HEALTH SERVICES | Facility: HOSPITAL | Age: 64
End: 2024-04-02
Payer: MEDICARE

## 2024-04-03 ENCOUNTER — DOCUMENT SCAN (OUTPATIENT)
Dept: HOME HEALTH SERVICES | Facility: HOSPITAL | Age: 64
End: 2024-04-03
Payer: MEDICARE

## 2024-04-15 PROBLEM — N17.9 ACUTE RENAL FAILURE: Status: RESOLVED | Noted: 2024-01-11 | Resolved: 2024-04-15

## 2024-04-15 PROBLEM — K92.2 GIB (GASTROINTESTINAL BLEEDING): Status: RESOLVED | Noted: 2024-01-11 | Resolved: 2024-04-15

## 2024-04-24 ENCOUNTER — ANESTHESIA (OUTPATIENT)
Dept: ENDOSCOPY | Facility: HOSPITAL | Age: 64
End: 2024-04-24
Payer: MEDICARE

## 2024-04-24 ENCOUNTER — HOSPITAL ENCOUNTER (OUTPATIENT)
Facility: HOSPITAL | Age: 64
Discharge: HOME | End: 2024-04-24
Attending: INTERNAL MEDICINE | Admitting: INTERNAL MEDICINE
Payer: MEDICARE

## 2024-04-24 ENCOUNTER — ANESTHESIA EVENT (OUTPATIENT)
Dept: ENDOSCOPY | Facility: HOSPITAL | Age: 64
End: 2024-04-24
Payer: MEDICARE

## 2024-04-24 VITALS
RESPIRATION RATE: 15 BRPM | OXYGEN SATURATION: 92 % | HEART RATE: 64 BPM | SYSTOLIC BLOOD PRESSURE: 126 MMHG | DIASTOLIC BLOOD PRESSURE: 72 MMHG | TEMPERATURE: 98 F

## 2024-04-24 PROCEDURE — D9220A PRA ANESTHESIA: Mod: CRNA,,,

## 2024-04-24 PROCEDURE — 43235 EGD DIAGNOSTIC BRUSH WASH: CPT | Performed by: INTERNAL MEDICINE

## 2024-04-24 PROCEDURE — 37000008 HC ANESTHESIA 1ST 15 MINUTES: Performed by: INTERNAL MEDICINE

## 2024-04-24 PROCEDURE — 63600175 PHARM REV CODE 636 W HCPCS

## 2024-04-24 PROCEDURE — 27201423 OPTIME MED/SURG SUP & DEVICES STERILE SUPPLY: Performed by: INTERNAL MEDICINE

## 2024-04-24 PROCEDURE — D9220A PRA ANESTHESIA: Mod: ANES,,, | Performed by: STUDENT IN AN ORGANIZED HEALTH CARE EDUCATION/TRAINING PROGRAM

## 2024-04-24 PROCEDURE — 37000009 HC ANESTHESIA EA ADD 15 MINS: Performed by: INTERNAL MEDICINE

## 2024-04-24 PROCEDURE — 25000003 PHARM REV CODE 250

## 2024-04-24 RX ORDER — GLYCOPYRROLATE 0.2 MG/ML
INJECTION INTRAMUSCULAR; INTRAVENOUS
Status: COMPLETED
Start: 2024-04-24 | End: 2024-04-24

## 2024-04-24 RX ORDER — KETAMINE HYDROCHLORIDE 100 MG/ML
INJECTION, SOLUTION INTRAMUSCULAR; INTRAVENOUS
Status: DISCONTINUED | OUTPATIENT
Start: 2024-04-24 | End: 2024-04-24

## 2024-04-24 RX ORDER — GLYCOPYRROLATE 0.2 MG/ML
INJECTION INTRAMUSCULAR; INTRAVENOUS
Status: DISCONTINUED | OUTPATIENT
Start: 2024-04-24 | End: 2024-04-24

## 2024-04-24 RX ORDER — ETOMIDATE 2 MG/ML
INJECTION INTRAVENOUS
Status: DISCONTINUED | OUTPATIENT
Start: 2024-04-24 | End: 2024-04-24

## 2024-04-24 RX ORDER — LIDOCAINE HYDROCHLORIDE 20 MG/ML
INJECTION, SOLUTION EPIDURAL; INFILTRATION; INTRACAUDAL; PERINEURAL
Status: COMPLETED
Start: 2024-04-24 | End: 2024-04-24

## 2024-04-24 RX ORDER — PROPOFOL 10 MG/ML
VIAL (ML) INTRAVENOUS
Status: DISCONTINUED | OUTPATIENT
Start: 2024-04-24 | End: 2024-04-24

## 2024-04-24 RX ORDER — KETAMINE HCL IN 0.9 % NACL 50 MG/5 ML
SYRINGE (ML) INTRAVENOUS
Status: DISCONTINUED
Start: 2024-04-24 | End: 2024-04-24 | Stop reason: HOSPADM

## 2024-04-24 RX ORDER — ONDANSETRON HYDROCHLORIDE 2 MG/ML
INJECTION, SOLUTION INTRAVENOUS
Status: DISCONTINUED | OUTPATIENT
Start: 2024-04-24 | End: 2024-04-24

## 2024-04-24 RX ORDER — PROPOFOL 10 MG/ML
VIAL (ML) INTRAVENOUS
Status: COMPLETED
Start: 2024-04-24 | End: 2024-04-24

## 2024-04-24 RX ORDER — ONDANSETRON HYDROCHLORIDE 2 MG/ML
INJECTION, SOLUTION INTRAVENOUS
Status: COMPLETED
Start: 2024-04-24 | End: 2024-04-24

## 2024-04-24 RX ORDER — LIDOCAINE HYDROCHLORIDE 20 MG/ML
INJECTION, SOLUTION EPIDURAL; INFILTRATION; INTRACAUDAL; PERINEURAL
Status: DISCONTINUED | OUTPATIENT
Start: 2024-04-24 | End: 2024-04-24

## 2024-04-24 RX ADMIN — KETAMINE HYDROCHLORIDE 10 MG: 100 INJECTION INTRAMUSCULAR; INTRAVENOUS at 12:04

## 2024-04-24 RX ADMIN — ONDANSETRON 4 MG: 2 INJECTION INTRAMUSCULAR; INTRAVENOUS at 12:04

## 2024-04-24 RX ADMIN — GLYCOPYRROLATE 0.2 MG: 0.2 INJECTION INTRAMUSCULAR; INTRAVENOUS at 12:04

## 2024-04-24 RX ADMIN — PROPOFOL 20 MG: 10 INJECTION, EMULSION INTRAVENOUS at 12:04

## 2024-04-24 RX ADMIN — ETOMIDATE 2 MG: 2 INJECTION INTRAVENOUS at 12:04

## 2024-04-24 RX ADMIN — SODIUM CHLORIDE, SODIUM GLUCONATE, SODIUM ACETATE, POTASSIUM CHLORIDE AND MAGNESIUM CHLORIDE: 526; 502; 368; 37; 30 INJECTION, SOLUTION INTRAVENOUS at 12:04

## 2024-04-24 RX ADMIN — LIDOCAINE HYDROCHLORIDE 60 MG: 20 INJECTION, SOLUTION INTRAVENOUS at 12:04

## 2024-04-24 NOTE — TRANSFER OF CARE
Anesthesia Transfer of Care Note    Patient: Hemal PALOMO Tomasz Bernal    Procedure(s) Performed: Procedure(s) (LRB):  EGD W/ M2A PLACEMENT (N/A)  M2A (N/A)    Patient location: GI    Anesthesia Type: MAC    Transport from OR: Transported from OR on room air with adequate spontaneous ventilation    Post pain: adequate analgesia    Post assessment: no apparent anesthetic complications    Post vital signs: stable    Level of consciousness: awake, alert and oriented    Nausea/Vomiting: no nausea/vomiting    Complications: none    Transfer of care protocol was followed      Last vitals: Visit Vitals  /70 (BP Location: Right arm, Patient Position: Lying)   Pulse 64   Temp 36.4 °C (97.5 °F) (Oral)   Resp 12   SpO2 95%

## 2024-04-24 NOTE — H&P
History and Physical           HPI:     Patient is a 64 y.o. male anemia and melena w normal egd/colon    PCP:  Ivette Hearn FNP    Review of patient's allergies indicates:  No Known Allergies     Past Medical History:  Past Medical History:   Diagnosis Date    Alcoholic cirrhosis     Anemia     CHF (congestive heart failure)     LV EF 40%    COPD (chronic obstructive pulmonary disease)     Hypertension     Myocardial infarction     Peripheral artery disease     with stents    Sleep apnea       Past Surgical History:  Past Surgical History:   Procedure Laterality Date    ANGIOGRAM, ABDOMINAL AORTA  10/24/2023    Procedure: Angiogram, Abdominal Aorta;  Surgeon: Janette Ernandez MD;  Location: [facility] CATH LAB;  Service: Cardiology;;    ARTERIOGRAPHY OF SUBCLAVIAN ARTERY Left 10/24/2023    Procedure: ARTERIOGRAM, SUBCLAVIAN;  Surgeon: Janette Ernandez MD;  Location: [facility] CATH LAB;  Service: Cardiology;  Laterality: Left;    CAPSULE ENDOSCOPY, ESOPHAGUS THROUGH ILEUM N/A 11/20/2023    Procedure: M2A;  Surgeon: Johan Flower MD;  Location: [facility] ENDOSCOPY;  Service: Gastroenterology;  Laterality: N/A;  To be done at any time today when staff is available.    CARDIAC DEFIBRILLATOR PLACEMENT N/A 11/28/2023    Procedure: Insertion, Single chamber ICD (SJM);  Surgeon: Suyapa Oakes MD;  Location: [facility] CATH LAB;  Service: Cardiology;  Laterality: N/A;    CATHETERIZATION OF BOTH LEFT AND RIGHT HEART N/A 10/24/2023    Procedure: CATHETERIZATION, HEART, BOTH LEFT AND RIGHT;  Surgeon: Janette Ernandez MD;  Location: [facility] CATH LAB;  Service: Cardiology;  Laterality: N/A;    COLONOSCOPY N/A 6/10/2023    Procedure: COLONOSCOPY;  Surgeon: Johan Flower MD;  Location: [facility] OR;  Service: Gastroenterology;  Laterality: N/A;    EGD, WITH CLOSED BIOPSY N/A 12/18/2023    Procedure: EGD;  Surgeon: Johan Flower MD;  Location: [facility] ENDOSCOPY;  Service: Gastroenterology;  Laterality: N/A;     ESOPHAGOGASTRODUODENOSCOPY N/A 6/9/2023    Procedure: EGD;  Surgeon: Tima Teixeira MD;  Location: [facility] ENDOSCOPY;  Service: Gastroenterology;  Laterality: N/A;    ESOPHAGOGASTRODUODENOSCOPY N/A 1/11/2024    Procedure: EGD (ESOPHAGOGASTRODUODENOSCOPY);  Surgeon: Chaya Castellon MD;  Location: [facility];  Service: Endoscopy;  Laterality: N/A;  POST OP: MILD GASTRITIS W/ NO ULCER    INSERTION OF INTRA-AORTIC BALLOON ASSIST DEVICE  10/24/2023    Procedure: INSERTION, INTRA-AORTIC BALLOON PUMP;  Surgeon: Janette Ernandez MD;  Location: [facility] CATH LAB;  Service: Cardiology;;    INSERTION OF INTRAVASCULAR MICROAXIAL BLOOD PUMP N/A 10/26/2023    Procedure: INSERTION, IMPELLA;  Surgeon: Janette Ernandez MD;  Location: [facility] CATH LAB;  Service: Cardiology;  Laterality: N/A;    INSTANTANEOUS WAVE-FREE RATIO (IFR) N/A 10/26/2023    Procedure: Instantaneous Wave-Free Ratio (IFR);  Surgeon: Janette Ernandez MD;  Location: [facility] CATH LAB;  Service: Cardiology;  Laterality: N/A;    INTRAVASCULAR ULTRASOUND, CORONARY N/A 10/26/2023    Procedure: Ultrasound-coronary;  Surgeon: Janette Ernandez MD;  Location: [facility] CATH LAB;  Service: Cardiology;  Laterality: N/A;    PERCUTANEOUS CORONARY INTERVENTION, ARTERY N/A 10/26/2023    Procedure: Percutaneous coronary intervention- with Impella;  Surgeon: Janette Ernandez MD;  Location: [facility] CATH LAB;  Service: Cardiology;  Laterality: N/A;    PLACEMENT, IABP  10/24/2023    Procedure: Placement, IABP;  Surgeon: Janette Ernandez MD;  Location: [facility] CATH LAB;  Service: Cardiology;;      Family History:  No family history on file.  Social History:  Social History     Tobacco Use    Smoking status: Every Day     Current packs/day: 0.25     Average packs/day: 0.3 packs/day for 50.3 years (12.6 ttl pk-yrs)     Types: Vaping with nicotine, Cigarettes     Start date: 1/10/1974    Smokeless tobacco: Former     Types: Chew   Substance Use Topics    Alcohol use: Not Currently     Comment: currently  not drinking per pt. 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 Quit drinking in 10/2023.         Review of Systems:     Review of Systems    Objective:     VITAL SIGNS: 24 HR MIN & MAX LAST    Temp  Min: 98.2 °F (36.8 °C)  Max: 98.2 °F (36.8 °C)  98.2 °F (36.8 °C)        BP  Min: 138/70  Max: 138/70  138/70     Pulse  Min: 62  Max: 62  62     Resp  Min: 20  Max: 20  20    SpO2  Min: 94 %  Max: 94 %  (!) 94 %      Physical Exam      No results found for this or any previous visit (from the past 48 hour(s)).    No results found.    @[facility]@    Assessment /Plan:   anemia and melena w normal egd/colon    Proceed w egd/vce placement  Patient Active Problem List    Diagnosis Date Noted    Acute blood loss anemia 01/11/2024    AVM (arteriovenous malformation) of small bowel, acquired 12/05/2023    Anemia in other chronic diseases classified elsewhere 11/18/2023    Shortness of breath 11/05/2023    Coronary artery disease involving native coronary artery of native heart with unstable angina pectoris 10/30/2023    Abdominal distention 10/27/2023    Cardiogenic shock 10/24/2023    Elevated troponin 10/23/2023    VT (ventricular tachycardia) 10/23/2023    Acute on chronic combined systolic and diastolic heart failure 10/23/2023    Tobacco dependence 10/23/2023    Primary hypertension 10/23/2023    COPD (chronic obstructive pulmonary disease) 10/23/2023    Alcohol abuse 10/23/2023    Alcoholic cirrhosis 06/07/2023    Anemia 06/07/2023    CHF (congestive heart failure) 06/07/2023        Thank you for allowing us to participate in this patient's care.

## 2024-04-24 NOTE — PROVATION PATIENT INSTRUCTIONS
Discharge Summary/Instructions after an Endoscopic Procedure  Patient Name: Hemal Tolbert  Patient MRN: [id number]  Patient YOB: 1960  Wednesday, April 24, 2024  Johan Flower MD  Dear patient,  As a result of recent federal legislation (The Federal Cures Act), you may   receive lab or pathology results from your procedure in your [facility]   account before your physician is able to contact you. Your physician or   their representative will relay the results to you with their   recommendations at their soonest availability.  Thank you,  RESTRICTIONS:  During your procedure today, you received medications for sedation.  These   medications may affect your judgment, balance and coordination.  Therefore,   for 24 hours, you have the following restrictions:   - DO NOT drive a car, operate machinery, make legal/financial decisions,   sign important papers or drink alcohol.    ACTIVITY:  Today: no heavy lifting, straining or running due to procedural   sedation/anesthesia.  The following day: return to full activity including work.  DIET:  Eat and drink normally unless instructed otherwise.     TREATMENT FOR COMMON SIDE EFFECTS:  - Mild abdominal pain, nausea, belching, bloating or excessive gas:  rest,   eat lightly and use a heating pad.  - Sore Throat: treat with throat lozenges and/or gargle with warm salt   water.  - Because air was used during the procedure, expelling large amounts of air   from your rectum or belching is normal.  - If a bowel prep was taken, you may not have a bowel movement for 1-3 days.    This is normal.  SYMPTOMS TO WATCH FOR AND REPORT TO YOUR PHYSICIAN:  1. Abdominal pain or bloating, other than gas cramps.  2. Chest pain.  3. Back pain.  4. Signs of infection such as: chills or fever occurring within 24 hours   after the procedure.  5. Rectal bleeding, which would show as bright red, maroon, or black stools.   (A tablespoon of blood from the rectum is not serious,  especially if   hemorrhoids are present.)  6. Vomiting.  7. Weakness or dizziness.  GO DIRECTLY TO THE NEAREST EMERGENCY ROOM IF YOU HAVE ANY OF THE FOLLOWING:      Difficulty breathing              Chills and/or fever over 101 F   Persistent vomiting and/or vomiting blood   Severe abdominal pain   Severe chest pain   Black, tarry stools   Bleeding- more than one tablespoon   Any other symptom or condition that you feel may need urgent attention  Your doctor recommends these additional instructions:  If any biopsies were taken, your doctors clinic will contact you in 1 to 2   weeks with any results.  - Discharge patient to home.   - we will call w vce results 7-10 days  For questions, problems or results please call your physician - Johan Flower MD at Work:  [phone].  [facility], EMERGENCY ROOM PHONE NUMBER: [phone]  IF A COMPLICATION OR EMERGENCY SITUATION ARISES AND YOU ARE UNABLE TO REACH   YOUR PHYSICIAN - GO DIRECTLY TO THE EMERGENCY ROOM.  MD Johan Guaman MD  4/24/2024 12:37:17 PM  This report has been verified and signed electronically.  Dear patient,  As a result of recent federal legislation (The Federal Cures Act), you may   receive lab or pathology results from your procedure in your [facility]   account before your physician is able to contact you. Your physician or   their representative will relay the results to you with their   recommendations at their soonest availability.  Thank you,  [employer]OVATION

## 2024-04-24 NOTE — ANESTHESIA PREPROCEDURE EVALUATION
04/24/2024  Hemal Tolbert Jr. is a 64 y.o., male.    CHF (EF 20-25%) and alcoholic cirrhosis with COPD states his sats are usually in the high 80s with previous admission in June for GI bleed (tolerated EGD and colonoscopy) and in October for cardiogenic shock.  ICD placed in November with prior history of ventricular fibrillation during cardiogenic shock.    Outpatient EGD eval for massimo in setting of liver cirrhosis    Pre-op Assessment    I have reviewed the Patient Summary Reports.     I have reviewed the Nursing Notes. I have reviewed the NPO Status.   I have reviewed the Medications.     Review of Systems  Anesthesia Hx:  No problems with previous Anesthesia             Denies Family Hx of Anesthesia complications.    Denies Personal Hx of Anesthesia complications.                    Social:  Smoker, Former Smoker       Hematology/Oncology:  Hematology Normal   Oncology Normal                                   EENT/Dental:  EENT/Dental Normal           Cardiovascular:    Pacemaker   Past MI CAD      Angina, with exertion CHF       ECG has been reviewed.          Peripheral Arterial Disease                   Pulmonary:  Pneumonia COPD   Shortness of breath  Sleep Apnea                Renal/:  Chronic Renal Disease, CKD                Hepatic/GI:      Liver Disease,            Musculoskeletal:  Musculoskeletal Normal                Neurological:  Neurology Normal                                      Endocrine:  Endocrine Normal            Dermatological:  Skin Normal    Psych:  Psychiatric Normal                  Physical Exam  General: Well nourished, Cooperative, Alert and Oriented    Airway:  Mallampati: I   Mouth Opening: Normal  TM Distance: Normal  Tongue: Normal  Neck ROM: Normal ROM    Dental:  Edentulous        Anesthesia Plan  Type of Anesthesia, risks & benefits discussed:    Anesthesia Type:  Gen Natural Airway  Intra-op Monitoring Plan: Standard ASA Monitors  Post Op Pain Control Plan: multimodal analgesia  Induction:  IV  Informed Consent: Informed consent signed with the Patient and all parties understand the risks and agree with anesthesia plan.  All questions answered. Patient consented to blood products? Yes  ASA Score: 4 Emergent  Day of Surgery Review of History & Physical: H&P Update referred to the surgeon/provider.H&P completed by Anesthesiologist.    Ready For Surgery From Anesthesia Perspective.     .

## 2024-04-25 DIAGNOSIS — J44.9 CHRONIC OBSTRUCTIVE PULMONARY DISEASE, UNSPECIFIED COPD TYPE: Primary | ICD-10-CM

## 2024-04-25 DIAGNOSIS — I50.9 CONGESTIVE HEART FAILURE, UNSPECIFIED HF CHRONICITY, UNSPECIFIED HEART FAILURE TYPE: ICD-10-CM

## 2024-04-25 DIAGNOSIS — R06.02 SHORTNESS OF BREATH: ICD-10-CM

## 2024-04-25 RX ORDER — IPRATROPIUM BROMIDE AND ALBUTEROL SULFATE 2.5; .5 MG/3ML; MG/3ML
3 SOLUTION RESPIRATORY (INHALATION) EVERY 6 HOURS PRN
Qty: 75 ML | Refills: 2 | Status: SHIPPED | OUTPATIENT
Start: 2024-04-25

## 2024-04-25 NOTE — ANESTHESIA POSTPROCEDURE EVALUATION
Anesthesia Post Evaluation    Patient: Hemal STACIA Tomasz Bernal    Procedure(s) Performed: Procedure(s) (LRB):  EGD W/ M2A PLACEMENT (N/A)  M2A (N/A)    Final Anesthesia Type: general      Patient location during evaluation: GI PACU  Patient participation: Yes- Able to Participate  Level of consciousness: awake and alert  Post-procedure vital signs: reviewed and stable  Pain management: adequate  Airway patency: patent    PONV status at discharge: No PONV  Anesthetic complications: no      Respiratory status: unassisted  Hydration status: euvolemic  Follow-up not needed.              Vitals Value Taken Time   /72 04/24/24 1307   Temp 36.4 °C (97.5 °F) 04/24/24 1243   Pulse 64 04/24/24 1307   Resp 15 04/24/24 1307   SpO2 92 % 04/24/24 1307         No case tracking events are documented in the log.      Pain/Noah Score: Noah Score: 10 (4/24/2024  1:07 PM)

## 2024-04-30 ENCOUNTER — OFFICE VISIT (OUTPATIENT)
Dept: HEMATOLOGY/ONCOLOGY | Facility: CLINIC | Age: 64
End: 2024-04-30
Attending: INTERNAL MEDICINE
Payer: MEDICARE

## 2024-04-30 VITALS
RESPIRATION RATE: 18 BRPM | DIASTOLIC BLOOD PRESSURE: 60 MMHG | TEMPERATURE: 98 F | BODY MASS INDEX: 27.08 KG/M2 | OXYGEN SATURATION: 98 % | WEIGHT: 182.81 LBS | SYSTOLIC BLOOD PRESSURE: 111 MMHG | HEART RATE: 60 BPM | HEIGHT: 69 IN

## 2024-04-30 DIAGNOSIS — E46 PROTEIN-CALORIE MALNUTRITION, UNSPECIFIED SEVERITY: ICD-10-CM

## 2024-04-30 DIAGNOSIS — D50.0 IRON DEFICIENCY ANEMIA DUE TO CHRONIC BLOOD LOSS: Primary | ICD-10-CM

## 2024-04-30 DIAGNOSIS — K70.30 ALCOHOLIC CIRRHOSIS OF LIVER WITHOUT ASCITES: ICD-10-CM

## 2024-04-30 DIAGNOSIS — D64.9 ANEMIA, UNSPECIFIED TYPE: ICD-10-CM

## 2024-04-30 DIAGNOSIS — D64.9 ANEMIA, UNSPECIFIED TYPE: Primary | ICD-10-CM

## 2024-04-30 DIAGNOSIS — Z87.19 H/O: GI BLEED: ICD-10-CM

## 2024-04-30 DIAGNOSIS — D50.0 IRON DEFICIENCY ANEMIA DUE TO CHRONIC BLOOD LOSS: ICD-10-CM

## 2024-04-30 PROBLEM — D50.9 IRON DEFICIENCY ANEMIA: Status: ACTIVE | Noted: 2024-04-30

## 2024-04-30 LAB
BASOPHILS # BLD AUTO: 0.06 X10(3)/MCL
BASOPHILS NFR BLD AUTO: 1 %
EOSINOPHIL # BLD AUTO: 0.06 X10(3)/MCL (ref 0–0.9)
EOSINOPHIL NFR BLD AUTO: 1 %
ERYTHROCYTE [DISTWIDTH] IN BLOOD BY AUTOMATED COUNT: 14.3 % (ref 11.5–17)
FERRITIN SERPL-MCNC: 114.51 NG/ML (ref 21.81–274.66)
FOLATE SERPL-MCNC: 36.3 NG/ML (ref 7–31.4)
HAPTOGLOB SERPL-MCNC: 168 MG/DL (ref 40–368)
HCT VFR BLD AUTO: 37.4 % (ref 42–52)
HGB BLD-MCNC: 12.3 G/DL (ref 14–18)
IGA SERPL-MCNC: 732 MG/DL (ref 101–645)
IGG SERPL-MCNC: 2320 MG/DL (ref 540–1822)
IGM SERPL-MCNC: 50 MG/DL (ref 22–240)
IMM GRANULOCYTES # BLD AUTO: 0.01 X10(3)/MCL (ref 0–0.04)
IMM GRANULOCYTES NFR BLD AUTO: 0.2 %
IRON SATN MFR SERPL: 20 % (ref 20–50)
IRON SERPL-MCNC: 51 UG/DL (ref 65–175)
LDH SERPL-CCNC: 193 U/L (ref 125–220)
LYMPHOCYTES # BLD AUTO: 1.25 X10(3)/MCL (ref 0.6–4.6)
LYMPHOCYTES NFR BLD AUTO: 21.6 %
MCH RBC QN AUTO: 32 PG (ref 27–31)
MCHC RBC AUTO-ENTMCNC: 32.9 G/DL (ref 33–36)
MCV RBC AUTO: 97.4 FL (ref 80–94)
MONOCYTES # BLD AUTO: 0.61 X10(3)/MCL (ref 0.1–1.3)
MONOCYTES NFR BLD AUTO: 10.6 %
NEUTROPHILS # BLD AUTO: 3.79 X10(3)/MCL (ref 2.1–9.2)
NEUTROPHILS NFR BLD AUTO: 65.6 %
NRBC BLD AUTO-RTO: 0 %
PLATELET # BLD AUTO: 224 X10(3)/MCL (ref 130–400)
PMV BLD AUTO: 10.1 FL (ref 7.4–10.4)
RBC # BLD AUTO: 3.84 X10(6)/MCL (ref 4.7–6.1)
TIBC SERPL-MCNC: 200 UG/DL (ref 69–240)
TIBC SERPL-MCNC: 251 UG/DL (ref 250–450)
TRANSFERRIN SERPL-MCNC: 236 MG/DL (ref 163–344)
VIT B12 SERPL-MCNC: 725 PG/ML (ref 213–816)
WBC # SPEC AUTO: 5.78 X10(3)/MCL (ref 4.5–11.5)

## 2024-04-30 PROCEDURE — 82607 VITAMIN B-12: CPT | Performed by: INTERNAL MEDICINE

## 2024-04-30 PROCEDURE — 1160F RVW MEDS BY RX/DR IN RCRD: CPT | Mod: CPTII,,, | Performed by: INTERNAL MEDICINE

## 2024-04-30 PROCEDURE — 3074F SYST BP LT 130 MM HG: CPT | Mod: CPTII,,, | Performed by: INTERNAL MEDICINE

## 2024-04-30 PROCEDURE — 3078F DIAST BP <80 MM HG: CPT | Mod: CPTII,,, | Performed by: INTERNAL MEDICINE

## 2024-04-30 PROCEDURE — 83521 IG LIGHT CHAINS FREE EACH: CPT | Mod: 59 | Performed by: INTERNAL MEDICINE

## 2024-04-30 PROCEDURE — 99205 OFFICE O/P NEW HI 60 MIN: CPT | Mod: S$PBB,,, | Performed by: INTERNAL MEDICINE

## 2024-04-30 PROCEDURE — 82746 ASSAY OF FOLIC ACID SERUM: CPT | Performed by: INTERNAL MEDICINE

## 2024-04-30 PROCEDURE — 83010 ASSAY OF HAPTOGLOBIN QUANT: CPT | Performed by: INTERNAL MEDICINE

## 2024-04-30 PROCEDURE — 85025 COMPLETE CBC W/AUTO DIFF WBC: CPT | Performed by: INTERNAL MEDICINE

## 2024-04-30 PROCEDURE — 3288F FALL RISK ASSESSMENT DOCD: CPT | Mod: CPTII,,, | Performed by: INTERNAL MEDICINE

## 2024-04-30 PROCEDURE — 83615 LACTATE (LD) (LDH) ENZYME: CPT | Performed by: INTERNAL MEDICINE

## 2024-04-30 PROCEDURE — 99215 OFFICE O/P EST HI 40 MIN: CPT | Mod: PBBFAC | Performed by: INTERNAL MEDICINE

## 2024-04-30 PROCEDURE — 82784 ASSAY IGA/IGD/IGG/IGM EACH: CPT | Performed by: INTERNAL MEDICINE

## 2024-04-30 PROCEDURE — 82728 ASSAY OF FERRITIN: CPT | Performed by: INTERNAL MEDICINE

## 2024-04-30 PROCEDURE — 1101F PT FALLS ASSESS-DOCD LE1/YR: CPT | Mod: CPTII,,, | Performed by: INTERNAL MEDICINE

## 2024-04-30 PROCEDURE — 84165 PROTEIN E-PHORESIS SERUM: CPT | Performed by: INTERNAL MEDICINE

## 2024-04-30 PROCEDURE — 4010F ACE/ARB THERAPY RXD/TAKEN: CPT | Mod: CPTII,,, | Performed by: INTERNAL MEDICINE

## 2024-04-30 PROCEDURE — 3008F BODY MASS INDEX DOCD: CPT | Mod: CPTII,,, | Performed by: INTERNAL MEDICINE

## 2024-04-30 PROCEDURE — 83540 ASSAY OF IRON: CPT | Performed by: INTERNAL MEDICINE

## 2024-04-30 PROCEDURE — 36415 COLL VENOUS BLD VENIPUNCTURE: CPT | Performed by: INTERNAL MEDICINE

## 2024-04-30 PROCEDURE — 1159F MED LIST DOCD IN RCRD: CPT | Mod: CPTII,,, | Performed by: INTERNAL MEDICINE

## 2024-04-30 NOTE — Clinical Note
-at this time, we will recheck CBC, serum iron, TIBC, and ferritin; we will also check folic acid level, B12 level, LDH, haptoglobin, SPEP, RODY -further planning, in terms of Hematology, do depend upon test results  -there is no indication of bone marrow biopsy, and he should continue to follow-up with GI for continued evaluation of GI tract for any source of bleeding.  Follow-up in 2 weeks with labs.
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
## 2024-05-01 LAB
ALBUMIN % SPEP (OHS): 41.94
ALBUMIN SERPL-MCNC: 3.4 G/DL (ref 3.4–4.8)
ALBUMIN/GLOB SERPL: 0.7 RATIO (ref 1.1–2)
ALPHA 1 GLOB (OHS): 0.25 GM/DL
ALPHA 1 GLOB% (OHS): 3.1
ALPHA 2 GLOB % (OHS): 9.89
ALPHA 2 GLOB (OHS): 0.8 GM/DL
BETA GLOB (OHS): 1.22 GM/DL
BETA GLOB% (OHS): 15.08
GAMMA GLOBULIN % (OHS): 29.99
GAMMA GLOBULIN (OHS): 2.43 GM/DL
GLOBULIN SER-MCNC: 4.7 GM/DL (ref 2.4–3.5)
KAPPA LC FREE SER-MCNC: 10.4 MG/DL (ref 0.33–1.94)
KAPPA LC FREE/LAMBDA FREE SER: 1.97 {RATIO} (ref 0.26–1.65)
LAMBDA LC FREE SERPL-MCNC: 5.28 MG/DL (ref 0.57–2.63)
M SPIKE % (OHS): ABNORMAL
M SPIKE (OHS): ABNORMAL
PATH REV: NORMAL
PROT SERPL-MCNC: 8.1 GM/DL (ref 5.8–7.6)

## 2024-05-28 DIAGNOSIS — K21.9 GASTROESOPHAGEAL REFLUX DISEASE WITHOUT ESOPHAGITIS: ICD-10-CM

## 2024-05-28 DIAGNOSIS — N40.0 BENIGN PROSTATIC HYPERPLASIA, UNSPECIFIED WHETHER LOWER URINARY TRACT SYMPTOMS PRESENT: ICD-10-CM

## 2024-05-28 RX ORDER — PANTOPRAZOLE SODIUM 40 MG/1
TABLET, DELAYED RELEASE ORAL
Qty: 60 TABLET | Refills: 5 | Status: SHIPPED | OUTPATIENT
Start: 2024-05-28

## 2024-05-28 RX ORDER — TAMSULOSIN HYDROCHLORIDE 0.4 MG/1
1 CAPSULE ORAL
Qty: 30 CAPSULE | Refills: 5 | Status: SHIPPED | OUTPATIENT
Start: 2024-05-28

## 2024-05-30 DIAGNOSIS — D64.9 ANEMIA, UNSPECIFIED TYPE: Primary | ICD-10-CM

## 2024-05-31 ENCOUNTER — TELEPHONE (OUTPATIENT)
Dept: HEMATOLOGY/ONCOLOGY | Facility: CLINIC | Age: 64
End: 2024-05-31
Payer: MEDICARE

## 2024-06-02 PROBLEM — D47.2 MGUS (MONOCLONAL GAMMOPATHY OF UNKNOWN SIGNIFICANCE): Status: ACTIVE | Noted: 2024-06-02

## 2024-06-02 PROBLEM — R76.8 ELEVATED SERUM IMMUNOGLOBULIN FREE LIGHT CHAIN LEVEL: Status: ACTIVE | Noted: 2024-06-02

## 2024-06-03 ENCOUNTER — TELEPHONE (OUTPATIENT)
Dept: HEMATOLOGY/ONCOLOGY | Facility: CLINIC | Age: 64
End: 2024-06-03
Payer: MEDICARE

## 2024-06-03 ENCOUNTER — OFFICE VISIT (OUTPATIENT)
Dept: FAMILY MEDICINE | Facility: CLINIC | Age: 64
End: 2024-06-03
Payer: MEDICARE

## 2024-06-03 VITALS
BODY MASS INDEX: 26.58 KG/M2 | SYSTOLIC BLOOD PRESSURE: 130 MMHG | OXYGEN SATURATION: 99 % | RESPIRATION RATE: 16 BRPM | TEMPERATURE: 98 F | DIASTOLIC BLOOD PRESSURE: 66 MMHG | HEART RATE: 62 BPM | HEIGHT: 68 IN | WEIGHT: 175.38 LBS

## 2024-06-03 DIAGNOSIS — D64.9 ANEMIA, UNSPECIFIED TYPE: ICD-10-CM

## 2024-06-03 DIAGNOSIS — I48.91 ATRIAL FIBRILLATION, UNSPECIFIED TYPE: ICD-10-CM

## 2024-06-03 DIAGNOSIS — K70.30 ALCOHOLIC CIRRHOSIS, UNSPECIFIED WHETHER ASCITES PRESENT: Primary | ICD-10-CM

## 2024-06-03 PROCEDURE — 3288F FALL RISK ASSESSMENT DOCD: CPT | Mod: ,,, | Performed by: NURSE PRACTITIONER

## 2024-06-03 PROCEDURE — 3075F SYST BP GE 130 - 139MM HG: CPT | Mod: ,,, | Performed by: NURSE PRACTITIONER

## 2024-06-03 PROCEDURE — 3008F BODY MASS INDEX DOCD: CPT | Mod: ,,, | Performed by: NURSE PRACTITIONER

## 2024-06-03 PROCEDURE — 1159F MED LIST DOCD IN RCRD: CPT | Mod: ,,, | Performed by: NURSE PRACTITIONER

## 2024-06-03 PROCEDURE — 1101F PT FALLS ASSESS-DOCD LE1/YR: CPT | Mod: ,,, | Performed by: NURSE PRACTITIONER

## 2024-06-03 PROCEDURE — 3078F DIAST BP <80 MM HG: CPT | Mod: ,,, | Performed by: NURSE PRACTITIONER

## 2024-06-03 PROCEDURE — 1160F RVW MEDS BY RX/DR IN RCRD: CPT | Mod: ,,, | Performed by: NURSE PRACTITIONER

## 2024-06-03 PROCEDURE — 99213 OFFICE O/P EST LOW 20 MIN: CPT | Mod: ,,, | Performed by: NURSE PRACTITIONER

## 2024-06-03 PROCEDURE — 4010F ACE/ARB THERAPY RXD/TAKEN: CPT | Mod: ,,, | Performed by: NURSE PRACTITIONER

## 2024-06-03 NOTE — PROGRESS NOTES
SUBJECTIVE:     History of Present Illness      Chief Complaint: Follow-up (2 month follow up visit.  Patient fell 2 days ago at convenient store, hit head, broke glasses, eye swollen (left), cut brow open.  No complaints at this time.)    HPI:  Patient is a 64 y.o. year old male who presents to clinic for follow up     Review of Systems:    Review of Systems    12 point review of systems conducted, negative except as stated in the history of present illness. See HPI for details.     Previous History    Ivette Hearn, CONNIEP  Review of patient's allergies indicates:  No Known Allergies    Past Medical History:   Diagnosis Date    Alcoholic cirrhosis     Anemia     CHF (congestive heart failure)     LV EF 40%    COPD (chronic obstructive pulmonary disease)     Hypertension     Myocardial infarction     Peripheral artery disease     with stents    Sleep apnea      Current Outpatient Medications   Medication Instructions    albuterol (PROVENTIL/VENTOLIN HFA) 90 mcg/actuation inhaler 1 puff, Inhalation, Every 4 hours PRN    albuterol-ipratropium (DUO-NEB) 2.5 mg-0.5 mg/3 mL nebulizer solution 3 mLs, Nebulization, Every 6 hours PRN    amiodarone (PACERONE) 400 mg, Oral, Daily    atorvastatin (LIPITOR) 80 mg, Oral, Daily    docusate sodium (STOOL SOFTENER) 50 mg, Oral, 2 times daily    ENTRESTO 24-26 mg per tablet 1 tablet, Oral, 2 times daily    ferrous sulfate 325 mg, Oral, 2 times daily    folic acid (FOLVITE) 1 mg, Oral, Daily    furosemide (LASIX) 40 mg, Oral, Daily    gabapentin (NEURONTIN) 300 mg, Oral, 3 times daily    metoprolol succinate (TOPROL-XL) 25 MG 24 hr tablet Take ½ ablet (12.5 mg total) by mouth once daily.    pantoprazole (PROTONIX) 40 MG tablet TAKE ONE (1) TABLET BY MOUTH TWICE DAILY    SandoSTATIN LAR Depot 20 mg, Every 30 days    tamsulosin (FLOMAX) 0.4 mg, Oral    tiotropium-olodateroL (STIOLTO RESPIMAT) 2.5-2.5 mcg/actuation Mist 1 puff, Oral, 2 times daily     Past Surgical History:    Procedure Laterality Date    ANGIOGRAM, ABDOMINAL AORTA  10/24/2023    Procedure: Angiogram, Abdominal Aorta;  Surgeon: Janette Ernandez MD;  Location: [facility] CATH LAB;  Service: Cardiology;;    ARTERIOGRAPHY OF SUBCLAVIAN ARTERY Left 10/24/2023    Procedure: ARTERIOGRAM, SUBCLAVIAN;  Surgeon: Janette Ernandez MD;  Location: [facility] CATH LAB;  Service: Cardiology;  Laterality: Left;    CAPSULE ENDOSCOPY, ESOPHAGUS THROUGH ILEUM N/A 11/20/2023    Procedure: M2A;  Surgeon: Johan Flower MD;  Location: [facility] ENDOSCOPY;  Service: Gastroenterology;  Laterality: N/A;  To be done at any time today when staff is available.    CARDIAC DEFIBRILLATOR PLACEMENT N/A 11/28/2023    Procedure: Insertion, Single chamber ICD (SJM);  Surgeon: Suyapa Oakes MD;  Location: [facility] CATH LAB;  Service: Cardiology;  Laterality: N/A;    CATHETERIZATION OF BOTH LEFT AND RIGHT HEART N/A 10/24/2023    Procedure: CATHETERIZATION, HEART, BOTH LEFT AND RIGHT;  Surgeon: Janette Ernandez MD;  Location: [facility] CATH LAB;  Service: Cardiology;  Laterality: N/A;    COLONOSCOPY N/A 6/10/2023    Procedure: COLONOSCOPY;  Surgeon: Johan Flower MD;  Location: [facility] OR;  Service: Gastroenterology;  Laterality: N/A;    EGD, WITH CLOSED BIOPSY N/A 12/18/2023    Procedure: EGD;  Surgeon: Johan Flower MD;  Location: [facility] ENDOSCOPY;  Service: Gastroenterology;  Laterality: N/A;    ESOPHAGOGASTRODUODENOSCOPY N/A 6/9/2023    Procedure: EGD;  Surgeon: Tima Teixeira MD;  Location: [facility] ENDOSCOPY;  Service: Gastroenterology;  Laterality: N/A;    ESOPHAGOGASTRODUODENOSCOPY N/A 1/11/2024    Procedure: EGD (ESOPHAGOGASTRODUODENOSCOPY);  Surgeon: Chaya Castellon MD;  Location: [facility];  Service: Endoscopy;  Laterality: N/A;  POST OP: MILD GASTRITIS W/ NO ULCER    ESOPHAGOGASTRODUODENOSCOPY N/A 4/24/2024    Procedure: EGD W/ M2A PLACEMENT;  Surgeon: Johan Flower MD;  Location: [facility] ENDOSCOPY;  Service:  Gastroenterology;  Laterality: N/A;    INSERTION OF INTRA-AORTIC BALLOON ASSIST DEVICE  10/24/2023    Procedure: INSERTION, INTRA-AORTIC BALLOON PUMP;  Surgeon: Janette Ernandez MD;  Location: [facility] CATH LAB;  Service: Cardiology;;    INSERTION OF INTRAVASCULAR MICROAXIAL BLOOD PUMP N/A 10/26/2023    Procedure: INSERTION, IMPELLA;  Surgeon: Janette Ernandez MD;  Location: [facility] C[facility] LAB;  Service: Cardiology;  Laterality: N/A;    INSTANTANEOUS WAVE-FREE RATIO (IFR) N/A 10/26/2023    Procedure: Instantaneous Wave-Free Ratio (IFR);  Surgeon: Janette Ernandez MD;  Location: [facility] C[facility] LAB;  Service: Cardiology;  Laterality: N/A;    INTRALUMINAL GASTROINTESTINAL TRACT IMAGING VIA CAPSULE N/A 4/24/2024    Procedure: M2A;  Surgeon: Johan Flower MD;  Location: [facility] ENDOSCOPY;  Service: Gastroenterology;  Laterality: N/A;    INTRAVASCULAR ULTRASOUND, CORONARY N/A 10/26/2023    Procedure: Ultrasound-coronary;  Surgeon: Janette Ernandez MD;  Location: [facility] CATH LAB;  Service: Cardiology;  Laterality: N/A;    PERCUTANEOUS CORONARY INTERVENTION, ARTERY N/A 10/26/2023    Procedure: Percutaneous coronary intervention- with Impella;  Surgeon: Janette Ernandez MD;  Location: [facility] C[facility] LAB;  Service: Cardiology;  Laterality: N/A;    PLACEMENT, IABP  10/24/2023    Procedure: Placement, IABP;  Surgeon: Janette Ernandez MD;  Location: [facility] CATH LAB;  Service: Cardiology;;     No family history on file.    Social History     Tobacco Use    Smoking status: Every Day     Current packs/day: 0.25     Average packs/day: 0.3 packs/day for 50.4 years (12.6 ttl pk-yrs)     Types: Vaping with nicotine, Cigarettes     Start date: 1/10/1974    Smokeless tobacco: Former     Types: Chew   Substance Use Topics    Alcohol use: Not Currently     Comment: currently not drinking per pt. 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 Quit drinking in 10/2023.    Drug use: Not Currently        Health Maintenance      Health Maintenance   Topic Date Due    Shingles  "Vaccine (1 of 2) Never done    Lipid Panel  12/13/2028    TETANUS VACCINE  12/17/2028    Colorectal Cancer Screening  06/10/2033    Hepatitis C Screening  Completed    Abdominal Aortic Aneurysm Screening  Completed       OBJECTIVE:     Physical Exam      Vital Signs Reviewed   Visit Vitals  /66 (BP Location: Left arm, Patient Position: Sitting)   Pulse 62   Temp 98 °F (36.7 °C) (Oral)   Resp 16   Ht 5' 8" (1.727 m)   Wt 79.6 kg (175 lb 6.4 oz)   SpO2 99%   BMI 26.67 kg/m²       Physical Exam    Physical Exam:  General: Alert, well nourished, no acute distress, non-toxic appearing.   Eyes: Anicteric sclera, without conjunctival injection, normal lids, no purulent drainage, EOMs grossly intact.   Ears: No tragal tenderness. Tympanic membranes intact, pearly grey, without effusion or erythema and with a positive light reflex.   Mouth: Posterior pharynx without erythema. No exudate, ulcerations, or lesion. No tonsillar swelling.   Neck: Supple, full ROM, no rigidity, no cervical adenopathy.   Cardio: Normal rate and rhythm    Resp: Respirations even and unlabored, clear to auscultation bilaterally.   Abd: No ecchymosis or distension. Normal bowel sounds in all 4 quadrants. No tenderness to palpation. No rebound tenderness or guarding. No CVA tenderness.   Skin: No rashes or open lesions noted.   MSK: No swelling. No abrasions or signs of trauma. Ambulating without assistance.   Neuro: Alert,oriented No focal deficits noted. Facial expressions even.   Psych: Cooperative, Normal affect      Procedures    Procedures     Labs     Results for orders placed or performed in visit on 04/30/24   Haptoglobin   Result Value Ref Range    Haptoglobin 168 40 - 368 mg/dL   Lactate Dehydrogenase   Result Value Ref Range    Lactate Dehydrogenase 193 125 - 220 U/L   Vitamin B12   Result Value Ref Range    Vitamin B12 725 213 - 816 pg/mL   Iron and TIBC   Result Value Ref Range    Iron Binding Capacity Unsaturated 200 69 - 240 ug/dL "    Iron Level 51 (L) 65 - 175 ug/dL    Transferrin 236 163 - 344 mg/dL    Iron Binding Capacity Total 251 250 - 450 ug/dL    Iron Saturation 20 20 - 50 %   Ferritin   Result Value Ref Range    Ferritin Level 114.51 21.81 - 274.66 ng/mL   Folate   Result Value Ref Range    Folate Level 36.3 (H) 7.0 - 31.4 ng/mL   Immunoglobulins (IgG, IgA, IgM) Quantitative   Result Value Ref Range    IgG Level 2,320.00 (H) 540.00 - 1,822.00 mg/dL    IgA Level 732.0 (H) 101.0 - 645.0 mg/dL    IgM Level 50.0 22.0 - 240.0 mg/dL   Immunoglobulin Free LT Chains Blood   Result Value Ref Range    Kappa Free Light Chain 10.4 (H) 0.3300 - 1.94 mg/dL    Lambda Free Light Chain 5.28 (H) 0.5700 - 2.63 mg/dL    Kappa/Lambda FLC Ratio 1.97 (H) 0.2600 - 1.65   Immunofixation & Protein Electrophoresis   Result Value Ref Range    Protein Total 8.1 (H) 5.8 - 7.6 gm/dL    Albumin 3.4 3.4 - 4.8 g/dL    Globulin 4.7 (H) 2.4 - 3.5 gm/dL    Albumin/Globulin Ratio 0.7 (L) 1.1 - 2.0 ratio    Albumin, SPEP 41.94     Alpha 1 Glob % 3.10     Alpha 1 Glob 0.25 gm/dl    Alpha 2 Glob% 9.89     Alpha 2 Glob 0.80 gm/dl    Beta Glob % 15.08     Beta Glob 1.22 gm/dl    Gamma Globulin % 29.99     Gamma Globulin 2.43 gm/dl    M Cody %      M Cody     CBC with Differential   Result Value Ref Range    WBC 5.78 4.50 - 11.50 x10(3)/mcL    RBC 3.84 (L) 4.70 - 6.10 x10(6)/mcL    Hgb 12.3 (L) 14.0 - 18.0 g/dL    Hct 37.4 (L) 42.0 - 52.0 %    MCV 97.4 (H) 80.0 - 94.0 fL    MCH 32.0 (H) 27.0 - 31.0 pg    MCHC 32.9 (L) 33.0 - 36.0 g/dL    RDW 14.3 11.5 - 17.0 %    Platelet 224 130 - 400 x10(3)/mcL    MPV 10.1 7.4 - 10.4 fL    Neut % 65.6 %    Lymph % 21.6 %    Mono % 10.6 %    Eos % 1.0 %    Basophil % 1.0 %    Lymph # 1.25 0.6 - 4.6 x10(3)/mcL    Neut # 3.79 2.1 - 9.2 x10(3)/mcL    Mono # 0.61 0.1 - 1.3 x10(3)/mcL    Eos # 0.06 0 - 0.9 x10(3)/mcL    Baso # 0.06 <=0.2 x10(3)/mcL    IG# 0.01 0 - 0.04 x10(3)/mcL    IG% 0.2 %    NRBC% 0.0 %   Pathologist Interpretation   Result  Value Ref Range    Pathology Review       SPEP: No M-spike indicative of a monoclonal protein is identified.    RODY:  Immunofixation shows a normal pattern of immunoglobulins.    No monoclonal bands are detected.    Robbie Maier M.D.        Chemistry:  Lab Results   Component Value Date     02/26/2024    K 4.6 02/26/2024    BUN 20.5 02/26/2024    CREATININE 1.89 (H) 02/26/2024    EGFRNORACEVR 39 02/26/2024    GLUCOSE 136 (H) 02/26/2024    CALCIUM 8.8 02/26/2024    ALKPHOS 70 02/26/2024    LABPROT 8.1 (H) 04/30/2024    ALBUMIN 3.4 04/30/2024    BILIDIR 0.2 11/21/2023    IBILI 0.20 11/21/2023    AST 17 02/26/2024    ALT 12 02/26/2024    MG 2.10 02/21/2024    PHOS 3.6 11/19/2023    [id number] 21.3 (L) 12/13/2023    TSH 3.221 12/13/2023    LVIXFD5STQA 0.92 06/07/2023    PSA 0.10 12/13/2023        Lab Results   Component Value Date    HGBA1C 6.1 12/13/2023        Hematology:  Lab Results   Component Value Date    WBC 5.78 04/30/2024    HGB 12.3 (L) 04/30/2024    HCT 37.4 (L) 04/30/2024     04/30/2024       Lipid Panel:  Lab Results   Component Value Date    CHOL 56 12/13/2023    HDL 16 (L) 12/13/2023    LDL 24.00 (L) 12/13/2023    TRIG 79 12/13/2023    TOTALCHOLEST 4 12/13/2023        Urine:  Lab Results   Component Value Date    APPEARANCEUA Clear 12/13/2023    SGUA 1.015 12/13/2023    PROTEINUA Negative 12/13/2023    KETONESUA Negative 12/13/2023    LEUKOCYTESUR Negative 12/13/2023    RBCUA None Seen 12/13/2023    WBCUA None Seen 12/13/2023    BACTERIA None Seen 12/13/2023    SQEPUA 1-2 12/22/2020    HYALINECASTS 0-2 (A) 12/22/2020    CREATRANDUR 142.7 10/24/2023         Assessment        No diagnosis found.    Plan       There are no diagnoses linked to this encounter.      Medication List with Changes/Refills   Current Medications    ALBUTEROL (PROVENTIL/VENTOLIN HFA) 90 MCG/ACTUATION INHALER    Inhale 1 puff into the lungs every 4 (four) hours as needed for Wheezing.    ALBUTEROL-IPRATROPIUM  (DUO-NEB) 2.5 MG-0.5 MG/3 ML NEBULIZER SOLUTION    Take 3 mLs by nebulization every 6 (six) hours as needed for Shortness of Breath or Wheezing.    AMIODARONE (PACERONE) 200 MG TAB    Take 2 tablets (400 mg total) by mouth once daily.    ATORVASTATIN (LIPITOR) 80 MG TABLET    Take 1 tablet (80 mg total) by mouth once daily.    DOCUSATE SODIUM (STOOL SOFTENER) 50 MG CAPSULE    Take 1 capsule (50 mg total) by mouth 2 (two) times daily.    ENTRESTO 24-26 MG PER TABLET    Take 1 tablet by mouth 2 (two) times daily.    FERROUS SULFATE 325 (65 FE) MG EC TABLET    Take 1 tablet (325 mg total) by mouth 2 (two) times daily.    FOLIC ACID (FOLVITE) 1 MG TABLET    Take 1 tablet (1 mg total) by mouth once daily.    FUROSEMIDE (LASIX) 40 MG TABLET    Take 40 mg by mouth once daily.    GABAPENTIN (NEURONTIN) 300 MG CAPSULE    Take 1 capsule (300 mg total) by mouth 3 (three) times daily.    METOPROLOL SUCCINATE (TOPROL-XL) 25 MG 24 HR TABLET    Take ½ ablet (12.5 mg total) by mouth once daily.    PANTOPRAZOLE (PROTONIX) 40 MG TABLET    TAKE ONE (1) TABLET BY MOUTH TWICE DAILY    SANDOSTATIN LAR DEPOT 20 MG SSRR INJECTION    Inject 20 mg into the muscle every 30 days.    TAMSULOSIN (FLOMAX) 0.4 MG CAP    TAKE 1 CAPSULE BY MOUTH ONCE DAILY.    TIOTROPIUM-OLODATEROL (STIOLTO RESPIMAT) 2.5-2.5 MCG/ACTUATION MIST    Take 1 puff by mouth 2 (two) times daily.       No follow-ups on file.   No follow-ups on file. In addition to their scheduled follow up, the patient has also been instructed to follow up on as needed basis.   Future Appointments   Date Time Provider Department Center   6/3/2024  1:30 PM NURSE, [facility] HEMATOLOGY ONCOLOGY [facility]    6/3/2024  2:40 PM Evan Cochran MD [facility]    6/25/2024  1:15 PM NURSE, [facility] HEMATOLOGY ONCOLOGY [facility]    6/25/2024  2:20 PM Evan Cochran MD [facility]

## 2024-06-03 NOTE — PROGRESS NOTES
SUBJECTIVE:     History of Present Illness      Chief Complaint: Follow-up (2 month follow up visit.  Patient fell 2 days ago at convenient store, hit head, broke glasses, eye swollen (left), cut brow open.  No complaints at this time.)    HPI:  Patient is a 64 y.o. year old male who presents to clinic for     Review of Systems:    Review of Systems    12 point review of systems conducted, negative except as stated in the history of present illness. See HPI for details.     Previous History    Ivette Hearn, CONNIEP  Review of patient's allergies indicates:  No Known Allergies    Past Medical History:   Diagnosis Date    Alcoholic cirrhosis     Anemia     CHF (congestive heart failure)     LV EF 40%    COPD (chronic obstructive pulmonary disease)     Hypertension     Myocardial infarction     Peripheral artery disease     with stents    Sleep apnea      Current Outpatient Medications   Medication Instructions    albuterol (PROVENTIL/VENTOLIN HFA) 90 mcg/actuation inhaler 1 puff, Inhalation, Every 4 hours PRN    albuterol-ipratropium (DUO-NEB) 2.5 mg-0.5 mg/3 mL nebulizer solution 3 mLs, Nebulization, Every 6 hours PRN    amiodarone (PACERONE) 400 mg, Oral, Daily    atorvastatin (LIPITOR) 80 mg, Oral, Daily    docusate sodium (STOOL SOFTENER) 50 mg, Oral, 2 times daily    ENTRESTO 24-26 mg per tablet 1 tablet, Oral, 2 times daily    ferrous sulfate 325 mg, Oral, 2 times daily    folic acid (FOLVITE) 1 mg, Oral, Daily    furosemide (LASIX) 40 mg, Oral, Daily    gabapentin (NEURONTIN) 300 mg, Oral, 3 times daily    metoprolol succinate (TOPROL-XL) 25 MG 24 hr tablet Take ½ ablet (12.5 mg total) by mouth once daily.    pantoprazole (PROTONIX) 40 MG tablet TAKE ONE (1) TABLET BY MOUTH TWICE DAILY    SandoSTATIN LAR Depot 20 mg, Every 30 days    tamsulosin (FLOMAX) 0.4 mg, Oral    tiotropium-olodateroL (STIOLTO RESPIMAT) 2.5-2.5 mcg/actuation Mist 1 puff, Oral, 2 times daily     Past Surgical History:   Procedure  Laterality Date    ANGIOGRAM, ABDOMINAL AORTA  10/24/2023    Procedure: Angiogram, Abdominal Aorta;  Surgeon: Janette Ernandez MD;  Location: [facility] CATH LAB;  Service: Cardiology;;    ARTERIOGRAPHY OF SUBCLAVIAN ARTERY Left 10/24/2023    Procedure: ARTERIOGRAM, SUBCLAVIAN;  Surgeon: Janette Ernandez MD;  Location: [facility] CATH LAB;  Service: Cardiology;  Laterality: Left;    CAPSULE ENDOSCOPY, ESOPHAGUS THROUGH ILEUM N/A 11/20/2023    Procedure: M2A;  Surgeon: Johan Flower MD;  Location: [facility] ENDOSCOPY;  Service: Gastroenterology;  Laterality: N/A;  To be done at any time today when staff is available.    CARDIAC DEFIBRILLATOR PLACEMENT N/A 11/28/2023    Procedure: Insertion, Single chamber ICD (SJM);  Surgeon: Suyapa Oakes MD;  Location: [facility] CATH LAB;  Service: Cardiology;  Laterality: N/A;    CATHETERIZATION OF BOTH LEFT AND RIGHT HEART N/A 10/24/2023    Procedure: CATHETERIZATION, HEART, BOTH LEFT AND RIGHT;  Surgeon: Janette Ernandez MD;  Location: [facility] CATH LAB;  Service: Cardiology;  Laterality: N/A;    COLONOSCOPY N/A 6/10/2023    Procedure: COLONOSCOPY;  Surgeon: Johan Flower MD;  Location[facility] OR;  Service: Gastroenterology;  Laterality: N/A;    EGD, WITH CLOSED BIOPSY N/A 12/18/2023    Procedure: EGD;  Surgeon: Johan Flower MD;  Location: [facility] ENDOSCOPY;  Service: Gastroenterology;  Laterality: N/A;    ESOPHAGOGASTRODUODENOSCOPY N/A 6/9/2023    Procedure: EGD;  Surgeon: Tima Teixeira MD;  Location: [facility] ENDOSCOPY;  Service: Gastroenterology;  Laterality: N/A;    ESOPHAGOGASTRODUODENOSCOPY N/A 1/11/2024    Procedure: EGD (ESOPHAGOGASTRODUODENOSCOPY);  Surgeon: Chaya Castellon MD;  Location: [facility];  Service: Endoscopy;  Laterality: N/A;  POST OP: MILD GASTRITIS W/ NO ULCER    ESOPHAGOGASTRODUODENOSCOPY N/A 4/24/2024    Procedure: EGD W/ M2A PLACEMENT;  Surgeon: Johan Flower MD;  Location: [facility] ENDOSCOPY;  Service: Gastroenterology;   Laterality: N/A;    INSERTION OF INTRA-AORTIC BALLOON ASSIST DEVICE  10/24/2023    Procedure: INSERTION, INTRA-AORTIC BALLOON PUMP;  Surgeon: Janette Ernandez MD;  Location: [facility] CATH LAB;  Service: Cardiology;;    INSERTION OF INTRAVASCULAR MICROAXIAL BLOOD PUMP N/A 10/26/2023    Procedure: INSERTION, IMPELLA;  Surgeon: Janette Ernandez MD;  Location: [facility] CATH LAB;  Service: Cardiology;  Laterality: N/A;    INSTANTANEOUS WAVE-FREE RATIO (IFR) N/A 10/26/2023    Procedure: Instantaneous Wave-Free Ratio (IFR);  Surgeon: Janette Ernandez MD;  Location: [facility] CATH LAB;  Service: Cardiology;  Laterality: N/A;    INTRALUMINAL GASTROINTESTINAL TRACT IMAGING VIA CAPSULE N/A 4/24/2024    Procedure: M2A;  Surgeon: Johan Flower MD;  Location: [facility] ENDOSCOPY;  Service: Gastroenterology;  Laterality: N/A;    INTRAVASCULAR ULTRASOUND, CORONARY N/A 10/26/2023    Procedure: Ultrasound-coronary;  Surgeon: Janette Ernandez MD;  Location: [facility] CATH LAB;  Service: Cardiology;  Laterality: N/A;    PERCUTANEOUS CORONARY INTERVENTION, ARTERY N/A 10/26/2023    Procedure: Percutaneous coronary intervention- with Impella;  Surgeon: Janette Ernandez MD;  Location: [facility] CATH LAB;  Service: Cardiology;  Laterality: N/A;    PLACEMENT, IABP  10/24/2023    Procedure: Placement, IABP;  Surgeon: Janette Ernandez MD;  Location: [facility] CATH LAB;  Service: Cardiology;;     No family history on file.    Social History     Tobacco Use    Smoking status: Every Day     Current packs/day: 0.25     Average packs/day: 0.3 packs/day for 50.4 years (12.6 ttl pk-yrs)     Types: Vaping with nicotine, Cigarettes     Start date: 1/10/1974    Smokeless tobacco: Former     Types: Chew   Substance Use Topics    Alcohol use: Not Currently     Comment: currently not drinking per pt. 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 Quit drinking in 10/2023.    Drug use: Not Currently        Health Maintenance      Health Maintenance   Topic Date Due    Shingles Vaccine (1 of 2) Never  "done    Lipid Panel  12/13/2028    TETANUS VACCINE  12/17/2028    Colorectal Cancer Screening  06/10/2033    Hepatitis C Screening  Completed    Abdominal Aortic Aneurysm Screening  Completed       OBJECTIVE:     Physical Exam      Vital Signs Reviewed   Visit Vitals  /66 (BP Location: Left arm, Patient Position: Sitting)   Pulse 62   Temp 98 °F (36.7 °C) (Oral)   Resp 16   Ht 5' 8" (1.727 m)   Wt 79.6 kg (175 lb 6.4 oz)   SpO2 99%   BMI 26.67 kg/m²       Physical Exam    Physical Exam:  General: Alert, well nourished, no acute distress, non-toxic appearing.   Eyes: Anicteric sclera, without conjunctival injection, normal lids, no purulent drainage, EOMs grossly intact.   Ears: No tragal tenderness. Tympanic membranes intact, pearly grey, without effusion or erythema and with a positive light reflex.   Mouth: Posterior pharynx without erythema. No exudate, ulcerations, or lesion. No tonsillar swelling.   Neck: Supple, full ROM, no rigidity, no cervical adenopathy.   Cardio: Normal rate and rhythm    Resp: Respirations even and unlabored, clear to auscultation bilaterally.   Abd: No ecchymosis or distension. Normal bowel sounds in all 4 quadrants. No tenderness to palpation. No rebound tenderness or guarding. No CVA tenderness.   Skin: No rashes or open lesions noted.   MSK: No swelling. No abrasions or signs of trauma. Ambulating without assistance.   Neuro: Alert,oriented No focal deficits noted. Facial expressions even.   Psych: Cooperative, Normal affect      Procedures    Procedures     Labs     Results for orders placed or performed in visit on 04/30/24   Haptoglobin   Result Value Ref Range    Haptoglobin 168 40 - 368 mg/dL   Lactate Dehydrogenase   Result Value Ref Range    Lactate Dehydrogenase 193 125 - 220 U/L   Vitamin B12   Result Value Ref Range    Vitamin B12 725 213 - 816 pg/mL   Iron and TIBC   Result Value Ref Range    Iron Binding Capacity Unsaturated 200 69 - 240 ug/dL    Iron Level 51 (L) " 65 - 175 ug/dL    Transferrin 236 163 - 344 mg/dL    Iron Binding Capacity Total 251 250 - 450 ug/dL    Iron Saturation 20 20 - 50 %   Ferritin   Result Value Ref Range    Ferritin Level 114.51 21.81 - 274.66 ng/mL   Folate   Result Value Ref Range    Folate Level 36.3 (H) 7.0 - 31.4 ng/mL   Immunoglobulins (IgG, IgA, IgM) Quantitative   Result Value Ref Range    IgG Level 2,320.00 (H) 540.00 - 1,822.00 mg/dL    IgA Level 732.0 (H) 101.0 - 645.0 mg/dL    IgM Level 50.0 22.0 - 240.0 mg/dL   Immunoglobulin Free LT Chains Blood   Result Value Ref Range    Kappa Free Light Chain 10.4 (H) 0.3300 - 1.94 mg/dL    Lambda Free Light Chain 5.28 (H) 0.5700 - 2.63 mg/dL    Kappa/Lambda FLC Ratio 1.97 (H) 0.2600 - 1.65   Immunofixation & Protein Electrophoresis   Result Value Ref Range    Protein Total 8.1 (H) 5.8 - 7.6 gm/dL    Albumin 3.4 3.4 - 4.8 g/dL    Globulin 4.7 (H) 2.4 - 3.5 gm/dL    Albumin/Globulin Ratio 0.7 (L) 1.1 - 2.0 ratio    Albumin, SPEP 41.94     Alpha 1 Glob % 3.10     Alpha 1 Glob 0.25 gm/dl    Alpha 2 Glob% 9.89     Alpha 2 Glob 0.80 gm/dl    Beta Glob % 15.08     Beta Glob 1.22 gm/dl    Gamma Globulin % 29.99     Gamma Globulin 2.43 gm/dl    M Cody %      M Cody     CBC with Differential   Result Value Ref Range    WBC 5.78 4.50 - 11.50 x10(3)/mcL    RBC 3.84 (L) 4.70 - 6.10 x10(6)/mcL    Hgb 12.3 (L) 14.0 - 18.0 g/dL    Hct 37.4 (L) 42.0 - 52.0 %    MCV 97.4 (H) 80.0 - 94.0 fL    MCH 32.0 (H) 27.0 - 31.0 pg    MCHC 32.9 (L) 33.0 - 36.0 g/dL    RDW 14.3 11.5 - 17.0 %    Platelet 224 130 - 400 x10(3)/mcL    MPV 10.1 7.4 - 10.4 fL    Neut % 65.6 %    Lymph % 21.6 %    Mono % 10.6 %    Eos % 1.0 %    Basophil % 1.0 %    Lymph # 1.25 0.6 - 4.6 x10(3)/mcL    Neut # 3.79 2.1 - 9.2 x10(3)/mcL    Mono # 0.61 0.1 - 1.3 x10(3)/mcL    Eos # 0.06 0 - 0.9 x10(3)/mcL    Baso # 0.06 <=0.2 x10(3)/mcL    IG# 0.01 0 - 0.04 x10(3)/mcL    IG% 0.2 %    NRBC% 0.0 %   Pathologist Interpretation   Result Value Ref Range     Pathology Review       SPEP: No M-spike indicative of a monoclonal protein is identified.    RODY:  Immunofixation shows a normal pattern of immunoglobulins.    No monoclonal bands are detected.    Robbie Maier M.D.        Chemistry:  Lab Results   Component Value Date     02/26/2024    K 4.6 02/26/2024    BUN 20.5 02/26/2024    CREATININE 1.89 (H) 02/26/2024    EGFRNORACEVR 39 02/26/2024    GLUCOSE 136 (H) 02/26/2024    CALCIUM 8.8 02/26/2024    ALKPHOS 70 02/26/2024    LABPROT 8.1 (H) 04/30/2024    ALBUMIN 3.4 04/30/2024    BILIDIR 0.2 11/21/2023    IBILI 0.20 11/21/2023    AST 17 02/26/2024    ALT 12 02/26/2024    MG 2.10 02/21/2024    PHOS 3.6 11/19/2023    XPUGYDLE15HN 21.3 (L) 12/13/2023    TSH 3.221 12/13/2023    YOZFSO6GDQX 0.92 06/07/2023    PSA 0.10 12/13/2023        Lab Results   Component Value Date    HGBA1C 6.1 12/13/2023        Hematology:  Lab Results   Component Value Date    WBC 5.78 04/30/2024    HGB 12.3 (L) 04/30/2024    HCT 37.4 (L) 04/30/2024     04/30/2024       Lipid Panel:  Lab Results   Component Value Date    CHOL 56 12/13/2023    HDL 16 (L) 12/13/2023    LDL 24.00 (L) 12/13/2023    TRIG 79 12/13/2023    TOTALCHOLEST 4 12/13/2023        Urine:  Lab Results   Component Value Date    APPEARANCEUA Clear 12/13/2023    SGUA 1.015 12/13/2023    PROTEINUA Negative 12/13/2023    KETONESUA Negative 12/13/2023    LEUKOCYTESUR Negative 12/13/2023    RBCUA None Seen 12/13/2023    WBCUA None Seen 12/13/2023    BACTERIA None Seen 12/13/2023    SQEPUA 1-2 12/22/2020    HYALINECASTS 0-2 (A) 12/22/2020    CREATRANDUR 142.7 10/24/2023         Assessment            ICD-10-CM ICD-9-CM   1. Alcoholic cirrhosis, unspecified whether ascites present  K70.30 571.2   2. Anemia, unspecified type  D64.9 285.9   3. Atrial fibrillation, unspecified type  I48.91 427.31       Plan       1. Alcoholic cirrhosis, unspecified whether ascites present  Stable    Keep routine appoints with gastro   2. Anemia,  unspecified type  Stable appointment with Heme  3. Atrial fibrillation, unspecified type  Rate controlled    Stable cont current meds       Medication List with Changes/Refills   Current Medications    ALBUTEROL (PROVENTIL/VENTOLIN HFA) 90 MCG/ACTUATION INHALER    Inhale 1 puff into the lungs every 4 (four) hours as needed for Wheezing.    ALBUTEROL-IPRATROPIUM (DUO-NEB) 2.5 MG-0.5 MG/3 ML NEBULIZER SOLUTION    Take 3 mLs by nebulization every 6 (six) hours as needed for Shortness of Breath or Wheezing.    AMIODARONE (PACERONE) 200 MG TAB    Take 2 tablets (400 mg total) by mouth once daily.    ATORVASTATIN (LIPITOR) 80 MG TABLET    Take 1 tablet (80 mg total) by mouth once daily.    DOCUSATE SODIUM (STOOL SOFTENER) 50 MG CAPSULE    Take 1 capsule (50 mg total) by mouth 2 (two) times daily.    ENTRESTO 24-26 MG PER TABLET    Take 1 tablet by mouth 2 (two) times daily.    FERROUS SULFATE 325 (65 FE) MG EC TABLET    Take 1 tablet (325 mg total) by mouth 2 (two) times daily.    FOLIC ACID (FOLVITE) 1 MG TABLET    Take 1 tablet (1 mg total) by mouth once daily.    FUROSEMIDE (LASIX) 40 MG TABLET    Take 40 mg by mouth once daily.    GABAPENTIN (NEURONTIN) 300 MG CAPSULE    Take 1 capsule (300 mg total) by mouth 3 (three) times daily.    METOPROLOL SUCCINATE (TOPROL-XL) 25 MG 24 HR TABLET    Take ½ ablet (12.5 mg total) by mouth once daily.    PANTOPRAZOLE (PROTONIX) 40 MG TABLET    TAKE ONE (1) TABLET BY MOUTH TWICE DAILY    SANDOSTATIN LAR DEPOT 20 MG SSRR INJECTION    Inject 20 mg into the muscle every 30 days.    TAMSULOSIN (FLOMAX) 0.4 MG CAP    TAKE 1 CAPSULE BY MOUTH ONCE DAILY.    TIOTROPIUM-OLODATEROL (STIOLTO RESPIMAT) 2.5-2.5 MCG/ACTUATION MIST    Take 1 puff by mouth 2 (two) times daily.       Follow up in about 3 months (around 9/3/2024) for 3 month.   Follow up in about 3 months (around 9/3/2024) for 3 month. In addition to their scheduled follow up, the patient has also been instructed to follow up on as  needed basis.   Future Appointments   Date Time Provider Department Center   6/3/2024  1:30 PM NURSE, [facility]   6/3/2024  2:40 PM [facility]    6/25/2024  1:15 PM [facility]UR[facility], [facility]   6/25/2024  2:20 PM [facility]    9/9/2024  8:15 AM [facility]   12/9/2024  8:30 AM Ivette Hearn FNP [facility]

## 2024-06-03 NOTE — PATIENT INSTRUCTIONS
Doug Recio,     If you are due for any health screening(s) below please notify me so we can arrange them to be ordered and scheduled. Most healthy patients at your age complete them, but you are free to accept or refuse.     If you can't do it, I'll definitely understand. If you can, I'd certainly appreciate it!    Tests to Keep You Healthy    Colon Cancer Screening: Met on 6/10/2023  Last Blood Pressure <= 139/89 (6/3/2024): Yes  Tobacco Cessation: NO      Were here to help you quit smoking     Our records indicated that you are still smoking. One of the best things you can do for your health is to stop smoking and we are here to help.     Talk with your provider about our Smoking Cessation Program and how we can support you on your journey.

## 2024-06-04 DIAGNOSIS — J44.9 CHRONIC OBSTRUCTIVE PULMONARY DISEASE, UNSPECIFIED COPD TYPE: ICD-10-CM

## 2024-06-04 RX ORDER — ALBUTEROL SULFATE 90 UG/1
1 AEROSOL, METERED RESPIRATORY (INHALATION) EVERY 4 HOURS PRN
Qty: 18 G | Refills: 2 | Status: SHIPPED | OUTPATIENT
Start: 2024-06-04

## 2024-06-04 RX ORDER — TIOTROPIUM BROMIDE AND OLODATEROL 3.124; 2.736 UG/1; UG/1
2 SPRAY, METERED RESPIRATORY (INHALATION)
Qty: 4 G | Refills: 4 | Status: SHIPPED | OUTPATIENT
Start: 2024-06-04

## 2024-06-24 ENCOUNTER — TELEPHONE (OUTPATIENT)
Dept: HEMATOLOGY/ONCOLOGY | Facility: CLINIC | Age: 64
End: 2024-06-24
Payer: MEDICARE

## 2024-06-24 DIAGNOSIS — D64.9 ANEMIA, UNSPECIFIED TYPE: Primary | ICD-10-CM

## 2024-06-24 DIAGNOSIS — D69.6 THROMBOCYTOPENIA, UNSPECIFIED: ICD-10-CM

## 2024-06-27 ENCOUNTER — HOSPITAL ENCOUNTER (EMERGENCY)
Facility: HOSPITAL | Age: 64
Discharge: HOME | End: 2024-06-27
Attending: EMERGENCY MEDICINE
Payer: MEDICARE

## 2024-06-27 VITALS
RESPIRATION RATE: 16 BRPM | DIASTOLIC BLOOD PRESSURE: 69 MMHG | BODY MASS INDEX: 25.18 KG/M2 | TEMPERATURE: 98 F | OXYGEN SATURATION: 96 % | SYSTOLIC BLOOD PRESSURE: 125 MMHG | WEIGHT: 170 LBS | HEIGHT: 69 IN | HEART RATE: 60 BPM

## 2024-06-27 DIAGNOSIS — R51.9 NONINTRACTABLE HEADACHE, UNSPECIFIED CHRONICITY PATTERN, UNSPECIFIED HEADACHE TYPE: ICD-10-CM

## 2024-06-27 DIAGNOSIS — E86.0 DEHYDRATION: Primary | ICD-10-CM

## 2024-06-27 DIAGNOSIS — R53.1 WEAKNESS: ICD-10-CM

## 2024-06-27 LAB
ALBUMIN SERPL-MCNC: 3.7 G/DL (ref 3.4–4.8)
ALBUMIN/GLOB SERPL: 0.8 RATIO (ref 1.1–2)
ALP SERPL-CCNC: 73 UNIT/L (ref 40–150)
ALT SERPL-CCNC: 61 UNIT/L (ref 0–55)
ANION GAP SERPL CALC-SCNC: 5 MEQ/L
AST SERPL-CCNC: 86 UNIT/L (ref 5–34)
BASOPHILS # BLD AUTO: 0.05 X10(3)/MCL
BASOPHILS NFR BLD AUTO: 0.5 %
BILIRUB SERPL-MCNC: 0.5 MG/DL
BNP BLD-MCNC: 260.1 PG/ML
BUN SERPL-MCNC: 33.8 MG/DL (ref 8.4–25.7)
CALCIUM SERPL-MCNC: 8.9 MG/DL (ref 8.8–10)
CHLORIDE SERPL-SCNC: 108 MMOL/L (ref 98–107)
CO2 SERPL-SCNC: 27 MMOL/L (ref 23–31)
CREAT SERPL-MCNC: 1.86 MG/DL (ref 0.73–1.18)
CREAT/UREA NIT SERPL: 18
EOSINOPHIL # BLD AUTO: 0.11 X10(3)/MCL (ref 0–0.9)
EOSINOPHIL NFR BLD AUTO: 1.1 %
ERYTHROCYTE [DISTWIDTH] IN BLOOD BY AUTOMATED COUNT: 13.4 % (ref 11.5–17)
GFR SERPLBLD CREATININE-BSD FMLA CKD-EPI: 40 ML/MIN/1.73/M2
GLOBULIN SER-MCNC: 4.7 GM/DL (ref 2.4–3.5)
GLUCOSE SERPL-MCNC: 150 MG/DL (ref 82–115)
HCT VFR BLD AUTO: 41.3 % (ref 42–52)
HGB BLD-MCNC: 13.5 G/DL (ref 14–18)
IMM GRANULOCYTES # BLD AUTO: 0.02 X10(3)/MCL (ref 0–0.04)
IMM GRANULOCYTES NFR BLD AUTO: 0.2 %
LYMPHOCYTES # BLD AUTO: 1.22 X10(3)/MCL (ref 0.6–4.6)
LYMPHOCYTES NFR BLD AUTO: 12.6 %
MCH RBC QN AUTO: 32.1 PG (ref 27–31)
MCHC RBC AUTO-ENTMCNC: 32.7 G/DL (ref 33–36)
MCV RBC AUTO: 98.3 FL (ref 80–94)
MONOCYTES # BLD AUTO: 0.72 X10(3)/MCL (ref 0.1–1.3)
MONOCYTES NFR BLD AUTO: 7.5 %
NEUTROPHILS # BLD AUTO: 7.53 X10(3)/MCL (ref 2.1–9.2)
NEUTROPHILS NFR BLD AUTO: 78.1 %
PLATELET # BLD AUTO: 227 X10(3)/MCL (ref 130–400)
PMV BLD AUTO: 10.2 FL (ref 7.4–10.4)
POTASSIUM SERPL-SCNC: 4.8 MMOL/L (ref 3.5–5.1)
PROT SERPL-MCNC: 8.4 GM/DL (ref 5.8–7.6)
RBC # BLD AUTO: 4.2 X10(6)/MCL (ref 4.7–6.1)
SODIUM SERPL-SCNC: 140 MMOL/L (ref 136–145)
WBC # BLD AUTO: 9.65 X10(3)/MCL (ref 4.5–11.5)

## 2024-06-27 PROCEDURE — 83880 ASSAY OF NATRIURETIC PEPTIDE: CPT | Performed by: EMERGENCY MEDICINE

## 2024-06-27 PROCEDURE — 85025 COMPLETE CBC W/AUTO DIFF WBC: CPT | Performed by: EMERGENCY MEDICINE

## 2024-06-27 PROCEDURE — 80053 COMPREHEN METABOLIC PANEL: CPT | Performed by: EMERGENCY MEDICINE

## 2024-06-27 PROCEDURE — 93010 ELECTROCARDIOGRAM REPORT: CPT | Mod: ,,, | Performed by: INTERNAL MEDICINE

## 2024-06-27 PROCEDURE — 93005 ELECTROCARDIOGRAM TRACING: CPT

## 2024-06-27 PROCEDURE — 96360 HYDRATION IV INFUSION INIT: CPT

## 2024-06-27 PROCEDURE — 25000003 PHARM REV CODE 250: Performed by: EMERGENCY MEDICINE

## 2024-06-27 PROCEDURE — 99284 EMERGENCY DEPT VISIT MOD MDM: CPT | Mod: 25

## 2024-06-27 RX ORDER — SODIUM CHLORIDE 9 MG/ML
500 INJECTION, SOLUTION INTRAVENOUS
Status: COMPLETED | OUTPATIENT
Start: 2024-06-27 | End: 2024-06-27

## 2024-06-27 RX ADMIN — SODIUM CHLORIDE 500 ML: 9 INJECTION, SOLUTION INTRAVENOUS at 02:06

## 2024-06-27 NOTE — ED PROVIDER NOTES
Encounter Date: 6/27/2024       History     Chief Complaint   Patient presents with    Weakness     C/o headache, pita arm tingling, SOB and weakness to both legs with low blood pressure at home      This 64-year-old man presents with complaints of diffuse joint aches in his elbows shoulders and knees, headache which is bilateral in his temples, and bilateral arm and leg tingling.  He reports that he had teeth pulled yesterday and they gave him Amoxil and ibuprofen and Norco.  In addition he reports that his blood pressure was low at home this morning.  Also he states he has been having problems having erections.  He has a history of myocardial infarction and CHF as well as peripheral artery disease with stents and had a cardiac defibrillator placed last November.       Review of patient's allergies indicates:  No Known Allergies  Past Medical History:   Diagnosis Date    Alcoholic cirrhosis     Anemia     CHF (congestive heart failure)     LV EF 40%    COPD (chronic obstructive pulmonary disease)     Hypertension     Myocardial infarction     Peripheral artery disease     with stents    Sleep apnea      Past Surgical History:   Procedure Laterality Date    ANGIOGRAM, ABDOMINAL AORTA  10/24/2023    Procedure: Angiogram, Abdominal Aorta;  Surgeon: Janette Ernandez MD;  Location: [facility] CATH LAB;  Service: Cardiology;;    ARTERIOGRAPHY OF SUBCLAVIAN ARTERY Left 10/24/2023    Procedure: ARTERIOGRAM, SUBCLAVIAN;  Surgeon: Janette Ernandez MD;  Location: [facility] CATH LAB;  Service: Cardiology;  Laterality: Left;    CAPSULE ENDOSCOPY, ESOPHAGUS THROUGH ILEUM N/A 11/20/2023    Procedure: M2A;  Surgeon: Johan Flower MD;  Location: [facility] ENDOSCOPY;  Service: Gastroenterology;  Laterality: N/A;  To be done at any time today when staff is available.    CARDIAC DEFIBRILLATOR PLACEMENT N/A 11/28/2023    Procedure: Insertion, Single chamber ICD (SJM);  Surgeon: Suyapa Oakes MD;  Location: [facility] CATH LAB;  Service:  Cardiology;  Laterality: N/A;    CATHETERIZATION OF BOTH LEFT AND RIGHT HEART N/A 10/24/2023    Procedure: CATHETERIZATION, HEART, BOTH LEFT AND RIGHT;  Surgeon: Janette Ernandez MD;  Location: [facility] CATH LAB;  Service: Cardiology;  Laterality: N/A;    COLONOSCOPY N/A 6/10/2023    Procedure: COLONOSCOPY;  Surgeon: Johan Flower MD;  Location: [facility] OR;  Service: Gastroenterology;  Laterality: N/A;    EGD, WITH CLOSED BIOPSY N/A 12/18/2023    Procedure: EGD;  Surgeon: Johan Flower MD;  Location: [facility] ENDOSCOPY;  Service: Gastroenterology;  Laterality: N/A;    ESOPHAGOGASTRODUODENOSCOPY N/A 6/9/2023    Procedure: EGD;  Surgeon: Tima Teixeira MD;  Location: [facility] ENDOSCOPY;  Service: Gastroenterology;  Laterality: N/A;    ESOPHAGOGASTRODUODENOSCOPY N/A 1/11/2024    Procedure: EGD (ESOPHAGOGASTRODUODENOSCOPY);  Surgeon: Chaya Castellon MD;  Location: [facility];  Service: Endoscopy;  Laterality: N/A;  POST OP: MILD GASTRITIS W/ NO ULCER    ESOPHAGOGASTRODUODENOSCOPY N/A 4/24/2024    Procedure: EGD W/ M2A PLACEMENT;  Surgeon: Johan Flower MD;  Location: [facility] ENDOSCOPY;  Service: Gastroenterology;  Laterality: N/A;    INSERTION OF INTRA-AORTIC BALLOON ASSIST DEVICE  10/24/2023    Procedure: INSERTION, INTRA-AORTIC BALLOON PUMP;  Surgeon: Janette Ernandez MD;  Location: [facility] CATH LAB;  Service: Cardiology;;    INSERTION OF INTRAVASCULAR MICROAXIAL BLOOD PUMP N/A 10/26/2023    Procedure: INSERTION, IMPELLA;  Surgeon: Janette Ernandez MD;  Location: [facility] CATH LAB;  Service: Cardiology;  Laterality: N/A;    INSTANTANEOUS WAVE-FREE RATIO (IFR) N/A 10/26/2023    Procedure: Instantaneous Wave-Free Ratio (IFR);  Surgeon: Janette Ernandez MD;  Location: [facility] CATH LAB;  Service: Cardiology;  Laterality: N/A;    INTRALUMINAL GASTROINTESTINAL TRACT IMAGING VIA CAPSULE N/A 4/24/2024    Procedure: M2A;  Surgeon: Johan Flower MD;  Location: [facility] ENDOSCOPY;  Service:  Gastroenterology;  Laterality: N/A;    INTRAVASCULAR ULTRASOUND, CORONARY N/A 10/26/2023    Procedure: Ultrasound-coronary;  Surgeon: Janette Ernandez MD;  Location: [facility] CATH LAB;  Service: Cardiology;  Laterality: N/A;    PERCUTANEOUS CORONARY INTERVENTION, ARTERY N/A 10/26/2023    Procedure: Percutaneous coronary intervention- with Impella;  Surgeon: Janette Ernandez MD;  Location: [facility] CATH LAB;  Service: Cardiology;  Laterality: N/A;    PLACEMENT, IABP  10/24/2023    Procedure: Placement, IABP;  Surgeon: Janette Ernandez MD;  Location: [facility] CATH LAB;  Service: Cardiology;;     No family history on file.  Social History     Tobacco Use    Smoking status: Every Day     Current packs/day: 0.25     Average packs/day: 0.3 packs/day for 50.5 years (12.6 ttl pk-yrs)     Types: Vaping with nicotine, Cigarettes     Start date: 1/10/1974    Smokeless tobacco: Former     Types: Chew   Substance Use Topics    Alcohol use: Not Currently     Comment: currently not drinking per pt. 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 Quit drinking in 10/2023.    Drug use: Not Currently     Review of Systems   Constitutional:  Negative for fever.   HENT:  Negative for sore throat.    Respiratory:  Negative for shortness of breath.    Cardiovascular:  Negative for chest pain.   Gastrointestinal:  Negative for nausea.   Genitourinary:  Negative for dysuria.   Musculoskeletal:  Positive for arthralgias. Negative for back pain.   Skin:  Negative for rash.   Neurological:  Positive for dizziness, numbness and headaches. Negative for weakness.   Hematological:  Does not bruise/bleed easily.       Physical Exam     Initial Vitals [06/27/24 1217]   BP Pulse Resp Temp SpO2   (!) 90/55 68 18 97.9 °F (36.6 °C) (!) 92 %      MAP       --         Physical Exam    Nursing note and vitals reviewed.  Constitutional: He appears well-developed and well-nourished.   HENT:   Head: Normocephalic and atraumatic.   Mouth/Throat: Mucous membranes are normal.   Eyes: EOM are normal.  Pupils are equal, round, and reactive to light.   Neck: Neck supple.   Normal range of motion.  Cardiovascular:  Normal rate, regular rhythm, normal heart sounds and intact distal pulses.           Pulmonary/Chest: Breath sounds normal.   Abdominal: Abdomen is soft. Bowel sounds are normal.   Musculoskeletal:         General: Normal range of motion.      Cervical back: Normal range of motion and neck supple.     Neurological: He is alert and oriented to person, place, and time. He has normal strength.   Skin: Skin is warm and dry. Capillary refill takes less than 2 seconds.   Psychiatric: He has a normal mood and affect. His behavior is normal. Judgment and thought content normal.         ED Course   Procedures  Labs Reviewed   COMPREHENSIVE METABOLIC PANEL - Abnormal; Notable for the following components:       Result Value    Chloride 108 (*)     Glucose 150 (*)     Blood Urea Nitrogen 33.8 (*)     Creatinine 1.86 (*)     Protein Total 8.4 (*)     Globulin 4.7 (*)     Albumin/Globulin Ratio 0.8 (*)     ALT 61 (*)     AST 86 (*)     All other components within normal limits   B-TYPE NATRIURETIC PEPTIDE - Abnormal; Notable for the following components:    Natriuretic Peptide 260.1 (*)     All other components within normal limits   CBC WITH DIFFERENTIAL - Abnormal; Notable for the following components:    RBC 4.20 (*)     Hgb 13.5 (*)     Hct 41.3 (*)     MCV 98.3 (*)     MCH 32.1 (*)     MCHC 32.7 (*)     All other components within normal limits   CBC W/ AUTO DIFFERENTIAL    Narrative:     The following orders were created for panel order CBC auto differential.  Procedure                               Abnormality         Status                     ---------                               -----------         ------                     CBC with Differential[[id number]]       Abnormal            Final result                 Please view results for these tests on the individual orders.     EKG Readings: (Independently  Interpreted)   Rhythm: Normal Sinus Rhythm. Heart Rate: 64. Ectopy: No Ectopy. Conduction: Normal. ST Segments: Normal ST Segments. T Waves: Normal. Axis: Normal. Other Findings: Prolonged QT Interval.   6/27/24 1242       Imaging Results    None          Medications   0.9%  NaCl infusion (500 mLs Intravenous New Bag 6/27/24 1419)     Medical Decision Making  Amount and/or Complexity of Data Reviewed  Labs: ordered.    Risk  Prescription drug management.                                      Clinical Impression:  Final diagnoses:  [R53.1] Weakness  [E86.0] Dehydration (Primary)  [R51.9] Nonintractable headache, unspecified chronicity pattern, unspecified headache type          ED Disposition Condition    Discharge Stable          ED Prescriptions    None       Follow-up Information       Follow up With Specialties Details Why Contact Info    Ivette Hearn, FNP Family Medicine Schedule an appointment as soon as possible for a visit  As needed [address]  [facility] [address]  [phone]               Chavez Pinto MD  06/27/24 1416

## 2024-06-28 ENCOUNTER — PATIENT OUTREACH (OUTPATIENT)
Dept: EMERGENCY MEDICINE | Facility: HOSPITAL | Age: 64
End: 2024-06-28
Payer: MEDICARE

## 2024-06-28 DIAGNOSIS — D64.9 ANEMIA, UNSPECIFIED TYPE: Primary | ICD-10-CM

## 2024-06-28 DIAGNOSIS — F10.10 ALCOHOL ABUSE: ICD-10-CM

## 2024-06-28 NOTE — PROGRESS NOTES
Patient declined assistance making a follow-up appointment with MARCELINO Hearn PCP at this time.  The patient is agreeable to a follow-up call the week of 7/9/24.

## 2024-06-30 LAB
OHS QRS DURATION: 100 MS
OHS QTC CALCULATION: 513 MS

## 2024-07-03 ENCOUNTER — TELEPHONE (OUTPATIENT)
Dept: HEMATOLOGY/ONCOLOGY | Facility: CLINIC | Age: 64
End: 2024-07-03
Payer: MEDICARE

## 2024-07-05 ENCOUNTER — TELEPHONE (OUTPATIENT)
Dept: HEMATOLOGY/ONCOLOGY | Facility: CLINIC | Age: 64
End: 2024-07-05
Payer: MEDICARE

## 2024-07-09 ENCOUNTER — PATIENT OUTREACH (OUTPATIENT)
Dept: EMERGENCY MEDICINE | Facility: HOSPITAL | Age: 64
End: 2024-07-09
Payer: MEDICARE

## 2024-08-07 DIAGNOSIS — Z76.0 MEDICATION REFILL: ICD-10-CM

## 2024-08-07 RX ORDER — GABAPENTIN 300 MG/1
300 CAPSULE ORAL 3 TIMES DAILY
Qty: 90 CAPSULE | Refills: 2 | Status: SHIPPED | OUTPATIENT
Start: 2024-08-07

## 2024-08-19 ENCOUNTER — OFFICE VISIT (OUTPATIENT)
Dept: FAMILY MEDICINE | Facility: CLINIC | Age: 64
End: 2024-08-19
Payer: MEDICARE

## 2024-08-19 VITALS
DIASTOLIC BLOOD PRESSURE: 62 MMHG | HEART RATE: 62 BPM | BODY MASS INDEX: 25.28 KG/M2 | HEIGHT: 69 IN | TEMPERATURE: 98 F | OXYGEN SATURATION: 94 % | SYSTOLIC BLOOD PRESSURE: 110 MMHG | WEIGHT: 170.69 LBS

## 2024-08-19 DIAGNOSIS — N17.9 AKI (ACUTE KIDNEY INJURY): Primary | ICD-10-CM

## 2024-08-19 DIAGNOSIS — I50.9 CONGESTIVE HEART FAILURE, UNSPECIFIED HF CHRONICITY, UNSPECIFIED HEART FAILURE TYPE: ICD-10-CM

## 2024-08-19 PROCEDURE — 4010F ACE/ARB THERAPY RXD/TAKEN: CPT | Mod: ,,, | Performed by: NURSE PRACTITIONER

## 2024-08-19 PROCEDURE — 3078F DIAST BP <80 MM HG: CPT | Mod: ,,, | Performed by: NURSE PRACTITIONER

## 2024-08-19 PROCEDURE — 3074F SYST BP LT 130 MM HG: CPT | Mod: ,,, | Performed by: NURSE PRACTITIONER

## 2024-08-19 PROCEDURE — 1101F PT FALLS ASSESS-DOCD LE1/YR: CPT | Mod: ,,, | Performed by: NURSE PRACTITIONER

## 2024-08-19 PROCEDURE — 99213 OFFICE O/P EST LOW 20 MIN: CPT | Mod: ,,, | Performed by: NURSE PRACTITIONER

## 2024-08-19 PROCEDURE — 3008F BODY MASS INDEX DOCD: CPT | Mod: ,,, | Performed by: NURSE PRACTITIONER

## 2024-08-19 PROCEDURE — 3288F FALL RISK ASSESSMENT DOCD: CPT | Mod: ,,, | Performed by: NURSE PRACTITIONER

## 2024-08-19 PROCEDURE — 1159F MED LIST DOCD IN RCRD: CPT | Mod: ,,, | Performed by: NURSE PRACTITIONER

## 2024-08-19 NOTE — PROGRESS NOTES
SUBJECTIVE:     History of Present Illness      Chief Complaint: low bp- hospital f/u  (Patient states he feels light headed and dizzy - was told to stop lasix til Monday - he  took it this morning and yesterday. )    HPI:  Patient is a 64 y.o. year old male who presents to clinic for for hospital follow-up for dizziness lightheadedness.  Patient does have a history of congestive heart failure currently taking Entresto and amiodarone per cardiologist.  States that he has been out in the heat most of the day and became weak and dehydrated.  We will recheck CMP patient does have upcoming have appointment with cardiologist  Review of Systems:    Review of Systems    12 point review of systems conducted, negative except as stated in the history of present illness. See HPI for details.     Previous History    Ivette Hearn FNP  Review of patient's allergies indicates:  No Known Allergies    Past Medical History:   Diagnosis Date    Alcoholic cirrhosis     Anemia     CHF (congestive heart failure)     LV EF 40%    COPD (chronic obstructive pulmonary disease)     Hypertension     Myocardial infarction     Peripheral artery disease     with stents    Sleep apnea      Current Outpatient Medications   Medication Instructions    albuterol (PROVENTIL/VENTOLIN HFA) 90 mcg/actuation inhaler 1 puff, Inhalation, Every 4 hours PRN    albuterol-ipratropium (DUO-NEB) 2.5 mg-0.5 mg/3 mL nebulizer solution 3 mLs, Nebulization, Every 6 hours PRN    amiodarone (PACERONE) 400 mg, Oral, Daily    atorvastatin (LIPITOR) 80 mg, Oral, Daily    ENTRESTO 24-26 mg per tablet 1 tablet, Oral, 2 times daily    ferrous sulfate 325 mg, Oral, 2 times daily    folic acid (FOLVITE) 1 mg, Oral, Daily    furosemide (LASIX) 40 mg, Oral, Daily    gabapentin (NEURONTIN) 300 mg, Oral, 3 times daily    metoprolol succinate (TOPROL-XL) 25 MG 24 hr tablet Take ½ ablet (12.5 mg total) by mouth once daily.    pantoprazole (PROTONIX) 40 MG tablet TAKE ONE (1)  TABLET BY MOUTH TWICE DAILY    STIOLTO RESPIMAT 2.5-2.5 mcg/actuation Mist 2 puffs, Inhalation    tamsulosin (FLOMAX) 0.4 mg, Oral     Past Surgical History:   Procedure Laterality Date    ANGIOGRAM, ABDOMINAL AORTA  10/24/2023    Procedure: Angiogram, Abdominal Aorta;  Surgeon: Janette Ernandez MD;  Location: [facility] CATH LAB;  Service: Cardiology;;    ARTERIOGRAPHY OF SUBCLAVIAN ARTERY Left 10/24/2023    Procedure: ARTERIOGRAM, SUBCLAVIAN;  Surgeon: Janette Ernandez MD;  Location: [facility] CATH LAB;  Service: Cardiology;  Laterality: Left;    CAPSULE ENDOSCOPY, ESOPHAGUS THROUGH ILEUM N/A 11/20/2023    Procedure: M2A;  Surgeon: Johan Flower MD;  Location: [facility] ENDOSCOPY;  Service: Gastroenterology;  Laterality: N/A;  To be done at any time today when staff is available.    CARDIAC DEFIBRILLATOR PLACEMENT N/A 11/28/2023    Procedure: Insertion, Single chamber ICD (SJM);  Surgeon: Suyapa Oakes MD;  Location: [facility] CATH LAB;  Service: Cardiology;  Laterality: N/A;    CATHETERIZATION OF BOTH LEFT AND RIGHT HEART N/A 10/24/2023    Procedure: CATHETERIZATION, HEART, BOTH LEFT AND RIGHT;  Surgeon: Janette Ernandez MD;  Location: [facility] CATH LAB;  Service: Cardiology;  Laterality: N/A;    COLONOSCOPY N/A 6/10/2023    Procedure: COLONOSCOPY;  Surgeon: Johan Flower MD;  Location: [facility];  Service: Gastroenterology;  Laterality: N/A;    EGD, WITH CLOSED BIOPSY N/A 12/18/2023    Procedure: EGD;  Surgeon: Johan Flower MD;  Location: [facility] ENDOSCOPY;  Service: Gastroenterology;  Laterality: N/A;    ESOPHAGOGASTRODUODENOSCOPY N/A 6/9/2023    Procedure: EGD;  Surgeon: Tima Teixeira MD;  Location: [facility] ENDOSCOPY;  Service: Gastroenterology;  Laterality: N/A;    ESOPHAGOGASTRODUODENOSCOPY N/A 1/11/2024    Procedure: EGD (ESOPHAGOGASTRODUODENOSCOPY);  Surgeon: Chaya Castellon MD;  Location: [facility];  Service: Endoscopy;  Laterality: N/A;  POST OP: MILD GASTRITIS W/ NO ULCER     ESOPHAGOGASTRODUODENOSCOPY N/A 4/24/2024    Procedure: EGD W/ M2A PLACEMENT;  Surgeon: Johan Flower MD;  Location: [facility] ENDOSCOPY;  Service: Gastroenterology;  Laterality: N/A;    INSERTION OF INTRA-AORTIC BALLOON ASSIST DEVICE  10/24/2023    Procedure: INSERTION, INTRA-AORTIC BALLOON PUMP;  Surgeon: Janette Ernandez MD;  Location: [facility] CATH LAB;  Service: Cardiology;;    INSERTION OF INTRAVASCULAR MICROAXIAL BLOOD PUMP N/A 10/26/2023    Procedure: INSERTION, IMPELLA;  Surgeon: Janette Ernandez MD;  Location: [facility] CATH LAB;  Service: Cardiology;  Laterality: N/A;    INSTANTANEOUS WAVE-FREE RATIO (IFR) N/A 10/26/2023    Procedure: Instantaneous Wave-Free Ratio (IFR);  Surgeon: Janette Ernandez MD;  Location: [facility] CATH LAB;  Service: Cardiology;  Laterality: N/A;    INTRALUMINAL GASTROINTESTINAL TRACT IMAGING VIA CAPSULE N/A 4/24/2024    Procedure: M2A;  Surgeon: Johan Flower MD;  Location: [facility] ENDOSCOPY;  Service: Gastroenterology;  Laterality: N/A;    INTRAVASCULAR ULTRASOUND, CORONARY N/A 10/26/2023    Procedure: Ultrasound-coronary;  Surgeon: Janette Ernandez MD;  Location: [facility] CATH LAB;  Service: Cardiology;  Laterality: N/A;    PERCUTANEOUS CORONARY INTERVENTION, ARTERY N/A 10/26/2023    Procedure: Percutaneous coronary intervention- with Impella;  Surgeon: Janette Ernandez MD;  Location: [facility] CATH LAB;  Service: Cardiology;  Laterality: N/A;    PLACEMENT, IABP  10/24/2023    Procedure: Placement, IABP;  Surgeon: Janette Ernandez MD;  Location: [facility] CATH LAB;  Service: Cardiology;;     No family history on file.    Social History     Tobacco Use    Smoking status: Every Day     Current packs/day: 0.25     Average packs/day: 0.3 packs/day for 50.6 years (12.7 ttl pk-yrs)     Types: Vaping with nicotine, Cigarettes     Start date: 1/10/1974    Smokeless tobacco: Former     Types: Chew   Substance Use Topics    Alcohol use: Not Currently     Comment: currently not drinking per pt.  "Drank for 50 years. Quit drinking in 10/2023.    Drug use: Not Currently        Health Maintenance      Health Maintenance   Topic Date Due    Shingles Vaccine (1 of 2) Never done    Lipid Panel  12/13/2028    TETANUS VACCINE  12/17/2028    Colorectal Cancer Screening  06/10/2033    Hepatitis C Screening  Completed    Abdominal Aortic Aneurysm Screening  Completed       OBJECTIVE:     Physical Exam      Vital Signs Reviewed   Visit Vitals  /62 (Patient Position: Sitting)   Pulse 62   Temp 97.8 °F (36.6 °C)   Ht 5' 9" (1.753 m)   Wt 77.4 kg (170 lb 11.2 oz)   SpO2 (!) 94%   BMI 25.21 kg/m²       Physical Exam    Physical Exam:  General: Alert, well nourished, no acute distress, non-toxic appearing.   Eyes: Anicteric sclera, without conjunctival injection, normal lids, no purulent drainage, EOMs grossly intact.   Ears: No tragal tenderness. Tympanic membranes intact, pearly grey, without effusion or erythema and with a positive light reflex.   Mouth: Posterior pharynx without erythema. No exudate, ulcerations, or lesion. No tonsillar swelling.   Neck: Supple, full ROM, no rigidity, no cervical adenopathy.   Cardio: Normal rate and rhythm    Resp: Respirations even and unlabored, clear to auscultation bilaterally.   Abd: No ecchymosis or distension. Normal bowel sounds in all 4 quadrants. No tenderness to palpation. No rebound tenderness or guarding. No CVA tenderness.   Skin: No rashes or open lesions noted.   MSK: No swelling. No abrasions or signs of trauma. Ambulating without assistance.   Neuro: Alert,oriented No focal deficits noted. Facial expressions even.   Psych: Cooperative, Normal affect      Procedures    Procedures     Labs     Results for orders placed or performed during the hospital encounter of 06/27/24   Comprehensive metabolic panel   Result Value Ref Range    Sodium 140 136 - 145 mmol/L    Potassium 4.8 3.5 - 5.1 mmol/L    Chloride 108 (H) 98 - 107 mmol/L    CO2 27 23 - 31 mmol/L    Glucose " 150 (H) 82 - 115 mg/dL    Blood Urea Nitrogen 33.8 (H) 8.4 - 25.7 mg/dL    Creatinine 1.86 (H) 0.73 - 1.18 mg/dL    Calcium 8.9 8.8 - 10.0 mg/dL    Protein Total 8.4 (H) 5.8 - 7.6 gm/dL    Albumin 3.7 3.4 - 4.8 g/dL    Globulin 4.7 (H) 2.4 - 3.5 gm/dL    Albumin/Globulin Ratio 0.8 (L) 1.1 - 2.0 ratio    Bilirubin Total 0.5 <=1.5 mg/dL    ALP 73 40 - 150 unit/L    ALT 61 (H) 0 - 55 unit/L    AST 86 (H) 5 - 34 unit/L    eGFR 40 mL/min/1.73/m2    Anion Gap 5.0 mEq/L    BUN/Creatinine Ratio 18    Brain natriuretic peptide   Result Value Ref Range    Natriuretic Peptide 260.1 (H) <=100.0 pg/mL   CBC with Differential   Result Value Ref Range    WBC 9.65 4.50 - 11.50 x10(3)/mcL    RBC 4.20 (L) 4.70 - 6.10 x10(6)/mcL    Hgb 13.5 (L) 14.0 - 18.0 g/dL    Hct 41.3 (L) 42.0 - 52.0 %    MCV 98.3 (H) 80.0 - 94.0 fL    MCH 32.1 (H) 27.0 - 31.0 pg    MCHC 32.7 (L) 33.0 - 36.0 g/dL    RDW 13.4 11.5 - 17.0 %    Platelet 227 130 - 400 x10(3)/mcL    MPV 10.2 7.4 - 10.4 fL    Neut % 78.1 %    Lymph % 12.6 %    Mono % 7.5 %    Eos % 1.1 %    Basophil % 0.5 %    Lymph # 1.22 0.6 - 4.6 x10(3)/mcL    Neut # 7.53 2.1 - 9.2 x10(3)/mcL    Mono # 0.72 0.1 - 1.3 x10(3)/mcL    Eos # 0.11 0 - 0.9 x10(3)/mcL    Baso # 0.05 <=0.2 x10(3)/mcL    IG# 0.02 0 - 0.04 x10(3)/mcL    IG% 0.2 %   EKG 12-lead   Result Value Ref Range    QRS Duration 100 ms    OHS QTC Calculation 513 ms       Chemistry:  Lab Results   Component Value Date     06/27/2024    K 4.8 06/27/2024    BUN 33.8 (H) 06/27/2024    CREATININE 1.86 (H) 06/27/2024    EGFRNORACEVR 40 06/27/2024    GLUCOSE 150 (H) 06/27/2024    CALCIUM 8.9 06/27/2024    ALKPHOS 73 06/27/2024    LABPROT 8.4 (H) 06/27/2024    ALBUMIN 3.7 06/27/2024    BILIDIR 0.2 11/21/2023    IBILI 0.20 11/21/2023    AST 86 (H) 06/27/2024    ALT 61 (H) 06/27/2024    MG 2.10 02/21/2024    PHOS 3.6 11/19/2023    [id number] 21.3 (L) 12/13/2023    TSH 3.221 12/13/2023    DQXVRH9ZUFW 0.92 06/07/2023    PSA 0.10 12/13/2023         Lab Results   Component Value Date    HGBA1C 6.1 12/13/2023        Hematology:  Lab Results   Component Value Date    WBC 9.65 06/27/2024    HGB 13.5 (L) 06/27/2024    HCT 41.3 (L) 06/27/2024     06/27/2024       Lipid Panel:  Lab Results   Component Value Date    CHOL 56 12/13/2023    HDL 16 (L) 12/13/2023    LDL 24.00 (L) 12/13/2023    TRIG 79 12/13/2023    TOTALCHOLEST 4 12/13/2023        Urine:  Lab Results   Component Value Date    APPEARANCEUA Clear 12/13/2023    SGUA 1.015 12/13/2023    PROTEINUA Negative 12/13/2023    KETONESUA Negative 12/13/2023    LEUKOCYTESUR Negative 12/13/2023    RBCUA None Seen 12/13/2023    WBCUA None Seen 12/13/2023    BACTERIA None Seen 12/13/2023    SQEPUA 1-2 12/22/2020    HYALINECASTS 0-2 (A) 12/22/2020    CREATRANDUR 142.7 10/24/2023         Assessment            ICD-10-CM ICD-9-CM   1. KARY (acute kidney injury)  N17.9 584.9   2. Congestive heart failure, unspecified HF chronicity, unspecified heart failure type  I50.9 428.0       Plan       1. KARY (acute kidney injury)  - CBC Auto Differential; Future  - Comprehensive Metabolic Panel; Future    2. Congestive heart failure, unspecified HF chronicity, unspecified heart failure type  Unchanged continue current medication and treatment per Cardiology.  Keep routine with cardiologist as recommended.  Orders Placed This Encounter    CBC Auto Differential    Comprehensive Metabolic Panel      Medication List with Changes/Refills   Current Medications    ALBUTEROL (PROVENTIL/VENTOLIN HFA) 90 MCG/ACTUATION INHALER    Inhale 1 puff into the lungs every 4 (four) hours as needed for Wheezing.    ALBUTEROL-IPRATROPIUM (DUO-NEB) 2.5 MG-0.5 MG/3 ML NEBULIZER SOLUTION    Take 3 mLs by nebulization every 6 (six) hours as needed for Shortness of Breath or Wheezing.    AMIODARONE (PACERONE) 200 MG TAB    Take 2 tablets (400 mg total) by mouth once daily.    ATORVASTATIN (LIPITOR) 80 MG TABLET    Take 1 tablet (80 mg total) by mouth  once daily.    ENTRESTO 24-26 MG PER TABLET    Take 1 tablet by mouth 2 (two) times daily.    FERROUS SULFATE 325 (65 FE) MG EC TABLET    Take 1 tablet (325 mg total) by mouth 2 (two) times daily.    FOLIC ACID (FOLVITE) 1 MG TABLET    Take 1 tablet (1 mg total) by mouth once daily.    FUROSEMIDE (LASIX) 40 MG TABLET    Take 40 mg by mouth once daily.    GABAPENTIN (NEURONTIN) 300 MG CAPSULE    TAKE 1 CAPSULE BY MOUTH 3 (THREE) TIMES DAILY.    METOPROLOL SUCCINATE (TOPROL-XL) 25 MG 24 HR TABLET    Take ½ ablet (12.5 mg total) by mouth once daily.    PANTOPRAZOLE (PROTONIX) 40 MG TABLET    TAKE ONE (1) TABLET BY MOUTH TWICE DAILY    STIOLTO RESPIMAT 2.5-2.5 MCG/ACTUATION MIST    INHALE 2 PUFFS BY MOUTH ONCE DAILY    TAMSULOSIN (FLOMAX) 0.4 MG CAP    TAKE 1 CAPSULE BY MOUTH ONCE DAILY.   Discontinued Medications    SANDOSTATIN LAR DEPOT 20 MG SSRR INJECTION    Inject 20 mg into the muscle every 30 days.       No follow-ups on file.   No follow-ups on file. In addition to their scheduled follow up, the patient has also been instructed to follow up on as needed basis.   Future Appointments   Date Time Provider Department Center   9/9/2024  8:15 AM Ivette Hearn FNP [facility]   12/9/2024  8:30 AM Ivette Hearn FNP [facility]

## 2024-08-20 ENCOUNTER — TELEPHONE (OUTPATIENT)
Dept: FAMILY MEDICINE | Facility: CLINIC | Age: 64
End: 2024-08-20
Payer: MEDICAID

## 2024-08-28 ENCOUNTER — LAB VISIT (OUTPATIENT)
Dept: LAB | Facility: HOSPITAL | Age: 64
End: 2024-08-28
Attending: NURSE PRACTITIONER
Payer: MEDICARE

## 2024-08-28 DIAGNOSIS — D64.9 ANEMIA, UNSPECIFIED TYPE: ICD-10-CM

## 2024-08-28 DIAGNOSIS — N17.9 AKI (ACUTE KIDNEY INJURY): ICD-10-CM

## 2024-08-28 LAB
ALBUMIN SERPL-MCNC: 3.6 G/DL (ref 3.4–4.8)
ALBUMIN/GLOB SERPL: 0.8 RATIO (ref 1.1–2)
ALP SERPL-CCNC: 74 UNIT/L (ref 40–150)
ALT SERPL-CCNC: 41 UNIT/L (ref 0–55)
ANION GAP SERPL CALC-SCNC: 5 MEQ/L
AST SERPL-CCNC: 34 UNIT/L (ref 5–34)
BASOPHILS # BLD AUTO: 0.03 X10(3)/MCL
BASOPHILS NFR BLD AUTO: 0.3 %
BILIRUB SERPL-MCNC: 0.5 MG/DL
BUN SERPL-MCNC: 25.6 MG/DL (ref 8.4–25.7)
CALCIUM SERPL-MCNC: 9 MG/DL (ref 8.8–10)
CHLORIDE SERPL-SCNC: 108 MMOL/L (ref 98–107)
CO2 SERPL-SCNC: 27 MMOL/L (ref 23–31)
CREAT SERPL-MCNC: 1.54 MG/DL (ref 0.73–1.18)
CREAT/UREA NIT SERPL: 17
EOSINOPHIL # BLD AUTO: 0.22 X10(3)/MCL (ref 0–0.9)
EOSINOPHIL NFR BLD AUTO: 2.5 %
ERYTHROCYTE [DISTWIDTH] IN BLOOD BY AUTOMATED COUNT: 13.1 % (ref 11.5–17)
GFR SERPLBLD CREATININE-BSD FMLA CKD-EPI: 50 ML/MIN/1.73/M2
GLOBULIN SER-MCNC: 4.3 GM/DL (ref 2.4–3.5)
GLUCOSE SERPL-MCNC: 143 MG/DL (ref 82–115)
HCT VFR BLD AUTO: 42.1 % (ref 42–52)
HGB BLD-MCNC: 13.6 G/DL (ref 14–18)
IMM GRANULOCYTES # BLD AUTO: 0.01 X10(3)/MCL (ref 0–0.04)
IMM GRANULOCYTES NFR BLD AUTO: 0.1 %
LYMPHOCYTES # BLD AUTO: 1.8 X10(3)/MCL (ref 0.6–4.6)
LYMPHOCYTES NFR BLD AUTO: 20.5 %
MCH RBC QN AUTO: 33 PG (ref 27–31)
MCHC RBC AUTO-ENTMCNC: 32.3 G/DL (ref 33–36)
MCV RBC AUTO: 102.2 FL (ref 80–94)
MONOCYTES # BLD AUTO: 0.72 X10(3)/MCL (ref 0.1–1.3)
MONOCYTES NFR BLD AUTO: 8.2 %
NEUTROPHILS # BLD AUTO: 5.99 X10(3)/MCL (ref 2.1–9.2)
NEUTROPHILS NFR BLD AUTO: 68.4 %
PLATELET # BLD AUTO: 201 X10(3)/MCL (ref 130–400)
PMV BLD AUTO: 10.3 FL (ref 7.4–10.4)
POTASSIUM SERPL-SCNC: 5.2 MMOL/L (ref 3.5–5.1)
PROT SERPL-MCNC: 7.9 GM/DL (ref 5.8–7.6)
RBC # BLD AUTO: 4.12 X10(6)/MCL (ref 4.7–6.1)
SODIUM SERPL-SCNC: 140 MMOL/L (ref 136–145)
WBC # BLD AUTO: 8.77 X10(3)/MCL (ref 4.5–11.5)

## 2024-08-28 PROCEDURE — 85025 COMPLETE CBC W/AUTO DIFF WBC: CPT

## 2024-08-28 PROCEDURE — 80053 COMPREHEN METABOLIC PANEL: CPT

## 2024-08-28 PROCEDURE — 36415 COLL VENOUS BLD VENIPUNCTURE: CPT

## 2024-09-01 ENCOUNTER — HOSPITAL ENCOUNTER (EMERGENCY)
Facility: HOSPITAL | Age: 64
Discharge: HOME | End: 2024-09-01
Attending: STUDENT IN AN ORGANIZED HEALTH CARE EDUCATION/TRAINING PROGRAM
Payer: MEDICARE

## 2024-09-01 VITALS
BODY MASS INDEX: 24.44 KG/M2 | DIASTOLIC BLOOD PRESSURE: 69 MMHG | WEIGHT: 165 LBS | SYSTOLIC BLOOD PRESSURE: 122 MMHG | OXYGEN SATURATION: 99 % | HEART RATE: 85 BPM | RESPIRATION RATE: 18 BRPM | HEIGHT: 69 IN | TEMPERATURE: 98 F

## 2024-09-01 DIAGNOSIS — R52 PAIN: ICD-10-CM

## 2024-09-01 DIAGNOSIS — S51.012A SKIN TEAR OF LEFT ELBOW WITHOUT COMPLICATION, INITIAL ENCOUNTER: ICD-10-CM

## 2024-09-01 DIAGNOSIS — T14.8XXA SKIN ABRASION: ICD-10-CM

## 2024-09-01 DIAGNOSIS — S50.02XA CONTUSION OF LEFT ELBOW, INITIAL ENCOUNTER: ICD-10-CM
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
## 2024-09-01 DIAGNOSIS — S80.02XA CONTUSION OF LEFT KNEE, INITIAL ENCOUNTER: Primary | ICD-10-CM

## 2024-09-01 PROCEDURE — 99284 EMERGENCY DEPT VISIT MOD MDM: CPT | Mod: 25

## 2024-09-01 RX ORDER — MUPIROCIN 20 MG/G
OINTMENT TOPICAL 3 TIMES DAILY
Qty: 30 G | Refills: 0 | Status: SHIPPED | OUTPATIENT
Start: 2024-09-01 | End: 2024-09-08

## 2024-09-02 NOTE — ED NOTES
Pt presents after fall- reports is missing 4/5 th toes on foot and sometimes gets off balance when trying to get up=-  fell onto rt wrist- hit forehead- hematoma noted w  superficial laceration - denies loc- denies neck pain /zhu- gait steady- also has skin tear to  lt upper arm.

## 2024-09-02 NOTE — ED NOTES
Lt uppe arm skin tear ckleaned w saline then tegaderm applied- free of bleeding.wound care instructions``given verbal and written- pt voiced understanding.

## 2024-09-02 NOTE — ED PROVIDER NOTES
"Encounter Date: 9/1/2024       History     Chief Complaint   Patient presents with    Fall     Patient reports fell right wrist pain and abrasion denies loc forehead laceration       Patient is a 64-year-old white woman past medical history of alcoholism, CHF, hypertension who presented to the ER today after mechanical fall.  He states he went to rise from a seated position and he "forgot I did not have some toes my left foot. " he states he subsequently fell whenever he tried to bear weight on the area that is lacking toes.  He states he fell to the left side and hit his left elbow, left knee and the front of his head.  He denies any loss of consciousness or neck pain at this time.  He denies any chest or back pain.  He denies any abdominal pain.  States he was up-to-date in his tetanus vaccine.  States pain is minimal and does not require anything for analgesia.  He was not on anticoagulation.      Review of patient's allergies indicates:  No Known Allergies  Past Medical History:   Diagnosis Date    Alcoholic cirrhosis     Anemia     CHF (congestive heart failure)     LV EF 40%    COPD (chronic obstructive pulmonary disease)     Hypertension     Myocardial infarction     Peripheral artery disease     with stents    Sleep apnea      Past Surgical History:   Procedure Laterality Date    ANGIOGRAM, ABDOMINAL AORTA  10/24/2023    Procedure: Angiogram, Abdominal Aorta;  Surgeon: Janette Ernandez MD;  Location: [facility] CATH LAB;  Service: Cardiology;;    ARTERIOGRAPHY OF SUBCLAVIAN ARTERY Left 10/24/2023    Procedure: ARTERIOGRAM, SUBCLAVIAN;  Surgeon: Janette Ernandez MD;  Location: [facility] CATH LAB;  Service: Cardiology;  Laterality: Left;    CAPSULE ENDOSCOPY, ESOPHAGUS THROUGH ILEUM N/A 11/20/2023    Procedure: M2A;  Surgeon: Johan Flower MD;  Location: [facility] ENDOSCOPY;  Service: Gastroenterology;  Laterality: N/A;  To be done at any time today when staff is available.    CARDIAC DEFIBRILLATOR PLACEMENT " N/A 11/28/2023    Procedure: Insertion, Single chamber ICD (SJM);  Surgeon: Suyapa Oakes MD;  Location: [facility] CATH LAB;  Service: Cardiology;  Laterality: N/A;    CATHETERIZATION OF BOTH LEFT AND RIGHT HEART N/A 10/24/2023    Procedure: CATHETERIZATION, HEART, BOTH LEFT AND RIGHT;  Surgeon: Janette Ernandez MD;  Location: [facility] CATH LAB;  Service: Cardiology;  Laterality: N/A;    COLONOSCOPY N/A 6/10/2023    Procedure: COLONOSCOPY;  Surgeon: Johan Flower MD;  Location: [facility] OR;  Service: Gastroenterology;  Laterality: N/A;    EGD, WITH CLOSED BIOPSY N/A 12/18/2023    Procedure: EGD;  Surgeon: Johan Flower MD;  Location: [facility] ENDOSCOPY;  Service: Gastroenterology;  Laterality: N/A;    ESOPHAGOGASTRODUODENOSCOPY N/A 6/9/2023    Procedure: EGD;  Surgeon: Tima Teixeira MD;  Location: [facility] ENDOSCOPY;  Service: Gastroenterology;  Laterality: N/A;    ESOPHAGOGASTRODUODENOSCOPY N/A 1/11/2024    Procedure: EGD (ESOPHAGOGASTRODUODENOSCOPY);  Surgeon: Chaya Castellon MD;  Location: [facility];  Service: Endoscopy;  Laterality: N/A;  POST OP: MILD GASTRITIS W/ NO ULCER    ESOPHAGOGASTRODUODENOSCOPY N/A 4/24/2024    Procedure: EGD W/ M2A PLACEMENT;  Surgeon: Johan Flower MD;  Location: [facility] ENDOSCOPY;  Service: Gastroenterology;  Laterality: N/A;    INSERTION OF INTRA-AORTIC BALLOON ASSIST DEVICE  10/24/2023    Procedure: INSERTION, INTRA-AORTIC BALLOON PUMP;  Surgeon: Janette Ernandez MD;  Location: [facility] CATH LAB;  Service: Cardiology;;    INSERTION OF INTRAVASCULAR MICROAXIAL BLOOD PUMP N/A 10/26/2023    Procedure: INSERTION, IMPELLA;  Surgeon: Janette Ernandez MD;  Location: [facility] CATH LAB;  Service: Cardiology;  Laterality: N/A;    INSTANTANEOUS WAVE-FREE RATIO (IFR) N/A 10/26/2023    Procedure: Instantaneous Wave-Free Ratio (IFR);  Surgeon: Janette Ernandez MD;  Location: [facility] CATH LAB;  Service: Cardiology;  Laterality: N/A;    INTRALUMINAL GASTROINTESTINAL TRACT IMAGING VIA  CAPSULE N/A 4/24/2024    Procedure: M2A;  Surgeon: Johan Flower MD;  Location: [facility] ENDOSCOPY;  Service: Gastroenterology;  Laterality: N/A;    INTRAVASCULAR ULTRASOUND, CORONARY N/A 10/26/2023    Procedure: Ultrasound-coronary;  Surgeon: Janette Ernandez MD;  Location: [facility] CATH LAB;  Service: Cardiology;  Laterality: N/A;    PERCUTANEOUS CORONARY INTERVENTION, ARTERY N/A 10/26/2023    Procedure: Percutaneous coronary intervention- with Impella;  Surgeon: Janette Ernandez MD;  Location: [facility] CATH LAB;  Service: Cardiology;  Laterality: N/A;    PLACEMENT, IABP  10/24/2023    Procedure: Placement, IABP;  Surgeon: Janette Ernandez MD;  Location: [facility] CATH LAB;  Service: Cardiology;;     No family history on file.  Social History     Tobacco Use    Smoking status: Every Day     Current packs/day: 0.25     Average packs/day: 0.3 packs/day for 50.6 years (12.7 ttl pk-yrs)     Types: Vaping with nicotine, Cigarettes     Start date: 1/10/1974    Smokeless tobacco: Former     Types: Chew   Substance Use Topics    Alcohol use: Not Currently     Comment: currently not drinking per pt. 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 Quit drinking in 10/2023.    Drug use: Not Currently     Review of Systems   Constitutional:  Negative for chills, fatigue and fever.   HENT:  Negative for congestion, sore throat and trouble swallowing.    Eyes:  Negative for pain and visual disturbance.   Respiratory:  Negative for cough, shortness of breath and wheezing.    Cardiovascular:  Negative for chest pain and palpitations.   Gastrointestinal:  Negative for abdominal pain, blood in stool, constipation, diarrhea, nausea and vomiting.   Genitourinary:  Negative for dysuria and hematuria.   Musculoskeletal:  Positive for arthralgias. Negative for back pain and myalgias.   Skin:  Negative for rash and wound.   Neurological:  Negative for seizures, syncope and headaches.   Psychiatric/Behavioral:  Negative for confusion. The patient is not  nervous/anxious.        Physical Exam     Initial Vitals [09/01/24 1721]   BP Pulse Resp Temp SpO2   122/69 85 18 97.6 °F (36.4 °C) 99 %      MAP       --         Physical Exam    Nursing note and vitals reviewed.  Constitutional: He appears well-developed and well-nourished. No distress.   HENT:   Head: Normocephalic.   There is a superficial laceration noted right above the right eyebrow.  This has not appear to protrude through the dermis and measures a proximally 1.5 cm.  It was no evidence of terrazas sign, raccoon eyes, epistaxis.  GCS of 15.  Head normocephalic otherwise.  There is no C, T, L-spine tenderness no step-off lesions.  There is no chest wall tenderness, abdominal tenderness.  Patient able to bear full weight on his 2 lower extremities and no tenderness over the greater trochanters of the hips bilaterally.  Range motion of the upper extremities seems to be within normal limits.   Eyes: Conjunctivae and EOM are normal. Right eye exhibits no discharge. Left eye exhibits no discharge. No scleral icterus.   Neck: No tracheal deviation present.   Normal range of motion.  Cardiovascular:  Normal rate, regular rhythm and normal heart sounds.     Exam reveals no gallop and no friction rub.       No murmur heard.  Pulmonary/Chest: Breath sounds normal. No respiratory distress. He has no wheezes. He has no rhonchi. He has no rales.   Musculoskeletal:         General: No edema. Normal range of motion.      Cervical back: Normal range of motion.      Comments: Right wrist: Radial pulses appreciated equal to than the left.  No obvious deformity or soft tissue swelling.  No tenderness over the scaphoid.  Left elbow.  Radial pulses appreciated equal to than the right.  There is superficial skin tear noted over the left lateral aspect of the elbow.  Range motion seems to be unaffected.  Left knee:  Patient able to bear full weight with no joint instability.  That has point tenderness over the patella.  No ecchymosis  noted.     Neurological: He is alert.   Skin: Skin is warm and dry. No rash and no abscess noted. No erythema. No pallor.   Psychiatric: His behavior is normal. Judgment normal.         ED Course   Procedures  Labs Reviewed - No data to display       Imaging Results              X-Ray Knee Complete 4 or More Views Left (Final result)  Result time 09/01/24 20:43:21   Procedure changed from X-Ray Knee 3 View Left     Final result by Tyler Ang MD (09/01/24 20:43:21)                   Impression:      No acute osseous finding.      Electronically signed by: Tyler Ang MD  Date:    09/01/2024  Time:    20:43               Narrative:    EXAMINATION:  Left knee four views    CLINICAL HISTORY:  Fall, injury, pain    COMPARISON:  None    FINDINGS:  No fracture subluxation.  The joint spaces are maintained.  No erosive or proliferative changes.  No joint effusion.                                       X-Ray Elbow Complete Left (Final result)  Result time 09/01/24 20:41:02      Final result by Tyler Ang MD (09/01/24 20:41:02)                   Impression:      No acute osseous finding.      Electronically signed by: Tyler Ang MD  Date:    09/01/2024  Time:    20:41               Narrative:    EXAMINATION:  Left elbow four views    CLINICAL HISTORY:  Fall, injury, pain    COMPARISON:  None    FINDINGS:  No fracture subluxation.  The joint spaces are maintained.  No erosive or proliferative changes.  No evidence for joint effusion.  No focal soft tissue swelling.                                       CT Cervical Spine Without Contrast (Final result)  Result time 09/01/24 20:36:00      Final result by Tyler Ang MD (09/01/24 20:36:00)                   Impression:      No acute osseous abnormality or static listhesis    Moderate severe cervical spondylosis    Partially seen moderate to large right-sided pleural effusion.      Electronically signed by: Tyler Ang  MD  Date:    09/01/2024  Time:    20:36               Narrative:    EXAMINATION:  CT of cervical spine without contrast    CLINICAL HISTORY:  Fall, injury, pain    COMPARISON:  None    Technique:    Routine CT cervical spine was performed without intravenous contrast    Total DLP: 230.83 mGy.cm    Automatic exposure control was utilized to reduce the patient's dose    FINDINGS:  Prevertebral soft tissues are normal.  Predental space is normal.  Vertebral body heights are maintained.  There is 2 mm anterolisthesis of C2 on C3.  There is 1.5 mm anterolisthesis of C5 on C6.  There is severe disc space narrowing noted at C4-5 with asymmetric right endplate sclerosis.  There is mild-to-moderate multilevel disc space narrowing of the levels with uncovertebral hypertrophy and spurring noted.  There is multilevel facet arthropathy.  The craniocervical junction is maintained.  There is no acute fracture seen.  There is no osseous destruction.  The visualized lung apices are clear.  There is multilevel osseous foraminal narrowing.  There is osseous central canal stenosis noted at C4-5.  There is partially seen right-sided pleural effusion.                                       CT Head Without Contrast (Final result)  Result time 09/01/24 20:32:31      Final result by Tyler Ang MD (09/01/24 20:32:31)                   Narrative:    EXAMINATION:  CT head without contrast    CLINICAL HISTORY:  Fall, injury, pain    COMPARISON:  11/14/2018    TECHNIQUE:  Routine CT of the head was performed without contrast    Total DLP: 1243.24 mGy.cm    Automatic exposure control was utilized to reduce the patient's dose    FINDINGS:  There is no acute intra hemorrhage, midline shift, mass-effect, or extra-axial collection.  No sulcal effacement.  Gray-white matter differentiation is preserved.  The ventricles and sulci are mildly globally proportional prominent compatible with mild involutional changes of brain.  There are mild patchy  areas of decreased attenuation in the periventricular white matter, while nonspecific, most commonly sequela of chronic microvascular ischemia.  No sulcal effacement.  Gray-white matter differentiation is preserved.  Visualized osseous structures are intact.  The visualized paranasal sinuses and mastoid air cells are clear.  Mild right frontal scalp soft tissue swelling noted.    IMPRESSION  No acute intracranial abnormality.      Electronically signed by: Tyler Ang MD  Date:    09/01/2024  Time:    20:32                                     X-Ray Wrist Complete Right (Final result)  Result time 09/01/24 19:19:58      Final result by Tyler Ang MD (09/01/24 19:19:58)                   Impression:      No acute osseous finding.      Electronically signed by: Tyler Ang MD  Date:    09/01/2024  Time:    19:19               Narrative:    EXAMINATION:  Right wrist three views    CLINICAL HISTORY:  Fall, injury, pain    COMPARISON:  None    FINDINGS:  There is no acute fracture subluxation.  The joint spaces are maintained.  No erosive or proliferative changes.  No focal soft tissue swelling.                                       Medications - No data to display  Medical Decision Making  Differentials: Abrasion, contusion, hematoma, fracture, intracranial bleed   History in his the patient   64-year-old GCS 15 male presents to the ER today after mechanical fall.  Abrasions noted to his left elbow, right forehead.  CT head showed no acute findings, CT C-spine showed no acute findings.  There was concern about a right-sided pleural effusion but after chart review it appears this was present for many months.  This seems to be stable patient was no respiratory concerns.  No acute osseous abnormalities of the right wrist, left elbow and left knee.  Sterile dressing in place over the skin tear to the left elbow.  Wound care was discussed mupirocin was sent to the pharmacy.  All questions answered in layman's  terms and return precautions were discussed.    Amount and/or Complexity of Data Reviewed  Radiology: ordered. Decision-making details documented in ED Course.    Risk  Prescription drug management.                                      Clinical Impression:  Final diagnoses:  [W19.XXXA] Fall  [R52] Pain  [S80.02XA] Contusion of left knee, initial encounter (Primary)  [S50.02XA] Contusion of left elbow, initial encounter  [S51.012A] Skin tear of left elbow without complication, initial encounter  [T14.8XXA] Skin abrasion          ED Disposition Condition    Discharge Stable          ED Prescriptions       Medication Sig Dispense Start Date End Date Auth. Provider    mupirocin (BACTROBAN) 2 % ointment Apply topically 3 (three) times daily. for 7 days 30 g 9/1/2024 9/8/2024 Tima Graham MD          Follow-up Information       Follow up With Specialties Details Why Contact Info    [facility] - Emergency Dept Emergency Medicine  If symptoms worsen [address] [facility] [id number] [phone]    Ivette Hearn, P Family Medicine Schedule an appointment as soon as possible for a visit   [address] [facility] [address] [phone]               Tima Graham MD  09/01/24 2052

## 2024-09-03 ENCOUNTER — TELEPHONE (OUTPATIENT)
Dept: FAMILY MEDICINE | Facility: CLINIC | Age: 64
End: 2024-09-03
Payer: MEDICARE

## 2024-09-03 ENCOUNTER — PATIENT OUTREACH (OUTPATIENT)
Dept: EMERGENCY MEDICINE | Facility: HOSPITAL | Age: 64
End: 2024-09-03
Payer: MEDICARE

## 2024-09-03 NOTE — PROGRESS NOTES
Mr Recio stated that he will wait and follow up with his PCP on 9/9/24 at 8:15 am with MARCELINO Hearn. Patient will receive an appointment reminder.

## 2024-09-06 ENCOUNTER — PATIENT OUTREACH (OUTPATIENT)
Dept: EMERGENCY MEDICINE | Facility: HOSPITAL | Age: 64
End: 2024-09-06
Payer: MEDICARE

## 2024-09-09 ENCOUNTER — OFFICE VISIT (OUTPATIENT)
Dept: FAMILY MEDICINE | Facility: CLINIC | Age: 64
End: 2024-09-09
Payer: MEDICARE

## 2024-09-09 ENCOUNTER — LAB VISIT (OUTPATIENT)
Dept: LAB | Facility: HOSPITAL | Age: 64
End: 2024-09-09
Attending: CLINICAL NURSE SPECIALIST
Payer: MEDICARE

## 2024-09-09 VITALS
HEART RATE: 61 BPM | SYSTOLIC BLOOD PRESSURE: 135 MMHG | BODY MASS INDEX: 25.21 KG/M2 | RESPIRATION RATE: 16 BRPM | HEIGHT: 69 IN | DIASTOLIC BLOOD PRESSURE: 78 MMHG | OXYGEN SATURATION: 99 % | WEIGHT: 170.19 LBS | TEMPERATURE: 98 F

## 2024-09-09 DIAGNOSIS — I25.10 CORONARY ATHEROSCLEROSIS OF NATIVE CORONARY ARTERY: Primary | ICD-10-CM

## 2024-09-09 DIAGNOSIS — I10 ESSENTIAL HYPERTENSION, MALIGNANT: ICD-10-CM

## 2024-09-09 DIAGNOSIS — E78.5 HYPERLIPIDEMIA, UNSPECIFIED HYPERLIPIDEMIA TYPE: ICD-10-CM

## 2024-09-09 DIAGNOSIS — D64.9 ANEMIA, UNSPECIFIED TYPE: Primary | ICD-10-CM

## 2024-09-09 LAB
ALBUMIN SERPL-MCNC: 3.4 G/DL (ref 3.4–4.8)
ALBUMIN/GLOB SERPL: 0.8 RATIO (ref 1.1–2)
ALP SERPL-CCNC: 85 UNIT/L (ref 40–150)
ALT SERPL-CCNC: 46 UNIT/L (ref 0–55)
ANION GAP SERPL CALC-SCNC: 6 MEQ/L
AST SERPL-CCNC: 46 UNIT/L (ref 5–34)
BILIRUB SERPL-MCNC: 0.4 MG/DL
BUN SERPL-MCNC: 24.8 MG/DL (ref 8.4–25.7)
CALCIUM SERPL-MCNC: 8.8 MG/DL (ref 8.8–10)
CHLORIDE SERPL-SCNC: 106 MMOL/L (ref 98–107)
CHOLEST SERPL-MCNC: 75 MG/DL
CHOLEST/HDLC SERPL: 4 {RATIO} (ref 0–5)
CO2 SERPL-SCNC: 28 MMOL/L (ref 23–31)
CREAT SERPL-MCNC: 1.69 MG/DL (ref 0.73–1.18)
CREAT/UREA NIT SERPL: 15
GFR SERPLBLD CREATININE-BSD FMLA CKD-EPI: 44 ML/MIN/1.73/M2
GLOBULIN SER-MCNC: 4.3 GM/DL (ref 2.4–3.5)
GLUCOSE SERPL-MCNC: 132 MG/DL (ref 82–115)
HDLC SERPL-MCNC: 18 MG/DL (ref 35–60)
LDLC SERPL CALC-MCNC: 35 MG/DL (ref 50–140)
POTASSIUM SERPL-SCNC: 5.3 MMOL/L (ref 3.5–5.1)
PROT SERPL-MCNC: 7.7 GM/DL (ref 5.8–7.6)
SODIUM SERPL-SCNC: 140 MMOL/L (ref 136–145)
TRIGL SERPL-MCNC: 111 MG/DL (ref 34–140)
VLDLC SERPL CALC-MCNC: 22 MG/DL

## 2024-09-09 PROCEDURE — 3288F FALL RISK ASSESSMENT DOCD: CPT | Mod: ,,, | Performed by: NURSE PRACTITIONER

## 2024-09-09 PROCEDURE — 99213 OFFICE O/P EST LOW 20 MIN: CPT | Mod: ,,, | Performed by: NURSE PRACTITIONER

## 2024-09-09 PROCEDURE — 3078F DIAST BP <80 MM HG: CPT | Mod: ,,, | Performed by: NURSE PRACTITIONER

## 2024-09-09 PROCEDURE — 4010F ACE/ARB THERAPY RXD/TAKEN: CPT | Mod: ,,, | Performed by: NURSE PRACTITIONER

## 2024-09-09 PROCEDURE — 1159F MED LIST DOCD IN RCRD: CPT | Mod: ,,, | Performed by: NURSE PRACTITIONER

## 2024-09-09 PROCEDURE — 3075F SYST BP GE 130 - 139MM HG: CPT | Mod: ,,, | Performed by: NURSE PRACTITIONER

## 2024-09-09 PROCEDURE — 3008F BODY MASS INDEX DOCD: CPT | Mod: ,,, | Performed by: NURSE PRACTITIONER

## 2024-09-09 PROCEDURE — 1100F PTFALLS ASSESS-DOCD GE2>/YR: CPT | Mod: ,,, | Performed by: NURSE PRACTITIONER

## 2024-09-09 PROCEDURE — 80053 COMPREHEN METABOLIC PANEL: CPT

## 2024-09-09 PROCEDURE — 80061 LIPID PANEL: CPT

## 2024-09-09 PROCEDURE — 36415 COLL VENOUS BLD VENIPUNCTURE: CPT

## 2024-09-09 RX ORDER — OCTREOTIDE ACETATE,MI-SPHERES 20 MG
VIAL (EA) INTRAMUSCULAR
COMMUNITY
Start: 2024-08-26

## 2024-09-16 NOTE — PROGRESS NOTES
SUBJECTIVE:     History of Present Illness      Chief Complaint: Follow-up (3 month follow up for lab results (CBC).  Since fall on 09/01/24 (he hit his head) feels wobbly and states has to catch himself or feels like he will fall but no fall since then (CT of head was done in the ER).)    HPI:  Patient is a 64 y.o. year old male who presents to clinic for CBC follow-up.  History of GI bleed.  Patient does follow up with gastro.  Denies shortness breath denies bleeding dizziness chest pain    Review of Systems:    Review of Systems    12 point review of systems conducted, negative except as stated in the history of present illness. See HPI for details.     Previous History    Ivette Hearn, CONNIEP  Review of patient's allergies indicates:  No Known Allergies    Past Medical History:   Diagnosis Date    Alcoholic cirrhosis     Anemia     CHF (congestive heart failure)     LV EF 40%    COPD (chronic obstructive pulmonary disease)     Hyperlipidemia     Hypertension     Myocardial infarction     Peripheral artery disease     with stents    Sleep apnea      Current Outpatient Medications   Medication Instructions    albuterol (PROVENTIL/VENTOLIN HFA) 90 mcg/actuation inhaler 1 puff, Inhalation, Every 4 hours PRN    albuterol-ipratropium (DUO-NEB) 2.5 mg-0.5 mg/3 mL nebulizer solution 3 mLs, Nebulization, Every 6 hours PRN    amiodarone (PACERONE) 400 mg, Oral, Daily    atorvastatin (LIPITOR) 80 mg, Oral, Daily    ENTRESTO 24-26 mg per tablet 1 tablet, Oral, 2 times daily    ferrous sulfate 325 mg, Oral, 2 times daily    folic acid (FOLVITE) 1 mg, Oral, Daily    furosemide (LASIX) 40 mg, Oral, Daily    gabapentin (NEURONTIN) 300 mg, Oral, 3 times daily    metoprolol succinate (TOPROL-XL) 25 MG 24 hr tablet Take ½ ablet (12.5 mg total) by mouth once daily.    pantoprazole (PROTONIX) 40 MG tablet TAKE ONE (1) TABLET BY MOUTH TWICE DAILY    SANDOSTATIN LAR DEPOT 20 mg SSRR injection     STIOLTO RESPIMAT 2.5-2.5  mcg/actuation Mist 2 puffs, Inhalation    tamsulosin (FLOMAX) 0.4 mg, Oral     Past Surgical History:   Procedure Laterality Date    ANGIOGRAM, ABDOMINAL AORTA  10/24/2023    Procedure: Angiogram, Abdominal Aorta;  Surgeon: Janette Ernandez MD;  Location: [facility] CATH LAB;  Service: Cardiology;;    ARTERIOGRAPHY OF SUBCLAVIAN ARTERY Left 10/24/2023    Procedure: ARTERIOGRAM, SUBCLAVIAN;  Surgeon: Janette Ernandez MD;  Location: [facility] CATH LAB;  Service: Cardiology;  Laterality: Left;    CAPSULE ENDOSCOPY, ESOPHAGUS THROUGH ILEUM N/A 11/20/2023    Procedure: M2A;  Surgeon: Johan Flower MD;  Location: [facility] ENDOSCOPY;  Service: Gastroenterology;  Laterality: N/A;  To be done at any time today when staff is available.    CARDIAC DEFIBRILLATOR PLACEMENT N/A 11/28/2023    Procedure: Insertion, Single chamber ICD (SJM);  Surgeon: Suyapa Oakes MD;  Location: [facility] CATH LAB;  Service: Cardiology;  Laterality: N/A;    CATHETERIZATION OF BOTH LEFT AND RIGHT HEART N/A 10/24/2023    Procedure: CATHETERIZATION, HEART, BOTH LEFT AND RIGHT;  Surgeon: Janette Ernandez MD;  Location: [facility] CATH LAB;  Service: Cardiology;  Laterality: N/A;    COLONOSCOPY N/A 6/10/2023    Procedure: COLONOSCOPY;  Surgeon: Johan Flower MD;  Location: [facility];  Service: Gastroenterology;  Laterality: N/A;    EGD, WITH CLOSED BIOPSY N/A 12/18/2023    Procedure: EGD;  Surgeon: Johan Flower MD;  Location: [facility] ENDOSCOPY;  Service: Gastroenterology;  Laterality: N/A;    ESOPHAGOGASTRODUODENOSCOPY N/A 6/9/2023    Procedure: EGD;  Surgeon: Tima Teixeira MD;  Location: [facility] ENDOSCOPY;  Service: Gastroenterology;  Laterality: N/A;    ESOPHAGOGASTRODUODENOSCOPY N/A 1/11/2024    Procedure: EGD (ESOPHAGOGASTRODUODENOSCOPY);  Surgeon: Chaya Castellon MD;  Location: [facility];  Service: Endoscopy;  Laterality: N/A;  POST OP: MILD GASTRITIS W/ NO ULCER    ESOPHAGOGASTRODUODENOSCOPY N/A 4/24/2024    Procedure: EGD W/ M2A  PLACEMENT;  Surgeon: Johan Flower MD;  Location: [facility] ENDOSCOPY;  Service: Gastroenterology;  Laterality: N/A;    INSERTION OF INTRA-AORTIC BALLOON ASSIST DEVICE  10/24/2023    Procedure: INSERTION, INTRA-AORTIC BALLOON PUMP;  Surgeon: Janette Ernandez MD;  Location: [facility] CATH LAB;  Service: Cardiology;;    INSERTION OF INTRAVASCULAR MICROAXIAL BLOOD PUMP N/A 10/26/2023    Procedure: INSERTION, IMPELLA;  Surgeon: Janette Ernandez MD;  Location: [facility] CATH LAB;  Service: Cardiology;  Laterality: N/A;    INSTANTANEOUS WAVE-FREE RATIO (IFR) N/A 10/26/2023    Procedure: Instantaneous Wave-Free Ratio (IFR);  Surgeon: Janette Ernandez MD;  Location: [facility] CATH LAB;  Service: Cardiology;  Laterality: N/A;    INTRALUMINAL GASTROINTESTINAL TRACT IMAGING VIA CAPSULE N/A 4/24/2024    Procedure: M2A;  Surgeon: Johan Flower MD;  Location: [facility] ENDOSCOPY;  Service: Gastroenterology;  Laterality: N/A;    INTRAVASCULAR ULTRASOUND, CORONARY N/A 10/26/2023    Procedure: Ultrasound-coronary;  Surgeon: Janette Ernandez MD;  Location: [facility] CATH LAB;  Service: Cardiology;  Laterality: N/A;    PERCUTANEOUS CORONARY INTERVENTION, ARTERY N/A 10/26/2023    Procedure: Percutaneous coronary intervention- with Impella;  Surgeon: Janette Ernandez MD;  Location: [facility] CATH LAB;  Service: Cardiology;  Laterality: N/A;    PLACEMENT, IABP  10/24/2023    Procedure: Placement, IABP;  Surgeon: Janette Ernandez MD;  Location: [facility] CATH LAB;  Service: Cardiology;;     No family history on file.    Social History     Tobacco Use    Smoking status: Every Day     Current packs/day: 0.25     Average packs/day: 0.3 packs/day for 50.7 years (12.7 ttl pk-yrs)     Types: Vaping with nicotine, Cigarettes     Start date: 1/10/1974    Smokeless tobacco: Former     Types: Chew   Substance Use Topics    Alcohol use: Yes     Comment: currently not drinking per pt. 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 Quit drinking in 10/2023.    Drug use: Not Currently  "       Health Maintenance      Health Maintenance   Topic Date Due    Shingles Vaccine (1 of 2) Never done    Lipid Panel  09/09/2029    Colorectal Cancer Screening  06/10/2033    TETANUS VACCINE  08/17/2034    Hepatitis C Screening  Completed    Abdominal Aortic Aneurysm Screening  Completed       OBJECTIVE:     Physical Exam      Vital Signs Reviewed   Visit Vitals  /78 (BP Location: Left arm, Patient Position: Sitting)   Pulse 61   Temp 97.7 °F (36.5 °C) (Oral)   Resp 16   Ht 5' 9" (1.753 m)   Wt 77.2 kg (170 lb 3.2 oz)   SpO2 99%   BMI 25.13 kg/m²       Physical Exam    Physical Exam:  General: Alert, well nourished, no acute distress, non-toxic appearing.   Eyes: Anicteric sclera, without conjunctival injection, normal lids, no purulent drainage, EOMs grossly intact.   Ears: No tragal tenderness. Tympanic membranes intact, pearly grey, without effusion or erythema and with a positive light reflex.   Mouth: Posterior pharynx without erythema. No exudate, ulcerations, or lesion. No tonsillar swelling.   Neck: Supple, full ROM, no rigidity, no cervical adenopathy.   Cardio: Normal rate and rhythm    Resp: Respirations even and unlabored, clear to auscultation bilaterally.   Abd: No ecchymosis or distension. Normal bowel sounds in all 4 quadrants. No tenderness to palpation. No rebound tenderness or guarding. No CVA tenderness.   Skin: No rashes or open lesions noted.   MSK: No swelling. No abrasions or signs of trauma. Ambulating without assistance.   Neuro: Alert,oriented No focal deficits noted. Facial expressions even.   Psych: Cooperative, Normal affect      Procedures    Procedures     Labs     Results for orders placed or performed in visit on 08/28/24   Comprehensive Metabolic Panel   Result Value Ref Range    Sodium 140 136 - 145 mmol/L    Potassium 5.2 (H) 3.5 - 5.1 mmol/L    Chloride 108 (H) 98 - 107 mmol/L    CO2 27 23 - 31 mmol/L    Glucose 143 (H) 82 - 115 mg/dL    Blood Urea Nitrogen 25.6 8.4 - " 25.7 mg/dL    Creatinine 1.54 (H) 0.73 - 1.18 mg/dL    Calcium 9.0 8.8 - 10.0 mg/dL    Protein Total 7.9 (H) 5.8 - 7.6 gm/dL    Albumin 3.6 3.4 - 4.8 g/dL    Globulin 4.3 (H) 2.4 - 3.5 gm/dL    Albumin/Globulin Ratio 0.8 (L) 1.1 - 2.0 ratio    Bilirubin Total 0.5 <=1.5 mg/dL    ALP 74 40 - 150 unit/L    ALT 41 0 - 55 unit/L    AST 34 5 - 34 unit/L    eGFR 50 mL/min/1.73/m2    Anion Gap 5.0 mEq/L    BUN/Creatinine Ratio 17    CBC with Differential   Result Value Ref Range    WBC 8.77 4.50 - 11.50 x10(3)/mcL    RBC 4.12 (L) 4.70 - 6.10 x10(6)/mcL    Hgb 13.6 (L) 14.0 - 18.0 g/dL    Hct 42.1 42.0 - 52.0 %    .2 (H) 80.0 - 94.0 fL    MCH 33.0 (H) 27.0 - 31.0 pg    MCHC 32.3 (L) 33.0 - 36.0 g/dL    RDW 13.1 11.5 - 17.0 %    Platelet 201 130 - 400 x10(3)/mcL    MPV 10.3 7.4 - 10.4 fL    Neut % 68.4 %    Lymph % 20.5 %    Mono % 8.2 %    Eos % 2.5 %    Basophil % 0.3 %    Lymph # 1.80 0.6 - 4.6 x10(3)/mcL    Neut # 5.99 2.1 - 9.2 x10(3)/mcL    Mono # 0.72 0.1 - 1.3 x10(3)/mcL    Eos # 0.22 0 - 0.9 x10(3)/mcL    Baso # 0.03 <=0.2 x10(3)/mcL    IG# 0.01 0 - 0.04 x10(3)/mcL    IG% 0.1 %       Chemistry:  Lab Results   Component Value Date     09/09/2024    K 5.3 (H) 09/09/2024    BUN 24.8 09/09/2024    CREATININE 1.69 (H) 09/09/2024    EGFRNORACEVR 44 09/09/2024    GLUCOSE 132 (H) 09/09/2024    CALCIUM 8.8 09/09/2024    ALKPHOS 85 09/09/2024    LABPROT 7.7 (H) 09/09/2024    ALBUMIN 3.4 09/09/2024    BILIDIR 0.2 11/21/2023    IBILI 0.20 11/21/2023    AST 46 (H) 09/09/2024    ALT 46 09/09/2024    MG 2.10 02/21/2024    PHOS 3.6 11/19/2023    [id number] 21.3 (L) 12/13/2023    TSH 3.221 12/13/2023    RRLQRA7AJGN 0.92 06/07/2023    PSA 0.10 12/13/2023        Lab Results   Component Value Date    HGBA1C 6.1 12/13/2023        Hematology:  Lab Results   Component Value Date    WBC 8.77 08/28/2024    HGB 13.6 (L) 08/28/2024    HCT 42.1 08/28/2024     08/28/2024       Lipid Panel:  Lab Results   Component Value  Date    CHOL 75 09/09/2024    HDL 18 (L) 09/09/2024    LDL 35.00 (L) 09/09/2024    TRIG 111 09/09/2024    TOTALCHOLEST 4 09/09/2024        Urine:  Lab Results   Component Value Date    APPEARANCEUA Clear 12/13/2023    SGUA 1.015 12/13/2023    PROTEINUA Negative 12/13/2023    KETONESUA Negative 12/13/2023    LEUKOCYTESUR Negative 12/13/2023    RBCUA None Seen 12/13/2023    WBCUA None Seen 12/13/2023    BACTERIA None Seen 12/13/2023    SQEPUA 1-2 12/22/2020    HYALINECASTS 0-2 (A) 12/22/2020    CREATRANDUR 142.7 10/24/2023         Assessment            ICD-10-CM ICD-9-CM   1. Anemia, unspecified type  D64.9 285.9       Plan       1. Anemia, unspecified type   H&H stable   Avoid ETOH    Keep appointments with Gastro as recommended       Medication List with Changes/Refills   Current Medications    ALBUTEROL (PROVENTIL/VENTOLIN HFA) 90 MCG/ACTUATION INHALER    Inhale 1 puff into the lungs every 4 (four) hours as needed for Wheezing.    ALBUTEROL-IPRATROPIUM (DUO-NEB) 2.5 MG-0.5 MG/3 ML NEBULIZER SOLUTION    Take 3 mLs by nebulization every 6 (six) hours as needed for Shortness of Breath or Wheezing.    AMIODARONE (PACERONE) 200 MG TAB    Take 2 tablets (400 mg total) by mouth once daily.    ATORVASTATIN (LIPITOR) 80 MG TABLET    Take 1 tablet (80 mg total) by mouth once daily.    ENTRESTO 24-26 MG PER TABLET    Take 1 tablet by mouth 2 (two) times daily.    FERROUS SULFATE 325 (65 FE) MG EC TABLET    Take 1 tablet (325 mg total) by mouth 2 (two) times daily.    FOLIC ACID (FOLVITE) 1 MG TABLET    Take 1 tablet (1 mg total) by mouth once daily.    FUROSEMIDE (LASIX) 40 MG TABLET    Take 40 mg by mouth once daily.    GABAPENTIN (NEURONTIN) 300 MG CAPSULE    TAKE 1 CAPSULE BY MOUTH 3 (THREE) TIMES DAILY.    METOPROLOL SUCCINATE (TOPROL-XL) 25 MG 24 HR TABLET    Take ½ ablet (12.5 mg total) by mouth once daily.    PANTOPRAZOLE (PROTONIX) 40 MG TABLET    TAKE ONE (1) TABLET BY MOUTH TWICE DAILY    SANDOSTATIN LAR DEPOT 20 MG  SSRR INJECTION        STIOLTO RESPIMAT 2.5-2.5 MCG/ACTUATION MIST    INHALE 2 PUFFS BY MOUTH ONCE DAILY    TAMSULOSIN (FLOMAX) 0.4 MG CAP    TAKE 1 CAPSULE BY MOUTH ONCE DAILY.       Follow up for Wellness already scheduled, LABS Prior.   Follow up for Wellness already scheduled, LABS Prior. In addition to their scheduled follow up, the patient has also been instructed to follow up on as needed basis.   Future Appointments   Date Time Provider Department Center   12/23/2024  2:00 PM Ivette Hearn FNP [facility]

## 2024-10-03 DIAGNOSIS — J44.9 CHRONIC OBSTRUCTIVE PULMONARY DISEASE, UNSPECIFIED COPD TYPE: ICD-10-CM

## 2024-10-03 RX ORDER — ALBUTEROL SULFATE 90 UG/1
1 INHALANT RESPIRATORY (INHALATION) EVERY 4 HOURS PRN
Qty: 18 G | Refills: 2 | Status: SHIPPED | OUTPATIENT
Start: 2024-10-03 | End: 2024-10-04 | Stop reason: SDUPTHER

## 2024-10-04 ENCOUNTER — TELEPHONE (OUTPATIENT)
Dept: FAMILY MEDICINE | Facility: CLINIC | Age: 64
End: 2024-10-04
Payer: MEDICARE

## 2024-10-04 DIAGNOSIS — J44.9 CHRONIC OBSTRUCTIVE PULMONARY DISEASE, UNSPECIFIED COPD TYPE: ICD-10-CM

## 2024-10-04 RX ORDER — ALBUTEROL SULFATE 90 UG/1
1 INHALANT RESPIRATORY (INHALATION) EVERY 4 HOURS PRN
Qty: 18 G | Refills: 2 | Status: SHIPPED | OUTPATIENT
Start: 2024-10-04

## 2024-10-04 NOTE — TELEPHONE ENCOUNTER
----- Message from Gamaliel Serna sent at 10/4/2024 12:27 PM CDT -----  Regarding: refill  Who Called: Hemal Tolbert Jr.    Refill or New Rx:Refill    RX Name and Strength: albuterol (PROVENTIL/VENTOLIN HFA) 90 mcg/actuation inhaler    How is the patient currently taking it? (ex. 1XDay):  Is this a 30 day or 90 day RX:  Local or Mail Order:  List of preferred pharmacies on file (remove unneeded): [unfilled]  If different Pharmacy is requested, enter Pharmacy information here including location and phone number:    Ordering Provider:      Preferred Method of Contact: Phone Call  Patient's Preferred Phone Number on File: [phone]   Best Call Back Number, if different:    Additional Information: refill request

## 2024-11-01 DIAGNOSIS — J44.9 CHRONIC OBSTRUCTIVE PULMONARY DISEASE, UNSPECIFIED COPD TYPE: ICD-10-CM

## 2024-11-01 RX ORDER — TIOTROPIUM BROMIDE AND OLODATEROL 3.124; 2.736 UG/1; UG/1
2 SPRAY, METERED RESPIRATORY (INHALATION)
Qty: 4 G | Refills: 4 | Status: SHIPPED | OUTPATIENT
Start: 2024-11-01

## 2024-12-05 DIAGNOSIS — Z76.0 MEDICATION REFILL: ICD-10-CM

## 2024-12-05 DIAGNOSIS — K21.9 GASTROESOPHAGEAL REFLUX DISEASE WITHOUT ESOPHAGITIS: ICD-10-CM

## 2024-12-05 RX ORDER — PANTOPRAZOLE SODIUM 40 MG/1
TABLET, DELAYED RELEASE ORAL
Qty: 60 TABLET | Refills: 5 | Status: SHIPPED | OUTPATIENT
Start: 2024-12-05

## 2024-12-05 RX ORDER — GABAPENTIN 300 MG/1
300 CAPSULE ORAL 3 TIMES DAILY
Qty: 90 CAPSULE | Refills: 2 | Status: SHIPPED | OUTPATIENT
Start: 2024-12-05

## 2024-12-08 ENCOUNTER — HOSPITAL ENCOUNTER (EMERGENCY)
Facility: HOSPITAL | Age: 64
Discharge: HOME | End: 2024-12-09
Attending: SPECIALIST
Payer: MEDICARE

## 2024-12-08 DIAGNOSIS — R00.1 BRADYCARDIA: ICD-10-CM

## 2024-12-08 DIAGNOSIS — E86.0 MILD DEHYDRATION: Primary | ICD-10-CM

## 2024-12-08 DIAGNOSIS — R55 NEAR SYNCOPE: ICD-10-CM

## 2024-12-08 LAB
BASOPHILS # BLD AUTO: 0.04 X10(3)/MCL
BASOPHILS NFR BLD AUTO: 0.5 %
EOSINOPHIL # BLD AUTO: 0.16 X10(3)/MCL (ref 0–0.9)
EOSINOPHIL NFR BLD AUTO: 2.1 %
ERYTHROCYTE [DISTWIDTH] IN BLOOD BY AUTOMATED COUNT: 12.7 % (ref 11.5–17)
HCT VFR BLD AUTO: 38.3 % (ref 42–52)
HGB BLD-MCNC: 12.5 G/DL (ref 14–18)
IMM GRANULOCYTES # BLD AUTO: 0.01 X10(3)/MCL (ref 0–0.04)
IMM GRANULOCYTES NFR BLD AUTO: 0.1 %
LYMPHOCYTES # BLD AUTO: 1.76 X10(3)/MCL (ref 0.6–4.6)
LYMPHOCYTES NFR BLD AUTO: 23.3 %
MCH RBC QN AUTO: 32.4 PG (ref 27–31)
MCHC RBC AUTO-ENTMCNC: 32.6 G/DL (ref 33–36)
MCV RBC AUTO: 99.2 FL (ref 80–94)
MONOCYTES # BLD AUTO: 0.71 X10(3)/MCL (ref 0.1–1.3)
MONOCYTES NFR BLD AUTO: 9.4 %
NEUTROPHILS # BLD AUTO: 4.86 X10(3)/MCL (ref 2.1–9.2)
NEUTROPHILS NFR BLD AUTO: 64.6 %
PLATELET # BLD AUTO: 210 X10(3)/MCL (ref 130–400)
PMV BLD AUTO: 9.6 FL (ref 7.4–10.4)
RBC # BLD AUTO: 3.86 X10(6)/MCL (ref 4.7–6.1)
WBC # BLD AUTO: 7.54 X10(3)/MCL (ref 4.5–11.5)

## 2024-12-08 PROCEDURE — 85025 COMPLETE CBC W/AUTO DIFF WBC: CPT | Performed by: SPECIALIST

## 2024-12-08 PROCEDURE — 25000242 PHARM REV CODE 250 ALT 637 W/ HCPCS: Performed by: SPECIALIST

## 2024-12-08 PROCEDURE — 94760 N-INVAS EAR/PLS OXIMETRY 1: CPT

## 2024-12-08 PROCEDURE — 83880 ASSAY OF NATRIURETIC PEPTIDE: CPT | Performed by: SPECIALIST

## 2024-12-08 PROCEDURE — 99284 EMERGENCY DEPT VISIT MOD MDM: CPT | Mod: 25

## 2024-12-08 PROCEDURE — 83735 ASSAY OF MAGNESIUM: CPT | Performed by: SPECIALIST

## 2024-12-08 PROCEDURE — 93005 ELECTROCARDIOGRAM TRACING: CPT

## 2024-12-08 PROCEDURE — 93010 ELECTROCARDIOGRAM REPORT: CPT | Mod: ,,, | Performed by: STUDENT IN AN ORGANIZED HEALTH CARE EDUCATION/TRAINING PROGRAM

## 2024-12-08 PROCEDURE — 84443 ASSAY THYROID STIM HORMONE: CPT | Performed by: SPECIALIST

## 2024-12-08 PROCEDURE — 99900031 HC PATIENT EDUCATION (STAT)

## 2024-12-08 PROCEDURE — 80053 COMPREHEN METABOLIC PANEL: CPT | Performed by: SPECIALIST

## 2024-12-08 PROCEDURE — 94640 AIRWAY INHALATION TREATMENT: CPT

## 2024-12-08 PROCEDURE — 84484 ASSAY OF TROPONIN QUANT: CPT | Performed by: SPECIALIST

## 2024-12-08 RX ORDER — IPRATROPIUM BROMIDE AND ALBUTEROL SULFATE 2.5; .5 MG/3ML; MG/3ML
3 SOLUTION RESPIRATORY (INHALATION)
Status: COMPLETED | OUTPATIENT
Start: 2024-12-08 | End: 2024-12-08

## 2024-12-08 RX ADMIN — IPRATROPIUM BROMIDE AND ALBUTEROL SULFATE 3 ML: .5; 3 SOLUTION RESPIRATORY (INHALATION) at 11:12

## 2024-12-09 VITALS
OXYGEN SATURATION: 93 % | DIASTOLIC BLOOD PRESSURE: 64 MMHG | RESPIRATION RATE: 13 BRPM | HEART RATE: 52 BPM | TEMPERATURE: 97 F | WEIGHT: 170 LBS | HEIGHT: 69 IN | SYSTOLIC BLOOD PRESSURE: 107 MMHG | BODY MASS INDEX: 25.18 KG/M2

## 2024-12-09 LAB
ALBUMIN SERPL-MCNC: 3.1 G/DL (ref 3.4–4.8)
ALBUMIN/GLOB SERPL: 0.7 RATIO (ref 1.1–2)
ALP SERPL-CCNC: 71 UNIT/L (ref 40–150)
ALT SERPL-CCNC: 42 UNIT/L (ref 0–55)
AMPHET UR QL SCN: NEGATIVE
ANION GAP SERPL CALC-SCNC: 5 MEQ/L
AST SERPL-CCNC: 40 UNIT/L (ref 5–34)
BACTERIA #/AREA URNS AUTO: NORMAL /HPF
BARBITURATE SCN PRESENT UR: NEGATIVE
BENZODIAZ UR QL SCN: NEGATIVE
BILIRUB SERPL-MCNC: 0.4 MG/DL
BILIRUB UR QL STRIP.AUTO: NEGATIVE
BNP BLD-MCNC: 108.2 PG/ML
BUN SERPL-MCNC: 30.8 MG/DL (ref 8.4–25.7)
CALCIUM SERPL-MCNC: 8.3 MG/DL (ref 8.8–10)
CANNABINOIDS UR QL SCN: NEGATIVE
CHLORIDE SERPL-SCNC: 108 MMOL/L (ref 98–107)
CLARITY UR: CLEAR
CO2 SERPL-SCNC: 27 MMOL/L (ref 23–31)
COCAINE UR QL SCN: NEGATIVE
COLOR UR AUTO: NORMAL
CREAT SERPL-MCNC: 2.09 MG/DL (ref 0.72–1.25)
CREAT/UREA NIT SERPL: 15
FENTANYL UR QL SCN: NEGATIVE
FLUAV AG UPPER RESP QL IA.RAPID: NOT DETECTED
FLUBV AG UPPER RESP QL IA.RAPID: NOT DETECTED
GFR SERPLBLD CREATININE-BSD FMLA CKD-EPI: 34 ML/MIN/1.73/M2
GLOBULIN SER-MCNC: 4.6 GM/DL (ref 2.4–3.5)
GLUCOSE SERPL-MCNC: 120 MG/DL (ref 82–115)
GLUCOSE UR QL STRIP: NEGATIVE
HGB UR QL STRIP: NEGATIVE
KETONES UR QL STRIP: NEGATIVE
LEUKOCYTE ESTERASE UR QL STRIP: NEGATIVE
MAGNESIUM SERPL-MCNC: 2.4 MG/DL (ref 1.6–2.6)
NITRITE UR QL STRIP: NEGATIVE
OHS QRS DURATION: 90 MS
OHS QTC CALCULATION: 485 MS
OPIATES UR QL SCN: NEGATIVE
PCP UR QL: NEGATIVE
PH UR STRIP: 6 [PH]
PH UR: 6 [PH] (ref 3–11)
POTASSIUM SERPL-SCNC: 4.7 MMOL/L (ref 3.5–5.1)
PROT SERPL-MCNC: 7.7 GM/DL (ref 5.8–7.6)
PROT UR QL STRIP: NEGATIVE
RBC #/AREA URNS AUTO: NORMAL /HPF
SARS-COV-2 RNA RESP QL NAA+PROBE: NOT DETECTED
SODIUM SERPL-SCNC: 140 MMOL/L (ref 136–145)
SP GR UR STRIP.AUTO: 1.02 (ref 1–1.03)
SPECIFIC GRAVITY, URINE AUTO (.000) (OHS): 1.02 (ref 1–1.03)
SQUAMOUS #/AREA URNS AUTO: NORMAL /HPF
TROPONIN I SERPL-MCNC: <0.01 NG/ML (ref 0–0.04)
TSH SERPL-ACNC: 3.36 UIU/ML (ref 0.35–4.94)
UROBILINOGEN UR STRIP-ACNC: 1
WBC #/AREA URNS AUTO: NORMAL /HPF

## 2024-12-09 PROCEDURE — 0240U COVID/FLU A&B PCR: CPT | Performed by: SPECIALIST

## 2024-12-09 PROCEDURE — 81001 URINALYSIS AUTO W/SCOPE: CPT | Performed by: SPECIALIST

## 2024-12-09 PROCEDURE — 80307 DRUG TEST PRSMV CHEM ANLYZR: CPT | Performed by: SPECIALIST

## 2024-12-09 NOTE — ED PROVIDER NOTES
"Encounter Date: 12/8/2024       History     Chief Complaint   Patient presents with    Fatigue     Weakness/ dizziness that stared this AM. CO chest "tightness" in  his "solar plexus area"     64-year-old male presents complaining of weakness in his legs, headache, dizziness, near-syncope, chest tightness and fatigue; he states he laid down several hours ago to take a nap because his legs felt weak; he states when he woke up about an hour ago he went to stand up and felt like he might pass out, felt dizzy has a headache and had some bilateral lower chest pressure; patient denied any nausea, no shortness of breath, no cough, no fever; his past medical history of COPD, HLD, HTN, mi/CAD, intra-aortic balloon pump, ICD placement, DEON, peripheral arterial disease with stents, alcoholic cirrhosis, CHF with EF 40%;  Patient has had episodes similar to this in the past    The history is provided by the patient and medical records.     Review of patient's allergies indicates:  No Known Allergies  Past Medical History:   Diagnosis Date    Alcoholic cirrhosis     Anemia     CHF (congestive heart failure)     LV EF 40%    COPD (chronic obstructive pulmonary disease)     Hyperlipidemia     Hypertension     Myocardial infarction     Peripheral artery disease     with stents    Sleep apnea      Past Surgical History:   Procedure Laterality Date    ANGIOGRAM, ABDOMINAL AORTA  10/24/2023    Procedure: Angiogram, Abdominal Aorta;  Surgeon: Janette Ernandez MD;  Location: [facility] CATH LAB;  Service: Cardiology;;    ARTERIOGRAPHY OF SUBCLAVIAN ARTERY Left 10/24/2023    Procedure: ARTERIOGRAM, SUBCLAVIAN;  Surgeon: Janette Ernandez MD;  Location: [facility] CATH LAB;  Service: Cardiology;  Laterality: Left;    CAPSULE ENDOSCOPY, ESOPHAGUS THROUGH ILEUM N/A 11/20/2023    Procedure: M2A;  Surgeon: Johan Flower MD;  Location[facility]COPY;  Service: Gastroenterology;  Laterality: N/A;  To be done at any time today when staff is " available.    CARDIAC DEFIBRILLATOR PLACEMENT N/A 11/28/2023    Procedure: Insertion, Single chamber ICD (SJM);  Surgeon: Suyapa Oakes MD;  Location: [facility] CATH LAB;  Service: Cardiology;  Laterality: N/A;    CATHETERIZATION OF BOTH LEFT AND RIGHT HEART N/A 10/24/2023    Procedure: CATHETERIZATION, HEART, BOTH LEFT AND RIGHT;  Surgeon: Janette Ernandez MD;  Location: [facility] CATH LAB;  Service: Cardiology;  Laterality: N/A;    COLONOSCOPY N/A 6/10/2023    Procedure: COLONOSCOPY;  Surgeon: Johan Flower MD;  Location: [facility] OR;  Service: Gastroenterology;  Laterality: N/A;    EGD, WITH CLOSED BIOPSY N/A 12/18/2023    Procedure: EGD;  Surgeon: Johan Flower MD;  Location: [facility] ENDOSCOPY;  Service: Gastroenterology;  Laterality: N/A;    ESOPHAGOGASTRODUODENOSCOPY N/A 6/9/2023    Procedure: EGD;  Surgeon: Tima Teixeira MD;  Location: [facility] ENDOSCOPY;  Service: Gastroenterology;  Laterality: N/A;    ESOPHAGOGASTRODUODENOSCOPY N/A 1/11/2024    Procedure: EGD (ESOPHAGOGASTRODUODENOSCOPY);  Surgeon: Chaya Castellon MD;  Location: [facility];  Service: Endoscopy;  Laterality: N/A;  POST OP: MILD GASTRITIS W/ NO ULCER    ESOPHAGOGASTRODUODENOSCOPY N/A 4/24/2024    Procedure: EGD W/ M2A PLACEMENT;  Surgeon: Johan Flower MD;  Location: [facility] ENDOSCOPY;  Service: Gastroenterology;  Laterality: N/A;    INSERTION OF INTRA-AORTIC BALLOON ASSIST DEVICE  10/24/2023    Procedure: INSERTION, INTRA-AORTIC BALLOON PUMP;  Surgeon: Janette Ernandez MD;  Location: [facility] CATH LAB;  Service: Cardiology;;    INSERTION OF INTRAVASCULAR MICROAXIAL BLOOD PUMP N/A 10/26/2023    Procedure: INSERTION, IMPELLA;  Surgeon: Janette Ernandez MD;  Location: [facility] CATH LAB;  Service: Cardiology;  Laterality: N/A;    INSTANTANEOUS WAVE-FREE RATIO (IFR) N/A 10/26/2023    Procedure: Instantaneous Wave-Free Ratio (IFR);  Surgeon: Janette Ernandez MD;  Location: [facility] CATH LAB;  Service: Cardiology;  Laterality: N/A;     INTRALUMINAL GASTROINTESTINAL TRACT IMAGING VIA CAPSULE N/A 4/24/2024    Procedure: M2A;  Surgeon: Johan Flower MD;  Location: [facility] ENDOSCOPY;  Service: Gastroenterology;  Laterality: N/A;    INTRAVASCULAR ULTRASOUND, CORONARY N/A 10/26/2023    Procedure: Ultrasound-coronary;  Surgeon: Janette Ernandez MD;  Location: [facility] CATH LAB;  Service: Cardiology;  Laterality: N/A;    PERCUTANEOUS CORONARY INTERVENTION, ARTERY N/A 10/26/2023    Procedure: Percutaneous coronary intervention- with Impella;  Surgeon: Janette Ernandez MD;  Location: [facility] CATH LAB;  Service: Cardiology;  Laterality: N/A;    PLACEMENT, IABP  10/24/2023    Procedure: Placement, IABP;  Surgeon: Janette Ernandez MD;  Location: [facility] CATH LAB;  Service: Cardiology;;     No family history on file.  Social History     Tobacco Use    Smoking status: Every Day     Current packs/day: 0.25     Average packs/day: 0.3 packs/day for 50.9 years (12.7 ttl pk-yrs)     Types: Vaping with nicotine, Cigarettes     Start date: 1/10/1974    Smokeless tobacco: Former     Types: Chew   Substance Use Topics    Alcohol use: Yes     Comment: currently not drinking per pt. 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 Quit drinking in 10/2023.    Drug use: Not Currently     Review of Systems   Constitutional: Negative.    HENT: Negative.     Respiratory: Negative.     Cardiovascular: Negative.    Gastrointestinal: Negative.    Genitourinary: Negative.    Musculoskeletal: Negative.    Neurological:  Positive for dizziness, syncope and weakness.       Physical Exam     Initial Vitals [12/08/24 2328]   BP Pulse Resp Temp SpO2   112/61 61 20 97.4 °F (36.3 °C) 95 %      MAP       --         Physical Exam    Nursing note and vitals reviewed.  Constitutional: He appears well-developed and well-nourished.   HENT:   Head: Normocephalic and atraumatic.   Eyes: EOM are normal. Pupils are equal, round, and reactive to light.   Neck: Neck supple.   Normal range of motion.  Cardiovascular:  Regular  rhythm and normal heart sounds.   Bradycardia present.         Pulmonary/Chest: He has wheezes (bilateral, expiratory, mild-to-moderate).   Abdominal: Abdomen is soft. He exhibits no distension. There is no abdominal tenderness.   Reducible umbilical hernia, nontender   There is no guarding.   Musculoskeletal:         General: Normal range of motion.      Cervical back: Normal range of motion and neck supple.     Neurological: He is alert and oriented to person, place, and time. He has normal strength. GCS score is 15. GCS eye subscore is 4. GCS verbal subscore is 5. GCS motor subscore is 6.   Skin: Skin is warm and dry.         ED Course   Procedures  Labs Reviewed   COMPREHENSIVE METABOLIC PANEL - Abnormal       Result Value    Sodium 140      Potassium 4.7      Chloride 108 (*)     CO2 27      Glucose 120 (*)     Blood Urea Nitrogen 30.8 (*)     Creatinine 2.09 (*)     Calcium 8.3 (*)     Protein Total 7.7 (*)     Albumin 3.1 (*)     Globulin 4.6 (*)     Albumin/Globulin Ratio 0.7 (*)     Bilirubin Total 0.4      ALP 71      ALT 42      AST 40 (*)     eGFR 34      Anion Gap 5.0      BUN/Creatinine Ratio 15     CBC WITH DIFFERENTIAL - Abnormal    WBC 7.54      RBC 3.86 (*)     Hgb 12.5 (*)     Hct 38.3 (*)     MCV 99.2 (*)     MCH 32.4 (*)     MCHC 32.6 (*)     RDW 12.7      Platelet 210      MPV 9.6      Neut % 64.6      Lymph % 23.3      Mono % 9.4      Eos % 2.1      Basophil % 0.5      Lymph # 1.76      Neut # 4.86      Mono # 0.71      Eos # 0.16      Baso # 0.04      IG# 0.01      IG% 0.1     B-TYPE NATRIURETIC PEPTIDE - Abnormal    Natriuretic Peptide 108.2 (*)    TROPONIN I - Normal    Troponin-I <0.010     TSH - Normal    TSH 3.362     URINALYSIS, REFLEX TO URINE CULTURE - Normal    Color, UA Straw      Appearance, UA Clear      Specific Gravity, UA 1.020      pH, UA 6.0      Protein, UA Negative      Glucose, UA Negative      Ketones, UA Negative      Blood, UA Negative      Bilirubin, UA Negative       Urobilinogen, UA 1.0      Nitrites, UA Negative      Leukocyte Esterase, UA Negative     DRUG SCREEN, URINE (BEAKER) - Normal    Amphetamines, Urine Negative      Barbiturates, Urine Negative      Benzodiazepine, Urine Negative      Cannabinoids, Urine Negative      Cocaine, Urine Negative      Opiates, Urine Negative      Phencyclidine, Urine Negative      Fentanyl, Urine Negative      pH, Urine 6.0      Specific Gravity, Urine Auto 1.020      Narrative:     Cut off concentrations:    Amphetamines - 1000 ng/ml  Barbiturates - 200 ng/ml  Benzodiazepine - 200 ng/ml  Cannabinoids (THC) - 50 ng/ml  Cocaine - 300 ng/ml  Fentanyl - 1.0 ng/ml  MDMA - 500 ng/ml  Opiates - 300 ng/ml   Phencyclidine (PCP) - 25 ng/ml    Specimen submitted for drug analysis and tested for pH and specific gravity in order to evaluate sample integrity. Suspect tampering if specific gravity is <1.003 and/or pH is not within the range of 4.5 - 8.0  False negatives may result form substances such as bleach added to urine.  False positives may result for the presence of a substance with similar chemical structure to the drug or its metabolite.    This test provides only a PRELIMINARY analytical test result. A more specific alternate chemical method must be used in order to obtain a confirmed analytical result. Gas chromatography/mass spectrometry (GC/MS) is the preferred confirmatory method. Other chemical confirmation methods are available. Clinical consideration and professional judgement should be applied to any drug of abuse test result, particularly when preliminary positive results are used.    Positive results will be confirmed only at the physicians request. Unconfirmed screening results are to be used only for medical purposes (treatment).        MAGNESIUM - Normal    Magnesium Level 2.40     URINALYSIS, MICROSCOPIC - Normal    Bacteria, UA None Seen      RBC, UA None Seen      WBC, UA None Seen      Squamous Epithelial Cells, UA Rare      CBC W/ AUTO DIFFERENTIAL    Narrative:     The following orders were created for panel order CBC auto differential.  Procedure                               Abnormality         Status                     ---------                               -----------         ------                     CBC with Differential[[id number]]       Abnormal            Final result                 Please view results for these tests on the individual orders.   COVID/FLU A&B PCR     EKG Readings: (Independently Interpreted)   Rhythm: Sinus Bradycardia. Heart Rate: 54. Ectopy: No Ectopy. Conduction: Normal. ST Segments: Normal ST Segments. T Waves: Normal. Clinical Impression: Normal Sinus Rhythm   Prolonged QT       Imaging Results    None          Medications   albuterol-ipratropium 2.5 mg-0.5 mg/3 mL nebulizer solution 3 mL (3 mLs Nebulization Given 12/8/24 8410)     Medical Decision Making  64-year-old male presents complaining of weakness in his legs, headache, dizziness, near-syncope, chest tightness and fatigue; he states he laid down several hours ago to take a nap because his legs felt weak; he states when he woke up about an hour ago he went to stand up and felt like he might pass out, felt dizzy has a headache and had some bilateral lower chest pressure; patient denied any nausea, no shortness of breath, no cough, no fever; his past medical history of COPD, HLD, HTN, mi/CAD, intra-aortic balloon pump, ICD placement, DEON, peripheral arterial disease with stents, alcoholic cirrhosis, CHF with EF 40%    DIFFERENTIAL DIAGNOSIS- dehydration, electrolyte abnormality, ACS, anemia, dysrhythmia    Amount and/or Complexity of Data Reviewed  External Data Reviewed: labs, ECG and notes.  Labs: ordered. Decision-making details documented in ED Course.  ECG/medicine tests: ordered and independent interpretation performed. Decision-making details documented in ED Course.    Risk  Prescription drug management.  Risk Details: Patient is  "hemodynamically stable; his labs indicate mild dehydration and I discussed hydration with the patient; encouraged follow up with his primary care physician within the next week       Patient Vitals for the past 24 hrs:   BP Temp Pulse Resp SpO2 Height Weight   12/08/24 2346 -- -- 62 18 95 % -- --   12/08/24 2328 112/61 97.4 °F (36.3 °C) 61 20 95 % 5' 9" (1.753 m) 77.1 kg (170 lb)                 The patient is resting comfortably and in no acute distress.   He states that his symptoms have improved after treatment in Emergency Department. I personally discussed his test results and treatment plan.  Gave strict ED precautions.  Specific conditions for return to the emergency department and importance of follow up with his primary care provided or the physician listed on the discharge instructions.  Patient voices understanding and agrees to the plan discussed. All patients' questions have been answered at this time.   He has remained hemodynamically stable throughout entire stay in ED and is stable for discharge home.                   Clinical Impression:  Final diagnoses:  [R55] Near syncope  [E86.0] Mild dehydration (Primary)  [R00.1] Bradycardia          ED Disposition Condition    Discharge Stable          ED Prescriptions    None       Follow-up Information       Follow up With Specialties Details Why Contact Info    Ivette Hearn, FNP Family Medicine  Scheduled appointment [address]  [facility] [place] [address]  [phone]               Grabiel Hanson MD  12/09/24 0134    "

## 2024-12-10 ENCOUNTER — PATIENT OUTREACH (OUTPATIENT)
Dept: EMERGENCY MEDICINE | Facility: HOSPITAL | Age: 64
End: 2024-12-10
Payer: MEDICARE

## 2024-12-10 DIAGNOSIS — Z00.00 WELLNESS EXAMINATION: ICD-10-CM

## 2024-12-10 DIAGNOSIS — Z12.5 SCREENING PSA (PROSTATE SPECIFIC ANTIGEN): ICD-10-CM

## 2024-12-10 DIAGNOSIS — D64.9 ANEMIA, UNSPECIFIED TYPE: Primary | ICD-10-CM

## 2024-12-10 DIAGNOSIS — N17.9 AKI (ACUTE KIDNEY INJURY): ICD-10-CM

## 2024-12-10 NOTE — PROGRESS NOTES
Patient declined assistance making a follow-up appointment with [facility] PCP at this time. Mr Recio stated he would wait until his follow up on 12/23/24 at 2 pm for his ED follow up and discuss his blood pressure medication with his PCP. Patient denies any new needs or needing any additional assistance at this time.

## 2024-12-17 ENCOUNTER — TELEPHONE (OUTPATIENT)
Dept: FAMILY MEDICINE | Facility: CLINIC | Age: 64
End: 2024-12-17
Payer: MEDICARE

## 2024-12-17 NOTE — TELEPHONE ENCOUNTER
----- Message from LINA Sears sent at 12/15/2024 10:34 PM CST -----  Hold meds until advised by Cardio / send message to cardio  and follow up with cardio . Med prescribed by [facility] MD- so they can be aware of his symptoms and make ultimate decision on stopping med  ----- Message -----  From: Urszula Ashraf LPN  Sent: 12/10/2024   3:22 PM CST  To: LINA Casarez    Spoke with patient.  States at home BP was 71/50s, went to ER, /61.  Dx with dehydration, bradycardia.  Stopped metoprolol and his symptoms of syncope, weakness and blurred vision have resolved.  Wants to know if he should continue to hold or stay on metoprolol?  Has appt on 12/23/24 for wellness with labs.  Please advise.  ----- Message -----  From: Rossana Dennis  Sent: 12/10/2024  12:35 PM CST  To: Dhiraj Steele Staff    Who Called: Hemal Tolbert Jr.    Caller is requesting assistance/information from provider's office.    Symptoms (please be specific):      How long has patient had these symptoms:      Rx name: metoprolol succinate (TOPROL-XL) 25 MG 24 hr tablet    List of preferred pharmacies on file (remove unneeded): [unfilled]  If different, enter pharmacy into here including location and phone number:         Preferred Method of Contact: Phone Call  Patient's Preferred Phone Number on File: [phone]   Best Call Back Number, if different:  Additional Information: Pt wants to know if he has to take the medication listed above; he stated that he hasn't taken it in 2 days and he feels better overall and his vision is better as well; please advise.

## 2024-12-17 NOTE — TELEPHONE ENCOUNTER
Spoke with Frances Sanchez [facility], they will call patient today and address patients symptoms and medications (metoprolol).

## 2024-12-20 ENCOUNTER — PATIENT OUTREACH (OUTPATIENT)
Dept: EMERGENCY MEDICINE | Facility: HOSPITAL | Age: 64
End: 2024-12-20
Payer: MEDICARE

## 2024-12-23 DIAGNOSIS — N40.0 BENIGN PROSTATIC HYPERPLASIA, UNSPECIFIED WHETHER LOWER URINARY TRACT SYMPTOMS PRESENT: ICD-10-CM

## 2024-12-23 RX ORDER — TAMSULOSIN HYDROCHLORIDE 0.4 MG/1
1 CAPSULE ORAL
Qty: 30 CAPSULE | Refills: 5 | Status: SHIPPED | OUTPATIENT
Start: 2024-12-23

## 2025-01-13 NOTE — TELEPHONE ENCOUNTER
08/20/24. Successful Call Reminder - remind patient of [facility] appointment sept 5th labs at 8:30 am , sept 16 f/u at 1:50. on his appointment with us yesterday he didn't remember .    [Negative] : Musculoskeletal [Neuropathy] : neuropathy [Fatigue] : fatigue [FreeTextEntry2] : slight numbness/tingling in both feet [FreeTextEntry7] : Overall 15lb weight loss over the course of chemo d/t loss of appetite. Fatigue improving.

## 2025-03-11 ENCOUNTER — TELEPHONE (OUTPATIENT)
Dept: INFECTIOUS DISEASES | Facility: CLINIC | Age: 65
End: 2025-03-11
Payer: MEDICARE

## 2025-03-11 DIAGNOSIS — D64.9 ANEMIA, UNSPECIFIED TYPE: Primary | ICD-10-CM

## 2025-03-11 DIAGNOSIS — Z76.0 MEDICATION REFILL: ICD-10-CM

## 2025-03-11 DIAGNOSIS — Z00.00 WELLNESS EXAMINATION: ICD-10-CM

## 2025-03-11 DIAGNOSIS — Z12.5 SCREENING PSA (PROSTATE SPECIFIC ANTIGEN): ICD-10-CM

## 2025-03-11 DIAGNOSIS — B18.2 CHRONIC HEPATITIS C WITHOUT HEPATIC COMA: Primary | ICD-10-CM

## 2025-03-11 RX ORDER — GABAPENTIN 300 MG/1
CAPSULE ORAL
Qty: 90 CAPSULE | Refills: 2 | Status: SHIPPED | OUTPATIENT
Start: 2025-03-11

## 2025-03-12 DIAGNOSIS — J44.9 CHRONIC OBSTRUCTIVE PULMONARY DISEASE, UNSPECIFIED COPD TYPE: ICD-10-CM

## 2025-03-12 RX ORDER — TIOTROPIUM BROMIDE AND OLODATEROL 3.124; 2.736 UG/1; UG/1
SPRAY, METERED RESPIRATORY (INHALATION)
Qty: 4 G | Refills: 4 | Status: SHIPPED | OUTPATIENT
Start: 2025-03-12

## 2025-03-13 ENCOUNTER — LAB VISIT (OUTPATIENT)
Dept: LAB | Facility: HOSPITAL | Age: 65
End: 2025-03-13
Attending: NURSE PRACTITIONER
Payer: MEDICARE

## 2025-03-13 ENCOUNTER — RESULTS FOLLOW-UP (OUTPATIENT)
Dept: FAMILY MEDICINE | Facility: CLINIC | Age: 65
End: 2025-03-13

## 2025-03-13 DIAGNOSIS — Z00.00 WELLNESS EXAMINATION: ICD-10-CM

## 2025-03-13 DIAGNOSIS — Z12.5 SCREENING PSA (PROSTATE SPECIFIC ANTIGEN): ICD-10-CM

## 2025-03-13 DIAGNOSIS — D64.9 ANEMIA, UNSPECIFIED TYPE: ICD-10-CM

## 2025-03-13 LAB
25(OH)D3+25(OH)D2 SERPL-MCNC: 12 NG/ML (ref 30–80)
ALBUMIN SERPL-MCNC: 3.4 G/DL (ref 3.4–4.8)
ALBUMIN/GLOB SERPL: 0.7 RATIO (ref 1.1–2)
ALP SERPL-CCNC: 69 UNIT/L (ref 40–150)
ALT SERPL-CCNC: 56 UNIT/L (ref 0–55)
ANION GAP SERPL CALC-SCNC: 6 MEQ/L
AST SERPL-CCNC: 46 UNIT/L (ref 5–34)
BASOPHILS # BLD AUTO: 0.04 X10(3)/MCL
BASOPHILS NFR BLD AUTO: 0.5 %
BILIRUB SERPL-MCNC: 0.6 MG/DL
BUN SERPL-MCNC: 32.7 MG/DL (ref 8.4–25.7)
CALCIUM SERPL-MCNC: 8.8 MG/DL (ref 8.8–10)
CHLORIDE SERPL-SCNC: 105 MMOL/L (ref 98–107)
CHOLEST SERPL-MCNC: 79 MG/DL
CHOLEST/HDLC SERPL: 4 {RATIO} (ref 0–5)
CO2 SERPL-SCNC: 26 MMOL/L (ref 23–31)
CREAT SERPL-MCNC: 1.68 MG/DL (ref 0.72–1.25)
CREAT/UREA NIT SERPL: 19
EOSINOPHIL # BLD AUTO: 0.14 X10(3)/MCL (ref 0–0.9)
EOSINOPHIL NFR BLD AUTO: 1.8 %
ERYTHROCYTE [DISTWIDTH] IN BLOOD BY AUTOMATED COUNT: 12.8 % (ref 11.5–17)
EST. AVERAGE GLUCOSE BLD GHB EST-MCNC: 114 MG/DL
GFR SERPLBLD CREATININE-BSD FMLA CKD-EPI: 45 ML/MIN/1.73/M2
GLOBULIN SER-MCNC: 5.1 GM/DL (ref 2.4–3.5)
GLUCOSE SERPL-MCNC: 116 MG/DL (ref 82–115)
HBA1C MFR BLD: 5.6 %
HCT VFR BLD AUTO: 39.4 % (ref 42–52)
HDLC SERPL-MCNC: 20 MG/DL (ref 35–60)
HGB BLD-MCNC: 12.8 G/DL (ref 14–18)
IMM GRANULOCYTES # BLD AUTO: 0.01 X10(3)/MCL (ref 0–0.04)
IMM GRANULOCYTES NFR BLD AUTO: 0.1 %
LDLC SERPL CALC-MCNC: 40 MG/DL (ref 50–140)
LYMPHOCYTES # BLD AUTO: 1.46 X10(3)/MCL (ref 0.6–4.6)
LYMPHOCYTES NFR BLD AUTO: 18.4 %
MCH RBC QN AUTO: 32.7 PG (ref 27–31)
MCHC RBC AUTO-ENTMCNC: 32.5 G/DL (ref 33–36)
MCV RBC AUTO: 100.5 FL (ref 80–94)
MONOCYTES # BLD AUTO: 0.8 X10(3)/MCL (ref 0.1–1.3)
MONOCYTES NFR BLD AUTO: 10.1 %
NEUTROPHILS # BLD AUTO: 5.47 X10(3)/MCL (ref 2.1–9.2)
NEUTROPHILS NFR BLD AUTO: 69.1 %
PLATELET # BLD AUTO: 209 X10(3)/MCL (ref 130–400)
PMV BLD AUTO: 10.4 FL (ref 7.4–10.4)
POTASSIUM SERPL-SCNC: 5 MMOL/L (ref 3.5–5.1)
PROT SERPL-MCNC: 8.5 GM/DL (ref 5.8–7.6)
PSA SERPL-MCNC: <0.1 NG/ML
RBC # BLD AUTO: 3.92 X10(6)/MCL (ref 4.7–6.1)
SODIUM SERPL-SCNC: 137 MMOL/L (ref 136–145)
TRIGL SERPL-MCNC: 95 MG/DL (ref 34–140)
TSH SERPL-ACNC: 1.72 UIU/ML (ref 0.35–4.94)
VLDLC SERPL CALC-MCNC: 19 MG/DL
WBC # BLD AUTO: 7.92 X10(3)/MCL (ref 4.5–11.5)

## 2025-03-13 PROCEDURE — 84153 ASSAY OF PSA TOTAL: CPT

## 2025-03-13 PROCEDURE — 36415 COLL VENOUS BLD VENIPUNCTURE: CPT

## 2025-03-13 PROCEDURE — 84443 ASSAY THYROID STIM HORMONE: CPT

## 2025-03-13 PROCEDURE — 80053 COMPREHEN METABOLIC PANEL: CPT

## 2025-03-13 PROCEDURE — 80061 LIPID PANEL: CPT

## 2025-03-13 PROCEDURE — 83036 HEMOGLOBIN GLYCOSYLATED A1C: CPT

## 2025-03-13 PROCEDURE — 85025 COMPLETE CBC W/AUTO DIFF WBC: CPT

## 2025-03-13 PROCEDURE — 82306 VITAMIN D 25 HYDROXY: CPT

## 2025-03-13 NOTE — PROGRESS NOTES
Therapy Activity Session  Performed by Rehab Aide staff     TEP: AcuteTherapy Extention Program, activity plan was established by a licensed therapist and performed under the guidance of a licensed therapist.      Pt seen on 12T nursing unit                                                                                         PT Frequency Frequency Comments: TF 1/2 by 12/20 ROSALINDA (TEP M-F reassess 12/20)                                                                                  OT Frequency Frequency Comments: TF 1/1-2 by 12/20 ASHWINI BARRERA (TEP daily to re-assess 12/20)    SLP Frequency      Availability:  Patient available and per RN report, able to particiate.     Tolerance/Participation  Tolerated well, no shortness of breath or signs of discomfort., Participated and followed direction well during session.    SESSION    Activities/Exercises/Mobility completed this session:  Physical Therapy Exercises    TEP Follow Up Needed: Yes  Therapy Extender Program Discipline: OT     PT Session Length (min): 10 minutes (12/16/22 1109)  PT Frequency: TEPM-F,PT M re assess 12/12 Rosalinda M [id number]    PT Task 1: supine heel slides or seated hip flexion bilat  PT Reps for Task 1: 10  PT Sets for Task 1: 1  PT Resistance for Task 1: AAROM  PT Task 1 Completion: Completed (12/16/22 1109)    PT Task 2: supine quad sets or seated knee extension bilat  PT Reps for Task 2: 10  PT Sets for Task 2: 1  PT Resistance for Task 2: AROM/AAROM  PT Task 2 Completion: Completed (12/16/22 1109)    PT Task 3: ankle pumps bilat  PT Reps for Task 3: 10  PT Sets for Task 3: 1  PT Resistance for Task 3: AROM  PT Task 3 Completion: Completed (12/16/22 1109)       PT Task 4: hip abduction bilat  PT Reps for Task 4: 10  PT Resistance for Task 4: AAROM  PT Task 4 Completion: Completed (12/16/22 1109)    Occupational Therapy Exercises    TEP Follow Up Needed: Yes  Therapy Extender Program Discipline: OT     OT Session Length (min): 10 minutes (12/16/22  Labs reviewed. Has upcoming appt. Will discuss at visit.    1109)  OT Frequency: TEP daily, OT to re-assess 12/20    OT Task 1: BUE shld flex/ext  OT Reps for Task 1: 10  OT Sets for Task 1: 1  OT Resistance for Task 1: AROM/AAROM  OT Task 1 Completion: Completed (12/16/22 1109)    OT Task 2: B shld abd/add  OT Reps for Task 2: 10  OT Sets for Task 2: 1  OT Resistance for Task 2: AROM/AAROM  OT Task 2 Completion: Completed (12/16/22 1109)    OT Task 3: B h abduction  OT Reps for Task 3: 10  OT Sets for Task 3: 1  OT Resistance for Task 3: AROM/AAROM  OT Task 3 Completion: Completed       Speech Therapy Exercises    TEP Follow Up Needed: Yes

## 2025-03-17 ENCOUNTER — HOSPITAL ENCOUNTER (OUTPATIENT)
Dept: RADIOLOGY | Facility: HOSPITAL | Age: 65
Discharge: HOME | End: 2025-03-17
Attending: NURSE PRACTITIONER
Payer: MEDICARE

## 2025-03-17 DIAGNOSIS — B18.2 CHRONIC HEPATITIS C WITHOUT HEPATIC COMA: ICD-10-CM

## 2025-03-17 PROCEDURE — 76705 ECHO EXAM OF ABDOMEN: CPT | Mod: TC

## 2025-03-26 ENCOUNTER — HOSPITAL ENCOUNTER (EMERGENCY)
Facility: HOSPITAL | Age: 65
Discharge: HOME | End: 2025-03-26
Attending: FAMILY MEDICINE
Payer: MEDICARE

## 2025-03-26 VITALS
HEIGHT: 69 IN | SYSTOLIC BLOOD PRESSURE: 105 MMHG | OXYGEN SATURATION: 94 % | TEMPERATURE: 98 F | WEIGHT: 170 LBS | BODY MASS INDEX: 25.18 KG/M2 | HEART RATE: 61 BPM | RESPIRATION RATE: 16 BRPM | DIASTOLIC BLOOD PRESSURE: 52 MMHG

## 2025-03-26 DIAGNOSIS — J18.9 PNEUMONIA OF RIGHT LOWER LOBE DUE TO INFECTIOUS ORGANISM: Primary | ICD-10-CM

## 2025-03-26 DIAGNOSIS — R53.1 WEAKNESS: ICD-10-CM

## 2025-03-26 DIAGNOSIS — A41.9 SEPSIS: ICD-10-CM

## 2025-03-26 LAB
ALBUMIN SERPL-MCNC: 4.2 G/DL (ref 3.4–5)
ALBUMIN/GLOB SERPL: 1.1 RATIO
ALP SERPL-CCNC: 67 UNIT/L (ref 50–144)
ALT SERPL-CCNC: 56 UNIT/L (ref 1–45)
ANION GAP SERPL CALC-SCNC: 6 MEQ/L (ref 2–13)
AST SERPL-CCNC: 73 UNIT/L (ref 17–59)
BACTERIA #/AREA URNS AUTO: NORMAL /HPF
BASOPHILS # BLD AUTO: 0.05 X10(3)/MCL (ref 0.01–0.08)
BASOPHILS NFR BLD AUTO: 0.6 % (ref 0.1–1.2)
BILIRUB SERPL-MCNC: 0.8 MG/DL (ref 0–1)
BILIRUB UR QL STRIP.AUTO: NEGATIVE
BNP BLD-MCNC: 1640 PG/ML (ref 0–124.9)
BUN SERPL-MCNC: 24 MG/DL (ref 7–20)
CALCIUM SERPL-MCNC: 8 MG/DL (ref 8.4–10.2)
CHLORIDE SERPL-SCNC: 99 MMOL/L (ref 98–110)
CLARITY UR: CLEAR
CO2 SERPL-SCNC: 27 MMOL/L (ref 21–32)
COLOR UR AUTO: YELLOW
CREAT SERPL-MCNC: 2.41 MG/DL (ref 0.66–1.25)
CREAT/UREA NIT SERPL: 10 (ref 12–20)
EOSINOPHIL # BLD AUTO: 0.12 X10(3)/MCL (ref 0.04–0.54)
EOSINOPHIL NFR BLD AUTO: 1.5 % (ref 0.7–7)
ERYTHROCYTE [DISTWIDTH] IN BLOOD BY AUTOMATED COUNT: 13 %
FLUAV AG UPPER RESP QL IA.RAPID: NOT DETECTED
FLUBV AG UPPER RESP QL IA.RAPID: NOT DETECTED
GFR SERPLBLD CREATININE-BSD FMLA CKD-EPI: 29 ML/MIN/1.73/M2
GLOBULIN SER-MCNC: 3.8 GM/DL (ref 2–3.9)
GLUCOSE SERPL-MCNC: 81 MG/DL (ref 70–115)
GLUCOSE UR QL STRIP: NEGATIVE
HCT VFR BLD AUTO: 33.5 % (ref 36–52)
HGB BLD-MCNC: 11.4 G/DL (ref 13–18)
HGB UR QL STRIP: ABNORMAL
IMM GRANULOCYTES # BLD AUTO: 0.04 X10(3)/MCL (ref 0–0.03)
IMM GRANULOCYTES NFR BLD AUTO: 0.5 % (ref 0–0.5)
KETONES UR QL STRIP: NEGATIVE
LACTATE SERPL-SCNC: 1.5 MMOL/L (ref 0.4–2)
LEUKOCYTE ESTERASE UR QL STRIP: NEGATIVE
LYMPHOCYTES # BLD AUTO: 1.24 X10(3)/MCL (ref 1.32–3.57)
LYMPHOCYTES NFR BLD AUTO: 15.8 % (ref 20–55)
MAGNESIUM SERPL-MCNC: 2 MG/DL (ref 1.8–2.4)
MCH RBC QN AUTO: 32.8 PG (ref 27–34)
MCHC RBC AUTO-ENTMCNC: 34 G/DL (ref 31–37)
MCV RBC AUTO: 96.3 FL (ref 79–99)
MONOCYTES # BLD AUTO: 0.71 X10(3)/MCL (ref 0.3–0.82)
MONOCYTES NFR BLD AUTO: 9.1 % (ref 4.7–12.5)
NEUTROPHILS # BLD AUTO: 5.67 X10(3)/MCL (ref 1.78–5.38)
NEUTROPHILS NFR BLD AUTO: 72.5 % (ref 37–73)
NITRITE UR QL STRIP: NEGATIVE
NRBC BLD AUTO-RTO: 0 %
PH UR STRIP: 6 [PH]
PLATELET # BLD AUTO: 201 X10(3)/MCL (ref 140–371)
PMV BLD AUTO: 9.4 FL (ref 9.4–12.4)
POTASSIUM SERPL-SCNC: 4.7 MMOL/L (ref 3.5–5.1)
PROT SERPL-MCNC: 8 GM/DL (ref 6.3–8.2)
PROT UR QL STRIP: NEGATIVE
RBC # BLD AUTO: 3.48 X10(6)/MCL (ref 4–6)
RBC #/AREA URNS AUTO: NORMAL /HPF
SARS-COV-2 RNA RESP QL NAA+PROBE: NEGATIVE
SODIUM SERPL-SCNC: 132 MMOL/L (ref 136–145)
SP GR UR STRIP.AUTO: 1.01 (ref 1–1.03)
SQUAMOUS #/AREA URNS AUTO: NORMAL /HPF
TROPONIN I SERPL-MCNC: <0.012 NG/ML (ref 0–0.03)
UROBILINOGEN UR STRIP-ACNC: 1
WBC # BLD AUTO: 7.83 X10(3)/MCL (ref 4–11.5)
WBC #/AREA URNS AUTO: NORMAL /HPF

## 2025-03-26 PROCEDURE — 93005 ELECTROCARDIOGRAM TRACING: CPT

## 2025-03-26 PROCEDURE — 84484 ASSAY OF TROPONIN QUANT: CPT | Performed by: FAMILY MEDICINE

## 2025-03-26 PROCEDURE — 87040 BLOOD CULTURE FOR BACTERIA: CPT | Performed by: FAMILY MEDICINE

## 2025-03-26 PROCEDURE — 93010 ELECTROCARDIOGRAM REPORT: CPT | Mod: ,,, | Performed by: INTERNAL MEDICINE

## 2025-03-26 PROCEDURE — 25000003 PHARM REV CODE 250: Performed by: FAMILY MEDICINE

## 2025-03-26 PROCEDURE — 85025 COMPLETE CBC W/AUTO DIFF WBC: CPT | Performed by: FAMILY MEDICINE

## 2025-03-26 PROCEDURE — 99285 EMERGENCY DEPT VISIT HI MDM: CPT | Mod: 25

## 2025-03-26 PROCEDURE — 81003 URINALYSIS AUTO W/O SCOPE: CPT | Performed by: FAMILY MEDICINE

## 2025-03-26 PROCEDURE — 80053 COMPREHEN METABOLIC PANEL: CPT | Performed by: FAMILY MEDICINE

## 2025-03-26 PROCEDURE — 96360 HYDRATION IV INFUSION INIT: CPT

## 2025-03-26 PROCEDURE — 83735 ASSAY OF MAGNESIUM: CPT | Performed by: FAMILY MEDICINE

## 2025-03-26 PROCEDURE — 83880 ASSAY OF NATRIURETIC PEPTIDE: CPT | Performed by: FAMILY MEDICINE

## 2025-03-26 PROCEDURE — 0240U COVID/FLU A&B PCR: CPT | Performed by: FAMILY MEDICINE

## 2025-03-26 PROCEDURE — 81015 MICROSCOPIC EXAM OF URINE: CPT | Performed by: FAMILY MEDICINE

## 2025-03-26 PROCEDURE — 96361 HYDRATE IV INFUSION ADD-ON: CPT

## 2025-03-26 PROCEDURE — 83605 ASSAY OF LACTIC ACID: CPT | Performed by: FAMILY MEDICINE

## 2025-03-26 RX ORDER — LEVOFLOXACIN 750 MG/1
750 TABLET ORAL DAILY
Qty: 7 TABLET | Refills: 0 | Status: SHIPPED | OUTPATIENT
Start: 2025-03-26 | End: 2025-04-02

## 2025-03-26 RX ORDER — LEVOFLOXACIN 500 MG/1
500 TABLET, FILM COATED ORAL
Status: COMPLETED | OUTPATIENT
Start: 2025-03-26 | End: 2025-03-26

## 2025-03-26 RX ORDER — ACETAMINOPHEN 500 MG
1000 TABLET ORAL
Status: COMPLETED | OUTPATIENT
Start: 2025-03-26 | End: 2025-03-26

## 2025-03-26 RX ADMIN — ACETAMINOPHEN 1000 MG: 500 TABLET, FILM COATED ORAL at 03:03

## 2025-03-26 RX ADMIN — SODIUM CHLORIDE 1000 ML: 9 INJECTION, SOLUTION INTRAVENOUS at 03:03

## 2025-03-26 RX ADMIN — LEVOFLOXACIN 500 MG: 500 TABLET, FILM COATED ORAL at 04:03

## 2025-03-26 RX ADMIN — SODIUM CHLORIDE 1000 ML: 9 INJECTION, SOLUTION INTRAVENOUS at 04:03

## 2025-03-26 NOTE — ED PROVIDER NOTES
"Encounter Date: 3/26/2025       History     Chief Complaint   Patient presents with    Chills     Onset yesterday am - "shaky" bilat hands-  generalized weakness - right temporal HA, "ears filling up"     Patient presents for evaluation of fatigue and malaise. Patient notes having increased fatigue and malaise for the past day. Symptoms have been present constantly throughout the day. Patient denies having cough and congestion. Patient also notes having fever with her symptoms. Patient denies having any other aggravating or relieving features at present.     The history is provided by the patient.     Review of patient's allergies indicates:  No Known Allergies  Past Medical History:   Diagnosis Date    Alcoholic cirrhosis     Anemia     CHF (congestive heart failure)     LV EF 40%    COPD (chronic obstructive pulmonary disease)     Hyperlipidemia     Hypertension     Myocardial infarction     Peripheral artery disease     with stents    Sleep apnea      Past Surgical History:   Procedure Laterality Date    ANGIOGRAM, ABDOMINAL AORTA  10/24/2023    Procedure: Angiogram, Abdominal Aorta;  Surgeon: Janette Ernandez MD;  Location: [facility] CATH LAB;  Service: Cardiology;;    ARTERIOGRAPHY OF SUBCLAVIAN ARTERY Left 10/24/2023    Procedure: ARTERIOGRAM, SUBCLAVIAN;  Surgeon: Janette Ernandez MD;  Location: [facility] CATH LAB;  Service: Cardiology;  Laterality: Left;    CAPSULE ENDOSCOPY, ESOPHAGUS THROUGH ILEUM N/A 11/20/2023    Procedure: M2A;  Surgeon: Johan Flower MD;  Location: [facility] ENDOSCOPY;  Service: Gastroenterology;  Laterality: N/A;  To be done at any time today when staff is available.    CARDIAC DEFIBRILLATOR PLACEMENT N/A 11/28/2023    Procedure: Insertion, Single chamber ICD (SJM);  Surgeon: Suyapa Oakes MD;  Location: [facility] CATH LAB;  Service: Cardiology;  Laterality: N/A;    CATHETERIZATION OF BOTH LEFT AND RIGHT HEART N/A 10/24/2023    Procedure: CATHETERIZATION, HEART, BOTH LEFT AND RIGHT;  " Surgeon: Janette Ernandez MD;  Location: [facility] CATH LAB;  Service: Cardiology;  Laterality: N/A;    COLONOSCOPY N/A 6/10/2023    Procedure: COLONOSCOPY;  Surgeon: Johan Flower MD;  Location: [facility] OR;  Service: Gastroenterology;  Laterality: N/A;    EGD, WITH CLOSED BIOPSY N/A 12/18/2023    Procedure: EGD;  Surgeon: Johan Flower MD;  Location: [facility] ENDOSCOPY;  Service: Gastroenterology;  Laterality: N/A;    ESOPHAGOGASTRODUODENOSCOPY N/A 6/9/2023    Procedure: EGD;  Surgeon: Tima Teixeira MD;  Location: [facility] ENDOSCOPY;  Service: Gastroenterology;  Laterality: N/A;    ESOPHAGOGASTRODUODENOSCOPY N/A 1/11/2024    Procedure: EGD (ESOPHAGOGASTRODUODENOSCOPY);  Surgeon: Chaya Castellon MD;  Location: [facility];  Service: Endoscopy;  Laterality: N/A;  POST OP: MILD GASTRITIS W/ NO ULCER    ESOPHAGOGASTRODUODENOSCOPY N/A 4/24/2024    Procedure: EGD W/ M2A PLACEMENT;  Surgeon: Johan Flower MD;  Location: [facility] ENDOSCOPY;  Service: Gastroenterology;  Laterality: N/A;    INSERTION OF INTRA-AORTIC BALLOON ASSIST DEVICE  10/24/2023    Procedure: INSERTION, INTRA-AORTIC BALLOON PUMP;  Surgeon: Janette Ernandez MD;  Location: [facility] CATH LAB;  Service: Cardiology;;    INSERTION OF INTRAVASCULAR MICROAXIAL BLOOD PUMP N/A 10/26/2023    Procedure: INSERTION, IMPELLA;  Surgeon: Janette Ernandez MD;  Location: [facility] CATH LAB;  Service: Cardiology;  Laterality: N/A;    INSTANTANEOUS WAVE-FREE RATIO (IFR) N/A 10/26/2023    Procedure: Instantaneous Wave-Free Ratio (IFR);  Surgeon: Janette Ernandez MD;  Location: [facility] CATH LAB;  Service: Cardiology;  Laterality: N/A;    INTRALUMINAL GASTROINTESTINAL TRACT IMAGING VIA CAPSULE N/A 4/24/2024    Procedure: M2A;  Surgeon: Johan Flower MD;  Location: [facility] ENDOSCOPY;  Service: Gastroenterology;  Laterality: N/A;    INTRAVASCULAR ULTRASOUND, CORONARY N/A 10/26/2023    Procedure: Ultrasound-coronary;  Surgeon: Janette Ernandez MD;  Location:  [facility] CATH LAB;  Service: Cardiology;  Laterality: N/A;    PERCUTANEOUS CORONARY INTERVENTION, ARTERY N/A 10/26/2023    Procedure: Percutaneous coronary intervention- with Impella;  Surgeon: Janette Ernandez MD;  Location: [facility] CATH LAB;  Service: Cardiology;  Laterality: N/A;    PLACEMENT, IABP  10/24/2023    Procedure: Placement, IABP;  Surgeon: Janette Ernandez MD;  Location: [facility] CATH LAB;  Service: Cardiology;;     No family history on file.  Social History[1]  Review of Systems   Constitutional:  Positive for activity change and fatigue.   HENT: Negative.     Eyes: Negative.    Respiratory: Negative.     Cardiovascular: Negative.    Gastrointestinal: Negative.    Endocrine: Negative.    Genitourinary: Negative.    Musculoskeletal: Negative.    Skin: Negative.    Allergic/Immunologic: Negative.    Neurological: Negative.    Hematological: Negative.    Psychiatric/Behavioral: Negative.         Physical Exam     Initial Vitals [03/26/25 1428]   BP Pulse Resp Temp SpO2   (!) 89/47 62 20 99.5 °F (37.5 °C) (!) 92 %      MAP       --         Physical Exam    Vitals reviewed.  Constitutional: He appears well-developed and well-nourished. He is not diaphoretic. No distress.   HENT:   Head: Normocephalic and atraumatic.   Eyes: EOM are normal. Pupils are equal, round, and reactive to light. Right eye exhibits no discharge. Left eye exhibits no discharge.   Neck: Neck supple. No thyromegaly present. No tracheal deviation present.   Normal range of motion.  Cardiovascular:  Normal rate, regular rhythm and normal heart sounds.     Exam reveals no friction rub.       No murmur heard.  Pulmonary/Chest: Breath sounds normal. No stridor. No respiratory distress. He has no wheezes. He has no rales.   Abdominal: Abdomen is soft. Bowel sounds are normal. He exhibits no distension. There is no abdominal tenderness. There is no rebound.   Musculoskeletal:         General: No tenderness or edema. Normal range of motion.      Cervical  back: Normal range of motion and neck supple.     Neurological: He is alert and oriented to person, place, and time. He displays normal reflexes. No cranial nerve deficit. GCS score is 15. GCS eye subscore is 4. GCS verbal subscore is 5. GCS motor subscore is 6.   Skin: Skin is warm. No erythema.   Psychiatric: He has a normal mood and affect.         ED Course   Procedures  Labs Reviewed   COMPREHENSIVE METABOLIC PANEL - Abnormal       Result Value    Sodium 132 (*)     Potassium 4.7      Chloride 99      CO2 27      Glucose 81      Blood Urea Nitrogen 24 (*)     Creatinine 2.41 (*)     Calcium 8.0 (*)     Protein Total 8.0      Albumin 4.2      Globulin 3.8      Albumin/Globulin Ratio 1.1      Bilirubin Total 0.8      ALP 67      ALT 56 (*)     AST 73 (*)     eGFR 29      Anion Gap 6.0      BUN/Creatinine Ratio 10 (*)    CBC WITH DIFFERENTIAL - Abnormal    WBC 7.83      RBC 3.48 (*)     Hgb 11.4 (*)     Hct 33.5 (*)     MCV 96.3      MCH 32.8      MCHC 34.0      RDW 13.0      Platelet 201      MPV 9.4      Neut % 72.5      Lymph % 15.8 (*)     Mono % 9.1      Eos % 1.5      Basophil % 0.6      Lymph # 1.24 (*)     Neut # 5.67 (*)     Mono # 0.71      Eos # 0.12      Baso # 0.05      Imm Gran # 0.04 (*)     Imm Grans % 0.5      NRBC% 0.0     NT-PRO NATRIURETIC PEPTIDE - Abnormal    ProBNP 1,640.0 (*)    LACTIC ACID, PLASMA - Normal    Lactic Acid Level 1.5     MAGNESIUM - Normal    Magnesium Level 2.00     COVID/FLU A&B PCR - Normal    Influenza A PCR Not Detected      Influenza B PCR Not Detected      SARS-CoV-2 PCR Negative      Narrative:     The Xpert Xpress SARS-CoV-2/FLU/RSV plus is a rapid, multiplexed real-time PCR test intended for the simultaneous qualitative detection and differentiation of SARS-CoV-2, Influenza A, Influenza B, and respiratory syncytial virus (RSV) viral RNA in either nasopharyngeal swab or nasal swab specimens.         BLOOD CULTURE OLG   BLOOD CULTURE [facility]   CBC W/ AUTO DIFFERENTIAL     Narrative:     The following orders were created for panel order CBC auto differential.  Procedure                               Abnormality         Status                     ---------                               -----------         ------                     CBC with Differential[[id number]]       Abnormal            Final result                 Please view results for these tests on the individual orders.   URINALYSIS, REFLEX TO URINE CULTURE   TROPONIN I     EKG Readings: (Independently Interpreted)   Normal sinus rhythm at 61 beats per minute.  Nonspecific ST-T changes.  Borderline EKG       Imaging Results              CT Chest Without Contrast (Final result)  Result time 03/26/25 16:11:18      Final result by Nile Soria MD (03/26/25 16:11:18)                   Impression:        1. Moderate-sized right-sided pleural effusion with areas of consolidation involving the right middle lobe and in the right lower lobe, the presence of which precludes the exclusion of a mass and therefore clinical correlation is recommended and follow-up until resolution.    n/a    Category: n/a    The following dose reduction techniques are used for all CT at [facility]:    1.   Automated exposure control.    2.   Adjustment of the mA and/or kV according to patient size.    3.   Use of iterative reconstruction technique.      Electronically signed by: Nile Soria  Date:    03/26/2025  Time:    16:11               Narrative:    EXAMINATION:  CT CHEST WITHOUT CONTRAST    CLINICAL HISTORY:  Dyspnea malaise;    TECHNIQUE:  Low dose axial images, sagittal and coronal reformations were obtained from the thoracic inlet to the lung bases. Contrast was not administered.    COMPARISON:  None.    FINDINGS:  LUNGS:    A moderate-sized right-sided pleural effusion is present.  An area of consolidation is noted involving the right middle lobe as well as the right lower lobe, the presence of which precludes the  exclusion of a mass and therefore clinical correlation is recommended and follow-up until resolution.    AIRWAYS:  No significant abnormalities noted.    HILAR REGIONS AND MEDIASTINUM:    No significant abnormalities noted.    VASCULAR STRUCTURES:    Atherosclerotic calcification is present involving the aorta and the coronary vasculature.  A pacemaker generator is also present.    SKELETAL STRUCTURES:   Mild degenerative findings are noted involving the spine.    MISCELLANEOUS:   No significant abnormalities noted.                                       X-Ray Chest 1 View (Final result)  Result time 03/26/25 16:20:49      Final result by Nile Soria MD (03/26/25 16:20:49)                   Impression:      Apparent interim reduction to the size of the previously seen right-sided pleural effusion.  Small to moderate-sized blunting of the right CP angle still remains.    Interim improvement in the previously seen consolidative findings involving the right mid and lower lung zones since the study of 01/10/2024 with residual consolidation or mass projected over the aerated portion of the right CP angle.  Clinical correlation and follow-up recommended until clear peer      Electronically signed by: Nile Soria  Date:    03/26/2025  Time:    16:20               Narrative:    EXAMINATION:  XR CHEST 1 VIEW    CLINICAL HISTORY:  Sepsis, unspecified organism    TECHNIQUE:  Single frontal view of the chest was performed.    COMPARISON:  01/10/2024    FINDINGS:  The examination shows apparent interim improvement in the previously seen right-sided pleural effusion and some improvement in the degree of aeration of the right middle lobe in the right lower lobe however there is apparent infiltrate or nodule projected over the aerated portion of the right CP angle.  There is also been some improvement in the appearance of the perihilar bronchovesicular markings.The left lung appears clear.    The cardiac silhouette is normal  "in size. The hilar and mediastinal contours are unremarkable.    Bones are intact.                                       Medications   sodium chloride 0.9% bolus 1,000 mL 1,000 mL (0 mLs Intravenous Stopped 3/26/25 1716)   levoFLOXacin tablet 500 mg (has no administration in time range)   sodium chloride 0.9% bolus 1,000 mL 1,000 mL (0 mLs Intravenous Stopped 3/26/25 1612)   acetaminophen tablet 1,000 mg (1,000 mg Oral Given 3/26/25 1537)     Medical Decision Making  Patient is seen for evaluation of fatigue and malaise hypotension.  Patient with a right-sided pneumonia on x-ray and CT.  Patient has had similar infections in the past predominantly 1 located in January of 2024.  Patient is started on levofloxacin patient is satting 95% on room air at time of discharge.  Patient discharged in good condition    Amount and/or Complexity of Data Reviewed  Labs: ordered.  Radiology: ordered.    Risk  OTC drugs.  Prescription drug management.                                      Clinical Impression:  Final diagnoses:  [R53.1] Weakness  [A41.9] Sepsis  [J18.9] Pneumonia of right lower lobe due to infectious organism (Primary)          ED Disposition Condition    Discharge Stable          ED Prescriptions       Medication Sig Dispense Start Date End Date Auth. Provider    levoFLOXacin (LEVAQUIN) 750 MG tablet Take 1 tablet (750 mg total) by mouth once daily. for 7 days 7 tablet 3/26/2025 4/2/2025 Alfa Shaffer MD          Follow-up Information    None              [1]   Social History  Tobacco Use    Smoking status: Every Day     Current packs/day: 0.25     Average packs/day: 0.3 packs/day for 51.2 years (12.8 ttl pk-yrs)     Types: Vaping with nicotine, Cigarettes     Start date: 1/10/1974    Smokeless tobacco: Former     Types: Chew   Substance Use Topics    Alcohol use: Yes     Comment: currently not drinking per pt. 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 Quit drinking in 10/2023. - drinks a beer "once in a while"    Drug use: Not " Currently        Alfa Shaffer MD  03/26/25 7360

## 2025-03-27 LAB
OHS QRS DURATION: 96 MS
OHS QTC CALCULATION: 471 MS

## 2025-03-31 LAB
BACTERIA BLD CULT: NORMAL
BACTERIA BLD CULT: NORMAL

## 2025-04-07 ENCOUNTER — LAB VISIT (OUTPATIENT)
Dept: LAB | Facility: HOSPITAL | Age: 65
End: 2025-04-07
Attending: NURSE PRACTITIONER
Payer: MEDICARE

## 2025-04-07 DIAGNOSIS — Z12.5 SCREENING PSA (PROSTATE SPECIFIC ANTIGEN): ICD-10-CM

## 2025-04-07 DIAGNOSIS — Z00.00 WELLNESS EXAMINATION: ICD-10-CM

## 2025-04-07 LAB
25(OH)D3+25(OH)D2 SERPL-MCNC: 12 NG/ML (ref 30–80)
PSA SERPL-MCNC: 0.1 NG/ML

## 2025-04-07 PROCEDURE — 84153 ASSAY OF PSA TOTAL: CPT

## 2025-04-07 PROCEDURE — 82306 VITAMIN D 25 HYDROXY: CPT

## 2025-04-07 PROCEDURE — 36415 COLL VENOUS BLD VENIPUNCTURE: CPT

## 2025-04-09 ENCOUNTER — OFFICE VISIT (OUTPATIENT)
Dept: FAMILY MEDICINE | Facility: CLINIC | Age: 65
End: 2025-04-09
Payer: MEDICARE

## 2025-04-09 VITALS
WEIGHT: 179.5 LBS | BODY MASS INDEX: 26.59 KG/M2 | HEIGHT: 69 IN | TEMPERATURE: 98 F | HEART RATE: 51 BPM | DIASTOLIC BLOOD PRESSURE: 52 MMHG | SYSTOLIC BLOOD PRESSURE: 101 MMHG | OXYGEN SATURATION: 90 %

## 2025-04-09 DIAGNOSIS — Z00.00 WELLNESS EXAMINATION: Primary | ICD-10-CM

## 2025-04-09 DIAGNOSIS — I25.110 CORONARY ARTERY DISEASE INVOLVING NATIVE CORONARY ARTERY OF NATIVE HEART WITH UNSTABLE ANGINA PECTORIS: ICD-10-CM

## 2025-04-09 DIAGNOSIS — F10.21 HISTORY OF ALCOHOLISM: ICD-10-CM

## 2025-04-09 DIAGNOSIS — F17.200 TOBACCO DEPENDENCE: ICD-10-CM

## 2025-04-09 DIAGNOSIS — K70.30 ALCOHOLIC CIRRHOSIS, UNSPECIFIED WHETHER ASCITES PRESENT: ICD-10-CM

## 2025-04-09 DIAGNOSIS — I48.91 ATRIAL FIBRILLATION, UNSPECIFIED TYPE: ICD-10-CM

## 2025-04-09 DIAGNOSIS — I50.9 CONGESTIVE HEART FAILURE, UNSPECIFIED HF CHRONICITY, UNSPECIFIED HEART FAILURE TYPE: ICD-10-CM

## 2025-04-09 DIAGNOSIS — N18.4 CHRONIC KIDNEY DISEASE (CKD), STAGE IV (SEVERE): ICD-10-CM

## 2025-04-09 RX ORDER — DICLOFENAC SODIUM 10 MG/G
2 GEL TOPICAL DAILY
Qty: 50 G | Refills: 1 | Status: SHIPPED | OUTPATIENT
Start: 2025-04-09

## 2025-04-11 ENCOUNTER — HOSPITAL ENCOUNTER (EMERGENCY)
Facility: HOSPITAL | Age: 65
Discharge: HOME | End: 2025-04-11
Attending: SPECIALIST
Payer: MEDICARE

## 2025-04-11 VITALS
HEART RATE: 57 BPM | OXYGEN SATURATION: 94 % | TEMPERATURE: 98 F | RESPIRATION RATE: 18 BRPM | SYSTOLIC BLOOD PRESSURE: 112 MMHG | DIASTOLIC BLOOD PRESSURE: 58 MMHG
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
## 2025-04-11 DIAGNOSIS — D64.9 CHRONIC ANEMIA: ICD-10-CM

## 2025-04-11 DIAGNOSIS — J44.9 CHRONIC OBSTRUCTIVE PULMONARY DISEASE, UNSPECIFIED COPD TYPE: ICD-10-CM

## 2025-04-11 LAB
ACCEPTIBLE SP GR UR QL: <=1.005 (ref 1–1.03)
ALBUMIN SERPL-MCNC: 3.1 G/DL (ref 3.4–4.8)
ALBUMIN/GLOB SERPL: 0.7 RATIO (ref 1.1–2)
ALP SERPL-CCNC: 60 UNIT/L (ref 40–150)
ALT SERPL-CCNC: 35 UNIT/L (ref 0–55)
AMPHET UR QL SCN: NEGATIVE
ANION GAP SERPL CALC-SCNC: 8 MEQ/L
AST SERPL-CCNC: 45 UNIT/L (ref 11–45)
BACTERIA #/AREA URNS AUTO: NORMAL /HPF
BARBITURATE SCN PRESENT UR: NEGATIVE
BASOPHILS # BLD AUTO: 0.07 X10(3)/MCL
BASOPHILS NFR BLD AUTO: 0.9 %
BENZODIAZ UR QL SCN: NEGATIVE
BILIRUB SERPL-MCNC: 0.4 MG/DL
BILIRUB UR QL STRIP.AUTO: NEGATIVE
BUN SERPL-MCNC: 28.8 MG/DL (ref 8.4–25.7)
CALCIUM SERPL-MCNC: 8.1 MG/DL (ref 8.8–10)
CANNABINOIDS UR QL SCN: NEGATIVE
CHLORIDE SERPL-SCNC: 98 MMOL/L (ref 98–107)
CLARITY UR: CLEAR
CO2 SERPL-SCNC: 27 MMOL/L (ref 23–31)
COCAINE UR QL SCN: NEGATIVE
COLOR UR AUTO: ABNORMAL
CREAT SERPL-MCNC: 1.89 MG/DL (ref 0.72–1.25)
CREAT/UREA NIT SERPL: 15
EOSINOPHIL # BLD AUTO: 0.11 X10(3)/MCL (ref 0–0.9)
EOSINOPHIL NFR BLD AUTO: 1.5 %
ERYTHROCYTE [DISTWIDTH] IN BLOOD BY AUTOMATED COUNT: 13.1 % (ref 11.5–17)
ETHANOL SERPL-MCNC: 48 MG/DL
FENTANYL UR QL SCN: NEGATIVE
GFR SERPLBLD CREATININE-BSD FMLA CKD-EPI: 39 ML/MIN/1.73/M2
GLOBULIN SER-MCNC: 4.6 GM/DL (ref 2.4–3.5)
GLUCOSE SERPL-MCNC: 95 MG/DL (ref 82–115)
GLUCOSE UR QL STRIP: NEGATIVE
HCT VFR BLD AUTO: 34.6 % (ref 42–52)
HGB BLD-MCNC: 11.6 G/DL (ref 14–18)
HGB UR QL STRIP: ABNORMAL
IMM GRANULOCYTES # BLD AUTO: 0.02 X10(3)/MCL (ref 0–0.04)
IMM GRANULOCYTES NFR BLD AUTO: 0.3 %
KETONES UR QL STRIP: NEGATIVE
LEUKOCYTE ESTERASE UR QL STRIP: NEGATIVE
LYMPHOCYTES # BLD AUTO: 1.66 X10(3)/MCL (ref 0.6–4.6)
LYMPHOCYTES NFR BLD AUTO: 21.9 %
MCH RBC QN AUTO: 32.4 PG (ref 27–31)
MCHC RBC AUTO-ENTMCNC: 33.5 G/DL (ref 33–36)
MCV RBC AUTO: 96.6 FL (ref 80–94)
MONOCYTES # BLD AUTO: 0.81 X10(3)/MCL (ref 0.1–1.3)
MONOCYTES NFR BLD AUTO: 10.7 %
NEUTROPHILS # BLD AUTO: 4.9 X10(3)/MCL (ref 2.1–9.2)
NEUTROPHILS NFR BLD AUTO: 64.7 %
NITRITE UR QL STRIP: NEGATIVE
OPIATES UR QL SCN: NEGATIVE
PCP UR QL: NEGATIVE
PH UR STRIP: 6 [PH]
PH UR: 6 [PH] (ref 3–11)
PLATELET # BLD AUTO: 171 X10(3)/MCL (ref 130–400)
PMV BLD AUTO: 9.8 FL (ref 7.4–10.4)
POTASSIUM SERPL-SCNC: 4.6 MMOL/L (ref 3.5–5.1)
PROT SERPL-MCNC: 7.7 GM/DL (ref 5.8–7.6)
PROT UR QL STRIP: NEGATIVE
RBC # BLD AUTO: 3.58 X10(6)/MCL (ref 4.7–6.1)
RBC #/AREA URNS AUTO: NORMAL /HPF
SODIUM SERPL-SCNC: 133 MMOL/L (ref 136–145)
SP GR UR STRIP.AUTO: <=1.005 (ref 1–1.03)
SQUAMOUS #/AREA URNS AUTO: NORMAL /HPF
UROBILINOGEN UR STRIP-ACNC: 0.2
WBC # BLD AUTO: 7.57 X10(3)/MCL (ref 4.5–11.5)
WBC #/AREA URNS AUTO: NORMAL /HPF

## 2025-04-11 PROCEDURE — 85025 COMPLETE CBC W/AUTO DIFF WBC: CPT | Performed by: SPECIALIST

## 2025-04-11 PROCEDURE — 82077 ASSAY SPEC XCP UR&BREATH IA: CPT | Performed by: SPECIALIST

## 2025-04-11 PROCEDURE — 80053 COMPREHEN METABOLIC PANEL: CPT | Performed by: SPECIALIST

## 2025-04-11 PROCEDURE — 81003 URINALYSIS AUTO W/O SCOPE: CPT | Performed by: SPECIALIST

## 2025-04-11 PROCEDURE — 80307 DRUG TEST PRSMV CHEM ANLYZR: CPT | Performed by: SPECIALIST

## 2025-04-11 PROCEDURE — 99283 EMERGENCY DEPT VISIT LOW MDM: CPT

## 2025-04-11 RX ORDER — ALBUTEROL SULFATE 90 UG/1
1 INHALANT RESPIRATORY (INHALATION) EVERY 4 HOURS PRN
Qty: 18 G | Refills: 2 | Status: SHIPPED | OUTPATIENT
Start: 2025-04-11

## 2025-04-11 NOTE — ED PROVIDER NOTES
Encounter Date: 4/11/2025       History     Chief Complaint   Patient presents with    Fall     Pt reports while walking both legs gave out and he dropped to his knees. Admits to having some beer today but he feels fine now.     65-year-old male brought in by EMS after he fell in a grocery store; he states he was walking when his legs gave out and he fell to his knees; he denies any injury; he also reports a tremor but this has been present; no chest pain, no shortness of breath, no muscle cramps, no focal deficits, no urinary incontinence, no saddle numbness, no back pain; past medical history of alcoholic cirrhosis, CHF, anemia, COPD, HLD, HTN, MI, PAD with stents, DEON    The history is provided by the patient.     Review of patient's allergies indicates:  No Known Allergies  Past Medical History:   Diagnosis Date    Alcoholic cirrhosis     Anemia     CHF (congestive heart failure)     LV EF 40%    COPD (chronic obstructive pulmonary disease)     Hyperlipidemia     Hypertension     Myocardial infarction     Peripheral artery disease     with stents    Sleep apnea      Past Surgical History:   Procedure Laterality Date    ANGIOGRAM, ABDOMINAL AORTA  10/24/2023    Procedure: Angiogram, Abdominal Aorta;  Surgeon: Janette Ernandez MD;  Location: [facility] CATH LAB;  Service: Cardiology;;    ARTERIOGRAPHY OF SUBCLAVIAN ARTERY Left 10/24/2023    Procedure: ARTERIOGRAM, SUBCLAVIAN;  Surgeon: Janette Ernandez MD;  Location: [facility] CATH LAB;  Service: Cardiology;  Laterality: Left;    CAPSULE ENDOSCOPY, ESOPHAGUS THROUGH ILEUM N/A 11/20/2023    Procedure: M2A;  Surgeon: Johan Flower MD;  Location: [facility] ENDOSCOPY;  Service: Gastroenterology;  Laterality: N/A;  To be done at any time today when staff is available.    CARDIAC DEFIBRILLATOR PLACEMENT N/A 11/28/2023    Procedure: Insertion, Single chamber ICD (SJM);  Surgeon: Suyapa Oakes MD;  Location: [facility] CATH LAB;  Service: Cardiology;  Laterality: N/A;     CATHETERIZATION OF BOTH LEFT AND RIGHT HEART N/A 10/24/2023    Procedure: CATHETERIZATION, HEART, BOTH LEFT AND RIGHT;  Surgeon: Janette Ernandez MD;  Location: [facility] CATH LAB;  Service: Cardiology;  Laterality: N/A;    COLONOSCOPY N/A 6/10/2023    Procedure: COLONOSCOPY;  Surgeon: Johan Flower MD;  Location: [facility] OR;  Service: Gastroenterology;  Laterality: N/A;    EGD, WITH CLOSED BIOPSY N/A 12/18/2023    Procedure: EGD;  Surgeon: Johan Flower MD;  Location: [facility] ENDOSCOPY;  Service: Gastroenterology;  Laterality: N/A;    ESOPHAGOGASTRODUODENOSCOPY N/A 6/9/2023    Procedure: EGD;  Surgeon: Tima Teixeira MD;  Location: [facility] ENDOSCOPY;  Service: Gastroenterology;  Laterality: N/A;    ESOPHAGOGASTRODUODENOSCOPY N/A 1/11/2024    Procedure: EGD (ESOPHAGOGASTRODUODENOSCOPY);  Surgeon: Chaya Castellon MD;  Location: [facility];  Service: Endoscopy;  Laterality: N/A;  POST OP: MILD GASTRITIS W/ NO ULCER    ESOPHAGOGASTRODUODENOSCOPY N/A 4/24/2024    Procedure: EGD W/ M2A PLACEMENT;  Surgeon: Johan Flower MD;  Location: [facility] ENDOSCOPY;  Service: Gastroenterology;  Laterality: N/A;    INSERTION OF INTRA-AORTIC BALLOON ASSIST DEVICE  10/24/2023    Procedure: INSERTION, INTRA-AORTIC BALLOON PUMP;  Surgeon: Janette Ernandez MD;  Location: [facility] CATH LAB;  Service: Cardiology;;    INSERTION OF INTRAVASCULAR MICROAXIAL BLOOD PUMP N/A 10/26/2023    Procedure: INSERTION, IMPELLA;  Surgeon: Janette Ernandez MD;  Location: [facility] CATH LAB;  Service: Cardiology;  Laterality: N/A;    INSTANTANEOUS WAVE-FREE RATIO (IFR) N/A 10/26/2023    Procedure: Instantaneous Wave-Free Ratio (IFR);  Surgeon: Janette Ernandez MD;  Location: [facility] CATH LAB;  Service: Cardiology;  Laterality: N/A;    INTRALUMINAL GASTROINTESTINAL TRACT IMAGING VIA CAPSULE N/A 4/24/2024    Procedure: M2A;  Surgeon: Johan Flower MD;  Location: [facility] ENDOSCOPY;  Service: Gastroenterology;  Laterality: N/A;     INTRAVASCULAR ULTRASOUND, CORONARY N/A 10/26/2023    Procedure: Ultrasound-coronary;  Surgeon: Janette Ernandez MD;  Location: [facility] CATH LAB;  Service: Cardiology;  Laterality: N/A;    PERCUTANEOUS CORONARY INTERVENTION, ARTERY N/A 10/26/2023    Procedure: Percutaneous coronary intervention- with Impella;  Surgeon: Janette Ernandez MD;  Location: [facility] CATH LAB;  Service: Cardiology;  Laterality: N/A;    PLACEMENT, IABP  10/24/2023    Procedure: Placement, IABP;  Surgeon: Janette Ernandez MD;  Location: [facility] CATH LAB;  Service: Cardiology;;     No family history on file.  Social History[1]  Review of Systems   Constitutional: Negative.    HENT: Negative.     Respiratory: Negative.     Cardiovascular: Negative.    Gastrointestinal: Negative.    Genitourinary: Negative.    Musculoskeletal: Negative.    Neurological: Negative.        Physical Exam     Initial Vitals [04/11/25 1806]   BP Pulse Resp Temp SpO2   116/78 64 20 97.8 °F (36.6 °C) 100 %      MAP       --         Physical Exam    Nursing note and vitals reviewed.  Constitutional: He appears well-developed and well-nourished.   HENT:   Head: Normocephalic and atraumatic.   Eyes: EOM are normal. Pupils are equal, round, and reactive to light.   Neck: Neck supple.   Normal range of motion.  Cardiovascular:  Normal rate, regular rhythm and normal heart sounds.           Pulmonary/Chest: Breath sounds normal.   Abdominal: Abdomen is soft.   Musculoskeletal:         General: Normal range of motion.      Cervical back: Normal range of motion and neck supple.      Comments: Generalized muscular atrophy to legs     Neurological: He is alert and oriented to person, place, and time. He has normal strength. GCS score is 15. GCS eye subscore is 4. GCS verbal subscore is 5. GCS motor subscore is 6.   Mild tremor to hands   Skin: Skin is warm and dry.         ED Course   Procedures  Labs Reviewed   COMPREHENSIVE METABOLIC PANEL - Abnormal       Result Value    Sodium 133 (*)      Potassium 4.6      Chloride 98      CO2 27      Glucose 95      Blood Urea Nitrogen 28.8 (*)     Creatinine 1.89 (*)     Calcium 8.1 (*)     Protein Total 7.7 (*)     Albumin 3.1 (*)     Globulin 4.6 (*)     Albumin/Globulin Ratio 0.7 (*)     Bilirubin Total 0.4      ALP 60      ALT 35      AST 45      eGFR 39      Anion Gap 8.0      BUN/Creatinine Ratio 15     ALCOHOL,MEDICAL (ETHANOL) - Abnormal    Ethanol Level 48.0 (*)    URINALYSIS, REFLEX TO URINE CULTURE - Abnormal    Color, UA Straw      Appearance, UA Clear      Specific Gravity, UA <=1.005      pH, UA 6.0      Protein, UA Negative      Glucose, UA Negative      Ketones, UA Negative      Blood, UA Trace (*)     Bilirubin, UA Negative      Urobilinogen, UA 0.2      Nitrites, UA Negative      Leukocyte Esterase, UA Negative     CBC WITH DIFFERENTIAL - Abnormal    WBC 7.57      RBC 3.58 (*)     Hgb 11.6 (*)     Hct 34.6 (*)     MCV 96.6 (*)     MCH 32.4 (*)     MCHC 33.5      RDW 13.1      Platelet 171      MPV 9.8      Neut % 64.7      Lymph % 21.9      Mono % 10.7      Eos % 1.5      Basophil % 0.9      Imm Grans % 0.3      Neut # 4.90      Lymph # 1.66      Mono # 0.81      Eos # 0.11      Baso # 0.07      Imm Gran # 0.02     DRUG SCREEN, URINE (BEAKER) - Normal    Amphetamines, Urine Negative      Barbiturates, Urine Negative      Benzodiazepine, Urine Negative      Cannabinoids, Urine Negative      Cocaine, Urine Negative      Opiates, Urine Negative      Phencyclidine, Urine Negative      Fentanyl, Urine Negative      pH, Urine 6.0      Specific Gravity, Urine Auto <=1.005      Narrative:     Cut off concentrations:    Amphetamines - 1000 ng/ml  Barbiturates - 200 ng/ml  Benzodiazepine - 200 ng/ml  Cannabinoids (THC) - 50 ng/ml  Cocaine - 300 ng/ml  Fentanyl - 1.0 ng/ml  MDMA - 500 ng/ml  Opiates - 300 ng/ml   Phencyclidine (PCP) - 25 ng/ml    Specimen submitted for drug analysis and tested for pH and specific gravity in order to evaluate sample  integrity. Suspect tampering if specific gravity is <1.003 and/or pH is not within the range of 4.5 - 8.0  False negatives may result form substances such as bleach added to urine.  False positives may result for the presence of a substance with similar chemical structure to the drug or its metabolite.    This test provides only a PRELIMINARY analytical test result. A more specific alternate chemical method must be used in order to obtain a confirmed analytical result. Gas chromatography/mass spectrometry (GC/MS) is the preferred confirmatory method. Other chemical confirmation methods are available. Clinical consideration and professional judgement should be applied to any drug of abuse test result, particularly when preliminary positive results are used.    Positive results will be confirmed only at the physicians request. Unconfirmed screening results are to be used only for medical purposes (treatment).        URINALYSIS, MICROSCOPIC - Normal    Bacteria, UA None Seen      RBC, UA None Seen      WBC, UA None Seen      Squamous Epithelial Cells, UA None Seen     CBC W/ AUTO DIFFERENTIAL    Narrative:     The following orders were created for panel order CBC auto differential.  Procedure                               Abnormality         Status                     ---------                               -----------         ------                     CBC with Differential[[id number]]       Abnormal            Final result                 Please view results for these tests on the individual orders.          Imaging Results    None          Medications - No data to display  Medical Decision Making  65-year-old male brought in by EMS after he fell in a grocery store; he states he was walking when his legs gave out and he fell to his knees; he denies any injury; he also reports a tremor but this has been present; no chest pain, no shortness of breath, no muscle cramps, no focal deficits, no urinary incontinence, no  saddle numbness, no back pain; past medical history of alcoholic cirrhosis, CHF, anemia, COPD, HLD, HTN, MI, PAD with stents, DEON    DIFFERENTIAL DIAGNOSIS- deconditioning, electrolyte abnormality, anemia, neurologic disorder    Amount and/or Complexity of Data Reviewed  External Data Reviewed: labs and notes.  Labs: ordered. Decision-making details documented in ED Course.    Risk  Risk Details: Labs appeared similar to previous labs; patient states symptoms have resolved and he is able to ambulate around the room without any difficulty; encouraged follow up with his family physician within 1 week       Patient Vitals for the past 24 hrs:   BP Temp Pulse Resp SpO2   04/11/25 1837 (!) 112/58 -- (!) 57 18 (!) 94 %   04/11/25 1806 116/78 97.8 °F (36.6 °C) 64 20 100 %                 The patient is resting comfortably and in no acute distress.   He states that his symptoms have improved after treatment in Emergency Department. I personally discussed his test results and treatment plan.  Gave strict ED precautions.  Specific conditions for return to the emergency department and importance of follow up with his primary care provided or the physician listed on the discharge instructions.  Patient voices understanding and agrees to the plan discussed. All patients' questions have been answered at this time.   He has remained hemodynamically stable throughout entire stay in ED and is stable for discharge home.                   Clinical Impression:  Final diagnoses:  [W19.XXXA] Fall, initial encounter (Primary)  [D64.9] Chronic anemia          ED Disposition Condition    Discharge Stable          ED Prescriptions    None       Follow-up Information       Follow up With Specialties Details Why Contact Info    Ivette Hearn, FNP Family Medicine In 1 week  [address]  [facility] [address]  [phone]                   [1]   Social History  Tobacco Use    Smoking status: Every Day     Current packs/day: 0.25  "    Average packs/day: 0.3 packs/day for 51.2 years (12.8 ttl pk-yrs)     Types: Vaping with nicotine, Cigarettes     Start date: 1/10/1974    Smokeless tobacco: Former     Types: Chew   Substance Use Topics    Alcohol use: Yes     Comment: currently not drinking per pt. 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 Quit drinking in 10/2023. - drinks a beer "once in a while"    Drug use: Not Currently        DialGrabiel MD  04/11/25 2729    "

## 2025-04-11 NOTE — ASSESSMENT & PLAN NOTE
The patient was counseled on the dangers of tobacco use, and was advised to quit.  Reviewed strategies to maximize success, including written materials.     Patient was counseled regarding smoking for 3-10 minutes.

## 2025-04-11 NOTE — PROGRESS NOTES
Patient ID: [id number]     Chief Complaint: Medicare IPPE (Wellness)      HPI:     Hemal Tolbert Jr. is a 65 y.o. male here today for a Medicare Wellness.     PAST MEDICAL HX  -------------------------------------    Alcoholic cirrhosis    Anemia    CHF (congestive heart failure)    LV EF 40%    COPD (chronic obstructive pulmonary disease)    Hyperlipidemia    Hypertension    Myocardial infarction    Peripheral artery disease    with stents    Sleep apnea        Past Surgical History:   Procedure Laterality Date    ANGIOGRAM, ABDOMINAL AORTA  10/24/2023    Procedure: Angiogram, Abdominal Aorta;  Surgeon: Janette Ernandez MD;  Location: [facility] CATH LAB;  Service: Cardiology;;    ARTERIOGRAPHY OF SUBCLAVIAN ARTERY Left 10/24/2023    Procedure: ARTERIOGRAM, SUBCLAVIAN;  Surgeon: Janette Ernandez MD;  Location: [facility] CATH LAB;  Service: Cardiology;  Laterality: Left;    CAPSULE ENDOSCOPY, ESOPHAGUS THROUGH ILEUM N/A 11/20/2023    Procedure: M2A;  Surgeon: Johan Flower MD;  Location: [facility] ENDOSCOPY;  Service: Gastroenterology;  Laterality: N/A;  To be done at any time today when staff is available.    CARDIAC DEFIBRILLATOR PLACEMENT N/A 11/28/2023    Procedure: Insertion, Single chamber ICD (SJM);  Surgeon: Suyapa Oakes MD;  Location: [facility] CATH LAB;  Service: Cardiology;  Laterality: N/A;    CATHETERIZATION OF BOTH LEFT AND RIGHT HEART N/A 10/24/2023    Procedure: CATHETERIZATION, HEART, BOTH LEFT AND RIGHT;  Surgeon: Janette Ernandez MD;  Location: [facility] CATH LAB;  Service: Cardiology;  Laterality: N/A;    COLONOSCOPY N/A 6/10/2023    Procedure: COLONOSCOPY;  Surgeon: Johan Flower MD;  Location: [facility] OR;  Service: Gastroenterology;  Laterality: N/A;    EGD, WITH CLOSED BIOPSY N/A 12/18/2023    Procedure: EGD;  Surgeon: Johan Flower MD;  Location: [facility] ENDOSCOPY;  Service: Gastroenterology;  Laterality: N/A;    ESOPHAGOGASTRODUODENOSCOPY N/A 6/9/2023    Procedure: EGD;  Surgeon:  Tima Teixeira MD;  Location: [facility] ENDOSCOPY;  Service: Gastroenterology;  Laterality: N/A;    ESOPHAGOGASTRODUODENOSCOPY N/A 1/11/2024    Procedure: EGD (ESOPHAGOGASTRODUODENOSCOPY);  Surgeon: Chaya Castellon MD;  Location: [facility];  Service: Endoscopy;  Laterality: N/A;  POST OP: MILD GASTRITIS W/ NO ULCER    ESOPHAGOGASTRODUODENOSCOPY N/A 4/24/2024    Procedure: EGD W/ M2A PLACEMENT;  Surgeon: Johan Flower MD;  Location: [facility] ENDOSCOPY;  Service: Gastroenterology;  Laterality: N/A;    INSERTION OF INTRA-AORTIC BALLOON ASSIST DEVICE  10/24/2023    Procedure: INSERTION, INTRA-AORTIC BALLOON PUMP;  Surgeon: Janette Ernandez MD;  Location: [facility] CATH LAB;  Service: Cardiology;;    INSERTION OF INTRAVASCULAR MICROAXIAL BLOOD PUMP N/A 10/26/2023    Procedure: INSERTION, IMPELLA;  Surgeon: Janette Ernandez MD;  Location: [facility] CATH LAB;  Service: Cardiology;  Laterality: N/A;    INSTANTANEOUS WAVE-FREE RATIO (IFR) N/A 10/26/2023    Procedure: Instantaneous Wave-Free Ratio (IFR);  Surgeon: Janette Ernandez MD;  Location: [facility] CATH LAB;  Service: Cardiology;  Laterality: N/A;    INTRALUMINAL GASTROINTESTINAL TRACT IMAGING VIA CAPSULE N/A 4/24/2024    Procedure: M2A;  Surgeon: Johan Flower MD;  Location: [facility] ENDOSCOPY;  Service: Gastroenterology;  Laterality: N/A;    INTRAVASCULAR ULTRASOUND, CORONARY N/A 10/26/2023    Procedure: Ultrasound-coronary;  Surgeon: Janette Ernandez MD;  Location: [facility] CATH LAB;  Service: Cardiology;  Laterality: N/A;    PERCUTANEOUS CORONARY INTERVENTION, ARTERY N/A 10/26/2023    Procedure: Percutaneous coronary intervention- with Impella;  Surgeon: Janette Ernandez MD;  Location: [facility] CATH LAB;  Service: Cardiology;  Laterality: N/A;    PLACEMENT, IABP  10/24/2023    Procedure: Placement, IABP;  Surgeon: Janette Ernandez MD;  Location: [facility] CATH LAB;  Service: Cardiology;;       Review of patient's allergies indicates:  No Known Allergies    Social  "History[1]     No family history on file.     Patient Care Team:  Ivette Hearn FNP as PCP - General (Family Medicine)  Payal Carter FNP as Nurse Practitioner (Infectious Diseases)  brenda stanley     Subjective:   Review of Systems   All other systems reviewed and are negative.      12 point review of systems conducted, negative except as stated in the history of present illness. See HPI for details.    Objective:   BP (!) 101/52 (BP Location: Right arm, Patient Position: Sitting)   Pulse (!) 51   Temp 98.1 °F (36.7 °C) (Oral)   Ht 5' 9" (1.753 m)   Wt 81.4 kg (179 lb 8 oz)   SpO2 (!) 90%   BMI 26.51 kg/m²     Physical Exam  HENT:      Head: Normocephalic.   Cardiovascular:      Rate and Rhythm: Normal rate.   Pulmonary:      Effort: Pulmonary effort is normal.      Breath sounds: Normal breath sounds.   Abdominal:      General: Bowel sounds are normal.      Palpations: Abdomen is soft.   Skin:     General: Skin is warm and dry.   Neurological:      General: No focal deficit present.      Mental Status: He is alert.          Labs   Chemistry:  Lab Results   Component Value Date     (L) 03/26/2025    K 4.7 03/26/2025    BUN 24 (H) 03/26/2025    CREATININE 2.41 (H) 03/26/2025    EGFRNORACEVR 29 03/26/2025    GLUCOSE 81 03/26/2025    CALCIUM 8.0 (L) 03/26/2025    ALKPHOS 67 03/26/2025    LABPROT 8.0 03/26/2025    ALBUMIN 4.2 03/26/2025    BILIDIR 0.2 11/21/2023    IBILI 0.20 11/21/2023    AST 73 (H) 03/26/2025    ALT 56 (H) 03/26/2025    MG 2.00 03/26/2025    PHOS 3.6 11/19/2023    LZDXNKRP58SD 12 (L) 04/07/2025    TSH 1.721 03/13/2025    IEPZET6YDCE 0.92 06/07/2023    PSA 0.10 04/07/2025        Lab Results   Component Value Date    HGBA1C 5.6 03/13/2025        Hematology:  Lab Results   Component Value Date    WBC 7.83 03/26/2025    HGB 11.4 (L) 03/26/2025    HCT 33.5 (L) 03/26/2025     03/26/2025       Lipid Panel:  Lab Results   Component Value Date    CHOL 79 03/13/2025    HDL 20 (L) " 03/13/2025    LDL 40.00 (L) 03/13/2025    TRIG 95 03/13/2025    TOTALCHOLEST 4 03/13/2025        Urine:  Lab Results   Component Value Date    APPEARANCEUA Clear 03/26/2025    SGUA 1.010 03/26/2025    PROTEINUA Negative 03/26/2025    KETONESUA Negative 03/26/2025    LEUKOCYTESUR Negative 03/26/2025    RBCUA 0-2 03/26/2025    WBCUA None Seen 03/26/2025    BACTERIA Rare 03/26/2025    SQEPUA 1-2 12/22/2020    HYALINECASTS 0-2 (A) 12/22/2020    CREATRANDUR 142.7 10/24/2023        Medicare Annual Wellness and Personalized Prevention Plan:   Fall Risk + Home Safety + Hearing Impairment + Depression Screen + Opioid and Substance Abuse Screening + Cognitive Impairment Screen + Health Risk Assessment all reviewed.   A comprehensive HEALTH RISK ASSESSMENT was completed today. Results are summarized below:    There are NO EMOTIONAL/SOCIAL CONCERNS identified on today's screening for Social Isolation, Depression and Anxiety.    There are NO COGNITIVE FUNCTION CONCERNS identified on today's screening.  The following FUNCTIONAL AND/OR SAFETY CONCERNS were identified on today's screening for Physical Symptoms, Nutritional, Home Safety/Living Situation, Fall Risk, Activities of Daily Living, Independent Activities of Daily Living, Physical Activity,Timed Up and Go test and Whisper test::   *Patient reports recently FEELING DIZZY, has a FEAR OF FALLING or HAS FALLEN. (In the past four weeks have you felt dizzy when standing up, were afraid of falling or fallen?: (!) Yes)  *Patient reports he NEEDS AMBULATION ASSISTANCE. (Do you use an assistance device to easily get around?: (!) Yes)(Ambulation Assistance: Cane)     The patient reports NO OPIOID PRESCRIPTIONS. This was confirmed through medication reconciliation and the [facility] website.    The patient is A CURRENT TOBACCO USER.  Tobacco Use: High Risk (4/9/2025)    Patient History     Smoking Tobacco Use: Every Day     Smokeless Tobacco Use: Former     Passive Exposure: Not on file            All Questions regarding food, transportation or housing were not answered today.    Assessment/Plan:       ICD-10-CM ICD-9-CM   1. Wellness examination  Z00.00 V70.0   2. Coronary artery disease involving native coronary artery of native heart with unstable angina pectoris  I25.110 414.01     411.1   3. Atrial fibrillation, unspecified type  I48.91 427.31   4. Tobacco dependence  F17.200 305.1   5. Congestive heart failure, unspecified HF chronicity, unspecified heart failure type  I50.9 428.0   6. Alcoholic cirrhosis, unspecified whether ascites present  K70.30 571.2   7. History of alcoholism  F10.21 V11.3   8. Chronic kidney disease (CKD), stage IV (severe)  N18.4 585.4     Assessment & Plan  Wellness examination  Discussed labs and preventative screenings   Overall health status reviewed.    Significant chronic conditions addressed, including ongoing treatment plans.   Good health habits reinforced.    Cardiovascular disease risk factors discussed.   Appropriate recommendations and preventative care medical   information provided with annual wellness exams encouraged.  Vaccination status             Coronary artery disease involving native coronary artery of native heart with unstable angina pectoris  Current medication per Cardiology amiodarone Entresto,         Atrial fibrillation, unspecified type  Rate control  Continue amiodarone and medication per cardio         Tobacco dependence  The patient was counseled on the dangers of tobacco use, and was advised to quit.  Reviewed strategies to maximize success, including written materials.     Patient was counseled regarding smoking for 3-10 minutes.            Congestive heart failure, unspecified HF chronicity, unspecified heart failure type  Unchanged  Continue Entresto, amiodarone per cardio         Alcoholic cirrhosis, unspecified whether ascites present  Keep routine appointment with gastrologist         History of alcoholism  Recommend encouraged  abstaining from alcohol         Chronic kidney disease (CKD), stage IV (severe)      Orders:    Ambulatory referral/consult to Nephrology; Future         Medication List with Changes/Refills   New Medications    DICLOFENAC SODIUM (VOLTAREN) 1 % GEL    Apply 2 g topically once daily.   Current Medications    ALBUTEROL-IPRATROPIUM (DUO-NEB) 2.5 MG-0.5 MG/3 ML NEBULIZER SOLUTION    Take 3 mLs by nebulization every 6 (six) hours as needed for Shortness of Breath or Wheezing.    AMIODARONE (PACERONE) 200 MG TAB    Take 2 tablets (400 mg total) by mouth once daily.    ATORVASTATIN (LIPITOR) 80 MG TABLET    Take 1 tablet (80 mg total) by mouth once daily.    ENTRESTO 24-26 MG PER TABLET    Take 1 tablet by mouth 2 (two) times daily.    FERROUS SULFATE 325 (65 FE) MG EC TABLET    Take 1 tablet (325 mg total) by mouth 2 (two) times daily.    FOLIC ACID (FOLVITE) 1 MG TABLET    Take 1 tablet (1 mg total) by mouth once daily.    FUROSEMIDE (LASIX) 40 MG TABLET    Take 40 mg by mouth once daily.    GABAPENTIN (NEURONTIN) 300 MG CAPSULE    TAKE ONE CAPSULE BY MOUTH (3) TIMES DAILY    METOPROLOL SUCCINATE (TOPROL-XL) 25 MG 24 HR TABLET    Take ½ ablet (12.5 mg total) by mouth once daily.    PANTOPRAZOLE (PROTONIX) 40 MG TABLET    TAKE ONE (1) TABLET BY MOUTH TWICE DAILY    SANDOSTATIN LAR DEPOT 20 MG SSRR INJECTION    Inject into the muscle every 30 days.    STIOLTO RESPIMAT 2.5-2.5 MCG/ACTUATION MIST    INHALE 2 PUFFS BY MOUTH ONCE DAILY **CALL AHEAD, SPECIALLY ORDERED FOR YOU**    TAMSULOSIN (FLOMAX) 0.4 MG CAP    TAKE 1 CAPSULE BY MOUTH ONCE DAILY.   Changed and/or Refilled Medications    Modified Medication Previous Medication    ALBUTEROL (PROVENTIL/VENTOLIN HFA) 90 MCG/ACTUATION INHALER albuterol (PROVENTIL/VENTOLIN HFA) 90 mcg/actuation inhaler       INHALE 1 PUFF INTO THE LUNGS EVERY 4 (FOUR) HOURS AS NEEDED FOR WHEEZING.    Inhale 1 puff into the lungs every 4 (four) hours as needed for Wheezing.           Health  Maintenance Topics with due status: Not Due       Topic Last Completion Date    Colorectal Cancer Screening 06/10/2023    TETANUS VACCINE 08/17/2024    Hemoglobin A1c (Diabetic Prevention Screening) 03/13/2025    Lipid Panel 03/13/2025      The patient's Health Maintenance was reviewed and the following appears to be due at this time:   Health Maintenance Due   Topic Date Due    Aspirin/Antiplatelet Therapy  Never done    Shingles Vaccine (1 of 2) Never done    RSV Vaccine (Age 60+ and Pregnant patients) (1 - Risk 60-74 years 1-dose series) Never done    Influenza Vaccine (1) 09/01/2024    COVID-19 Vaccine (1 - 2024-25 season) Never done       Advance Care Planning   I attest to discussing Advance Care Planning with patient and/or family member.  Education was provided including the importance of the Health Care Power of , Advance Directives, and/or [place] documentation.  The patient expressed understanding to the importance of this information and discussion.       Future Appointments   Date Time Provider Department Center   4/23/2025 11:10 AM Payal Carter FNP [facility]   8/13/2025  9:00 AM [facility]   4/15/2026  9:00 [facility]     Follow up in about 4 months (around 8/9/2025), or if symptoms worsen or fail to improve, for LABS Prior. In addition to their scheduled follow up, the patient has also been instructed to follow up on as needed basis.     LINA Sears          [1]   Social History  Socioeconomic History    Marital status: Single   Tobacco Use    Smoking status: Every Day     Current packs/day: 0.25     Average packs/day: 0.3 packs/day for 51.2 years (12.8 ttl pk-yrs)     Types: Vaping with nicotine, Cigarettes     Start date: 1/10/1974    Smokeless tobacco: Former     Types: Chew   Substance and Sexual Activity    Alcohol use: Yes     Comment: currently not drinking per pt. 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  "Quit drinking in 10/2023. - drinks a beer "once in a while"    Drug use: Not Currently    Sexual activity: Not Currently     Social Drivers of Health     Financial Resource Strain: Low Risk  (11/5/2023)    Overall Financial Resource Strain (CARDIA)     Difficulty of Paying Living Expenses: Not hard at all   Food Insecurity: No Food Insecurity (11/5/2023)    Hunger Vital Sign     Worried About Running Out of Food in the Last Year: Never true     Ran Out of Food in the Last Year: Never true   Transportation Needs: No Transportation Needs (11/5/2023)    PRAPARE - Transportation     Lack of Transportation (Medical): No     Lack of Transportation (Non-Medical): No   Physical Activity: Inactive (11/5/2023)    Exercise Vital Sign     Days of Exercise per Week: 0 days     Minutes of Exercise per Session: 0 min   Stress: No Stress Concern Present (11/5/2023)    [employer] [place] [employer] - Occupational Stress Questionnaire     Feeling of Stress : Only a little   Housing Stability: Low Risk  (11/5/2023)    Housing Stability Vital Sign     Unable to Pay for Housing in the Last Year: No     Number of Places Lived in the Last Year: 1     Unstable Housing in the Last Year: No     "

## 2025-04-28 ENCOUNTER — TELEPHONE (OUTPATIENT)
Dept: FAMILY MEDICINE | Facility: CLINIC | Age: 65
End: 2025-04-28
Payer: MEDICARE

## 2025-04-28 NOTE — TELEPHONE ENCOUNTER
----- Message from LINA Sears sent at 4/28/2025  9:08 AM CDT -----  Regarding: RE: advice/refill request  We can increase the quantity of the pump for his next refill.  If he is having increased shortness of  breath where he is using rescue inhaler multiple times a day we may need to re-evaluate this. Please follow up with pt  ----- Message -----  From: Riaz Parker LPN  Sent: 4/28/2025   8:51 AM CDT  To: LINA Casarez  Subject: FW: advice/refill request                        Please see additional info details from call center.  ----- Message -----  From: Judy Soria, Patient Care Assistant  Sent: 4/28/2025   8:24 AM CDT  To: Dhiraj Steele Staff  Subject: advice/refill request                            Who Called: Hemal Tolbert Jr.Refill or New Rx:RefillRX Name and Strength:albuterol (PROVENTIL/VENTOLIN HFA) 90 mcg/actuation inhalerHow is the patient currently taking it? (ex. 1XDay):Is this a 30 day or 90 day RX:Local or Mail Order:List of preferred pharmacies on file (remove unneeded): [facility] [address] different Pharmacy is requested, enter Pharmacy information here including location and phone number: Ordering Provider:Preferred Method of Contact: Phone CallPatient's Preferred Phone Number on File: [phone] Best Call Back Number, if different:Additional Information: pt stated he spoke with the pharmacist about his inhaler because he runs out pretty quickly. The pharmacist told him he would speak with dr marrero about writing mr gresham a prescription for two inhalers at a time instead of one but I don't know if insurance will accept that. I did tell the pt that he had a fresh script from 4/11 with two refills but he wanted to see if he could get two at a time. Please advise

## 2025-04-28 NOTE — TELEPHONE ENCOUNTER
Discussed with patient. States he is having to use inhalers more frequently and even more at night. Would like to schedule follow up. Vianey notified for scheduling.

## 2025-04-29 NOTE — ANESTHESIA PREPROCEDURE EVALUATION
"                                                                                                             12/18/2023  Hemal Tolbert Jr. is a 63 y.o., male with CHF (EF 20-25%) and alcoholic cirrhosis with COPD states his sats are usually in the high 80s with previous admission in June for GI bleed (tolerated EGD and colonoscopy) and in October for cardiogenic shock.  ICD placed in November with prior history of ventricular fibrillation during cardiogenic shock.  Patient presents today as an outpatient for EGD (anemia with Hemoccult positive stool)     Transthoracic echo October 2023   EF 20-25% with normal diastolic function   Mild-to-moderate MR   PA pressure 20    Left heart catheterization October 24 2023  RCA ostial 100%   Lad ostial 80%   LVEDP 31  Diastolic dysfunction  Mild-to-moderate pulmonary hypertension  No AS  Intra-aortic balloon pump placed and subsequently switched out for Impella    Last 3 sets of Vitals        12/13/2023     2:39 PM 12/15/2023     3:41 PM 12/18/2023     2:11 PM   Vitals - 1 value per visit   SYSTOLIC 88  120   DIASTOLIC 43  68   Pulse 81  67   Temp 36.4 °C (97.5 °F)  36.5 °C (97.7 °F)   Resp 20  19   SPO2 100 %  97 %   Weight (lb) 174.83 164    Weight (kg) 79.3 74.39    Height 5' 8" (1.727 m) 5' 9" (1.753 m)    BMI (Calculated) 26.6 24.2              Lab Results   Component Value Date    WBC 7.16 12/14/2023    HGB 7.4 (L) 12/14/2023    HCT 23.9 (L) 12/14/2023    MCV 86.9 12/14/2023     12/14/2023          BMP  Lab Results   Component Value Date     12/13/2023    K 4.2 12/13/2023     11/02/2023    CO2 25 12/13/2023    BUN 29.2 (H) 12/13/2023    CREATININE 1.33 (H) 12/13/2023    CALCIUM 8.6 (L) 12/13/2023    ANIONGAP 8 11/02/2023    EGFRNONAA 83 (L) 12/22/2020      Pre-op Assessment    I have reviewed the Patient Summary Reports.    I have reviewed the NPO Status.   I have reviewed the Medications.     Review of Systems  Anesthesia Hx:               Denies " Personal Hx of Anesthesia complications.                    Social:  Non-Smoker       Hematology/Oncology:       -- Anemia:                                  Cardiovascular:     Hypertension                Functional Capacity 2 METS      Congestive Heart Failure (CHF)   , LV Systolic HF, LV Diastolic HF               Disorder of Cardiac Rhythm, Ventricular Fibrillation, controlled with ICD/Pacemaker     Pulmonary:   COPD, severe   Shortness of breath  Sleep Apnea                    Physical Exam  General: Well nourished, Cooperative, Alert and Oriented    Airway:  Mallampati: II   Mouth Opening: Normal  TM Distance: Normal  Tongue: Normal  Neck ROM: Normal ROM    Dental:  Edentulous    Chest/Lungs:  Clear to auscultation, Normal Respiratory Rate    Heart:  Rate: Normal  Rhythm: Regular Rhythm        Anesthesia Plan  Type of Anesthesia, risks & benefits discussed:    Anesthesia Type: Gen Natural Airway  Intra-op Monitoring Plan: Standard ASA Monitors  Induction:  IV  Informed Consent: Informed consent signed with the Patient and all parties understand the risks and agree with anesthesia plan.  All questions answered.   ASA Score: 4  Day of Surgery Review of History & Physical: H&P Update referred to the surgeon/provider.  Anesthesia Plan Notes: Etomidate >>> propofol    Ready For Surgery From Anesthesia Perspective.     .       Attending Only

## 2025-05-01 DIAGNOSIS — I10 PRIMARY HYPERTENSION: ICD-10-CM

## 2025-05-01 DIAGNOSIS — N18.32 STAGE 3B CHRONIC KIDNEY DISEASE: Primary | ICD-10-CM

## 2025-05-06 ENCOUNTER — OFFICE VISIT (OUTPATIENT)
Dept: FAMILY MEDICINE | Facility: CLINIC | Age: 65
End: 2025-05-06
Payer: MEDICARE

## 2025-05-06 ENCOUNTER — LAB VISIT (OUTPATIENT)
Dept: LAB | Facility: HOSPITAL | Age: 65
End: 2025-05-06
Attending: NURSE PRACTITIONER
Payer: MEDICARE

## 2025-05-06 VITALS
HEIGHT: 69 IN | SYSTOLIC BLOOD PRESSURE: 116 MMHG | TEMPERATURE: 98 F | BODY MASS INDEX: 25.97 KG/M2 | DIASTOLIC BLOOD PRESSURE: 62 MMHG | WEIGHT: 175.31 LBS | RESPIRATION RATE: 18 BRPM | OXYGEN SATURATION: 95 % | HEART RATE: 55 BPM

## 2025-05-06 DIAGNOSIS — N18.32 STAGE 3B CHRONIC KIDNEY DISEASE: ICD-10-CM

## 2025-05-06 DIAGNOSIS — J44.9 CHRONIC OBSTRUCTIVE PULMONARY DISEASE, UNSPECIFIED COPD TYPE: Primary | ICD-10-CM

## 2025-05-06 DIAGNOSIS — I10 PRIMARY HYPERTENSION: ICD-10-CM

## 2025-05-06 DIAGNOSIS — F17.200 TOBACCO DEPENDENCE: ICD-10-CM

## 2025-05-06 LAB
ALBUMIN SERPL-MCNC: 3.3 G/DL (ref 3.4–4.8)
ALBUMIN/GLOB SERPL: 0.7 RATIO (ref 1.1–2)
ALP SERPL-CCNC: 62 UNIT/L (ref 40–150)
ALT SERPL-CCNC: 40 UNIT/L (ref 0–55)
ANION GAP SERPL CALC-SCNC: 5 MEQ/L
AST SERPL-CCNC: 43 UNIT/L (ref 11–45)
BACTERIA #/AREA URNS AUTO: NORMAL /HPF
BASOPHILS # BLD AUTO: 0.03 X10(3)/MCL
BASOPHILS NFR BLD AUTO: 0.5 %
BILIRUB SERPL-MCNC: 0.6 MG/DL
BILIRUB UR QL STRIP.AUTO: NEGATIVE
BUN SERPL-MCNC: 25.7 MG/DL (ref 8.4–25.7)
CALCIUM SERPL-MCNC: 8.5 MG/DL (ref 8.8–10)
CHLORIDE SERPL-SCNC: 105 MMOL/L (ref 98–107)
CLARITY UR: CLEAR
CO2 SERPL-SCNC: 29 MMOL/L (ref 23–31)
COLOR UR AUTO: YELLOW
CREAT SERPL-MCNC: 1.46 MG/DL (ref 0.72–1.25)
CREAT UR-MCNC: 137 MG/DL (ref 63–166)
CREAT/UREA NIT SERPL: 18
EOSINOPHIL # BLD AUTO: 0.11 X10(3)/MCL (ref 0–0.9)
EOSINOPHIL NFR BLD AUTO: 1.8 %
ERYTHROCYTE [DISTWIDTH] IN BLOOD BY AUTOMATED COUNT: 13 % (ref 11.5–17)
GFR SERPLBLD CREATININE-BSD FMLA CKD-EPI: 53 ML/MIN/1.73/M2
GLOBULIN SER-MCNC: 4.6 GM/DL (ref 2.4–3.5)
GLUCOSE SERPL-MCNC: 116 MG/DL (ref 82–115)
GLUCOSE UR QL STRIP: NEGATIVE
HCT VFR BLD AUTO: 40.4 % (ref 42–52)
HGB BLD-MCNC: 13.2 G/DL (ref 14–18)
HGB UR QL STRIP: ABNORMAL
IMM GRANULOCYTES # BLD AUTO: 0.01 X10(3)/MCL (ref 0–0.04)
IMM GRANULOCYTES NFR BLD AUTO: 0.2 %
KETONES UR QL STRIP: NEGATIVE
LEUKOCYTE ESTERASE UR QL STRIP: NEGATIVE
LYMPHOCYTES # BLD AUTO: 1.4 X10(3)/MCL (ref 0.6–4.6)
LYMPHOCYTES NFR BLD AUTO: 22.4 %
MCH RBC QN AUTO: 32.8 PG (ref 27–31)
MCHC RBC AUTO-ENTMCNC: 32.7 G/DL (ref 33–36)
MCV RBC AUTO: 100.2 FL (ref 80–94)
MONOCYTES # BLD AUTO: 0.6 X10(3)/MCL (ref 0.1–1.3)
MONOCYTES NFR BLD AUTO: 9.6 %
NEUTROPHILS # BLD AUTO: 4.11 X10(3)/MCL (ref 2.1–9.2)
NEUTROPHILS NFR BLD AUTO: 65.5 %
NITRITE UR QL STRIP: NEGATIVE
PH UR STRIP: 7 [PH]
PLATELET # BLD AUTO: 216 X10(3)/MCL (ref 130–400)
PMV BLD AUTO: 9.6 FL (ref 7.4–10.4)
POTASSIUM SERPL-SCNC: 4.8 MMOL/L (ref 3.5–5.1)
PROT SERPL-MCNC: 7.9 GM/DL (ref 5.8–7.6)
PROT UR QL STRIP: ABNORMAL
PROT UR STRIP-MCNC: 15.4 MG/DL
PTH-INTACT SERPL-MCNC: 280.9 PG/ML (ref 8.7–77)
RBC # BLD AUTO: 4.03 X10(6)/MCL (ref 4.7–6.1)
RBC #/AREA URNS AUTO: NORMAL /HPF
SODIUM SERPL-SCNC: 139 MMOL/L (ref 136–145)
SP GR UR STRIP.AUTO: 1.01 (ref 1–1.03)
SQUAMOUS #/AREA URNS AUTO: NORMAL /HPF
URINE PROTEIN/CREATININE RATIO (OLG): 0.1
UROBILINOGEN UR STRIP-ACNC: 1
WBC # BLD AUTO: 6.26 X10(3)/MCL (ref 4.5–11.5)
WBC #/AREA URNS AUTO: NORMAL /HPF

## 2025-05-06 PROCEDURE — 3044F HG A1C LEVEL LT 7.0%: CPT | Mod: ,,, | Performed by: NURSE PRACTITIONER

## 2025-05-06 PROCEDURE — 4010F ACE/ARB THERAPY RXD/TAKEN: CPT | Mod: ,,, | Performed by: NURSE PRACTITIONER

## 2025-05-06 PROCEDURE — 82570 ASSAY OF URINE CREATININE: CPT

## 2025-05-06 PROCEDURE — 1101F PT FALLS ASSESS-DOCD LE1/YR: CPT | Mod: ,,, | Performed by: NURSE PRACTITIONER

## 2025-05-06 PROCEDURE — 3008F BODY MASS INDEX DOCD: CPT | Mod: ,,, | Performed by: NURSE PRACTITIONER

## 2025-05-06 PROCEDURE — 1160F RVW MEDS BY RX/DR IN RCRD: CPT | Mod: ,,, | Performed by: NURSE PRACTITIONER

## 2025-05-06 PROCEDURE — 1159F MED LIST DOCD IN RCRD: CPT | Mod: ,,, | Performed by: NURSE PRACTITIONER

## 2025-05-06 PROCEDURE — 3288F FALL RISK ASSESSMENT DOCD: CPT | Mod: ,,, | Performed by: NURSE PRACTITIONER

## 2025-05-06 PROCEDURE — 85025 COMPLETE CBC W/AUTO DIFF WBC: CPT

## 2025-05-06 PROCEDURE — 80053 COMPREHEN METABOLIC PANEL: CPT

## 2025-05-06 PROCEDURE — 83970 ASSAY OF PARATHORMONE: CPT

## 2025-05-06 PROCEDURE — 99406 BEHAV CHNG SMOKING 3-10 MIN: CPT | Mod: ,,, | Performed by: NURSE PRACTITIONER

## 2025-05-06 PROCEDURE — 3074F SYST BP LT 130 MM HG: CPT | Mod: ,,, | Performed by: NURSE PRACTITIONER

## 2025-05-06 PROCEDURE — 81003 URINALYSIS AUTO W/O SCOPE: CPT

## 2025-05-06 PROCEDURE — 3078F DIAST BP <80 MM HG: CPT | Mod: ,,, | Performed by: NURSE PRACTITIONER

## 2025-05-06 PROCEDURE — 99214 OFFICE O/P EST MOD 30 MIN: CPT | Mod: 25,,, | Performed by: NURSE PRACTITIONER

## 2025-05-06 PROCEDURE — 36415 COLL VENOUS BLD VENIPUNCTURE: CPT

## 2025-05-06 RX ORDER — AMIODARONE HYDROCHLORIDE 200 MG/1
200 TABLET ORAL DAILY
COMMUNITY
Start: 2024-07-11

## 2025-05-06 RX ORDER — FOLIC ACID 1 MG/1
1 TABLET ORAL DAILY
COMMUNITY

## 2025-05-06 RX ORDER — METOPROLOL SUCCINATE 25 MG/1
25 TABLET, EXTENDED RELEASE ORAL
COMMUNITY

## 2025-05-06 RX ORDER — ALBUTEROL SULFATE 90 UG/1
2 INHALANT RESPIRATORY (INHALATION) EVERY 4 HOURS PRN
Qty: 18 G | Refills: 2 | Status: SHIPPED | OUTPATIENT
Start: 2025-05-06

## 2025-05-06 RX ORDER — ATORVASTATIN CALCIUM 80 MG/1
1 TABLET, FILM COATED ORAL DAILY
COMMUNITY
Start: 2024-07-11

## 2025-05-06 NOTE — PROGRESS NOTES
SUBJECTIVE:     History of Present Illness      Chief Complaint: Shortness of Breath (Pt wants to discuss inhaler regimen.)    HPI:  Patient is a 65 y.o. year old male who presents to clinic for reoccurring shortness a breath.  Patient reports that he has been using his albuterol inhaler more frequent lately.    Review of Systems:    Review of Systems    12 point review of systems conducted, negative except as stated in the history of present illness. See HPI for details.     Previous History      PCP: Ivette Hearn FNP  Review of patient's allergies indicates:  No Known Allergies    Past Medical History:   Diagnosis Date    Alcoholic cirrhosis     Anemia     CHF (congestive heart failure)     LV EF 40%    COPD (chronic obstructive pulmonary disease)     Hyperlipidemia     Hypertension     Myocardial infarction     Peripheral artery disease     with stents    Sleep apnea        Past Surgical History:   Procedure Laterality Date    ANGIOGRAM, ABDOMINAL AORTA  10/24/2023    Procedure: Angiogram, Abdominal Aorta;  Surgeon: Janette Ernandez MD;  Location: [facility] CATH LAB;  Service: Cardiology;;    ARTERIOGRAPHY OF SUBCLAVIAN ARTERY Left 10/24/2023    Procedure: ARTERIOGRAM, SUBCLAVIAN;  Surgeon: Janette Ernandez MD;  Location: [facility] CATH LAB;  Service: Cardiology;  Laterality: Left;    CAPSULE ENDOSCOPY, ESOPHAGUS THROUGH ILEUM N/A 11/20/2023    Procedure: M2A;  Surgeon: Johan Flower MD;  Location: [facility] ENDOSCOPY;  Service: Gastroenterology;  Laterality: N/A;  To be done at any time today when staff is available.    CARDIAC DEFIBRILLATOR PLACEMENT N/A 11/28/2023    Procedure: Insertion, Single chamber ICD (SJM);  Surgeon: Suyapa Oakes MD;  Location: [facility] CATH LAB;  Service: Cardiology;  Laterality: N/A;    CATHETERIZATION OF BOTH LEFT AND RIGHT HEART N/A 10/24/2023    Procedure: CATHETERIZATION, HEART, BOTH LEFT AND RIGHT;  Surgeon: Janette Ernandez MD;  Location: [facility] CATH LAB;  Service:  Cardiology;  Laterality: N/A;    COLONOSCOPY N/A 6/10/2023    Procedure: COLONOSCOPY;  Surgeon: Johan Flower MD;  Location: [facility] OR;  Service: Gastroenterology;  Laterality: N/A;    EGD, WITH CLOSED BIOPSY N/A 12/18/2023    Procedure: EGD;  Surgeon: Johan Flower MD;  Location: [facility] ENDOSCOPY;  Service: Gastroenterology;  Laterality: N/A;    ESOPHAGOGASTRODUODENOSCOPY N/A 6/9/2023    Procedure: EGD;  Surgeon: Tima Teixeira MD;  Location: [facility] ENDOSCOPY;  Service: Gastroenterology;  Laterality: N/A;    ESOPHAGOGASTRODUODENOSCOPY N/A 1/11/2024    Procedure: EGD (ESOPHAGOGASTRODUODENOSCOPY);  Surgeon: Chaya Castellon MD;  Location: [facility];  Service: Endoscopy;  Laterality: N/A;  POST OP: MILD GASTRITIS W/ NO ULCER    ESOPHAGOGASTRODUODENOSCOPY N/A 4/24/2024    Procedure: EGD W/ M2A PLACEMENT;  Surgeon: Johan Flower MD;  Location: [facility] ENDOSCOPY;  Service: Gastroenterology;  Laterality: N/A;    INSERTION OF INTRA-AORTIC BALLOON ASSIST DEVICE  10/24/2023    Procedure: INSERTION, INTRA-AORTIC BALLOON PUMP;  Surgeon: Janette Ernandez MD;  Location: [facility] CATH LAB;  Service: Cardiology;;    INSERTION OF INTRAVASCULAR MICROAXIAL BLOOD PUMP N/A 10/26/2023    Procedure: INSERTION, IMPELLA;  Surgeon: Janette Ernandez MD;  Location: [facility] CATH LAB;  Service: Cardiology;  Laterality: N/A;    INSTANTANEOUS WAVE-FREE RATIO (IFR) N/A 10/26/2023    Procedure: Instantaneous Wave-Free Ratio (IFR);  Surgeon: Janette Ernandez MD;  Location: [facility] CATH LAB;  Service: Cardiology;  Laterality: N/A;    INTRALUMINAL GASTROINTESTINAL TRACT IMAGING VIA CAPSULE N/A 4/24/2024    Procedure: M2A;  Surgeon: Johan Flower MD;  Location: [facility] ENDOSCOPY;  Service: Gastroenterology;  Laterality: N/A;    INTRAVASCULAR ULTRASOUND, CORONARY N/A 10/26/2023    Procedure: Ultrasound-coronary;  Surgeon: Janette Ernandez MD;  Location: [facility] CATH LAB;  Service: Cardiology;  Laterality: N/A;     "PERCUTANEOUS CORONARY INTERVENTION, ARTERY N/A 10/26/2023    Procedure: Percutaneous coronary intervention- with Impella;  Surgeon: Janette Ernandez MD;  Location: [facility] CATH LAB;  Service: Cardiology;  Laterality: N/A;    PLACEMENT, IABP  10/24/2023    Procedure: Placement, IABP;  Surgeon: Janette Ernandez MD;  Location: [facility] CATH LAB;  Service: Cardiology;;     No family history on file.    Social History[1]     Health Maintenance      Health Maintenance   Topic Date Due    Aspirin/Antiplatelet Therapy  Never done    Shingles Vaccine (1 of 2) Never done    RSV Vaccine (Age 60+ and Pregnant patients) (1 - Risk 60-74 years 1-dose series) Never done    COVID-19 Vaccine (1 - 2024-25 season) Never done    Pneumococcal Vaccines (Age 50+) (1 of 2 - PCV) 04/09/2026 (Originally 3/16/1979)    Influenza Vaccine (Season Ended) 09/01/2025    Hemoglobin A1c (Diabetic Prevention Screening)  03/13/2028    Lipid Panel  03/13/2030    Colorectal Cancer Screening  06/10/2033    TETANUS VACCINE  08/17/2034    Hepatitis C Screening  Completed    Abdominal Aortic Aneurysm Screening  Completed       OBJECTIVE:     Physical Exam      Vital Signs Reviewed   Visit Vitals  /62 (BP Location: Left arm, Patient Position: Sitting)   Pulse (!) 55   Temp 97.7 °F (36.5 °C)   Resp 18   Ht 5' 9" (1.753 m)   Wt 79.5 kg (175 lb 4.8 oz)   SpO2 95%   BMI 25.89 kg/m²       Physical Exam    Physical Exam:  General: Alert, well nourished, no acute distress, non-toxic appearing.   Eyes: Anicteric sclera, without conjunctival injection, normal lids, no purulent drainage, EOMs grossly intact.   Ears: No tragal tenderness. Tympanic membranes intact, pearly grey, without effusion or erythema and with a positive light reflex.   Mouth: Posterior pharynx without erythema. No exudate, ulcerations, or lesion. No tonsillar swelling.   Neck: Supple, full ROM, no rigidity, no cervical adenopathy.   Cardio: Normal rate and rhythm    Resp: Respirations even and " unlabored, clear to auscultation bilaterally.   Abd: No ecchymosis or distension. Normal bowel sounds in all 4 quadrants. No tenderness to palpation. No rebound tenderness or guarding. No CVA tenderness.   Skin: No rashes or open lesions noted.   MSK: No swelling. No abrasions or signs of trauma. Ambulating without assistance.   Neuro: Alert,oriented No focal deficits noted. Facial expressions even.   Psych: Cooperative, Normal affect      Labs   Chemistry:  Lab Results   Component Value Date     05/06/2025    K 4.8 05/06/2025    BUN 25.7 05/06/2025    CREATININE 1.46 (H) 05/06/2025    EGFRNORACEVR 53 05/06/2025    CALCIUM 8.5 (L) 05/06/2025    ALKPHOS 62 05/06/2025    LABPROT 10.8 10/23/2023    ALBUMIN 3.3 (L) 05/06/2025    BILIDIR 0.2 11/21/2023    IBILI 0.20 11/21/2023    AST 43 05/06/2025    ALT 40 05/06/2025    MG 2.00 03/26/2025    PHOS 3.6 11/19/2023    TEBCCWKC88YU 12 (L) 04/07/2025    TSH 1.721 03/13/2025    FBPRGZ5GRDK 0.92 06/07/2023    PSA 0.10 04/07/2025        Lab Results   Component Value Date    HGBA1C 5.6 03/13/2025        Hematology:  Lab Results   Component Value Date    WBC 6.26 05/06/2025    HGB 13.2 (L) 05/06/2025    HCT 40.4 (L) 05/06/2025     05/06/2025       Lipid Panel:  Lab Results   Component Value Date    CHOL 79 03/13/2025    HDL 20 (L) 03/13/2025    LDL 40.00 (L) 03/13/2025    TRIG 95 03/13/2025    TOTALCHOLEST 4 03/13/2025        Urine:  Lab Results   Component Value Date    APPEARANCEUA Clear 05/06/2025    SGUA 1.010 05/06/2025    PROTEINUA Trace (A) 05/06/2025    KETONESUA Negative 05/06/2025    LEUKOCYTESUR Negative 05/06/2025    RBCUA 0-2 05/06/2025    WBCUA None Seen 05/06/2025    BACTERIA None Seen 05/06/2025    SQEPUA 1-2 12/22/2020    HYALINECASTS 0-2 (A) 12/22/2020    CREATRANDUR 137.0 05/06/2025    PROTEINURINE 15.4 05/06/2025    UPROTCREA 0.1 05/06/2025         Assessment         ICD-10-CM ICD-9-CM   1. Chronic obstructive pulmonary disease, unspecified COPD  type  J44.9 496   2. Tobacco dependence  F17.200 305.1       Plan       Assessment & Plan  Chronic obstructive pulmonary disease, unspecified COPD type    Orders:    Complete PFT w/ bronchodilator; Future    Ambulatory referral/consult to Pulmonology; Future    albuterol (PROVENTIL/VENTOLIN HFA) 90 mcg/actuation inhaler; Inhale 2 puffs into the lungs every 4 (four) hours as needed for Wheezing.    Tobacco dependence  The patient was counseled on the dangers of tobacco use, and was advised to quit.  Reviewed strategies to maximize success, including written materials.     Patient was counseled regarding smoking for 3-10 minutes.               Orders Placed This Encounter    Ambulatory referral/consult to Pulmonology    Complete PFT w/ bronchodilator    albuterol (PROVENTIL/VENTOLIN HFA) 90 mcg/actuation inhaler      Medication List with Changes/Refills   Current Medications    ALBUTEROL-IPRATROPIUM (DUO-NEB) 2.5 MG-0.5 MG/3 ML NEBULIZER SOLUTION    Take 3 mLs by nebulization every 6 (six) hours as needed for Shortness of Breath or Wheezing.    AMIODARONE (PACERONE) 200 MG TAB    Take 2 tablets (400 mg total) by mouth once daily.    AMIODARONE (PACERONE) 200 MG TAB    Take 200 mg by mouth once daily.    ATORVASTATIN (LIPITOR) 80 MG TABLET    Take 1 tablet (80 mg total) by mouth once daily.    ATORVASTATIN (LIPITOR) 80 MG TABLET    Take 1 tablet by mouth once daily.    DICLOFENAC SODIUM (VOLTAREN) 1 % GEL    Apply 2 g topically once daily.    ENTRESTO 24-26 MG PER TABLET    Take 1 tablet by mouth 2 (two) times daily.    FERROUS SULFATE 325 (65 FE) MG EC TABLET    Take 1 tablet (325 mg total) by mouth 2 (two) times daily.    FOLIC ACID (FOLVITE) 1 MG TABLET    Take 1 tablet (1 mg total) by mouth once daily.    FOLIC ACID (FOLVITE) 1 MG TABLET    Take 1 tablet by mouth once daily.    FUROSEMIDE (LASIX) 40 MG TABLET    Take 40 mg by mouth once daily.    GABAPENTIN (NEURONTIN) 300 MG CAPSULE    TAKE ONE CAPSULE BY MOUTH  (3) TIMES DAILY    METOPROLOL SUCCINATE (TOPROL-XL) 25 MG 24 HR TABLET    Take ½ ablet (12.5 mg total) by mouth once daily.    METOPROLOL SUCCINATE (TOPROL-XL) 25 MG 24 HR TABLET    25 mg.    PANTOPRAZOLE (PROTONIX) 40 MG TABLET    TAKE ONE (1) TABLET BY MOUTH TWICE DAILY    SANDOSTATIN LAR DEPOT 20 MG SSRR INJECTION    Inject into the muscle every 30 days.    STIOLTO RESPIMAT 2.5-2.5 MCG/ACTUATION MIST    INHALE 2 PUFFS BY MOUTH ONCE DAILY **CALL AHEAD, SPECIALLY ORDERED FOR YOU**    TAMSULOSIN (FLOMAX) 0.4 MG CAP    TAKE 1 CAPSULE BY MOUTH ONCE DAILY.   Changed and/or Refilled Medications    Modified Medication Previous Medication    ALBUTEROL (PROVENTIL/VENTOLIN HFA) 90 MCG/ACTUATION INHALER albuterol (PROVENTIL/VENTOLIN HFA) 90 mcg/actuation inhaler       Inhale 2 puffs into the lungs every 4 (four) hours as needed for Wheezing.    INHALE 1 PUFF INTO THE LUNGS EVERY 4 (FOUR) HOURS AS NEEDED FOR WHEEZING.         Follow up in about 3 months (around 8/6/2025), or if symptoms worsen or fail to improve, for Routine appointment as scheduled. In addition to their scheduled follow up, the patient has also been instructed to follow up on as needed basis.   Future Appointments   Date Time Provider Department Center   5/12/2025 10:40 AM Evelyn Fuentes ACNP [facility] [place] [facility]   5/19/2025 11:00 AM PFT, [facility]   7/10/2025 10:30 AM Payal Carter FNP [facility] [place]   8/11/2025  1:30 PM Ivette Hearn FNP [facility]   8/13/2025  9:00 AM Ivette Hearn FNP [facility]   4/15/2026  9:00 AM Ivette Hearn ROSE [facility]       LINA Sears                [1]   Social History  Tobacco Use    Smoking status: Every Day     Current packs/day: 0.25     Average packs/day: 0.3 packs/day for 51.3 years (12.8 ttl pk-yrs)     Types: Cigarettes     Start date: 1/10/1974    Smokeless tobacco: Former     Types: Chew   Substance Use Topics     "Alcohol use: Yes     Comment: currently not drinking per pt. 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 Quit drinking in 10/2023. - drinks a beer "once in a while"    Drug use: Not Currently     "

## 2025-05-06 NOTE — ASSESSMENT & PLAN NOTE
Orders:    Complete PFT w/ bronchodilator; Future    Ambulatory referral/consult to Pulmonology; Future    albuterol (PROVENTIL/VENTOLIN HFA) 90 mcg/actuation inhaler; Inhale 2 puffs into the lungs every 4 (four) hours as needed for Wheezing.

## 2025-05-11 ENCOUNTER — HOSPITAL ENCOUNTER (EMERGENCY)
Facility: HOSPITAL | Age: 65
Discharge: HOME | End: 2025-05-11
Attending: OTOLARYNGOLOGY
Payer: MEDICARE

## 2025-05-11 VITALS
HEART RATE: 60 BPM | RESPIRATION RATE: 19 BRPM | HEIGHT: 69 IN | BODY MASS INDEX: 26.66 KG/M2 | TEMPERATURE: 99 F | OXYGEN SATURATION: 93 % | WEIGHT: 180 LBS | SYSTOLIC BLOOD PRESSURE: 110 MMHG | DIASTOLIC BLOOD PRESSURE: 51 MMHG

## 2025-05-11 DIAGNOSIS — J18.9 PNEUMONIA OF RIGHT LOWER LOBE DUE TO INFECTIOUS ORGANISM: ICD-10-CM
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
## 2025-05-11 DIAGNOSIS — J90 PLEURAL EFFUSION ON RIGHT: ICD-10-CM

## 2025-05-11 DIAGNOSIS — R07.81 RIB PAIN: ICD-10-CM

## 2025-05-11 LAB
BACTERIA #/AREA URNS AUTO: ABNORMAL /HPF
BILIRUB UR QL STRIP.AUTO: NEGATIVE
CLARITY UR: CLEAR
COLOR UR AUTO: YELLOW
GLUCOSE UR QL STRIP: NEGATIVE
HGB UR QL STRIP: ABNORMAL
HYALINE CASTS URNS QL MICRO: ABNORMAL /LPF
KETONES UR QL STRIP: NEGATIVE
LEUKOCYTE ESTERASE UR QL STRIP: NEGATIVE
MUCOUS THREADS URNS QL MICRO: ABNORMAL /LPF
NITRITE UR QL STRIP: NEGATIVE
PH UR STRIP: 6 [PH]
PROT UR QL STRIP: NEGATIVE
RBC #/AREA URNS AUTO: ABNORMAL /HPF
SP GR UR STRIP.AUTO: <=1.005 (ref 1–1.03)
SQUAMOUS #/AREA URNS AUTO: ABNORMAL /HPF
UROBILINOGEN UR STRIP-ACNC: 0.2
WBC #/AREA URNS AUTO: ABNORMAL /HPF

## 2025-05-11 PROCEDURE — 63600175 PHARM REV CODE 636 W HCPCS: Performed by: NURSE PRACTITIONER

## 2025-05-11 PROCEDURE — 81003 URINALYSIS AUTO W/O SCOPE: CPT | Performed by: NURSE PRACTITIONER

## 2025-05-11 PROCEDURE — 99285 EMERGENCY DEPT VISIT HI MDM: CPT | Mod: 25

## 2025-05-11 PROCEDURE — 81015 MICROSCOPIC EXAM OF URINE: CPT | Performed by: NURSE PRACTITIONER

## 2025-05-11 PROCEDURE — 96372 THER/PROPH/DIAG INJ SC/IM: CPT | Performed by: NURSE PRACTITIONER

## 2025-05-11 RX ORDER — CEFTRIAXONE 1 G/1
1 INJECTION, POWDER, FOR SOLUTION INTRAMUSCULAR; INTRAVENOUS
Status: COMPLETED | OUTPATIENT
Start: 2025-05-11 | End: 2025-05-11

## 2025-05-11 RX ORDER — AZITHROMYCIN 250 MG/1
TABLET, FILM COATED ORAL
Qty: 6 TABLET | Refills: 0 | Status: SHIPPED | OUTPATIENT
Start: 2025-05-11 | End: 2025-05-16

## 2025-05-11 RX ORDER — PREDNISONE 20 MG/1
40 TABLET ORAL
Status: COMPLETED | OUTPATIENT
Start: 2025-05-11 | End: 2025-05-11

## 2025-05-11 RX ADMIN — CEFTRIAXONE SODIUM 1 G: 1 INJECTION, POWDER, FOR SOLUTION INTRAMUSCULAR; INTRAVENOUS at 07:05

## 2025-05-11 RX ADMIN — PREDNISONE 40 MG: 20 TABLET ORAL at 07:05

## 2025-05-11 NOTE — ED PROVIDER NOTES
Encounter Date: 5/11/2025       History     Chief Complaint   Patient presents with    Fall     Pt BIB[place] for mechanical fall [place] by [employer], c/o pain to right ribs. Denies hit strike, LOC.      65 yrs old male presents with trip and fall on the bridge by [employer] just prior to arrival. Patient reports he has right posterior rib pain after trip and fall. Patient denies hitting head or LOC. Patient denies neck pain or tenderness upon palpation. Patient has  no muscle cramps, no focal deficits, no urinary incontinence, no saddle anesthesia noted.    The history is provided by the patient.     Review of patient's allergies indicates:  No Known Allergies  Past Medical History:   Diagnosis Date    Alcoholic cirrhosis     Anemia     CHF (congestive heart failure)     LV EF 40%    COPD (chronic obstructive pulmonary disease)     Hyperlipidemia     Hypertension     Myocardial infarction     Peripheral artery disease     with stents    Sleep apnea      Past Surgical History:   Procedure Laterality Date    ANGIOGRAM, ABDOMINAL AORTA  10/24/2023    Procedure: Angiogram, Abdominal Aorta;  Surgeon: Janette Ernandez MD;  Location: [facility] CATH LAB;  Service: Cardiology;;    ARTERIOGRAPHY OF SUBCLAVIAN ARTERY Left 10/24/2023    Procedure: ARTERIOGRAM, SUBCLAVIAN;  Surgeon: Janette Ernandez MD;  Location: [facility] CATH LAB;  Service: Cardiology;  Laterality: Left;    CAPSULE ENDOSCOPY, ESOPHAGUS THROUGH ILEUM N/A 11/20/2023    Procedure: M2A;  Surgeon: Johan Flower MD;  Location: [facility] ENDOSCOPY;  Service: Gastroenterology;  Laterality: N/A;  To be done at any time today when staff is available.    CARDIAC DEFIBRILLATOR PLACEMENT N/A 11/28/2023    Procedure: Insertion, Single chamber ICD (SJM);  Surgeon: Suyapa Oakes MD;  Location: [facility] CATH LAB;  Service: Cardiology;  Laterality: N/A;    CATHETERIZATION OF BOTH LEFT AND RIGHT HEART N/A 10/24/2023    Procedure: CATHETERIZATION, HEART, BOTH LEFT AND RIGHT;   Surgeon: Janette Ernandez MD;  Location: [facility] CATH LAB;  Service: Cardiology;  Laterality: N/A;    COLONOSCOPY N/A 6/10/2023    Procedure: COLONOSCOPY;  Surgeon: Johan Flower MD;  Location: [facility] OR;  Service: Gastroenterology;  Laterality: N/A;    EGD, WITH CLOSED BIOPSY N/A 12/18/2023    Procedure: EGD;  Surgeon: Johan Flower MD;  Location: [facility] ENDOSCOPY;  Service: Gastroenterology;  Laterality: N/A;    ESOPHAGOGASTRODUODENOSCOPY N/A 6/9/2023    Procedure: EGD;  Surgeon: Tima Teixeira MD;  Location: [facility] ENDOSCOPY;  Service: Gastroenterology;  Laterality: N/A;    ESOPHAGOGASTRODUODENOSCOPY N/A 1/11/2024    Procedure: EGD (ESOPHAGOGASTRODUODENOSCOPY);  Surgeon: Chaya Castellon MD;  Location: [facility];  Service: Endoscopy;  Laterality: N/A;  POST OP: MILD GASTRITIS W/ NO ULCER    ESOPHAGOGASTRODUODENOSCOPY N/A 4/24/2024    Procedure: EGD W/ M2A PLACEMENT;  Surgeon: Johan Flower MD;  Location: [facility] ENDOSCOPY;  Service: Gastroenterology;  Laterality: N/A;    INSERTION OF INTRA-AORTIC BALLOON ASSIST DEVICE  10/24/2023    Procedure: INSERTION, INTRA-AORTIC BALLOON PUMP;  Surgeon: Janette Ernandez MD;  Location: [facility] CATH LAB;  Service: Cardiology;;    INSERTION OF INTRAVASCULAR MICROAXIAL BLOOD PUMP N/A 10/26/2023    Procedure: INSERTION, IMPELLA;  Surgeon: Janette Ernandez MD;  Location: [facility] CATH LAB;  Service: Cardiology;  Laterality: N/A;    INSTANTANEOUS WAVE-FREE RATIO (IFR) N/A 10/26/2023    Procedure: Instantaneous Wave-Free Ratio (IFR);  Surgeon: Janette Ernandez MD;  Location: [facility] CATH LAB;  Service: Cardiology;  Laterality: N/A;    INTRALUMINAL GASTROINTESTINAL TRACT IMAGING VIA CAPSULE N/A 4/24/2024    Procedure: M2A;  Surgeon: Johan Flower MD;  Location: [facility] ENDOSCOPY;  Service: Gastroenterology;  Laterality: N/A;    INTRAVASCULAR ULTRASOUND, CORONARY N/A 10/26/2023    Procedure: Ultrasound-coronary;  Surgeon: Janette Ernandez MD;  Location:  [facility] CATH LAB;  Service: Cardiology;  Laterality: N/A;    PERCUTANEOUS CORONARY INTERVENTION, ARTERY N/A 10/26/2023    Procedure: Percutaneous coronary intervention- with Impella;  Surgeon: Janette Ernandez MD;  Location: [facility] CATH LAB;  Service: Cardiology;  Laterality: N/A;    PLACEMENT, IABP  10/24/2023    Procedure: Placement, IABP;  Surgeon: Janette Ernandez MD;  Location: [facility] CATH LAB;  Service: Cardiology;;     No family history on file.  Social History[1]  Review of Systems   Constitutional:  Negative for activity change, appetite change, chills, diaphoresis, fatigue and fever.   Respiratory:  Negative for apnea, cough, choking, chest tightness, shortness of breath, wheezing and stridor.    Cardiovascular:  Negative for chest pain, palpitations and leg swelling.   Gastrointestinal:  Negative for abdominal pain, nausea and vomiting.   Genitourinary:  Negative for difficulty urinating, dysuria, flank pain, frequency, penile discharge, penile pain, penile swelling, scrotal swelling, testicular pain and urgency.   Musculoskeletal:  Positive for back pain. Negative for arthralgias, gait problem, joint swelling, myalgias, neck pain and neck stiffness.   Neurological:  Negative for dizziness, tremors, seizures, syncope, facial asymmetry, speech difficulty, weakness, light-headedness, numbness and headaches.   All other systems reviewed and are negative.      Physical Exam     Initial Vitals [05/11/25 1800]   BP Pulse Resp Temp SpO2   123/68 (!) 58 18 97.6 °F (36.4 °C) 98 %      MAP       --         Physical Exam    Nursing note and vitals reviewed.  Constitutional: He appears well-developed and well-nourished.   HENT:   Head: Normocephalic and atraumatic.   Right Ear: External ear normal.   Left Ear: External ear normal.   Nose: Nose normal. Mouth/Throat: Oropharynx is clear and moist.   Eyes: Conjunctivae and EOM are normal.   Neck: Neck supple.   Normal range of motion.  Cardiovascular:  Normal rate, regular  rhythm, normal heart sounds and intact distal pulses.           Pulmonary/Chest: Breath sounds normal.   Abdominal: Abdomen is soft. Bowel sounds are normal.   Musculoskeletal:         General: Normal range of motion.        Arms:       Cervical back: Normal, normal range of motion and neck supple.      Thoracic back: Normal.      Lumbar back: Normal.        Back:       Comments: Tenderness upon palpation to area of concern     Neurological: He is alert and oriented to person, place, and time. He has normal strength and normal reflexes. GCS score is 15. GCS eye subscore is 4. GCS verbal subscore is 5. GCS motor subscore is 6.   Skin: Skin is warm and dry. Capillary refill takes less than 2 seconds.   Psychiatric: He has a normal mood and affect. His behavior is normal. Judgment and thought content normal.         ED Course   Procedures  Labs Reviewed   URINALYSIS, REFLEX TO URINE CULTURE - Abnormal       Result Value    Color, UA Yellow      Appearance, UA Clear      Specific Gravity, UA <=1.005      pH, UA 6.0      Protein, UA Negative      Glucose, UA Negative      Ketones, UA Negative      Blood, UA Trace-Intact (*)     Bilirubin, UA Negative      Urobilinogen, UA 0.2      Nitrites, UA Negative      Leukocyte Esterase, UA Negative      Narrative:      URINE STABILITY IS 2 HOURS AT ROOM TEMP OR    SIX HOURS REFRIGERATED. PERFORMING TESTING ON    SPECIMENS GREATER THAN THIS AGE MAY AFFECT THE    FOLLOWING TESTS:    PH          SPECIFIC GRAVITY           BLOOD    CLARITY     BILIRUBIN               UROBILINOGEN   URINALYSIS, MICROSCOPIC - Abnormal    Bacteria, UA Occasional      Hyaline Casts, UA Rare      Mucous, UA Trace (*)     RBC, UA 0-5      WBC, UA 0-5      Squamous Epithelial Cells, UA Rare            Imaging Results              X-Ray Ribs 2 View Right (Final result)  Result time 05/11/25 19:14:26      Final result by Rob Fan MD (05/11/25 19:14:26)                   Impression:      Under  penetrated film.  Definite abnormality is not demonstrated      Electronically signed by: Rob Fan MD  Date:    05/11/2025  Time:    19:14               Narrative:    EXAMINATION:  XR RIBS 2 VIEW RIGHT    CLINICAL HISTORY:  Pleurodynia    TECHNIQUE:  Two views of the right ribs were performed.    COMPARISON:  None    FINDINGS:  Film is under penetrated.  A definite fracture is not seen.  There are no bony lesions noted.                        Wet Read by Harpreet Toussaint MD (05/11/25 18:55:21, [facility], Emergency Medicine)    No visible fracture, normal alignment                                     X-Ray Chest PA And Lateral (Final result)  Result time 05/11/25 19:13:48      Final result by Rob Fan MD (05/11/25 19:13:48)                   Impression:      Small to moderate right-sided pleural effusion with associated atelectasis and or infiltrate      Electronically signed by: Rob Fan MD  Date:    05/11/2025  Time:    19:13               Narrative:    EXAMINATION:  XR CHEST PA AND LATERAL    CLINICAL HISTORY:  Pleurodynia    TECHNIQUE:  PA and lateral views of the chest were performed.    COMPARISON:  05/11/2025    FINDINGS:  There is a small to moderate right-sided pleural effusion.  Left lungs clear.  Pacing device is in place.  Heart size is within normal limits.                        Wet Read by Harpreet Toussaint MD (05/11/25 18:56:00, [facility], Emergency Medicine)    Scarring/possible bleb, right lower lung base, no infiltrates, no pneumothorax                                     CT Head Without Contrast (Final result)  Result time 05/11/25 19:06:32      Final result by Rob Fan MD (05/11/25 19:06:32)                   Impression:      No acute abnormalities are seen      Electronically signed by: Rob Fan MD  Date:    05/11/2025  Time:    19:06               Narrative:    EXAMINATION:  CT HEAD WITHOUT  CONTRAST    CLINICAL HISTORY:  Head trauma, minor (Age >= 65y);    TECHNIQUE:  Low dose axial images were obtained through the head.  Coronal and sagittal reformations were also performed. Contrast was not administered.    Automatic exposure control (AEC) was utilized for dose reduction.    Dose: 1184.8 mGycm    COMPARISON:  09/01/2024    FINDINGS:  There is atrophy.  Ventricles of normal size and shape there is no shift of the midline noted.  There are no extra-axial fluid collections or areas consistent with hemorrhage noted.  No masses is seen no acute infarcts are noted.  The calvarium appears intact.                                       CT Cervical Spine Without Contrast (Final result)  Result time 05/11/25 19:08:43      Final result by Rob Fan MD (05/11/25 19:08:43)                   Impression:      Degenerative changes and mild malalignment, no acute fractures are demonstrated.      Electronically signed by: Rob Fan MD  Date:    05/11/2025  Time:    19:08               Narrative:    EXAMINATION:  CT CERVICAL SPINE WITHOUT CONTRAST    CLINICAL HISTORY:  Neck trauma (Age >= 65y);    TECHNIQUE:  Low dose axial images, sagittal and coronal reformations were performed though the cervical spine.  Contrast was not administered.    Automatic exposure control (AEC) was utilized for dose reduction.    Dose: 615.5 mGycm    COMPARISON:  09/01/2024    FINDINGS:  There degenerative changes throughout the cervical spine.  There is a mild anterolisthesis of C2 on C3 and retrolisthesis of C4 on C5.  For the odontoid is intact the    No acute fractures are seen.                                       Medications   cefTRIAXone injection 1 g (has no administration in time range)     Medical Decision Making  65 yrs old male presents with [place] fall [place] [employer] just prior to arrival. Patient reports he has right posterior rib pain after trip and fall. Patient denies hitting head or LOC. Patient  denies neck pain or tenderness upon palpation. Patient has  no muscle cramps, no focal deficits, no urinary incontinence, no saddle anesthesia noted. Patient reports he wasn't having any pain prior to trip and fall. He states he fell on right side and that is when the pain started.     Amount and/or Complexity of Data Reviewed  Radiology: ordered and independent interpretation performed.    Risk  Prescription drug management.                          Medical Decision Making:   Differential Diagnosis:   Cervical Fracture, Head Injury, Rib Fracture, Rib Contusion, Cervical Strain             Clinical Impression:  Final diagnoses:  [R07.81] Rib pain  [W19.XXXA] Fall, initial encounter (Primary)  [J90] Pleural effusion on right  [J18.9] Pneumonia of right lower lobe due to infectious organism          ED Disposition Condition    Discharge Stable          ED Prescriptions       Medication Sig Dispense Start Date End Date Auth. Provider    azithromycin (Z-LUCAS) 250 MG tablet Take 2 tablets by mouth on day 1; Take 1 tablet by mouth on days 2-5 6 tablet 5/11/2025 5/16/2025 Bello Alfredo FNP          Follow-up Information       Follow up With Specialties Details Why Contact Info    Ivette Hearn FNP Family Medicine Schedule an appointment as soon as possible for a visit   [address] [facility] [address]  [phone]      [facility] Emergency Medicine  If symptoms worsen [address] [facility] [id number] [phone]               Bello Alfredo FNP  05/11/25 1912       Bello Alfredo FNP  05/11/25 1926         [1]   Social History  Tobacco Use    Smoking status: Every Day     Current packs/day: 0.25     Average packs/day: 0.3 packs/day for 51.3 years (12.8 ttl pk-yrs)     Types: Cigarettes     Start date: 1/10/1974    Smokeless tobacco: Former     Types: Chew   Substance Use Topics    Alcohol use: Yes     Comment: currently not drinking per pt. Drank for 50  "years. Quit drinking in 10/2023. - drinks a beer "once in a while"    Drug use: Not Currently        Bello Alfredo, LINA  05/11/25 1931    "

## 2025-05-12 ENCOUNTER — TELEPHONE (OUTPATIENT)
Dept: FAMILY MEDICINE | Facility: CLINIC | Age: 65
End: 2025-05-12
Payer: MEDICARE

## 2025-05-12 ENCOUNTER — OFFICE VISIT (OUTPATIENT)
Dept: NEPHROLOGY | Facility: CLINIC | Age: 65
End: 2025-05-12
Payer: MEDICARE

## 2025-05-12 VITALS
SYSTOLIC BLOOD PRESSURE: 105 MMHG | OXYGEN SATURATION: 95 % | RESPIRATION RATE: 20 BRPM | HEART RATE: 57 BPM | BODY MASS INDEX: 26.01 KG/M2 | WEIGHT: 175.63 LBS | DIASTOLIC BLOOD PRESSURE: 59 MMHG | TEMPERATURE: 98 F | HEIGHT: 69 IN

## 2025-05-12 DIAGNOSIS — Z87.19 H/O: GI BLEED: ICD-10-CM

## 2025-05-12 DIAGNOSIS — N18.4 CHRONIC KIDNEY DISEASE (CKD), STAGE IV (SEVERE): ICD-10-CM

## 2025-05-12 DIAGNOSIS — D47.2 MGUS (MONOCLONAL GAMMOPATHY OF UNKNOWN SIGNIFICANCE): ICD-10-CM

## 2025-05-12 DIAGNOSIS — I50.9 CONGESTIVE HEART FAILURE, UNSPECIFIED HF CHRONICITY, UNSPECIFIED HEART FAILURE TYPE: ICD-10-CM

## 2025-05-12 DIAGNOSIS — N18.30 STAGE 3 CHRONIC KIDNEY DISEASE, UNSPECIFIED WHETHER STAGE 3A OR 3B CKD: Primary | ICD-10-CM

## 2025-05-12 PROCEDURE — 3044F HG A1C LEVEL LT 7.0%: CPT | Mod: CPTII,S$GLB,, | Performed by: NURSE PRACTITIONER

## 2025-05-12 PROCEDURE — 99999 PR PBB SHADOW E&M-EST. PATIENT-LVL V: CPT | Mod: PBBFAC,,, | Performed by: NURSE PRACTITIONER

## 2025-05-12 PROCEDURE — 1125F AMNT PAIN NOTED PAIN PRSNT: CPT | Mod: CPTII,S$GLB,, | Performed by: NURSE PRACTITIONER

## 2025-05-12 PROCEDURE — 1100F PTFALLS ASSESS-DOCD GE2>/YR: CPT | Mod: CPTII,S$GLB,, | Performed by: NURSE PRACTITIONER

## 2025-05-12 PROCEDURE — 3078F DIAST BP <80 MM HG: CPT | Mod: CPTII,S$GLB,, | Performed by: NURSE PRACTITIONER

## 2025-05-12 PROCEDURE — 3288F FALL RISK ASSESSMENT DOCD: CPT | Mod: CPTII,S$GLB,, | Performed by: NURSE PRACTITIONER

## 2025-05-12 PROCEDURE — 3074F SYST BP LT 130 MM HG: CPT | Mod: CPTII,S$GLB,, | Performed by: NURSE PRACTITIONER

## 2025-05-12 PROCEDURE — 99204 OFFICE O/P NEW MOD 45 MIN: CPT | Mod: S$GLB,,, | Performed by: NURSE PRACTITIONER

## 2025-05-12 PROCEDURE — 4010F ACE/ARB THERAPY RXD/TAKEN: CPT | Mod: CPTII,S$GLB,, | Performed by: NURSE PRACTITIONER

## 2025-05-12 PROCEDURE — 3008F BODY MASS INDEX DOCD: CPT | Mod: CPTII,S$GLB,, | Performed by: NURSE PRACTITIONER

## 2025-05-12 PROCEDURE — 1159F MED LIST DOCD IN RCRD: CPT | Mod: CPTII,S$GLB,, | Performed by: NURSE PRACTITIONER

## 2025-05-12 PROCEDURE — 3066F NEPHROPATHY DOC TX: CPT | Mod: CPTII,S$GLB,, | Performed by: NURSE PRACTITIONER

## 2025-05-12 RX ORDER — AZITHROMYCIN 250 MG/1
TABLET, FILM COATED ORAL
Qty: 6 TABLET | Refills: 0 | Status: SHIPPED | OUTPATIENT
Start: 2025-05-12 | End: 2025-05-17

## 2025-05-12 RX ORDER — CALCITRIOL 0.25 UG/1
0.25 CAPSULE ORAL
Qty: 36 CAPSULE | Refills: 3 | Status: SHIPPED | OUTPATIENT
Start: 2025-05-12 | End: 2026-05-12

## 2025-05-12 NOTE — TELEPHONE ENCOUNTER
Spoke with Pt, advise him that medication will be sent to pharmacy but insurance might not cover it. Pt verbally understood and still wants medication sent.

## 2025-05-12 NOTE — PROGRESS NOTES
[facility]phrology New Referral Office Note    HPI  Hemal Tolbert Jr., 65 y.o. male, presents to office as a new patient.      Patient denies taking NSAIDs or new antibiotics. Also denies recent episode of dehydration, diarrhea, vomiting, acute illness, hospitalization, recent angiograms or exposure to IV radiocontrast.        Medical Diagnoses:   Past Medical History:   Diagnosis Date    Alcoholic cirrhosis     Anemia     CHF (congestive heart failure)     LV EF 40%    COPD (chronic obstructive pulmonary disease)     Hyperlipidemia     Hypertension     Myocardial infarction     Peripheral artery disease     with stents    Sleep apnea      Problem List[1]    Surgical History:   Past Surgical History:   Procedure Laterality Date    ANGIOGRAM, ABDOMINAL AORTA  10/24/2023    Procedure: Angiogram, Abdominal Aorta;  Surgeon: Janette Ernandez MD;  Location: [facility] CATH LAB;  Service: Cardiology;;    ARTERIOGRAPHY OF SUBCLAVIAN ARTERY Left 10/24/2023    Procedure: ARTERIOGRAM, SUBCLAVIAN;  Surgeon: Janette Ernandez MD;  Location: [facility] CATH LAB;  Service: Cardiology;  Laterality: Left;    CAPSULE ENDOSCOPY, ESOPHAGUS THROUGH ILEUM N/A 11/20/2023    Procedure: M2A;  Surgeon: Johan Flower MD;  Location: [facility] ENDOSCOPY;  Service: Gastroenterology;  Laterality: N/A;  To be done at any time today when staff is available.    CARDIAC DEFIBRILLATOR PLACEMENT N/A 11/28/2023    Procedure: Insertion, Single chamber ICD (SJM);  Surgeon: Suyapa Oakes MD;  Location: [facility] CATH LAB;  Service: Cardiology;  Laterality: N/A;    CATHETERIZATION OF BOTH LEFT AND RIGHT HEART N/A 10/24/2023    Procedure: CATHETERIZATION, HEART, BOTH LEFT AND RIGHT;  Surgeon: Janette Ernandez MD;  Location: [facility] CATH LAB;  Service: Cardiology;  Laterality: N/A;    COLONOSCOPY N/A 6/10/2023    Procedure: COLONOSCOPY;  Surgeon: Johan Flower MD;  Location: [facility] OR;  Service: Gastroenterology;  Laterality: N/A;    EGD, WITH CLOSED BIOPSY N/A  12/18/2023    Procedure: EGD;  Surgeon: Johan Flower MD;  Location: [facility] ENDOSCOPY;  Service: Gastroenterology;  Laterality: N/A;    ESOPHAGOGASTRODUODENOSCOPY N/A 6/9/2023    Procedure: EGD;  Surgeon: Tima Teixeira MD;  Location: [facility] ENDOSCOPY;  Service: Gastroenterology;  Laterality: N/A;    ESOPHAGOGASTRODUODENOSCOPY N/A 1/11/2024    Procedure: EGD (ESOPHAGOGASTRODUODENOSCOPY);  Surgeon: Chaya Castellon MD;  Location: [facility];  Service: Endoscopy;  Laterality: N/A;  POST OP: MILD GASTRITIS W/ NO ULCER    ESOPHAGOGASTRODUODENOSCOPY N/A 4/24/2024    Procedure: EGD W/ M2A PLACEMENT;  Surgeon: Johan Flower MD;  Location: [facility] ENDOSCOPY;  Service: Gastroenterology;  Laterality: N/A;    INSERTION OF INTRA-AORTIC BALLOON ASSIST DEVICE  10/24/2023    Procedure: INSERTION, INTRA-AORTIC BALLOON PUMP;  Surgeon: Janette Ernandez MD;  Location: [facility] CATH LAB;  Service: Cardiology;;    INSERTION OF INTRAVASCULAR MICROAXIAL BLOOD PUMP N/A 10/26/2023    Procedure: INSERTION, IMPELLA;  Surgeon: Janette Ernandez MD;  Location: [facility] CATH LAB;  Service: Cardiology;  Laterality: N/A;    INSTANTANEOUS WAVE-FREE RATIO (IFR) N/A 10/26/2023    Procedure: Instantaneous Wave-Free Ratio (IFR);  Surgeon: Janette Ernandez MD;  Location: [facility] CATH LAB;  Service: Cardiology;  Laterality: N/A;    INTRALUMINAL GASTROINTESTINAL TRACT IMAGING VIA CAPSULE N/A 4/24/2024    Procedure: M2A;  Surgeon: Johan Flower MD;  Location: [facility] ENDOSCOPY;  Service: Gastroenterology;  Laterality: N/A;    INTRAVASCULAR ULTRASOUND, CORONARY N/A 10/26/2023    Procedure: Ultrasound-coronary;  Surgeon: Janette Ernandez MD;  Location: [facility] CATH LAB;  Service: Cardiology;  Laterality: N/A;    PERCUTANEOUS CORONARY INTERVENTION, ARTERY N/A 10/26/2023    Procedure: Percutaneous coronary intervention- with Impella;  Surgeon: Janette Ernnadez MD;  Location: [facility] CATH LAB;  Service: Cardiology;  Laterality: N/A;     "PLACEMENT, IABP  10/24/2023    Procedure: Placement, IABP;  Surgeon: Janette Ernandez MD;  Location: [facility] CATH LAB;  Service: Cardiology;;       Family History:   No family history on file.    Social History:   Social History     Tobacco Use    Smoking status: Every Day     Current packs/day: 0.25     Average packs/day: 0.3 packs/day for 51.3 years (12.8 ttl pk-yrs)     Types: Cigarettes     Start date: 1/10/1974    Smokeless tobacco: Former     Types: Chew   Substance Use Topics    Alcohol use: Yes     Comment: currently not drinking per pt. 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 Quit drinking in 10/2023. - drinks a beer "once in a while"       Allergies:  Review of patient's allergies indicates:  No Known Allergies    Medications:  Outpatient Medications Marked as Taking for the 5/12/25 encounter (Office Visit) with Evelyn Fuentes ACNP   Medication Sig Dispense Refill    albuterol (PROVENTIL/VENTOLIN HFA) 90 mcg/actuation inhaler Inhale 2 puffs into the lungs every 4 (four) hours as needed for Wheezing. 18 g 2    albuterol-ipratropium (DUO-NEB) 2.5 mg-0.5 mg/3 mL nebulizer solution Take 3 mLs by nebulization every 6 (six) hours as needed for Shortness of Breath or Wheezing. 75 mL 2    amiodarone (PACERONE) 200 MG Tab Take 200 mg by mouth once daily.      atorvastatin (LIPITOR) 80 MG tablet Take 1 tablet by mouth once daily.      ENTRESTO 24-26 mg per tablet Take 1 tablet by mouth 2 (two) times daily.      ferrous sulfate 325 (65 FE) MG EC tablet Take 1 tablet (325 mg total) by mouth 2 (two) times daily. 60 tablet 1    folic acid (FOLVITE) 1 MG tablet Take 1 tablet (1 mg total) by mouth once daily. 90 tablet 1    furosemide (LASIX) 40 MG tablet Take 40 mg by mouth once daily.      gabapentin (NEURONTIN) 300 MG capsule TAKE ONE CAPSULE BY MOUTH (3) TIMES DAILY 90 capsule 2    metoprolol succinate (TOPROL-XL) 25 MG 24 hr tablet 25 mg.      pantoprazole (PROTONIX) 40 MG tablet TAKE ONE (1) TABLET BY MOUTH TWICE DAILY 60 tablet 5 " "   SANDOSTATIN LAR DEPOT 20 mg SSRR injection Inject into the muscle every 30 days.      STIOLTO RESPIMAT 2.5-2.5 mcg/actuation Mist INHALE 2 PUFFS BY MOUTH ONCE DAILY **CALL AHEAD, SPECIALLY ORDERED FOR YOU** 4 g 4    tamsulosin (FLOMAX) 0.4 mg Cap TAKE 1 CAPSULE BY MOUTH ONCE DAILY. 30 capsule 5         Review of Systems:    Constitutional: Denies fever, fatigue, generalized weakness  Skin: Denies wounds, no rashes, no itching, no new skin lesions  Respiratory:  Denies cough, shortness of breath, or wheezing  Cardiovascular: Denies chest pain, palpitations, or swelling  Gastrointestional: Denies abdominal pain, nausea, vomiting, diarrhea, or constipation  Genitourinary: Denies dysuria, hematuria, foamy urine, or incontinence; reports able to empty bladder  Musculoskeletal: Denies back or flank pain  Neurological: Denies headaches, dizziness, paresthesias, tremors or focal weakness      Vital Signs:  BP (!) 105/59 (BP Location: Right arm, Patient Position: Sitting)   Pulse (!) 57   Temp 98.2 °F (36.8 °C) (Oral)   Resp 20   Ht 5' 9.02" (1.753 m)   Wt 79.7 kg (175 lb 9.6 oz)   SpO2 95%   BMI 25.92 kg/m²   Body mass index is 25.92 kg/m².      Physical Exam:    General: no acute distress, awake, alert  Eyes: PERRLA, conjunctiva clear, eyelids without swelling  HENT: atraumatic, oropharynx and nasal mucosa patent  Neck: supple, trache midline, full ROM, no JVD, no thyromegaly or lymphadenopathy  Respiratory: equal, unlabored, clear to auscultation A/P  Cardiovascular: RRR without murmur or rub; radial and pedal pulses +2 BL  Edema: none  Gastrointestinal: soft, non-tender, non-distended; positive bowel sounds; no masses to palpation; no ascites  Genitourinary: no CVA tenderness upon palpation  Musculoskeletal: ROM without new limitation or discomfort  Integumentary: warm, dry; no rashes, wounds, or skin lesions  Neurological: oriented x4, appropriate, no acute deficits; no asterixis      Labs:        Component " Value Date/Time     05/06/2025 0852     (L) 04/11/2025 1833     (L) 11/02/2023 0843     10/30/2023 0501    K 4.8 05/06/2025 0852    K 4.6 04/11/2025 1833    K 4.8 11/02/2023 0843    K 4.2 10/30/2023 0501     05/06/2025 0852    CL 98 04/11/2025 1833     11/02/2023 0843     10/30/2023 0501    CO2 29 05/06/2025 0852    CO2 27 04/11/2025 1833    CO2 17 (L) 11/02/2023 0843    CO2 21 (L) 10/30/2023 0501    BUN 25.7 05/06/2025 0852    BUN 28.8 (H) 04/11/2025 1833    BUN 28 (H) 11/02/2023 0843    BUN 37 (H) 10/30/2023 0501    CREATININE 1.46 (H) 05/06/2025 0852    CREATININE 1.89 (H) 04/11/2025 1833    CREATININE 2.41 (H) 03/26/2025 1456    CREATININE 1.68 (H) 03/13/2025 0847    CREATININE 1.1 11/02/2023 0843    CREATININE 1.1 10/30/2023 0501    CREATININE 1.1 10/29/2023 0516    CREATININE 1.4 10/28/2023 0502    CALCIUM 8.5 (L) 05/06/2025 0852    CALCIUM 8.1 (L) 04/11/2025 1833    CALCIUM 7.8 (L) 11/02/2023 0843    CALCIUM 7.5 (L) 10/30/2023 0501    PHOS 3.6 11/19/2023 1259    PHOS 3.9 11/06/2023 0359    PHOS 2.7 10/30/2023 0501    PHOS 2.5 (L) 10/29/2023 0516    .9 (H) 05/06/2025 0852           Component Value Date/Time    WBC 6.26 05/06/2025 0852    WBC 7.57 04/11/2025 1833    WBC 12.11 11/02/2023 0843    WBC 14.97 (H) 10/30/2023 0501    HGB 13.2 (L) 05/06/2025 0852    HGB 11.6 (L) 04/11/2025 1833    HGB 9.2 (L) 11/02/2023 0843    HGB 9.2 (L) 10/30/2023 0501    HCT 40.4 (L) 05/06/2025 0852    HCT 34.6 (L) 04/11/2025 1833    HCT 28.7 (L) 11/02/2023 0843    HCT 29.1 (L) 10/30/2023 0501     05/06/2025 0852     04/11/2025 1833     11/02/2023 0843     10/30/2023 0501         Imaging:  Retroperitoneal US:      Impression:    1. Chronic kidney disease (CKD), stage IV (severe)      CKD3 due to ischemic nepphrosclerosis and likely cardiorenal componenent  Recent KARY    BP low normal. Asyptomatic.     CHF followed by Dr. Lira    Hyponatremia resolved.      [facility]      Plan:  Renal ultrasound.   Start calcitriol 0.25 mcg MWF  Continue current regimen including lasix and entresto.   Low sodium diet.   Home bp monitor  Daily weights     Follow up in 1 month.     Patient to call our office with any concerns prior to next appointment.    Avoid NSAIDs (Aleve, Mobic, Celebrex, Ibuprofen, Advil, Toradol and Diclofenac).  Only take tylenol occasionally if needed for aches/pains.    Educated on low sodium diet:  Avoid high salt foods (olives, pickles, smoked meats, deli meats, salted potato chips, etc.).   Do not add salt to your food at the table.   Use only small amounts of salt when cooking.    Recommended Daily Protein Intake for chronic kidney disease:  0.8 g/kg    Avoid excessive intake of high potassium foods  Bananas, oranges, watermelon, tomatoes, potatoes, or salt substitutes    Evelyn Jacob        This note was created with the assistance of Court[employer] voice recognition software or phone dictation. There may be transcription errors as a result of using this technology however minimal. Effort has been made to assure accuracy of transcription but any obvious errors or omissions should be clarified with the author of the document.         [1]   Patient Active Problem List  Diagnosis    Alcoholic cirrhosis of liver    Anemia    CHF (congestive heart failure)    Elevated troponin    VT (ventricular tachycardia)    Acute on chronic combined systolic and diastolic heart failure    Tobacco dependence    Primary hypertension    COPD (chronic obstructive pulmonary disease)    Alcohol abuse    Cardiogenic shock    Abdominal distention    Coronary artery disease involving native coronary artery of native heart with unstable angina pectoris    Shortness of breath    Anemia in other chronic diseases classified elsewhere    AVM (arteriovenous malformation) of small bowel, acquired    Acute blood loss anemia    Iron deficiency anemia    H/O: GI bleed    Elevated serum  immunoglobulin free light chain level    MGUS (monoclonal gammopathy of unknown significance)    History of alcoholism    Chronic kidney disease (CKD), stage IV (severe)

## 2025-05-13 ENCOUNTER — HOSPITAL ENCOUNTER (EMERGENCY)
Facility: HOSPITAL | Age: 65
Discharge: HOME | End: 2025-05-13
Attending: EMERGENCY MEDICINE
Payer: MEDICARE

## 2025-05-13 VITALS
RESPIRATION RATE: 19 BRPM | SYSTOLIC BLOOD PRESSURE: 108 MMHG | TEMPERATURE: 99 F | WEIGHT: 170 LBS | DIASTOLIC BLOOD PRESSURE: 56 MMHG | HEART RATE: 58 BPM | HEIGHT: 69 IN | BODY MASS INDEX: 25.18 KG/M2 | OXYGEN SATURATION: 96 %

## 2025-05-13 DIAGNOSIS — M54.6 ACUTE RIGHT-SIDED THORACIC BACK PAIN: Primary | ICD-10-CM

## 2025-05-13 DIAGNOSIS — I95.9 HYPOTENSION: ICD-10-CM

## 2025-05-13 LAB
ALBUMIN SERPL-MCNC: 3.4 G/DL (ref 3.4–4.8)
ALBUMIN/GLOB SERPL: 0.6 RATIO (ref 1.1–2)
ALP SERPL-CCNC: 63 UNIT/L (ref 40–150)
ALT SERPL-CCNC: 49 UNIT/L (ref 0–55)
ANION GAP SERPL CALC-SCNC: 7 MEQ/L
AST SERPL-CCNC: 59 UNIT/L (ref 11–45)
BASOPHILS # BLD AUTO: 0.03 X10(3)/MCL
BASOPHILS NFR BLD AUTO: 0.4 %
BILIRUB SERPL-MCNC: 0.6 MG/DL
BUN SERPL-MCNC: 26.2 MG/DL (ref 8.4–25.7)
CALCIUM SERPL-MCNC: 8.6 MG/DL (ref 8.8–10)
CHLORIDE SERPL-SCNC: 103 MMOL/L (ref 98–107)
CO2 SERPL-SCNC: 28 MMOL/L (ref 23–31)
CREAT SERPL-MCNC: 1.52 MG/DL (ref 0.72–1.25)
CREAT/UREA NIT SERPL: 17
EOSINOPHIL # BLD AUTO: 0.05 X10(3)/MCL (ref 0–0.9)
EOSINOPHIL NFR BLD AUTO: 0.7 %
ERYTHROCYTE [DISTWIDTH] IN BLOOD BY AUTOMATED COUNT: 12.9 % (ref 11.5–17)
GFR SERPLBLD CREATININE-BSD FMLA CKD-EPI: 50 ML/MIN/1.73/M2
GLOBULIN SER-MCNC: 5.3 GM/DL (ref 2.4–3.5)
GLUCOSE SERPL-MCNC: 97 MG/DL (ref 82–115)
HCT VFR BLD AUTO: 38 % (ref 42–52)
HGB BLD-MCNC: 12.8 G/DL (ref 14–18)
IMM GRANULOCYTES # BLD AUTO: 0.02 X10(3)/MCL (ref 0–0.04)
IMM GRANULOCYTES NFR BLD AUTO: 0.3 %
LYMPHOCYTES # BLD AUTO: 0.99 X10(3)/MCL (ref 0.6–4.6)
LYMPHOCYTES NFR BLD AUTO: 12.9 %
MCH RBC QN AUTO: 33.1 PG (ref 27–31)
MCHC RBC AUTO-ENTMCNC: 33.7 G/DL (ref 33–36)
MCV RBC AUTO: 98.2 FL (ref 80–94)
MONOCYTES # BLD AUTO: 0.62 X10(3)/MCL (ref 0.1–1.3)
MONOCYTES NFR BLD AUTO: 8.1 %
NEUTROPHILS # BLD AUTO: 5.97 X10(3)/MCL (ref 2.1–9.2)
NEUTROPHILS NFR BLD AUTO: 77.6 %
PLATELET # BLD AUTO: 194 X10(3)/MCL (ref 130–400)
PMV BLD AUTO: 9.9 FL (ref 7.4–10.4)
POTASSIUM SERPL-SCNC: 4 MMOL/L (ref 3.5–5.1)
PROT SERPL-MCNC: 8.7 GM/DL (ref 5.8–7.6)
RBC # BLD AUTO: 3.87 X10(6)/MCL (ref 4.7–6.1)
SODIUM SERPL-SCNC: 138 MMOL/L (ref 136–145)
WBC # BLD AUTO: 7.68 X10(3)/MCL (ref 4.5–11.5)

## 2025-05-13 PROCEDURE — 85025 COMPLETE CBC W/AUTO DIFF WBC: CPT | Performed by: EMERGENCY MEDICINE

## 2025-05-13 PROCEDURE — 80053 COMPREHEN METABOLIC PANEL: CPT | Performed by: EMERGENCY MEDICINE

## 2025-05-13 PROCEDURE — 99284 EMERGENCY DEPT VISIT MOD MDM: CPT | Mod: 25

## 2025-05-13 RX ORDER — HYDROCODONE BITARTRATE AND ACETAMINOPHEN 7.5; 325 MG/1; MG/1
1 TABLET ORAL EVERY 6 HOURS PRN
Qty: 12 TABLET | Refills: 0 | Status: SHIPPED | OUTPATIENT
Start: 2025-05-13

## 2025-05-13 NOTE — ED NOTES
Pt presents w/ c/o right lateral/lower rib pain after a fall 2 days ago; pt reports he tripped on a wooden board and tried to stop his fall with left forearm but landed on right side; pt was seen at [facility] and d/c; pt states pain to rib area has gotten worse and pain is worse with deep breaths/certain movement

## 2025-05-13 NOTE — ED PROVIDER NOTES
Encounter Date: 5/13/2025       History     Chief Complaint   Patient presents with    Fall     Fall 2 days ago, c/o right sided rib pain, worsens with breathing, also c/o dizziness, BP in triage 94/52.      This 65-year-old man fell 2 days ago he presents with complaints of lightheadedness and pain in his right mid back.  He was seen in [place] after the fall and had a CT of his head in his C-spine as well as right-sided rib views and a chest x-ray that did not show any rib fractures but did show small pleural effusion.       Review of patient's allergies indicates:  No Known Allergies  Past Medical History:   Diagnosis Date    Alcoholic cirrhosis     Anemia     CHF (congestive heart failure)     LV EF 40%    COPD (chronic obstructive pulmonary disease)     Hyperlipidemia     Hypertension     Myocardial infarction     Peripheral artery disease     with stents    Sleep apnea      Past Surgical History:   Procedure Laterality Date    ANGIOGRAM, ABDOMINAL AORTA  10/24/2023    Procedure: Angiogram, Abdominal Aorta;  Surgeon: Janette Ernandez MD;  Location: [facility] CATH LAB;  Service: Cardiology;;    ARTERIOGRAPHY OF SUBCLAVIAN ARTERY Left 10/24/2023    Procedure: ARTERIOGRAM, SUBCLAVIAN;  Surgeon: Janette Ernandez MD;  Location: [facility] CATH LAB;  Service: Cardiology;  Laterality: Left;    CAPSULE ENDOSCOPY, ESOPHAGUS THROUGH ILEUM N/A 11/20/2023    Procedure: M2A;  Surgeon: Johan Flower MD;  Location: [facility] ENDOSCOPY;  Service: Gastroenterology;  Laterality: N/A;  To be done at any time today when staff is available.    CARDIAC DEFIBRILLATOR PLACEMENT N/A 11/28/2023    Procedure: Insertion, Single chamber ICD (SJM);  Surgeon: Suyapa Oakes MD;  Location: [facility] CATH LAB;  Service: Cardiology;  Laterality: N/A;    CATHETERIZATION OF BOTH LEFT AND RIGHT HEART N/A 10/24/2023    Procedure: CATHETERIZATION, HEART, BOTH LEFT AND RIGHT;  Surgeon: Janette Ernandez MD;  Location: [facility] CATH LAB;  Service: Cardiology;   Laterality: N/A;    COLONOSCOPY N/A 6/10/2023    Procedure: COLONOSCOPY;  Surgeon: Johan Flower MD;  Location: [facility];  Service: Gastroenterology;  Laterality: N/A;    EGD, WITH CLOSED BIOPSY N/A 12/18/2023    Procedure: EGD;  Surgeon: Johan Flower MD;  Location: [facility] ENDOSCOPY;  Service: Gastroenterology;  Laterality: N/A;    ESOPHAGOGASTRODUODENOSCOPY N/A 6/9/2023    Procedure: EGD;  Surgeon: Tima Teixeira MD;  Location: [facility] ENDOSCOPY;  Service: Gastroenterology;  Laterality: N/A;    ESOPHAGOGASTRODUODENOSCOPY N/A 1/11/2024    Procedure: EGD (ESOPHAGOGASTRODUODENOSCOPY);  Surgeon: Chaya Castellon MD;  Location: [facility];  Service: Endoscopy;  Laterality: N/A;  POST OP: MILD GASTRITIS W/ NO ULCER    ESOPHAGOGASTRODUODENOSCOPY N/A 4/24/2024    Procedure: EGD W/ M2A PLACEMENT;  Surgeon: Johan Flower MD;  Location: [facility] ENDOSCOPY;  Service: Gastroenterology;  Laterality: N/A;    INSERTION OF INTRA-AORTIC BALLOON ASSIST DEVICE  10/24/2023    Procedure: INSERTION, INTRA-AORTIC BALLOON PUMP;  Surgeon: Janette Ernandez MD;  Location: [facility] CATH LAB;  Service: Cardiology;;    INSERTION OF INTRAVASCULAR MICROAXIAL BLOOD PUMP N/A 10/26/2023    Procedure: INSERTION, IMPELLA;  Surgeon: Janette Ernandez MD;  Location: [facility] CATH LAB;  Service: Cardiology;  Laterality: N/A;    INSTANTANEOUS WAVE-FREE RATIO (IFR) N/A 10/26/2023    Procedure: Instantaneous Wave-Free Ratio (IFR);  Surgeon: Janette Ernandez MD;  Location: [facility] CATH LAB;  Service: Cardiology;  Laterality: N/A;    INTRALUMINAL GASTROINTESTINAL TRACT IMAGING VIA CAPSULE N/A 4/24/2024    Procedure: M2A;  Surgeon: Johan Flower MD;  Location: [facility] ENDOSCOPY;  Service: Gastroenterology;  Laterality: N/A;    INTRAVASCULAR ULTRASOUND, CORONARY N/A 10/26/2023    Procedure: Ultrasound-coronary;  Surgeon: Janette Ernandez MD;  Location: [facility] CATH LAB;  Service: Cardiology;  Laterality: N/A;    PERCUTANEOUS  CORONARY INTERVENTION, ARTERY N/A 10/26/2023    Procedure: Percutaneous coronary intervention- with Impella;  Surgeon: Janette Ernandez MD;  Location: [facility] CATH LAB;  Service: Cardiology;  Laterality: N/A;    PLACEMENT, IABP  10/24/2023    Procedure: Placement, IABP;  Surgeon: Janette Ernandez MD;  Location: [facility] CATH LAB;  Service: Cardiology;;     No family history on file.  Social History[1]  Review of Systems   Musculoskeletal:  Positive for back pain.   Neurological:  Positive for dizziness and light-headedness.       Physical Exam     Initial Vitals [05/13/25 1404]   BP Pulse Resp Temp SpO2   (!) 94/52 62 20 98.7 °F (37.1 °C) (!) 92 %      MAP       --         Physical Exam    ED Course   Procedures  Labs Reviewed   COMPREHENSIVE METABOLIC PANEL - Abnormal       Result Value    Sodium 138      Potassium 4.0      Chloride 103      CO2 28      Glucose 97      Blood Urea Nitrogen 26.2 (*)     Creatinine 1.52 (*)     Calcium 8.6 (*)     Protein Total 8.7 (*)     Albumin 3.4      Globulin 5.3 (*)     Albumin/Globulin Ratio 0.6 (*)     Bilirubin Total 0.6      ALP 63      ALT 49      AST 59 (*)     eGFR 50      Anion Gap 7.0      BUN/Creatinine Ratio 17     CBC WITH DIFFERENTIAL - Abnormal    WBC 7.68      RBC 3.87 (*)     Hgb 12.8 (*)     Hct 38.0 (*)     MCV 98.2 (*)     MCH 33.1 (*)     MCHC 33.7      RDW 12.9      Platelet 194      MPV 9.9      Neut % 77.6      Lymph % 12.9      Mono % 8.1      Eos % 0.7      Basophil % 0.4      Imm Grans % 0.3      Neut # 5.97      Lymph # 0.99      Mono # 0.62      Eos # 0.05      Baso # 0.03      Imm Gran # 0.02     CBC W/ AUTO DIFFERENTIAL    Narrative:     The following orders were created for panel order CBC auto differential.  Procedure                               Abnormality         Status                     ---------                               -----------         ------                     CBC with Differential[[id number]]       Abnormal            Final result                  Please view results for these tests on the individual orders.          Imaging Results              X-Ray Chest PA And Lateral (Final result)  Result time 05/13/25 14:55:54      Final result by Antonio Jerez MD (05/13/25 14:55:54)                   Impression:      Right-sided pleural effusion with no significant change as compared with the previous exam.    Ill-defined opacity at the right base likely representing a nipple, however, oblique projections and or repeat exam with nipple markers might prove helpful for further assessment      Electronically signed by: Antonio Jerez  Date:    05/13/2025  Time:    14:55               Narrative:    EXAMINATION:  XR CHEST PA AND LATERAL    CLINICAL HISTORY:  , Hypotension, unspecified.    COMPARISON:  May 11, 2025    FINDINGS:  Examination reveals cardiomediastinal silhouette improved parenchymal changes to be essentially unchanged as compared with the previous exam with persistent blunting of the right costophrenic angle indicating the presence of a right-sided pleural effusion    There is a persistent ill-defined opacity in the right lobe these might represent a nipple, however, either oblique projections and or repeat exam with nipple markers might prove helpful for further assessment                                       Medications - No data to display  Medical Decision Making  Blood pressure has been stable here. O2 sats are within normal limits. Blood counts are stable and renal indices are unchanged.     Amount and/or Complexity of Data Reviewed  Labs: ordered. Decision-making details documented in ED Course.  Radiology: ordered. Decision-making details documented in ED Course.                                      Clinical Impression:  Final diagnoses:  [I95.9] Hypotension          ED Disposition Condition    Discharge Stable          ED Prescriptions    None       Follow-up Information       Follow up With Specialties Details Why Contact Info  "   Ivette Hearn, P Family Medicine Schedule an appointment as soon as possible for a visit  As needed, If symptoms worsen [address] [place] [address]  [phone]                   [1]   Social History  Tobacco Use    Smoking status: Every Day     Current packs/day: 0.25     Average packs/day: 0.3 packs/day for 51.3 years (12.8 ttl pk-yrs)     Types: Cigarettes     Start date: 1/10/1974    Smokeless tobacco: Former     Types: Chew   Substance Use Topics    Alcohol use: Yes     Comment: currently not drinking per pt. 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 Quit drinking in 10/2023. - drinks a beer "once in a while"    Drug use: Not Currently        Chavez Pinto MD  05/13/25 [id number]    "

## 2025-05-14 ENCOUNTER — TELEPHONE (OUTPATIENT)
Dept: FAMILY MEDICINE | Facility: CLINIC | Age: 65
End: 2025-05-14
Payer: MEDICARE

## 2025-05-14 NOTE — TELEPHONE ENCOUNTER
Spoke with pt this afternoon stated he had a fall x yesterday visited the ER and is running out of pain medication. Pt stated he has right side pain after fall.     Pt also spoke with Iveth about wanting to try Farxiga or Jardiance for his history of heart failure and chronic kidney disease. Iveth recommended he speak with his PC provider about it. Pt states he doesn't remember who Iveth is maybe someone with his insurance.

## 2025-05-14 NOTE — TELEPHONE ENCOUNTER
Good morning, pt was seen at the ER yesterday requesting pain meds. Should pt schedule a ER F/U for Rx?

## 2025-05-15 ENCOUNTER — TELEPHONE (OUTPATIENT)
Dept: FAMILY MEDICINE | Facility: CLINIC | Age: 65
End: 2025-05-15
Payer: MEDICARE

## 2025-05-15 NOTE — TELEPHONE ENCOUNTER
Spoke to pt informed him that NORCO was sent to pharmacy. Notified pt that RX does cause doziness, no drinking or driving when taking RX. Also if pt has SOB, CP, tongue swelling or any serve symptoms to visit nearest ER. Pt verbalized understanding and had no further questions or concerns.

## 2025-05-28 ENCOUNTER — OFFICE VISIT (OUTPATIENT)
Dept: FAMILY MEDICINE | Facility: CLINIC | Age: 65
End: 2025-05-28
Payer: MEDICARE

## 2025-05-28 VITALS
BODY MASS INDEX: 25.35 KG/M2 | RESPIRATION RATE: 18 BRPM | HEART RATE: 74 BPM | HEIGHT: 69 IN | WEIGHT: 171.13 LBS | TEMPERATURE: 98 F | DIASTOLIC BLOOD PRESSURE: 64 MMHG | SYSTOLIC BLOOD PRESSURE: 105 MMHG | OXYGEN SATURATION: 98 %

## 2025-05-28 DIAGNOSIS — J90 PLEURAL EFFUSION: ICD-10-CM

## 2025-05-28 DIAGNOSIS — R07.81 RIB PAIN: ICD-10-CM
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
## 2025-05-28 DIAGNOSIS — F17.200 NEEDS SMOKING CESSATION EDUCATION: ICD-10-CM

## 2025-05-28 NOTE — PROGRESS NOTES
SUBJECTIVE:     History of Present Illness      Chief Complaint: Follow-up (F/U fall bruised rib)    HPI:  Patient is a 65 y.o. year old male who presents to clinic for emergency room follow-up after fall 2 weeks ago.  Patient was subsequently treated for pleural effusion/ pneumonia of the right lobe . Patient denies shortness of breath. Pain has improved. Continues to  have some tenderness to his right side/chest wall.     Review of Systems:    Review of Systems    12 point review of systems conducted, negative except as stated in the history of present illness. See HPI for details.     Previous History      PCP: Ivette Hearn FNP  Review of patient's allergies indicates:  No Known Allergies    Past Medical History:   Diagnosis Date    Alcoholic cirrhosis     Anemia     CHF (congestive heart failure)     LV EF 40%    COPD (chronic obstructive pulmonary disease)     Hyperlipidemia     Hypertension     Myocardial infarction     Peripheral artery disease     with stents    Sleep apnea        Past Surgical History:   Procedure Laterality Date    ANGIOGRAM, ABDOMINAL AORTA  10/24/2023    Procedure: Angiogram, Abdominal Aorta;  Surgeon: Janette Ernandez MD;  Location: [facility] CATH LAB;  Service: Cardiology;;    ARTERIOGRAPHY OF SUBCLAVIAN ARTERY Left 10/24/2023    Procedure: ARTERIOGRAM, SUBCLAVIAN;  Surgeon: Janette Ernandez MD;  Location: [facility] CATH LAB;  Service: Cardiology;  Laterality: Left;    CAPSULE ENDOSCOPY, ESOPHAGUS THROUGH ILEUM N/A 11/20/2023    Procedure: M2A;  Surgeon: Johan Flower MD;  Location: [facility] ENDOSCOPY;  Service: Gastroenterology;  Laterality: N/A;  To be done at any time today when staff is available.    CARDIAC DEFIBRILLATOR PLACEMENT N/A 11/28/2023    Procedure: Insertion, Single chamber ICD (SJM);  Surgeon: Suyapa Oakes MD;  Location: [facility] CATH LAB;  Service: Cardiology;  Laterality: N/A;    CATHETERIZATION OF BOTH LEFT AND RIGHT HEART N/A 10/24/2023    Procedure:  CATHETERIZATION, HEART, BOTH LEFT AND RIGHT;  Surgeon: Janette Ernandez MD;  Location: [facility] CATH LAB;  Service: Cardiology;  Laterality: N/A;    COLONOSCOPY N/A 6/10/2023    Procedure: COLONOSCOPY;  Surgeon: Johan Flower MD;  Location: [facility] OR;  Service: Gastroenterology;  Laterality: N/A;    EGD, WITH CLOSED BIOPSY N/A 12/18/2023    Procedure: EGD;  Surgeon: Johan Flower MD;  Location: [facility] ENDOSCOPY;  Service: Gastroenterology;  Laterality: N/A;    ESOPHAGOGASTRODUODENOSCOPY N/A 6/9/2023    Procedure: EGD;  Surgeon: Tima Teixeira MD;  Location: [facility] ENDOSCOPY;  Service: Gastroenterology;  Laterality: N/A;    ESOPHAGOGASTRODUODENOSCOPY N/A 1/11/2024    Procedure: EGD (ESOPHAGOGASTRODUODENOSCOPY);  Surgeon: Chaya Castellon MD;  Location: [facility];  Service: Endoscopy;  Laterality: N/A;  POST OP: MILD GASTRITIS W/ NO ULCER    ESOPHAGOGASTRODUODENOSCOPY N/A 4/24/2024    Procedure: EGD W/ M2A PLACEMENT;  Surgeon: Johan Flower MD;  Location: [facility] ENDOSCOPY;  Service: Gastroenterology;  Laterality: N/A;    INSERTION OF INTRA-AORTIC BALLOON ASSIST DEVICE  10/24/2023    Procedure: INSERTION, INTRA-AORTIC BALLOON PUMP;  Surgeon: Janette Ernandez MD;  Location: [facility] CATH LAB;  Service: Cardiology;;    INSERTION OF INTRAVASCULAR MICROAXIAL BLOOD PUMP N/A 10/26/2023    Procedure: INSERTION, IMPELLA;  Surgeon: Janette Ernandez MD;  Location: [facility] CATH LAB;  Service: Cardiology;  Laterality: N/A;    INSTANTANEOUS WAVE-FREE RATIO (IFR) N/A 10/26/2023    Procedure: Instantaneous Wave-Free Ratio (IFR);  Surgeon: Janette Ernandez MD;  Location: [facility] CATH LAB;  Service: Cardiology;  Laterality: N/A;    INTRALUMINAL GASTROINTESTINAL TRACT IMAGING VIA CAPSULE N/A 4/24/2024    Procedure: M2A;  Surgeon: Johan Flower MD;  Location: [facility] ENDOSCOPY;  Service: Gastroenterology;  Laterality: N/A;    INTRAVASCULAR ULTRASOUND, CORONARY N/A 10/26/2023    Procedure:  "Ultrasound-coronary;  Surgeon: Janette Ernandez MD;  Location: [facility] CATH LAB;  Service: Cardiology;  Laterality: N/A;    PERCUTANEOUS CORONARY INTERVENTION, ARTERY N/A 10/26/2023    Procedure: Percutaneous coronary intervention- with Impella;  Surgeon: Janette Ernandez MD;  Location: [facility] CATH LAB;  Service: Cardiology;  Laterality: N/A;    PLACEMENT, IABP  10/24/2023    Procedure: Placement, IABP;  Surgeon: Janette Ernandez MD;  Location: [facility] CATH LAB;  Service: Cardiology;;     No family history on file.    Social History[1]     Health Maintenance      Health Maintenance   Topic Date Due    Aspirin/Antiplatelet Therapy  Never done    Shingles Vaccine (1 of 2) Never done    RSV Vaccine (Age 60+ and Pregnant patients) (1 - Risk 60-74 years 1-dose series) Never done    COVID-19 Vaccine (1 - 2024-25 season) Never done    Pneumococcal Vaccines (Age 50+) (1 of 2 - PCV) 04/09/2026 (Originally 3/16/1979)    Influenza Vaccine (Season Ended) 09/01/2025    Hemoglobin A1c (Diabetic Prevention Screening)  03/13/2028    Lipid Panel  03/13/2030    Colorectal Cancer Screening  06/10/2033    TETANUS VACCINE  08/17/2034    Hepatitis C Screening  Completed    Abdominal Aortic Aneurysm Screening  Completed       OBJECTIVE:     Physical Exam      Vital Signs Reviewed   Visit Vitals  /64   Pulse 74   Temp 98.2 °F (36.8 °C)   Resp 18   Ht 5' 9" (1.753 m)   Wt 77.6 kg (171 lb 1.6 oz)   SpO2 98%   BMI 25.27 kg/m²       Physical Exam    Physical Exam:  General: Alert, well nourished, no acute distress, non-toxic appearing.   Eyes: Anicteric sclera, without conjunctival injection, normal lids, no purulent drainage, EOMs grossly intact.   Ears: No tragal tenderness. Tympanic membranes intact, pearly grey, without effusion or erythema and with a positive light reflex.   Mouth: Posterior pharynx without erythema. No exudate, ulcerations, or lesion. No tonsillar swelling.   Neck: Supple, full ROM, no rigidity, no cervical adenopathy. "   Cardio: Normal rate and rhythm    Resp: Respirations even and unlabored, clear to auscultation bilaterally.   Abd: No ecchymosis or distension. Normal bowel sounds in all 4 quadrants. No tenderness to palpation. No rebound tenderness or guarding. No CVA tenderness.   Skin: No rashes or open lesions noted.   MSK: No swelling. No abrasions or signs of trauma. Ambulating without assistance.   Neuro: Alert,oriented No focal deficits noted. Facial expressions even.   Psych: Cooperative, Normal affect      Labs   Chemistry:  Lab Results   Component Value Date     05/13/2025    K 4.0 05/13/2025    BUN 26.2 (H) 05/13/2025    CREATININE 1.52 (H) 05/13/2025    EGFRNORACEVR 50 05/13/2025    CALCIUM 8.6 (L) 05/13/2025    ALKPHOS 63 05/13/2025    LABPROT 10.8 10/23/2023    ALBUMIN 3.4 05/13/2025    BILIDIR 0.2 11/21/2023    IBILI 0.20 11/21/2023    AST 59 (H) 05/13/2025    ALT 49 05/13/2025    MG 2.00 03/26/2025    PHOS 3.6 11/19/2023    SDCWEGUA27LV 12 (L) 04/07/2025    TSH 1.721 03/13/2025    PJRKNI0OPDK 0.92 06/07/2023    PSA 0.10 04/07/2025        Lab Results   Component Value Date    HGBA1C 5.6 03/13/2025        Hematology:  Lab Results   Component Value Date    WBC 7.68 05/13/2025    HGB 12.8 (L) 05/13/2025    HCT 38.0 (L) 05/13/2025     05/13/2025       Lipid Panel:  Lab Results   Component Value Date    CHOL 79 03/13/2025    HDL 20 (L) 03/13/2025    LDL 40.00 (L) 03/13/2025    TRIG 95 03/13/2025    TOTALCHOLEST 4 03/13/2025        Urine:  Lab Results   Component Value Date    APPEARANCEUA Clear 05/11/2025    SGUA <=1.005 05/11/2025    PROTEINUA Negative 05/11/2025    KETONESUA Negative 05/11/2025    LEUKOCYTESUR Negative 05/11/2025    RBCUA 0-5 05/11/2025    WBCUA 0-5 05/11/2025    BACTERIA Occasional 05/11/2025    SQEPUA 1-2 12/22/2020    HYALINECASTS Rare 05/11/2025    CREATRANDUR 137.0 05/06/2025    PROTEINURINE 15.4 05/06/2025    UPROTCREA 0.1 05/06/2025         Assessment         ICD-10-CM ICD-9-CM    1. Fall, subsequent encounter  W19.XXXD V58.89     E888.9   2. Pleural effusion  J90 511.9   3. Rib pain  R07.81 786.50       Plan       Assessment & Plan  Fall, subsequent encounter      Orders:    X-Ray Chest PA And Lateral; Future    Pleural effusion  Recently completed Zithromax  Repeat chest x-ray  Pending pulmonary function test for reoccurring shortness of breath  Orders:    X-Ray Chest PA And Lateral; Future    Rib pain  Continue gabapentin            Orders Placed This Encounter    X-Ray Chest PA And Lateral      Medication List with Changes/Refills   Current Medications    ALBUTEROL (PROVENTIL/VENTOLIN HFA) 90 MCG/ACTUATION INHALER    Inhale 2 puffs into the lungs every 4 (four) hours as needed for Wheezing.    ALBUTEROL-IPRATROPIUM (DUO-NEB) 2.5 MG-0.5 MG/3 ML NEBULIZER SOLUTION    Take 3 mLs by nebulization every 6 (six) hours as needed for Shortness of Breath or Wheezing.    AMIODARONE (PACERONE) 200 MG TAB    Take 200 mg by mouth once daily.    ATORVASTATIN (LIPITOR) 80 MG TABLET    Take 1 tablet by mouth once daily.    CALCITRIOL (ROCALTROL) 0.25 MCG CAP    Take 1 capsule (0.25 mcg total) by mouth every Mon, Wed, Fri.    DICLOFENAC SODIUM (VOLTAREN) 1 % GEL    Apply 2 g topically once daily.    ENTRESTO 24-26 MG PER TABLET    Take 1 tablet by mouth 2 (two) times daily.    FERROUS SULFATE 325 (65 FE) MG EC TABLET    Take 1 tablet (325 mg total) by mouth 2 (two) times daily.    FOLIC ACID (FOLVITE) 1 MG TABLET    Take 1 tablet (1 mg total) by mouth once daily.    FOLIC ACID (FOLVITE) 1 MG TABLET    Take 1 tablet by mouth once daily.    FUROSEMIDE (LASIX) 40 MG TABLET    Take 40 mg by mouth once daily.    GABAPENTIN (NEURONTIN) 300 MG CAPSULE    TAKE ONE CAPSULE BY MOUTH (3) TIMES DAILY    HYDROCODONE-ACETAMINOPHEN (NORCO) 7.5-325 MG PER TABLET    Take 1 tablet by mouth every 6 (six) hours as needed for Pain.    METOPROLOL SUCCINATE (TOPROL-XL) 25 MG 24 HR TABLET    25 mg.    PANTOPRAZOLE (PROTONIX) 40  "MG TABLET    TAKE ONE (1) TABLET BY MOUTH TWICE DAILY    SANDOSTATIN LAR DEPOT 20 MG SSRR INJECTION    Inject into the muscle every 30 days.    STIOLTO RESPIMAT 2.5-2.5 MCG/ACTUATION MIST    INHALE 2 PUFFS BY MOUTH ONCE DAILY **CALL AHEAD, SPECIALLY ORDERED FOR YOU**    TAMSULOSIN (FLOMAX) 0.4 MG CAP    TAKE 1 CAPSULE BY MOUTH ONCE DAILY.         No follow-ups on file. In addition to their scheduled follow up, the patient has also been instructed to follow up on as needed basis.   Future Appointments   Date Time Provider Department Center   6/12/2025  9:30 AM [address] US [facility]   6/12/2025 10:30 AM PFT, [facility] OPRT [facility]   6/13/2025  9:20 AM Joel Connell, LINA [facility] NEPH [place] [facility]   7/10/2025 10:30 AM Payal Carter FNP [facility] INFDIS [place]   8/11/2025  1:30 PM Ivette Hearn FNP [facility] Cl   8/13/2025  9:00 AM Ivette Hearn ROSE [facility] FAMMED [facility] Cl   4/15/2026  9:00 AM Ivette Hearn [facility]       LINA [facility]              [1]   Social History  Tobacco Use    Smoking status: Every Day     Current packs/day: 0.25     Average packs/day: 0.3 packs/day for 51.4 years (12.8 ttl pk-yrs)     Types: Cigarettes     Start date: 1/10/1974    Smokeless tobacco: Former     Types: Chew   Substance Use Topics    Alcohol use: Yes     Comment: currently not drinking per pt. 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 Quit drinking in 10/2023. - drinks a beer "once in a while"    Drug use: Not Currently     "

## 2025-05-31 ENCOUNTER — HOSPITAL ENCOUNTER (EMERGENCY)
Facility: HOSPITAL | Age: 65
Discharge: HOME | End: 2025-05-31
Payer: MEDICARE

## 2025-05-31 VITALS
BODY MASS INDEX: 24.44 KG/M2 | WEIGHT: 165 LBS | OXYGEN SATURATION: 95 % | SYSTOLIC BLOOD PRESSURE: 136 MMHG | TEMPERATURE: 98 F | DIASTOLIC BLOOD PRESSURE: 82 MMHG | HEART RATE: 57 BPM | HEIGHT: 69 IN | RESPIRATION RATE: 20 BRPM

## 2025-05-31 DIAGNOSIS — R42 DIZZINESS: ICD-10-CM

## 2025-05-31 DIAGNOSIS — R42 DIZZY: ICD-10-CM

## 2025-05-31 DIAGNOSIS — Z86.79 HISTORY OF CHF (CONGESTIVE HEART FAILURE): ICD-10-CM

## 2025-05-31 DIAGNOSIS — I95.1 ORTHOSTATIC HYPOTENSION: ICD-10-CM

## 2025-05-31 DIAGNOSIS — R55 NEAR SYNCOPE: Primary | ICD-10-CM

## 2025-05-31 LAB
ALBUMIN SERPL-MCNC: 3.2 G/DL (ref 3.4–4.8)
ALBUMIN/GLOB SERPL: 0.6 RATIO (ref 1.1–2)
ALP SERPL-CCNC: 79 UNIT/L (ref 40–150)
ALT SERPL-CCNC: 33 UNIT/L (ref 0–55)
ANION GAP SERPL CALC-SCNC: 11 MEQ/L
AST SERPL-CCNC: 45 UNIT/L (ref 11–45)
BASOPHILS # BLD AUTO: 0.02 X10(3)/MCL
BASOPHILS NFR BLD AUTO: 0.3 %
BILIRUB SERPL-MCNC: 0.5 MG/DL
BNP BLD-MCNC: 125.2 PG/ML
BUN SERPL-MCNC: 23.6 MG/DL (ref 8.4–25.7)
CALCIUM SERPL-MCNC: 8.7 MG/DL (ref 8.8–10)
CHLORIDE SERPL-SCNC: 103 MMOL/L (ref 98–107)
CO2 SERPL-SCNC: 23 MMOL/L (ref 23–31)
CREAT SERPL-MCNC: 1.76 MG/DL (ref 0.72–1.25)
CREAT/UREA NIT SERPL: 13
EOSINOPHIL # BLD AUTO: 0.09 X10(3)/MCL (ref 0–0.9)
EOSINOPHIL NFR BLD AUTO: 1.4 %
ERYTHROCYTE [DISTWIDTH] IN BLOOD BY AUTOMATED COUNT: 13.1 % (ref 11.5–17)
FLUAV AG UPPER RESP QL IA.RAPID: NOT DETECTED
FLUBV AG UPPER RESP QL IA.RAPID: NOT DETECTED
GFR SERPLBLD CREATININE-BSD FMLA CKD-EPI: 42 ML/MIN/1.73/M2
GLOBULIN SER-MCNC: 5.1 GM/DL (ref 2.4–3.5)
GLUCOSE SERPL-MCNC: 76 MG/DL (ref 82–115)
HCT VFR BLD AUTO: 37.4 % (ref 42–52)
HGB BLD-MCNC: 12.6 G/DL (ref 14–18)
IMM GRANULOCYTES # BLD AUTO: 0.01 X10(3)/MCL (ref 0–0.04)
IMM GRANULOCYTES NFR BLD AUTO: 0.2 %
LYMPHOCYTES # BLD AUTO: 1.41 X10(3)/MCL (ref 0.6–4.6)
LYMPHOCYTES NFR BLD AUTO: 21.5 %
MCH RBC QN AUTO: 33.1 PG (ref 27–31)
MCHC RBC AUTO-ENTMCNC: 33.7 G/DL (ref 33–36)
MCV RBC AUTO: 98.2 FL (ref 80–94)
MONOCYTES # BLD AUTO: 0.68 X10(3)/MCL (ref 0.1–1.3)
MONOCYTES NFR BLD AUTO: 10.4 %
NEUTROPHILS # BLD AUTO: 4.36 X10(3)/MCL (ref 2.1–9.2)
NEUTROPHILS NFR BLD AUTO: 66.2 %
PLATELET # BLD AUTO: 185 X10(3)/MCL (ref 130–400)
PMV BLD AUTO: 10.6 FL (ref 7.4–10.4)
POTASSIUM SERPL-SCNC: 4.2 MMOL/L (ref 3.5–5.1)
PROT SERPL-MCNC: 8.3 GM/DL (ref 5.8–7.6)
RBC # BLD AUTO: 3.81 X10(6)/MCL (ref 4.7–6.1)
SARS-COV-2 RNA RESP QL NAA+PROBE: NOT DETECTED
SODIUM SERPL-SCNC: 137 MMOL/L (ref 136–145)
TROPONIN I SERPL-MCNC: 0.01 NG/ML (ref 0–0.04)
TROPONIN I SERPL-MCNC: 0.02 NG/ML (ref 0–0.04)
WBC # BLD AUTO: 6.57 X10(3)/MCL (ref 4.5–11.5)

## 2025-05-31 PROCEDURE — 80053 COMPREHEN METABOLIC PANEL: CPT

## 2025-05-31 PROCEDURE — 99284 EMERGENCY DEPT VISIT MOD MDM: CPT | Mod: 25

## 2025-05-31 PROCEDURE — 96360 HYDRATION IV INFUSION INIT: CPT

## 2025-05-31 PROCEDURE — 96361 HYDRATE IV INFUSION ADD-ON: CPT

## 2025-05-31 PROCEDURE — 0240U COVID/FLU A&B PCR: CPT

## 2025-05-31 PROCEDURE — 85025 COMPLETE CBC W/AUTO DIFF WBC: CPT

## 2025-05-31 PROCEDURE — 93010 ELECTROCARDIOGRAM REPORT: CPT | Mod: ,,, | Performed by: INTERNAL MEDICINE

## 2025-05-31 PROCEDURE — 83880 ASSAY OF NATRIURETIC PEPTIDE: CPT

## 2025-05-31 PROCEDURE — 25000003 PHARM REV CODE 250

## 2025-05-31 PROCEDURE — 84484 ASSAY OF TROPONIN QUANT: CPT

## 2025-05-31 PROCEDURE — 93005 ELECTROCARDIOGRAM TRACING: CPT

## 2025-05-31 RX ORDER — OXYCODONE HYDROCHLORIDE 5 MG/1
5 TABLET ORAL
Refills: 0 | Status: DISCONTINUED | OUTPATIENT
Start: 2025-05-31 | End: 2025-05-31

## 2025-05-31 RX ADMIN — SODIUM CHLORIDE 500 ML: 9 INJECTION, SOLUTION INTRAVENOUS at 02:05

## 2025-05-31 RX ADMIN — SODIUM CHLORIDE 500 ML: 9 INJECTION, SOLUTION INTRAVENOUS at 01:05
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
## 2025-05-31 NOTE — DISCHARGE INSTRUCTIONS
Change Lasix dosing to once daily  Hold all antihypertensives this evening.    Return to the hospital for any changes    IMPORTANT: EVEN THOUGH WE THINK IT IS SAFE FOR YOU TO GO HOME, NO EVALUATION OR TEST IS PERFECT SO THERE IS ALWAYS A SMALL CHANCE THAT YOU WILL NEED TO RETURN AND BE HOSPITALIZED.  THEREFORE IT IS VERY IMPORTANT THAT IF YOU GET WORSE OR DEVELOP ANY NEW SYMPTOMS THAT YOU RETURN HERE AS SOON AS POSSIBLE TO BE RE-EVALUATED.  THIS INCLUDES THE RETURN OF SYMPTOMS THAT HAVE RESOLVED SUCH AS FAINTING, CHEST PAIN OR SYMPTOMS THAT COULD BE A WARNING FOR A STROKE.     RETURN PRECAUTIONS:         Return here or see/call your doctor if not improving as expected, but return here immediately and/or contact a primary care doctor if you get worse, develop any new symptoms or have any of the following:  - Weakness or changes in speech/vision/coordination   - Shortness of breath, chest pain or fainting  -  One or both legs/feet become swollen, cold or painful  - Vomiting, dark stool, bleeding or trouble urinating  - Fever or chills, or if already present fever >103 or lasting >4-5 days  - New, changing or migrating pain     FOLLOW-UP CARE: We often dont make a definite diagnosis in the ER.  You will need a second opinion if you do not get better and usually even if you do.  Call your doctor and/or any doctors we referred you to for more advice and to make an appointment.  Do this today, tomorrow or after the weekend.  If you have HMO insurance, you may want to contact your HMO or your primary care doctor for referral to a specialist within your plan.  Either way tell the doctors office that it was a referral from the emergency department to get the soonest possible appointment.        YOUR TEST RESULTS: Take result reports of any blood or urine tests, imaging tests and EKGs to your doctor and any referral doctors. Have any abnormal tests repeated.  Your doctor or a referral doctor can let you know when this  should be done.  Also make sure your doctor contacts this hospital to get any test results that are not yet available such as culture results or special tests for infection and final imaging reports, which are often not available at the time you leave the ER but which may list additional important findings that are not on the preliminary report.        BLOOD PRESSURE & GLUCOSE: If your blood pressure was greater than 120/80 or your glucose level was >100 have it rechecked within 1 to 2 weeks.  The stress of your current condition can often cause a temporary rise in blood pressure or glucose level, but many people have undiagnosed hypertension or pre-diabetes.        MEDICATION SIDE EFFECTS: Do not drive, walk, bike, take the bus, etc. if you have received or are being prescribed any sedating medications such as those for pain or anxiety or certain  antihistamines like Benadryl.  If you have been give one of these here, get a taxi home or have a friend drive you home.  Ask your pharmacist to  you on potential side effects of any new medication.  If you are being prescribed antibiotics take a probiotic or eat yogurt and realize birth control pills may be less effective.        WHAT IF I DONT GET BETTER: Even with the best laid treatment plan, there is typically a 5-10% chance that you will not improve as expected or even get worse.  If you get worse you should return here as soon as possible; the wait is typically shortest in the morning.  If you stay the same or only improve partially, instead of returning here it may be reasonable to see a primary care provider for follow-up care and a second opinion.

## 2025-05-31 NOTE — ED PROVIDER NOTES
Encounter Date: 5/31/2025       History     Chief Complaint   Patient presents with    Dizziness     Complains of shakiness and dizziness     Patient is a 65-year-old male past medical history of alcoholic cirrhosis, CHF, COPD, hypertension, hyperlipidemia as well as MI.  He presents to the emergency department today secondary to episodes of dizziness and shaking that began this morning.  He denies any chest pain or shortness of breath in association with these episodes.  Patient presented to the emergency department with EMS and was noted to be hypotensive with blood pressures in the 80s.  Patient did not receive any IV fluids prior to arrival in the emergency department.  He denies any syncopal episodes falling, or visual changes headache        Review of patient's allergies indicates:  No Known Allergies  Past Medical History:   Diagnosis Date    Alcoholic cirrhosis     Anemia     CHF (congestive heart failure)     LV EF 40%    COPD (chronic obstructive pulmonary disease)     Hyperlipidemia     Hypertension     Myocardial infarction     Peripheral artery disease     with stents    Sleep apnea      Past Surgical History:   Procedure Laterality Date    ANGIOGRAM, ABDOMINAL AORTA  10/24/2023    Procedure: Angiogram, Abdominal Aorta;  Surgeon: Janette Ernandez MD;  Location: [facility] CATH LAB;  Service: Cardiology;;    ARTERIOGRAPHY OF SUBCLAVIAN ARTERY Left 10/24/2023    Procedure: ARTERIOGRAM, SUBCLAVIAN;  Surgeon: Janette Ernandez MD;  Location: [facility] CATH LAB;  Service: Cardiology;  Laterality: Left;    CAPSULE ENDOSCOPY, ESOPHAGUS THROUGH ILEUM N/A 11/20/2023    Procedure: M2A;  Surgeon: Johan Flower MD;  Location: [facility] ENDOSCOPY;  Service: Gastroenterology;  Laterality: N/A;  To be done at any time today when staff is available.    CARDIAC DEFIBRILLATOR PLACEMENT N/A 11/28/2023    Procedure: Insertion, Single chamber ICD (SJM);  Surgeon: Suyapa Oakes MD;  Location: [facility] CATH LAB;  Service:  Cardiology;  Laterality: N/A;    CATHETERIZATION OF BOTH LEFT AND RIGHT HEART N/A 10/24/2023    Procedure: CATHETERIZATION, HEART, BOTH LEFT AND RIGHT;  Surgeon: Janette Ernandez MD;  Location: [facility] CATH LAB;  Service: Cardiology;  Laterality: N/A;    COLONOSCOPY N/A 6/10/2023    Procedure: COLONOSCOPY;  Surgeon: Johan Flower MD;  Location: [facility] OR;  Service: Gastroenterology;  Laterality: N/A;    EGD, WITH CLOSED BIOPSY N/A 12/18/2023    Procedure: EGD;  Surgeon: Johan Flower MD;  Location: [facility] ENDOSCOPY;  Service: Gastroenterology;  Laterality: N/A;    ESOPHAGOGASTRODUODENOSCOPY N/A 6/9/2023    Procedure: EGD;  Surgeon: Tima Teixeira MD;  Location: [facility] ENDOSCOPY;  Service: Gastroenterology;  Laterality: N/A;    ESOPHAGOGASTRODUODENOSCOPY N/A 1/11/2024    Procedure: EGD (ESOPHAGOGASTRODUODENOSCOPY);  Surgeon: Chaya Castellon MD;  Location: [facility];  Service: Endoscopy;  Laterality: N/A;  POST OP: MILD GASTRITIS W/ NO ULCER    ESOPHAGOGASTRODUODENOSCOPY N/A 4/24/2024    Procedure: EGD W/ M2A PLACEMENT;  Surgeon: Johan Flower MD;  Location: [facility] ENDOSCOPY;  Service: Gastroenterology;  Laterality: N/A;    INSERTION OF INTRA-AORTIC BALLOON ASSIST DEVICE  10/24/2023    Procedure: INSERTION, INTRA-AORTIC BALLOON PUMP;  Surgeon: Janette Ernandez MD;  Location: [facility] CATH LAB;  Service: Cardiology;;    INSERTION OF INTRAVASCULAR MICROAXIAL BLOOD PUMP N/A 10/26/2023    Procedure: INSERTION, IMPELLA;  Surgeon: Janette Ernandez MD;  Location: [facility] CATH LAB;  Service: Cardiology;  Laterality: N/A;    INSTANTANEOUS WAVE-FREE RATIO (IFR) N/A 10/26/2023    Procedure: Instantaneous Wave-Free Ratio (IFR);  Surgeon: Janette Ernandez MD;  Location: [facility] CATH LAB;  Service: Cardiology;  Laterality: N/A;    INTRALUMINAL GASTROINTESTINAL TRACT IMAGING VIA CAPSULE N/A 4/24/2024    Procedure: M2A;  Surgeon: Johan Flower MD;  Location: [facility] ENDOSCOPY;  Service:  Gastroenterology;  Laterality: N/A;    INTRAVASCULAR ULTRASOUND, CORONARY N/A 10/26/2023    Procedure: Ultrasound-coronary;  Surgeon: Janette Ernandez MD;  Location: [facility] CATH LAB;  Service: Cardiology;  Laterality: N/A;    PERCUTANEOUS CORONARY INTERVENTION, ARTERY N/A 10/26/2023    Procedure: Percutaneous coronary intervention- with Impella;  Surgeon: Janette Ernandez MD;  Location: [facility] CATH LAB;  Service: Cardiology;  Laterality: N/A;    PLACEMENT, IABP  10/24/2023    Procedure: Placement, IABP;  Surgeon: Janette Ernandez MD;  Location: [facility] CATH LAB;  Service: Cardiology;;     No family history on file.  Social History[1]  Review of Systems    Physical Exam     Initial Vitals [05/31/25 1210]   BP Pulse Resp Temp SpO2   (!) 80/47 66 18 98.2 °F (36.8 °C) 95 %      MAP       --         Physical Exam    Constitutional:   Chronically ill-appearing elderly  male   HENT:   Head: Normocephalic and atraumatic.   Eyes: EOM are normal. Pupils are equal, round, and reactive to light.   Cardiovascular:  Regular rhythm.           Pulmonary/Chest: Breath sounds normal. No respiratory distress. He has no wheezes. He has no rales. He exhibits no tenderness.   Abdominal: Abdomen is soft. He exhibits distension. There is no abdominal tenderness.   Musculoskeletal:         General: No tenderness or edema.      Comments: No pitting edema to the lower extremities     Neurological: He is alert and oriented to person, place, and time.         ED Course   Procedures  Labs Reviewed   COMPREHENSIVE METABOLIC PANEL - Abnormal       Result Value    Sodium 137      Potassium 4.2      Chloride 103      CO2 23      Glucose 76 (*)     Blood Urea Nitrogen 23.6      Creatinine 1.76 (*)     Calcium 8.7 (*)     Protein Total 8.3 (*)     Albumin 3.2 (*)     Globulin 5.1 (*)     Albumin/Globulin Ratio 0.6 (*)     Bilirubin Total 0.5      ALP 79      ALT 33      AST 45      eGFR 42      Anion Gap 11.0      BUN/Creatinine Ratio 13     B-TYPE  NATRIURETIC PEPTIDE - Abnormal    Natriuretic Peptide 125.2 (*)    CBC WITH DIFFERENTIAL - Abnormal    WBC 6.57      RBC 3.81 (*)     Hgb 12.6 (*)     Hct 37.4 (*)     MCV 98.2 (*)     MCH 33.1 (*)     MCHC 33.7      RDW 13.1      Platelet 185      MPV 10.6 (*)     Neut % 66.2      Lymph % 21.5      Mono % 10.4      Eos % 1.4      Basophil % 0.3      Imm Grans % 0.2      Neut # 4.36      Lymph # 1.41      Mono # 0.68      Eos # 0.09      Baso # 0.02      Imm Gran # 0.01     TROPONIN I - Normal    Troponin-I 0.013     COVID/FLU A&B PCR - Normal    Influenza A PCR Not Detected      Influenza B PCR Not Detected      SARS-CoV-2 PCR Not Detected      Narrative:     The Xpert Xpress SARS-CoV-2/FLU/RSV plus is a rapid, multiplexed real-time PCR test intended for the simultaneous qualitative detection and differentiation of SARS-CoV-2, Influenza A, Influenza B, and respiratory syncytial virus (RSV) viral RNA in either nasopharyngeal swab or nasal swab specimens.         TROPONIN I - Normal    Troponin-I 0.017     CBC W/ AUTO DIFFERENTIAL    Narrative:     The following orders were created for panel order CBC auto differential.  Procedure                               Abnormality         Status                     ---------                               -----------         ------                     CBC with Differential[[id number]]       Abnormal            Final result                 Please view results for these tests on the individual orders.          Imaging Results    None          Medications   sodium chloride 0.9% bolus 500 mL 500 mL (0 mLs Intravenous Stopped 5/31/25 1406)   sodium chloride 0.9% bolus 500 mL 500 mL (0 mLs Intravenous Stopped 5/31/25 1543)     Medical Decision Making  Amount and/or Complexity of Data Reviewed  Labs: ordered.               ED Course as of 05/31/25 1835   Sat May 31, 2025   1235 EKG rate 57, sinus bradycardia, normal axis deviation, no ST elevations, no reciprocal changes, no STEMI,   [KJ]   1420 Patient received 500 of IV fluids over an hour and we will subsequently still orthostatic with his blood pressure dropping to 100/64 upon standing.  We will administer the patient additional fluids over the next hour and continue to monitor. [KJ]   1530 Patient ordered an additional fluids and will evaluate the patient [KJ]   1545 Patient is a 65-year-old male who presents to the emergency department due to an episode of dizziness that occurred earlier today off the standing.  He denies any chest pain shortness of breath dizziness any other symptoms.  He denies falling visual changes persistent symptoms [KJ]   1548 Patient's blood pressure decreased once again to 88 after fluids and patient became dizzy once again.  We will consult Cardiology in regards to the patient [KJ]   1550 Echocardiogram from 2023 shows the patient's EF is 20-25%.  [KJ]   1552 Patient is a 65-year-old male past medical history of CHF and COPD.  He presents to the emergency department today due to episodes of dizziness that occurred earlier today.  Patient's vital signs significant for hypertension, on physical exam patient has no rales but does have a distended abdomen [KJ]   1700 Patient's pacemaker was interrogated and he had no episodes of V-tach or VFib since January of 2025  [KJ]   1832 Patient was seen by Cardiology in consultation.  They are familiar with his case and his previous episodes of orthostasis.  He has had issues with Lasix administration and becoming somewhat dehydrated in the past.  They have given recommendations at the bedside to change his Lasix dosing to once daily beginning tomorrow.  They are going to see him in the clinic this upcoming week.  They did recommend no additional medications tonight for his blood pressure control.  I interviewed the patient at the bedside after the consultation was completed.  He did not have any questions and understood the plan and was able to articulate the plan to  "me at the bedside. [DB]      ED Course User Index  [DB] Bulmaro Aguilar MD  [KJ] Lacy Macedo DO                           Clinical Impression:  Final diagnoses:  [R42] Dizzy  [R42] Dizziness  [R55] Near syncope (Primary)  [Z86.79] History of CHF (congestive heart failure)  [I95.1] Orthostatic hypotension          ED Disposition Condition    Discharge Stable          ED Prescriptions    None       Follow-up Information       Follow up With Specialties Details Why Contact Info    Follow up in the office with the primary cardiologist as recommended this week                       [1]   Social History  Tobacco Use    Smoking status: Every Day     Current packs/day: 0.25     Average packs/day: 0.3 packs/day for 51.4 years (12.8 ttl pk-yrs)     Types: Cigarettes     Start date: 1/10/1974    Smokeless tobacco: Former     Types: Chew   Substance Use Topics    Alcohol use: Yes     Comment: currently not drinking per pt. 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 Quit drinking in 10/2023. - drinks a beer "once in a while"    Drug use: Not Currently        Bulmaro Aguilar MD  05/31/25 9664    "

## 2025-06-01 LAB
OHS QRS DURATION: 86 MS
OHS QTC CALCULATION: 474 MS

## 2025-06-03 ENCOUNTER — HOSPITAL ENCOUNTER (EMERGENCY)
Facility: HOSPITAL | Age: 65
Discharge: HOME | End: 2025-06-03
Attending: EMERGENCY MEDICINE
Payer: MEDICARE

## 2025-06-03 VITALS
SYSTOLIC BLOOD PRESSURE: 131 MMHG | HEIGHT: 69 IN | DIASTOLIC BLOOD PRESSURE: 60 MMHG | OXYGEN SATURATION: 94 % | HEART RATE: 57 BPM | WEIGHT: 170 LBS | BODY MASS INDEX: 25.18 KG/M2 | RESPIRATION RATE: 16 BRPM | TEMPERATURE: 98 F

## 2025-06-03 DIAGNOSIS — R42 DIZZINESS: ICD-10-CM

## 2025-06-03 DIAGNOSIS — R53.1 WEAKNESS: Primary | ICD-10-CM

## 2025-06-03 LAB
ALBUMIN SERPL-MCNC: 3 G/DL (ref 3.4–4.8)
ALBUMIN/GLOB SERPL: 0.6 RATIO (ref 1.1–2)
ALP SERPL-CCNC: 75 UNIT/L (ref 40–150)
ALT SERPL-CCNC: 33 UNIT/L (ref 0–55)
AMMONIA PLAS-MSCNC: 27.4 UMOL/L (ref 18–72)
ANION GAP SERPL CALC-SCNC: 7 MEQ/L
AST SERPL-CCNC: 38 UNIT/L (ref 11–45)
BASOPHILS # BLD AUTO: 0.03 X10(3)/MCL
BASOPHILS NFR BLD AUTO: 0.5 %
BILIRUB SERPL-MCNC: 0.6 MG/DL
BILIRUB UR QL STRIP.AUTO: NEGATIVE
BUN SERPL-MCNC: 16.3 MG/DL (ref 8.4–25.7)
CALCIUM SERPL-MCNC: 8.3 MG/DL (ref 8.8–10)
CHLORIDE SERPL-SCNC: 107 MMOL/L (ref 98–107)
CLARITY UR: CLEAR
CO2 SERPL-SCNC: 25 MMOL/L (ref 23–31)
COLOR UR AUTO: NORMAL
CREAT SERPL-MCNC: 1.55 MG/DL (ref 0.72–1.25)
CREAT/UREA NIT SERPL: 11
EOSINOPHIL # BLD AUTO: 0.1 X10(3)/MCL (ref 0–0.9)
EOSINOPHIL NFR BLD AUTO: 1.5 %
ERYTHROCYTE [DISTWIDTH] IN BLOOD BY AUTOMATED COUNT: 13 % (ref 11.5–17)
GFR SERPLBLD CREATININE-BSD FMLA CKD-EPI: 49 ML/MIN/1.73/M2
GLOBULIN SER-MCNC: 4.7 GM/DL (ref 2.4–3.5)
GLUCOSE SERPL-MCNC: 128 MG/DL (ref 82–115)
GLUCOSE UR QL STRIP: NEGATIVE
HCT VFR BLD AUTO: 35.7 % (ref 42–52)
HGB BLD-MCNC: 12.1 G/DL (ref 14–18)
HGB UR QL STRIP: NEGATIVE
IMM GRANULOCYTES # BLD AUTO: 0.02 X10(3)/MCL (ref 0–0.04)
IMM GRANULOCYTES NFR BLD AUTO: 0.3 %
KETONES UR QL STRIP: NEGATIVE
LEUKOCYTE ESTERASE UR QL STRIP: NEGATIVE
LYMPHOCYTES # BLD AUTO: 1.17 X10(3)/MCL (ref 0.6–4.6)
LYMPHOCYTES NFR BLD AUTO: 17.7 %
MCH RBC QN AUTO: 33.3 PG (ref 27–31)
MCHC RBC AUTO-ENTMCNC: 33.9 G/DL (ref 33–36)
MCV RBC AUTO: 98.3 FL (ref 80–94)
MONOCYTES # BLD AUTO: 0.61 X10(3)/MCL (ref 0.1–1.3)
MONOCYTES NFR BLD AUTO: 9.2 %
NEUTROPHILS # BLD AUTO: 4.68 X10(3)/MCL (ref 2.1–9.2)
NEUTROPHILS NFR BLD AUTO: 70.8 %
NITRITE UR QL STRIP: NEGATIVE
PH UR STRIP: 6.5 [PH]
PLATELET # BLD AUTO: 166 X10(3)/MCL (ref 130–400)
PMV BLD AUTO: 9.9 FL (ref 7.4–10.4)
POTASSIUM SERPL-SCNC: 4.3 MMOL/L (ref 3.5–5.1)
PROT SERPL-MCNC: 7.7 GM/DL (ref 5.8–7.6)
PROT UR QL STRIP: NEGATIVE
RBC # BLD AUTO: 3.63 X10(6)/MCL (ref 4.7–6.1)
SODIUM SERPL-SCNC: 139 MMOL/L (ref 136–145)
SP GR UR STRIP.AUTO: 1.01 (ref 1–1.03)
UROBILINOGEN UR STRIP-ACNC: 0.2
WBC # BLD AUTO: 6.61 X10(3)/MCL (ref 4.5–11.5)

## 2025-06-03 PROCEDURE — 99284 EMERGENCY DEPT VISIT MOD MDM: CPT | Mod: 25

## 2025-06-03 PROCEDURE — 80053 COMPREHEN METABOLIC PANEL: CPT | Performed by: EMERGENCY MEDICINE

## 2025-06-03 PROCEDURE — 25000003 PHARM REV CODE 250: Performed by: EMERGENCY MEDICINE

## 2025-06-03 PROCEDURE — 93010 ELECTROCARDIOGRAM REPORT: CPT | Mod: ,,, | Performed by: INTERNAL MEDICINE

## 2025-06-03 PROCEDURE — 85025 COMPLETE CBC W/AUTO DIFF WBC: CPT | Performed by: EMERGENCY MEDICINE

## 2025-06-03 PROCEDURE — 82140 ASSAY OF AMMONIA: CPT | Performed by: EMERGENCY MEDICINE

## 2025-06-03 PROCEDURE — 81003 URINALYSIS AUTO W/O SCOPE: CPT | Performed by: EMERGENCY MEDICINE

## 2025-06-03 PROCEDURE — 96360 HYDRATION IV INFUSION INIT: CPT

## 2025-06-03 PROCEDURE — 93005 ELECTROCARDIOGRAM TRACING: CPT

## 2025-06-03 RX ADMIN — SODIUM CHLORIDE 500 ML: 9 INJECTION, SOLUTION INTRAVENOUS at 02:06

## 2025-06-03 NOTE — ED PROVIDER NOTES
NAME:  Hemal Tolbert Jr.  CSN:     [id number]  MRN:    [id number]  ADMIT DATE: 6/3/2025        eMERGENCY dEPARTMENT eNCOUnter    CHIEF COMPLAINT    Chief Complaint   Patient presents with    Fatigue     Pt c/o generalized weakness with headache and dizziness x 1 day        HPI      Hemal Tolbert Jr. is a 65 y.o. male who presents to the ED complains of weakness and dizziness that started this morning.  Patient was seen here for the same complaint 3 days ago and had a Cardiology consult while in the ED. patient is prone to dehydration and has a history of CHF.  He also has a history of cirrhosis.  Patient denies any chest pain or shortness of breath.        ALLERGIES    Review of patient's allergies indicates:  No Known Allergies    PAST MEDICAL HISTORY  Past Medical History:   Diagnosis Date    Alcoholic cirrhosis     Anemia     CHF (congestive heart failure)     LV EF 40%    COPD (chronic obstructive pulmonary disease)     Hyperlipidemia     Hypertension     Myocardial infarction     Peripheral artery disease     with stents    Sleep apnea        SURGICAL HISTORY    Past Surgical History:   Procedure Laterality Date    ANGIOGRAM, ABDOMINAL AORTA  10/24/2023    Procedure: Angiogram, Abdominal Aorta;  Surgeon: Janette Ernandez MD;  Location: [facility] CATH LAB;  Service: Cardiology;;    ARTERIOGRAPHY OF SUBCLAVIAN ARTERY Left 10/24/2023    Procedure: ARTERIOGRAM, SUBCLAVIAN;  Surgeon: Janette Ernandez MD;  Location: [facility] CATH LAB;  Service: Cardiology;  Laterality: Left;    CAPSULE ENDOSCOPY, ESOPHAGUS THROUGH ILEUM N/A 11/20/2023    Procedure: M2A;  Surgeon: Johan Flower MD;  Location: [facility] ENDOSCOPY;  Service: Gastroenterology;  Laterality: N/A;  To be done at any time today when staff is available.    CARDIAC DEFIBRILLATOR PLACEMENT N/A 11/28/2023    Procedure: Insertion, Single chamber ICD (SJM);  Surgeon: Suyapa Oakes MD;  Location: [facility] CATH LAB;  Service: Cardiology;  Laterality: N/A;     CATHETERIZATION OF BOTH LEFT AND RIGHT HEART N/A 10/24/2023    Procedure: CATHETERIZATION, HEART, BOTH LEFT AND RIGHT;  Surgeon: Janette Ernandez MD;  Location: [facility] CATH LAB;  Service: Cardiology;  Laterality: N/A;    COLONOSCOPY N/A 6/10/2023    Procedure: COLONOSCOPY;  Surgeon: Johan Flower MD;  Location: [facility] OR;  Service: Gastroenterology;  Laterality: N/A;    EGD, WITH CLOSED BIOPSY N/A 12/18/2023    Procedure: EGD;  Surgeon: Johan Flower MD;  Location: [facility] ENDOSCOPY;  Service: Gastroenterology;  Laterality: N/A;    ESOPHAGOGASTRODUODENOSCOPY N/A 6/9/2023    Procedure: EGD;  Surgeon: Tima Teixeira MD;  Location: [facility] ENDOSCOPY;  Service: Gastroenterology;  Laterality: N/A;    ESOPHAGOGASTRODUODENOSCOPY N/A 1/11/2024    Procedure: EGD (ESOPHAGOGASTRODUODENOSCOPY);  Surgeon: Chaya Castellon MD;  Location: [facility];  Service: Endoscopy;  Laterality: N/A;  POST OP: MILD GASTRITIS W/ NO ULCER    ESOPHAGOGASTRODUODENOSCOPY N/A 4/24/2024    Procedure: EGD W/ M2A PLACEMENT;  Surgeon: Johan Flower MD;  Location: [facility] ENDOSCOPY;  Service: Gastroenterology;  Laterality: N/A;    INSERTION OF INTRA-AORTIC BALLOON ASSIST DEVICE  10/24/2023    Procedure: INSERTION, INTRA-AORTIC BALLOON PUMP;  Surgeon: Janette Ernandez MD;  Location: [facility] CATH LAB;  Service: Cardiology;;    INSERTION OF INTRAVASCULAR MICROAXIAL BLOOD PUMP N/A 10/26/2023    Procedure: INSERTION, IMPELLA;  Surgeon: Janette Ernandez MD;  Location: [facility] CATH LAB;  Service: Cardiology;  Laterality: N/A;    INSTANTANEOUS WAVE-FREE RATIO (IFR) N/A 10/26/2023    Procedure: Instantaneous Wave-Free Ratio (IFR);  Surgeon: Janette Ernandez MD;  Location: [facility] CATH LAB;  Service: Cardiology;  Laterality: N/A;    INTRALUMINAL GASTROINTESTINAL TRACT IMAGING VIA CAPSULE N/A 4/24/2024    Procedure: M2A;  Surgeon: Johan Flower MD;  Location: [facility] ENDOSCOPY;  Service: Gastroenterology;  Laterality: N/A;     "INTRAVASCULAR ULTRASOUND, CORONARY N/A 10/26/2023    Procedure: Ultrasound-coronary;  Surgeon: Janette Ernandez MD;  Location: [facility] CATH LAB;  Service: Cardiology;  Laterality: N/A;    PERCUTANEOUS CORONARY INTERVENTION, ARTERY N/A 10/26/2023    Procedure: Percutaneous coronary intervention- with Impella;  Surgeon: Janette Ernandez MD;  Location: [facility] CATH LAB;  Service: Cardiology;  Laterality: N/A;    PLACEMENT, IABP  10/24/2023    Procedure: Placement, IABP;  Surgeon: Janette Ernandez MD;  Location: [facility] CATH LAB;  Service: Cardiology;;       SOCIAL HISTORY    Social History     Socioeconomic History    Marital status: Single   Tobacco Use    Smoking status: Every Day     Current packs/day: 0.25     Average packs/day: 0.3 packs/day for 51.4 years (12.8 ttl pk-yrs)     Types: Cigarettes     Start date: 1/10/1974    Smokeless tobacco: Former     Types: Chew   Substance and Sexual Activity    Alcohol use: Yes     Comment: currently not drinking per pt. 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 Quit drinking in 10/2023. - drinks a beer "once in a while"    Drug use: Not Currently    Sexual activity: Not Currently     Social Drivers of Health     Financial Resource Strain: Low Risk  (11/5/2023)    Overall Financial Resource Strain (CARDIA)     Difficulty of Paying Living Expenses: Not hard at all   Food Insecurity: No Food Insecurity (11/5/2023)    Hunger Vital Sign     Worried About Running Out of Food in the Last Year: Never true     Ran Out of Food in the Last Year: Never true   Transportation Needs: No Transportation Needs (11/5/2023)    PRAPARE - Transportation     Lack of Transportation (Medical): No     Lack of Transportation (Non-Medical): No   Physical Activity: Inactive (11/5/2023)    Exercise Vital Sign     Days of Exercise per Week: 0 days     Minutes of Exercise per Session: 0 min   Stress: No Stress Concern Present (11/5/2023)    [employer] - Occupational Stress Questionnaire     Feeling of " "Stress : Only a little   Housing Stability: Low Risk  (11/5/2023)    Housing Stability Vital Sign     Unable to Pay for Housing in the Last Year: No     Number of Places Lived in the Last Year: 1     Unstable Housing in the Last Year: No       FAMILY HISTORY    No family history on file.    REVIEW OF SYSTEMS   ROS  All Systems otherwise negative except as noted in the History of Present Illness.        PHYSICAL EXAM    Reviewed Triage Note  VITAL SIGNS:   ED Triage Vitals [06/03/25 1347]   Encounter Vitals Group      BP (!) 97/57      Systolic BP Percentile       Diastolic BP Percentile       Pulse 61      Resp 20      Temp 97.9 °F (36.6 °C)      Temp Source Oral      SpO2 95 %      Weight 170 lb      Height 5' 9"      Head Circumference       Peak Flow       Pain Score       Pain Loc       Pain Education       Exclude from Growth Chart        Patient Vitals for the past 24 hrs:   BP Temp Temp src Pulse Resp SpO2 Height Weight   06/03/25 1600 131/60 -- -- (!) 57 16 (!) 94 % -- --   06/03/25 1500 128/60 -- -- (!) 53 20 (!) 92 % -- --   06/03/25 1400 120/62 -- -- (!) 55 15 (!) 90 % -- --   06/03/25 1347 (!) 97/57 97.9 °F (36.6 °C) Oral 61 20 95 % 5' 9" (1.753 m) 77.1 kg (170 lb)           Physical Exam    Constitutional:  Well-developed, well-nourished. No acute distress  HENT:  Normocephalic, atraumatic.  Eyes:  EOMI. Conjunctiva normal without discharge.   Neck: Normal range of motion.No stridor. No meningismus.   Respiratory:  Normal breath sounds bilaterally.  No respiratory distress, retractions, or conversational dyspnea. No wheezing. No rhonchi. No rales.   Cardiovascular:  Normal heart rate. Normal rhythm. No pitting lower extremity edema.   GI:  Abdomen soft, non-distended, non-tender. Normal bowel sounds. No guarding, rigidity or rebound.    : No CVA tenderness.   Musculoskeletal:  No gross deformity or limited range of motion of all major joints. No palpable bony deformity. No tenderness to " palpation.  Integument:  Warm and dry. No rash.  Neurologic:  Normal motor function. Normal sensory function. No focal deficits noted. Alert and Interactive.  Psychiatric:  Affect normal. Mood normal.         LABS  Pertinent labs reviewed. (See chart for details)   Labs Reviewed   COMPREHENSIVE METABOLIC PANEL - Abnormal       Result Value    Sodium 139      Potassium 4.3      Chloride 107      CO2 25      Glucose 128 (*)     Blood Urea Nitrogen 16.3      Creatinine 1.55 (*)     Calcium 8.3 (*)     Protein Total 7.7 (*)     Albumin 3.0 (*)     Globulin 4.7 (*)     Albumin/Globulin Ratio 0.6 (*)     Bilirubin Total 0.6      ALP 75      ALT 33      AST 38      eGFR 49      Anion Gap 7.0      BUN/Creatinine Ratio 11     CBC WITH DIFFERENTIAL - Abnormal    WBC 6.61      RBC 3.63 (*)     Hgb 12.1 (*)     Hct 35.7 (*)     MCV 98.3 (*)     MCH 33.3 (*)     MCHC 33.9      RDW 13.0      Platelet 166      MPV 9.9      Neut % 70.8      Lymph % 17.7      Mono % 9.2      Eos % 1.5      Basophil % 0.5      Imm Grans % 0.3      Neut # 4.68      Lymph # 1.17      Mono # 0.61      Eos # 0.10      Baso # 0.03      Imm Gran # 0.02     AMMONIA - Normal    Ammonia Level 27.4     URINALYSIS, REFLEX TO URINE CULTURE - Normal    Color, UA Straw      Appearance, UA Clear      Specific Gravity, UA 1.010      pH, UA 6.5      Protein, UA Negative      Glucose, UA Negative      Ketones, UA Negative      Blood, UA Negative      Bilirubin, UA Negative      Urobilinogen, UA 0.2      Nitrites, UA Negative      Leukocyte Esterase, UA Negative     CBC W/ AUTO DIFFERENTIAL    Narrative:     The following orders were created for panel order CBC auto differential.  Procedure                               Abnormality         Status                     ---------                               -----------         ------                     CBC with Differential[[id number]]       Abnormal            Final result                 Please view results for these  tests on the individual orders.         RADIOLOGY    Imaging Results    None         PROCEDURES    Procedures      EKG     Interpreted by ERP:     EKG Readings: (Independently Interpreted)   Rhythm: Sinus Bradycardia. Heart Rate: 54. Ectopy: No Ectopy. Conduction: Normal. ST Segments: Normal ST Segments. T Waves: Normal. Clinical Impression: Sinus Bradycardia       ED COURSE & MEDICAL DECISION MAKING    Pertinent & Imaging studies reviewed. (See chart for details and specific orders.)        Medications   sodium chloride 0.9% bolus 500 mL 500 mL (0 mLs Intravenous Stopped 6/3/25 3963)          Patient's workup is unremarkable.  He reports symptomatic improvement after IV fluids.  He has a cardiology appointment tomorrow.  Patient was given ED return precautions       DISPOSITION  Patient discharged in stable condition at No discharge date for patient encounter.      DISCHARGE INSTRUCTIONS & MEDS       Medication List        ASK your doctor about these medications      albuterol 90 mcg/actuation inhaler  Commonly known as: PROVENTIL/VENTOLIN HFA  Inhale 2 puffs into the lungs every 4 (four) hours as needed for Wheezing.     albuterol-ipratropium 2.5 mg-0.5 mg/3 mL nebulizer solution  Commonly known as: DUO-NEB  Take 3 mLs by nebulization every 6 (six) hours as needed for Shortness of Breath or Wheezing.     amiodarone 200 MG Tab  Commonly known as: PACERONE     atorvastatin 80 MG tablet  Commonly known as: LIPITOR     calcitRIOL 0.25 MCG Cap  Commonly known as: ROCALTROL  Take 1 capsule (0.25 mcg total) by mouth every Mon, Wed, Fri.     diclofenac sodium 1 % Gel  Commonly known as: VOLTAREN  Apply 2 g topically once daily.     ENTRESTO 24-26 mg per tablet  Generic drug: sacubitriL-valsartan     ferrous sulfate 325 (65 FE) MG EC tablet  Take 1 tablet (325 mg total) by mouth 2 (two) times daily.     * folic acid 1 MG tablet  Commonly known as: FOLVITE     * folic acid 1 MG tablet  Commonly known as: FOLVITE  Take 1  tablet (1 mg total) by mouth once daily.     furosemide 40 MG tablet  Commonly known as: LASIX     gabapentin 300 MG capsule  Commonly known as: NEURONTIN  TAKE ONE CAPSULE BY MOUTH (3) TIMES DAILY     HYDROcodone-acetaminophen 7.5-325 mg per tablet  Commonly known as: NORCO  Take 1 tablet by mouth every 6 (six) hours as needed for Pain.     metoprolol succinate 25 MG 24 hr tablet  Commonly known as: TOPROL-XL     pantoprazole 40 MG tablet  Commonly known as: PROTONIX  TAKE ONE (1) TABLET BY MOUTH TWICE DAILY     SandoSTATIN LAR [place] 20 mg Ssrr injection  Generic drug: octreotide     STIOLTO RESPIMAT 2.5-2.5 mcg/actuation Mist  Generic drug: tiotropium-olodateroL  INHALE 2 PUFFS BY MOUTH ONCE DAILY **CALL AHEAD, SPECIALLY ORDERED FOR YOU**     tamsulosin 0.4 mg Cap  Commonly known as: FLOMAX  TAKE 1 CAPSULE BY MOUTH ONCE DAILY.           * This list has 2 medication(s) that are the same as other medications prescribed for you. Read the directions carefully, and ask your doctor or other care provider to review them with you.                    New Prescriptions    No medications on file           FINAL IMPRESSION    1. Weakness    2. Dizziness              Blood Pressure Follow-Up Advised  Patient advised to follow up with PCP within 3-5 days for blood pressure re-check if blood pressure is equal to or greater than 120/80.         Critical care time spent with this patient (not including separately billable items) was  0 minutes.     DISCLAIMER: This note was prepared with Dragon NaturallySpeaking voice recognition transcription software. Garbled syntax, mangled pronouns, and other bizarre constructions may be attributed to that software system.      Ramesh Renee MD  06/03/2025  3:05 PM           Ramesh Renee MD  06/03/25 8457

## 2025-06-04 LAB
OHS QRS DURATION: 90 MS
OHS QTC CALCULATION: 462 MS

## 2025-06-09 DIAGNOSIS — Z76.0 MEDICATION REFILL: ICD-10-CM

## 2025-06-09 DIAGNOSIS — N40.0 BENIGN PROSTATIC HYPERPLASIA, UNSPECIFIED WHETHER LOWER URINARY TRACT SYMPTOMS PRESENT: ICD-10-CM

## 2025-06-09 DIAGNOSIS — K21.9 GASTROESOPHAGEAL REFLUX DISEASE WITHOUT ESOPHAGITIS: ICD-10-CM

## 2025-06-09 RX ORDER — PANTOPRAZOLE SODIUM 40 MG/1
TABLET, DELAYED RELEASE ORAL
Qty: 60 TABLET | Refills: 5 | Status: SHIPPED | OUTPATIENT
Start: 2025-06-09

## 2025-06-09 RX ORDER — GABAPENTIN 300 MG/1
CAPSULE ORAL
Qty: 90 CAPSULE | Refills: 2 | Status: SHIPPED | OUTPATIENT
Start: 2025-06-09

## 2025-06-09 RX ORDER — TAMSULOSIN HYDROCHLORIDE 0.4 MG/1
1 CAPSULE ORAL
Qty: 30 CAPSULE | Refills: 5 | Status: SHIPPED | OUTPATIENT
Start: 2025-06-09

## 2025-06-12 ENCOUNTER — HOSPITAL ENCOUNTER (OUTPATIENT)
Dept: RADIOLOGY | Facility: HOSPITAL | Age: 65
Discharge: HOME | End: 2025-06-12
Attending: NURSE PRACTITIONER
Payer: MEDICARE

## 2025-06-12 ENCOUNTER — PROCEDURE VISIT (OUTPATIENT)
Dept: RESPIRATORY THERAPY | Facility: HOSPITAL | Age: 65
End: 2025-06-12
Attending: NURSE PRACTITIONER
Payer: MEDICARE

## 2025-06-12 DIAGNOSIS — J44.9 CHRONIC OBSTRUCTIVE PULMONARY DISEASE, UNSPECIFIED COPD TYPE: ICD-10-CM

## 2025-06-12 DIAGNOSIS — J90 PLEURAL EFFUSION: ICD-10-CM

## 2025-06-12 DIAGNOSIS — I50.9 CONGESTIVE HEART FAILURE, UNSPECIFIED HF CHRONICITY, UNSPECIFIED HEART FAILURE TYPE: ICD-10-CM

## 2025-06-12 DIAGNOSIS — N18.30 STAGE 3 CHRONIC KIDNEY DISEASE, UNSPECIFIED WHETHER STAGE 3A OR 3B CKD: ICD-10-CM

## 2025-06-12 DIAGNOSIS — Z87.19 H/O: GI BLEED: ICD-10-CM

## 2025-06-12 DIAGNOSIS — D47.2 MGUS (MONOCLONAL GAMMOPATHY OF UNKNOWN SIGNIFICANCE): ICD-10-CM
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
## 2025-06-12 LAB
DLCO SINGLE BREATH LLN: 20.2
DLCO SINGLE BREATH PRE REF: 31.5 %
DLCO SINGLE BREATH REF: 27.13
DLCOC SBVA LLN: 2.72
DLCOC SBVA REF: 3.92
DLCOC SINGLE BREATH LLN: 20.2
DLCOC SINGLE BREATH REF: 27.13
DLCOCSBVAULN: 5.12
DLCOCSINGLEBREATHULN: 34.06
DLCOSINGLEBREATHULN: 34.06
DLCOSINGLEBREATHZSCORE: -4.41
DLCOVA LLN: 2.72
DLCOVA PRE REF: 48.4 %
DLCOVA PRE: 1.9 ML/(MIN*MMHG*L) (ref 2.72–5.12)
DLCOVA REF: 3.92
DLCOVAULN: 5.12
ERVN2 LLN: -16448.91
ERVN2 PRE REF: 101 %
ERVN2 PRE: 1.1 L (ref -16448.91–16451.09)
ERVN2 REF: 1.09
ERVN2ULN: ABNORMAL
FEF 25 75 LLN: 1.55
FEF 25 75 PRE REF: 9.1 %
FEF 25 75 REF: 3.26
FET100 CHG: -27.5 %
FEV05 LLN: 1.48
FEV05 REF: 2.62
FEV1 CHG: 15.4 %
FEV1 FVC LLN: 64
FEV1 FVC PRE REF: 60.9 %
FEV1 FVC REF: 77
FEV1 LLN: 2.39
FEV1 PRE REF: 47 %
FEV1 REF: 3.25
FEV1 VOL CHG: 0.23
FRCN2 LLN: 2.62
FRCN2 PRE REF: 93.1 %
FRCN2 REF: 3.61
FRCN2ULN: 4.59
FVC CHG: 0.6 %
FVC LLN: 3.18
FVC PRE REF: 77 %
FVC REF: 4.25
FVC VOL CHG: 0.02
ICN2REF: 3.11
IVC PRE: 2.54 L (ref 3.18–5.32)
IVC SINGLE BREATH LLN: 3.18
IVC SINGLE BREATH PRE REF: 59.8 %
IVC SINGLE BREATH REF: 4.25
IVCSINGLEBREATHULN: 5.32
LLN IC N2: -9999996.89
PEF LLN: 6.26
PEF PRE REF: 49.2 %
PEF REF: 8.51
PHYSICIAN COMMENT: ABNORMAL
POST FEF 25 75: 0.4 L/S (ref 1.55–4.97)
POST FET 100: 15.04 SEC
POST FEV1 FVC: 53.55 % (ref 63.83–88.03)
POST FEV1: 1.76 L (ref 2.39–4.06)
POST FEV5: 1.25 L (ref 1.48–3.75)
POST FVC: 3.29 L (ref 3.18–5.32)
POST PEF: 5.3 L/S (ref 6.26–10.76)
PRE DLCO: 8.54 ML/(MIN*MMHG) (ref 20.2–34.06)
PRE FEF 25 75: 0.3 L/S (ref 1.55–4.97)
PRE FET 100: 20.75 SEC
PRE FEV05 REF: 44.3 %
PRE FEV1 FVC: 46.72 % (ref 63.83–88.03)
PRE FEV1: 1.53 L (ref 2.39–4.06)
PRE FEV5: 1.16 L (ref 1.48–3.75)
PRE FRC N2: 3.36 L (ref 2.62–4.59)
PRE FVC: 3.27 L (ref 3.18–5.32)
PRE IC N2: 2.17 L (ref -9999996.89–#######.####)
PRE PEF: 4.19 L/S (ref 6.26–10.76)
PRE REF IC N2: 69.9 %
RVN2 LLN: 1.84
RVN2 PRE REF: 89.8 %
RVN2 PRE: 2.26 L (ref 1.84–3.19)
RVN2 REF: 2.52
RVN2TLCN2 LLN: 30.72
RVN2TLCN2 PRE REF: 102.9 %
RVN2TLCN2 PRE: 40.87 % (ref 30.72–48.68)
RVN2TLCN2 REF: 39.7
RVN2TLCN2ULN: 48.68
RVN2ULN: 3.19
TLCN2 LLN: 5.77
TLCN2 PRE REF: 79.9 %
TLCN2 PRE: 5.53 L (ref 5.77–8.07)
TLCN2 REF: 6.92
TLCN2ULN: 8.07
ULN IC N2: ABNORMAL
VA PRE: 4.5 L (ref 6.77–6.77)
VA SINGLE BREATH LLN: 6.77
VA SINGLE BREATH PRE REF: 66.5 %
VA SINGLE BREATH REF: 6.77
VASINGLEBREATHULN: 6.77
VCMAXN2 LLN: 3.18
VCMAXN2 PRE REF: 77 %
VCMAXN2 PRE: 3.27 L (ref 3.18–5.32)
VCMAXN2 REF: 4.25
VCMAXN2ULN: 5.32

## 2025-06-12 PROCEDURE — 76770 US EXAM ABDO BACK WALL COMP: CPT | Mod: TC

## 2025-06-12 PROCEDURE — 71046 X-RAY EXAM CHEST 2 VIEWS: CPT | Mod: TC

## 2025-06-12 PROCEDURE — 94060 EVALUATION OF WHEEZING: CPT

## 2025-06-12 PROCEDURE — 99900031 HC PATIENT EDUCATION (STAT)

## 2025-06-12 PROCEDURE — 94727 GAS DIL/WSHOT DETER LNG VOL: CPT

## 2025-06-12 PROCEDURE — 94729 DIFFUSING CAPACITY: CPT

## 2025-06-23 DIAGNOSIS — J44.9 CHRONIC OBSTRUCTIVE PULMONARY DISEASE, UNSPECIFIED COPD TYPE: ICD-10-CM

## 2025-06-23 RX ORDER — ALBUTEROL SULFATE 90 UG/1
INHALANT RESPIRATORY (INHALATION)
Qty: 18 G | Refills: 6 | Status: SHIPPED | OUTPATIENT
Start: 2025-06-23

## 2025-06-25 ENCOUNTER — TELEPHONE (OUTPATIENT)
Dept: FAMILY MEDICINE | Facility: CLINIC | Age: 65
End: 2025-06-25
Payer: MEDICARE

## 2025-06-25 ENCOUNTER — HOSPITAL ENCOUNTER (EMERGENCY)
Facility: HOSPITAL | Age: 65
Discharge: HOME | End: 2025-06-25
Attending: EMERGENCY MEDICINE
Payer: MEDICARE

## 2025-06-25 VITALS
TEMPERATURE: 98 F | OXYGEN SATURATION: 91 % | RESPIRATION RATE: 18 BRPM | HEIGHT: 69 IN | WEIGHT: 170 LBS | DIASTOLIC BLOOD PRESSURE: 46 MMHG | SYSTOLIC BLOOD PRESSURE: 100 MMHG | BODY MASS INDEX: 25.18 KG/M2 | HEART RATE: 57 BPM

## 2025-06-25 DIAGNOSIS — R51.9 NONINTRACTABLE HEADACHE, UNSPECIFIED CHRONICITY PATTERN, UNSPECIFIED HEADACHE TYPE: Primary | ICD-10-CM

## 2025-06-25 LAB
ANION GAP SERPL CALC-SCNC: 8 MEQ/L
BASOPHILS # BLD AUTO: 0.02 X10(3)/MCL
BASOPHILS NFR BLD AUTO: 0.3 %
BILIRUB UR QL STRIP.AUTO: NEGATIVE
BNP BLD-MCNC: 213.2 PG/ML
BUN SERPL-MCNC: 19 MG/DL (ref 8.4–25.7)
CALCIUM SERPL-MCNC: 8.4 MG/DL (ref 8.8–10)
CHLORIDE SERPL-SCNC: 104 MMOL/L (ref 98–107)
CLARITY UR: CLEAR
CO2 SERPL-SCNC: 27 MMOL/L (ref 23–31)
COLOR UR AUTO: NORMAL
CREAT SERPL-MCNC: 1.51 MG/DL (ref 0.72–1.25)
CREAT/UREA NIT SERPL: 13
EOSINOPHIL # BLD AUTO: 0.15 X10(3)/MCL (ref 0–0.9)
EOSINOPHIL NFR BLD AUTO: 1.9 %
ERYTHROCYTE [DISTWIDTH] IN BLOOD BY AUTOMATED COUNT: 13 % (ref 11.5–17)
GFR SERPLBLD CREATININE-BSD FMLA CKD-EPI: 51 ML/MIN/1.73/M2
GLUCOSE SERPL-MCNC: 108 MG/DL (ref 82–115)
GLUCOSE UR QL STRIP: NEGATIVE
HCT VFR BLD AUTO: 37.7 % (ref 42–52)
HGB BLD-MCNC: 12.8 G/DL (ref 14–18)
HGB UR QL STRIP: NEGATIVE
IMM GRANULOCYTES # BLD AUTO: 0.03 X10(3)/MCL (ref 0–0.04)
IMM GRANULOCYTES NFR BLD AUTO: 0.4 %
KETONES UR QL STRIP: NEGATIVE
LEUKOCYTE ESTERASE UR QL STRIP: NEGATIVE
LYMPHOCYTES # BLD AUTO: 1.43 X10(3)/MCL (ref 0.6–4.6)
LYMPHOCYTES NFR BLD AUTO: 18 %
MCH RBC QN AUTO: 33.2 PG (ref 27–31)
MCHC RBC AUTO-ENTMCNC: 34 G/DL (ref 33–36)
MCV RBC AUTO: 97.7 FL (ref 80–94)
MONOCYTES # BLD AUTO: 0.88 X10(3)/MCL (ref 0.1–1.3)
MONOCYTES NFR BLD AUTO: 11.1 %
NEUTROPHILS # BLD AUTO: 5.44 X10(3)/MCL (ref 2.1–9.2)
NEUTROPHILS NFR BLD AUTO: 68.3 %
NITRITE UR QL STRIP: NEGATIVE
PH UR STRIP: 6.5 [PH]
PLATELET # BLD AUTO: 192 X10(3)/MCL (ref 130–400)
PMV BLD AUTO: 9.7 FL (ref 7.4–10.4)
POTASSIUM SERPL-SCNC: 4.7 MMOL/L (ref 3.5–5.1)
PROT UR QL STRIP: NEGATIVE
RBC # BLD AUTO: 3.86 X10(6)/MCL (ref 4.7–6.1)
SODIUM SERPL-SCNC: 139 MMOL/L (ref 136–145)
SP GR UR STRIP.AUTO: 1.01 (ref 1–1.03)
UROBILINOGEN UR STRIP-ACNC: 0.2
WBC # BLD AUTO: 7.95 X10(3)/MCL (ref 4.5–11.5)

## 2025-06-25 PROCEDURE — 85025 COMPLETE CBC W/AUTO DIFF WBC: CPT | Performed by: EMERGENCY MEDICINE

## 2025-06-25 PROCEDURE — 80048 BASIC METABOLIC PNL TOTAL CA: CPT | Performed by: EMERGENCY MEDICINE

## 2025-06-25 PROCEDURE — 83880 ASSAY OF NATRIURETIC PEPTIDE: CPT | Performed by: EMERGENCY MEDICINE

## 2025-06-25 PROCEDURE — 81003 URINALYSIS AUTO W/O SCOPE: CPT | Performed by: EMERGENCY MEDICINE

## 2025-06-25 PROCEDURE — 99283 EMERGENCY DEPT VISIT LOW MDM: CPT

## 2025-06-25 NOTE — TELEPHONE ENCOUNTER
Pt states his BP is steady dropping, Pt BP is now 100/46. Pt was advised to keep his appointment scheduled for 07/03. And to also F/U with the ED or UC if BP is still dropping. Pt verbally understood

## 2025-06-25 NOTE — ED PROVIDER NOTES
Encounter Date: 6/25/2025       History     Chief Complaint   Patient presents with    Headache     Pt reports headache since this morning went to heart doctor this morning but didn't see him appointment for tomorrow morning . Pt reports weakness last night .      This 65-year-old man presents with complaints of a headache that started this morning.  In addition to this he states that his blood pressure was low at home in the 70s over 40s.  He states when he gets up to walk he gets weak.  Has a history of gastrointestinal bleeding but states that since he has been on Sandostatin injections it has stopped.  He denies any recent cough or cold fever or congestion.  Denies blood in his stool.  He has no dysuria.  Blood pressure on arrival here was 100/46.       Review of patient's allergies indicates:  No Known Allergies  Past Medical History:   Diagnosis Date    Alcoholic cirrhosis     Anemia     CHF (congestive heart failure)     LV EF 40%    COPD (chronic obstructive pulmonary disease)     Hyperlipidemia     Hypertension     Myocardial infarction     Peripheral artery disease     with stents    Sleep apnea      Past Surgical History:   Procedure Laterality Date    ANGIOGRAM, ABDOMINAL AORTA  10/24/2023    Procedure: Angiogram, Abdominal Aorta;  Surgeon: Janette Ernandez MD;  Location: [facility] CATH LAB;  Service: Cardiology;;    ARTERIOGRAPHY OF SUBCLAVIAN ARTERY Left 10/24/2023    Procedure: ARTERIOGRAM, SUBCLAVIAN;  Surgeon: Janette Ernandez MD;  Location: [facility] CATH LAB;  Service: Cardiology;  Laterality: Left;    CAPSULE ENDOSCOPY, ESOPHAGUS THROUGH ILEUM N/A 11/20/2023    Procedure: M2A;  Surgeon: Johan Flower MD;  Location: [facility] ENDOSCOPY;  Service: Gastroenterology;  Laterality: N/A;  To be done at any time today when staff is available.    CARDIAC DEFIBRILLATOR PLACEMENT N/A 11/28/2023    Procedure: Insertion, Single chamber ICD (SJM);  Surgeon: Suaypa Oakes MD;  Location: [facility] CATH LAB;   Service: Cardiology;  Laterality: N/A;    CATHETERIZATION OF BOTH LEFT AND RIGHT HEART N/A 10/24/2023    Procedure: CATHETERIZATION, HEART, BOTH LEFT AND RIGHT;  Surgeon: Janette Ernandez MD;  Location: [facility] CATH LAB;  Service: Cardiology;  Laterality: N/A;    COLONOSCOPY N/A 6/10/2023    Procedure: COLONOSCOPY;  Surgeon: Johan Flower MD;  Location: [facility] OR;  Service: Gastroenterology;  Laterality: N/A;    EGD, WITH CLOSED BIOPSY N/A 12/18/2023    Procedure: EGD;  Surgeon: Johan Flower MD;  Location: [facility] ENDOSCOPY;  Service: Gastroenterology;  Laterality: N/A;    ESOPHAGOGASTRODUODENOSCOPY N/A 6/9/2023    Procedure: EGD;  Surgeon: Tima Teixeira MD;  Location: [facility] ENDOSCOPY;  Service: Gastroenterology;  Laterality: N/A;    ESOPHAGOGASTRODUODENOSCOPY N/A 1/11/2024    Procedure: EGD (ESOPHAGOGASTRODUODENOSCOPY);  Surgeon: Chaya Castellon MD;  Location: [facility];  Service: Endoscopy;  Laterality: N/A;  POST OP: MILD GASTRITIS W/ NO ULCER    ESOPHAGOGASTRODUODENOSCOPY N/A 4/24/2024    Procedure: EGD W/ M2A PLACEMENT;  Surgeon: Johan Flower MD;  Location: [facility] ENDOSCOPY;  Service: Gastroenterology;  Laterality: N/A;    INSERTION OF INTRA-AORTIC BALLOON ASSIST DEVICE  10/24/2023    Procedure: INSERTION, INTRA-AORTIC BALLOON PUMP;  Surgeon: Janette Ernandez MD;  Location: [facility] CATH LAB;  Service: Cardiology;;    INSERTION OF INTRAVASCULAR MICROAXIAL BLOOD PUMP N/A 10/26/2023    Procedure: INSERTION, IMPELLA;  Surgeon: Janette Ernandez MD;  Location: [facility] CATH LAB;  Service: Cardiology;  Laterality: N/A;    INSTANTANEOUS WAVE-FREE RATIO (IFR) N/A 10/26/2023    Procedure: Instantaneous Wave-Free Ratio (IFR);  Surgeon: Janette Ernandez MD;  Location: [facility] CATH LAB;  Service: Cardiology;  Laterality: N/A;    INTRALUMINAL GASTROINTESTINAL TRACT IMAGING VIA CAPSULE N/A 4/24/2024    Procedure: M2A;  Surgeon: Johan Flower MD;  Location: [facility] ENDOSCOPY;  Service:  Gastroenterology;  Laterality: N/A;    INTRAVASCULAR ULTRASOUND, CORONARY N/A 10/26/2023    Procedure: Ultrasound-coronary;  Surgeon: Janette Ernandez MD;  Location: [facility] CATH LAB;  Service: Cardiology;  Laterality: N/A;    PERCUTANEOUS CORONARY INTERVENTION, ARTERY N/A 10/26/2023    Procedure: Percutaneous coronary intervention- with Impella;  Surgeon: Janette Ernandez MD;  Location: [facility] CATH LAB;  Service: Cardiology;  Laterality: N/A;    PLACEMENT, IABP  10/24/2023    Procedure: Placement, IABP;  Surgeon: Janette Ernandez MD;  Location: [facility] CATH LAB;  Service: Cardiology;;     No family history on file.  Social History[1]  Review of Systems   Constitutional:  Negative for fever.   HENT:  Negative for sore throat.    Respiratory:  Negative for shortness of breath.    Cardiovascular:  Negative for chest pain.   Gastrointestinal:  Negative for nausea.   Genitourinary:  Negative for dysuria.   Musculoskeletal:  Negative for back pain.   Skin:  Negative for rash.   Neurological:  Positive for weakness and headaches.   Hematological:  Does not bruise/bleed easily.       Physical Exam     Initial Vitals [06/25/25 1219]   BP Pulse Resp Temp SpO2   (!) 100/46 (!) 57 18 98.4 °F (36.9 °C) (!) 91 %      MAP       --         Physical Exam    Nursing note and vitals reviewed.  Constitutional: He appears well-developed and well-nourished.   HENT:   Head: Normocephalic and atraumatic. Mouth/Throat: Mucous membranes are normal.   Eyes: EOM are normal. Pupils are equal, round, and reactive to light.   Neck: Neck supple.   Normal range of motion.  Cardiovascular:  Normal rate, regular rhythm, normal heart sounds and intact distal pulses.           Pulmonary/Chest: Breath sounds normal.   Abdominal: Abdomen is soft. Bowel sounds are normal.   Musculoskeletal:         General: Normal range of motion.      Cervical back: Normal range of motion and neck supple.     Neurological: He is alert and oriented to person, place, and time. He  has normal strength.   Skin: Skin is warm and dry. Capillary refill takes less than 2 seconds.   Psychiatric: He has a normal mood and affect. His behavior is normal. Judgment and thought content normal.         ED Course   Procedures  Labs Reviewed   BASIC METABOLIC PANEL - Abnormal       Result Value    Sodium 139      Potassium 4.7      Chloride 104      CO2 27      Glucose 108      Blood Urea Nitrogen 19.0      Creatinine 1.51 (*)     BUN/Creatinine Ratio 13      Calcium 8.4 (*)     Anion Gap 8.0      eGFR 51     B-TYPE NATRIURETIC PEPTIDE - Abnormal    Natriuretic Peptide 213.2 (*)    CBC WITH DIFFERENTIAL - Abnormal    WBC 7.95      RBC 3.86 (*)     Hgb 12.8 (*)     Hct 37.7 (*)     MCV 97.7 (*)     MCH 33.2 (*)     MCHC 34.0      RDW 13.0      Platelet 192      MPV 9.7      Neut % 68.3      Lymph % 18.0      Mono % 11.1      Eos % 1.9      Basophil % 0.3      Imm Grans % 0.4      Neut # 5.44      Lymph # 1.43      Mono # 0.88      Eos # 0.15      Baso # 0.02      Imm Gran # 0.03     URINALYSIS - Normal    Color, UA Straw      Appearance, UA Clear      Specific Gravity, UA 1.010      pH, UA 6.5      Protein, UA Negative      Glucose, UA Negative      Ketones, UA Negative      Blood, UA Negative      Bilirubin, UA Negative      Urobilinogen, UA 0.2      Nitrites, UA Negative      Leukocyte Esterase, UA Negative     CBC W/ AUTO DIFFERENTIAL    Narrative:     The following orders were created for panel order CBC auto differential.  Procedure                               Abnormality         Status                     ---------                               -----------         ------                     CBC with Differential[[id number]]       Abnormal            Final result                 Please view results for these tests on the individual orders.          Imaging Results    None          Medications - No data to display  Medical Decision Making  Amount and/or Complexity of Data Reviewed  Labs: ordered.        "                               Clinical Impression:  Final diagnoses:  [R51.9] Nonintractable headache, unspecified chronicity pattern, unspecified headache type (Primary)          ED Disposition Condition    Discharge Stable          ED Prescriptions    None       Follow-up Information       Follow up With Specialties Details Why Contact Info    Keep your appointment tomorrow with your cardiologist as planned.                       [1]   Social History  Tobacco Use    Smoking status: Every Day     Current packs/day: 0.25     Average packs/day: 0.3 packs/day for 51.5 years (12.9 ttl pk-yrs)     Types: Cigarettes     Start date: 1/10/1974    Smokeless tobacco: Former     Types: Chew   Substance Use Topics    Alcohol use: Yes     Comment: currently not drinking per pt. 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 Quit drinking in 10/2023. - drinks a beer "once in a while"    Drug use: Not Currently        BlairChavez chandra MD  06/25/25 7507    "

## 2025-07-01 ENCOUNTER — LAB VISIT (OUTPATIENT)
Dept: LAB | Facility: HOSPITAL | Age: 65
End: 2025-07-01
Attending: NURSE PRACTITIONER
Payer: MEDICARE

## 2025-07-01 DIAGNOSIS — N18.30 STAGE 3 CHRONIC KIDNEY DISEASE, UNSPECIFIED WHETHER STAGE 3A OR 3B CKD: ICD-10-CM

## 2025-07-01 DIAGNOSIS — I50.9 CONGESTIVE HEART FAILURE, UNSPECIFIED HF CHRONICITY, UNSPECIFIED HEART FAILURE TYPE: ICD-10-CM

## 2025-07-01 DIAGNOSIS — Z87.19 H/O: GI BLEED: ICD-10-CM

## 2025-07-01 DIAGNOSIS — D47.2 MGUS (MONOCLONAL GAMMOPATHY OF UNKNOWN SIGNIFICANCE): ICD-10-CM

## 2025-07-01 DIAGNOSIS — D64.9 ANEMIA, UNSPECIFIED TYPE: ICD-10-CM

## 2025-07-01 LAB
ALBUMIN SERPL-MCNC: 3.5 G/DL (ref 3.4–4.8)
ALBUMIN/GLOB SERPL: 0.7 RATIO (ref 1.1–2)
ALP SERPL-CCNC: 74 UNIT/L (ref 40–150)
ALT SERPL-CCNC: 56 UNIT/L (ref 0–55)
ANION GAP SERPL CALC-SCNC: 2 MEQ/L
AST SERPL-CCNC: 57 UNIT/L (ref 11–45)
BACTERIA #/AREA URNS AUTO: NORMAL /HPF
BASOPHILS # BLD AUTO: 0.02 X10(3)/MCL
BASOPHILS NFR BLD AUTO: 0.2 %
BILIRUB SERPL-MCNC: 0.4 MG/DL
BILIRUB UR QL STRIP.AUTO: NEGATIVE
BUN SERPL-MCNC: 19.5 MG/DL (ref 8.4–25.7)
CALCIUM SERPL-MCNC: 8.5 MG/DL (ref 8.8–10)
CHLORIDE SERPL-SCNC: 107 MMOL/L (ref 98–107)
CLARITY UR: CLEAR
CO2 SERPL-SCNC: 33 MMOL/L (ref 23–31)
COLOR UR AUTO: ABNORMAL
CREAT SERPL-MCNC: 1.26 MG/DL (ref 0.72–1.25)
CREAT UR-MCNC: 32.2 MG/DL (ref 63–166)
CREAT/UREA NIT SERPL: 15
EOSINOPHIL # BLD AUTO: 0.15 X10(3)/MCL (ref 0–0.9)
EOSINOPHIL NFR BLD AUTO: 1.8 %
ERYTHROCYTE [DISTWIDTH] IN BLOOD BY AUTOMATED COUNT: 13.1 % (ref 11.5–17)
GFR SERPLBLD CREATININE-BSD FMLA CKD-EPI: >60 ML/MIN/1.73/M2
GLOBULIN SER-MCNC: 5.1 GM/DL (ref 2.4–3.5)
GLUCOSE SERPL-MCNC: 103 MG/DL (ref 82–115)
GLUCOSE UR QL STRIP: NEGATIVE
HCT VFR BLD AUTO: 37.7 % (ref 42–52)
HGB BLD-MCNC: 12.6 G/DL (ref 14–18)
HGB UR QL STRIP: ABNORMAL
IMM GRANULOCYTES # BLD AUTO: 0.01 X10(3)/MCL (ref 0–0.04)
IMM GRANULOCYTES NFR BLD AUTO: 0.1 %
KETONES UR QL STRIP: NEGATIVE
LEUKOCYTE ESTERASE UR QL STRIP: NEGATIVE
LYMPHOCYTES # BLD AUTO: 1.58 X10(3)/MCL (ref 0.6–4.6)
LYMPHOCYTES NFR BLD AUTO: 18.5 %
MCH RBC QN AUTO: 33.2 PG (ref 27–31)
MCHC RBC AUTO-ENTMCNC: 33.4 G/DL (ref 33–36)
MCV RBC AUTO: 99.2 FL (ref 80–94)
MONOCYTES # BLD AUTO: 0.88 X10(3)/MCL (ref 0.1–1.3)
MONOCYTES NFR BLD AUTO: 10.3 %
NEUTROPHILS # BLD AUTO: 5.89 X10(3)/MCL (ref 2.1–9.2)
NEUTROPHILS NFR BLD AUTO: 69.1 %
NITRITE UR QL STRIP: NEGATIVE
PH UR STRIP: 6 [PH]
PLATELET # BLD AUTO: 216 X10(3)/MCL (ref 130–400)
PMV BLD AUTO: 10 FL (ref 7.4–10.4)
POTASSIUM SERPL-SCNC: 4.9 MMOL/L (ref 3.5–5.1)
PROT SERPL-MCNC: 8.6 GM/DL (ref 5.8–7.6)
PROT UR QL STRIP: NEGATIVE
PROT UR STRIP-MCNC: <6.8 MG/DL
RBC # BLD AUTO: 3.8 X10(6)/MCL (ref 4.7–6.1)
RBC #/AREA URNS AUTO: NORMAL /HPF
SODIUM SERPL-SCNC: 142 MMOL/L (ref 136–145)
SP GR UR STRIP.AUTO: 1.01 (ref 1–1.03)
SQUAMOUS #/AREA URNS AUTO: NORMAL /HPF
UROBILINOGEN UR STRIP-ACNC: 0.2
WBC # BLD AUTO: 8.53 X10(3)/MCL (ref 4.5–11.5)
WBC #/AREA URNS AUTO: NORMAL /HPF

## 2025-07-01 PROCEDURE — 85025 COMPLETE CBC W/AUTO DIFF WBC: CPT

## 2025-07-01 PROCEDURE — 81003 URINALYSIS AUTO W/O SCOPE: CPT

## 2025-07-01 PROCEDURE — 80053 COMPREHEN METABOLIC PANEL: CPT

## 2025-07-01 PROCEDURE — 82570 ASSAY OF URINE CREATININE: CPT

## 2025-07-01 PROCEDURE — 83970 ASSAY OF PARATHORMONE: CPT

## 2025-07-01 PROCEDURE — 36415 COLL VENOUS BLD VENIPUNCTURE: CPT

## 2025-07-02 LAB — PTH-INTACT SERPL-MCNC: 300.8 PG/ML (ref 8.7–77)

## 2025-07-07 ENCOUNTER — OFFICE VISIT (OUTPATIENT)
Dept: NEPHROLOGY | Facility: CLINIC | Age: 65
End: 2025-07-07
Payer: MEDICARE

## 2025-07-07 VITALS
RESPIRATION RATE: 18 BRPM | BODY MASS INDEX: 25.48 KG/M2 | DIASTOLIC BLOOD PRESSURE: 76 MMHG | WEIGHT: 172 LBS | TEMPERATURE: 98 F | SYSTOLIC BLOOD PRESSURE: 146 MMHG | HEART RATE: 57 BPM | HEIGHT: 69 IN | OXYGEN SATURATION: 94 %

## 2025-07-07 DIAGNOSIS — I10 PRIMARY HYPERTENSION: ICD-10-CM

## 2025-07-07 DIAGNOSIS — N18.31 STAGE 3A CHRONIC KIDNEY DISEASE: Primary | ICD-10-CM

## 2025-07-07 DIAGNOSIS — I50.9 CONGESTIVE HEART FAILURE, UNSPECIFIED HF CHRONICITY, UNSPECIFIED HEART FAILURE TYPE: ICD-10-CM

## 2025-07-07 DIAGNOSIS — D47.2 MGUS (MONOCLONAL GAMMOPATHY OF UNKNOWN SIGNIFICANCE): ICD-10-CM

## 2025-07-07 PROCEDURE — 99999 PR PBB SHADOW E&M-EST. PATIENT-LVL IV: CPT | Mod: PBBFAC,,,

## 2025-07-07 PROCEDURE — 99214 OFFICE O/P EST MOD 30 MIN: CPT | Mod: S$GLB,,,

## 2025-07-07 PROCEDURE — 3078F DIAST BP <80 MM HG: CPT | Mod: CPTII,S$GLB,,

## 2025-07-07 PROCEDURE — 3044F HG A1C LEVEL LT 7.0%: CPT | Mod: CPTII,S$GLB,,

## 2025-07-07 PROCEDURE — 1159F MED LIST DOCD IN RCRD: CPT | Mod: CPTII,S$GLB,,

## 2025-07-07 PROCEDURE — 3066F NEPHROPATHY DOC TX: CPT | Mod: CPTII,S$GLB,,

## 2025-07-07 PROCEDURE — 3077F SYST BP >= 140 MM HG: CPT | Mod: CPTII,S$GLB,,

## 2025-07-07 PROCEDURE — 4010F ACE/ARB THERAPY RXD/TAKEN: CPT | Mod: CPTII,S$GLB,,

## 2025-07-07 PROCEDURE — 3008F BODY MASS INDEX DOCD: CPT | Mod: CPTII,S$GLB,,

## 2025-07-07 PROCEDURE — 1101F PT FALLS ASSESS-DOCD LE1/YR: CPT | Mod: CPTII,S$GLB,,

## 2025-07-07 PROCEDURE — 3288F FALL RISK ASSESSMENT DOCD: CPT | Mod: CPTII,S$GLB,,

## 2025-07-07 NOTE — PROGRESS NOTES
[facility] [place] [facility] New Referral Office Note    HPI  Hemal Tolbert Jr., 65 y.o. male, presents to office as a new patient for cardiorenal disease.  Patient with no acute complaints.  Patient reports recent visit with cardiologist were multiple medications were changed.  See below.  Patient does admit to drinking beer.    Patient denies taking NSAIDs or new antibiotics. Also denies recent episode of dehydration, diarrhea, vomiting, acute illness, hospitalization, recent angiograms or exposure to IV radiocontrast.        Medical Diagnoses:   Past Medical History:   Diagnosis Date    Alcoholic cirrhosis     Anemia     CHF (congestive heart failure)     LV EF 40%    COPD (chronic obstructive pulmonary disease)     Hyperlipidemia     Hypertension     Myocardial infarction     Peripheral artery disease     with stents    Sleep apnea      Problem List[1]    Surgical History:   Past Surgical History:   Procedure Laterality Date    ANGIOGRAM, ABDOMINAL AORTA  10/24/2023    Procedure: Angiogram, Abdominal Aorta;  Surgeon: Janette Ernandez MD;  Location: [facility] CATH LAB;  Service: Cardiology;;    ARTERIOGRAPHY OF SUBCLAVIAN ARTERY Left 10/24/2023    Procedure: ARTERIOGRAM, SUBCLAVIAN;  Surgeon: Janette Ernandez MD;  Location: [facility] CATH LAB;  Service: Cardiology;  Laterality: Left;    CAPSULE ENDOSCOPY, ESOPHAGUS THROUGH ILEUM N/A 11/20/2023    Procedure: M2A;  Surgeon: Johan Flower MD;  Location: [facility] ENDOSCOPY;  Service: Gastroenterology;  Laterality: N/A;  To be done at any time today when staff is available.    CARDIAC DEFIBRILLATOR PLACEMENT N/A 11/28/2023    Procedure: Insertion, Single chamber ICD (SJM);  Surgeon: Suyapa Oakes MD;  Location: [facility] CATH LAB;  Service: Cardiology;  Laterality: N/A;    CATHETERIZATION OF BOTH LEFT AND RIGHT HEART N/A 10/24/2023    Procedure: CATHETERIZATION, HEART, BOTH LEFT AND RIGHT;  Surgeon: Janette Ernandez MD;  Location: [facility] CATH LAB;  Service: Cardiology;  Laterality:  N/A;    COLONOSCOPY N/A 6/10/2023    Procedure: COLONOSCOPY;  Surgeon: Johan Flower MD;  Location: [facility];  Service: Gastroenterology;  Laterality: N/A;    EGD, WITH CLOSED BIOPSY N/A 12/18/2023    Procedure: EGD;  Surgeon: Johan Flower MD;  Location: [facility] ENDOSCOPY;  Service: Gastroenterology;  Laterality: N/A;    ESOPHAGOGASTRODUODENOSCOPY N/A 6/9/2023    Procedure: EGD;  Surgeon: Tima Teixeira MD;  Location: [facility] ENDOSCOPY;  Service: Gastroenterology;  Laterality: N/A;    ESOPHAGOGASTRODUODENOSCOPY N/A 1/11/2024    Procedure: EGD (ESOPHAGOGASTRODUODENOSCOPY);  Surgeon: Chaya Castellon MD;  Location: [facility];  Service: Endoscopy;  Laterality: N/A;  POST OP: MILD GASTRITIS W/ NO ULCER    ESOPHAGOGASTRODUODENOSCOPY N/A 4/24/2024    Procedure: EGD W/ M2A PLACEMENT;  Surgeon: Johan Flower MD;  Location: [facility] ENDOSCOPY;  Service: Gastroenterology;  Laterality: N/A;    INSERTION OF INTRA-AORTIC BALLOON ASSIST DEVICE  10/24/2023    Procedure: INSERTION, INTRA-AORTIC BALLOON PUMP;  Surgeon: Janette Ernandez MD;  Location: [facility] CATH LAB;  Service: Cardiology;;    INSERTION OF INTRAVASCULAR MICROAXIAL BLOOD PUMP N/A 10/26/2023    Procedure: INSERTION, IMPELLA;  Surgeon: Janette Ernandez MD;  Location: [facility] CATH LAB;  Service: Cardiology;  Laterality: N/A;    INSTANTANEOUS WAVE-FREE RATIO (IFR) N/A 10/26/2023    Procedure: Instantaneous Wave-Free Ratio (IFR);  Surgeon: Janette Ernandez MD;  Location: [facility] CATH LAB;  Service: Cardiology;  Laterality: N/A;    INTRALUMINAL GASTROINTESTINAL TRACT IMAGING VIA CAPSULE N/A 4/24/2024    Procedure: M2A;  Surgeon: Johan Flower MD;  Location: [facility] ENDOSCOPY;  Service: Gastroenterology;  Laterality: N/A;    INTRAVASCULAR ULTRASOUND, CORONARY N/A 10/26/2023    Procedure: Ultrasound-coronary;  Surgeon: Janette Ernandez MD;  Location: [facility] CATH LAB;  Service: Cardiology;  Laterality: N/A;    PERCUTANEOUS CORONARY  "INTERVENTION, ARTERY N/A 10/26/2023    Procedure: Percutaneous coronary intervention- with Impella;  Surgeon: Janette Ernandez MD;  Location: [facility] CATH LAB;  Service: Cardiology;  Laterality: N/A;    PLACEMENT, IABP  10/24/2023    Procedure: Placement, IABP;  Surgeon: Janette Ernandez MD;  Location: [facility] CATH LAB;  Service: Cardiology;;       Family History:   No family history on file.    Social History:   Social History     Tobacco Use    Smoking status: Every Day     Current packs/day: 0.25     Average packs/day: 0.3 packs/day for 51.5 years (12.9 ttl pk-yrs)     Types: Cigarettes     Start date: 1/10/1974    Smokeless tobacco: Former     Types: Chew   Substance Use Topics    Alcohol use: Yes     Comment: currently not drinking per pt. 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 Quit drinking in 10/2023. - drinks a beer "once in a while"       Allergies:  Review of patient's allergies indicates:  No Known Allergies    Medications:  Outpatient Medications Marked as Taking for the 7/7/25 encounter (Office Visit) with Joel Connell FNP   Medication Sig Dispense Refill    albuterol (PROVENTIL/VENTOLIN HFA) 90 mcg/actuation inhaler INHALE 2 PUFFS INTO THE LUNGS EVERY FOUR (4) HOURS AS NEEDED FOR WHEEZING 18 g 6    albuterol-ipratropium (DUO-NEB) 2.5 mg-0.5 mg/3 mL nebulizer solution Take 3 mLs by nebulization every 6 (six) hours as needed for Shortness of Breath or Wheezing. 75 mL 2    amiodarone (PACERONE) 200 MG Tab Take 200 mg by mouth once daily.      atorvastatin (LIPITOR) 80 MG tablet Take 1 tablet by mouth once daily.      calcitRIOL (ROCALTROL) 0.25 MCG Cap Take 1 capsule (0.25 mcg total) by mouth every Mon, Wed, Fri. 36 capsule 3    ferrous sulfate 325 (65 FE) MG EC tablet Take 1 tablet (325 mg total) by mouth 2 (two) times daily. 60 tablet 1    folic acid (FOLVITE) 1 MG tablet Take 1 tablet (1 mg total) by mouth once daily. 90 tablet 1    furosemide (LASIX) 40 MG tablet Take 40 mg by mouth once daily.      gabapentin " "(NEURONTIN) 300 MG capsule TAKE ONE CAPSULE BY MOUTH (3) TIMES DAILY 90 capsule 2    metoprolol succinate (TOPROL-XL) 25 MG 24 hr tablet 25 mg.      pantoprazole (PROTONIX) 40 MG tablet TAKE ONE (1) TABLET BY MOUTH TWICE DAILY 60 tablet 5    SANDOSTATIN LAR DEPOT 20 mg SSRR injection Inject into the muscle every 30 days.      STIOLTO RESPIMAT 2.5-2.5 mcg/actuation Mist INHALE 2 PUFFS BY MOUTH ONCE DAILY **CALL AHEAD, SPECIALLY ORDERED FOR YOU** 4 g 4    tamsulosin (FLOMAX) 0.4 mg Cap TAKE 1 CAPSULE BY MOUTH ONCE DAILY. 30 capsule 5         Review of Systems:    Constitutional: Denies fever, fatigue, generalized weakness  Skin: Denies wounds, no rashes, no itching, no new skin lesions  Respiratory:  Denies cough, shortness of breath, or wheezing  Cardiovascular: Denies chest pain, palpitations, or swelling  Gastrointestional: Denies abdominal pain, nausea, vomiting, diarrhea, or constipation  Genitourinary: Denies dysuria, hematuria, foamy urine, or incontinence; reports able to empty bladder  Musculoskeletal: Denies back or flank pain  Neurological: Denies headaches, dizziness, paresthesias, tremors or focal weakness      Vital Signs:  BP (!) 146/76 (BP Location: Right arm, Patient Position: Sitting) Comment (BP Location): perf. arm  Pulse (!) 57   Temp 98.1 °F (36.7 °C) (Oral)   Resp 18   Ht 5' 9" (1.753 m)   Wt 78 kg (172 lb)   SpO2 (!) 94%   BMI 25.40 kg/m²   Body mass index is 25.4 kg/m².      Physical Exam:    General: no acute distress, awake, alert  Eyes: PERRLA, conjunctiva clear, eyelids without swelling  HENT: atraumatic, oropharynx and nasal mucosa patent  Neck: supple, trache midline, full ROM, no JVD  Respiratory: equal, unlabored, clear to auscultation A/P  Cardiovascular: RRR without rub; radial and pedal pulses +2 BL  Edema:  Mild bilateral ankle  Gastrointestinal: soft, non-tender, non-distended  Musculoskeletal: ROM without new limitation or discomfort  Integumentary: warm, dry; no rashes, " wounds, or skin lesions  Neurological: oriented x4, appropriate, no acute deficits; no asterixis      Labs:        Component Value Date/Time     07/01/2025 1603     06/25/2025 1325     (L) 11/02/2023 0843     10/30/2023 0501    K 4.9 07/01/2025 1603    K 4.7 06/25/2025 1325    K 4.8 11/02/2023 0843    K 4.2 10/30/2023 0501     07/01/2025 1603     06/25/2025 1325     11/02/2023 0843     10/30/2023 0501    CO2 33 (H) 07/01/2025 1603    CO2 27 06/25/2025 1325    CO2 17 (L) 11/02/2023 0843    CO2 21 (L) 10/30/2023 0501    BUN 19.5 07/01/2025 1603    BUN 19.0 06/25/2025 1325    BUN 28 (H) 11/02/2023 0843    BUN 37 (H) 10/30/2023 0501    CREATININE 1.26 (H) 07/01/2025 1603    CREATININE 1.51 (H) 06/25/2025 1325    CREATININE 1.55 (H) 06/03/2025 1350    CREATININE 1.76 (H) 05/31/2025 1247    CREATININE 1.1 11/02/2023 0843    CREATININE 1.1 10/30/2023 0501    CREATININE 1.1 10/29/2023 0516    CREATININE 1.4 10/28/2023 0502    CALCIUM 8.5 (L) 07/01/2025 1603    CALCIUM 8.4 (L) 06/25/2025 1325    CALCIUM 7.8 (L) 11/02/2023 0843    CALCIUM 7.5 (L) 10/30/2023 0501    PHOS 3.6 11/19/2023 1259    PHOS 3.9 11/06/2023 0359    PHOS 2.7 10/30/2023 0501    PHOS 2.5 (L) 10/29/2023 0516    .8 (H) 07/01/2025 1603    .9 (H) 05/06/2025 0852           Component Value Date/Time    WBC 8.53 07/01/2025 1603    WBC 7.95 06/25/2025 1325    WBC 12.11 11/02/2023 0843    WBC 14.97 (H) 10/30/2023 0501    HGB 12.6 (L) 07/01/2025 1603    HGB 12.8 (L) 06/25/2025 1325    HGB 9.2 (L) 11/02/2023 0843    HGB 9.2 (L) 10/30/2023 0501    HCT 37.7 (L) 07/01/2025 1603    HCT 37.7 (L) 06/25/2025 1325    HCT 28.7 (L) 11/02/2023 0843    HCT 29.1 (L) 10/30/2023 0501     07/01/2025 1603     06/25/2025 1325     11/02/2023 0843     10/30/2023 0501         Imaging:  Retroperitoneal US:     Impression:     Cyst of the right kidney.     No other abnormalities        Electronically  signed by: Antonio Jerez  Date:                                            06/12/2025    Impression:    1. Stage 3a chronic kidney disease    2. Congestive heart failure, unspecified HF chronicity, unspecified heart failure type    3. MGUS (monoclonal gammopathy of unknown significance)    4. Primary hypertension      CKD3 due to ischemic nepphrosclerosis and likely cardiorenal component; renal function will fluctuate with cardiac output and diuresis.  Renal function on better end of baseline however patient reports Entresto was discontinued as well as decrease in Lasix.    Fluid status stable    He reports he is now taking 40 mg of Lasix daily instead of 80 mg daily.  He also reports cardiologist stopped his Entresto    He also tells me at some point someone started him on Jardiance and then his cardiologist stopped it due to risk of yeast infection..  I am unable to find any records of this medication being prescribed other than a note from an MA that someone was recommending it however patient does not know who this person is.    BP stable    Hyponatremia resolved.     HPTH with hypocalcemia; patient was supposed to start calcitriol last visit.        Plan:  Patient to start taking calcitriol as previously prescribed 0.25 mcg MWF; he thought his cardiologist told him to stop taking it    We need to request the last note from his cardiologist Dr. Lira  Patient has close follow up with cardiologist.  Reports repeat echo with improvement    Renal cyst.  May repeat in 1-2 years    Low sodium diet.   Home bp monitor  Daily weights     Follow up in 6 months    Patient to call our office with any concerns prior to next appointment.    Avoid NSAIDs (Aleve, Mobic, Celebrex, Ibuprofen, Advil, Toradol and Diclofenac).  Only take tylenol occasionally if needed for aches/pains.    Educated on low sodium diet:  Avoid high salt foods (olives, pickles, smoked meats, deli meats, salted potato chips, etc.).   Do not add  salt to your food at the table.   Use only small amounts of salt when cooking.    Joel ARNDT        This note was created with the assistance of [employer] voice recognition software or phone dictation. There may be transcription errors as a result of using this technology however minimal. Effort has been made to assure accuracy of transcription but any obvious errors or omissions should be clarified with the author of the document.           [1]   Patient Active Problem List  Diagnosis    Alcoholic cirrhosis of liver    Anemia    CHF (congestive heart failure)    Elevated troponin    VT (ventricular tachycardia)    Acute on chronic combined systolic and diastolic heart failure    Tobacco dependence    Primary hypertension    COPD (chronic obstructive pulmonary disease)    Alcohol abuse    Cardiogenic shock    Abdominal distention    Coronary artery disease involving native coronary artery of native heart with unstable angina pectoris    Shortness of breath    Anemia in other chronic diseases classified elsewhere    AVM (arteriovenous malformation) of small bowel, acquired    Acute blood loss anemia    Iron deficiency anemia    H/O: GI bleed    Elevated serum immunoglobulin free light chain level    MGUS (monoclonal gammopathy of unknown significance)    History of alcoholism    Chronic kidney disease (CKD), stage IV (severe)    Stage 3a chronic kidney disease

## 2025-07-07 NOTE — PATIENT INSTRUCTIONS
When you get home please make sure that you have calcitriol 0.25 mcg and start taking it every Monday Wednesday and Friday    Continue close follow up with Cardiology    Blood pressure goal: consistently less than 130/85    Avoid NSAIDs and COX2 inhibitors: Advil (ibuprofen), Aleve (naproxen), Mobic (meloxicam), Celebrex (celecoxib), Toradol (ketorolac) and Diclofenac (voltaren), Indomethacin (indocin).    Only take tylenol (acetaminophen) occasionally if needed for aches/pains.    Stay well hydrated with water: goal is around 64 ounces per day of pure water    Recommend low sodium diet:  Less than 2,000 mg per day  Avoid high salt foods (olives, pickles, smoked meats, deli meats, chips, fast food, etc.).   Do not add salt to your food at the table.   Use only small amounts of salt when cooking.      Avoid alcohol, soda, and energy drinks. Limit tea and coffee.       Call our office with concerns prior to next appointment.    ---------------------------------------------------------------------------------------------------------------------------------    CHRONIC KIDNEY DISEASE BASIC INFORMATION:    Your kidneys do many important jobs. Some of the ways they keep your whole body in balance include:  Removing natural waste products and extra water from your body  Helping make red blood cells  Balancing important minerals in your body  Helping maintain your blood pressure  Keeping your bones healthy    Chronic kidney disease (CKD) is when the kidneys have become damaged over time (for at least 3 months) and have a hard time doing all their important jobs. CKD also increases the risk of other health problems like heart disease and stroke. Developing CKD is usually a very slow process with very few symptoms at first.    Risk Factors  Anyone can develop CKD - at any age. However, some people are at a higher risk than others. The most common CKD risk factors are:  Diabetes  High blood pressure (hypertension)  Heart  disease and/or heart failure  Obesity  Over the age of 60  Family history of CKD or kidney failure  Personal history of acute kidney injury (KARY)  Smoking and/or use of tobacco products  Use of NSAIDs    For many people, CKD is not caused by just one reason. Instead, it is a result of many physical, environmental, and social factors.      For more education on kidney disease you can visit:  https://www.kidney.org/kidney-basics    https://www.kidney.org/kidney-topics/understanding-your-lab-values-and-other-ckd-health-numbers

## 2025-07-16 PROBLEM — B18.2 CHRONIC HEPATITIS C WITHOUT HEPATIC COMA: Status: ACTIVE | Noted: 2025-07-16

## 2025-08-05 ENCOUNTER — LAB VISIT (OUTPATIENT)
Dept: LAB | Facility: HOSPITAL | Age: 65
End: 2025-08-05
Attending: NURSE PRACTITIONER
Payer: MEDICARE

## 2025-08-05 ENCOUNTER — RESULTS FOLLOW-UP (OUTPATIENT)
Dept: INFECTIOUS DISEASES | Facility: CLINIC | Age: 65
End: 2025-08-05
Payer: MEDICARE

## 2025-08-05 ENCOUNTER — TELEPHONE (OUTPATIENT)
Dept: INFECTIOUS DISEASES | Facility: CLINIC | Age: 65
End: 2025-08-05
Payer: MEDICARE

## 2025-08-05 DIAGNOSIS — B18.2 CHRONIC HEPATITIS C WITHOUT HEPATIC COMA: ICD-10-CM

## 2025-08-05 DIAGNOSIS — E87.5 HYPERKALEMIA: Primary | ICD-10-CM

## 2025-08-05 LAB
ALBUMIN SERPL-MCNC: 3.4 G/DL (ref 3.4–4.8)
ALBUMIN/GLOB SERPL: 0.7 RATIO (ref 1.1–2)
ALP SERPL-CCNC: 75 UNIT/L (ref 40–150)
ALT SERPL-CCNC: 49 UNIT/L (ref 0–55)
ANION GAP SERPL CALC-SCNC: 5 MEQ/L
AST SERPL-CCNC: 58 UNIT/L (ref 11–45)
BASOPHILS # BLD AUTO: 0.03 X10(3)/MCL
BASOPHILS NFR BLD AUTO: 0.4 %
BILIRUB SERPL-MCNC: 0.7 MG/DL
BUN SERPL-MCNC: 19 MG/DL (ref 8.4–25.7)
CALCIUM SERPL-MCNC: 8.7 MG/DL (ref 8.8–10)
CHLORIDE SERPL-SCNC: 103 MMOL/L (ref 98–107)
CO2 SERPL-SCNC: 30 MMOL/L (ref 23–31)
CREAT SERPL-MCNC: 1.19 MG/DL (ref 0.72–1.25)
CREAT/UREA NIT SERPL: 16
EOSINOPHIL # BLD AUTO: 0.12 X10(3)/MCL (ref 0–0.9)
EOSINOPHIL NFR BLD AUTO: 1.7 %
ERYTHROCYTE [DISTWIDTH] IN BLOOD BY AUTOMATED COUNT: 13.3 % (ref 11.5–17)
FERRITIN SERPL-MCNC: 546.7 NG/ML (ref 21.81–274.66)
GFR SERPLBLD CREATININE-BSD FMLA CKD-EPI: >60 ML/MIN/1.73/M2
GLOBULIN SER-MCNC: 5.1 GM/DL (ref 2.4–3.5)
GLUCOSE SERPL-MCNC: 106 MG/DL (ref 82–115)
HAV AB SER QL IA: REACTIVE
HAV IGM SERPL QL IA: NONREACTIVE
HBV CORE AB SERPL QL IA: REACTIVE
HBV SURFACE AB SER-ACNC: 51.22 MIU/ML
HBV SURFACE AB SERPL IA-ACNC: REACTIVE M[IU]/ML
HBV SURFACE AG SERPL QL IA: NONREACTIVE
HCT VFR BLD AUTO: 40 % (ref 42–52)
HGB BLD-MCNC: 13 G/DL (ref 14–18)
HIV 1+2 AB+HIV1 P24 AG SERPL QL IA: NONREACTIVE
IMM GRANULOCYTES # BLD AUTO: 0.01 X10(3)/MCL (ref 0–0.04)
IMM GRANULOCYTES NFR BLD AUTO: 0.1 %
INR PPP: 1.1
LYMPHOCYTES # BLD AUTO: 1.49 X10(3)/MCL (ref 0.6–4.6)
LYMPHOCYTES NFR BLD AUTO: 21 %
MCH RBC QN AUTO: 32.9 PG (ref 27–31)
MCHC RBC AUTO-ENTMCNC: 32.5 G/DL (ref 33–36)
MCV RBC AUTO: 101.3 FL (ref 80–94)
MONOCYTES # BLD AUTO: 0.8 X10(3)/MCL (ref 0.1–1.3)
MONOCYTES NFR BLD AUTO: 11.3 %
NEUTROPHILS # BLD AUTO: 4.65 X10(3)/MCL (ref 2.1–9.2)
NEUTROPHILS NFR BLD AUTO: 65.5 %
PLATELET # BLD AUTO: 227 X10(3)/MCL (ref 130–400)
PMV BLD AUTO: 9.6 FL (ref 7.4–10.4)
POTASSIUM SERPL-SCNC: 5.2 MMOL/L (ref 3.5–5.1)
PROT SERPL-MCNC: 8.5 GM/DL (ref 5.8–7.6)
PROTHROMBIN TIME: 11 SECONDS (ref 12.5–14.5)
RBC # BLD AUTO: 3.95 X10(6)/MCL (ref 4.7–6.1)
SODIUM SERPL-SCNC: 138 MMOL/L (ref 136–145)
T PALLIDUM AB SER QL: NONREACTIVE
WBC # BLD AUTO: 7.1 X10(3)/MCL (ref 4.5–11.5)

## 2025-08-05 PROCEDURE — 86708 HEPATITIS A ANTIBODY: CPT

## 2025-08-05 PROCEDURE — 86780 TREPONEMA PALLIDUM: CPT

## 2025-08-05 PROCEDURE — 86706 HEP B SURFACE ANTIBODY: CPT

## 2025-08-05 PROCEDURE — 85025 COMPLETE CBC W/AUTO DIFF WBC: CPT

## 2025-08-05 PROCEDURE — 87389 HIV-1 AG W/HIV-1&-2 AB AG IA: CPT

## 2025-08-05 PROCEDURE — 87902 NFCT AGT GNTYP ALYS HEP C: CPT

## 2025-08-05 PROCEDURE — 86039 ANTINUCLEAR ANTIBODIES (ANA): CPT

## 2025-08-05 PROCEDURE — 87340 HEPATITIS B SURFACE AG IA: CPT

## 2025-08-05 PROCEDURE — 80053 COMPREHEN METABOLIC PANEL: CPT

## 2025-08-05 PROCEDURE — 81596 NFCT DS CHRNC HCV 6 ASSAYS: CPT

## 2025-08-05 PROCEDURE — 85610 PROTHROMBIN TIME: CPT

## 2025-08-05 PROCEDURE — 86704 HEP B CORE ANTIBODY TOTAL: CPT

## 2025-08-05 PROCEDURE — 36415 COLL VENOUS BLD VENIPUNCTURE: CPT

## 2025-08-05 PROCEDURE — 87522 HEPATITIS C REVRS TRNSCRPJ: CPT

## 2025-08-05 PROCEDURE — 86709 HEPATITIS A IGM ANTIBODY: CPT

## 2025-08-05 PROCEDURE — 82728 ASSAY OF FERRITIN: CPT

## 2025-08-05 NOTE — TELEPHONE ENCOUNTER
----- Message from LINA Meza sent at 8/5/2025  2:31 PM CDT -----  Reviewed labs.  K 5.2. Pt will need to have BMP drawn ASAP as K is too high. This could be related to blood draw, but needs to be repeated as elevated K can cause heart arrhythmia.   ----- Message -----  From: Lab, Background User  Sent: 8/5/2025  11:41 AM CDT  To: LINA Velazquez

## 2025-08-05 NOTE — PROGRESS NOTES
Reviewed labs.  K 5.2. Pt will need to have BMP drawn ASAP as K is too high. This could be related to blood draw, but needs to be repeated as elevated K can cause heart arrhythmia.

## 2025-08-06 ENCOUNTER — HOSPITAL ENCOUNTER (EMERGENCY)
Facility: HOSPITAL | Age: 65
Discharge: HOME | End: 2025-08-06
Attending: INTERNAL MEDICINE
Payer: MEDICARE

## 2025-08-06 VITALS
SYSTOLIC BLOOD PRESSURE: 102 MMHG | OXYGEN SATURATION: 97 % | DIASTOLIC BLOOD PRESSURE: 55 MMHG | HEART RATE: 60 BPM | RESPIRATION RATE: 18 BRPM | HEIGHT: 69 IN | WEIGHT: 169 LBS | BODY MASS INDEX: 25.03 KG/M2 | TEMPERATURE: 98 F

## 2025-08-06 DIAGNOSIS — N28.9 RENAL INSUFFICIENCY: Primary | ICD-10-CM

## 2025-08-06 DIAGNOSIS — R07.9 CHEST PAIN: ICD-10-CM

## 2025-08-06 DIAGNOSIS — R07.9 CHEST PAIN, UNSPECIFIED TYPE: ICD-10-CM

## 2025-08-06 LAB
A2 MACROGLOB SERPL-MCNC: NORMAL
ALBUMIN SERPL-MCNC: 3.5 G/DL (ref 3.4–4.8)
ALBUMIN/GLOB SERPL: 0.8 RATIO (ref 1.1–2)
ALP SERPL-CCNC: 72 UNIT/L (ref 40–150)
ALT SERPL W P-5'-P-CCNC: NORMAL U/L
ALT SERPL-CCNC: 45 UNIT/L (ref 0–55)
ANA SER QL HEP2 SUBST: NORMAL
ANION GAP SERPL CALC-SCNC: 11 MEQ/L
ANNOTATION COMMENT IMP: NORMAL
APO A-I SERPL-MCNC: NORMAL
AST SERPL-CCNC: 60 UNIT/L (ref 11–45)
BASOPHILS # BLD AUTO: 0.05 X10(3)/MCL (ref 0.01–0.08)
BASOPHILS NFR BLD AUTO: 0.5 % (ref 0.1–1.2)
BILIRUB SERPL-MCNC: 0.4 MG/DL
BILIRUB SERPL-MCNC: NORMAL MG/DL
BUN SERPL-MCNC: 21 MG/DL (ref 8.4–25.7)
CALCIUM SERPL-MCNC: 8.3 MG/DL (ref 8.8–10)
CHLORIDE SERPL-SCNC: 98 MMOL/L (ref 98–107)
CO2 SERPL-SCNC: 25 MMOL/L (ref 23–31)
CREAT SERPL-MCNC: 1.91 MG/DL (ref 0.72–1.25)
CREAT/UREA NIT SERPL: 11
EOSINOPHIL # BLD AUTO: 0.08 X10(3)/MCL (ref 0.04–0.54)
EOSINOPHIL NFR BLD AUTO: 0.9 % (ref 0.7–7)
ERYTHROCYTE [DISTWIDTH] IN BLOOD BY AUTOMATED COUNT: 13.1 %
FIBROSIS STAGE SERPL QL: NORMAL
GFR SERPLBLD CREATININE-BSD FMLA CKD-EPI: 38 ML/MIN/1.73/M2
GGT SERPL-CCNC: NORMAL U/L
GLOBULIN SER-MCNC: 4.5 GM/DL (ref 2.4–3.5)
GLUCOSE SERPL-MCNC: 91 MG/DL (ref 82–115)
HAPTOGLOB SERPL NEPH-MCNC: NORMAL G/DL
HCT VFR BLD AUTO: 35.8 % (ref 36–52)
HGB BLD-MCNC: 11.9 G/DL (ref 13–18)
IMM GRANULOCYTES # BLD AUTO: 0.04 X10(3)/MCL (ref 0–0.03)
IMM GRANULOCYTES NFR BLD AUTO: 0.4 % (ref 0–0.5)
INR PPP: 1.1
LIVER FIBR SCORE SERPL CALC.FIBROSURE: NORMAL
LIVER FIBROSIS INTERPRETATION SER-IMP: NORMAL
LYMPHOCYTES # BLD AUTO: 1.6 X10(3)/MCL (ref 1.32–3.57)
LYMPHOCYTES NFR BLD AUTO: 17.5 % (ref 20–55)
MAGNESIUM SERPL-MCNC: 2.3 MG/DL (ref 1.6–2.6)
MCH RBC QN AUTO: 32.9 PG (ref 27–34)
MCHC RBC AUTO-ENTMCNC: 33.2 G/DL (ref 31–37)
MCV RBC AUTO: 98.9 FL (ref 79–99)
MONOCYTES # BLD AUTO: 0.99 X10(3)/MCL (ref 0.3–0.82)
MONOCYTES NFR BLD AUTO: 10.8 % (ref 4.7–12.5)
NECROINFLAMMATORY ACT GRADE SERPL QL: NORMAL
NECROINFLAMMATORY ACT SCORE SERPL: NORMAL
NECROINFLAMMATORY ACTIV INTERP SER-IMP: NORMAL
NEUTROPHILS # BLD AUTO: 6.39 X10(3)/MCL (ref 1.78–5.38)
NEUTROPHILS NFR BLD AUTO: 69.9 % (ref 37–73)
NRBC BLD AUTO-RTO: 0 %
NT-PROBNP SERPL-MCNC: 822 PG/ML
PLATELET # BLD AUTO: 202 X10(3)/MCL (ref 140–371)
PMV BLD AUTO: 9.6 FL (ref 9.4–12.4)
POTASSIUM SERPL-SCNC: 4.4 MMOL/L (ref 3.5–5.1)
PROT SERPL-MCNC: 8 GM/DL (ref 5.8–7.6)
PROTHROMBIN TIME: 10.8 SECONDS (ref 9.3–11.9)
RBC # BLD AUTO: 3.62 X10(6)/MCL (ref 4–6)
SERIAL #: NORMAL
SODIUM SERPL-SCNC: 134 MMOL/L (ref 136–145)
TROPONIN I SERPL HS-MCNC: 10 NG/L
TROPONIN I SERPL HS-MCNC: 10 NG/L
WBC # BLD AUTO: 9.15 X10(3)/MCL (ref 4–11.5)

## 2025-08-06 PROCEDURE — 85025 COMPLETE CBC W/AUTO DIFF WBC: CPT | Performed by: INTERNAL MEDICINE

## 2025-08-06 PROCEDURE — 84484 ASSAY OF TROPONIN QUANT: CPT | Performed by: INTERNAL MEDICINE

## 2025-08-06 PROCEDURE — 99285 EMERGENCY DEPT VISIT HI MDM: CPT | Mod: 25

## 2025-08-06 PROCEDURE — 85610 PROTHROMBIN TIME: CPT | Performed by: INTERNAL MEDICINE

## 2025-08-06 PROCEDURE — 80053 COMPREHEN METABOLIC PANEL: CPT | Performed by: INTERNAL MEDICINE

## 2025-08-06 PROCEDURE — 83880 ASSAY OF NATRIURETIC PEPTIDE: CPT | Performed by: INTERNAL MEDICINE

## 2025-08-06 PROCEDURE — 83735 ASSAY OF MAGNESIUM: CPT | Performed by: INTERNAL MEDICINE

## 2025-08-06 PROCEDURE — 93005 ELECTROCARDIOGRAM TRACING: CPT

## 2025-08-07 LAB
HCV GENTYP SERPL NAA+PROBE: NORMAL
HCV RNA SERPL NAA+PROBE-ACNC: NORMAL IU/ML

## 2025-08-11 ENCOUNTER — TELEPHONE (OUTPATIENT)
Dept: INFECTIOUS DISEASES | Facility: CLINIC | Age: 65
End: 2025-08-11
Payer: MEDICARE

## 2025-08-11 ENCOUNTER — LAB VISIT (OUTPATIENT)
Dept: LAB | Facility: HOSPITAL | Age: 65
End: 2025-08-11
Attending: NURSE PRACTITIONER
Payer: MEDICARE

## 2025-08-11 DIAGNOSIS — E87.5 HYPERKALEMIA: ICD-10-CM

## 2025-08-11 LAB
ANION GAP SERPL CALC-SCNC: 7 MEQ/L
BUN SERPL-MCNC: 26.6 MG/DL (ref 8.4–25.7)
CALCIUM SERPL-MCNC: 8.8 MG/DL (ref 8.8–10)
CHLORIDE SERPL-SCNC: 96 MMOL/L (ref 98–107)
CO2 SERPL-SCNC: 32 MMOL/L (ref 23–31)
CREAT SERPL-MCNC: 1.83 MG/DL (ref 0.72–1.25)
CREAT/UREA NIT SERPL: 15
GFR SERPLBLD CREATININE-BSD FMLA CKD-EPI: 40 ML/MIN/1.73/M2
GLUCOSE SERPL-MCNC: 117 MG/DL (ref 82–115)
POTASSIUM SERPL-SCNC: 4.9 MMOL/L (ref 3.5–5.1)
SODIUM SERPL-SCNC: 135 MMOL/L (ref 136–145)

## 2025-08-11 PROCEDURE — 80048 BASIC METABOLIC PNL TOTAL CA: CPT

## 2025-08-11 PROCEDURE — 36415 COLL VENOUS BLD VENIPUNCTURE: CPT

## 2025-08-12 ENCOUNTER — TELEPHONE (OUTPATIENT)
Dept: NEPHROLOGY | Facility: CLINIC | Age: 65
End: 2025-08-12
Payer: MEDICARE

## 2025-08-13 ENCOUNTER — OFFICE VISIT (OUTPATIENT)
Dept: FAMILY MEDICINE | Facility: CLINIC | Age: 65
End: 2025-08-13
Payer: MEDICARE

## 2025-08-13 VITALS
HEIGHT: 69 IN | DIASTOLIC BLOOD PRESSURE: 64 MMHG | BODY MASS INDEX: 25.22 KG/M2 | SYSTOLIC BLOOD PRESSURE: 121 MMHG | WEIGHT: 170.31 LBS | OXYGEN SATURATION: 93 % | TEMPERATURE: 98 F | HEART RATE: 60 BPM | RESPIRATION RATE: 18 BRPM

## 2025-08-13 DIAGNOSIS — I50.9 CONGESTIVE HEART FAILURE, UNSPECIFIED HF CHRONICITY, UNSPECIFIED HEART FAILURE TYPE: ICD-10-CM

## 2025-08-13 DIAGNOSIS — N17.9 AKI (ACUTE KIDNEY INJURY): Primary | ICD-10-CM

## 2025-08-13 DIAGNOSIS — K70.30 ALCOHOLIC CIRRHOSIS, UNSPECIFIED WHETHER ASCITES PRESENT: ICD-10-CM

## 2025-08-20 ENCOUNTER — TELEPHONE (OUTPATIENT)
Dept: FAMILY MEDICINE | Facility: CLINIC | Age: 65
End: 2025-08-20
Payer: MEDICARE

## 2025-08-25 DIAGNOSIS — J44.9 CHRONIC OBSTRUCTIVE PULMONARY DISEASE, UNSPECIFIED COPD TYPE: ICD-10-CM

## 2025-08-25 RX ORDER — TIOTROPIUM BROMIDE AND OLODATEROL 3.124; 2.736 UG/1; UG/1
SPRAY, METERED RESPIRATORY (INHALATION)
Qty: 4 G | Refills: 4 | Status: SHIPPED | OUTPATIENT
Start: 2025-08-25

## (undated) DEVICE — Device

## (undated) DEVICE — CATH INFINITI MULTIPAK JR4 5FR

## (undated) DEVICE — GUIDEWIRE OMNI J TIP 185CM

## (undated) DEVICE — CATH IMPELLA CP 14F 4.7X65MM

## (undated) DEVICE — DILATOR VESSEL 7FR

## (undated) DEVICE — CATH REVOLUTION IVUS 45MHZ

## (undated) DEVICE — DRAPE ANGIO BRACH 38X44IN

## (undated) DEVICE — CATH 5FR AL2.0

## (undated) DEVICE — KIT GLIDESHEATH SLEND 6FR 10CM

## (undated) DEVICE — COVER PROBE US 5.5X58L NON LTX

## (undated) DEVICE — KIT MANIFOLD LOW PRESS TUBING

## (undated) DEVICE — TUBING O2 FEMALE CONN 13FT

## (undated) DEVICE — SUT SILK 2-0 PS 18IN BLACK

## (undated) DEVICE — GLOVE 7.0 PROTEXIS PI MICRO

## (undated) DEVICE — FORCEP BIOPSY RADIAL JW 4

## (undated) DEVICE — KIT SURGICAL COLON .25 1.1OZ

## (undated) DEVICE — SUT 3-0 MONOCRYL PLUS PS-2

## (undated) DEVICE — TIP SUCTION YANKAUER

## (undated) DEVICE — PACK HEART CATH BR

## (undated) DEVICE — INFLATOR ENCORE 26 BLLN INFL

## (undated) DEVICE — PAD DEFIB CADENCE ADULT R2

## (undated) DEVICE — COLLECTION SPECIMEN NEPTUNE

## (undated) DEVICE — CATH JR4 5FR

## (undated) DEVICE — MANIFOLD 4 PORT

## (undated) DEVICE — ELECTRODE PATIENT RETURN DISP

## (undated) DEVICE — ADAPTER DUAL NSL LUER M-M 7FT

## (undated) DEVICE — ADHESIVE DERMABOND ADVANCED

## (undated) DEVICE — KIT SYR REUSABLE

## (undated) DEVICE — KIT CANIST SUCTION 1200CC

## (undated) DEVICE — PILLCAM SB3 EX EXTENDED

## (undated) DEVICE — DRAPE INCISE IOBAN 2 13X13IN

## (undated) DEVICE — SOL IRRI STRL WATER 1000ML

## (undated) DEVICE — BLOCK BLOX BITE DENT RIM 54FR

## (undated) DEVICE — CATH JL4 5FR

## (undated) DEVICE — ANGIOTOUCH KIT

## (undated) DEVICE — SHEATH INTRODUCER 6FR 11CM

## (undated) DEVICE — GUIDEWIRE SUPRA CORE 035 190CM

## (undated) DEVICE — DRESSING TELFA + BARR 4X6IN

## (undated) DEVICE — GUIDEWIRE V-18 CONTROL .18

## (undated) DEVICE — SUT 2-0 VICRYL / CT-1

## (undated) DEVICE — CATH CV QD LUMN 6FRX110CM

## (undated) DEVICE — BLLN CATH SENSATION PLUS 8FR

## (undated) DEVICE — BAND TR COMP DEVICE REG 24CM

## (undated) DEVICE — PACK OR CLEAN UP COMBO SIZE 2

## (undated) DEVICE — HEMOSTAT SURGICEL FIBRLR 2X4IN

## (undated) DEVICE — KIT HAND CONTROL HIGH PRESSUR

## (undated) DEVICE — SLING ARM COMFT NAVY BLU MED

## (undated) DEVICE — CATH PIG145 INFINITI 5X110CM

## (undated) DEVICE — KIT INTRODUCER STIFFEN MICRO

## (undated) DEVICE — CANNULA DUAL CO2/O2 NASAL 7FT

## (undated) DEVICE — SHEATH PINNACLE INTRO .035 4FR

## (undated) DEVICE — WIRE X-SUP CHOICE PT .014X182

## (undated) DEVICE — GUIDEWIRE SV-5 ST .018IN 300CM

## (undated) DEVICE — SHEATH INTRODUCER 9FR 11CM

## (undated) DEVICE — PACK ANGIOPLASTY ACCESS PLUS

## (undated) DEVICE — GOWN X-LG STERILE BACK

## (undated) DEVICE — BITE BLOCK ADULT

## (undated) DEVICE — GUIDE LAUNCHER 6FR EBU 3.5

## (undated) DEVICE — UNDERPAD DISPOSABLE 30X30IN

## (undated) DEVICE — OMNIPAQUE 300MG 150ML VIAL

## (undated) DEVICE — GLOVE 7.5 PROTEXIS PI MICRO

## (undated) DEVICE — CONTRAST ISOVUE 370 500ML MULT

## (undated) DEVICE — DEVICE ADVANCE DELIVERY 2.5 MM

## (undated) DEVICE — CONTAINER SPECIMEN 4OZ

## (undated) DEVICE — SUT SILK 0 BLK BR CT-1 30IN

## (undated) DEVICE — GUIDE WIRE J-TIP 260CM .025